# Patient Record
Sex: MALE | Race: BLACK OR AFRICAN AMERICAN | NOT HISPANIC OR LATINO | Employment: OTHER | URBAN - METROPOLITAN AREA
[De-identification: names, ages, dates, MRNs, and addresses within clinical notes are randomized per-mention and may not be internally consistent; named-entity substitution may affect disease eponyms.]

---

## 2019-05-07 ENCOUNTER — OFFICE VISIT (OUTPATIENT)
Dept: GASTROENTEROLOGY | Facility: CLINIC | Age: 69
End: 2019-05-07
Payer: MEDICARE

## 2019-05-07 VITALS
DIASTOLIC BLOOD PRESSURE: 95 MMHG | BODY MASS INDEX: 27.83 KG/M2 | OXYGEN SATURATION: 98 % | SYSTOLIC BLOOD PRESSURE: 142 MMHG | HEART RATE: 74 BPM | HEIGHT: 73 IN | WEIGHT: 210 LBS

## 2019-05-07 DIAGNOSIS — K21.00 GASTROESOPHAGEAL REFLUX DISEASE WITH ESOPHAGITIS: ICD-10-CM

## 2019-05-07 DIAGNOSIS — K57.30 DIVERTICULOSIS OF LARGE INTESTINE WITHOUT HEMORRHAGE: ICD-10-CM

## 2019-05-07 DIAGNOSIS — G89.4 CHRONIC PAIN SYNDROME: Primary | ICD-10-CM

## 2019-05-07 DIAGNOSIS — G89.29 CHRONIC BILATERAL LOW BACK PAIN WITHOUT SCIATICA: ICD-10-CM

## 2019-05-07 DIAGNOSIS — M54.50 CHRONIC BILATERAL LOW BACK PAIN WITHOUT SCIATICA: ICD-10-CM

## 2019-05-07 DIAGNOSIS — Z86.19 HEPATITIS C VIRUS INFECTION RESOLVED AFTER ANTIVIRAL DRUG THERAPY: ICD-10-CM

## 2019-05-07 PROCEDURE — 99999 PR PBB SHADOW E&M-EST. PATIENT-LVL III: ICD-10-PCS | Mod: PBBFAC,,, | Performed by: INTERNAL MEDICINE

## 2019-05-07 PROCEDURE — 99213 OFFICE O/P EST LOW 20 MIN: CPT | Mod: PBBFAC,PN | Performed by: INTERNAL MEDICINE

## 2019-05-07 PROCEDURE — 99203 OFFICE O/P NEW LOW 30 MIN: CPT | Mod: S$PBB,,, | Performed by: INTERNAL MEDICINE

## 2019-05-07 PROCEDURE — 99203 PR OFFICE/OUTPT VISIT, NEW, LEVL III, 30-44 MIN: ICD-10-PCS | Mod: S$PBB,,, | Performed by: INTERNAL MEDICINE

## 2019-05-07 PROCEDURE — 99999 PR PBB SHADOW E&M-EST. PATIENT-LVL III: CPT | Mod: PBBFAC,,, | Performed by: INTERNAL MEDICINE

## 2019-05-07 RX ORDER — HYDROCODONE BITARTRATE AND ACETAMINOPHEN 10; 325 MG/1; MG/1
1 TABLET ORAL EVERY 6 HOURS PRN
Qty: 100 TABLET | Refills: 0 | Status: SHIPPED | OUTPATIENT
Start: 2019-05-07 | End: 2019-06-07 | Stop reason: SDUPTHER

## 2019-05-07 RX ORDER — TRIAMTERENE AND HYDROCHLOROTHIAZIDE 75; 50 MG/1; MG/1
0.5 TABLET ORAL DAILY
COMMUNITY
Start: 2019-04-02

## 2019-05-07 RX ORDER — CHOLECALCIFEROL (VITAMIN D3) 25 MCG
1000 TABLET ORAL DAILY
COMMUNITY
End: 2023-08-14

## 2019-05-07 RX ORDER — METFORMIN HYDROCHLORIDE 1000 MG/1
1000 TABLET ORAL
COMMUNITY

## 2019-05-07 RX ORDER — HYDROCODONE BITARTRATE AND ACETAMINOPHEN 10; 325 MG/1; MG/1
1 TABLET ORAL EVERY 6 HOURS PRN
COMMUNITY
End: 2019-05-07 | Stop reason: SDUPTHER

## 2019-05-07 NOTE — PATIENT INSTRUCTIONS
He will continue his reflux regimen current medications vitamins and minerals.  He went for fiber to the diet and avoid constipation.  He is followed at the [facility] for follow-up for his chronic hepatitis-C.  He completed the therapy.  He has chronic back pain in his intolerant to the anti-inflammatory agents any uses his Lortab of correctly

## 2019-05-07 NOTE — PROGRESS NOTES
Subjective:       Patient ID: Garcia Renteria is a 69 y.o. male.    Chief Complaint: Medication Refill    He is trying to stay physically active.  He is going to the [facility] and is having physical therapy and also water therapy.  He has chronic back pain is had multiple surgeries.  He is intolerant to the anti-inflammatory agents which is caused abdominal pain and GI bleeding.  He uses the Norco 10 correctly. MS- is correct  he states the PPIs resolved the pyrosis dyspepsia.  It is a history of gastroesophageal reflux.  He denies dysphagia hematemesis hematochezia jaundice.  He has had his colonoscopy in the last 3 years.  He has a history of diverticulosis.  He denies constipation. He completed therapy for chronic hepatitis C and is cured.  He is followed by the [facility] who has scheduled abdominal ultrasounds and also performs his labs.  He states they have all been.  I have tried to request the results.  He denies tobacco alcohol.  He does have arthralgias knees.      Allergies:  Review of patient's allergies indicates:  No Known Allergies    Medications:    Current Outpatient Medications:     HYDROcodone-acetaminophen (NORCO)  mg per tablet, Take 1 tablet by mouth every 6 (six) hours as needed for Pain., Disp: 100 tablet, Rfl: 0    metFORMIN (GLUCOPHAGE) 1000 MG tablet, Take 1,000 mg by mouth daily with breakfast., Disp: , Rfl:     ranitidine (ZANTAC) 150 MG tablet, Take 150 mg by mouth nightly., Disp: , Rfl:     triamterene-hydrochlorothiazide 75-50 mg (MAXZIDE) 75-50 mg per tablet, Take 1 tablet by mouth once daily., Disp: , Rfl:     vitamin D (VITAMIN D3) 1000 units Tab, Take 1,000 Units by mouth once daily., Disp: , Rfl:     Past Medical History:   Diagnosis Date    Colitis     Diverticulosis        Past Surgical History:   Procedure Laterality Date    COLONOSCOPY      UPPER GASTROINTESTINAL ENDOSCOPY           Review of Systems   Constitutional: Negative for appetite change,  fever and unexpected weight change.   HENT: Negative for trouble swallowing.         No jaundice.   Respiratory: Negative for cough, shortness of breath and wheezing.         No Rales, Rhonchi, or Dyspnea.   Cardiovascular: Negative for chest pain.   Gastrointestinal: Negative for abdominal distention, abdominal pain, anal bleeding, blood in stool and constipation.        He has a history gastroesophageal reflux but he is currently symptom-free on his PPI.  He is having daily bowel movements.  He is ingesting adequate fiber he states he is taking his vitamins and minerals   Endocrine: Negative for cold intolerance and heat intolerance.   Genitourinary: Negative for difficulty urinating.   Musculoskeletal: Positive for arthralgias and back pain. Negative for neck pain.   Skin: Negative for pallor and rash.   Neurological: Negative for dizziness, seizures, syncope, speech difficulty, weakness and numbness.   Hematological: Negative for adenopathy.   Psychiatric/Behavioral: Negative for confusion.       Objective:      Physical Exam   Constitutional: He is oriented to person, place, and time. He appears well-developed and well-nourished.   Well-nourished well-hydrated nonicteric  male   HENT:   Head: Normocephalic.   Eyes: Pupils are equal, round, and reactive to light. EOM are normal.   Neck: Normal range of motion. Neck supple. No tracheal deviation present. No thyromegaly present.   Cardiovascular: Normal rate, regular rhythm and normal heart sounds.   Pulmonary/Chest: Effort normal and breath sounds normal.   Abdominal: Soft. Bowel sounds are normal. He exhibits no mass. There is no tenderness. There is no guarding.   The abdomen is soft mildly obese without organomegaly masses felt.  Bowel sounds are normal.  Tenderness is absent.  The liver is approximately 10 cm   Musculoskeletal: Normal range of motion.   The back reveals healed lumbar spine surgeries.  There is mild loss of curvature.    Lymphadenopathy:     He has no cervical adenopathy.   Neurological: He is alert and oriented to person, place, and time. No cranial nerve deficit.   Skin: Skin is warm and dry.   Psychiatric: He has a normal mood and affect. His behavior is normal.         Plan:       Chronic pain syndrome    Gastroesophageal reflux disease with esophagitis    Diverticulosis of large intestine without hemorrhage    Chronic bilateral low back pain without sciatica    Hepatitis C virus infection resolved after antiviral drug therapy    Other orders  -     HYDROcodone-acetaminophen (NORCO)  mg per tablet; Take 1 tablet by mouth every 6 (six) hours as needed for Pain.  Dispense: 100 tablet; Refill: 0

## 2019-06-07 ENCOUNTER — TELEPHONE (OUTPATIENT)
Dept: PAIN MEDICINE | Facility: CLINIC | Age: 69
End: 2019-06-07

## 2019-06-07 ENCOUNTER — OFFICE VISIT (OUTPATIENT)
Dept: GASTROENTEROLOGY | Facility: CLINIC | Age: 69
End: 2019-06-07
Payer: OTHER GOVERNMENT

## 2019-06-07 VITALS
WEIGHT: 210 LBS | OXYGEN SATURATION: 98 % | SYSTOLIC BLOOD PRESSURE: 114 MMHG | HEIGHT: 73 IN | HEART RATE: 79 BPM | BODY MASS INDEX: 27.83 KG/M2 | DIASTOLIC BLOOD PRESSURE: 76 MMHG

## 2019-06-07 DIAGNOSIS — K57.30 DIVERTICULOSIS OF LARGE INTESTINE WITHOUT HEMORRHAGE: ICD-10-CM

## 2019-06-07 DIAGNOSIS — M54.50 CHRONIC BILATERAL LOW BACK PAIN WITHOUT SCIATICA: Primary | ICD-10-CM

## 2019-06-07 DIAGNOSIS — K21.9 GASTROESOPHAGEAL REFLUX DISEASE WITHOUT ESOPHAGITIS: ICD-10-CM

## 2019-06-07 DIAGNOSIS — Z86.19 HEPATITIS C VIRUS INFECTION CURED AFTER ANTIVIRAL DRUG THERAPY: ICD-10-CM

## 2019-06-07 DIAGNOSIS — G89.29 CHRONIC BILATERAL LOW BACK PAIN WITHOUT SCIATICA: Primary | ICD-10-CM

## 2019-06-07 PROCEDURE — 99213 PR OFFICE/OUTPT VISIT, EST, LEVL III, 20-29 MIN: ICD-10-PCS | Mod: S$PBB,,, | Performed by: INTERNAL MEDICINE

## 2019-06-07 PROCEDURE — 99999 PR PBB SHADOW E&M-EST. PATIENT-LVL III: CPT | Mod: PBBFAC,,, | Performed by: INTERNAL MEDICINE

## 2019-06-07 PROCEDURE — 99999 PR PBB SHADOW E&M-EST. PATIENT-LVL III: ICD-10-PCS | Mod: PBBFAC,,, | Performed by: INTERNAL MEDICINE

## 2019-06-07 PROCEDURE — 99213 OFFICE O/P EST LOW 20 MIN: CPT | Mod: S$PBB,,, | Performed by: INTERNAL MEDICINE

## 2019-06-07 PROCEDURE — 99213 OFFICE O/P EST LOW 20 MIN: CPT | Mod: PBBFAC,PN | Performed by: INTERNAL MEDICINE

## 2019-06-07 RX ORDER — SILDENAFIL 100 MG/1
TABLET, FILM COATED ORAL
COMMUNITY
Start: 2019-05-14 | End: 2022-02-04 | Stop reason: SDUPTHER

## 2019-06-07 RX ORDER — HYDROCODONE BITARTRATE AND ACETAMINOPHEN 10; 325 MG/1; MG/1
1 TABLET ORAL EVERY 6 HOURS PRN
Qty: 100 TABLET | Refills: 0 | Status: SHIPPED | OUTPATIENT
Start: 2019-06-07 | End: 2019-09-04 | Stop reason: SDUPTHER

## 2019-06-07 NOTE — PATIENT INSTRUCTIONS
He will be referred to the Pain Clinic.  He has had multiple surgeries and has chronic back pain. He uses the Norco correctly.  His diabetes is well controlled and he will continue his diabetic diet vitamins and minerals.  I have requested his ultrasound labs from the [facility].  He will continue his gastrointestinal treatment with the reflux regimen and fiber to avoid constipation

## 2019-06-07 NOTE — PROGRESS NOTES
Noted his history and physical is thereSubjective:       Patient ID: Garcia Renteria is a 69 y.o. male.    Chief Complaint: Follow-up (1m) and Medication Refill    He goes to the [facility].  He had an ultrasound and labs obtained.  I have requested them.  He was told that they were normal. He has chronic back pain.  The Norco has worked very well. He states that he has had an cervical spine surgery lumbar spine surgery twice and ankle surgery.  He is intolerant to the anti-inflammatory agents.  He has a history gastroesophageal reflux but he states that he has not had pyrosis dyspepsia hematemesis hematochezia or dysphagia or aspiration.  The PPI is worked well.  His diabetes is well controlled on the metformin.  He is on the diabetic diet.  He has history of diverticulosis and is having daily bowel movements.  He denies fever chills.  He has completed his medicine for chronic hepatitis C and is cured. MS- he is correct                                                                                                              Medication Refill   This is a chronic problem. The current episode started more than 1 year ago. The problem occurs constantly. The problem has been unchanged. Associated symptoms include arthralgias and neck pain. Pertinent negatives include no abdominal pain, chest pain, coughing, fever, numbness, rash or weakness. The symptoms are aggravated by bending and walking. He has tried oral narcotics for the symptoms.       Allergies:  Review of patient's allergies indicates:  No Known Allergies    Medications:    Current Outpatient Medications:     HYDROcodone-acetaminophen (NORCO)  mg per tablet, Take 1 tablet by mouth every 6 (six) hours as needed for Pain., Disp: 100 tablet, Rfl: 0    metFORMIN (GLUCOPHAGE) 1000 MG tablet, Take 1,000 mg by mouth daily with breakfast., Disp: , Rfl:     ranitidine (ZANTAC) 150 MG tablet, Take 150 mg by mouth nightly., Disp: , Rfl:     sildenafil  (VIAGRA) 100 MG tablet, , Disp: , Rfl:     triamterene-hydrochlorothiazide 75-50 mg (MAXZIDE) 75-50 mg per tablet, Take 1 tablet by mouth once daily., Disp: , Rfl:     vitamin D (VITAMIN D3) 1000 units Tab, Take 1,000 Units by mouth once daily., Disp: , Rfl:     Past Medical History:   Diagnosis Date    Colitis     Diverticulosis        Past Surgical History:   Procedure Laterality Date    COLONOSCOPY      UPPER GASTROINTESTINAL ENDOSCOPY           Review of Systems   Constitutional: Negative for appetite change, fever and unexpected weight change.   HENT: Negative for trouble swallowing.         No jaundice.   Respiratory: Negative for cough, shortness of breath and wheezing.         He is a daily cigarette smoker.  He has been offered the smoking cessation program in the past but does not want to stop smoking.  He denies cardiopulmonary symptoms but he is not as physically active because of his back pain   Cardiovascular: Negative for chest pain.        Hypertension is well controlled   Gastrointestinal: Negative for abdominal distention, abdominal pain, anal bleeding and blood in stool.   Endocrine:        Diabetes is well controlled he states   Musculoskeletal: Positive for arthralgias, back pain and neck pain.        Ambulates with cane   Skin: Negative for pallor and rash.   Neurological: Negative for dizziness, seizures, syncope, speech difficulty, weakness and numbness.   Hematological: Negative for adenopathy.   Psychiatric/Behavioral: Negative for confusion.       Objective:      Physical Exam   Constitutional: He is oriented to person, place, and time. He appears well-developed and well-nourished.   Well-nourished well-hydrated nonicteric  male   HENT:   Head: Normocephalic.   Eyes: Pupils are equal, round, and reactive to light. EOM are normal.   Neck: Normal range of motion. Neck supple. No tracheal deviation present. No thyromegaly present.   Cardiovascular: Normal rate, regular  rhythm and normal heart sounds.   Pulmonary/Chest: Effort normal and breath sounds normal.   Abdominal: Soft. Bowel sounds are normal. He exhibits no distension and no mass. There is no tenderness. There is no guarding.   The abdomen is soft mildly obese without organomegaly or masses felt.  Bowel sounds are normal. Tenderness is absent   Musculoskeletal:   Decreased range of motion of his ankles and also he has lost the curvature of his spine.  There healed cervical spine scars and lumbar scars   Lymphadenopathy:     He has no cervical adenopathy.   Neurological: He is alert and oriented to person, place, and time. No cranial nerve deficit.   Skin: Skin is warm and dry.   Psychiatric: He has a normal mood and affect. His behavior is normal.   Vitals reviewed.        Plan:       Chronic bilateral low back pain without sciatica  -     Ambulatory Referral to Pain Clinic    Hepatitis C virus infection cured after antiviral drug therapy    Gastroesophageal reflux disease without esophagitis    Diverticulosis of large intestine without hemorrhage    Other orders  -     HYDROcodone-acetaminophen (NORCO)  mg per tablet; Take 1 tablet by mouth every 6 (six) hours as needed for Pain.  Dispense: 100 tablet; Refill: 0

## 2019-06-07 NOTE — PROGRESS NOTES
Subjective:       Patient ID: Garcia Renteria is a 69 y.o. male.    Chief Complaint: Follow-up (1m) and Medication Refill    Medication Refill         Allergies:  Review of patient's allergies indicates:  No Known Allergies    Medications:    Current Outpatient Medications:     HYDROcodone-acetaminophen (NORCO)  mg per tablet, Take 1 tablet by mouth every 6 (six) hours as needed for Pain., Disp: 100 tablet, Rfl: 0    metFORMIN (GLUCOPHAGE) 1000 MG tablet, Take 1,000 mg by mouth daily with breakfast., Disp: , Rfl:     ranitidine (ZANTAC) 150 MG tablet, Take 150 mg by mouth nightly., Disp: , Rfl:     sildenafil (VIAGRA) 100 MG tablet, , Disp: , Rfl:     triamterene-hydrochlorothiazide 75-50 mg (MAXZIDE) 75-50 mg per tablet, Take 1 tablet by mouth once daily., Disp: , Rfl:     vitamin D (VITAMIN D3) 1000 units Tab, Take 1,000 Units by mouth once daily., Disp: , Rfl:     Past Medical History:   Diagnosis Date    Colitis     Diverticulosis        Past Surgical History:   Procedure Laterality Date    COLONOSCOPY      UPPER GASTROINTESTINAL ENDOSCOPY           Review of Systems    Objective:      Physical Exam      Plan:       Chronic bilateral low back pain without sciatica  -     Ambulatory Referral to Pain Clinic    Hepatitis C virus infection cured after antiviral drug therapy    Gastroesophageal reflux disease without esophagitis    Diverticulosis of large intestine without hemorrhage    Other orders  -     HYDROcodone-acetaminophen (NORCO)  mg per tablet; Take 1 tablet by mouth every 6 (six) hours as needed for Pain.  Dispense: 100 tablet; Refill: 0

## 2019-06-07 NOTE — PROGRESS NOTES
Validity without doSubjective:       Patient ID: Garcia Renteria is a 69 y.o. male.    Chief Complaint: Follow-up (1m) and Medication Refill    Medication Refill         Allergies:  Review of patient's allergies indicates:  No Known Allergies    Medications:    Current Outpatient Medications:     HYDROcodone-acetaminophen (NORCO)  mg per tablet, Take 1 tablet by mouth every 6 (six) hours as needed for Pain., Disp: 100 tablet, Rfl: 0    metFORMIN (GLUCOPHAGE) 1000 MG tablet, Take 1,000 mg by mouth daily with breakfast., Disp: , Rfl:     ranitidine (ZANTAC) 150 MG tablet, Take 150 mg by mouth nightly., Disp: , Rfl:     sildenafil (VIAGRA) 100 MG tablet, , Disp: , Rfl:     triamterene-hydrochlorothiazide 75-50 mg (MAXZIDE) 75-50 mg per tablet, Take 1 tablet by mouth once daily., Disp: , Rfl:     vitamin D (VITAMIN D3) 1000 units Tab, Take 1,000 Units by mouth once daily., Disp: , Rfl:     Past Medical History:   Diagnosis Date    Colitis     Diverticulosis        Past Surgical History:   Procedure Laterality Date    COLONOSCOPY      UPPER GASTROINTESTINAL ENDOSCOPY           Review of Systems    Objective:      Physical Exam      Plan:       Chronic bilateral low back pain without sciatica  -     Ambulatory Referral to Pain Clinic    Hepatitis C virus infection cured after antiviral drug therapy    Gastroesophageal reflux disease without esophagitis    Diverticulosis of large intestine without hemorrhage    Other orders  -     HYDROcodone-acetaminophen (NORCO)  mg per tablet; Take 1 tablet by mouth every 6 (six) hours as needed for Pain.  Dispense: 100 tablet; Refill: 0

## 2019-06-07 NOTE — TELEPHONE ENCOUNTER
Spoke with patient while in clinic this am. Patient requested appointment with Dr. Mendez on 8/5/19, as he is seeing Dr. Elpidio Lau that day and he is driving from [place].   [facility] for patient advising of 930am with Dr. Mendez availability. If patient would like this appt, he will need to reschedule Dr. Elpidio Lau appt for later in the day.

## 2019-07-18 ENCOUNTER — TELEPHONE (OUTPATIENT)
Dept: PAIN MEDICINE | Facility: CLINIC | Age: 69
End: 2019-07-18

## 2019-08-01 ENCOUNTER — TELEPHONE (OUTPATIENT)
Dept: PAIN MEDICINE | Facility: CLINIC | Age: 69
End: 2019-08-01

## 2019-08-01 NOTE — TELEPHONE ENCOUNTER
M requesting call back to reschedule appointment with Dr. Mendez on 8/5/19 from 9:30am to 1:30pm.    Spoke with patient's wife, Khloe. States patient will take appointment at 1:30pm. Appointment time changed in chart. Provided new address of clinic. Patient's wife voiced understanding.

## 2019-08-05 ENCOUNTER — OFFICE VISIT (OUTPATIENT)
Dept: PAIN MEDICINE | Facility: CLINIC | Age: 69
End: 2019-08-05
Payer: MEDICARE

## 2019-08-05 VITALS
OXYGEN SATURATION: 97 % | WEIGHT: 213.31 LBS | TEMPERATURE: 98 F | SYSTOLIC BLOOD PRESSURE: 130 MMHG | RESPIRATION RATE: 16 BRPM | BODY MASS INDEX: 28.27 KG/M2 | HEIGHT: 73 IN | HEART RATE: 75 BPM | DIASTOLIC BLOOD PRESSURE: 79 MMHG

## 2019-08-05 DIAGNOSIS — M54.50 LOW BACK PAIN, NON-SPECIFIC: ICD-10-CM

## 2019-08-05 DIAGNOSIS — M51.36 DDD (DEGENERATIVE DISC DISEASE), LUMBAR: ICD-10-CM

## 2019-08-05 DIAGNOSIS — G89.4 CHRONIC PAIN DISORDER: Primary | ICD-10-CM

## 2019-08-05 DIAGNOSIS — Z98.890 H/O LUMBOSACRAL SPINE SURGERY: ICD-10-CM

## 2019-08-05 PROCEDURE — 99204 PR OFFICE/OUTPT VISIT, NEW, LEVL IV, 45-59 MIN: ICD-10-PCS | Mod: 25,S$PBB,, | Performed by: ANESTHESIOLOGY

## 2019-08-05 PROCEDURE — 99999 PR PBB SHADOW E&M-EST. PATIENT-LVL IV: ICD-10-PCS | Mod: PBBFAC,,, | Performed by: ANESTHESIOLOGY

## 2019-08-05 PROCEDURE — 99204 OFFICE O/P NEW MOD 45 MIN: CPT | Mod: 25,S$PBB,, | Performed by: ANESTHESIOLOGY

## 2019-08-05 PROCEDURE — 96372 PR INJECTION,THERAP/PROPH/DIAG2ST, IM OR SUBCUT: ICD-10-PCS | Mod: ,,, | Performed by: ANESTHESIOLOGY

## 2019-08-05 PROCEDURE — 96372 THER/PROPH/DIAG INJ SC/IM: CPT | Mod: PBBFAC,PO

## 2019-08-05 PROCEDURE — 96372 THER/PROPH/DIAG INJ SC/IM: CPT | Mod: ,,, | Performed by: ANESTHESIOLOGY

## 2019-08-05 PROCEDURE — 99214 OFFICE O/P EST MOD 30 MIN: CPT | Mod: PBBFAC,PO,25 | Performed by: ANESTHESIOLOGY

## 2019-08-05 PROCEDURE — 99999 PR PBB SHADOW E&M-EST. PATIENT-LVL IV: CPT | Mod: PBBFAC,,, | Performed by: ANESTHESIOLOGY

## 2019-08-05 RX ORDER — GABAPENTIN 300 MG/1
600 CAPSULE ORAL NIGHTLY
Qty: 60 CAPSULE | Refills: 5 | Status: SHIPPED | OUTPATIENT
Start: 2019-08-05 | End: 2019-10-07 | Stop reason: SDUPTHER

## 2019-08-05 NOTE — LETTER
August 6, 2019      Elpidio Lau MD  [address] [place] [address]           [facility]  [address] [place] [address]  Phone: [phone]  Fax: [phone]          Patient: Garcia Renteria   MR Number: [id number]   YOB: 1950   Date of Visit: 8/5/2019       Dear Dr. Elpidio Lau:    Thank you for referring Garcia Renteria to me for evaluation. Attached you will find relevant portions of my assessment and plan of care.    If you have questions, please do not hesitate to call me. I look forward to following Garcia Renteria along with you.    Sincerely,    Estephania Mendez MD    Enclosure  CC:  No Recipients    If you would like to receive this communication electronically, please contact [email or web address] or [phone] to request more information on [employer] access.    For providers and/or their staff who would like to refer a patient to [facility], please contact us through our one-stop-shop provider referral line, [facility] , at [phone].    If you feel you have received this communication in error or would no longer like to receive these types of communications, please e-mail [email or web address]
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
## 2019-08-05 NOTE — PATIENT INSTRUCTIONS
"We are going to start gabapentin for your low back pain.  To help with side effects, I recommend starting this slowly.  This medication may make you sleepy, so do not drive until you know how it affect you.  Start this medication at bedtime.  If it does not make you too sleepy, you can increase this as listed below to 2 capsules at bedtime.  Some people choose to only take this at night.  Other side effects are generally mild and include diarrhea, nausea, and "forgetfullness."    Gabapentin dose schedule:  - Start with one capsule at night for 7 days   - Then increase to two capsules at night  "

## 2019-08-06 ENCOUNTER — HOSPITAL ENCOUNTER (OUTPATIENT)
Dept: RADIOLOGY | Facility: HOSPITAL | Age: 69
Discharge: HOME | End: 2019-08-06
Attending: ANESTHESIOLOGY
Payer: MEDICARE

## 2019-08-06 DIAGNOSIS — M54.50 LOW BACK PAIN, NON-SPECIFIC: ICD-10-CM

## 2019-08-06 DIAGNOSIS — G89.4 CHRONIC PAIN DISORDER: ICD-10-CM

## 2019-08-06 DIAGNOSIS — M51.36 DDD (DEGENERATIVE DISC DISEASE), LUMBAR: ICD-10-CM

## 2019-08-06 DIAGNOSIS — Z98.890 H/O LUMBOSACRAL SPINE SURGERY: ICD-10-CM

## 2019-08-06 PROCEDURE — 72148 MRI LUMBAR SPINE W/O DYE: CPT | Mod: 26,,, | Performed by: RADIOLOGY

## 2019-08-06 PROCEDURE — 72148 MRI LUMBAR SPINE WITHOUT CONTRAST: ICD-10-PCS | Mod: 26,,, | Performed by: RADIOLOGY

## 2019-08-06 PROCEDURE — 72110 XR LUMBAR SPINE 5 VIEW WITH FLEX AND EXT: ICD-10-PCS | Mod: 26,,, | Performed by: RADIOLOGY

## 2019-08-06 PROCEDURE — 72148 MRI LUMBAR SPINE W/O DYE: CPT | Mod: TC

## 2019-08-06 PROCEDURE — 72110 X-RAY EXAM L-2 SPINE 4/>VWS: CPT | Mod: 26,,, | Performed by: RADIOLOGY

## 2019-08-06 PROCEDURE — 72110 X-RAY EXAM L-2 SPINE 4/>VWS: CPT | Mod: TC,FY

## 2019-08-06 RX ORDER — METHYLPREDNISOLONE ACETATE 80 MG/ML
80 INJECTION, SUSPENSION INTRA-ARTICULAR; INTRALESIONAL; INTRAMUSCULAR; SOFT TISSUE
Status: COMPLETED | OUTPATIENT
Start: 2019-08-06 | End: 2019-08-06

## 2019-08-06 RX ORDER — LIDOCAINE HYDROCHLORIDE 10 MG/ML
2 INJECTION INFILTRATION; PERINEURAL
Status: COMPLETED | OUTPATIENT
Start: 2019-08-06 | End: 2019-08-06

## 2019-08-06 RX ADMIN — METHYLPREDNISOLONE ACETATE 80 MG: 80 INJECTION, SUSPENSION INTRA-ARTICULAR; INTRALESIONAL; INTRAMUSCULAR; SOFT TISSUE at 05:08

## 2019-08-06 RX ADMIN — LIDOCAINE HYDROCHLORIDE 2 ML: 10 INJECTION INFILTRATION; PERINEURAL at 05:08

## 2019-08-07 ENCOUNTER — OFFICE VISIT (OUTPATIENT)
Dept: GASTROENTEROLOGY | Facility: CLINIC | Age: 69
End: 2019-08-07
Payer: MEDICARE

## 2019-08-07 VITALS
DIASTOLIC BLOOD PRESSURE: 74 MMHG | OXYGEN SATURATION: 98 % | HEIGHT: 73 IN | SYSTOLIC BLOOD PRESSURE: 124 MMHG | WEIGHT: 209 LBS | BODY MASS INDEX: 27.7 KG/M2 | RESPIRATION RATE: 18 BRPM | TEMPERATURE: 98 F | HEART RATE: 71 BPM

## 2019-08-07 DIAGNOSIS — K21.00 GASTROESOPHAGEAL REFLUX DISEASE WITH ESOPHAGITIS: ICD-10-CM

## 2019-08-07 DIAGNOSIS — M54.41 CHRONIC BILATERAL LOW BACK PAIN WITH RIGHT-SIDED SCIATICA: ICD-10-CM

## 2019-08-07 DIAGNOSIS — G89.4 CHRONIC PAIN SYNDROME: Primary | ICD-10-CM

## 2019-08-07 DIAGNOSIS — G89.29 CHRONIC BILATERAL LOW BACK PAIN WITH RIGHT-SIDED SCIATICA: ICD-10-CM

## 2019-08-07 DIAGNOSIS — K57.30 DIVERTICULOSIS OF LARGE INTESTINE WITHOUT HEMORRHAGE: ICD-10-CM

## 2019-08-07 PROCEDURE — 99213 OFFICE O/P EST LOW 20 MIN: CPT | Mod: S$PBB,,, | Performed by: INTERNAL MEDICINE

## 2019-08-07 PROCEDURE — 99213 OFFICE O/P EST LOW 20 MIN: CPT | Mod: PBBFAC,PN | Performed by: INTERNAL MEDICINE

## 2019-08-07 PROCEDURE — 99999 PR PBB SHADOW E&M-EST. PATIENT-LVL III: CPT | Mod: PBBFAC,,, | Performed by: INTERNAL MEDICINE

## 2019-08-07 PROCEDURE — 99213 PR OFFICE/OUTPT VISIT, EST, LEVL III, 20-29 MIN: ICD-10-PCS | Mod: S$PBB,,, | Performed by: INTERNAL MEDICINE

## 2019-08-07 PROCEDURE — 99999 PR PBB SHADOW E&M-EST. PATIENT-LVL III: ICD-10-PCS | Mod: PBBFAC,,, | Performed by: INTERNAL MEDICINE

## 2019-08-07 RX ORDER — OMEPRAZOLE 20 MG/1
20 CAPSULE, DELAYED RELEASE ORAL DAILY
Qty: 90 CAPSULE | Refills: 1 | Status: SHIPPED | OUTPATIENT
Start: 2019-08-07 | End: 2020-01-02 | Stop reason: SDUPTHER

## 2019-08-07 NOTE — PATIENT INSTRUCTIONS
He will follow up with the Pain Clinic.  He uses the Norco correctly.  The ranitidine has not controlled his reflux and he will be started on the omeprazole 20 mg daily.  He will make a food diary and avoid the offending foods.  He continues his other medications vitamins and minerals.  Discharge Instructions for Gastroesophageal Reflux Disease (GERD)  Gastroesophageal reflux disease (GERD) is a backflow of acid from the stomach into the swallowing tube (esophagus).  Home care  These home care steps can help you manage GERD:  · Maintain a healthy weight. Get help to lose any extra pounds.  · Avoid lying down after meals.  · Avoid eating late at night.  · Elevate the head of your bed by 6 inches. You can do this by placing wooden blocks or bed risers under the head of your bed.  · Avoid wearing tight-fitting clothes.  · Avoid foods that might irritate your stomach, such as the following:  ¨ Alcohol  ¨ Fat  ¨ Chocolate  ¨ Caffeine  ¨ Spearmint or peppermint  · Talk to your healthcare provider if you are taking any of the following medicines. These medicines can make GERD symptoms worse:  ¨ Calcium channel blockers  ¨ Theophylline  ¨ Anticholinergic medicines, such as oxybutynin and benzatropine  · Begin an exercise program. Ask your healthcare provider how to get started. You can benefit from simple activities, such as walking or gardening.  · Break the smoking habit. Enroll in a stop-smoking program to improve your chances of success.  · Limit alcohol intake to no more than 2 drinks a day.  · Take your medicines exactly as directed. Dont skip doses.  · Avoid over-the-counter nonsteroidal anti-inflammatory medicines, such as aspirin and ibuprofen, unless recommended by your healthcare provider for certain conditions.   · If possible, avoid nitrates (heart medicines, such as nitroglycerin and isosorbide dinitrate ).  Follow-up care  Make a follow-up appointment as directed by our staff.     When to call the healthcare  provider  Call your healthcare provider immediately if you have any of the following:  · Trouble swallowing  · Pain when swallowing  · Feeling of food caught in your chest or throat  · Pain in the neck, chest, or back  · Heartburn that causes you to vomit  · Vomiting blood  · Black or tarry stools (from digested blood)  · More saliva (watering of the mouth) than usual  · Weight loss of more than 3% to 5% of your total body weight in a month  · Hoarseness or sore throat that wont go away  · Choking, coughing, or wheezing   Date Last Reviewed: 7/1/2016  © 3147-6024 [employer]. [address] [place] [address], [place] [address]. All rights reserved. This information is not intended as a substitute for professional medical care. Always follow your healthcare professional's instructions.

## 2019-08-07 NOTE — PROGRESS NOTES
Subjective:       Patient ID: Garcia Renteria is a 69 y.o. male.    Chief Complaint: Follow-up (2 m)    He has history of gastroesophageal reflux and diverticulosis.  He has had a recent colonoscopy.  He states the ranitidine has not helped him.  He is having pyrosis but he denies dysphagia aspiration.  He denies hematemesis hematochezia jaundice or bleeding.  He generally has daily bowel movements.  He has a history of chronic hepatitis C and responded to therapy and is considered a cure.  He denies jaundice or alcohol.  He is having daily bowel movements.      Allergies:  Review of patient's allergies indicates:  No Known Allergies    Medications:    Current Outpatient Medications:     gabapentin (NEURONTIN) 300 MG capsule, Take 2 capsules (600 mg total) by mouth every evening., Disp: 60 capsule, Rfl: 5    HYDROcodone-acetaminophen (NORCO)  mg per tablet, Take 1 tablet by mouth every 6 (six) hours as needed for Pain., Disp: 100 tablet, Rfl: 0    metFORMIN (GLUCOPHAGE) 1000 MG tablet, Take 1,000 mg by mouth daily with breakfast., Disp: , Rfl:     ranitidine (ZANTAC) 150 MG tablet, Take 150 mg by mouth nightly., Disp: , Rfl:     sildenafil (VIAGRA) 100 MG tablet, , Disp: , Rfl:     triamterene-hydrochlorothiazide 75-50 mg (MAXZIDE) 75-50 mg per tablet, Take 1 tablet by mouth once daily., Disp: , Rfl:     vitamin D (VITAMIN D3) 1000 units Tab, Take 1,000 Units by mouth once daily., Disp: , Rfl:     omeprazole (PRILOSEC) 20 MG capsule, Take 1 capsule (20 mg total) by mouth once daily., Disp: 90 capsule, Rfl: 1  No current facility-administered medications for this visit.     Past Medical History:   Diagnosis Date    Colitis     Diabetes mellitus type I     Diverticulosis        Past Surgical History:   Procedure Laterality Date    ANKLE SURGERY Bilateral     BACK SURGERY      COLONOSCOPY      lower back surgery      NECK SURGERY      UPPER GASTROINTESTINAL ENDOSCOPY           Review of Systems    Constitutional: Negative for appetite change, fever and unexpected weight change.   HENT: Negative for trouble swallowing.         No jaundice.   Respiratory: Negative for cough, shortness of breath and wheezing.         He said every day cigarette smoker.  He denies cardiopulmonary symptoms but is not physically active.  He denies chronic cough or aspiration.   Cardiovascular: Negative for chest pain.        He states his hypertension well controlled.  He denies cardiopulmonary symptoms   Gastrointestinal: Negative for abdominal distention, abdominal pain, anal bleeding, blood in stool, constipation, diarrhea, nausea, rectal pain and vomiting.   Endocrine:        He states his diabetes is well controlled.  He is on the ADA diet.   Musculoskeletal: Positive for arthralgias, back pain, neck pain and neck stiffness.        He complains of increase in severe back pain with radiation to the right leg.  He has not been physically active.  He is being evaluated in the Pain Clinic.  He has scans and x-rays scheduled to have his back.  He has had 2 back surgeries 1 cervical spine surgery.  He is having difficulty now with numbness of the right leg.  He is having difficulty ambulating even with his cane.  He denies falling.  He uses the Norco correctly.  He avoids the anti-inflammatory agents.  He is intolerant to them.MS- is correct   Skin: Negative for pallor and rash.   Neurological: Negative for dizziness, seizures, syncope, speech difficulty, weakness and numbness.   Hematological: Negative for adenopathy.   Psychiatric/Behavioral: Negative for confusion.       Objective:      Physical Exam   Constitutional: He is oriented to person, place, and time. He appears well-developed and well-nourished.   Well-nourished well-hydrated nonicteric  male   HENT:   Head: Normocephalic.   Eyes: Pupils are equal, round, and reactive to light. EOM are normal.   Neck: Normal range of motion. Neck supple. No tracheal  deviation present. No thyromegaly present.   Cardiovascular: Normal rate, regular rhythm and normal heart sounds.   Pulmonary/Chest: Effort normal and breath sounds normal.   Abdominal: Soft. Bowel sounds are normal. He exhibits no distension and no mass. There is no tenderness. There is no rebound and no guarding.   Musculoskeletal:   His gait is unsteady.  He needs his cane for ambulation.  He leans forward is he walks.  He can get from the site sitting to the standing position with the assistance the cane.  He states that if he straightens his back he will have extreme weakness in his legs and feels as if he would fall.   Lymphadenopathy:     He has no cervical adenopathy.   Neurological: He is alert and oriented to person, place, and time. No cranial nerve deficit.   Skin: Skin is warm and dry.   Psychiatric: He has a normal mood and affect. His behavior is normal.   Vitals reviewed.        Plan:       Chronic pain syndrome    Chronic bilateral low back pain with right-sided sciatica    Gastroesophageal reflux disease with esophagitis    Diverticulosis of large intestine without hemorrhage    Other orders  -     omeprazole (PRILOSEC) 20 MG capsule; Take 1 capsule (20 mg total) by mouth once daily.  Dispense: 90 capsule; Refill: 1

## 2019-08-07 NOTE — PROGRESS NOTES
He will continue her reflux regimen.  He started on the omeprazole and he can stop the ranitidine.  He will continue his therapy and evaluation in the Pain Clinic.  He is using the Norco correctly.  He will avoid the anti-inflammatory agents.  He continues the ADA diet with fiber supplements.  He will consider smoking cessation program.  He is not using alcohol.

## 2019-08-27 ENCOUNTER — OFFICE VISIT (OUTPATIENT)
Dept: PAIN MEDICINE | Facility: CLINIC | Age: 69
End: 2019-08-27
Payer: MEDICARE

## 2019-08-27 VITALS
TEMPERATURE: 98 F | WEIGHT: 214.31 LBS | OXYGEN SATURATION: 98 % | RESPIRATION RATE: 18 BRPM | DIASTOLIC BLOOD PRESSURE: 75 MMHG | HEIGHT: 73 IN | HEART RATE: 74 BPM | BODY MASS INDEX: 28.4 KG/M2 | SYSTOLIC BLOOD PRESSURE: 117 MMHG

## 2019-08-27 DIAGNOSIS — M79.18 MYOFASCIAL PAIN: ICD-10-CM

## 2019-08-27 DIAGNOSIS — Z98.890 H/O LUMBOSACRAL SPINE SURGERY: ICD-10-CM

## 2019-08-27 DIAGNOSIS — M51.36 DDD (DEGENERATIVE DISC DISEASE), LUMBAR: ICD-10-CM

## 2019-08-27 DIAGNOSIS — G89.4 CHRONIC PAIN DISORDER: Primary | ICD-10-CM

## 2019-08-27 DIAGNOSIS — M48.062 SPINAL STENOSIS OF LUMBAR REGION WITH NEUROGENIC CLAUDICATION: ICD-10-CM

## 2019-08-27 PROCEDURE — 20552 NJX 1/MLT TRIGGER POINT 1/2: CPT | Mod: S$PBB,,, | Performed by: ANESTHESIOLOGY

## 2019-08-27 PROCEDURE — 20552 PR INJECT TRIGGER POINT, 1 OR 2: ICD-10-PCS | Mod: S$PBB,,, | Performed by: ANESTHESIOLOGY

## 2019-08-27 PROCEDURE — 96372 THER/PROPH/DIAG INJ SC/IM: CPT | Mod: PBBFAC,PO

## 2019-08-27 PROCEDURE — 99999 PR PBB SHADOW E&M-EST. PATIENT-LVL IV: CPT | Mod: PBBFAC,,, | Performed by: ANESTHESIOLOGY

## 2019-08-27 PROCEDURE — 99214 OFFICE O/P EST MOD 30 MIN: CPT | Mod: S$PBB,25,, | Performed by: ANESTHESIOLOGY

## 2019-08-27 PROCEDURE — 99999 PR PBB SHADOW E&M-EST. PATIENT-LVL IV: ICD-10-PCS | Mod: PBBFAC,,, | Performed by: ANESTHESIOLOGY

## 2019-08-27 PROCEDURE — 99214 OFFICE O/P EST MOD 30 MIN: CPT | Mod: PBBFAC,PO,25 | Performed by: ANESTHESIOLOGY

## 2019-08-27 PROCEDURE — 20552 NJX 1/MLT TRIGGER POINT 1/2: CPT | Mod: PBBFAC,PO | Performed by: ANESTHESIOLOGY

## 2019-08-27 PROCEDURE — 99214 PR OFFICE/OUTPT VISIT, EST, LEVL IV, 30-39 MIN: ICD-10-PCS | Mod: S$PBB,25,, | Performed by: ANESTHESIOLOGY

## 2019-08-27 RX ORDER — BUPIVACAINE HYDROCHLORIDE 5 MG/ML
2 INJECTION, SOLUTION EPIDURAL; INTRACAUDAL ONCE
Status: COMPLETED | OUTPATIENT
Start: 2019-08-27 | End: 2019-08-27

## 2019-08-27 RX ORDER — METHYLPREDNISOLONE ACETATE 80 MG/ML
80 INJECTION, SUSPENSION INTRA-ARTICULAR; INTRALESIONAL; INTRAMUSCULAR; SOFT TISSUE
Status: COMPLETED | OUTPATIENT
Start: 2019-08-27 | End: 2019-08-27

## 2019-08-27 RX ADMIN — METHYLPREDNISOLONE ACETATE 80 MG: 80 INJECTION, SUSPENSION INTRA-ARTICULAR; INTRALESIONAL; INTRAMUSCULAR; SOFT TISSUE at 05:08

## 2019-08-27 RX ADMIN — BUPIVACAINE HYDROCHLORIDE 10 MG: 5 INJECTION, SOLUTION EPIDURAL; INTRACAUDAL; PERINEURAL at 05:08

## 2019-08-27 NOTE — LETTER
August 29, 2019      Elpidio Lau MD  [address] [place] [address]           [facility]  [address] [facility] [place] [address]  Phone: [phone]  Fax: [phone]          Patient: Garcia Renteria   MR Number: [id number]   YOB: 1950   Date of Visit: 8/27/2019       Dear Dr. Elpidio Lau:    Thank you for referring Garcia Renteria to me for evaluation. Attached you will find relevant portions of my assessment and plan of care.    If you have questions, please do not hesitate to call me. I look forward to following Garcia Renteria along with you.    Sincerely,    Estephania Mendez MD    Enclosure  CC:  No Recipients    If you would like to receive this communication electronically, please contact [email or web address] or [phone] to request more information on [employer] access.    For providers and/or their staff who would like to refer a patient to [facility], please contact us through our one-stop-shop provider referral line, [facility], at [phone].    If you feel you have received this communication in error or would no longer like to receive these types of communications, please e-mail [email or web address]
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
## 2019-08-27 NOTE — PATIENT INSTRUCTIONS
Recommend calling [facility]'s financial assistance department to see about co-pay assistance.  Their number is [phone]

## 2019-08-29 DIAGNOSIS — M51.36 DDD (DEGENERATIVE DISC DISEASE), LUMBAR: ICD-10-CM

## 2019-08-29 DIAGNOSIS — M54.16 LUMBAR RADICULOPATHY: Primary | ICD-10-CM

## 2019-09-04 ENCOUNTER — TELEPHONE (OUTPATIENT)
Dept: NEUROSURGERY | Facility: CLINIC | Age: 69
End: 2019-09-04

## 2019-09-04 ENCOUNTER — OFFICE VISIT (OUTPATIENT)
Dept: GASTROENTEROLOGY | Facility: CLINIC | Age: 69
End: 2019-09-04
Payer: MEDICARE

## 2019-09-04 VITALS
OXYGEN SATURATION: 97 % | BODY MASS INDEX: 28.23 KG/M2 | HEIGHT: 73 IN | RESPIRATION RATE: 18 BRPM | WEIGHT: 213 LBS | HEART RATE: 70 BPM | SYSTOLIC BLOOD PRESSURE: 146 MMHG | DIASTOLIC BLOOD PRESSURE: 87 MMHG

## 2019-09-04 DIAGNOSIS — K21.9 GASTROESOPHAGEAL REFLUX DISEASE WITHOUT ESOPHAGITIS: ICD-10-CM

## 2019-09-04 DIAGNOSIS — G89.4 CHRONIC PAIN SYNDROME: Primary | ICD-10-CM

## 2019-09-04 DIAGNOSIS — K57.30 DIVERTICULOSIS OF LARGE INTESTINE WITHOUT HEMORRHAGE: ICD-10-CM

## 2019-09-04 PROCEDURE — 99213 OFFICE O/P EST LOW 20 MIN: CPT | Mod: S$PBB,,, | Performed by: INTERNAL MEDICINE

## 2019-09-04 PROCEDURE — 99999 PR PBB SHADOW E&M-EST. PATIENT-LVL III: ICD-10-PCS | Mod: PBBFAC,,, | Performed by: INTERNAL MEDICINE

## 2019-09-04 PROCEDURE — 99213 PR OFFICE/OUTPT VISIT, EST, LEVL III, 20-29 MIN: ICD-10-PCS | Mod: S$PBB,,, | Performed by: INTERNAL MEDICINE

## 2019-09-04 PROCEDURE — 99213 OFFICE O/P EST LOW 20 MIN: CPT | Mod: PBBFAC,PN | Performed by: INTERNAL MEDICINE

## 2019-09-04 PROCEDURE — 99999 PR PBB SHADOW E&M-EST. PATIENT-LVL III: CPT | Mod: PBBFAC,,, | Performed by: INTERNAL MEDICINE

## 2019-09-04 RX ORDER — HYDROCODONE BITARTRATE AND ACETAMINOPHEN 10; 325 MG/1; MG/1
1 TABLET ORAL EVERY 6 HOURS PRN
Qty: 100 TABLET | Refills: 0 | Status: SHIPPED | OUTPATIENT
Start: 2019-09-04 | End: 2019-10-07 | Stop reason: SDUPTHER

## 2019-09-04 NOTE — PROGRESS NOTES
Subjective:       Patient ID: Garcia Renteria is a 69 y.o. male.    Chief Complaint: Follow-up    The omeprazole and ranitidine have resolved his upper GI symptoms.  He denies hematemesis hematochezia jaundice or bleeding.  He has had a injection by the Pain Clinic and with the gabapentin has noted some improvement in the neuropathy.  He has severe pain and difficulty with walking in spite of using the cane.  He was referred to the neurosurgeon.  He has had multiple back surgeries.  The Norco was helped.  He is intolerant to the anti-inflammatory agents but he states nothing can get rid of this back pain .      Allergies:  Review of patient's allergies indicates:  No Known Allergies    Medications:    Current Outpatient Medications:     gabapentin (NEURONTIN) 300 MG capsule, Take 2 capsules (600 mg total) by mouth every evening., Disp: 60 capsule, Rfl: 5    HYDROcodone-acetaminophen (NORCO)  mg per tablet, Take 1 tablet by mouth every 6 (six) hours as needed for Pain., Disp: 100 tablet, Rfl: 0    metFORMIN (GLUCOPHAGE) 1000 MG tablet, Take 1,000 mg by mouth daily with breakfast., Disp: , Rfl:     omeprazole (PRILOSEC) 20 MG capsule, Take 1 capsule (20 mg total) by mouth once daily., Disp: 90 capsule, Rfl: 1    ranitidine (ZANTAC) 150 MG tablet, Take 150 mg by mouth nightly., Disp: , Rfl:     sildenafil (VIAGRA) 100 MG tablet, , Disp: , Rfl:     triamterene-hydrochlorothiazide 75-50 mg (MAXZIDE) 75-50 mg per tablet, Take 1 tablet by mouth once daily., Disp: , Rfl:     vitamin D (VITAMIN D3) 1000 units Tab, Take 1,000 Units by mouth once daily., Disp: , Rfl:     Past Medical History:   Diagnosis Date    Colitis     Diabetes mellitus type I     Diverticulosis        Past Surgical History:   Procedure Laterality Date    ANKLE SURGERY Bilateral     BACK SURGERY      COLONOSCOPY      lower back surgery      NECK SURGERY      UPPER GASTROINTESTINAL ENDOSCOPY           Review of Systems    Constitutional: Negative for appetite change, fever and unexpected weight change.   HENT: Negative for trouble swallowing.         No jaundice.   Respiratory: Negative for cough, shortness of breath and wheezing.         He is a chronic cigarette smoker.  He is not particularly physically active.  He denies aspiration.  He has occasional coughing without sputum production.  He denies dyspnea on exertion with his limited activity.  He denies   Cardiovascular: Negative for chest pain.        He states his hypertension is well controlled except when I have severe back pain  .  He denies chest pain or rhythm disturbances.   Gastrointestinal: Negative for abdominal distention, abdominal pain, anal bleeding, blood in stool, constipation, diarrhea and nausea.        He will still hold off on GI evaluation such as upper endoscopy and colonoscopy until is he has had his back surgery.  He he states that the PPI with the ranitidine have resolved the pyrosis.  She has had occasional nausea.  He denies jaundice or dysphagia.  She is having daily bowel movements.  His last colonoscopy was at least 5 years ago.  He is having daily bowel movements with fiber supplements and or MiraLax.   Musculoskeletal: Positive for arthralgias, back pain and gait problem. Negative for neck pain.   Skin: Negative for pallor and rash.   Neurological: Negative for dizziness, seizures, syncope, speech difficulty, weakness and numbness.   Hematological: Negative for adenopathy.   Psychiatric/Behavioral: Negative for confusion.       Objective:      Physical Exam   Constitutional: He is oriented to person, place, and time. He appears well-developed and well-nourished.   Thin nonicteric  male.   HENT:   Head: Normocephalic.   Eyes: Pupils are equal, round, and reactive to light. EOM are normal.   Neck: Normal range of motion. Neck supple. No tracheal deviation present. No thyromegaly present.   Cardiovascular: Normal rate, regular  rhythm and normal heart sounds.   Pulmonary/Chest: Effort normal and breath sounds normal.   Abdominal: Soft. Bowel sounds are normal. He exhibits no distension and no mass. There is no tenderness. There is no rebound and no guarding. No hernia.   Abdomen is soft without tenderness masses organomegaly.  Bowel sounds are normal.   Musculoskeletal:   He ambulates poorly with a cane.  His posture is stouped over with a limp he is able to get from the sitting to the standing position.   Lymphadenopathy:     He has no cervical adenopathy.   Neurological: He is alert and oriented to person, place, and time. No cranial nerve deficit.   Skin: Skin is warm and dry.   Psychiatric: He has a normal mood and affect. His behavior is normal.   Vitals reviewed.        Plan:       Chronic pain syndrome    Diverticulosis of large intestine without hemorrhage    Gastroesophageal reflux disease without esophagitis    Other orders  -     HYDROcodone-acetaminophen (NORCO)  mg per tablet; Take 1 tablet by mouth every 6 (six) hours as needed for Pain.  Dispense: 100 tablet; Refill: 0     He will continue his reflux regimen current medications vitamins and minerals.  He continues his bowel program to avoid constipation. He will follow up in the Pain Clinic and also with the neurosurgeon.

## 2019-09-04 NOTE — TELEPHONE ENCOUNTER
----- Message from Carol Her LPN sent at 9/4/2019  9:58 AM CDT -----  Pt referred to your clinic by Dr. Mendez. Please schedule this pt for a consult. Thank you.

## 2019-09-04 NOTE — PATIENT INSTRUCTIONS
He will continue his reflux regimen current medications.  Vitamins and minerals  He continues his bowel program to avoid constipation.  He will follow up in the Pain Clinic.  He is referred to the neurosurgeon as directed.  He continues his vitamins minerals.  He will use his Norco correctly.

## 2019-09-04 NOTE — PROGRESS NOTES
MS- is correct for the Norco usage.  He continues his medications as directed.  He will avoid the anti-inflammatory agents because of his intolerance and history of GI bleeding and gastric upset.

## 2019-10-07 ENCOUNTER — OFFICE VISIT (OUTPATIENT)
Dept: GASTROENTEROLOGY | Facility: CLINIC | Age: 69
End: 2019-10-07
Payer: MEDICARE

## 2019-10-07 VITALS
DIASTOLIC BLOOD PRESSURE: 87 MMHG | HEART RATE: 76 BPM | OXYGEN SATURATION: 98 % | RESPIRATION RATE: 16 BRPM | SYSTOLIC BLOOD PRESSURE: 131 MMHG | BODY MASS INDEX: 28.1 KG/M2 | WEIGHT: 212 LBS | HEIGHT: 73 IN

## 2019-10-07 DIAGNOSIS — G89.4 CHRONIC PAIN DISORDER: ICD-10-CM

## 2019-10-07 DIAGNOSIS — G89.4 CHRONIC PAIN SYNDROME: Primary | ICD-10-CM

## 2019-10-07 DIAGNOSIS — K21.00 GASTROESOPHAGEAL REFLUX DISEASE WITH ESOPHAGITIS: ICD-10-CM

## 2019-10-07 DIAGNOSIS — K21.9 GASTROESOPHAGEAL REFLUX DISEASE WITHOUT ESOPHAGITIS: ICD-10-CM

## 2019-10-07 DIAGNOSIS — K57.30 DIVERTICULOSIS OF LARGE INTESTINE WITHOUT HEMORRHAGE: ICD-10-CM

## 2019-10-07 DIAGNOSIS — M54.50 LOW BACK PAIN, NON-SPECIFIC: ICD-10-CM

## 2019-10-07 DIAGNOSIS — M51.36 DDD (DEGENERATIVE DISC DISEASE), LUMBAR: ICD-10-CM

## 2019-10-07 DIAGNOSIS — Z98.890 H/O LUMBOSACRAL SPINE SURGERY: ICD-10-CM

## 2019-10-07 PROBLEM — M51.369 DDD (DEGENERATIVE DISC DISEASE), LUMBAR: Status: ACTIVE | Noted: 2019-10-07

## 2019-10-07 PROCEDURE — 99213 OFFICE O/P EST LOW 20 MIN: CPT | Mod: S$PBB,,, | Performed by: INTERNAL MEDICINE

## 2019-10-07 PROCEDURE — 99213 OFFICE O/P EST LOW 20 MIN: CPT | Mod: PBBFAC,PN | Performed by: INTERNAL MEDICINE

## 2019-10-07 PROCEDURE — 99999 PR PBB SHADOW E&M-EST. PATIENT-LVL III: CPT | Mod: PBBFAC,,, | Performed by: INTERNAL MEDICINE

## 2019-10-07 PROCEDURE — 99999 PR PBB SHADOW E&M-EST. PATIENT-LVL III: ICD-10-PCS | Mod: PBBFAC,,, | Performed by: INTERNAL MEDICINE

## 2019-10-07 PROCEDURE — 99213 PR OFFICE/OUTPT VISIT, EST, LEVL III, 20-29 MIN: ICD-10-PCS | Mod: S$PBB,,, | Performed by: INTERNAL MEDICINE

## 2019-10-07 RX ORDER — GABAPENTIN 300 MG/1
600 CAPSULE ORAL NIGHTLY
Qty: 60 CAPSULE | Refills: 5 | Status: ON HOLD | OUTPATIENT
Start: 2019-10-07 | End: 2020-03-13 | Stop reason: HOSPADM

## 2019-10-07 RX ORDER — HYDROCODONE BITARTRATE AND ACETAMINOPHEN 10; 325 MG/1; MG/1
1 TABLET ORAL EVERY 6 HOURS PRN
Qty: 100 TABLET | Refills: 0 | Status: SHIPPED | OUTPATIENT
Start: 2019-10-07 | End: 2020-02-05

## 2019-10-07 NOTE — PATIENT INSTRUCTIONS
He will continue her reflux regimen current medications.  He is using the Norco correctly.  He follows up with the neurosurgeon tomorrow  He continues his nutritious diet and avoids constipation. He will continue his current medications vitamins and minerals.  He will avoid the anti-inflammatory agents.

## 2019-10-07 NOTE — PROGRESS NOTES
[facility] - [facility]  Clinic Consult     Consult Requested By: Estephania Mendez MD  PCP: [facility] Quang Beckham    SUBJECTIVE:     Chief Complaint:   Chief Complaint   Patient presents with    Lumbar Spine Pain (L-Spine)     patient reports pains for about 6 monthes; radaities to bilateral legs; c/o numbness and tingling; previous back surgery; pain 7/10       History of Present Illness:  Garcia Renteria is a 69 y.o. male without any major medical problems who presents for evaluation of back pain and leg weakness. Patient reports history of cervical fusion and lumbar fusion. He reports 2 lumbar surgeries, the first laminectomy and the second non-instrumented fusion in the 1990s. He reports chronic low back pain and leg pain. He has been attending yoga and pilates. He reports approximately 6 months ago he experienced worsening of pain. He reports his back and legs started giving out on him. He states he has been experiencing urinary incontinence. Denies fecal incontinence. He reports burning pain and numbness/tingling in left leg and reports now beginning in right leg. He feels as though his knees are weak. He states his feet are always cold. He walks with a cane and wears a lumbar corset brace.     VAS 7/10  PHQ 3  Oswestry Disability Index 60    Past Medical History:   Diagnosis Date    Colitis     Diabetes mellitus type I     Diverticulosis      Past Surgical History:   Procedure Laterality Date    ANKLE SURGERY Bilateral     BACK SURGERY      COLONOSCOPY      lower back surgery      NECK SURGERY      UPPER GASTROINTESTINAL ENDOSCOPY       Family History   Problem Relation Age of Onset    Neurological Disorders Mother      Social History     Tobacco Use    Smoking status: Current Every Day Smoker     Types: Cigars    Smokeless tobacco: Never Used   Substance Use Topics    Alcohol use: Yes     Alcohol/week: 2.0 standard drinks     Types: 1 Glasses of wine, 1 Cans of beer per week     Drug use: Never      Review of patient's allergies indicates:  No Known Allergies    Current Outpatient Medications:     gabapentin (NEURONTIN) 300 MG capsule, Take 2 capsules (600 mg total) by mouth every evening., Disp: 60 capsule, Rfl: 5    HYDROcodone-acetaminophen (NORCO)  mg per tablet, Take 1 tablet by mouth every 6 (six) hours as needed for Pain., Disp: 100 tablet, Rfl: 0    metFORMIN (GLUCOPHAGE) 1000 MG tablet, Take 1,000 mg by mouth daily with breakfast., Disp: , Rfl:     omeprazole (PRILOSEC) 20 MG capsule, Take 1 capsule (20 mg total) by mouth once daily., Disp: 90 capsule, Rfl: 1    ranitidine (ZANTAC) 150 MG tablet, Take 150 mg by mouth nightly., Disp: , Rfl:     sildenafil (VIAGRA) 100 MG tablet, , Disp: , Rfl:     triamterene-hydrochlorothiazide 75-50 mg (MAXZIDE) 75-50 mg per tablet, Take 1 tablet by mouth once daily., Disp: , Rfl:     vitamin D (VITAMIN D3) 1000 units Tab, Take 1,000 Units by mouth once daily., Disp: , Rfl:     Review of Systems:   Constitutional: no fever, chills or night sweats. No changes in weight   Eyes: no visual changes   ENT: no nasal congestion or sore throat   Respiratory: no cough or shortness of breath   Cardiovascular: no chest pain or palpitations   Gastrointestinal: no nausea or vomiting   Genitourinary: no hematuria or dysuria +urinary incontinence   Integument/Breast: no rash or pruritis   Hematologic/Lymphatic: no easy bruising or lymphadenopathy   Musculoskeletal: +back pain   Neurological: no seizures or tremors +weakness  Behavioral/Psych: no auditory or visual hallucinations   Endocrine: no heat or cold intolerance         OBJECTIVE:     Vital Signs (Most Recent):  Vitals:    10/08/19 1406   BP: 125/78   Pulse: 74         Physical Exam:   General: well developed, well nourished, no distress.   Neurologic: Alert and oriented. Thought content appropriate. GCS 15.   Head: normocephalic, atraumatic  Eyes: EOMI.  Neck: trachea midline, no JVD    Cardiovascular: no LE edema  Pulmonary: normal respirations, no signs of respiratory distress  Abdomen: non-distended  Skin: Skin is warm, dry and intact.    Motor Strength: Moves all extremities spontaneously with good tone. No abnormal movements seen.     Strength  Deltoids Triceps Biceps Wrist Extension Wrist Flexion Hand  Interossei   Upper: R 5/5 5/5 5/5 5/5 5/5 5/5 5/5    L 5/5 5/5 5/5 5/5 5/5 5/5 5/5     Iliopsoas Quadriceps Knee  Flexion Tibialis  anterior Gastro- cnemius EHL    Lower: R 5-/5 5/5 5/5 5/5 5/5 5/5     L 5-/5 5/5 5/5 5/5 5/5 5/5      DTR's: 2+ UE 3+ LE  Clonus: absent  Diminished sensation to BLE. Diminished to pinprick and light touch  Sensory level at T12  Gait: spastic     No TTP spine           Diagnostic Results:  I have independently reviewed the following imaging:  MRI: Reviewed  And x-rays reviewed.  Patient has a previous L4-1 noninstrumented fusion, wide decompression at this level.  He has segmental severe adjacent segment disease and stenosis at the thoracolumbar junction with cord signal change, at L1-2, and L2-3.  He has evidence of lumbar kyphosis    ASSESSMENT/PLAN:     Low back pain, non-specific  -     X-Ray Scoliosis Complete; Future; Expected date: 10/08/2019  -     CT Lumbar Spine Without Contrast; Future; Expected date: 10/08/2019  -     CT Thoracic Spine Without Contrast; Future; Expected date: 10/08/2019    H/O spinal fusion    Adjacent segment disease with kyphosis    Myelomalacia    Thoracic spinal stenosis        Garcia Renteria is a 69 y.o. male  With complex spinal history s/p lumbar fusion, cervical fusion  He presents with several months of worsening balance gait, numbness in his lower extremities and urinary incontinence  He remains ambulatory using cane assist  He is otherwise without major medical comorbidities  He is complex imaging previous lumbar fusion, lumbar kyphosis severe stenosis with cord signal change at the thoracolumbar junction  He require  surgery we discussed the magnitude of his symptoms and rapid surgery  Is a high likelihood his urinary incontinence, sensory symptoms and spastic gait do not improve; however, the goal is to prevent worsening and remain ambulatory with hopes of improving  He is complex deformity, his review with Dr. Alan for deformity evaluation, he will likely need a lumbar PSO, He would like to evaluate ,referral was placed he will expedite evaluation  I will to coordinating and assist in any manner possible    CT scan of the T and L-spine, as well as scoliosis films ordered    The diagnosis, goals, limitations, risks and benefits of surgery and alternative treatment options where discussed at length (pros/cons). All questions/concerns were addressed. The patient has verbalized a good understanding of the diagnosis, the planned procedure, anticipated post-operative course, overall expectations, and risks including but not limited to:  nerve root injury/paralysis, death, nerve injury leading to pain or neurological deficit, csf leak, vascular injury or serious bleeding/need for blood product transfusion/stroke, wrong level surgery, hardware/instrumenteation misplacement, chronic pain/failure to improve or worsening of symptoms, infection, pseudoarthrosis, hardware failure, need for further surgery at the same or different levels; medical (eg Heart attack, blood clot, infection) and anesthetic complications.          Patient verbalized understanding of plan. Encouraged to call with any questions or concerns.     This note was partially dictated using voice recognition software, so please excuse any errors that were not corrected.

## 2019-10-07 NOTE — PROGRESS NOTES
Subjective:       Patient ID: Garcia Renteria is a 69 y.o. male.    Chief Complaint: Follow-up    He states the Norco has controlled 95% of his pain.  The omeprazole has resolved his pyrosis and dyspepsia.  He is using the Norco correctly as verified by the MS-.  He is intolerant to the anti-inflammatory agents which is caused abdominal pain and GI bleeding.  He is having daily bowel movements with his fiber supplements.  He states his diabetes is well controlled.  He denies aspiration hematemesis hematochezia jaundice or bleeding.      Allergies:  Review of patient's allergies indicates:  No Known Allergies    Medications:    Current Outpatient Medications:     gabapentin (NEURONTIN) 300 MG capsule, Take 2 capsules (600 mg total) by mouth every evening., Disp: 60 capsule, Rfl: 5    HYDROcodone-acetaminophen (NORCO)  mg per tablet, Take 1 tablet by mouth every 6 (six) hours as needed for Pain., Disp: 100 tablet, Rfl: 0    metFORMIN (GLUCOPHAGE) 1000 MG tablet, Take 1,000 mg by mouth daily with breakfast., Disp: , Rfl:     omeprazole (PRILOSEC) 20 MG capsule, Take 1 capsule (20 mg total) by mouth once daily., Disp: 90 capsule, Rfl: 1    ranitidine (ZANTAC) 150 MG tablet, Take 150 mg by mouth nightly., Disp: , Rfl:     sildenafil (VIAGRA) 100 MG tablet, , Disp: , Rfl:     triamterene-hydrochlorothiazide 75-50 mg (MAXZIDE) 75-50 mg per tablet, Take 1 tablet by mouth once daily., Disp: , Rfl:     vitamin D (VITAMIN D3) 1000 units Tab, Take 1,000 Units by mouth once daily., Disp: , Rfl:     Past Medical History:   Diagnosis Date    Colitis     Diabetes mellitus type I     Diverticulosis        Past Surgical History:   Procedure Laterality Date    ANKLE SURGERY Bilateral     BACK SURGERY      COLONOSCOPY      lower back surgery      NECK SURGERY      UPPER GASTROINTESTINAL ENDOSCOPY           Review of Systems   Constitutional: Negative for appetite change, fever and unexpected weight change.    HENT: Negative for trouble swallowing.         No jaundice.   Respiratory: Negative for cough, shortness of breath and wheezing.         He is a cigarette smoker.  He denies dyspnea on exertion but is not particularly physically active.  Has occasional coughing without aspiration hemoptysis or chronic sputum production.  He has been offered the smoking cessation program in the past.  He states he cannot stop smoking because the chronic pain syndrome.   Cardiovascular: Negative for chest pain.   Gastrointestinal: Negative for abdominal distention, abdominal pain, anal bleeding, constipation and diarrhea.        He has completed his treatment for chronic hepatitis-C and responded and is considered cure.  [facility] and [facility] have [facility] them again.  He denies jaundice.  He denies alcohol.  He denies abdominal pain.   Endocrine:        He states the diabetes well controlled.  He has his labs at the [facility].  I have requested them repeatedly.  He continues his current medications.   Musculoskeletal: Positive for arthralgias, back pain, neck pain and neck stiffness.        He was referred by the Pain Clinic to the nurse surgeon.  He has had multiple back surgeries.  He has chronic back pain.  The pain is radiated into both legs.  He was told he had a neuropathy and is on the gabapentin which has really not helped him.  He is able to ambulate slowly with assistance.  He denies falling.  He is not particularly physically active because of the severe pain.  The Norco has helped.   Skin: Negative for pallor and rash.   Neurological: Negative for dizziness, seizures, syncope, speech difficulty, weakness and numbness.   Hematological: Negative for adenopathy.   Psychiatric/Behavioral: Negative for confusion.       Objective:      Physical Exam   Constitutional: He is oriented to person, place, and time. He appears well-nourished.   Well-nourished well-hydrated thin nonicteric  male.   HENT:    Head: Normocephalic.   Eyes: Pupils are equal, round, and reactive to light. EOM are normal.   Neck: Normal range of motion. Neck supple. No tracheal deviation present. No thyromegaly present.   Cardiovascular: Normal rate, regular rhythm and normal heart sounds.   Pulmonary/Chest: Effort normal and breath sounds normal.   Abdominal: Soft. Bowel sounds are normal. He exhibits no distension and no mass. There is no tenderness. There is no rebound and no guarding.   Musculoskeletal: Normal range of motion.   He ambulates slowly with an antalgic gait he slowly can go from the sitting to the standing position.  He complains of pain is he ambulates and moves.   Lymphadenopathy:     He has no cervical adenopathy.   Neurological: He is alert and oriented to person, place, and time. No cranial nerve deficit.   Skin: Skin is warm and dry.   Psychiatric: He has a normal mood and affect. His behavior is normal.   Vitals reviewed.        Plan:       Chronic pain syndrome    Chronic pain disorder  -     gabapentin (NEURONTIN) 300 MG capsule; Take 2 capsules (600 mg total) by mouth every evening.  Dispense: 60 capsule; Refill: 5    DDD (degenerative disc disease), lumbar  -     gabapentin (NEURONTIN) 300 MG capsule; Take 2 capsules (600 mg total) by mouth every evening.  Dispense: 60 capsule; Refill: 5    H/O lumbosacral spine surgery  -     gabapentin (NEURONTIN) 300 MG capsule; Take 2 capsules (600 mg total) by mouth every evening.  Dispense: 60 capsule; Refill: 5    Low back pain, non-specific  -     gabapentin (NEURONTIN) 300 MG capsule; Take 2 capsules (600 mg total) by mouth every evening.  Dispense: 60 capsule; Refill: 5    Gastroesophageal reflux disease with esophagitis    Diverticulosis of large intestine without hemorrhage    Gastroesophageal reflux disease without esophagitis    Other orders  -     HYDROcodone-acetaminophen (NORCO)  mg per tablet; Take 1 tablet by mouth every 6 (six) hours as needed for Pain.   Dispense: 100 tablet; Refill: 0     He will continue his reflux regimen current medications vitamins and minerals.  He continues his diabetic diet.  He follows up in the Pain Clinic and also with the neurosurgeon.  He uses the Norco correctly and avoids the anti-inflammatory agents.

## 2019-10-08 ENCOUNTER — OFFICE VISIT (OUTPATIENT)
Dept: NEUROSURGERY | Facility: CLINIC | Age: 69
End: 2019-10-08
Payer: MEDICARE

## 2019-10-08 ENCOUNTER — HOSPITAL ENCOUNTER (OUTPATIENT)
Dept: RADIOLOGY | Facility: HOSPITAL | Age: 69
Discharge: HOME | End: 2019-10-08
Attending: STUDENT IN AN ORGANIZED HEALTH CARE EDUCATION/TRAINING PROGRAM
Payer: MEDICARE

## 2019-10-08 VITALS — DIASTOLIC BLOOD PRESSURE: 78 MMHG | HEART RATE: 74 BPM | SYSTOLIC BLOOD PRESSURE: 125 MMHG

## 2019-10-08 DIAGNOSIS — Z98.1 H/O SPINAL FUSION: ICD-10-CM

## 2019-10-08 DIAGNOSIS — M48.04 THORACIC SPINAL STENOSIS: ICD-10-CM

## 2019-10-08 DIAGNOSIS — G95.89 MYELOMALACIA: ICD-10-CM

## 2019-10-08 DIAGNOSIS — M54.50 LOW BACK PAIN, NON-SPECIFIC: ICD-10-CM

## 2019-10-08 DIAGNOSIS — M54.50 LOW BACK PAIN, NON-SPECIFIC: Primary | ICD-10-CM

## 2019-10-08 PROCEDURE — 99205 PR OFFICE/OUTPT VISIT, NEW, LEVL V, 60-74 MIN: ICD-10-PCS | Mod: S$PBB,,, | Performed by: STUDENT IN AN ORGANIZED HEALTH CARE EDUCATION/TRAINING PROGRAM

## 2019-10-08 PROCEDURE — 72082 X-RAY EXAM ENTIRE SPI 2/3 VW: CPT | Mod: 26,,, | Performed by: RADIOLOGY

## 2019-10-08 PROCEDURE — 72082 X-RAY EXAM ENTIRE SPI 2/3 VW: CPT | Mod: TC,FY,PO

## 2019-10-08 PROCEDURE — 99999 PR PBB SHADOW E&M-EST. PATIENT-LVL III: CPT | Mod: PBBFAC,,, | Performed by: STUDENT IN AN ORGANIZED HEALTH CARE EDUCATION/TRAINING PROGRAM

## 2019-10-08 PROCEDURE — 99205 OFFICE O/P NEW HI 60 MIN: CPT | Mod: S$PBB,,, | Performed by: STUDENT IN AN ORGANIZED HEALTH CARE EDUCATION/TRAINING PROGRAM

## 2019-10-08 PROCEDURE — 99999 PR PBB SHADOW E&M-EST. PATIENT-LVL III: ICD-10-PCS | Mod: PBBFAC,,, | Performed by: STUDENT IN AN ORGANIZED HEALTH CARE EDUCATION/TRAINING PROGRAM

## 2019-10-08 PROCEDURE — 99213 OFFICE O/P EST LOW 20 MIN: CPT | Mod: PBBFAC,25,PN | Performed by: STUDENT IN AN ORGANIZED HEALTH CARE EDUCATION/TRAINING PROGRAM

## 2019-10-08 PROCEDURE — 72082 XR SCOLIOSIS COMPLETE: ICD-10-PCS | Mod: 26,,, | Performed by: RADIOLOGY

## 2019-10-08 NOTE — LETTER
October 9, 2019      Estephania Mendez MD  [address]  [address]  [facility] [address]           [facility] - Neurosurgery  [address] [place] [address]  Phone: [phone]  Fax: [phone]          Patient: Garcia Renteria   MR Number: [id number]   YOB: 1950   Date of Visit: 10/8/2019       Dear Dr. Estephania Mendez:    Thank you for referring Garcia Renteria to me for evaluation. Attached you will find relevant portions of my assessment and plan of care.    If you have questions, please do not hesitate to call me. I look forward to following Garcia Renteria along with you.    Sincerely,    Roberto Parkinson MD    Enclosure  CC:  No Recipients    If you would like to receive this communication electronically, please contact [email or web address] or [phone] to request more information on [employer] Link access.    For providers and/or their staff who would like to refer a patient to [facility], please contact us through our one-stop-shop provider referral line, [facility], [place], at [phone].    If you feel you have received this communication in error or would no longer like to receive these types of communications, please e-mail [email or web address]

## 2019-10-09 ENCOUNTER — TELEPHONE (OUTPATIENT)
Dept: NEUROSURGERY | Facility: CLINIC | Age: 69
End: 2019-10-09

## 2019-10-09 NOTE — TELEPHONE ENCOUNTER
Called pt to notify him of appt with Dr. Alan tomorrow at 10AM at [facility] 7th floor neurosurgery clinic per Dr. Parkinson' request. Pt states he can make it to this appt, provided pt with all appt details, address for [facility] and directions to [facility] and the Neurosurgery clinic. Also provided pt with call back number should he have any other questions. Pt very appreciative, confirms appt.

## 2019-10-10 ENCOUNTER — OFFICE VISIT (OUTPATIENT)
Dept: NEUROSURGERY | Facility: CLINIC | Age: 69
End: 2019-10-10
Payer: MEDICARE

## 2019-10-10 ENCOUNTER — TELEPHONE (OUTPATIENT)
Dept: NEUROSURGERY | Facility: CLINIC | Age: 69
End: 2019-10-10

## 2019-10-10 VITALS — HEART RATE: 76 BPM | DIASTOLIC BLOOD PRESSURE: 75 MMHG | TEMPERATURE: 97 F | SYSTOLIC BLOOD PRESSURE: 125 MMHG

## 2019-10-10 DIAGNOSIS — G95.9 MYELOPATHY: ICD-10-CM

## 2019-10-10 DIAGNOSIS — G95.81 CONUS MEDULLARIS SYNDROME: ICD-10-CM

## 2019-10-10 DIAGNOSIS — M43.16 SPONDYLOLISTHESIS OF LUMBAR REGION: ICD-10-CM

## 2019-10-10 DIAGNOSIS — M40.209 ACQUIRED KYPHOSIS: ICD-10-CM

## 2019-10-10 DIAGNOSIS — M51.34 DDD (DEGENERATIVE DISC DISEASE), THORACIC: ICD-10-CM

## 2019-10-10 DIAGNOSIS — M47.14 SPONDYLOSIS, THORACIC, WITH MYELOPATHY: ICD-10-CM

## 2019-10-10 DIAGNOSIS — M41.50 DEGENERATIVE SCOLIOSIS IN ADULT PATIENT: Primary | ICD-10-CM

## 2019-10-10 DIAGNOSIS — M48.062 LUMBAR STENOSIS WITH NEUROGENIC CLAUDICATION: ICD-10-CM

## 2019-10-10 DIAGNOSIS — M48.061 SPINAL STENOSIS OF LUMBAR REGION, UNSPECIFIED WHETHER NEUROGENIC CLAUDICATION PRESENT: ICD-10-CM

## 2019-10-10 DIAGNOSIS — M47.15 OTHER SPONDYLOSIS WITH MYELOPATHY, THORACOLUMBAR REGION: ICD-10-CM

## 2019-10-10 DIAGNOSIS — M47.26 OTHER SPONDYLOSIS WITH RADICULOPATHY, LUMBAR REGION: ICD-10-CM

## 2019-10-10 DIAGNOSIS — G95.89 MYELOMALACIA: ICD-10-CM

## 2019-10-10 DIAGNOSIS — M43.8X9 SAGITTAL PLANE IMBALANCE: ICD-10-CM

## 2019-10-10 DIAGNOSIS — M48.04 SPINAL STENOSIS OF THORACIC REGION: ICD-10-CM

## 2019-10-10 DIAGNOSIS — M51.35 DDD (DEGENERATIVE DISC DISEASE), THORACOLUMBAR: ICD-10-CM

## 2019-10-10 DIAGNOSIS — Z98.1 S/P SPINAL FUSION: Primary | ICD-10-CM

## 2019-10-10 PROCEDURE — 99213 OFFICE O/P EST LOW 20 MIN: CPT | Mod: PBBFAC | Performed by: NEUROLOGICAL SURGERY

## 2019-10-10 PROCEDURE — 99214 PR OFFICE/OUTPT VISIT, EST, LEVL IV, 30-39 MIN: ICD-10-PCS | Mod: S$PBB,,, | Performed by: NEUROLOGICAL SURGERY

## 2019-10-10 PROCEDURE — 99214 OFFICE O/P EST MOD 30 MIN: CPT | Mod: S$PBB,,, | Performed by: NEUROLOGICAL SURGERY

## 2019-10-10 PROCEDURE — 99999 PR PBB SHADOW E&M-EST. PATIENT-LVL III: ICD-10-PCS | Mod: PBBFAC,,, | Performed by: NEUROLOGICAL SURGERY

## 2019-10-10 PROCEDURE — 99999 PR PBB SHADOW E&M-EST. PATIENT-LVL III: CPT | Mod: PBBFAC,,, | Performed by: NEUROLOGICAL SURGERY

## 2019-10-10 RX ORDER — LISINOPRIL 5 MG/1
5 TABLET ORAL DAILY
COMMUNITY
Start: 2019-10-07

## 2019-10-10 RX ORDER — DICLOFENAC SODIUM 10 MG/G
GEL TOPICAL
Status: ON HOLD | COMMUNITY
Start: 2019-10-07 | End: 2019-10-31 | Stop reason: HOSPADM

## 2019-10-10 RX ORDER — DOCUSATE CALCIUM 240 MG
CAPSULE ORAL
COMMUNITY
Start: 2019-10-07 | End: 2023-08-14

## 2019-10-10 NOTE — LETTER
October 10, 2019      Roberto Parkinson MD  [address] [facility] [address] [place] [address]           [facility] - Neurosurgery 7th Fl  [address]  [place] [address]  Phone: [phone]          Patient: Garcia Renteria   MR Number: [id number]   YOB: 1950   Date of Visit: 10/10/2019       Dear Dr. Roberto Parkinson:    Thank you for referring Garcia Renteria to me for evaluation. Attached you will find relevant portions of my assessment and plan of care.    If you have questions, please do not hesitate to call me. I look forward to following Garcia Renteria along with you.    Sincerely,    Francis Alan MD    Enclosure  CC:  No Recipients    If you would like to receive this communication electronically, please contact [email or web address] or [phone] to request more information on [employer] Link access.    For providers and/or their staff who would like to refer a patient to [facility], please contact us through our one-stop-shop provider referral line, [facility], at [phone].    If you feel you have received this communication in error or would no longer like to receive these types of communications, please e-mail [email or web address]

## 2019-10-10 NOTE — PROGRESS NOTES
Dear Dr. Parkinson,      Thank you for referring this patient to my clinic. The full details of my evaluation will also be forthcoming to you by letter.    CHIEF COMPLAINT:  Consult for back pain    I, Cristian Hartley, attest that this documentation has been prepared under the direction and in the presence of Francis Alan MD.    HPI:  Garcia Renteria is a 69 y.o.  male with history of diabetes type 1, smoking, and colitis, who is referred to me by Dr. Parkinson for evaluation of back pain. Pt reports difficulty walking due to BLE numbness and weakaness progressing over the last 6-8 months. He has received several surgeries including an ACDF in 1993 and a lumbar surgery in 1996. He describes leg pain, left worse than right, radiating from his anus down his posterior legs to his feet. He experiences some low back pain but his primary concern is his BLE pain. He also notes new urinary incontinence which has been worsening. He began wearing diapers 6-8 months ago because he cannot make it to the bathroom. He states that his legs and back give out on him. He also notes spasms in his legs. Pt reports cold sensations in his bilateral hands. He denies dropping objects with his hands. He notes mild neck pain as well. Pt smokes cigars.       Review of patient's allergies indicates:  No Known Allergies    Past Medical History:   Diagnosis Date    Colitis     Diabetes mellitus type I     Diverticulosis      Past Surgical History:   Procedure Laterality Date    ANKLE SURGERY Bilateral     BACK SURGERY      COLONOSCOPY      lower back surgery      NECK SURGERY      UPPER GASTROINTESTINAL ENDOSCOPY       Family History   Problem Relation Age of Onset    Neurological Disorders Mother      Social History     Tobacco Use    Smoking status: Current Every Day Smoker     Types: Cigars    Smokeless tobacco: Never Used   Substance Use Topics    Alcohol use: Yes     Alcohol/week: 2.0 standard drinks     Types: 1 Glasses of wine, 1  Cans of beer per week    Drug use: Never        Review of Systems   Constitutional: Negative.    HENT: Negative.    Eyes: Negative.    Respiratory: Negative.    Cardiovascular: Negative.    Gastrointestinal: Negative.    Endocrine: Negative.    Genitourinary: Positive for enuresis.   Musculoskeletal: Positive for myalgias (BLE, L>R). Negative for back pain, gait problem and neck pain.   Skin: Negative.    Allergic/Immunologic: Negative.    Neurological: Positive for numbness. Negative for weakness, light-headedness and headaches.   Hematological: Negative.    Psychiatric/Behavioral: Negative.        OBJECTIVE:   Vital Signs:  Temp: 97.3 °F (36.3 °C) (10/10/19 1032)  Pulse: 76 (10/10/19 1032)  BP: 125/75 (10/10/19 1032)    Physical Exam:    Vital signs: All nursing notes and vital signs reviewed -- afebrile, vital signs stable.  Constitutional: Patient sitting comfortably in chair. Appears well developed and well nourished.  Skin: Exposed areas are intact without abnormal markings, rashes or other lesions. Well healed lumbar incision.  HEENT: Normocephalic. Normal conjunctivae.  Cardiovascular: Normal rate and regular rhythm.  Respiratory: Chest wall rises and falls symmetrically, without signs of respiratory distress.  Abdomen: Soft and non-tender.  Extremities: Warm and without edema. Calves supple, non-tender. Normal muscle tone and bulk.   Psych/Behavior: Normal affect.    Neurological:    Mental status: Alert and oriented. Conversational and appropriate.       Cranial Nerves: Grossly intact.     Motor:    Upper:  Deltoids Triceps Biceps WE WF     R 5/5 5/5 5/5 5/5 5/5 5/5    L 5/5 5/5 5/5 5/5 5/5 5/5      Lower:  HF KE KF DF PF EHL    R 4-/5 5/5 5/5 5/5 4+/5 5/5    L 4-/5 5/5 5/5 5/5 4+/5 5/5     Sensory: Intact sensation to light touch in all extremities. Romberg negative.    Reflexes:          DTR: 1+ bilateral patellar.      Harris's: Negative.     Babinski's: Negative.     Clonus:  Negative.    Cerebellar: Finger-to-nose and rapid alternating movements normal. Gait ataxia. Left leg spasticity    Spine:    Posture: Stooped posture. No focal or global spinal deformity visible on inspection. Shoulders and hips even. No obvious leg length discrepancy. No scapula winging.      Bending: Full ROM with forward, back and lateral bending. No rib prominence with forward bend.    Cervical:      ROM: Full with flexion, extension, lateral rotation and ear-to-shoulder bend.      Midline TTP: Negative.     Spurling's test: Negative.     Lhermitte's: Negative.    Thoracic:     Midline TTP: Negative    Lumbar:     Midline TTP: Negative     Straight Leg Test: Negative     Crossed Straight Leg Test: Negative     Sciatic notch tenderness: Negative.    Other:     SI joint TTP: Negative.     Greater trochanter TTP: Negative.     Tenderness with external/internal hip rotation: Negative.    Diagnostic Results:  All imaging was independently reviewed by me.    MRI L-spine, dated 8/6/2019:  1. Autofusion of L4 and L5 anteriorly  2. Compression of the conus at T12/L1 with T2 signal change  3. Severe nerve root compression at L1/2 and L2/3     Flex/Ex X-ray L-spine, dated 8/6/2019:  1. Mobile grade 1 spondylolisthesis L3 on L4    Scoliosis standing AP and Lateral X-ray, dated 10/8/2019:  1. No global coronal imbalance  2. ACD hardware  3. 7 degrees of focal kyphosis from L3 to S1  2. 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 The compression is from chronic degenerative changes and his symptoms have been slowly progressing for about 8 months. He also has kyphoscoliosis with sagittal imbalance secondary to degenerative and iatrogenic changes from his  multiple remote lumbar surgeries. I will likely recommend a low thoracic to pelvis instrumented fusion, multi-level decompression, and possible L4 PSO; however, I will await MRI C and T spine, flex ex C spine, and CT T and L spine before final recommendations are made. To expedite things, I will book surgery now, get imaging soon, and see him back one week before surgery to finalize the plans. Surgery will be done in concert with Dr. Roberto Parkinson. I have reviewed the substantial risks of the proposed surgery in detail, including the need for diabetic control and smoking cessation to mitigate these risks. A/B/I/M were also reviewed and he wishes to proceed.    The patient understands and agrees with the plan of care. All questions were answered.     1. MRI C-spine  2. MRI T-spine   3. Flex/Ex C-spine  4. CT T-spine  5. CT L-spine  6. RTC 1 week prior to surgery      I, Dr. Francis Alan personally performed the services described in this documentation. All medical record entries made by the scribe, Cristian Hartley, were at my direction and in my presence.  I have reviewed the chart and agree that the record reflects my personal performance and is accurate and complete.      Francis Alan M.D.  Department of Neurosurgery  [facility]      .

## 2019-10-11 DIAGNOSIS — G95.89 MYELOMALACIA: Primary | ICD-10-CM

## 2019-10-11 DIAGNOSIS — Z01.818 PREOPERATIVE TESTING: Primary | ICD-10-CM

## 2019-10-11 DIAGNOSIS — E10.9 DIABETES TYPE 1, NO OCULAR INVOLVEMENT: ICD-10-CM

## 2019-10-11 DIAGNOSIS — M79.604 PAIN IN BOTH LOWER EXTREMITIES: ICD-10-CM

## 2019-10-11 DIAGNOSIS — M47.14 SPONDYLOSIS, THORACIC, WITH MYELOPATHY: ICD-10-CM

## 2019-10-11 DIAGNOSIS — M79.605 PAIN IN BOTH LOWER EXTREMITIES: ICD-10-CM

## 2019-10-11 NOTE — TELEPHONE ENCOUNTER
Pt states the [facility] will not cover his pre-op clearance.  Provided the number for [facility] to investigate scheduling.  V/U. ----- Message from Octaviano Ponce sent at 10/11/2019 10:01 AM CDT -----  Contact: Pt. [phone]  The patient would like to speak to someone regarding the lab work he needs before surgery.  Please contact the patient to discuss further.

## 2019-10-15 ENCOUNTER — TELEPHONE (OUTPATIENT)
Dept: PREADMISSION TESTING | Facility: HOSPITAL | Age: 69
End: 2019-10-15

## 2019-10-15 NOTE — TELEPHONE ENCOUNTER
Appt. 10/25 spoke with patient. Please note if you need anything other then LAB or EKG pass it to your MA (thanks)

## 2019-10-15 NOTE — TELEPHONE ENCOUNTER
----- Message from Freda Dill RN sent at 10/11/2019  1:53 PM CDT -----  10/30 SURGERY  Please schedule Dr. Mcdonnell, poc appt, labs, ua and ekg.  Thanks!

## 2019-10-17 ENCOUNTER — HOSPITAL ENCOUNTER (OUTPATIENT)
Dept: RADIOLOGY | Facility: HOSPITAL | Age: 69
Discharge: HOME | End: 2019-10-17
Attending: NEUROLOGICAL SURGERY
Payer: MEDICARE

## 2019-10-17 ENCOUNTER — HOSPITAL ENCOUNTER (OUTPATIENT)
Dept: RADIOLOGY | Facility: HOSPITAL | Age: 69
Discharge: HOME | End: 2019-10-17
Attending: STUDENT IN AN ORGANIZED HEALTH CARE EDUCATION/TRAINING PROGRAM
Payer: MEDICARE

## 2019-10-17 DIAGNOSIS — G95.81 CONUS MEDULLARIS SYNDROME: ICD-10-CM

## 2019-10-17 DIAGNOSIS — M41.50 DEGENERATIVE SCOLIOSIS IN ADULT PATIENT: ICD-10-CM

## 2019-10-17 DIAGNOSIS — M54.50 LOW BACK PAIN, NON-SPECIFIC: ICD-10-CM

## 2019-10-17 DIAGNOSIS — G95.89 MYELOMALACIA: ICD-10-CM

## 2019-10-17 PROCEDURE — 72131 CT LUMBAR SPINE W/O DYE: CPT | Mod: TC,PO

## 2019-10-17 PROCEDURE — 72141 MRI NECK SPINE W/O DYE: CPT | Mod: TC,PO

## 2019-10-17 PROCEDURE — 72128 CT CHEST SPINE W/O DYE: CPT | Mod: TC,PO

## 2019-10-17 PROCEDURE — 72146 MRI CHEST SPINE W/O DYE: CPT | Mod: TC,PO

## 2019-10-17 PROCEDURE — 72052 X-RAY EXAM NECK SPINE 6/>VWS: CPT | Mod: TC,PO

## 2019-10-18 ENCOUNTER — TELEPHONE (OUTPATIENT)
Dept: PREADMISSION TESTING | Facility: HOSPITAL | Age: 69
End: 2019-10-18

## 2019-10-18 DIAGNOSIS — E11.9 DIABETES MELLITUS WITHOUT COMPLICATION: ICD-10-CM

## 2019-10-18 DIAGNOSIS — M79.604 PAIN IN BOTH LOWER EXTREMITIES: ICD-10-CM

## 2019-10-18 DIAGNOSIS — Z01.818 PREOPERATIVE TESTING: Primary | ICD-10-CM

## 2019-10-18 DIAGNOSIS — M79.605 PAIN IN BOTH LOWER EXTREMITIES: ICD-10-CM

## 2019-10-18 NOTE — TELEPHONE ENCOUNTER
----- Message from Freda Dill RN sent at 10/18/2019  8:05 AM CDT -----  10/30 surgery  Please schedule UA.  Thanks!

## 2019-10-21 ENCOUNTER — TELEPHONE (OUTPATIENT)
Dept: NEUROSURGERY | Facility: CLINIC | Age: 69
End: 2019-10-21

## 2019-10-25 ENCOUNTER — HOSPITAL ENCOUNTER (OUTPATIENT)
Dept: CARDIOLOGY | Facility: CLINIC | Age: 69
Discharge: HOME | End: 2019-10-25
Payer: MEDICARE

## 2019-10-25 ENCOUNTER — OFFICE VISIT (OUTPATIENT)
Dept: NEUROSURGERY | Facility: CLINIC | Age: 69
End: 2019-10-25
Payer: MEDICARE

## 2019-10-25 ENCOUNTER — HOSPITAL ENCOUNTER (OUTPATIENT)
Dept: PREADMISSION TESTING | Facility: HOSPITAL | Age: 69
Discharge: HOME | End: 2019-10-25
Attending: ANESTHESIOLOGY
Payer: MEDICARE

## 2019-10-25 ENCOUNTER — TELEPHONE (OUTPATIENT)
Dept: NEUROSURGERY | Facility: CLINIC | Age: 69
End: 2019-10-25

## 2019-10-25 ENCOUNTER — INITIAL CONSULT (OUTPATIENT)
Dept: INTERNAL MEDICINE | Facility: CLINIC | Age: 69
End: 2019-10-25
Payer: MEDICARE

## 2019-10-25 ENCOUNTER — ANESTHESIA EVENT (OUTPATIENT)
Dept: SURGERY | Facility: HOSPITAL | Age: 69
DRG: 473 | End: 2019-10-25
Payer: MEDICARE

## 2019-10-25 ENCOUNTER — HOSPITAL ENCOUNTER (OUTPATIENT)
Dept: RADIOLOGY | Facility: HOSPITAL | Age: 69
Discharge: HOME | End: 2019-10-25
Attending: NEUROLOGICAL SURGERY
Payer: MEDICARE

## 2019-10-25 VITALS
DIASTOLIC BLOOD PRESSURE: 91 MMHG | TEMPERATURE: 99 F | HEART RATE: 78 BPM | HEIGHT: 73 IN | WEIGHT: 213 LBS | SYSTOLIC BLOOD PRESSURE: 127 MMHG | BODY MASS INDEX: 28.23 KG/M2

## 2019-10-25 VITALS
WEIGHT: 213 LBS | HEIGHT: 73 IN | OXYGEN SATURATION: 100 % | SYSTOLIC BLOOD PRESSURE: 124 MMHG | TEMPERATURE: 98 F | DIASTOLIC BLOOD PRESSURE: 80 MMHG | BODY MASS INDEX: 28.23 KG/M2 | HEART RATE: 77 BPM

## 2019-10-25 DIAGNOSIS — M47.14 SPONDYLOSIS, THORACIC, WITH MYELOPATHY: ICD-10-CM

## 2019-10-25 DIAGNOSIS — I10 ESSENTIAL HYPERTENSION: ICD-10-CM

## 2019-10-25 DIAGNOSIS — K57.30 DIVERTICULOSIS OF LARGE INTESTINE WITHOUT HEMORRHAGE: ICD-10-CM

## 2019-10-25 DIAGNOSIS — G95.9 CERVICAL MYELOPATHY: Primary | ICD-10-CM

## 2019-10-25 DIAGNOSIS — Z86.19 HEPATITIS C VIRUS INFECTION CURED AFTER ANTIVIRAL DRUG THERAPY: ICD-10-CM

## 2019-10-25 DIAGNOSIS — Z98.1 S/P CERVICAL SPINAL FUSION: Primary | ICD-10-CM

## 2019-10-25 DIAGNOSIS — G95.89 MYELOMALACIA: ICD-10-CM

## 2019-10-25 DIAGNOSIS — M47.12 CERVICAL SPONDYLOSIS WITH MYELOPATHY: ICD-10-CM

## 2019-10-25 DIAGNOSIS — Z98.890 H/O LUMBOSACRAL SPINE SURGERY: ICD-10-CM

## 2019-10-25 DIAGNOSIS — K59.00 CONSTIPATION, UNSPECIFIED CONSTIPATION TYPE: ICD-10-CM

## 2019-10-25 DIAGNOSIS — Z98.890 PERSONAL HISTORY OF SPINE SURGERY: ICD-10-CM

## 2019-10-25 DIAGNOSIS — M50.00 INTERVERTEBRAL CERVICAL DISC DISORDER WITH MYELOPATHY, CERVICAL REGION: ICD-10-CM

## 2019-10-25 DIAGNOSIS — K21.9 GASTROESOPHAGEAL REFLUX DISEASE WITHOUT ESOPHAGITIS: ICD-10-CM

## 2019-10-25 DIAGNOSIS — E11.40 TYPE 2 DIABETES MELLITUS WITH DIABETIC NEUROPATHY, WITHOUT LONG-TERM CURRENT USE OF INSULIN: ICD-10-CM

## 2019-10-25 DIAGNOSIS — Z01.818 PREOPERATIVE TESTING: ICD-10-CM

## 2019-10-25 DIAGNOSIS — R33.9 INCOMPLETE BLADDER EMPTYING: ICD-10-CM

## 2019-10-25 DIAGNOSIS — Z01.818 PREOP EXAMINATION: Primary | ICD-10-CM

## 2019-10-25 DIAGNOSIS — E83.52 HYPERCALCEMIA: ICD-10-CM

## 2019-10-25 DIAGNOSIS — F11.90 CHRONIC, CONTINUOUS USE OF OPIOIDS: ICD-10-CM

## 2019-10-25 DIAGNOSIS — Z87.891 HISTORY OF TOBACCO ABUSE: ICD-10-CM

## 2019-10-25 DIAGNOSIS — G89.4 CHRONIC PAIN DISORDER: ICD-10-CM

## 2019-10-25 PROCEDURE — 99204 PR OFFICE/OUTPT VISIT, NEW, LEVL IV, 45-59 MIN: ICD-10-PCS | Mod: S$PBB,,, | Performed by: HOSPITALIST

## 2019-10-25 PROCEDURE — 99999 PR PBB SHADOW E&M-EST. PATIENT-LVL III: ICD-10-PCS | Mod: PBBFAC,,, | Performed by: HOSPITALIST

## 2019-10-25 PROCEDURE — 99212 OFFICE O/P EST SF 10 MIN: CPT | Mod: S$PBB,,, | Performed by: NEUROLOGICAL SURGERY

## 2019-10-25 PROCEDURE — 71046 X-RAY EXAM CHEST 2 VIEWS: CPT | Mod: 26,,, | Performed by: RADIOLOGY

## 2019-10-25 PROCEDURE — 99204 OFFICE O/P NEW MOD 45 MIN: CPT | Mod: S$PBB,,, | Performed by: HOSPITALIST

## 2019-10-25 PROCEDURE — 93010 ELECTROCARDIOGRAM REPORT: CPT | Mod: S$PBB,,, | Performed by: INTERNAL MEDICINE

## 2019-10-25 PROCEDURE — 99999 PR PBB SHADOW E&M-EST. PATIENT-LVL III: ICD-10-PCS | Mod: PBBFAC,,, | Performed by: NEUROLOGICAL SURGERY

## 2019-10-25 PROCEDURE — 99999 PR PBB SHADOW E&M-EST. PATIENT-LVL III: CPT | Mod: PBBFAC,,, | Performed by: HOSPITALIST

## 2019-10-25 PROCEDURE — 99213 OFFICE O/P EST LOW 20 MIN: CPT | Mod: PBBFAC,25 | Performed by: NEUROLOGICAL SURGERY

## 2019-10-25 PROCEDURE — 93010 EKG 12-LEAD: ICD-10-PCS | Mod: S$PBB,,, | Performed by: INTERNAL MEDICINE

## 2019-10-25 PROCEDURE — 99213 OFFICE O/P EST LOW 20 MIN: CPT | Mod: PBBFAC,25,27 | Performed by: HOSPITALIST

## 2019-10-25 PROCEDURE — 99999 PR PBB SHADOW E&M-EST. PATIENT-LVL III: CPT | Mod: PBBFAC,,, | Performed by: NEUROLOGICAL SURGERY

## 2019-10-25 PROCEDURE — 71046 XR CHEST PA AND LATERAL: ICD-10-PCS | Mod: 26,,, | Performed by: RADIOLOGY

## 2019-10-25 PROCEDURE — 93005 ELECTROCARDIOGRAM TRACING: CPT | Mod: PBBFAC | Performed by: INTERNAL MEDICINE

## 2019-10-25 PROCEDURE — 99212 PR OFFICE/OUTPT VISIT, EST, LEVL II, 10-19 MIN: ICD-10-PCS | Mod: S$PBB,,, | Performed by: NEUROLOGICAL SURGERY

## 2019-10-25 PROCEDURE — 71046 X-RAY EXAM CHEST 2 VIEWS: CPT | Mod: TC

## 2019-10-25 NOTE — ASSESSMENT & PLAN NOTE
Had IVDU while in [place]    No history  of cirrhosis of liver or suggestions of hepatic decompensation

## 2019-10-25 NOTE — ASSESSMENT & PLAN NOTE
No recent problem   GERD  Symptoms -acid taste to the throat   Ranitidine helping   GERD-  I suggest continuation of the Ranitidine  in the perioperative period . I suggest aspiration precautions
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
## 2019-10-25 NOTE — PROGRESS NOTES
CHIEF COMPLAINT:  Follow up to discuss surgery    I, Cristian Hartley, attest that this documentation has been prepared under the direction and in the presence of Francis Alan MD.    HPI:  Garcia Renteria is a 69 y.o.  male with history of diabetes type 1, smoking, and colitis, who presents today for follow up evaluation to discuss surgery. Pt reports o significant change in his symptoms. He states that he has been moving slightly better gaining strength in his legs. Pt's previous ACDF surgery was performed in 1993 from a right sided approach. He denies any significant hoarse voice following that surgery. Pt reports neck pain worse when he is driving or traveling long distances. He denies any hand clumsiness but more coldness in the ulnar aspect of his arm to his last three digits. Pt reports numbness down his BLE when he wakes up in the morning. He also experiences spasms in his BLE and a cold sensation in his feet.       Review of patient's allergies indicates:  No Known Allergies    Past Medical History:   Diagnosis Date    Colitis     Diabetes mellitus type I     Diverticulosis      Past Surgical History:   Procedure Laterality Date    ANKLE SURGERY Bilateral     BACK SURGERY      COLONOSCOPY      lower back surgery      NECK SURGERY      UPPER GASTROINTESTINAL ENDOSCOPY       Family History   Problem Relation Age of Onset    Neurological Disorders Mother      Social History     Tobacco Use    Smoking status: Current Every Day Smoker     Types: Cigars    Smokeless tobacco: Never Used   Substance Use Topics    Alcohol use: Yes     Alcohol/week: 2.0 standard drinks     Types: 1 Glasses of wine, 1 Cans of beer per week    Drug use: Never        Review of Systems   Constitutional: Negative.    HENT: Negative.    Eyes: Negative.    Respiratory: Negative.    Cardiovascular: Negative.    Gastrointestinal: Negative.    Endocrine: Negative.    Genitourinary: Positive for enuresis.   Musculoskeletal:  Positive for neck pain. Negative for back pain and gait problem.   Skin: Negative.    Allergic/Immunologic: Negative.    Neurological: Positive for weakness (BLE) and numbness (BUE (coldness); BLE). Negative for light-headedness and headaches.   Hematological: Negative.    Psychiatric/Behavioral: Negative.        OBJECTIVE:   Vital Signs:  Temp: 98.6 °F (37 °C) (10/25/19 0953)  Pulse: 78 (10/25/19 0953)  BP: (!) 127/91 (10/25/19 0953)    Physical Exam:    Vital signs: All nursing notes and vital signs reviewed -- afebrile, vital signs stable.  Constitutional: Patient sitting comfortably in chair. Appears well developed and well nourished.  Skin: Exposed areas are intact without abnormal markings, rashes or other lesions. Well healed right neck incision.   HEENT: Normocephalic. Normal conjunctivae.  Cardiovascular: Normal rate and regular rhythm.  Respiratory: Chest wall rises and falls symmetrically, without signs of respiratory distress.  Abdomen: Soft and non-tender.  Extremities: Warm and without edema. Calves supple, non-tender.  Psych/Behavior: Normal affect.    Neurological:    Mental status: Alert and oriented. Conversational and appropriate.       Cranial Nerves: Grossly intact.     Motor:    Upper:  Deltoids Triceps Biceps WE WF     R 5/5 5/5 5/5 5/5 5/5 5/5    L 5/5 5/5 5/5 5/5 5/5 5/5      Lower:  HF KE KF DF PF EHL    R 4-/5 5/5 5/5 5/5 4+/5 5/5    L 4-/5 5/5 5/5 5/5 4+/5 5/5     Sensory: Intact sensation to light touch in all extremities. Romberg negative.    Reflexes:          DTR: 1+ bilateral patellar.      Harris's: Negative.     Babinski's: Negative.     Clonus: Negative.     Cerebellar: Finger-to-nose and rapid alternating movements normal. Gait ataxia. Left leg spasticity     Spine:     Posture: Stooped posture. No focal or global spinal deformity visible on inspection. Shoulders and hips even. No obvious leg length discrepancy. No scapula winging.     Bending: Full ROM with forward, back  and lateral bending. No rib prominence with forward bend.    Cervical:      ROM: Full with flexion, extension, lateral rotation and ear-to-shoulder bend.      Midline TTP: Negative.     Spurling's test: Negative.     Lhermitte's: Negative.    Thoracic:     Midline TTP: Negative    Lumbar:     Midline TTP: Negative     Straight Leg Test: Negative     Crossed Straight Leg Test: Negative     Sciatic notch tenderness: Negative.    Other:     SI joint TTP: Negative.     Greater trochanter TTP: Negative.     Tenderness with external/internal hip rotation: Negative.    Diagnostic Results:  All imaging was independently reviewed by me.    MRI C-spine, dated 10/17/2019:  1. Severe stenosis at C3/4 and C4/5 with T2 signal change at C3/4     MRI T-spine, dated 10/17/2019:  1. Severe central stenosis at T10/11 and T12/L1    Flex/Ex X-ray C-spine, dated 10/17/2019:  1. C5/6 ACD that appears fused  2. No instability    ASSESSMENT/PLAN:     Garcia Renteria was here today to review his deformity surgery next week; however his recent MRI C spine shows severe cord compression at C3-4 and C4-5 with associated myelomalacia. This may be significantly contributing to his current myelopathy symptoms, and needs to be addressed surgically prior to his deformity surgery. I recommend a C3-4 and C4-5 ACD. The deformity surgery would follow after his bone has healed from this surgery -- unless his thoracic myelopathy progresses. I discussed the R/B/A/I/M of this surgery in detail and he wishes to proceed. He will need a CT C spine prior to surgery. The approach will be RIGHT side, which is ipsilateral to his previous ACD.    The patient understands and agrees with the plan of care. All questions were answered.     1. C3/4, C4/5 ACDF      I, Dr. Francis Alan personally performed the services described in this documentation. All medical record entries made by the scribe, Cristian Hartley, were at my direction and in my presence.  I have reviewed  the chart and agree that the record reflects my personal performance and is accurate and complete.      Francis Alan M.D.  Department of Neurosurgery  [facility]      .

## 2019-10-25 NOTE — ASSESSMENT & PLAN NOTE
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 The compression is from chronic degenerative changes and his symptoms have been slowly progressing for about 8 months. He also has kyphoscoliosis with sagittal imbalance secondary to degenerative and iatrogenic changes from his multiple remote lumbar surgeries. I will likely recommend a low thoracic to pelvis instrumented fusion, multi-level decompression, and possible L4 PSO; however, I will await MRI C and T spine, flex ex C spine, and CT T and L spine before final recommendations are made. To expedite things, I will book surgery now, get imaging soon, and see him back one week before surgery to finalize the plans. Surgery will be done in concert with Dr. Roberto Parkinson. I have reviewed the substantial risks of the proposed surgery in detail, including the need for diabetic control and smoking cessation to mitigate these risks. A/B/I/M were also reviewed and he wishes to proceed.

## 2019-10-25 NOTE — ASSESSMENT & PLAN NOTE
Not known to have eye, kidney involvement   No stroke, TIA    Type 2 Diabetes Mellitus  On treatment with oral agent  Hemoglobin A1c- None   Capillary glucose check-yes   Pre breakfast -100-110       Diabetes Mellitus-I suggest monitoring the glucose in the perioperative period ( Before meals and bed time,if the patient is on oral feeds or every 6 hourly ,if the patient is NPO )  Blood glucose target in hospitalized patients is 140-180. Oral Hypoglycemic agents are generally avoided during the hospital stay . If glucose is consistently elevated ,I suggest using basal ,prandial Insulin regimen to control the glucose , as elevated glucose can be associated with adverse surgical out comes. Please consider involving Hospital Medicine or Endocrinology ,if any help is needed with Glucose control. Patient will be instructed based on the pre op clinic guidelines  about adjustment of diabetic treatment (If applicable )  considering the NPO status for Surgery
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
## 2019-10-25 NOTE — ASSESSMENT & PLAN NOTE
Lisinopril,Maxzide     Home BP readings -None   Recent BP readings in the record-120/80  Hypertension-  Blood pressure is acceptable .I suggest holding - Lisinopril,Maxzide  - on the morning of the surgery and can continue that  post operatively under blood pressure, electrolyte and renal function monitoring as long as they are acceptable.I suggest addressing pain control as uncontrolled pain can increased blood pressure

## 2019-10-25 NOTE — ASSESSMENT & PLAN NOTE
Long standing low back pain , down to Left leg   On Hydrocodone- Tylenol   Planning on surgery for it

## 2019-10-25 NOTE — LETTER
October 25, 2019      Francis Alan MD  [address] [address]           [facility]  [address] [address]  Phone: [phone]          Patient: Garcia Renteria   MR Number: [id number]   YOB: 1950   Date of Visit: 10/25/2019       Dear Dr. Francis Alan:    Thank you for referring Garcia Renteria to me for evaluation. Attached you will find relevant portions of my assessment and plan of care.    If you have questions, please do not hesitate to call me. I look forward to following Garcia Renteria along with you.    Sincerely,    Olga Mcdonnell MD    Enclosure  CC:  Roberto Parkinson MD    If you would like to receive this communication electronically, please contact [email or web address] or [phone] to request more information on [employer] access.    For providers and/or their staff who would like to refer a patient to [facility], please contact us through our one-stop-shop provider referral line, [facility], at [phone].    If you feel you have received this communication in error or would no longer like to receive these types of communications, please e-mail [email or web address]

## 2019-10-25 NOTE — ASSESSMENT & PLAN NOTE
Chronic continuous opioid use- In view of the opioid use, the patient may have opioid tolerance .   He are aware of potential for tolerance and uses half tablet twice a day   I suggest considering the possibility of opioid tolerance  in planning post operative pain control

## 2019-10-25 NOTE — OUTPATIENT SUBJECTIVE & OBJECTIVE
"Outpatient Subjective & Objective     Chief complaint-Preoperative evaluation, Perioperative Medical management, complication reduction plan     Active cardiac conditions- none    Revised cardiac risk index predictors- none    Functional capacity -Examples of physical activity, walks, uses a cane,less active lately ,was doing pilates, water aerobics , yoga until July 2019  ,  can take 1 flight of stairs----- He can undertake all the above activities without  chest pain,chest tightness, Shortness of breath ,dizziness,lightheadedness making his exercise tolerance more, than 4 Mets.       Review of Systems   Constitutional: Negative for chills and fever.        No unusual weight changes     HENT:        STOPBANG score 4 / 8      HTN   Age over 50 years  Neck size over 40 CM  Male gender   Eyes:        No unusual vision changes   Respiratory:        No cough , phlegm    No Hemoptysis   Cardiovascular:        As noted   Gastrointestinal:        Bowels- Regular   No overt GI/ blood losses   Endocrine:        Prednisone use > 20 mg daily for 3 weeks- None    Genitourinary: Negative for dysuria.        No urinary hesitancy    Musculoskeletal:        As above      Skin: Negative for rash.   Neurological: Negative for syncope.        No unilateral weakness   Hematological:        Current use of Anticoagulants  None    Psychiatric/Behavioral:          Anxiety about up coming surgery   No SI/HI   No vascular stenting   No dysphagia  Has urinary incontinence - long standing   No past medical history pertinent negatives.        No anesthesia, bleeding, cardiac problems, PONV  with previous surgeries/procedures.  Medications and Allergies reviewed in epic.   FH- No anesthesia,bleeding / venous thrombosis ,in family   Physical Exam  Blood pressure 124/80, pulse 77, temperature 97.5 °F (36.4 °C), height 6' 1" (1.854 m), weight 96.6 kg (213 lb), SpO2 100 %.      Physical Exam  Constitutional- Vitals - Body mass index is 28.1 " kg/m².,   Vitals:    10/25/19 1309   BP: 124/80   Pulse: 77   Temp: 97.5 °F (36.4 °C)     General appearance-Conscious,Coherent  Eyes- No conjunctival icterus,pupils  round  and reactive to light   ENT-Oral cavity- moist  , Hearing grossly normal   Neck- No thyromegaly ,Trachea -central, No jugular venous distension,   No Carotid Bruit   Cardiovascular -Heart Sounds- Normal  and  no murmur   , No gallop rhythm ,  Respiratory - Normal Respiratory Effort, Normal breath sounds,  no wheeze  and  no forced expiratory wheeze    Peripheral pitting pedal edema-- none , no calf pain   Gastrointestinal -Soft abdomen, No palpable masses, Non Tender,Liver,Spleen not palpable. No-- free fluid and shifting dullness  Musculoskeletal- No finger Clubbing. Strength grossly normal   Lymphatic-No Palpable cervical, axillary,Inguinal lymphadenopathy   Psychiatric - normal effect,Orientation  Rt Dorsalis pedis pulses-palpable    Lt Dorsalis pedis pulses- palpable   Rt Posterior tibial pulses -palpable   Left posterior tibial pulses -palpable   Miscellaneous -  no asterixis,  no dupuytren's contracture,  no renal bruit and  Tattoo     Investigations    Review of Medicine tests    EKG- I had independently reviewed the EKG from--10/25/2019   It was reported to be showing     Sinus rhythm with Premature ventricular complexes  Otherwise normal ECG  No previous ECGs available    As per history , [facility] - As per him no heart disease or heart failure     Review of clinical lab tests:  No results found for: CREATININE, HGB, PLT            Review of old records- Was done and information gathered regards to events leading to surgery and health conditions of significance in the perioperative period.    Outpatient Subjective & Objective

## 2019-10-25 NOTE — DISCHARGE INSTRUCTIONS
Your surgery has been scheduled for:__________________________________________    You should report to:  ____[facility], located on the [address] side of the first floor of the           [facility] ([phone])  ____[facility], located on the [facility] side of the            Second floor of the [facility] ([phone])  ____3rd Floor [facility] located on the [facility] side of the [facility] [phone]  Please Note   - Tell your doctor if you take Aspirin, products containing Aspirin, herbal medications  or blood thinners, such as Coumadin, Ticlid, or Plavix.  (Consult your provider regarding holding or stopping before surgery).  - Arrange for someone to drive you home following surgery.  You will not be allowed to leave the surgical facility alone or drive yourself home following sedation and anesthesia.  Before Surgery  - Stop taking all vitamins/ herbal medications 14days prior to surgery  - No Motrin/Advil (Ibuprofen) 1 day before surgery  - No Aleve (Naproxen) 7 days before surgery  - Stop Taking Asprin, products containing Asprin _____days before surgery  - Stop taking blood thinners_______days before surgery  - No Goody's/BC  Powder 7 days before surgery  - Refrain from drinking alcoholic beverages for 24hours before and after surgery  - Stop or limit smoking _________days before surgery  - You may take Tylenol for pain  Night before Surgery   Stop ALL solid food, gum, candy (including vitamins) 8 hours before arrival time.  (Please note: If your surgeon gives you different eating and drinking instructions, please follow surgeon's directions.)   Stop all CLOUDY liquids: coffee with creamer, formula, tube feeds, cloudy juices, non-human milk and breast milk with additives, 6 hours prior to arrival time.   Stop plain breast milk 4 hours prior to arrival time.   The patient should be ENCOURAGED to drink  carbohydrate-rich clear liquids (sports drinks, clear juices) until 2 hours prior to arrival time.   CLEAR liquids include only water, black coffee NO creamer, clear oral rehydration drinks, clear sports drinks or clear fruit juices (no orange juice, no pulpy juices, no apple cider). Advise patients if they can read newsprint through the liquid, it qualifies as clear liquid.    IF IN DOUBT, drink water instead.   - Take a shower or bath (shower is recommended).  Bathe with Hibiclens soap or an antibacterial soap from the neck down.  If not supplied by your surgeon, hibiclens soap will need to be purchased over the counter in pharmacy.  Rinse soap off thoroughly.  - Shampoo your hair with your regular shampoo  The Day of Surgery  · NOTHING TO  DRINK 2 hours before arrival time. If you are told to take medication on the morning of surgery, it may be taken with a sip of water.   - Take another bath or shower with hibiclens or any antibacterial soap, to reduce the chance of infection.  - Take heart and blood pressure medications with a small sip of water, as advised by the perioperative team.  - Do not take fluid pills  - You may brush your teeth and rinse your mouth, but do not swall any additional water.   - Do not apply perfumes, powder, body lotions or deodorant on the day of surgery.  - Nail polish should be removed.  - Do not wear makeup or moisturizer  - Wear comfortable clothes, such as a button front shirt and loose fitting pants.  - Leave all jewelry, including body piercings, and valuables at home.    - Bring any devices you will neeed after surgery such as crutches or canes.  - If you have sleep apnea, please bring your CPAP machine  In the event that your physical condition changes including the onset of a cold or respiratory illness, or if you have to delay or cancel your surgery, please notify your surgeon.  Anesthesia: General Anesthesia     You are watched continuously during your procedure by your  anesthesia provider.     Youre due to have surgery. During surgery, youll be given medicine called anesthesia or anesthetic. This will keep you comfortable and pain-free. Your anesthesia provider will use general anesthesia.  What is general anesthesia?  General anesthesia puts you into a state like deep sleep. It goes into the bloodstream (IV anesthetics), into the lungs (gas anesthetics), or both. You feel nothing during the procedure. You will not remember it. During the procedure, the anesthesia provider monitors you continuously. He or she checks your heart rate and rhythm, blood pressure, breathing, and blood oxygen.  · IV anesthetics. IV anesthetics are given through an IV line in your arm. Theyre often given first. This is so you are asleep before a gas anesthetic is started. Some kinds of IV anesthetics relieve pain. Others relax you. Your doctor will decide which kind is best in your case.  · Gas anesthetics. Gas anesthetics are breathed into the lungs. They are often used to keep you asleep. They can be given through a facemask or a tube placed in your larynx or trachea (breathing tube).  ? If you have a facemask, your anesthesia provider will most likely place it over your nose and mouth while youre still awake. Youll breathe oxygen through the mask as your IV anesthetic is started. Gas anesthetic may be added through the mask.  ? If you have a tube in the larynx or trachea, it will be inserted into your throat after youre asleep.  Anesthesia tools and medicines  You will likely have:  · IV anesthetics. These are put into an IV line into your bloodstream.  · Gas anesthetics. You breathe these anesthetics into your lungs, where they pass into your bloodstream.  · Pulse oximeter. This is a small clip that is attached to the end of your finger. This measures your blood oxygen level.  · Electrocardiography leads (electrodes). These are small sticky pads that are placed on your chest. They record your  heart rate and rhythm.  · Blood pressure cuff. This reads your blood pressure.  Risks and possible complications  General anesthesia has some risks. These include:  · Breathing problems  · Nausea and vomiting  · Sore throat or hoarseness (usually temporary)  · Allergic reaction to the anesthetic  · Irregular heartbeat (rare)  · Cardiac arrest (rare)   Anesthesia safety  · Follow all instructions you are given for how long not to eat or drink before your procedure.  · Be sure your doctor knows what medicines and drugs you take. This includes over-the-counter medicines, herbs, supplements, alcohol or other drugs. You will be asked when those were last taken.  · Have an adult family member or friend drive you home after the procedure.  · For the first 24 hours after your surgery:  ? Do not drive or use heavy equipment.  ? Do not make important decisions or sign legal documents. If important decisions or signing legal documents is necessary during the first 24 hours after surgery, have a trusted family member or spouse act on your behalf.  ? Avoid alcohol.  ? Have a responsible adult stay with you. He or she can watch for problems and help keep you safe.  Date Last Reviewed: 12/1/2016  © 9943-3947 [employer]. [address] [place] [address], [place] [address]. All rights reserved. This information is not intended as a substitute for professional medical care. Always follow your healthcare professional's instructions

## 2019-10-25 NOTE — ASSESSMENT & PLAN NOTE
Cigar use   Holding for surgery      I suggested to consider stopping  smoking tobacco for its benefits in the desirae operative period and in the long term    Tobacco use-I  Informed about risk of wound healing problem ,infection,lung complications,thrombosis with tobacco use     Not known to have COPD, Bronchitis, Emphysema, wheezing , chronic phlegm   No inhaler, oxygen use     No SOB

## 2019-10-25 NOTE — ASSESSMENT & PLAN NOTE
Cervical spine  1993    Lumbar laminectomy ( ? L5-S1) - early 70's  - while got hurt on [employer]  1996 - lumbar fusion - ? Levels

## 2019-10-26 NOTE — ASSESSMENT & PLAN NOTE
Likely from HCTZ  I suggested hydration , follow up   I suggest desirae operative hydration ,calcium and Cardiac  monitoring due to the potential for Cardiac arrhythmia from hypercalcemia

## 2019-10-29 ENCOUNTER — TELEPHONE (OUTPATIENT)
Dept: NEUROSURGERY | Facility: CLINIC | Age: 69
End: 2019-10-29

## 2019-10-29 ENCOUNTER — HOSPITAL ENCOUNTER (OUTPATIENT)
Dept: RADIOLOGY | Facility: HOSPITAL | Age: 69
Discharge: HOME | DRG: 473 | End: 2019-10-29
Attending: NEUROLOGICAL SURGERY
Payer: MEDICARE

## 2019-10-29 DIAGNOSIS — G95.9 CERVICAL MYELOPATHY: ICD-10-CM

## 2019-10-29 PROCEDURE — 72125 CT CERVICAL SPINE WITHOUT CONTRAST: ICD-10-PCS | Mod: 26,,, | Performed by: RADIOLOGY

## 2019-10-29 PROCEDURE — 72125 CT NECK SPINE W/O DYE: CPT | Mod: 26,,, | Performed by: RADIOLOGY

## 2019-10-29 PROCEDURE — 72125 CT NECK SPINE W/O DYE: CPT | Mod: TC

## 2019-10-29 NOTE — TELEPHONE ENCOUNTER
Confirmed pt's Sx check in time of 0730 for [phone] at [facility].  Reviewed pre-op instructions: bathe in Hibiclens or Dial and NPO after 2300.  Deny any needs at this time.  V/U. ----- Message from Kirill Rincon sent at 10/29/2019  4:23 PM CDT -----  Contact: pt @ [phone]  Pt is calling to confirm time of surgery tomorrow

## 2019-10-29 NOTE — TELEPHONE ENCOUNTER
Attempted to reach pt to review pre-op instructions.  Instructed to check in for Sx at 0730 on 10.30.2019 at [facility].  Bathe in Hibiclens or Dial and NPO after 2300.  LVM.  ----- Message from Bailee Downing sent at 10/29/2019 11:42 AM CDT -----  Contact: pt#[phone]  Pt is calling for surgery time.Please call

## 2019-10-30 ENCOUNTER — ANESTHESIA (OUTPATIENT)
Dept: SURGERY | Facility: HOSPITAL | Age: 69
DRG: 473 | End: 2019-10-30
Payer: MEDICARE

## 2019-10-30 ENCOUNTER — HOSPITAL ENCOUNTER (INPATIENT)
Facility: HOSPITAL | Age: 69
LOS: 1 days | Discharge: HOME | DRG: 473 | End: 2019-10-31
Attending: NEUROLOGICAL SURGERY | Admitting: NEUROLOGICAL SURGERY
Payer: MEDICARE

## 2019-10-30 DIAGNOSIS — M48.02 CERVICAL STENOSIS OF SPINAL CANAL: ICD-10-CM

## 2019-10-30 LAB
ABO + RH BLD: NORMAL
ANION GAP SERPL CALC-SCNC: 9 MMOL/L (ref 8–16)
APTT BLDCRRT: 25.2 SEC (ref 21–32)
BASOPHILS # BLD AUTO: 0.06 K/UL (ref 0–0.2)
BASOPHILS NFR BLD: 1.1 % (ref 0–1.9)
BLD GP AB SCN CELLS X3 SERPL QL: NORMAL
BUN SERPL-MCNC: 23 MG/DL (ref 8–23)
CALCIUM SERPL-MCNC: 10.5 MG/DL (ref 8.7–10.5)
CHLORIDE SERPL-SCNC: 104 MMOL/L (ref 95–110)
CO2 SERPL-SCNC: 29 MMOL/L (ref 23–29)
CREAT SERPL-MCNC: 1.1 MG/DL (ref 0.5–1.4)
DIFFERENTIAL METHOD: ABNORMAL
EOSINOPHIL # BLD AUTO: 0.1 K/UL (ref 0–0.5)
EOSINOPHIL NFR BLD: 2.3 % (ref 0–8)
ERYTHROCYTE [DISTWIDTH] IN BLOOD BY AUTOMATED COUNT: 13.1 % (ref 11.5–14.5)
EST. GFR  (AFRICAN AMERICAN): >60 ML/MIN/1.73 M^2
EST. GFR  (NON AFRICAN AMERICAN): >60 ML/MIN/1.73 M^2
GLUCOSE SERPL-MCNC: 87 MG/DL (ref 70–110)
HCT VFR BLD AUTO: 45.9 % (ref 40–54)
HGB BLD-MCNC: 14.1 G/DL (ref 14–18)
IMM GRANULOCYTES # BLD AUTO: 0.03 K/UL (ref 0–0.04)
IMM GRANULOCYTES NFR BLD AUTO: 0.5 % (ref 0–0.5)
INR PPP: 1.1 (ref 0.8–1.2)
LYMPHOCYTES # BLD AUTO: 2.7 K/UL (ref 1–4.8)
LYMPHOCYTES NFR BLD: 46.6 % (ref 18–48)
MCH RBC QN AUTO: 27 PG (ref 27–31)
MCHC RBC AUTO-ENTMCNC: 30.7 G/DL (ref 32–36)
MCV RBC AUTO: 88 FL (ref 82–98)
MONOCYTES # BLD AUTO: 0.5 K/UL (ref 0.3–1)
MONOCYTES NFR BLD: 8.1 % (ref 4–15)
NEUTROPHILS # BLD AUTO: 2.4 K/UL (ref 1.8–7.7)
NEUTROPHILS NFR BLD: 41.4 % (ref 38–73)
NRBC BLD-RTO: 0 /100 WBC
PLATELET # BLD AUTO: 198 K/UL (ref 150–350)
PMV BLD AUTO: 10.8 FL (ref 9.2–12.9)
POCT GLUCOSE: 99 MG/DL (ref 70–110)
POTASSIUM SERPL-SCNC: 4.2 MMOL/L (ref 3.5–5.1)
PROTHROMBIN TIME: 10.8 SEC (ref 9–12.5)
RBC # BLD AUTO: 5.23 M/UL (ref 4.6–6.2)
SODIUM SERPL-SCNC: 142 MMOL/L (ref 136–145)
WBC # BLD AUTO: 5.71 K/UL (ref 3.9–12.7)

## 2019-10-30 PROCEDURE — 63600175 PHARM REV CODE 636 W HCPCS: Performed by: NEUROLOGICAL SURGERY

## 2019-10-30 PROCEDURE — 36000710: Performed by: NEUROLOGICAL SURGERY

## 2019-10-30 PROCEDURE — 80048 BASIC METABOLIC PNL TOTAL CA: CPT

## 2019-10-30 PROCEDURE — 20930 SP BONE ALGRFT MORSEL ADD-ON: CPT | Mod: ,,, | Performed by: NEUROLOGICAL SURGERY

## 2019-10-30 PROCEDURE — 63600175 PHARM REV CODE 636 W HCPCS: Performed by: STUDENT IN AN ORGANIZED HEALTH CARE EDUCATION/TRAINING PROGRAM

## 2019-10-30 PROCEDURE — 37000009 HC ANESTHESIA EA ADD 15 MINS: Performed by: NEUROLOGICAL SURGERY

## 2019-10-30 PROCEDURE — 71000039 HC RECOVERY, EACH ADD'L HOUR: Performed by: NEUROLOGICAL SURGERY

## 2019-10-30 PROCEDURE — D9220A PRA ANESTHESIA: Mod: ANES,,, | Performed by: ANESTHESIOLOGY

## 2019-10-30 PROCEDURE — C1729 CATH, DRAINAGE: HCPCS | Performed by: NEUROLOGICAL SURGERY

## 2019-10-30 PROCEDURE — 25000003 PHARM REV CODE 250: Performed by: NURSE ANESTHETIST, CERTIFIED REGISTERED

## 2019-10-30 PROCEDURE — 36000711: Performed by: NEUROLOGICAL SURGERY

## 2019-10-30 PROCEDURE — 25000003 PHARM REV CODE 250: Performed by: STUDENT IN AN ORGANIZED HEALTH CARE EDUCATION/TRAINING PROGRAM

## 2019-10-30 PROCEDURE — 22552 ARTHRD ANT NTRBD CERVICAL EA: CPT | Mod: GC,,, | Performed by: NEUROLOGICAL SURGERY

## 2019-10-30 PROCEDURE — D9220A PRA ANESTHESIA: ICD-10-PCS | Mod: ANES,,, | Performed by: ANESTHESIOLOGY

## 2019-10-30 PROCEDURE — 94761 N-INVAS EAR/PLS OXIMETRY MLT: CPT

## 2019-10-30 PROCEDURE — 22551 PR ARTHRODESIS ANT INTERBODY INC DISCECTOMY, CERVICAL BELOW C2: ICD-10-PCS | Mod: GC,,, | Performed by: NEUROLOGICAL SURGERY

## 2019-10-30 PROCEDURE — 12000002 HC ACUTE/MED SURGE SEMI-PRIVATE ROOM

## 2019-10-30 PROCEDURE — 71000033 HC RECOVERY, INTIAL HOUR: Performed by: NEUROLOGICAL SURGERY

## 2019-10-30 PROCEDURE — 22552 PR ARTHRODESIS ANT INTERBODY INC DISCECTOMY, CERVICAL BELOW C2 EACH ADDL: ICD-10-PCS | Mod: GC,,, | Performed by: NEUROLOGICAL SURGERY

## 2019-10-30 PROCEDURE — 85730 THROMBOPLASTIN TIME PARTIAL: CPT

## 2019-10-30 PROCEDURE — 25000003 PHARM REV CODE 250: Performed by: NEUROLOGICAL SURGERY

## 2019-10-30 PROCEDURE — 63600175 PHARM REV CODE 636 W HCPCS

## 2019-10-30 PROCEDURE — 20930 PR ALLOGRAFT FOR SPINE SURGERY ONLY MORSELIZED: ICD-10-PCS | Mod: ,,, | Performed by: NEUROLOGICAL SURGERY

## 2019-10-30 PROCEDURE — 27201423 OPTIME MED/SURG SUP & DEVICES STERILE SUPPLY: Performed by: NEUROLOGICAL SURGERY

## 2019-10-30 PROCEDURE — 86850 RBC ANTIBODY SCREEN: CPT

## 2019-10-30 PROCEDURE — 25000003 PHARM REV CODE 250: Performed by: ANESTHESIOLOGY

## 2019-10-30 PROCEDURE — D9220A PRA ANESTHESIA: ICD-10-PCS | Mod: CRNA,,, | Performed by: NURSE ANESTHETIST, CERTIFIED REGISTERED

## 2019-10-30 PROCEDURE — 82962 GLUCOSE BLOOD TEST: CPT | Performed by: NEUROLOGICAL SURGERY

## 2019-10-30 PROCEDURE — 22551 ARTHRD ANT NTRBDY CERVICAL: CPT | Mod: GC,,, | Performed by: NEUROLOGICAL SURGERY

## 2019-10-30 PROCEDURE — 37000008 HC ANESTHESIA 1ST 15 MINUTES: Performed by: NEUROLOGICAL SURGERY

## 2019-10-30 PROCEDURE — 22853 INSJ BIOMECHANICAL DEVICE: CPT | Mod: GC,,, | Performed by: NEUROLOGICAL SURGERY

## 2019-10-30 PROCEDURE — 85025 COMPLETE CBC W/AUTO DIFF WBC: CPT

## 2019-10-30 PROCEDURE — 85610 PROTHROMBIN TIME: CPT

## 2019-10-30 PROCEDURE — 27800903 OPTIME MED/SURG SUP & DEVICES OTHER IMPLANTS: Performed by: NEUROLOGICAL SURGERY

## 2019-10-30 PROCEDURE — 22853 PR INSERT BIOMECH DEV W/INTERBODY ARTHRODESIS, EA CONTIGUOUS DEFECT: ICD-10-PCS | Mod: GC,,, | Performed by: NEUROLOGICAL SURGERY

## 2019-10-30 PROCEDURE — D9220A PRA ANESTHESIA: Mod: CRNA,,, | Performed by: NURSE ANESTHETIST, CERTIFIED REGISTERED

## 2019-10-30 PROCEDURE — 63600175 PHARM REV CODE 636 W HCPCS: Performed by: ANESTHESIOLOGY

## 2019-10-30 PROCEDURE — 63600175 PHARM REV CODE 636 W HCPCS: Performed by: NURSE ANESTHETIST, CERTIFIED REGISTERED

## 2019-10-30 PROCEDURE — C1713 ANCHOR/SCREW BN/BN,TIS/BN: HCPCS | Performed by: NEUROLOGICAL SURGERY

## 2019-10-30 DEVICE — MATRIX BONE CELLR VIVIGEN 1CC: Type: IMPLANTABLE DEVICE | Site: SPINE CERVICAL | Status: FUNCTIONAL

## 2019-10-30 DEVICE — IMPLANTABLE DEVICE: Type: IMPLANTABLE DEVICE | Site: SPINE CERVICAL | Status: FUNCTIONAL

## 2019-10-30 DEVICE — SPACER COALITION MIS 7X12X14MM: Type: IMPLANTABLE DEVICE | Site: SPINE CERVICAL | Status: FUNCTIONAL

## 2019-10-30 DEVICE — SPACER COALITION MIS 6X12X14MM: Type: IMPLANTABLE DEVICE | Site: SPINE CERVICAL | Status: FUNCTIONAL

## 2019-10-30 DEVICE — ANCHOR COALITION MIS 12MM: Type: IMPLANTABLE DEVICE | Site: SPINE CERVICAL | Status: FUNCTIONAL

## 2019-10-30 RX ORDER — AMOXICILLIN 250 MG
1 CAPSULE ORAL 2 TIMES DAILY
Status: DISCONTINUED | OUTPATIENT
Start: 2019-10-30 | End: 2019-10-31 | Stop reason: HOSPADM

## 2019-10-30 RX ORDER — GABAPENTIN 300 MG/1
CAPSULE ORAL
Status: DISCONTINUED
Start: 2019-10-30 | End: 2019-10-30 | Stop reason: WASHOUT

## 2019-10-30 RX ORDER — METOCLOPRAMIDE HYDROCHLORIDE 5 MG/ML
10 INJECTION INTRAMUSCULAR; INTRAVENOUS EVERY 10 MIN PRN
Status: DISCONTINUED | OUTPATIENT
Start: 2019-10-30 | End: 2019-10-30

## 2019-10-30 RX ORDER — LIDOCAINE HCL/PF 100 MG/5ML
SYRINGE (ML) INTRAVENOUS
Status: DISCONTINUED | OUTPATIENT
Start: 2019-10-30 | End: 2019-10-30

## 2019-10-30 RX ORDER — GLUCAGON 1 MG
1 KIT INJECTION
Status: DISCONTINUED | OUTPATIENT
Start: 2019-10-30 | End: 2019-10-31 | Stop reason: HOSPADM

## 2019-10-30 RX ORDER — GLYCOPYRROLATE 0.2 MG/ML
INJECTION INTRAMUSCULAR; INTRAVENOUS
Status: DISCONTINUED | OUTPATIENT
Start: 2019-10-30 | End: 2019-10-30

## 2019-10-30 RX ORDER — KETAMINE HCL IN 0.9 % NACL 50 MG/5 ML
SYRINGE (ML) INTRAVENOUS
Status: DISCONTINUED | OUTPATIENT
Start: 2019-10-30 | End: 2019-10-30

## 2019-10-30 RX ORDER — IBUPROFEN 200 MG
24 TABLET ORAL
Status: DISCONTINUED | OUTPATIENT
Start: 2019-10-30 | End: 2019-10-31 | Stop reason: HOSPADM

## 2019-10-30 RX ORDER — SUCCINYLCHOLINE CHLORIDE 20 MG/ML
INJECTION INTRAMUSCULAR; INTRAVENOUS
Status: DISCONTINUED | OUTPATIENT
Start: 2019-10-30 | End: 2019-10-30

## 2019-10-30 RX ORDER — PROPOFOL 10 MG/ML
VIAL (ML) INTRAVENOUS
Status: DISCONTINUED | OUTPATIENT
Start: 2019-10-30 | End: 2019-10-30

## 2019-10-30 RX ORDER — HYDROMORPHONE HYDROCHLORIDE 1 MG/ML
1 INJECTION, SOLUTION INTRAMUSCULAR; INTRAVENOUS; SUBCUTANEOUS EVERY 4 HOURS PRN
Status: DISCONTINUED | OUTPATIENT
Start: 2019-10-30 | End: 2019-10-31

## 2019-10-30 RX ORDER — ACETAMINOPHEN 10 MG/ML
INJECTION, SOLUTION INTRAVENOUS
Status: DISCONTINUED | OUTPATIENT
Start: 2019-10-30 | End: 2019-10-30

## 2019-10-30 RX ORDER — ROCURONIUM BROMIDE 10 MG/ML
INJECTION, SOLUTION INTRAVENOUS
Status: DISCONTINUED | OUTPATIENT
Start: 2019-10-30 | End: 2019-10-30

## 2019-10-30 RX ORDER — OXYCODONE AND ACETAMINOPHEN 10; 325 MG/1; MG/1
1 TABLET ORAL EVERY 4 HOURS PRN
Status: DISCONTINUED | OUTPATIENT
Start: 2019-10-30 | End: 2019-10-31 | Stop reason: HOSPADM

## 2019-10-30 RX ORDER — DEXAMETHASONE SODIUM PHOSPHATE 4 MG/ML
INJECTION, SOLUTION INTRA-ARTICULAR; INTRALESIONAL; INTRAMUSCULAR; INTRAVENOUS; SOFT TISSUE
Status: DISCONTINUED | OUTPATIENT
Start: 2019-10-30 | End: 2019-10-30

## 2019-10-30 RX ORDER — LORAZEPAM 2 MG/ML
0.25 INJECTION INTRAMUSCULAR ONCE AS NEEDED
Status: DISCONTINUED | OUTPATIENT
Start: 2019-10-30 | End: 2019-10-30

## 2019-10-30 RX ORDER — HYDROMORPHONE HYDROCHLORIDE 1 MG/ML
2 INJECTION, SOLUTION INTRAMUSCULAR; INTRAVENOUS; SUBCUTANEOUS ONCE
Status: COMPLETED | OUTPATIENT
Start: 2019-10-30 | End: 2019-10-30

## 2019-10-30 RX ORDER — LABETALOL HCL 20 MG/4 ML
10 SYRINGE (ML) INTRAVENOUS EVERY 10 MIN PRN
Status: DISCONTINUED | OUTPATIENT
Start: 2019-10-30 | End: 2019-10-31 | Stop reason: HOSPADM

## 2019-10-30 RX ORDER — MUPIROCIN 20 MG/G
1 OINTMENT TOPICAL
Status: COMPLETED | OUTPATIENT
Start: 2019-10-30 | End: 2019-10-30

## 2019-10-30 RX ORDER — CHOLECALCIFEROL (VITAMIN D3) 25 MCG
1000 TABLET ORAL DAILY
Status: DISCONTINUED | OUTPATIENT
Start: 2019-10-31 | End: 2019-10-31 | Stop reason: HOSPADM

## 2019-10-30 RX ORDER — LISINOPRIL 5 MG/1
5 TABLET ORAL DAILY
Status: DISCONTINUED | OUTPATIENT
Start: 2019-10-31 | End: 2019-10-31 | Stop reason: HOSPADM

## 2019-10-30 RX ORDER — ONDANSETRON 4 MG/1
4 TABLET, FILM COATED ORAL EVERY 6 HOURS PRN
Status: DISCONTINUED | OUTPATIENT
Start: 2019-10-30 | End: 2019-10-31 | Stop reason: HOSPADM

## 2019-10-30 RX ORDER — DEXMEDETOMIDINE HYDROCHLORIDE 100 UG/ML
INJECTION, SOLUTION INTRAVENOUS
Status: DISCONTINUED | OUTPATIENT
Start: 2019-10-30 | End: 2019-10-30

## 2019-10-30 RX ORDER — CEFAZOLIN SODIUM 1 G/3ML
2 INJECTION, POWDER, FOR SOLUTION INTRAMUSCULAR; INTRAVENOUS
Status: DISCONTINUED | OUTPATIENT
Start: 2019-10-30 | End: 2019-10-31

## 2019-10-30 RX ORDER — PROPOFOL 10 MG/ML
VIAL (ML) INTRAVENOUS CONTINUOUS PRN
Status: DISCONTINUED | OUTPATIENT
Start: 2019-10-30 | End: 2019-10-30

## 2019-10-30 RX ORDER — IBUPROFEN 200 MG
16 TABLET ORAL
Status: DISCONTINUED | OUTPATIENT
Start: 2019-10-30 | End: 2019-10-31 | Stop reason: HOSPADM

## 2019-10-30 RX ORDER — ONDANSETRON 2 MG/ML
INJECTION INTRAMUSCULAR; INTRAVENOUS
Status: DISCONTINUED | OUTPATIENT
Start: 2019-10-30 | End: 2019-10-30

## 2019-10-30 RX ORDER — SODIUM CHLORIDE 0.9 % (FLUSH) 0.9 %
3 SYRINGE (ML) INJECTION
Status: DISCONTINUED | OUTPATIENT
Start: 2019-10-30 | End: 2019-10-30

## 2019-10-30 RX ORDER — METHYLPREDNISOLONE ACETATE 40 MG/ML
INJECTION, SUSPENSION INTRA-ARTICULAR; INTRALESIONAL; INTRAMUSCULAR; SOFT TISSUE
Status: DISCONTINUED | OUTPATIENT
Start: 2019-10-30 | End: 2019-10-30 | Stop reason: HOSPADM

## 2019-10-30 RX ORDER — HYDROMORPHONE HYDROCHLORIDE 1 MG/ML
INJECTION, SOLUTION INTRAMUSCULAR; INTRAVENOUS; SUBCUTANEOUS
Status: COMPLETED
Start: 2019-10-30 | End: 2019-10-30

## 2019-10-30 RX ORDER — CEFAZOLIN SODIUM 1 G/3ML
2 INJECTION, POWDER, FOR SOLUTION INTRAMUSCULAR; INTRAVENOUS
Status: COMPLETED | OUTPATIENT
Start: 2019-10-30 | End: 2019-10-30

## 2019-10-30 RX ORDER — OXYCODONE AND ACETAMINOPHEN 10; 325 MG/1; MG/1
TABLET ORAL
Status: DISPENSED
Start: 2019-10-30 | End: 2019-10-31

## 2019-10-30 RX ORDER — LIDOCAINE HYDROCHLORIDE AND EPINEPHRINE 10; 10 MG/ML; UG/ML
INJECTION, SOLUTION INFILTRATION; PERINEURAL
Status: DISCONTINUED | OUTPATIENT
Start: 2019-10-30 | End: 2019-10-30

## 2019-10-30 RX ORDER — TRIAMTERENE AND HYDROCHLOROTHIAZIDE 75; 50 MG/1; MG/1
0.5 TABLET ORAL DAILY
Status: DISCONTINUED | OUTPATIENT
Start: 2019-10-31 | End: 2019-10-31 | Stop reason: ALTCHOICE

## 2019-10-30 RX ORDER — VANCOMYCIN HYDROCHLORIDE 1 G/20ML
INJECTION, POWDER, LYOPHILIZED, FOR SOLUTION INTRAVENOUS
Status: DISCONTINUED | OUTPATIENT
Start: 2019-10-30 | End: 2019-10-30 | Stop reason: HOSPADM

## 2019-10-30 RX ORDER — LIDOCAINE HYDROCHLORIDE 10 MG/ML
1 INJECTION, SOLUTION EPIDURAL; INFILTRATION; INTRACAUDAL; PERINEURAL ONCE
Status: COMPLETED | OUTPATIENT
Start: 2019-10-30 | End: 2019-10-30

## 2019-10-30 RX ORDER — LISINOPRIL 5 MG/1
5 TABLET ORAL ONCE
Status: DISCONTINUED | OUTPATIENT
Start: 2019-10-30 | End: 2019-10-31 | Stop reason: HOSPADM

## 2019-10-30 RX ORDER — BACITRACIN 50000 [IU]/1
INJECTION, POWDER, FOR SOLUTION INTRAMUSCULAR
Status: DISCONTINUED | OUTPATIENT
Start: 2019-10-30 | End: 2019-10-30 | Stop reason: HOSPADM

## 2019-10-30 RX ORDER — MUPIROCIN 20 MG/G
OINTMENT TOPICAL
Status: DISCONTINUED | OUTPATIENT
Start: 2019-10-30 | End: 2019-10-30

## 2019-10-30 RX ORDER — INSULIN ASPART 100 [IU]/ML
1-10 INJECTION, SOLUTION INTRAVENOUS; SUBCUTANEOUS
Status: DISCONTINUED | OUTPATIENT
Start: 2019-10-30 | End: 2019-10-31 | Stop reason: HOSPADM

## 2019-10-30 RX ORDER — MIDAZOLAM HYDROCHLORIDE 1 MG/ML
INJECTION, SOLUTION INTRAMUSCULAR; INTRAVENOUS
Status: DISCONTINUED | OUTPATIENT
Start: 2019-10-30 | End: 2019-10-30

## 2019-10-30 RX ORDER — HEPARIN SODIUM 5000 [USP'U]/ML
5000 INJECTION, SOLUTION INTRAVENOUS; SUBCUTANEOUS EVERY 8 HOURS
Status: DISCONTINUED | OUTPATIENT
Start: 2019-10-31 | End: 2019-10-31 | Stop reason: HOSPADM

## 2019-10-30 RX ORDER — FENTANYL CITRATE 50 UG/ML
INJECTION, SOLUTION INTRAMUSCULAR; INTRAVENOUS
Status: DISCONTINUED | OUTPATIENT
Start: 2019-10-30 | End: 2019-10-30

## 2019-10-30 RX ORDER — PHENYLEPHRINE HCL IN 0.9% NACL 1 MG/10 ML
SYRINGE (ML) INTRAVENOUS
Status: DISCONTINUED | OUTPATIENT
Start: 2019-10-30 | End: 2019-10-30

## 2019-10-30 RX ORDER — FAMOTIDINE 20 MG/1
20 TABLET, FILM COATED ORAL 2 TIMES DAILY
Status: DISCONTINUED | OUTPATIENT
Start: 2019-10-30 | End: 2019-10-31 | Stop reason: HOSPADM

## 2019-10-30 RX ORDER — HYDROMORPHONE HYDROCHLORIDE 1 MG/ML
0.2 INJECTION, SOLUTION INTRAMUSCULAR; INTRAVENOUS; SUBCUTANEOUS EVERY 5 MIN PRN
Status: COMPLETED | OUTPATIENT
Start: 2019-10-30 | End: 2019-10-30

## 2019-10-30 RX ORDER — SODIUM CHLORIDE 9 MG/ML
INJECTION, SOLUTION INTRAVENOUS CONTINUOUS
Status: DISCONTINUED | OUTPATIENT
Start: 2019-10-30 | End: 2019-10-30

## 2019-10-30 RX ORDER — GABAPENTIN 300 MG/1
600 CAPSULE ORAL NIGHTLY
Status: DISCONTINUED | OUTPATIENT
Start: 2019-10-30 | End: 2019-10-31 | Stop reason: HOSPADM

## 2019-10-30 RX ADMIN — MIDAZOLAM HYDROCHLORIDE 2 MG: 1 INJECTION, SOLUTION INTRAMUSCULAR; INTRAVENOUS at 10:10

## 2019-10-30 RX ADMIN — Medication 30 MG: at 11:10

## 2019-10-30 RX ADMIN — MUPIROCIN: 20 OINTMENT TOPICAL at 08:10

## 2019-10-30 RX ADMIN — ACETAMINOPHEN 1000 MG: 10 INJECTION, SOLUTION INTRAVENOUS at 11:10

## 2019-10-30 RX ADMIN — SODIUM CHLORIDE 0.25 MCG/KG/MIN: 9 INJECTION, SOLUTION INTRAVENOUS at 12:10

## 2019-10-30 RX ADMIN — HYDROMORPHONE HYDROCHLORIDE 0.2 MG: 1 INJECTION, SOLUTION INTRAMUSCULAR; INTRAVENOUS; SUBCUTANEOUS at 04:10

## 2019-10-30 RX ADMIN — CEFAZOLIN 2 G: 1 INJECTION, POWDER, FOR SOLUTION INTRAMUSCULAR; INTRAVENOUS at 09:10

## 2019-10-30 RX ADMIN — OXYCODONE HYDROCHLORIDE AND ACETAMINOPHEN 1 TABLET: 10; 325 TABLET ORAL at 04:10

## 2019-10-30 RX ADMIN — DEXMEDETOMIDINE HYDROCHLORIDE 40 MCG: 100 INJECTION, SOLUTION, CONCENTRATE INTRAVENOUS at 02:10

## 2019-10-30 RX ADMIN — PROPOFOL 150 MG: 10 INJECTION, EMULSION INTRAVENOUS at 11:10

## 2019-10-30 RX ADMIN — FAMOTIDINE 20 MG: 20 TABLET ORAL at 08:10

## 2019-10-30 RX ADMIN — Medication 10 MG: at 01:10

## 2019-10-30 RX ADMIN — DEXAMETHASONE SODIUM PHOSPHATE 8 MG: 4 INJECTION, SOLUTION INTRAMUSCULAR; INTRAVENOUS at 11:10

## 2019-10-30 RX ADMIN — SODIUM CHLORIDE, SODIUM GLUCONATE, SODIUM ACETATE, POTASSIUM CHLORIDE, MAGNESIUM CHLORIDE, SODIUM PHOSPHATE, DIBASIC, AND POTASSIUM PHOSPHATE: .53; .5; .37; .037; .03; .012; .00082 INJECTION, SOLUTION INTRAVENOUS at 11:10

## 2019-10-30 RX ADMIN — OXYCODONE HYDROCHLORIDE AND ACETAMINOPHEN 1 TABLET: 10; 325 TABLET ORAL at 08:10

## 2019-10-30 RX ADMIN — HYDROMORPHONE HYDROCHLORIDE 0.2 MG: 1 INJECTION, SOLUTION INTRAMUSCULAR; INTRAVENOUS; SUBCUTANEOUS at 03:10

## 2019-10-30 RX ADMIN — LIDOCAINE HYDROCHLORIDE 10 MG: 10 INJECTION, SOLUTION EPIDURAL; INFILTRATION; INTRACAUDAL; PERINEURAL at 09:10

## 2019-10-30 RX ADMIN — SODIUM CHLORIDE: 0.9 INJECTION, SOLUTION INTRAVENOUS at 09:10

## 2019-10-30 RX ADMIN — ONDANSETRON 4 MG: 2 INJECTION INTRAMUSCULAR; INTRAVENOUS at 02:10

## 2019-10-30 RX ADMIN — MUPIROCIN 1 G: 20 OINTMENT TOPICAL at 08:10

## 2019-10-30 RX ADMIN — GLYCOPYRROLATE 0.2 MG: 0.2 INJECTION, SOLUTION INTRAMUSCULAR; INTRAVENOUS at 02:10

## 2019-10-30 RX ADMIN — LABETALOL HCL IV SOLN PREFILLED SYRINGE 20 MG/4ML (5 MG/ML) 10 MG: 20/4 SOLUTION PREFILLED SYRINGE at 07:10

## 2019-10-30 RX ADMIN — CEFAZOLIN 2 G: 330 INJECTION, POWDER, FOR SOLUTION INTRAMUSCULAR; INTRAVENOUS at 11:10

## 2019-10-30 RX ADMIN — SENNOSIDES AND DOCUSATE SODIUM 1 TABLET: 8.6; 5 TABLET ORAL at 08:10

## 2019-10-30 RX ADMIN — Medication 100 MCG: at 12:10

## 2019-10-30 RX ADMIN — SUCCINYLCHOLINE CHLORIDE 100 MG: 20 INJECTION, SOLUTION INTRAMUSCULAR; INTRAVENOUS at 11:10

## 2019-10-30 RX ADMIN — Medication 100 MCG: at 11:10

## 2019-10-30 RX ADMIN — FENTANYL CITRATE 50 MCG: 50 INJECTION, SOLUTION INTRAMUSCULAR; INTRAVENOUS at 03:10

## 2019-10-30 RX ADMIN — PROPOFOL 150 MCG/KG/MIN: 10 INJECTION, EMULSION INTRAVENOUS at 11:10

## 2019-10-30 RX ADMIN — HYDROMORPHONE HYDROCHLORIDE 2 MG: 1 INJECTION, SOLUTION INTRAMUSCULAR; INTRAVENOUS; SUBCUTANEOUS at 04:10

## 2019-10-30 RX ADMIN — HYDROMORPHONE HYDROCHLORIDE 1 MG: 1 INJECTION, SOLUTION INTRAMUSCULAR; INTRAVENOUS; SUBCUTANEOUS at 09:10

## 2019-10-30 RX ADMIN — REMIFENTANIL HYDROCHLORIDE 0.2 MCG/KG/MIN: 1 INJECTION, POWDER, LYOPHILIZED, FOR SOLUTION INTRAVENOUS at 11:10

## 2019-10-30 RX ADMIN — ROCURONIUM BROMIDE 10 MG: 10 INJECTION, SOLUTION INTRAVENOUS at 11:10

## 2019-10-30 RX ADMIN — GABAPENTIN 600 MG: 300 CAPSULE ORAL at 08:10

## 2019-10-30 RX ADMIN — Medication 10 MG: at 12:10

## 2019-10-30 RX ADMIN — LIDOCAINE HYDROCHLORIDE 100 MG: 20 INJECTION, SOLUTION INTRAVENOUS at 11:10

## 2019-10-30 RX ADMIN — FENTANYL CITRATE 150 MCG: 50 INJECTION, SOLUTION INTRAMUSCULAR; INTRAVENOUS at 11:10

## 2019-10-30 RX ADMIN — PROPOFOL 20 MG: 10 INJECTION, EMULSION INTRAVENOUS at 11:10

## 2019-10-30 NOTE — PROGRESS NOTES
Patient received to room 556B. Patient is AAOx4. States pain is getting better as he was medicated last at 4pm. /100. Patient is asymptomactic for htn at this time.Order noted for prn labetalol. Unable to retreive labetalol from pyxis at this time. Called inpatient pharmacy and technician states he will tube labetalol up asap. Hemovac drain in place, cervical collar on, tay catheter draining clear yellow urine. Patient and family oriented to room and clear liquid dinner is served. Xray called to say they are sending transport for patient to come down for post-op xray.

## 2019-10-30 NOTE — H&P
Garcia Renteria is a 69 y.o.  male with history of diabetes type 1, smoking, and colitis, who presents today for follow up evaluation to discuss surgery. Pt reports o significant change in his symptoms. He states that he has been moving slightly better gaining strength in his legs. Pt's previous ACDF surgery was performed in 1993 from a right sided approach. He denies any significant hoarse voice following that surgery. Pt reports neck pain worse when he is driving or traveling long distances. He denies any hand clumsiness but more coldness in the ulnar aspect of his arm to his last three digits. Pt reports numbness down his BLE when he wakes up in the morning. He also experiences spasms in his BLE and a cold sensation in his feet.         Review of patient's allergies indicates:  No Known Allergies          Past Medical History:   Diagnosis Date    Colitis      Diabetes mellitus type I      Diverticulosis              Past Surgical History:   Procedure Laterality Date    ANKLE SURGERY Bilateral      BACK SURGERY        COLONOSCOPY        lower back surgery        NECK SURGERY        UPPER GASTROINTESTINAL ENDOSCOPY                Family History   Problem Relation Age of Onset    Neurological Disorders Mother        Social History            Tobacco Use    Smoking status: Current Every Day Smoker       Types: Cigars    Smokeless tobacco: Never Used   Substance Use Topics    Alcohol use: Yes       Alcohol/week: 2.0 standard drinks       Types: 1 Glasses of wine, 1 Cans of beer per week    Drug use: Never         Review of Systems   Constitutional: Negative.    HENT: Negative.    Eyes: Negative.    Respiratory: Negative.    Cardiovascular: Negative.    Gastrointestinal: Negative.    Endocrine: Negative.    Genitourinary: Positive for enuresis.   Musculoskeletal: Positive for neck pain. Negative for back pain and gait problem.   Skin: Negative.    Allergic/Immunologic: Negative.    Neurological:  Positive for weakness (BLE) and numbness (BUE (coldness); BLE). Negative for light-headedness and headaches.   Hematological: Negative.    Psychiatric/Behavioral: Negative.          OBJECTIVE:   Vital Signs:  Temp: 98.6 °F (37 °C) (10/25/19 0953)  Pulse: 78 (10/25/19 0953)  BP: (!) 127/91 (10/25/19 0953)     Physical Exam:     Vital signs: All nursing notes and vital signs reviewed -- afebrile, vital signs stable.  Constitutional: Patient sitting comfortably in chair. Appears well developed and well nourished.  Skin: Exposed areas are intact without abnormal markings, rashes or other lesions. Well healed right neck incision.   HEENT: Normocephalic. Normal conjunctivae.  Cardiovascular: Normal rate and regular rhythm.  Respiratory: Chest wall rises and falls symmetrically, without signs of respiratory distress.  Abdomen: Soft and non-tender.  Extremities: Warm and without edema. Calves supple, non-tender.  Psych/Behavior: Normal affect.     Neurological:     Mental status: Alert and oriented. Conversational and appropriate.       Cranial Nerves: Grossly intact.      Motor:     Upper:   Deltoids Triceps Biceps WE WF      R 5/5 5/5 5/5 5/5 5/5 5/5     L 5/5 5/5 5/5 5/5 5/5 5/5      Lower:   HF KE KF DF PF EHL     R 4-/5 5/5 5/5 5/5 4+/5 5/5     L 4-/5 5/5 5/5 5/5 4+/5 5/5      Sensory: Intact sensation to light touch in all extremities. Romberg negative.     Reflexes:          DTR: 1+ bilateral patellar.      Harris's: Negative.     Babinski's: Negative.     Clonus: Negative.     Cerebellar: Finger-to-nose and rapid alternating movements normal. Gait ataxia. Left leg spasticity     Spine:     Posture: Stooped posture. No focal or global spinal deformity visible on inspection. Shoulders and hips even. No obvious leg length discrepancy. No scapula winging.      Bending: Full ROM with forward, back and lateral bending. No rib prominence with forward bend.     Cervical:      ROM: Full with flexion, extension, lateral  rotation and ear-to-shoulder bend.      Midline TTP: Negative.     Spurling's test: Negative.     Lhermitte's: Negative.     Thoracic:     Midline TTP: Negative     Lumbar:     Midline TTP: Negative     Straight Leg Test: Negative     Crossed Straight Leg Test: Negative     Sciatic notch tenderness: Negative.     Other:     SI joint TTP: Negative.     Greater trochanter TTP: Negative.     Tenderness with external/internal hip rotation: Negative.     Diagnostic Results:  All imaging was independently reviewed by me.     MRI C-spine, dated 10/17/2019:  1. Severe stenosis at C3/4 and C4/5 with T2 signal change at C3/4      MRI T-spine, dated 10/17/2019:  1. Severe central stenosis at T10/11 and T12/L1     Flex/Ex X-ray C-spine, dated 10/17/2019:  1. C5/6 ACD that appears fused  2. No instability     ASSESSMENT/PLAN:      Garcia Renteria was here today to review his deformity surgery next week; however his recent MRI C spine shows severe cord compression at C3-4 and C4-5 with associated myelomalacia. This may be significantly contributing to his current myelopathy symptoms, and needs to be addressed surgically prior to his deformity surgery. I recommend a C3-4 and C4-5 ACD. The deformity surgery would follow after his bone has healed from this surgery -- unless his thoracic myelopathy progresses. I discussed the R/B/A/I/M of this surgery in detail and he wishes to proceed. He will need a CT C spine prior to surgery. The approach will be RIGHT side, which is ipsilateral to his previous ACD.     The patient understands and agrees with the plan of care. All questions were answered.      1. C3/4, C4/5 ACDF

## 2019-10-30 NOTE — ANESTHESIA PREPROCEDURE EVALUATION
10/30/2019  Garcia Renteria is a 69 y.o., male.    Anesthesia Evaluation    I have reviewed the Patient Summary Reports.    I have reviewed the Nursing Notes.   I have reviewed the Medications.     Review of Systems  Anesthesia Hx:  No problems with previous Anesthesia  History of prior surgery of interest to airway management or planning: Previous anesthesia: General  Denies Personal Hx of Anesthesia complications.   Cardiovascular:   Hypertension    Pulmonary:  Pulmonary Normal    Renal/:  Renal/ Normal     Hepatic/GI:   GERD    Musculoskeletal:   Arthritis   Spine Disorders: Degenerative disease and Disc disease    Neurological:  Neurology Normal    Endocrine:   Diabetes, type 2      Patient Active Problem List   Diagnosis    Diverticulosis of large intestine without hemorrhage    Gastroesophageal reflux disease without esophagitis    Hepatitis C virus infection cured after antiviral drug therapy    Low back pain, non-specific    Gastroesophageal reflux disease with esophagitis    Chronic pain disorder    H/O lumbosacral spine surgery    DDD (degenerative disc disease), lumbar    Personal history of spine surgery    Chronic, continuous use of opioids    Constipation    Essential hypertension    Type 2 diabetes mellitus with diabetic neuropathy, without long-term current use of insulin    History of tobacco abuse    Incomplete bladder emptying    Hypercalcemia    Cervical stenosis of spinal canal     Past Medical History:   Diagnosis Date    Colitis     Diabetes mellitus, type 2     Diverticulosis     GERD (gastroesophageal reflux disease)     Hypertension      Past Surgical History:   Procedure Laterality Date    ANKLE SURGERY Bilateral     BACK SURGERY      COLONOSCOPY      lower back surgery      NECK SURGERY      SPINE SURGERY      UPPER GASTROINTESTINAL ENDOSCOPY            Physical Exam  General:  Well nourished    Airway/Jaw/Neck:  Airway Findings: Mouth Opening: Normal Tongue: Normal  General Airway Assessment: Adult  Mallampati: II  TM Distance: Normal, at least 6 cm  Jaw/Neck Findings:  Neck ROM: Normal ROM      Dental:  Dental Findings: In tact   Chest/Lungs:  Chest/Lungs Findings: Clear to auscultation     Heart/Vascular:  Heart Findings: Rate: Normal  Rhythm: Regular Rhythm  Sounds: Normal        Mental Status:  Mental Status Findings:  Cooperative, Alert and Oriented         Anesthesia Plan  Type of Anesthesia, risks & benefits discussed:  Anesthesia Type:  general  Patient's Preference:   Intra-op Monitoring Plan: standard ASA monitors  Intra-op Monitoring Plan Comments:   Post Op Pain Control Plan: per primary service following discharge from PACU  Post Op Pain Control Plan Comments:   Induction:   IV  Beta Blocker:  Patient is not currently on a Beta-Blocker (No further documentation required).       Informed Consent: Patient understands risks and agrees with Anesthesia plan.  Questions answered. Anesthesia consent signed with patient.  ASA Score: 2     Day of Surgery Review of History & Physical:    H&P update referred to the surgeon.         Ready For Surgery From Anesthesia Perspective.

## 2019-10-30 NOTE — TRANSFER OF CARE
"Anesthesia Transfer of Care Note    Patient: Garcia Renteria    Procedure(s) Performed: Procedure(s) (LRB):  C3-4, C4-5 ACDF (N/A)    Patient location: PACU    Anesthesia Type: general    Transport from OR: Transported from OR on 6-10 L/min O2 by face mask with adequate spontaneous ventilation    Post pain: adequate analgesia    Post assessment: no apparent anesthetic complications and tolerated procedure well    Post vital signs: stable    Level of consciousness: responds to stimulation (voice)    Nausea/Vomiting: no nausea/vomiting    Complications: none    Transfer of care protocol was followed      Last vitals:   Visit Vitals  /84 (BP Location: Right arm, Patient Position: Lying)   Pulse 79   Temp 36.7 °C (98.1 °F) (Temporal)   Resp 16   Ht 6' 1" (1.854 m)   Wt 97.5 kg (215 lb)   SpO2 100%   BMI 28.37 kg/m²     "

## 2019-10-30 NOTE — OP NOTE
DATE OF SURGERY: 10/30/19    PREOPERATIVE DIAGNOSIS:  1. Severe cervical stenosis, C3-4 and C4-5, with myelopathy  2. Previous C5-6 ACD  3. Scoliosis    POSTOPERATIVE DIAGNOSIS:  Same.    PROCEDURE PERFORMED:  1. Anterior cervical discectomy and fusion, C3-4 and C4-5  2. Application of PEEK interbody cage with integrated blade fixation, C3-4 and C4-5 (Globus)  3. Use of intraoperative microscope for microdissection  4. Use of neuromonitoring with MEPs  5. Use of intraoperative fluoroscopy  6. Vivigen bone grafting    SURGEON: Francis Alan M.D.    ASSISTANT: Subhash Rothman M.D.    ANESTHESIA: GETA    ESTIMATED BLOOD LOSS: 200mL    COMPLICATIONS: None    DRAINS: One deep Hemovac    SPECIMENS SENT: None    INDICATIONS:    This is a 69M who presented with signs and symptoms of cervical myelopathy during my evaluation of his progressive scoliosis deformity. Though I determined that thoracolumbar spinal deformity surgery was indicated, the MRI C spine showed severe cervical stenosis at C3-4 and C4-5. As such, I recommended a C3-4 and C4-5 anterior cervical discectomy and fusion, both for his myelopathy and to prevent high spinal cord injury during the anticipated scoliosis surgery.    Risks, benefits, alternatives, indications and methods were reviewed in detail and the patient wished to proceed. All questions were answered. No guarantees about the results of the procedure were made.    PROCEDURE:    The patient was brought into the operating room where he was intubated and placed under general anesthesia without difficulty. All lines were placed. The patient was positioned supine onto the operating table with appropriate padding of all pressure points. The head and neck were placed in slight extension, resting on a surgical pillow with GW tongs and hanging weight traction. Lateral x-rays were taken for localization and to assess cervical lordosis. Baseline MEPs were run and found to be present and stable in all  extremities. The right neck was marked, prepped and draped in the usual sterile fashion. A timeout was performed prior to the procedure. Ten mL of Lidocaine with epinephrine were injected into the skin.    A transverse linear skin incision was made at the right neck and Weitlaner retractors were used to widen the incision. The platysma was divided sharply with Metzenbaum scissors. A sub-plastysmal dissection was carried out with Bovie electrocautery. A Goode Dawson approach to the the anterior cervical spine was performed in the usual fashion. The carotid artery was palpated lateral to the working corridor. The prevertebral soft tissues were bluntly dissected with Kitner dissectors. The top of the C5-6 plate was seen and left alone. A spinal needle was placed into the presumed C4-5 disc space, and was confirmed on lateral x-ray. Subperiosteal dissection was carried out at C3, C4 and C5 vertebral bodies with Bovie electrocautery. The Trimline retractor system was put into place.     An annulotomy at C3-4 and C4-5 was performed with the 15 blade. Pituitary rongeurs and curettes were used to remove disc material at both levels. Anterior osteophytes were removed with the rongeur and Kerrison punches at both levels. The end plates were prepared with straight curettes at both levels. The microscope was then brought into the field for microdissection. Posterior osteophytes were removed with the high speed drill and Kerrison punches at both levels. The posterior longitudinal ligament was opened and resected with curettes and kerrison punches at both levels. Adequate central and neuroforaminal decompression was achieved at both levels and confirmed with a nerve hook.     An intervertebral trial spacer was placed, and a size 6 PEEK cage with integrated blaes was packed with Vivigen for anterior arthrodesis at C4-5. The cage was then countersunk into the C4-5 disc space and the blades were deployed. A size 7 PEEK cage with  integrated blades and packed with Vivigen was countersunk into the C3-4 disc space. The blades were deployed. MEPs were run after this was completed and found to be consistent with baseline. The microscope was taken out of the field. All hardware was found to be in adequate position on AP and lateral xrays. The screws were finally tightened and locked.    The wound was copiously irrigated with normal saline and hemostasis was achieved with bipolar electrocautery. Depomedrol was placed over the esophagus and the retropharyngeal space. One gram of Vancomycin powder was placed into the wound. One deep Hemovac was placed. The platysma was reapproximated with interrupted inverted 3.0 Vicryl sutures and a subcuticular stitch was placed with 4.0 Monocryl. Dermabond was placed at the skin.    The patient tolerated the procedure well from a hemodynamic and neuromonitoring standpoint. MEPs were present and stable throughout the case. I was present for all critical portions of the case, and at the end of the case all counts were correct. The patient was repositioned supine onto the hospital bed where he was extubated and allowed to emerge from anesthesia without difficult. The patient was sent to the PACU in stable condition for recovery.

## 2019-10-30 NOTE — NURSING TRANSFER
Nursing Transfer Note      10/30/2019     Transfer To: 556B    Transfer via stretcher    Transfer with room air    Transported by pct    Medicines sent: none    Chart send with patient: Yes    Notified: spouse    Patient reassessed at: 10/30/19 see flowsheets

## 2019-10-31 ENCOUNTER — TELEPHONE (OUTPATIENT)
Dept: PAIN MEDICINE | Facility: CLINIC | Age: 69
End: 2019-10-31

## 2019-10-31 VITALS
WEIGHT: 215 LBS | TEMPERATURE: 97 F | HEIGHT: 73 IN | DIASTOLIC BLOOD PRESSURE: 67 MMHG | HEART RATE: 90 BPM | RESPIRATION RATE: 18 BRPM | OXYGEN SATURATION: 95 % | SYSTOLIC BLOOD PRESSURE: 122 MMHG | BODY MASS INDEX: 28.49 KG/M2

## 2019-10-31 LAB
ANION GAP SERPL CALC-SCNC: 8 MMOL/L (ref 8–16)
BASOPHILS # BLD AUTO: 0.02 K/UL (ref 0–0.2)
BASOPHILS NFR BLD: 0.2 % (ref 0–1.9)
BUN SERPL-MCNC: 16 MG/DL (ref 8–23)
CALCIUM SERPL-MCNC: 10.1 MG/DL (ref 8.7–10.5)
CHLORIDE SERPL-SCNC: 103 MMOL/L (ref 95–110)
CO2 SERPL-SCNC: 29 MMOL/L (ref 23–29)
CREAT SERPL-MCNC: 1 MG/DL (ref 0.5–1.4)
DIFFERENTIAL METHOD: ABNORMAL
EOSINOPHIL # BLD AUTO: 0 K/UL (ref 0–0.5)
EOSINOPHIL NFR BLD: 0 % (ref 0–8)
ERYTHROCYTE [DISTWIDTH] IN BLOOD BY AUTOMATED COUNT: 13.2 % (ref 11.5–14.5)
EST. GFR  (AFRICAN AMERICAN): >60 ML/MIN/1.73 M^2
EST. GFR  (NON AFRICAN AMERICAN): >60 ML/MIN/1.73 M^2
GLUCOSE SERPL-MCNC: 106 MG/DL (ref 70–110)
HCT VFR BLD AUTO: 41.3 % (ref 40–54)
HGB BLD-MCNC: 13 G/DL (ref 14–18)
IMM GRANULOCYTES # BLD AUTO: 0.05 K/UL (ref 0–0.04)
IMM GRANULOCYTES NFR BLD AUTO: 0.5 % (ref 0–0.5)
LYMPHOCYTES # BLD AUTO: 1.6 K/UL (ref 1–4.8)
LYMPHOCYTES NFR BLD: 14 % (ref 18–48)
MAGNESIUM SERPL-MCNC: 1.8 MG/DL (ref 1.6–2.6)
MCH RBC QN AUTO: 26.9 PG (ref 27–31)
MCHC RBC AUTO-ENTMCNC: 31.5 G/DL (ref 32–36)
MCV RBC AUTO: 85 FL (ref 82–98)
MONOCYTES # BLD AUTO: 1 K/UL (ref 0.3–1)
MONOCYTES NFR BLD: 9 % (ref 4–15)
NEUTROPHILS # BLD AUTO: 8.5 K/UL (ref 1.8–7.7)
NEUTROPHILS NFR BLD: 76.3 % (ref 38–73)
NRBC BLD-RTO: 0 /100 WBC
PHOSPHATE SERPL-MCNC: 3.5 MG/DL (ref 2.7–4.5)
PLATELET # BLD AUTO: 189 K/UL (ref 150–350)
PMV BLD AUTO: 11.7 FL (ref 9.2–12.9)
POCT GLUCOSE: 132 MG/DL (ref 70–110)
POCT GLUCOSE: 88 MG/DL (ref 70–110)
POCT GLUCOSE: 99 MG/DL (ref 70–110)
POTASSIUM SERPL-SCNC: 4.4 MMOL/L (ref 3.5–5.1)
RBC # BLD AUTO: 4.84 M/UL (ref 4.6–6.2)
SODIUM SERPL-SCNC: 140 MMOL/L (ref 136–145)
WBC # BLD AUTO: 11.09 K/UL (ref 3.9–12.7)

## 2019-10-31 PROCEDURE — 25000003 PHARM REV CODE 250: Performed by: NEUROLOGICAL SURGERY

## 2019-10-31 PROCEDURE — 97161 PT EVAL LOW COMPLEX 20 MIN: CPT

## 2019-10-31 PROCEDURE — 25000003 PHARM REV CODE 250: Performed by: STUDENT IN AN ORGANIZED HEALTH CARE EDUCATION/TRAINING PROGRAM

## 2019-10-31 PROCEDURE — 63600175 PHARM REV CODE 636 W HCPCS: Performed by: STUDENT IN AN ORGANIZED HEALTH CARE EDUCATION/TRAINING PROGRAM

## 2019-10-31 PROCEDURE — 99024 POSTOP FOLLOW-UP VISIT: CPT | Mod: POP,,, | Performed by: PHYSICIAN ASSISTANT

## 2019-10-31 PROCEDURE — 99024 PR POST-OP FOLLOW-UP VISIT: ICD-10-PCS | Mod: POP,,, | Performed by: PHYSICIAN ASSISTANT

## 2019-10-31 PROCEDURE — 85025 COMPLETE CBC W/AUTO DIFF WBC: CPT

## 2019-10-31 PROCEDURE — 80048 BASIC METABOLIC PNL TOTAL CA: CPT

## 2019-10-31 PROCEDURE — 36415 COLL VENOUS BLD VENIPUNCTURE: CPT

## 2019-10-31 PROCEDURE — 97116 GAIT TRAINING THERAPY: CPT

## 2019-10-31 PROCEDURE — 83735 ASSAY OF MAGNESIUM: CPT

## 2019-10-31 PROCEDURE — 84100 ASSAY OF PHOSPHORUS: CPT

## 2019-10-31 RX ORDER — HYDROCODONE BITARTRATE AND ACETAMINOPHEN 10; 325 MG/1; MG/1
1 TABLET ORAL EVERY 4 HOURS PRN
Qty: 30 TABLET | Refills: 0 | Status: SHIPPED | OUTPATIENT
Start: 2019-10-31 | End: 2020-01-02 | Stop reason: SDUPTHER

## 2019-10-31 RX ORDER — TRIAMTERENE AND HYDROCHLOROTHIAZIDE 37.5; 25 MG/1; MG/1
1 CAPSULE ORAL DAILY
Status: DISCONTINUED | OUTPATIENT
Start: 2019-10-31 | End: 2019-10-31 | Stop reason: HOSPADM

## 2019-10-31 RX ORDER — HYDROCODONE BITARTRATE AND ACETAMINOPHEN 10; 325 MG/1; MG/1
1 TABLET ORAL EVERY 4 HOURS PRN
Qty: 30 TABLET | Refills: 0 | Status: SHIPPED | OUTPATIENT
Start: 2019-10-31 | End: 2019-10-31 | Stop reason: SDUPTHER

## 2019-10-31 RX ADMIN — HYDROMORPHONE HYDROCHLORIDE 1 MG: 1 INJECTION, SOLUTION INTRAMUSCULAR; INTRAVENOUS; SUBCUTANEOUS at 07:10

## 2019-10-31 RX ADMIN — FAMOTIDINE 20 MG: 20 TABLET ORAL at 08:10

## 2019-10-31 RX ADMIN — HEPARIN SODIUM 5000 UNITS: 5000 INJECTION INTRAVENOUS; SUBCUTANEOUS at 01:10

## 2019-10-31 RX ADMIN — HEPARIN SODIUM 5000 UNITS: 5000 INJECTION INTRAVENOUS; SUBCUTANEOUS at 05:10

## 2019-10-31 RX ADMIN — CEFAZOLIN 2 G: 1 INJECTION, POWDER, FOR SOLUTION INTRAMUSCULAR; INTRAVENOUS at 01:10

## 2019-10-31 RX ADMIN — MELATONIN 1000 UNITS: at 08:10

## 2019-10-31 RX ADMIN — LISINOPRIL 5 MG: 5 TABLET ORAL at 08:10

## 2019-10-31 RX ADMIN — OXYCODONE HYDROCHLORIDE AND ACETAMINOPHEN 1 TABLET: 10; 325 TABLET ORAL at 05:10

## 2019-10-31 RX ADMIN — TRIAMTERENE AND HYDROCHLOROTHIAZIDE 1 CAPSULE: 25; 37.5 CAPSULE ORAL at 01:10

## 2019-10-31 RX ADMIN — OXYCODONE HYDROCHLORIDE AND ACETAMINOPHEN 1 TABLET: 10; 325 TABLET ORAL at 11:10

## 2019-10-31 RX ADMIN — HYDROMORPHONE HYDROCHLORIDE 1 MG: 1 INJECTION, SOLUTION INTRAMUSCULAR; INTRAVENOUS; SUBCUTANEOUS at 03:10

## 2019-10-31 RX ADMIN — CEFAZOLIN 2 G: 1 INJECTION, POWDER, FOR SOLUTION INTRAMUSCULAR; INTRAVENOUS at 05:10

## 2019-10-31 RX ADMIN — SENNOSIDES AND DOCUSATE SODIUM 1 TABLET: 8.6; 5 TABLET ORAL at 08:10

## 2019-10-31 RX ADMIN — OXYCODONE HYDROCHLORIDE AND ACETAMINOPHEN 1 TABLET: 10; 325 TABLET ORAL at 12:10

## 2019-10-31 NOTE — PLAN OF CARE
Problem: Physical Therapy Goal  Goal: Physical Therapy Goal  Description  Pt is at a SPV level for mobility and scheduled to D/C home on this date. Pt is recommended to return home w/ home health recommended for safety.   Outcome: Adequate for Care Transition     Destiny Pacheco  10/31/2019

## 2019-10-31 NOTE — HPI
Garcia Renteria is a 69 y.o.  male with history of diabetes type 1, smoking, and colitis, who presents today for follow up evaluation to discuss surgery. Pt reports o significant change in his symptoms. He states that he has been moving slightly better gaining strength in his legs. Pt's previous ACDF surgery was performed in 1993 from a right sided approach. He denies any significant hoarse voice following that surgery. Pt reports neck pain worse when he is driving or traveling long distances. He denies any hand clumsiness but more coldness in the ulnar aspect of his arm to his last three digits. Pt reports numbness down his BLE when he wakes up in the morning. He also experiences spasms in his BLE and a cold sensation in his feet.     Patient presents to [facility] today for elective C3-C5 ACDF.

## 2019-10-31 NOTE — ASSESSMENT & PLAN NOTE
70 y/o M with PMH of diabetes type 1, smoking, colitis, and previous ACDF who presents for neck pain, found to have cervical myelopathy, now s/p C3-5 ACDF on 10/30.    - Neurologically stable on exam  - Postop X-rays reviewed, stable with hardware in satisfactory position  - Brace on when upright, OOB, or working with therapy. OK to remove when lying in bed.   - Pain: Controlled on current regimen of Percocet 10 q4h PRN. Pt on pain contract with PCP Dr. Elpidio Lau. Will discharge with regular dose of Norco 10mg with sufficient quantity until next appointment with Dr. Lau on 11/4.  - Continue home meds for HTN, diabetes, and GERD.  - Follow up in Neurosurgery clinic in 2 weeks for a wound check  - Follow up with Dr. Alan in 6 weeks with repeat X-rays  - Discharge instructions given verbally to patient and wife. All of their questions were answered. They were encouraged to call the clinic with any questions or concerns prior to follow up appt.     Dispo: Medically stable for discharge home with appropriate follow up scheduled.

## 2019-10-31 NOTE — PROGRESS NOTES
Patient discharging home, self care. Discharge instructions were given verbally/written to the patient and their family and all of their questions were answered. Patient and family voiced understanding. They were encouraged to call the clinic with any questions they might have prior to the follow up appointments. Medications to be picked up from pharmacy with printed prescription.

## 2019-10-31 NOTE — PROGRESS NOTES
Emptied 600cc from tay before discontinuing. Patient is due to void by 5pm. Requesting regular diet. Telephoned physician in regards to patient request.

## 2019-10-31 NOTE — PT/OT/SLP EVAL
Physical Therapy Evaluation and Discharge Note    Patient Name:  Garcia Renteria   MRN:  [id number]    Recommendations:     Discharge Recommendations:  home health PT   Discharge Equipment Recommendations: none   Barriers to discharge: None    Assessment:     Garcia Renteria is a 69 y.o. male admitted with a medical diagnosis of Cervical stenosis of spinal canal. .  Pt cee session well w/ good participation. PTA he was Bayron w/ mobility but currently requires SPV for safety w/ use of RW. He is medically stable to D/C home but is recommended to receive HH therapy for safe transition.     Recent Surgery: Procedure(s) (LRB):  C3-4, C4-5 ACDF (N/A) 1 Day Post-Op    Plan:     During this hospitalization, patient does not require further acute PT services.  Please re-consult if situation changes.      Subjective     Chief Complaint: cervical pain  Patient/Family Comments/goals: return to PLOF  Pain/Comfort:  · Pain Rating 1: 8/10  · Location - Orientation 1: generalized  · Location 1: neck  · Pain Addressed 1: Pre-medicate for activity  · Pain Rating Post-Intervention 1: 8/10    Patients cultural, spiritual, Mandaen conflicts given the current situation: no    Living Environment:  Pt lives w/ his wife in a 1SH w/ 0 KASSY and a walk-in shower w/ a built in bench.  About 3 months ago pt began using RW for stability due to overall weakness. Prior to then he was IND w/ mobility and ADLs. He drives and is retired.  Equipment used at home: cane, straight, walker, rolling.  DME owned (not currently used): none.  Upon discharge, patient will have assistance from his wife who is retired.    Objective:     Communicated with nursing prior to session.  Patient found supine with cervical collar upon PT entry to room.    General Precautions: Standard, fall   Orthopedic Precautions:spinal precautions   Braces: Cervical collar     Exams:  · Cognitive Exam:  Patient is oriented to Person, Place, Time and Situation  · Gross Motor  Coordination:  WFL  · Postural Exam:  Patient presented with the following abnormalities:    · -       Rounded shoulders  · -       Forward head  · Sensation:    · -       Impaired  light/touch feet due to neuropathy  · RUE ROM: WFL  · RUE Strength: WFL  · LUE ROM: WFL  · LUE Strength: WFL  · RLE ROM: WFL  · RLE Strength: WFL except ankle 4/5  · LLE ROM: WFL  · LLE Strength: WFL except ankle 4/5    Functional Mobility:  · Bed Mobility:    · Supine>sit on bed Bayron; HOB elevated and w/ side rail  · Cues for spinal precautions  · Transfers:   · Sit<>stand to/from EOB w/o AD w/ SPV for safety  · Gait:   · ~200ft w/ RW and SBA/SPV for safety  · Cues for posture and to remain inside RW for safety  · Balance:   · Static stand w/ RW and SPV    AM-PAC 6 CLICK MOBILITY  Total Score:20       Therapeutic Activities and Exercises:   Pt was educated on use of RW for safety/stability upon D/C home. Pt verb understanding and was in agreement w/ recommendation.     AM-PAC 6 CLICK MOBILITY  Total Score:20     Patient left sitting EOB with call button in reach.    GOALS:   Multidisciplinary Problems     Physical Therapy Goals        Problem: Physical Therapy Goal    Goal Priority Disciplines Outcome Goal Variances Interventions   Physical Therapy Goal     PT, PT/OT Adequate for Care Transition     Description:  Pt is at a SPV level for mobility and scheduled to D/C home on this date. Pt is recommended to return home w/ home health recommended for safety.                    History:     Past Medical History:   Diagnosis Date    Colitis     Diabetes mellitus, type 2     Diverticulosis     GERD (gastroesophageal reflux disease)     Hypertension        Past Surgical History:   Procedure Laterality Date    ANKLE SURGERY Bilateral     BACK SURGERY      COLONOSCOPY      lower back surgery      NECK SURGERY      SPINE SURGERY      UPPER GASTROINTESTINAL ENDOSCOPY         Time Tracking:     PT Received On: 10/31/19  PT Start Time:  1107     PT Stop Time: 1127  PT Total Time (min): 20 min     Billable Minutes: Evaluation 10, Gait Training 10 and Total Time 20      Destiny Pacheco, PT  10/31/2019

## 2019-10-31 NOTE — PLAN OF CARE
CM met with patient and spouse to obtain discharge planning assessment.  Patient is POD 1 for ACDF.  Planned discharge is home with family - Plan (A) or home with family with home health - Plan (B).    PCP:  [facility] Quang [facility]     Pharmacy:    [id number] - [place], [place] - [address] [facility] [address]  [address]  [place] [address]  Phone: [phone] Fax: [phone]      Payor:  Payor: MEDICARE / Plan: MEDICARE PART A & B / Product Type: Government /       10/31/19 1210   Discharge Assessment   Assessment Type Discharge Planning Assessment   Confirmed/corrected address and phone number on facesheet? Yes   Assessment information obtained from? Patient   Communicated expected length of stay with patient/caregiver yes   Prior to hospitilization cognitive status: Alert/Oriented   Prior to hospitalization functional status: Assistive Equipment   Current cognitive status: Alert/Oriented   Current Functional Status: Assistive Equipment   Lives With spouse   Able to Return to Prior Arrangements yes   Is patient able to care for self after discharge? Yes   Who are your caregiver(s) and their phone number(s)? Khloe - spouse [phone]   Patient's perception of discharge disposition home or selfcare   Readmission Within the Last 30 Days no previous admission in last 30 days   Patient currently being followed by outpatient case management? No   Patient currently receives any other outside agency services? No   Equipment Currently Used at Home cane, straight   Do you have any problems affording any of your prescribed medications? No   Is the patient taking medications as prescribed? yes   Does the patient have transportation home? Yes   Transportation Anticipated family or friend will provide   Does the patient receive services at the Coumadin Clinic? No   Discharge Plan A Home with family   Discharge Plan B Home Health   DME Needed Upon Discharge  none    Patient/Family in Agreement with Plan yes

## 2019-10-31 NOTE — DISCHARGE SUMMARY
[facility]  Neurosurgery  Discharge Summary      Patient Name: Garcia Renteria  MRN: [id number]  Admission Date: 10/30/2019  Hospital Length of Stay: 1 days  Discharge Date and Time:  10/31/2019 1:52 PM  Attending Physician: Francis Alan MD   Discharging Provider: Allyson Jackson PA-C  Primary Care Provider: [facility]    HPI:   Garcia Renteria is a 69 y.o.  male with history of diabetes type 1, smoking, and colitis, who presents today for follow up evaluation to discuss surgery. Pt reports o significant change in his symptoms. He states that he has been moving slightly better gaining strength in his legs. Pt's previous ACDF surgery was performed in 1993 from a right sided approach. He denies any significant hoarse voice following that surgery. Pt reports neck pain worse when he is driving or traveling long distances. He denies any hand clumsiness but more coldness in the ulnar aspect of his arm to his last three digits. Pt reports numbness down his BLE when he wakes up in the morning. He also experiences spasms in his BLE and a cold sensation in his feet.     Patient presents to [facility] today for elective C3-C5 ACDF.     Procedure(s) (LRB):  C3-4, C4-5 ACDF (N/A)     Hospital Course: Mr. Renteria presented to [facility] on 10/30/2019 for the above stated procedure. He tolerated the procedure well and there were no intra-operative complications. He recovered in PACU and was then transferred to the floor. Post-op X-rays showed good placement of the hardware. Drain had low output and was removed on 10/31 without complication.  On 10/31, he was discharged home with pain medication and follow up appointments. Regular diet. Activity as tolerated. At the time of discharge, vital signs were stable, patient was afebrile and neuro stable. Discharge instructions were given verbally/written to the patient and their family and all of their questions were answered. Patient and family  voiced understanding. They were encouraged to call the clinic with any questions they might have prior to the follow up appointments.             Consults:     Significant Diagnostic Studies: Labs:   BMP:   Recent Labs   Lab 10/30/19  0910 10/31/19  0353   GLU 87 106    140   K 4.2 4.4    103   CO2 29 29   BUN 23 16   CREATININE 1.1 1.0   CALCIUM 10.5 10.1   MG  --  1.8   , CMP   Recent Labs   Lab 10/30/19  0910 10/31/19  0353    140   K 4.2 4.4    103   CO2 29 29   GLU 87 106   BUN 23 16   CREATININE 1.1 1.0   CALCIUM 10.5 10.1   ANIONGAP 9 8   ESTGFRAFRICA >60.0 >60.0   EGFRNONAA >60.0 >60.0   , CBC   Recent Labs   Lab 10/30/19  0910 10/31/19  0354   WBC 5.71 11.09   HGB 14.1 13.0*   HCT 45.9 41.3    189   , INR   Lab Results   Component Value Date    INR 1.1 10/30/2019    INR 1.0 10/25/2019   , A1C:   Recent Labs   Lab 10/25/19  1618   HGBA1C 6.1*    and All labs within the past 24 hours have been reviewed  Radiology: X-ray Cervical Spine    Pending Diagnostic Studies:     None        Final Active Diagnoses:    Diagnosis Date Noted POA    PRINCIPAL PROBLEM:  Cervical stenosis of spinal canal [M48.02] 10/30/2019 Yes      Problems Resolved During this Admission:      Discharged Condition: stable    Disposition: Home or Self Care    Follow Up: 2 weeks with Neurosurgery for wound check    Patient Instructions:      Notify your health care provider if you experience any of the following:  temperature >100.4     Notify your health care provider if you experience any of the following:  persistent nausea and vomiting or diarrhea     Notify your health care provider if you experience any of the following:  severe uncontrolled pain     Notify your health care provider if you experience any of the following:  redness, tenderness, or signs of infection (pain, swelling, redness, odor or green/yellow discharge around incision site)     Notify your health care provider if you experience any of  the following:  difficulty breathing or increased cough     Notify your health care provider if you experience any of the following:  severe persistent headache     Notify your health care provider if you experience any of the following:  worsening rash     Notify your health care provider if you experience any of the following:  persistent dizziness, light-headedness, or visual disturbances     Notify your health care provider if you experience any of the following:  increased confusion or weakness     Activity as tolerated     Medications:  Reconciled Home Medications:      Medication List      CHANGE how you take these medications    * HYDROcodone-acetaminophen  mg per tablet  Commonly known as:  NORCO  Take 1 tablet by mouth every 6 (six) hours as needed for Pain.  What changed:    · how much to take  · when to take this     * HYDROcodone-acetaminophen  mg per tablet  Commonly known as:  NORCO  Take 1 tablet by mouth every 4 (four) hours as needed for Pain.  What changed:  You were already taking a medication with the same name, and this prescription was added. Make sure you understand how and when to take each.         * This list has 2 medication(s) that are the same as other medications prescribed for you. Read the directions carefully, and ask your doctor or other care provider to review them with you.            CONTINUE taking these medications    gabapentin 300 MG capsule  Commonly known as:  NEURONTIN  Take 2 capsules (600 mg total) by mouth every evening.     lisinopril 5 MG tablet  Commonly known as:  PRINIVIL,ZESTRIL  Take 5 mg by mouth once daily.     metFORMIN 1000 MG tablet  Commonly known as:  GLUCOPHAGE  Take 500 mg by mouth daily with breakfast.     omeprazole 20 MG capsule  Commonly known as:  PRILOSEC  Take 1 capsule (20 mg total) by mouth once daily.     ranitidine 150 MG tablet  Commonly known as:  ZANTAC  Take 150 mg by mouth nightly.     sildenafil 100 MG tablet  Commonly known  as:  VIAGRA     STOOL SOFTENER (DOCUSATE MADELAINE) 240 mg capsule  Generic drug:  docusate calcium     triamterene-hydrochlorothiazide 75-50 mg 75-50 mg per tablet  Commonly known as:  MAXZIDE  Take 0.5 tablets by mouth once daily.     vitamin D 1000 units Tab  Commonly known as:  VITAMIN D3  Take 1,000 Units by mouth once daily.        STOP taking these medications    VOLTAREN 1 % Gel  Generic drug:  diclofenac sodium            Allyson Jackson PA-C  Neurosurgery [facility]

## 2019-10-31 NOTE — SUBJECTIVE & OBJECTIVE
Interval History: NAEON. Pt sitting up in bed, wife at bedside. Pt reports neck pain controlled by PO medications. Denies headache, weakness, paresthesias, difficulty swallowing, n/v, and b/b dysfunction. Ambulating without assistance. Tolerating solid diet, voiding appropriately.    Medications:  Continuous Infusions:  Scheduled Meds:   ceFAZolin (ANCEF) IVPB  2 g Intravenous Q8H    famotidine  20 mg Oral BID    gabapentin  600 mg Oral QHS    heparin (porcine)  5,000 Units Subcutaneous Q8H    lisinopril  5 mg Oral Daily    lisinopril  5 mg Oral Once    senna-docusate 8.6-50 mg  1 tablet Oral BID    triamterene-hydrochlorothiazide 37.5-25 mg  1 capsule Oral Daily    vitamin D  1,000 Units Oral Daily     PRN Meds:Dextrose 10% Bolus, Dextrose 10% Bolus, glucagon (human recombinant), glucose, glucose, HYDROmorphone, insulin aspart U-100, labetalol, ondansetron, oxyCODONE-acetaminophen     Review of Systems  Objective:     Weight: 97.5 kg (215 lb)  Body mass index is 28.37 kg/m².  Vital Signs (Most Recent):  Temp: 97.3 °F (36.3 °C) (10/31/19 1150)  Pulse: 90 (10/31/19 1150)  Resp: 18 (10/31/19 1150)  BP: 122/67 (10/31/19 1150)  SpO2: 95 % (10/31/19 1150) Vital Signs (24h Range):  Temp:  [97.3 °F (36.3 °C)-98.4 °F (36.9 °C)] 97.3 °F (36.3 °C)  Pulse:  [67-93] 90  Resp:  [14-18] 18  SpO2:  [94 %-100 %] 95 %  BP: (117-168)/() 122/67     Date 10/31/19 0700 - 11/01/19 0659   Shift 3558-3369 3140-4703 4473-1695 24 Hour Total   INTAKE   Shift Total(mL/kg)       OUTPUT   Urine(mL/kg/hr) 600   600   Shift Total(mL/kg) 600(6.2)   600(6.2)   Weight (kg) 97.5 97.5 97.5 97.5                        Closed/Suction Drain 10/30/19 1511 Right Neck Accordion 10 Fr. (Active)   Site Description Unable to view 10/31/2019  8:00 AM   Drainage Sanguineous 10/31/2019  8:00 AM   Status To bulb suction 10/31/2019  8:00 AM   Output (mL) 10 mL 10/31/2019 12:55 AM            Urethral Catheter 10/30/19 1130 Non-latex;Straight-tip 16 Fr.  "(Active)   Site Assessment Clean;Intact 10/31/2019 12:50 AM   Collection Container Urimeter 10/31/2019 12:50 AM   Securement Method secured to top of thigh w/ adhesive device 10/31/2019 12:50 AM   Catheter Care Performed yes 10/31/2019 12:50 AM   Reason for Continuing Urinary Catheterization Post operative 10/31/2019 12:50 AM   CAUTI Prevention Bundle StatLock in place w 1" slack;Intact seal between catheter & drainage tubing;Drainage bag/urimeter off the floor;Green sheeting clip in use;No dependent loops or kinks;Drainage bag/urimeter not overfilled (<2/3 full);Drainage bag/urimeter below bladder 10/31/2019 12:50 AM   Output (mL) 600 mL 10/31/2019  8:00 AM       Neurosurgery Physical Exam    General: well developed, well nourished, no distress.   Head: normocephalic, atraumatic  Neurologic: Alert and oriented. Thought content appropriate.  GCS: Motor: 6/Verbal: 5/Eyes: 4 GCS Total: 15  Mental Status: Awake, Alert, Oriented x 4  Language: No aphasia  Speech: No dysarthria  Cranial nerves: face symmetric, tongue midline, CN II-XII grossly intact.   Eyes: pupils equal, round, reactive to light with accomodation, EOMI.   Pulmonary: normal respirations, no signs of respiratory distress  Abdomen: soft, non-distended, not tender to palpation  Skin: Skin is warm, dry and intact.  Sensory: intact to light touch throughout    Motor Strength: Moves all extremities spontaneously with good tone.  Full strength upper and lower extremities. No abnormal movements seen.     DTR: 2+ symmetrically throughout.  Harris's: Negative.  Babinski's: Negative.  Clonus: Negative.  Finger-to-nose: intact bilaterally   Pronator drift: absent bilaterally       Incision: Clean, dry, dermabond intact. Skin edges well approximated. No surrounding erythema or edema. No drainage or TTP.     Drain: Intact, holding suction with minimal serosanguinous drainage.      Significant Labs:  Recent Labs   Lab 10/30/19  0910 10/31/19  0353   GLU 87 106   NA " 142 140   K 4.2 4.4    103   CO2 29 29   BUN 23 16   CREATININE 1.1 1.0   CALCIUM 10.5 10.1   MG  --  1.8     Recent Labs   Lab 10/30/19  0910 10/31/19  0354   WBC 5.71 11.09   HGB 14.1 13.0*   HCT 45.9 41.3    189     Recent Labs   Lab 10/30/19  0910   INR 1.1   APTT 25.2     Microbiology Results (last 7 days)     ** No results found for the last 168 hours. **        All pertinent labs from the last 24 hours have been reviewed.    Significant Diagnostics:  I have reviewed and interpreted all pertinent imaging results/findings within the past 24 hours.

## 2019-10-31 NOTE — DISCHARGE INSTRUCTIONS
Neurosurgery Patient Information. Please follow ONLY the instructions that are checked below.    Activity Restrictions:  [x]  Return to work will be determined on an individual basis.  [x]  No lifting greater than 5-10 pounds.  [x]  Avoid bending and twisting the area of your surgery more than 45 degrees from neutral position in any direction.  [x]  No driving or operating machinery:  [x]  until cleared by your surgeon.  [x]  while taking narcotic pain medications or muscle relaxants.  [x]  Wear your brace at all times except when lying in bed, showering, using the restroom, or performing hygiene tasks.   [x]  Increase ambulation over the next 2 weeks so that you are walking 2 miles per day at 2 weeks post-operatively.  [x]  Walk on paved surfaces only. It is okay to walk up and down stairs while holding onto a side rail.  [x]  No sexual activity for 6 weeks.    Discharge Medication/Follow-up:  [x]  Please refer to discharge medication reconciliation form.  [x]  Do not take ANY Aspirin or Aspirin containing products for 2 weeks after surgery.   [x]  Do not take ANY herbal supplements for 2 weeks after surgery.    [x]  Do not take ANY non-steroidal anti-inflammatory drugs (NSAIDS), including the following: ibuprofen, naprosyn, Aleve, Advil, Indocin, Mobic, or Celebrex for 6 weeks after surgery.   [x]  Prescriptions for appropriate medication will be given upon discharge.  [x]  Take docusate (Colace 100 mg): take one capsule a day as needed for constipation. You can get this over the counter.  [x]  Follow-up appointment:  [x]  10-14 days post-op for wound check by physician assistant/nurse  [x]  4-6 weeks with MD:  [x]  with x-rays  [x]  An appointment will be mailed to you.    Wound Care:  [x]  No bandage required. Keep your incision open to the air.  [x]  You may shower on the 2nd day after your surgery. Keep the incision clean and dry at all times. Please cover the incision while showering and REMOVE once you have  completed taking your shower. Do not allow the force of water to hit the incision. If the incision gets damp, pat it dry. Do not rub or scrub the incision.  [x]  You cannot take a bath until 8 weeks after surgery.  [x]  No need to apply anything to incision. Dermabond surgical glue will fall off on its own. Do not try to peel this off.    Call your doctor or go to the Emergency Room for any signs of infection, including: increased redness, drainage, pain, or fever (temperature ?101.5 for 24 hours). Call your doctor or go to the Emergency Room if there are any localized neurological changes; problems with speech, vision, numbness, tingling, weakness, or severe headache; or for other concerns.    Special Instructions:  [x]  No use of tobacco products.  [x]  Diet: Please eat a regular diet as tolerated.    You are on a Pain Contract with Dr. Elpidio Lau. You were given a prescription for pain medication after your surgery that will be sufficient to last until next follow up with Dr. Lau on 11/4. Neurosurgery will not be able to provide future refills of pain medications due to your contract. These must be obtained from Dr. Elpidio Lau.    Physicians need 3 days' notice for pain medicine to be refilled. Pain medicine will only be refilled between 8 AM and 5 PM, Monday through Friday, due to Food and Drug Administration regulation of documentation.        [facility] Neurosurgery Office: [phone]              [facility] Neurosurgery Office: [phone]   [place] Neurosurgery Office: [phone]

## 2019-10-31 NOTE — PLAN OF CARE
SW following for DC needs. SW in communication with Alison VERA.    No SW needs identified at this time.      10/31/19 1556   Post-Acute Status   Post-Acute Authorization Other   Other Status No Post-Acute Service Needs     Gina Cardenas, RONALD  [facility]  [id number]

## 2019-10-31 NOTE — HOSPITAL COURSE
Mr. Renteria presented to [facility] on 10/30/2019 for the above stated procedure. He tolerated the procedure well and there were no intra-operative complications. He recovered in PACU and was then transferred to the floor. Post-op X-rays showed good placement of the hardware. Drain had low output and was removed on 10/31 without complication.  On 10/31, he was discharged home with pain medication and follow up appointments. Regular diet. Activity as tolerated. At the time of discharge, vital signs were stable, patient was afebrile and neuro stable. Discharge instructions were given verbally/written to the patient and their family and all of their questions were answered. Patient and family voiced understanding. They were encouraged to call the clinic with any questions they might have prior to the follow up appointments.

## 2019-10-31 NOTE — PROGRESS NOTES
[facility]  Neurosurgery  Progress Note    Subjective:     History of Present Illness: Garcia Renteria is a 69 y.o.  male with history of diabetes type 1, smoking, and colitis, who presents today for follow up evaluation to discuss surgery. Pt reports o significant change in his symptoms. He states that he has been moving slightly better gaining strength in his legs. Pt's previous ACDF surgery was performed in 1993 from a right sided approach. He denies any significant hoarse voice following that surgery. Pt reports neck pain worse when he is driving or traveling long distances. He denies any hand clumsiness but more coldness in the ulnar aspect of his arm to his last three digits. Pt reports numbness down his BLE when he wakes up in the morning. He also experiences spasms in his BLE and a cold sensation in his feet.     Patient presents to [facility] today for elective C3-C5 ACDF.     Post-Op Info:  Procedure(s) (LRB):  C3-4, C4-5 ACDF (N/A)   1 Day Post-Op     Interval History: NAEON. Pt sitting up in bed, wife at bedside. Pt reports neck pain controlled by PO medications. Denies headache, weakness, paresthesias, difficulty swallowing, n/v, and b/b dysfunction. Ambulating without assistance. Tolerating solid diet, voiding appropriately.    Medications:  Continuous Infusions:  Scheduled Meds:   ceFAZolin (ANCEF) IVPB  2 g Intravenous Q8H    famotidine  20 mg Oral BID    gabapentin  600 mg Oral QHS    heparin (porcine)  5,000 Units Subcutaneous Q8H    lisinopril  5 mg Oral Daily    lisinopril  5 mg Oral Once    senna-docusate 8.6-50 mg  1 tablet Oral BID    triamterene-hydrochlorothiazide 37.5-25 mg  1 capsule Oral Daily    vitamin D  1,000 Units Oral Daily     PRN Meds:Dextrose 10% Bolus, Dextrose 10% Bolus, glucagon (human recombinant), glucose, glucose, HYDROmorphone, insulin aspart U-100, labetalol, ondansetron, oxyCODONE-acetaminophen     Review of Systems  Objective:     Weight: 97.5 kg  "(215 lb)  Body mass index is 28.37 kg/m².  Vital Signs (Most Recent):  Temp: 97.3 °F (36.3 °C) (10/31/19 1150)  Pulse: 90 (10/31/19 1150)  Resp: 18 (10/31/19 1150)  BP: 122/67 (10/31/19 1150)  SpO2: 95 % (10/31/19 1150) Vital Signs (24h Range):  Temp:  [97.3 °F (36.3 °C)-98.4 °F (36.9 °C)] 97.3 °F (36.3 °C)  Pulse:  [67-93] 90  Resp:  [14-18] 18  SpO2:  [94 %-100 %] 95 %  BP: (117-168)/() 122/67     Date 10/31/19 0700 - 11/01/19 0659   Shift 1855-1152 3645-8918 3094-9632 24 Hour Total   INTAKE   Shift Total(mL/kg)       OUTPUT   Urine(mL/kg/hr) 600   600   Shift Total(mL/kg) 600(6.2)   600(6.2)   Weight (kg) 97.5 97.5 97.5 97.5                        Closed/Suction Drain 10/30/19 1511 Right Neck Accordion 10 Fr. (Active)   Site Description Unable to view 10/31/2019  8:00 AM   Drainage Sanguineous 10/31/2019  8:00 AM   Status To bulb suction 10/31/2019  8:00 AM   Output (mL) 10 mL 10/31/2019 12:55 AM            Urethral Catheter 10/30/19 1130 Non-latex;Straight-tip 16 Fr. (Active)   Site Assessment Clean;Intact 10/31/2019 12:50 AM   Collection Container Urimeter 10/31/2019 12:50 AM   Securement Method secured to top of thigh w/ adhesive device 10/31/2019 12:50 AM   Catheter Care Performed yes 10/31/2019 12:50 AM   Reason for Continuing Urinary Catheterization Post operative 10/31/2019 12:50 AM   CAUTI Prevention Bundle StatLock in place w 1" slack;Intact seal between catheter & drainage tubing;Drainage bag/urimeter off the floor;Green sheeting clip in use;No dependent loops or kinks;Drainage bag/urimeter not overfilled (<2/3 full);Drainage bag/urimeter below bladder 10/31/2019 12:50 AM   Output (mL) 600 mL 10/31/2019  8:00 AM       Neurosurgery Physical Exam    General: well developed, well nourished, no distress.   Head: normocephalic, atraumatic  Neurologic: Alert and oriented. Thought content appropriate.  GCS: Motor: 6/Verbal: 5/Eyes: 4 GCS Total: 15  Mental Status: Awake, Alert, Oriented x 4  Language: No " aphasia  Speech: No dysarthria  Cranial nerves: face symmetric, tongue midline, CN II-XII grossly intact.   Eyes: pupils equal, round, reactive to light with accomodation, EOMI.   Pulmonary: normal respirations, no signs of respiratory distress  Abdomen: soft, non-distended, not tender to palpation  Skin: Skin is warm, dry and intact.  Sensory: intact to light touch throughout    Motor Strength: Moves all extremities spontaneously with good tone.  Full strength upper and lower extremities. No abnormal movements seen.     DTR: 2+ symmetrically throughout.  Harris's: Negative.  Babinski's: Negative.  Clonus: Negative.  Finger-to-nose: intact bilaterally   Pronator drift: absent bilaterally       Incision: Clean, dry, dermabond intact. Skin edges well approximated. No surrounding erythema or edema. No drainage or TTP.     Drain: Intact, holding suction with minimal serosanguinous drainage.      Significant Labs:  Recent Labs   Lab 10/30/19  0910 10/31/19  0353   GLU 87 106    140   K 4.2 4.4    103   CO2 29 29   BUN 23 16   CREATININE 1.1 1.0   CALCIUM 10.5 10.1   MG  --  1.8     Recent Labs   Lab 10/30/19  0910 10/31/19  0354   WBC 5.71 11.09   HGB 14.1 13.0*   HCT 45.9 41.3    189     Recent Labs   Lab 10/30/19  0910   INR 1.1   APTT 25.2     Microbiology Results (last 7 days)     ** No results found for the last 168 hours. **        All pertinent labs from the last 24 hours have been reviewed.    Significant Diagnostics:  I have reviewed and interpreted all pertinent imaging results/findings within the past 24 hours.    Assessment/Plan:     * Cervical stenosis of spinal canal  68 y/o M with PMH of diabetes type 1, smoking, colitis, and previous ACDF who presents for neck pain, found to have cervical myelopathy, now s/p C3-5 ACDF on 10/30.    - Neurologically stable on exam  - Postop X-rays reviewed, stable with hardware in satisfactory position  - Brace on when upright, OOB, or working with  therapy. OK to remove when lying in bed.   - Pain: Controlled on current regimen of Percocet 10 q4h PRN. Pt on pain contract with PCP Dr. Elpidio Lau. Will discharge with regular dose of Norco 10mg with sufficient quantity until next appointment with Dr. Lau on 11/4.  - Continue home meds for HTN, diabetes, and GERD.  - Follow up in Neurosurgery clinic in 2 weeks for a wound check  - Follow up with Dr. Alan in 6 weeks with repeat X-rays  - Discharge instructions given verbally to patient and wife. All of their questions were answered. They were encouraged to call the clinic with any questions or concerns prior to follow up appt.     Dispo: Medically stable for discharge home with appropriate follow up scheduled.          Allyson Jackson PA-C  Neurosurgery  [facility]

## 2019-10-31 NOTE — PLAN OF CARE
Patient discharged home.  The patient does not have any home needs.  Family provided transportation home.  Neurosurgery clinic to schedule follow up appointment.    Future Appointments   Date Time Provider Department Center   11/4/2019  9:20 AM Elpidio Lau MD [facility] GASTRO [facility] C   11/15/2019 10:30 AM Pauline Skelton RN [facility] NEUROS [place]   12/20/2019  2:00 PM [facility] OIC-XRAY [facility] X[facility]   12/20/2019  3:00 PM Francis Alan MD [place] [id number] [place] [facility]   1/30/2020 11:30 AM Francis Alan MD [place] [id number] [place] [address]        10/31/19 1616   Final Note   Assessment Type Final Discharge Note   Anticipated Discharge Disposition Home   Hospital Follow Up  Appt(s) scheduled?   (Neurosurgery clinic to schedule follow up appointment.)   Discharge plans and expectations educations in teach back method with documentation complete? Yes

## 2019-10-31 NOTE — TELEPHONE ENCOUNTER
Please advise. Thank you      ----- Message from Alva Cuevas PA-C sent at 10/31/2019 11:05 AM CDT -----  This patient had a cervical fusion yesterday at [facility] and I will be discharging him today. I was not sure about the protocol you use while a patient is under a pain contract because I would like to discharge him with Percocet 10. At home he takes Norco 10, however while admitted he is taking Percocet 10 q4h which is adequately controlling his pain.     Thanks,  Alva Cuevas PA-C  Pager: [phone]  Neurosurgery  [facility]

## 2019-11-01 ENCOUNTER — TELEPHONE (OUTPATIENT)
Dept: NEUROSURGERY | Facility: CLINIC | Age: 69
End: 2019-11-01

## 2019-11-04 ENCOUNTER — OFFICE VISIT (OUTPATIENT)
Dept: GASTROENTEROLOGY | Facility: CLINIC | Age: 69
End: 2019-11-04
Payer: MEDICARE

## 2019-11-04 VITALS
SYSTOLIC BLOOD PRESSURE: 137 MMHG | RESPIRATION RATE: 18 BRPM | HEART RATE: 87 BPM | HEIGHT: 73 IN | OXYGEN SATURATION: 97 % | WEIGHT: 212 LBS | BODY MASS INDEX: 28.1 KG/M2 | DIASTOLIC BLOOD PRESSURE: 80 MMHG

## 2019-11-04 DIAGNOSIS — G89.4 CHRONIC PAIN SYNDROME: Primary | ICD-10-CM

## 2019-11-04 DIAGNOSIS — K21.9 GASTROESOPHAGEAL REFLUX DISEASE WITHOUT ESOPHAGITIS: ICD-10-CM

## 2019-11-04 DIAGNOSIS — M54.50 LOW BACK PAIN, NON-SPECIFIC: ICD-10-CM

## 2019-11-04 DIAGNOSIS — K57.30 DIVERTICULOSIS OF LARGE INTESTINE WITHOUT HEMORRHAGE: ICD-10-CM

## 2019-11-04 PROCEDURE — 99213 OFFICE O/P EST LOW 20 MIN: CPT | Mod: S$PBB,,, | Performed by: INTERNAL MEDICINE

## 2019-11-04 PROCEDURE — 99999 PR PBB SHADOW E&M-EST. PATIENT-LVL III: ICD-10-PCS | Mod: PBBFAC,,, | Performed by: INTERNAL MEDICINE

## 2019-11-04 PROCEDURE — 99213 OFFICE O/P EST LOW 20 MIN: CPT | Mod: PBBFAC,PN | Performed by: INTERNAL MEDICINE

## 2019-11-04 PROCEDURE — 99999 PR PBB SHADOW E&M-EST. PATIENT-LVL III: CPT | Mod: PBBFAC,,, | Performed by: INTERNAL MEDICINE

## 2019-11-04 PROCEDURE — 99213 PR OFFICE/OUTPT VISIT, EST, LEVL III, 20-29 MIN: ICD-10-PCS | Mod: S$PBB,,, | Performed by: INTERNAL MEDICINE

## 2019-11-04 RX ORDER — OXYCODONE AND ACETAMINOPHEN 10; 325 MG/1; MG/1
1 TABLET ORAL EVERY 6 HOURS PRN
Qty: 120 TABLET | Refills: 0 | Status: SHIPPED | OUTPATIENT
Start: 2019-11-04 | End: 2019-12-04 | Stop reason: SDUPTHER

## 2019-11-04 RX ORDER — FAMOTIDINE 40 MG/1
40 TABLET, FILM COATED ORAL DAILY
Qty: 90 TABLET | Refills: 3 | Status: SHIPPED | OUTPATIENT
Start: 2019-11-04 | End: 2020-01-02 | Stop reason: SDUPTHER

## 2019-11-04 NOTE — PROGRESS NOTES
Subjective:       Patient ID: Garcia Renteria is a 69 y.o. male.    Chief Complaint: Follow-up    He had his cervical spine surgery last week.  He has a neck brace on.  He complains of chronic neck and back pain.  He is having difficulty sleeping and moving because of the rigid neck brace.  He is not sleeping well.  The omeprazole and ranitidine have controlled his symptoms.  The ranitidine will have to be replaced because it has been recalled.  He denies dysphagia hematemesis or aspiration.  He is having daily bowel movements.  He completed his treatment for the chronic hepatitis C.  He denies jaundice.  He has severe lower back pain with difficulty walking.  He will be scheduled for lumbar spine surgery in 3 months.  He is able to ambulate with his cane without falling.  He is becoming severely depressed because I have had pain for years .  He is having difficulty moving and sleeping because of the neck brace.  He is having to sleep on on his back without movement at night.  He denies cardiopulmonary symptoms.  He is a cigarette smoker.  He states I can not stop now with all the pain and stress of the pain .      Allergies:  Review of patient's allergies indicates:  No Known Allergies    Medications:    Current Outpatient Medications:     gabapentin (NEURONTIN) 300 MG capsule, Take 2 capsules (600 mg total) by mouth every evening., Disp: 60 capsule, Rfl: 5    HYDROcodone-acetaminophen (NORCO)  mg per tablet, Take 1 tablet by mouth every 6 (six) hours as needed for Pain. (Patient taking differently: Take 0.5 tablets by mouth every 12 (twelve) hours as needed for Pain. ), Disp: 100 tablet, Rfl: 0    HYDROcodone-acetaminophen (NORCO)  mg per tablet, Take 1 tablet by mouth every 4 (four) hours as needed for Pain., Disp: 30 tablet, Rfl: 0    lisinopril (PRINIVIL,ZESTRIL) 5 MG tablet, Take 5 mg by mouth once daily. , Disp: , Rfl:     metFORMIN (GLUCOPHAGE) 1000 MG tablet, Take 500 mg by mouth  daily with breakfast. , Disp: , Rfl:     omeprazole (PRILOSEC) 20 MG capsule, Take 1 capsule (20 mg total) by mouth once daily., Disp: 90 capsule, Rfl: 1    sildenafil (VIAGRA) 100 MG tablet, , Disp: , Rfl:     STOOL SOFTENER, DOCUSATE MADELAINE, 240 mg capsule, , Disp: , Rfl:     triamterene-hydrochlorothiazide 75-50 mg (MAXZIDE) 75-50 mg per tablet, Take 0.5 tablets by mouth once daily. , Disp: , Rfl:     vitamin D (VITAMIN D3) 1000 units Tab, Take 1,000 Units by mouth once daily., Disp: , Rfl:     famotidine (PEPCID) 40 MG tablet, Take 1 tablet (40 mg total) by mouth once daily., Disp: 90 tablet, Rfl: 3    oxyCODONE-acetaminophen (PERCOCET)  mg per tablet, Take 1 tablet by mouth every 6 (six) hours as needed for Pain., Disp: 120 tablet, Rfl: 0    Past Medical History:   Diagnosis Date    Colitis     Diabetes mellitus, type 2     Diverticulosis     GERD (gastroesophageal reflux disease)     Hypertension        Past Surgical History:   Procedure Laterality Date    ANKLE SURGERY Bilateral     BACK SURGERY      COLONOSCOPY      lower back surgery      NECK SURGERY      SPINE SURGERY      UPPER GASTROINTESTINAL ENDOSCOPY           Review of Systems   Constitutional: Negative for appetite change, fever and unexpected weight change.   HENT: Negative for trouble swallowing.         No jaundice.   Respiratory: Negative for cough, shortness of breath and wheezing.         He is a cigarette smoker.  He denies dyspnea on exertion but is not physically active.  He denies aspiration her biopsies.  He has occasional coughing.  He has occasional sputum production.  He has been offered the smoking cessation program in the past.   Cardiovascular: Negative for chest pain.        He denies exertional chest pain or cardiac arrhythmia.   Gastrointestinal: Negative for abdominal distention, abdominal pain, anal bleeding, blood in stool, constipation, diarrhea, nausea, rectal pain and vomiting.        He has a history  of diverticulosis.  With his bowel program with fiber and/or MiraLax he is having daily bowel movements without straining.  He denies hematemesis hematochezia jaundice or bleeding.  He completed his treatment for chronic hepatitis C and is cured.  He denies jaundice or alcohol usage   Musculoskeletal: Positive for arthralgias, back pain, gait problem, myalgias, neck pain and neck stiffness.   Skin: Negative for pallor and rash.   Neurological: Negative for dizziness, seizures, syncope, speech difficulty, weakness and numbness.   Hematological: Negative for adenopathy.   Psychiatric/Behavioral: Negative for confusion.       Objective:      Physical Exam   Constitutional: He is oriented to person, place, and time. He appears well-nourished.   Thin nonicteric  male.   HENT:   Head: Normocephalic.   Eyes: Pupils are equal, round, and reactive to light. EOM are normal.   Neck: Normal range of motion. Neck supple. No tracheal deviation present. No thyromegaly present.   Rigid neck brace is in place.   Cardiovascular: Normal rate, regular rhythm and normal heart sounds.   Pulmonary/Chest: Effort normal and breath sounds normal.   Abdominal: Soft. Bowel sounds are normal. He exhibits no distension and no mass. There is no tenderness. There is no rebound and no guarding.   The abdomen is soft without tenderness masses organomegaly bowel sounds normal.   Musculoskeletal: Normal range of motion.   He ambulates slowly with the cane.  He slowly can go from the sitting to the standing position with assistance with his cane.   Lymphadenopathy:     He has no cervical adenopathy.   Neurological: He is alert and oriented to person, place, and time. No cranial nerve deficit.   Skin: Skin is warm and dry.   Psychiatric: He has a normal mood and affect. His behavior is normal.   Vitals reviewed.        Plan:       Chronic pain syndrome    Diverticulosis of large intestine without hemorrhage    Gastroesophageal reflux  disease without esophagitis    Low back pain, non-specific    Other orders  -     oxyCODONE-acetaminophen (PERCOCET)  mg per tablet; Take 1 tablet by mouth every 6 (six) hours as needed for Pain.  Dispense: 120 tablet; Refill: 0  -     famotidine (PEPCID) 40 MG tablet; Take 1 tablet (40 mg total) by mouth once daily.  Dispense: 90 tablet; Refill: 3     He will continue his reflux regimen.  The ranitidine will be stopped and restarted on the Pepcid.  He follows up with his surgeon.  He avoids the anti-inflammatory agents.  He continues his bowel program to avoid constipation. He will use the Percocet as directed.  He continues his vitamins and minerals.  He was offered the smoking cessation program and he will consider this.

## 2019-11-04 NOTE — PATIENT INSTRUCTIONS
He has had the cervical spine surgery.  He has a neck brace .  He has chronic pain.  He has chronic lower back pain and is scheduled for follow-up lumbar surgery.  The Percocet has worked better than the Norco.  The ranitidine will be stop and the will be switched to the Pepcid.  He continues his omeprazole in the mornings.  He continues his current medications vitamins and minerals.

## 2019-11-05 NOTE — ANESTHESIA POSTPROCEDURE EVALUATION
Anesthesia Post Evaluation    Patient: Garcia Renteria    Procedure(s) Performed: Procedure(s) (LRB):  C3-4, C4-5 ACDF (N/A)    Final Anesthesia Type: general  Patient location during evaluation: PACU  Patient participation: Yes- Able to Participate  Level of consciousness: awake and alert and oriented  Post-procedure vital signs: reviewed and stable  Pain management: adequate  Airway patency: patent  PONV status at discharge: No PONV  Anesthetic complications: no      Cardiovascular status: hemodynamically stable  Respiratory status: unassisted, spontaneous ventilation and room air  Hydration status: euvolemic  Follow-up not needed.          Vitals Value Taken Time   /80 11/4/2019  9:25 AM     11/5/2019  8:27 AM   Pulse 87 11/4/2019  9:25 AM   Resp 18 11/4/2019  9:25 AM   SpO2 97 % 11/4/2019  9:25 AM         Event Time     Out of Recovery 17:56:48          Pain/Celena Score: No data recorded

## 2019-11-07 NOTE — PHYSICIAN QUERY
PT Name: Garcia Renteria  MR #: [id number]     Physician Query Form - Documentation Clarification      CDS: Nury Hartley RN, CCDS       Contact information: [email or web address]    This form is a permanent document in the medical record.     Query Date: November 7, 2019    By submitting this query, we are merely seeking further clarification of documentation. Please utilize your independent clinical judgment when addressing the question(s) below.    The Medical record reflects the following:    Supporting Clinical Findings Location in Medical Record   Patient Active Problem List:  Type 2 diabetes mellitus with diabetic neuropathy, without long-term current use of insulin   10/30-Anes Note   Past Medical History: Diabetes mellitus type I    CONTINUE taking these medications:   metFORMIN 1000 MG tablet  Commonly known as:  GLUCOPHAGE  Take 500 mg by mouth daily with breakfast.   10/30-H&P    10/30-D/C Summary                                                                            Doctor, Please specify diagnosis or diagnoses associated with above clinical findings.    Please clarify TYPE of DM.    Provider Use Only       [X   ] DM Type 2     [   ] DM Type 1     [   ] Other Clarification (please specify)_____________________________                                                                                                           [  ] Clinically Undetermined

## 2019-11-14 DIAGNOSIS — Z98.1 S/P CERVICAL SPINAL FUSION: Primary | ICD-10-CM

## 2019-11-15 ENCOUNTER — CLINICAL SUPPORT (OUTPATIENT)
Dept: NEUROSURGERY | Facility: CLINIC | Age: 69
End: 2019-11-15
Payer: MEDICARE

## 2019-11-15 VITALS
RESPIRATION RATE: 18 BRPM | TEMPERATURE: 98 F | DIASTOLIC BLOOD PRESSURE: 84 MMHG | HEART RATE: 80 BPM | SYSTOLIC BLOOD PRESSURE: 129 MMHG

## 2019-11-15 PROCEDURE — 99999 PR PBB SHADOW E&M-EST. PATIENT-LVL III: CPT | Mod: PBBFAC,,,

## 2019-11-15 PROCEDURE — 99999 PR PBB SHADOW E&M-EST. PATIENT-LVL III: ICD-10-PCS | Mod: PBBFAC,,,

## 2019-11-15 PROCEDURE — 99213 OFFICE O/P EST LOW 20 MIN: CPT | Mod: PBBFAC

## 2019-11-15 NOTE — PROGRESS NOTES
"Patient seen in clinic for 2 week post op s/p C3-4 ACDF performed by Dr. Alan on 10/30/19.  Pain rated 7 out of 10 today, states pain is "muscle pain" in posterior neck/shoulder. Also states this pain has been improving over time. Pt describes his pre-op s/s as 10/10 neck and back pain, numbness in bilateral hands and forearms, and BLE numbness and "coldness". He states that his neck pain and BUE numbness has significantly improved post-op, as has his BLE numbness and "coldness". Patient denies fever, N/V. Denies new s/s. Pt reports brace compliance. Ambulating well with cane.     Incision to anterior neck assessed, C/D/I. Edges well approximated. No redness, swelling, or drainage. Reviewed wound care instructions. Discussed s/s of infection and instructed pt to notify clinic immediately if any of these develop, pt v/u. Education provided regarding healing process after spine fusion surgery. Reviewed appropriate use of pain medications and benefits of physical activity at this stage of recovery.     Patient was instructed as follows:    Discontinue Bacitracin   May shower normally but avoid scrubbing incision and pat dry after shower.   Do not submerge wound in bath tub or go swimming until at least 8 weeks after surgery   Keep incision clean, dry and open to air as much as possible.   Patient encouraged to walk as much as possible but advised to walk with family member or friend and rest as necessary.   No lifting >10lbs.   Avoid bending and twisting the area of your surgery more than 45 degrees from neutral position in any direction.   Return to work will be determined on an individual basis.   No driving or operating machinery while taking narcotic pain medication or muscle relaxants and until cleared by a surgeon.   Wear brace at all times, however, you may remove brace to eat, sleep, and shower.     All questions were answered. Patient will follow up with Dr. Alan on 12/19/19 at [facility] with same " day x-ray. Patient was encouraged to call clinic with any future concerns prior to follow up appt. If any worsening symptoms, patient should report to ED.      Pauline Skelton RN  Neurosurgery

## 2019-12-02 ENCOUNTER — TELEPHONE (OUTPATIENT)
Dept: NEUROSURGERY | Facility: CLINIC | Age: 69
End: 2019-12-02

## 2019-12-02 NOTE — TELEPHONE ENCOUNTER
Notified pt of appt changes from 12/19/19 to 12/18/19, pt v/u, confirms new appt date, times, and locations.

## 2019-12-04 ENCOUNTER — OFFICE VISIT (OUTPATIENT)
Dept: GASTROENTEROLOGY | Facility: CLINIC | Age: 69
End: 2019-12-04
Payer: MEDICARE

## 2019-12-04 VITALS
OXYGEN SATURATION: 97 % | HEART RATE: 94 BPM | DIASTOLIC BLOOD PRESSURE: 76 MMHG | BODY MASS INDEX: 27.04 KG/M2 | SYSTOLIC BLOOD PRESSURE: 112 MMHG | WEIGHT: 204 LBS | RESPIRATION RATE: 16 BRPM | HEIGHT: 73 IN

## 2019-12-04 DIAGNOSIS — G89.4 CHRONIC PAIN SYNDROME: Primary | ICD-10-CM

## 2019-12-04 DIAGNOSIS — M51.36 DDD (DEGENERATIVE DISC DISEASE), LUMBAR: ICD-10-CM

## 2019-12-04 DIAGNOSIS — K57.30 DIVERTICULOSIS OF LARGE INTESTINE WITHOUT HEMORRHAGE: ICD-10-CM

## 2019-12-04 DIAGNOSIS — M54.50 LOW BACK PAIN, NON-SPECIFIC: ICD-10-CM

## 2019-12-04 DIAGNOSIS — Z86.19 HEPATITIS C VIRUS INFECTION RESOLVED AFTER ANTIVIRAL DRUG THERAPY: ICD-10-CM

## 2019-12-04 DIAGNOSIS — K21.9 GASTROESOPHAGEAL REFLUX DISEASE WITHOUT ESOPHAGITIS: ICD-10-CM

## 2019-12-04 PROCEDURE — 1159F PR MEDICATION LIST DOCUMENTED IN MEDICAL RECORD: ICD-10-PCS | Mod: ,,, | Performed by: INTERNAL MEDICINE

## 2019-12-04 PROCEDURE — 99213 PR OFFICE/OUTPT VISIT, EST, LEVL III, 20-29 MIN: ICD-10-PCS | Mod: S$PBB,,, | Performed by: INTERNAL MEDICINE

## 2019-12-04 PROCEDURE — 1159F MED LIST DOCD IN RCRD: CPT | Mod: ,,, | Performed by: INTERNAL MEDICINE

## 2019-12-04 PROCEDURE — 99214 OFFICE O/P EST MOD 30 MIN: CPT | Mod: PBBFAC,PN | Performed by: INTERNAL MEDICINE

## 2019-12-04 PROCEDURE — 99999 PR PBB SHADOW E&M-EST. PATIENT-LVL IV: CPT | Mod: PBBFAC,,, | Performed by: INTERNAL MEDICINE

## 2019-12-04 PROCEDURE — 99213 OFFICE O/P EST LOW 20 MIN: CPT | Mod: S$PBB,,, | Performed by: INTERNAL MEDICINE

## 2019-12-04 PROCEDURE — 99999 PR PBB SHADOW E&M-EST. PATIENT-LVL IV: ICD-10-PCS | Mod: PBBFAC,,, | Performed by: INTERNAL MEDICINE

## 2019-12-04 RX ORDER — OXYCODONE AND ACETAMINOPHEN 10; 325 MG/1; MG/1
1 TABLET ORAL EVERY 6 HOURS PRN
Qty: 120 TABLET | Refills: 0 | Status: ON HOLD | OUTPATIENT
Start: 2019-12-04 | End: 2020-03-13 | Stop reason: HOSPADM

## 2019-12-04 NOTE — PROGRESS NOTES
Subjective:       Patient ID: Garcia Renteria is a 69 y.o. male.    Chief Complaint: Follow-up    He is being followed by the surgeon.  He states that his neck is better after the cervical surgery.  He has chronic lower back pain and is scheduled for surgery next month.  The Percocet is controlled the pain.  He is intolerant to the anti-inflammatory agents.  He is able to ambulate with his cane without falling.  He states that his upper GI symptoms due to reflux are improved with the Pepcid at night and the omeprazole in the morning.  He states his diabetes is well controlled.  He is on the diabetic diet.  He has a history of diverticulosis.  He has daily bowel movements with fiber and/or MiraLax.  He is a cigarette smoker and has been offered the smoking cessation program in the past.  He is not particularly physically active but he denies aspiration hemoptysis chronic cough or chronic sputum production.  He denies hematemesis hematochezia jaundice dysphagia or aspiration.      Allergies:  Review of patient's allergies indicates:  No Known Allergies    Medications:    Current Outpatient Medications:     famotidine (PEPCID) 40 MG tablet, Take 1 tablet (40 mg total) by mouth once daily., Disp: 90 tablet, Rfl: 3    gabapentin (NEURONTIN) 300 MG capsule, Take 2 capsules (600 mg total) by mouth every evening., Disp: 60 capsule, Rfl: 5    HYDROcodone-acetaminophen (NORCO)  mg per tablet, Take 1 tablet by mouth every 6 (six) hours as needed for Pain., Disp: 100 tablet, Rfl: 0    HYDROcodone-acetaminophen (NORCO)  mg per tablet, Take 1 tablet by mouth every 4 (four) hours as needed for Pain., Disp: 30 tablet, Rfl: 0    lisinopril (PRINIVIL,ZESTRIL) 5 MG tablet, Take 5 mg by mouth once daily. , Disp: , Rfl:     metFORMIN (GLUCOPHAGE) 1000 MG tablet, Take 500 mg by mouth daily with breakfast. , Disp: , Rfl:     omeprazole (PRILOSEC) 20 MG capsule, Take 1 capsule (20 mg total) by mouth once daily., Disp:  90 capsule, Rfl: 1    oxyCODONE-acetaminophen (PERCOCET)  mg per tablet, Take 1 tablet by mouth every 6 (six) hours as needed for Pain., Disp: 120 tablet, Rfl: 0    sildenafil (VIAGRA) 100 MG tablet, , Disp: , Rfl:     STOOL SOFTENER, DOCUSATE MADELAINE, 240 mg capsule, , Disp: , Rfl:     triamterene-hydrochlorothiazide 75-50 mg (MAXZIDE) 75-50 mg per tablet, Take 0.5 tablets by mouth once daily. , Disp: , Rfl:     vitamin D (VITAMIN D3) 1000 units Tab, Take 1,000 Units by mouth once daily., Disp: , Rfl:     Past Medical History:   Diagnosis Date    Colitis     Diabetes mellitus, type 2     Diverticulosis     GERD (gastroesophageal reflux disease)     Hypertension        Past Surgical History:   Procedure Laterality Date    ANKLE SURGERY Bilateral     ANTERIOR CERVICAL DISCECTOMY W/ FUSION N/A 10/30/2019    Procedure: C3-4, C4-5 ACDF;  Surgeon: Francis Alan MD;  Location: [facility] OR [address] [place] [address];  Service: Neurosurgery;  Laterality: N/A;  C3-4, C4-5 ACDF  Anesthesia:  General  Rad:  C-Arm  NM:  EMG, SEP, & MEP  Blood:  Type & Screen  Position:  Supine  Bed:  Regular slider bed  Headrest:  [place] & [employer] tongs/hanging weights  Misc:  Small vivigen  Nerve root retractor ([employer])  Disposable #2 [employer]  Interbody    BACK SURGERY      COLONOSCOPY      lower back surgery      NECK SURGERY      SPINE SURGERY      UPPER GASTROINTESTINAL ENDOSCOPY           Review of Systems   Constitutional: Negative for appetite change, fever and unexpected weight change.   HENT: Negative for trouble swallowing.         No jaundice.   Respiratory: Negative for cough, shortness of breath and wheezing.         No Rales, Rhonchi, or Dyspnea.   Cardiovascular: Negative for chest pain.        He denies exertional chest pain or rhythm disturbances.   Gastrointestinal: Negative for abdominal distention, abdominal pain, anal bleeding, blood in stool, constipation, diarrhea and nausea.        He has a history of chronic hepatitis C and  responded to the treatment.   Musculoskeletal: Positive for arthralgias, back pain, neck pain and neck stiffness.        He received additional hydrocodone from his surgeon because of the severe pain.  Otherwise he uses the hydrocodone as prescribed by me.  The -MS was reviewed   Skin: Negative for pallor and rash.   Neurological: Negative for dizziness, seizures, syncope, speech difficulty, weakness and numbness.   Hematological: Negative for adenopathy.   Psychiatric/Behavioral: Negative for confusion.       Objective:      Physical Exam   Constitutional: He is oriented to person, place, and time.   Thin nonicteric  male   HENT:   Head: Normocephalic.   Eyes: Pupils are equal, round, and reactive to light. EOM are normal.   Neck: Normal range of motion. Neck supple. No tracheal deviation present. No thyromegaly present.   He is wearing a cervical collar   Cardiovascular: Normal rate, regular rhythm and normal heart sounds.   Pulmonary/Chest: Effort normal and breath sounds normal.   Abdominal: Soft. Bowel sounds are normal. He exhibits no distension and no mass. There is no tenderness. There is no rebound and no guarding.   The abdomen is soft without tenderness masses organomegaly.  Bowel sounds are normal.   Musculoskeletal: Normal range of motion.   He ambulates slowly with the cane.  He has anatalgic gait   Lymphadenopathy:     He has no cervical adenopathy.   Neurological: He is alert and oriented to person, place, and time. No cranial nerve deficit.   Skin: Skin is warm and dry.   Psychiatric: He has a normal mood and affect. His behavior is normal.   Vitals reviewed.        Plan:       Chronic pain syndrome    Diverticulosis of large intestine without hemorrhage    DDD (degenerative disc disease), lumbar    Gastroesophageal reflux disease without esophagitis    Low back pain, non-specific    Hepatitis C virus infection resolved after antiviral drug therapy    Other orders  -      oxyCODONE-acetaminophen (PERCOCET)  mg per tablet; Take 1 tablet by mouth every 6 (six) hours as needed for Pain.  Dispense: 120 tablet; Refill: 0     He will continue his reflux regimen current medications vitamins and minerals.  He continues his bowel program to avoid constipation. He can and supplemental MiraLax as needed.  He follows up with his surgeon.

## 2019-12-04 NOTE — PATIENT INSTRUCTIONS
He will continue his reflux regimen current medications.  He will make a food diary and avoid the offending foods.  He follows up with his  surgeon.  He will continue the Norco as prescribed.  He will continue his bowel program with additional fiber.

## 2019-12-18 ENCOUNTER — HOSPITAL ENCOUNTER (OUTPATIENT)
Dept: RADIOLOGY | Facility: HOSPITAL | Age: 69
Discharge: HOME | End: 2019-12-18
Attending: NEUROLOGICAL SURGERY
Payer: MEDICARE

## 2019-12-18 ENCOUNTER — OFFICE VISIT (OUTPATIENT)
Dept: NEUROSURGERY | Facility: CLINIC | Age: 69
End: 2019-12-18
Payer: MEDICARE

## 2019-12-18 VITALS
DIASTOLIC BLOOD PRESSURE: 78 MMHG | SYSTOLIC BLOOD PRESSURE: 132 MMHG | BODY MASS INDEX: 27.04 KG/M2 | WEIGHT: 204 LBS | HEIGHT: 73 IN | TEMPERATURE: 98 F | HEART RATE: 75 BPM

## 2019-12-18 DIAGNOSIS — M47.12 CERVICAL SPONDYLOSIS WITH MYELOPATHY: ICD-10-CM

## 2019-12-18 DIAGNOSIS — Z98.1 S/P CERVICAL SPINAL FUSION: ICD-10-CM

## 2019-12-18 DIAGNOSIS — G95.9 CERVICAL MYELOPATHY: Primary | ICD-10-CM

## 2019-12-18 PROCEDURE — 72040 X-RAY EXAM NECK SPINE 2-3 VW: CPT | Mod: 26,,, | Performed by: RADIOLOGY

## 2019-12-18 PROCEDURE — 99999 PR PBB SHADOW E&M-EST. PATIENT-LVL III: CPT | Mod: PBBFAC,,, | Performed by: NEUROLOGICAL SURGERY

## 2019-12-18 PROCEDURE — 99024 POSTOP FOLLOW-UP VISIT: CPT | Mod: POP,,, | Performed by: NEUROLOGICAL SURGERY

## 2019-12-18 PROCEDURE — 99024 PR POST-OP FOLLOW-UP VISIT: ICD-10-PCS | Mod: POP,,, | Performed by: NEUROLOGICAL SURGERY

## 2019-12-18 PROCEDURE — 72040 XR CERVICAL SPINE AP LATERAL: ICD-10-PCS | Mod: 26,,, | Performed by: RADIOLOGY

## 2019-12-18 PROCEDURE — 99999 PR PBB SHADOW E&M-EST. PATIENT-LVL III: ICD-10-PCS | Mod: PBBFAC,,, | Performed by: NEUROLOGICAL SURGERY

## 2019-12-18 PROCEDURE — 72040 X-RAY EXAM NECK SPINE 2-3 VW: CPT | Mod: TC

## 2019-12-18 PROCEDURE — 99213 OFFICE O/P EST LOW 20 MIN: CPT | Mod: PBBFAC,25 | Performed by: NEUROLOGICAL SURGERY

## 2019-12-18 NOTE — PROGRESS NOTES
CHIEF COMPLAINT:  Post op evaluation 7 weeks after C3/4, C4/5 ACDF    Cristian HERNANDEZ, attest that this documentation has been prepared under the direction and in the presence of Francis Alan MD.    HPI:  Garcia Renteria is a 69 y.o.  male who presents today for a 7 week post op evaluation. Pt is s/p C3/4, C4/5 ACDF on 10/30/2019. Pt reports some soreness in the neck but feels that it is mostly musculoskeletal and it is improving. He presents today wearing a soft collar and using a cane. He is swallowing decently well. He noticed some difficulty swallowing on the right side immediately following surgery but it is improving. He is ambulating well and notices that he is walking better than prior to surgery.     Review of patient's allergies indicates:  No Known Allergies    Past Medical History:   Diagnosis Date    Colitis     Diabetes mellitus, type 2     Diverticulosis     GERD (gastroesophageal reflux disease)     Hypertension      Past Surgical History:   Procedure Laterality Date    ANKLE SURGERY Bilateral     ANTERIOR CERVICAL DISCECTOMY W/ FUSION N/A 10/30/2019    Procedure: C3-4, C4-5 ACDF;  Surgeon: Francis Alan MD;  Location: [facility] OR [address] [place] [address];  Service: Neurosurgery;  Laterality: N/A;  C3-4, C4-5 ACDF  Anesthesia:  General  Rad:  C-Arm  NM:  EMG, SEP, & MEP  Blood:  Type & Screen  Position:  Supine  Bed:  Regular slider bed  Headrest:  [place] & GW tongs/hanging weights  Misc:  Small vivigen  Nerve root retractor (DePuy)  Disposable #2 Kerrison  Interbody    BACK SURGERY      COLONOSCOPY      lower back surgery      NECK SURGERY      SPINE SURGERY      UPPER GASTROINTESTINAL ENDOSCOPY       Family History   Problem Relation Age of Onset    Neurological Disorders Mother      Social History     Tobacco Use    Smoking status: Current Every Day Smoker     Types: Cigars    Smokeless tobacco: Never Used   Substance Use Topics    Alcohol use: Yes     Alcohol/week: 2.0 standard drinks      Types: 1 Glasses of wine, 1 Cans of beer per week     Comment: 1 beer, 1 wine , 2 timesa  week    Drug use: Not Currently        Review of Systems   Constitutional: Negative.    HENT: Negative.    Eyes: Negative.    Respiratory: Negative.    Cardiovascular: Negative.    Gastrointestinal: Negative.    Endocrine: Negative.    Genitourinary: Negative.    Musculoskeletal: Positive for gait problem (Cane) and neck pain. Negative for back pain.   Skin: Negative.    Allergic/Immunologic: Negative.    Neurological: Negative for weakness, light-headedness, numbness and headaches.   Hematological: Negative.    Psychiatric/Behavioral: Negative.        OBJECTIVE:   Vital Signs:  Temp: 97.7 °F (36.5 °C) (12/18/19 0858)  Pulse: 75 (12/18/19 0858)  BP: 132/78 (12/18/19 0858)    Physical Exam:    Vital signs: All nursing notes and vital signs reviewed -- afebrile, vital signs stable.  Constitutional: Patient sitting comfortably in chair. Appears well developed and well nourished.  Skin: Exposed areas are intact without abnormal markings, rashes or other lesions. Incision is healing well.  HEENT: Normocephalic. Normal conjunctivae.  Cardiovascular: Normal rate and regular rhythm.  Respiratory: Chest wall rises and falls symmetrically, without signs of respiratory distress.  Abdomen: Soft and non-tender.  Extremities: Warm and without edema. Calves supple, non-tender.  Psych/Behavior: Normal affect.    Neurological:    Mental status: Alert and oriented. Conversational and appropriate.       Cranial Nerves: Grossly intact.     Motor:    Upper:  Deltoids Triceps Biceps WE WF     R 5/5 5/5 5/5 5/5 5/5 5/5    L 5/5 5/5 5/5 5/5 5/5 5/5      Lower:  HF KE KF DF PF EHL    R 5/5 5/5 5/5 5/5 5/5 5/5    L 5/5 5/5 5/5 5/5 5/5 5/5     Sensory: Intact sensation to light touch in all extremities. Romberg negative.    Reflexes:          DTR: 2+ symmetrically throughout.     Harris's: Negative.     Babinski's: Negative.     Clonus:  Negative.    Cerebellar: Finger-to-nose and rapid alternating movements normal. Gait stable, fluid.    Spine:    Posture: Head well aligned over pelvis in front and side views.  No focal or global spinal deformity visible on inspection. Shoulders and hips even. No obvious leg length discrepancy. No scapula winging.    Bending: Full ROM with forward, back and lateral bending. No rib prominence with forward bend.    Cervical:      ROM: Full with flexion, extension, lateral rotation and ear-to-shoulder bend.      Midline TTP: Negative.     Spurling's test: Negative.     Lhermitte's: Negative.    Thoracic:     Midline TTP: Negative    Lumbar:     Midline TTP: Negative     Straight Leg Test: Negative     Crossed Straight Leg Test: Negative     Sciatic notch tenderness: Negative.    Other:     SI joint TTP: Negative.     Greater trochanter TTP: Negative.     Tenderness with external/internal hip rotation: Negative.    Diagnostic Results:  All imaging was independently reviewed by me.    AP and Lateral X-ray C-spine, dated 12/18/2019:  1. Good spinal alignment and hardware position    ASSESSMENT/PLAN:     Garcia Renteria is doing well 6 weeks after a two level ACD. His xrays look good. We will see him back in 6 weeks with a CT C spine to assess bony fusion. We will address his scoliosis once he has fully healed from cervical surgery.    The patient understands and agrees with the plan of care. All questions were answered.     1. RTC 6 weeks with CT C-spine      I, Dr. Francis Alan personally performed the services described in this documentation. All medical record entries made by the scribe, Cristian Hartley, were at my direction and in my presence.  I have reviewed the chart and agree that the record reflects my personal performance and is accurate and complete.      Francis Alan M.D.  Department of Neurosurgery  [facility]      .

## 2020-01-02 ENCOUNTER — OFFICE VISIT (OUTPATIENT)
Dept: GASTROENTEROLOGY | Facility: CLINIC | Age: 70
End: 2020-01-02
Payer: MEDICARE

## 2020-01-02 VITALS
RESPIRATION RATE: 16 BRPM | HEIGHT: 73 IN | BODY MASS INDEX: 27.7 KG/M2 | OXYGEN SATURATION: 99 % | DIASTOLIC BLOOD PRESSURE: 69 MMHG | WEIGHT: 209 LBS | HEART RATE: 95 BPM | SYSTOLIC BLOOD PRESSURE: 116 MMHG

## 2020-01-02 DIAGNOSIS — M54.50 LOW BACK PAIN, NON-SPECIFIC: ICD-10-CM

## 2020-01-02 DIAGNOSIS — K57.30 DIVERTICULOSIS OF LARGE INTESTINE WITHOUT HEMORRHAGE: ICD-10-CM

## 2020-01-02 DIAGNOSIS — G89.4 CHRONIC PAIN SYNDROME: ICD-10-CM

## 2020-01-02 DIAGNOSIS — K21.9 GASTROESOPHAGEAL REFLUX DISEASE, ESOPHAGITIS PRESENCE NOT SPECIFIED: Primary | ICD-10-CM

## 2020-01-02 DIAGNOSIS — M51.36 DDD (DEGENERATIVE DISC DISEASE), LUMBAR: ICD-10-CM

## 2020-01-02 PROCEDURE — 1159F PR MEDICATION LIST DOCUMENTED IN MEDICAL RECORD: ICD-10-PCS | Mod: ,,, | Performed by: INTERNAL MEDICINE

## 2020-01-02 PROCEDURE — 1159F MED LIST DOCD IN RCRD: CPT | Mod: ,,, | Performed by: INTERNAL MEDICINE

## 2020-01-02 PROCEDURE — 99999 PR PBB SHADOW E&M-EST. PATIENT-LVL IV: ICD-10-PCS | Mod: PBBFAC,,, | Performed by: INTERNAL MEDICINE

## 2020-01-02 PROCEDURE — 99213 PR OFFICE/OUTPT VISIT, EST, LEVL III, 20-29 MIN: ICD-10-PCS | Mod: S$PBB,,, | Performed by: INTERNAL MEDICINE

## 2020-01-02 PROCEDURE — 99999 PR PBB SHADOW E&M-EST. PATIENT-LVL IV: CPT | Mod: PBBFAC,,, | Performed by: INTERNAL MEDICINE

## 2020-01-02 PROCEDURE — 99214 OFFICE O/P EST MOD 30 MIN: CPT | Mod: PBBFAC,PN | Performed by: INTERNAL MEDICINE

## 2020-01-02 PROCEDURE — 99213 OFFICE O/P EST LOW 20 MIN: CPT | Mod: S$PBB,,, | Performed by: INTERNAL MEDICINE

## 2020-01-02 RX ORDER — HYDROCODONE BITARTRATE AND ACETAMINOPHEN 10; 325 MG/1; MG/1
1 TABLET ORAL EVERY 6 HOURS PRN
Qty: 120 TABLET | Refills: 0 | Status: SHIPPED | OUTPATIENT
Start: 2020-01-02 | End: 2020-02-05 | Stop reason: SDUPTHER

## 2020-01-02 RX ORDER — OMEPRAZOLE 20 MG/1
20 CAPSULE, DELAYED RELEASE ORAL DAILY
Qty: 90 CAPSULE | Refills: 1 | Status: SHIPPED | OUTPATIENT
Start: 2020-01-02 | End: 2020-07-06 | Stop reason: SDUPTHER

## 2020-01-02 RX ORDER — IBUPROFEN 800 MG/1
800 TABLET ORAL DAILY PRN
Status: ON HOLD | COMMUNITY
Start: 2019-12-24 | End: 2020-03-13 | Stop reason: HOSPADM

## 2020-01-02 RX ORDER — FAMOTIDINE 40 MG/1
40 TABLET, FILM COATED ORAL NIGHTLY
Qty: 90 TABLET | Refills: 3 | Status: SHIPPED | OUTPATIENT
Start: 2020-01-02 | End: 2021-01-04 | Stop reason: SDUPTHER

## 2020-01-02 NOTE — LETTER
January 2, 2020      Alicja Romo MD  [address]  [address]  [place] [address]           [facility]  [address], [address]  [place] [address]  Phone: [phone]  Fax: [phone]          Patient: Garcia Renteria   MR Number: [id number]   YOB: 1950   Date of Visit: 1/2/2020       Dear Dr. Alicja Romo:    Thank you for referring Garcia Renteria to me for evaluation. Attached you will find relevant portions of my assessment and plan of care.    If you have questions, please do not hesitate to call me. I look forward to following Garcia Renteria along with you.    Sincerely,    Elpidio Lau MD    Enclosure  CC:  No Recipients    If you would like to receive this communication electronically, please contact [email or web address] or [phone] to request more information on [employer] Link access.    For providers and/or their staff who would like to refer a patient to [facility], please contact us through our one-stop-shop provider referral line, [facility], at [phone].    If you feel you have received this communication in error or would no longer like to receive these types of communications, please e-mail [email or web address]

## 2020-01-02 NOTE — PATIENT INSTRUCTIONS
He will continue his reflux regimen.  He will take the omeprazole in the morning and Pepcid will be added in the evening for the breakthrough symptoms.  He continues his follow up with his neurosurgeon.  He scheduled for evaluation his lumbar spine.  He continues his current medications vitamins and minerals.

## 2020-01-02 NOTE — PROGRESS NOTES
Subjective:       Patient ID: Garcia Renteria is a 69 y.o. male.    Chief Complaint: Follow-up and Medication Refill    He states the neck pain is better.  He is experiencing less numbness and weakness in his legs.  He states I am getting over my surgery .  He is followed by the nurse surgeon.  He is being evaluated for the low back pain.  She has had multiple spinal surgeries.  He states he can ambulate with the cane without falling.  He is still when the cervical collar but he states that he can move his neck without pain.  The Norco has controlled the pain. He avoids the anti-inflammatory agents.ms- reviewed and reveals that he is using his Norco correctly.  He has been on omeprazole but states his having breakthrough symptoms with pyrosis and dyspepsia bad taste in my mouth .  He denies dysphagia aspiration hematemesis hematochezia jaundice or bleeding.  He has diverticulosis and he is having daily bowel movements.      Allergies:  Review of patient's allergies indicates:  No Known Allergies    Medications:    Current Outpatient Medications:     famotidine (PEPCID) 40 MG tablet, Take 1 tablet (40 mg total) by mouth every evening., Disp: 90 tablet, Rfl: 3    gabapentin (NEURONTIN) 300 MG capsule, Take 2 capsules (600 mg total) by mouth every evening., Disp: 60 capsule, Rfl: 5    HYDROcodone-acetaminophen (NORCO)  mg per tablet, Take 1 tablet by mouth every 6 (six) hours as needed for Pain., Disp: 100 tablet, Rfl: 0    HYDROcodone-acetaminophen (NORCO)  mg per tablet, Take 1 tablet by mouth every 6 (six) hours as needed for Pain., Disp: 120 tablet, Rfl: 0    ibuprofen (ADVIL,MOTRIN) 800 MG tablet, , Disp: , Rfl:     lisinopril (PRINIVIL,ZESTRIL) 5 MG tablet, Take 5 mg by mouth once daily. , Disp: , Rfl:     metFORMIN (GLUCOPHAGE) 1000 MG tablet, Take 500 mg by mouth daily with breakfast. , Disp: , Rfl:     omeprazole (PRILOSEC) 20 MG capsule, Take 1 capsule (20 mg total) by mouth once  daily., Disp: 90 capsule, Rfl: 1    oxyCODONE-acetaminophen (PERCOCET)  mg per tablet, Take 1 tablet by mouth every 6 (six) hours as needed for Pain., Disp: 120 tablet, Rfl: 0    sildenafil (VIAGRA) 100 MG tablet, , Disp: , Rfl:     STOOL SOFTENER, DOCUSATE MADELAINE, 240 mg capsule, , Disp: , Rfl:     triamterene-hydrochlorothiazide 75-50 mg (MAXZIDE) 75-50 mg per tablet, Take 0.5 tablets by mouth once daily. , Disp: , Rfl:     vitamin D (VITAMIN D3) 1000 units Tab, Take 1,000 Units by mouth once daily., Disp: , Rfl:     Past Medical History:   Diagnosis Date    Colitis     Diabetes mellitus, type 2     Diverticulosis     GERD (gastroesophageal reflux disease)     Hypertension        Past Surgical History:   Procedure Laterality Date    ANKLE SURGERY Bilateral     ANTERIOR CERVICAL DISCECTOMY W/ FUSION N/A 10/30/2019    Procedure: C3-4, C4-5 ACDF;  Surgeon: Francis Alan MD;  Location: [facility] OR [address] [place] [address];  Service: Neurosurgery;  Laterality: N/A;  C3-4, C4-5 ACDF  Anesthesia:  General  Rad:  C-Arm  NM:  EMG, SEP, & MEP  Blood:  Type & Screen  Position:  Supine  Bed:  Regular slider bed  Headrest:  [place] & GW tongs/hanging weights  Misc:  Small vivigen  Nerve root retractor (DePuy)  Disposable #2 Kerrison  Interbody    BACK SURGERY      COLONOSCOPY      lower back surgery      NECK SURGERY      SPINE SURGERY      UPPER GASTROINTESTINAL ENDOSCOPY           Review of Systems   Constitutional: Negative for appetite change, fever and unexpected weight change.   HENT: Negative for trouble swallowing.         No jaundice.   Respiratory: Negative for cough, shortness of breath and wheezing.         He is a daily cigarette smoker.  He has been offered the smoking cessation program in the past and he states he will consider this option.  He is not particularly physically active because the chronic pain syndrome but he denies dyspnea on exertion.  He denies aspiration or hemoptysis.  He has occasional  coughing but states that is not chronic and there is very little sputum production.   Cardiovascular: Negative for chest pain.        He denies exertional chest pain but is not particularly physically active.  He denies rhythm disturbances.   Gastrointestinal: Negative for abdominal distention, abdominal pain, anal bleeding, blood in stool, constipation, diarrhea, nausea, rectal pain and vomiting.   Musculoskeletal: Positive for arthralgias, back pain, gait problem, neck pain and neck stiffness.        He ambulates with a cane.  He denies falling.  The neck pain is markedly improved after the surgery.  He has been followed by the nurse surgeon for the chronic back pain.  He has had multiple lumbar spine surgeries.  He believes the numbness and weakness in his legs have improved.  He has a history of bilateral ankle surgery but he is ambulating without difficulty.   Skin: Negative for pallor and rash.   Neurological: Negative for dizziness, seizures, syncope, speech difficulty, weakness and numbness.   Hematological: Negative for adenopathy.   Psychiatric/Behavioral: Negative for confusion.       Objective:      Physical Exam   Constitutional: He is oriented to person, place, and time. He appears well-developed and well-nourished.   Well-nourished well-hydrated nonicteric  male.   HENT:   Head: Normocephalic.   Eyes: Pupils are equal, round, and reactive to light. EOM are normal.   Neck: Normal range of motion. Neck supple. No tracheal deviation present. No thyromegaly present.   He is wearing a cervical collar.  He has a healed scar in the right neck.  He has good movement.   Cardiovascular: Normal rate, regular rhythm and normal heart sounds.   Pulmonary/Chest: Effort normal and breath sounds normal.   Abdominal: Soft. Bowel sounds are normal. He exhibits no distension and no mass. There is no tenderness. There is no rebound and no guarding.   The abdomen is soft without tenderness masses  organomegaly.  Bowel sounds are normal.   Musculoskeletal: Normal range of motion.   He ambulates carefully and slowly with a cane.  He can go from the sitting to the standing position.   Lymphadenopathy:     He has no cervical adenopathy.   Neurological: He is alert and oriented to person, place, and time. No cranial nerve deficit.   Skin: Skin is warm and dry.   Psychiatric: He has a normal mood and affect. His behavior is normal.   Vitals reviewed.        Plan:       Gastroesophageal reflux disease, esophagitis presence not specified  -     famotidine (PEPCID) 40 MG tablet; Take 1 tablet (40 mg total) by mouth every evening.  Dispense: 90 tablet; Refill: 3  -     omeprazole (PRILOSEC) 20 MG capsule; Take 1 capsule (20 mg total) by mouth once daily.  Dispense: 90 capsule; Refill: 1    Chronic pain syndrome  -     HYDROcodone-acetaminophen (NORCO)  mg per tablet; Take 1 tablet by mouth every 6 (six) hours as needed for Pain.  Dispense: 120 tablet; Refill: 0    DDD (degenerative disc disease), lumbar  -     HYDROcodone-acetaminophen (NORCO)  mg per tablet; Take 1 tablet by mouth every 6 (six) hours as needed for Pain.  Dispense: 120 tablet; Refill: 0    Low back pain, non-specific  -     HYDROcodone-acetaminophen (NORCO)  mg per tablet; Take 1 tablet by mouth every 6 (six) hours as needed for Pain.  Dispense: 120 tablet; Refill: 0    Diverticulosis of large intestine without hemorrhage     Continue the reflux regimen.  He takes the omeprazole in the morning in the Pepcid is been added at night.  He will make a food diary and avoid the offending foods.  He continues the Norco.  He will avoid the anti-inflammatory agents.  He continues his vitamins and minerals.  He continues his bowel program with additional fiber and/or stool softeners to avoid constipation. He will follow up with his neurosurgeon.

## 2020-01-30 ENCOUNTER — TELEPHONE (OUTPATIENT)
Dept: NEUROSURGERY | Facility: CLINIC | Age: 70
End: 2020-01-30

## 2020-02-05 ENCOUNTER — OFFICE VISIT (OUTPATIENT)
Dept: GASTROENTEROLOGY | Facility: CLINIC | Age: 70
End: 2020-02-05
Payer: MEDICARE

## 2020-02-05 VITALS
HEIGHT: 73 IN | DIASTOLIC BLOOD PRESSURE: 68 MMHG | RESPIRATION RATE: 18 BRPM | SYSTOLIC BLOOD PRESSURE: 101 MMHG | HEART RATE: 91 BPM | WEIGHT: 214 LBS | OXYGEN SATURATION: 98 % | BODY MASS INDEX: 28.36 KG/M2

## 2020-02-05 DIAGNOSIS — M51.36 DDD (DEGENERATIVE DISC DISEASE), LUMBAR: ICD-10-CM

## 2020-02-05 DIAGNOSIS — M54.50 LOW BACK PAIN, NON-SPECIFIC: ICD-10-CM

## 2020-02-05 DIAGNOSIS — G89.4 CHRONIC PAIN SYNDROME: ICD-10-CM

## 2020-02-05 DIAGNOSIS — K21.9 GASTROESOPHAGEAL REFLUX DISEASE, ESOPHAGITIS PRESENCE NOT SPECIFIED: Primary | ICD-10-CM

## 2020-02-05 DIAGNOSIS — K57.30 DIVERTICULOSIS OF LARGE INTESTINE WITHOUT HEMORRHAGE: ICD-10-CM

## 2020-02-05 PROCEDURE — 99999 PR PBB SHADOW E&M-EST. PATIENT-LVL III: CPT | Mod: PBBFAC,,, | Performed by: INTERNAL MEDICINE

## 2020-02-05 PROCEDURE — 99213 OFFICE O/P EST LOW 20 MIN: CPT | Mod: S$PBB,,, | Performed by: INTERNAL MEDICINE

## 2020-02-05 PROCEDURE — 99999 PR PBB SHADOW E&M-EST. PATIENT-LVL III: ICD-10-PCS | Mod: PBBFAC,,, | Performed by: INTERNAL MEDICINE

## 2020-02-05 PROCEDURE — 99213 OFFICE O/P EST LOW 20 MIN: CPT | Mod: PBBFAC,PN | Performed by: INTERNAL MEDICINE

## 2020-02-05 PROCEDURE — 99213 PR OFFICE/OUTPT VISIT, EST, LEVL III, 20-29 MIN: ICD-10-PCS | Mod: S$PBB,,, | Performed by: INTERNAL MEDICINE

## 2020-02-05 RX ORDER — HYDROCODONE BITARTRATE AND ACETAMINOPHEN 10; 325 MG/1; MG/1
1 TABLET ORAL EVERY 6 HOURS PRN
Qty: 120 TABLET | Refills: 0 | Status: ON HOLD | OUTPATIENT
Start: 2020-02-05 | End: 2020-03-13 | Stop reason: HOSPADM

## 2020-02-05 NOTE — PATIENT INSTRUCTIONS
He will continue his current medications vitamins and minerals.  He will follows up with the neurosurgeon.  Will continue the Norco for pain and he will avoid the anti-inflammatory agents.  He continues his reflux regimen the and nutritious diet.

## 2020-02-05 NOTE — PROGRESS NOTES
Subjective:       Patient ID: Garcia Renteria is a 70 y.o. male.    Chief Complaint: Follow-up    MS- was reviewed and he is using the Norco correctly and obtains the prescriptions only from me.  He has a history of chronic hepatitis C and completed his therapy.  He is considered cured.  He denies tobacco alcohol.  He has diverticulosis.  He has occasional constipation but is using the MiraLax and fiber.  He has gastroesophageal reflux but he symptom-free with current medications.  He denies hematemesis aspiration dysphagia jaundice bleeding or melena.  He has had cervical spine surgery and his neurologic status is markedly improved.  He has chronic joint and back pain.  He has been intolerant to the anti-inflammatory agents.  He is to be evaluated by the neurosurgeon.  He states that he is scheduled to have lower back surgery.  His wife states that is markedly improved and he can ambulate better.  His toenails have improved since he has had the cervical spine surgery.  He ambulates with a with a cane and denies falling.      Allergies:  Review of patient's allergies indicates:  No Known Allergies    Medications:    Current Outpatient Medications:     famotidine (PEPCID) 40 MG tablet, Take 1 tablet (40 mg total) by mouth every evening., Disp: 90 tablet, Rfl: 3    gabapentin (NEURONTIN) 300 MG capsule, Take 2 capsules (600 mg total) by mouth every evening., Disp: 60 capsule, Rfl: 5    HYDROcodone-acetaminophen (NORCO)  mg per tablet, Take 1 tablet by mouth every 6 (six) hours as needed for Pain., Disp: 120 tablet, Rfl: 0    ibuprofen (ADVIL,MOTRIN) 800 MG tablet, , Disp: , Rfl:     lisinopril (PRINIVIL,ZESTRIL) 5 MG tablet, Take 5 mg by mouth once daily. , Disp: , Rfl:     metFORMIN (GLUCOPHAGE) 1000 MG tablet, Take 500 mg by mouth daily with breakfast. , Disp: , Rfl:     omeprazole (PRILOSEC) 20 MG capsule, Take 1 capsule (20 mg total) by mouth once daily., Disp: 90 capsule, Rfl: 1     oxyCODONE-acetaminophen (PERCOCET)  mg per tablet, Take 1 tablet by mouth every 6 (six) hours as needed for Pain., Disp: 120 tablet, Rfl: 0    sildenafil (VIAGRA) 100 MG tablet, , Disp: , Rfl:     STOOL SOFTENER, DOCUSATE MADELAINE, 240 mg capsule, , Disp: , Rfl:     triamterene-hydrochlorothiazide 75-50 mg (MAXZIDE) 75-50 mg per tablet, Take 0.5 tablets by mouth once daily. , Disp: , Rfl:     vitamin D (VITAMIN D3) 1000 units Tab, Take 1,000 Units by mouth once daily., Disp: , Rfl:     Past Medical History:   Diagnosis Date    Colitis     Diabetes mellitus, type 2     Diverticulosis     GERD (gastroesophageal reflux disease)     Hypertension        Past Surgical History:   Procedure Laterality Date    ANKLE SURGERY Bilateral     ANTERIOR CERVICAL DISCECTOMY W/ FUSION N/A 10/30/2019    Procedure: C3-4, C4-5 ACDF;  Surgeon: Francis Alan MD;  Location: [facility] OR [address] [place] [address];  Service: Neurosurgery;  Laterality: N/A;  C3-4, C4-5 ACDF  Anesthesia:  General  Rad:  C-Arm  NM:  EMG, SEP, & MEP  Blood:  Type & Screen  Position:  Supine  Bed:  Regular slider bed  Headrest:  [place] & GW tongs/hanging weights  Misc:  Small vivigen  Nerve root retractor (iPaymentuy)  Disposable #2 Kerrison  Interbody    BACK SURGERY      COLONOSCOPY      lower back surgery      NECK SURGERY      SPINE SURGERY      UPPER GASTROINTESTINAL ENDOSCOPY           Review of Systems   Constitutional: Negative for appetite change, fever and unexpected weight change.   HENT: Negative for trouble swallowing.         No jaundice.   Respiratory: Negative for cough, shortness of breath and wheezing.         He is a chronic cigarette smoker.  He denies dyspnea on exertion but is not particularly physically active.  He only has occasional coughing.  He denies aspiration hemoptysis or chronic sputum production.   Cardiovascular: Negative for chest pain.        He denies exertional chest pain or rhythm disturbance.  He is followed [facility] the [facility] in  addition.   Gastrointestinal: Negative for abdominal distention, abdominal pain, anal bleeding, blood in stool, constipation, diarrhea and nausea.   Endocrine:        He states his diabetes is well controlled.  He takes his vitamins minerals states on the ADA diet.   Musculoskeletal: Positive for arthralgias, back pain, gait problem, neck pain and neck stiffness.   Skin: Negative for pallor and rash.   Neurological: Negative for dizziness, seizures, syncope, speech difficulty, weakness and numbness.   Hematological: Negative for adenopathy.   Psychiatric/Behavioral: Negative for confusion.       Objective:      Physical Exam   Constitutional: He is oriented to person, place, and time. He appears well-developed and well-nourished.   Well-nourished well-hydrated thin nonicteric  male.  He has a firm hand .  He is oriented x3 can present his history appropriately.  He can answer the appropriately.   HENT:   Head: Normocephalic.   Eyes: Pupils are equal, round, and reactive to light. EOM are normal.   Neck: Normal range of motion. Neck supple. No tracheal deviation present. No thyromegaly present.   He has a cervical collar in place.  His surgical scars   Cardiovascular: Normal rate, regular rhythm and normal heart sounds.   Pulmonary/Chest: Effort normal and breath sounds normal.   Abdominal: He exhibits no distension and no mass. There is no tenderness. There is no rebound and no guarding. No hernia.   Abdomen is soft without tenderness masses organomegaly.  Bowel sounds are normal.   Musculoskeletal: Normal range of motion.   He ambulates slowly with a cane.  He can get from the sitting to the standing position with occasions assistance.   Lymphadenopathy:     He has no cervical adenopathy.   Neurological: He is alert and oriented to person, place, and time. No cranial nerve deficit.   Skin: Skin is warm and dry.   Psychiatric: He has a normal mood and affect. His behavior is normal.   Vitals  reviewed.        Plan:       Gastroesophageal reflux disease, esophagitis presence not specified    Chronic pain syndrome  -     HYDROcodone-acetaminophen (NORCO)  mg per tablet; Take 1 tablet by mouth every 6 (six) hours as needed for Pain.  Dispense: 120 tablet; Refill: 0    DDD (degenerative disc disease), lumbar  -     HYDROcodone-acetaminophen (NORCO)  mg per tablet; Take 1 tablet by mouth every 6 (six) hours as needed for Pain.  Dispense: 120 tablet; Refill: 0    Low back pain, non-specific  -     HYDROcodone-acetaminophen (NORCO)  mg per tablet; Take 1 tablet by mouth every 6 (six) hours as needed for Pain.  Dispense: 120 tablet; Refill: 0    Diverticulosis of large intestine without hemorrhage     He will continue his diabetic diet vitamins and minerals.  He will continue his fiber supplements to avoid constipation.  He will continue his reflux regimen.  He uses the Norco as directed the avoid anti-inflammatory agents.  He will follow-up with the surgeon.  [facility]

## 2020-02-05 NOTE — LETTER
February 6, 2020      Alicja Romo MD  [address]  [address]  [place] [address]           [facility]  [address], [address]  [place] [address]  Phone: [phone]  Fax: [phone]          Patient: Garcia Renteria   MR Number: [id number]   YOB: 1950   Date of Visit: 2/5/2020       Dear Dr. Alicja Romo:    Thank you for referring Garcia Renteria to me for evaluation. Attached you will find relevant portions of my assessment and plan of care.    If you have questions, please do not hesitate to call me. I look forward to following Garcia Renteria along with you.    Sincerely,    Elpidio Lau MD    Enclosure  CC:  No Recipients    If you would like to receive this communication electronically, please contact [email or web address] or [phone] to request more information on [employer] Link access.    For providers and/or their staff who would like to refer a patient to [facility], please contact us through our one-stop-shop provider referral line, [facility], at [phone].    If you feel you have received this communication in error or would no longer like to receive these types of communications, please e-mail [email or web address]

## 2020-02-06 ENCOUNTER — TELEPHONE (OUTPATIENT)
Dept: NEUROSURGERY | Facility: CLINIC | Age: 70
End: 2020-02-06

## 2020-02-06 ENCOUNTER — OFFICE VISIT (OUTPATIENT)
Dept: NEUROSURGERY | Facility: CLINIC | Age: 70
End: 2020-02-06
Payer: MEDICARE

## 2020-02-06 ENCOUNTER — HOSPITAL ENCOUNTER (OUTPATIENT)
Dept: RADIOLOGY | Facility: HOSPITAL | Age: 70
Discharge: HOME | End: 2020-02-06
Attending: NEUROLOGICAL SURGERY
Payer: MEDICARE

## 2020-02-06 VITALS
SYSTOLIC BLOOD PRESSURE: 132 MMHG | HEART RATE: 73 BPM | BODY MASS INDEX: 28.1 KG/M2 | WEIGHT: 213 LBS | TEMPERATURE: 98 F | DIASTOLIC BLOOD PRESSURE: 69 MMHG

## 2020-02-06 DIAGNOSIS — M50.00 INTERVERTEBRAL CERVICAL DISC DISORDER WITH MYELOPATHY, CERVICAL REGION: ICD-10-CM

## 2020-02-06 DIAGNOSIS — M43.16 SPONDYLOLISTHESIS OF LUMBAR REGION: ICD-10-CM

## 2020-02-06 DIAGNOSIS — Z98.1 S/P CERVICAL SPINAL FUSION: ICD-10-CM

## 2020-02-06 DIAGNOSIS — M47.12 CERVICAL SPONDYLOSIS WITH MYELOPATHY: ICD-10-CM

## 2020-02-06 DIAGNOSIS — M41.50 DEGENERATIVE SCOLIOSIS IN ADULT PATIENT: Primary | ICD-10-CM

## 2020-02-06 DIAGNOSIS — M47.14 THORACIC MYELOPATHY: ICD-10-CM

## 2020-02-06 PROCEDURE — 99999 PR PBB SHADOW E&M-EST. PATIENT-LVL III: ICD-10-PCS | Mod: PBBFAC,,, | Performed by: NEUROLOGICAL SURGERY

## 2020-02-06 PROCEDURE — 72125 CT NECK SPINE W/O DYE: CPT | Mod: TC

## 2020-02-06 PROCEDURE — 72125 CT CERVICAL SPINE WITHOUT CONTRAST: ICD-10-PCS | Mod: 26,,, | Performed by: RADIOLOGY

## 2020-02-06 PROCEDURE — 99213 OFFICE O/P EST LOW 20 MIN: CPT | Mod: PBBFAC,25 | Performed by: NEUROLOGICAL SURGERY

## 2020-02-06 PROCEDURE — 72125 CT NECK SPINE W/O DYE: CPT | Mod: 26,,, | Performed by: RADIOLOGY

## 2020-02-06 PROCEDURE — 99999 PR PBB SHADOW E&M-EST. PATIENT-LVL III: CPT | Mod: PBBFAC,,, | Performed by: NEUROLOGICAL SURGERY

## 2020-02-06 PROCEDURE — 99214 OFFICE O/P EST MOD 30 MIN: CPT | Mod: S$PBB,,, | Performed by: NEUROLOGICAL SURGERY

## 2020-02-06 PROCEDURE — 99214 PR OFFICE/OUTPT VISIT, EST, LEVL IV, 30-39 MIN: ICD-10-PCS | Mod: S$PBB,,, | Performed by: NEUROLOGICAL SURGERY

## 2020-02-06 NOTE — H&P (VIEW-ONLY)
CHIEF COMPLAINT:  Post op evaluation 3 months after C3/4, C4/5 ACDF    Cristian HERNANDEZ, attest that this documentation has been prepared under the direction and in the presence of Francis Alan MD.    HPI:  Garcia Renteria is a 70 y.o.  male who presents today for a 3 month post op evaluation. Pt is s/p C3/4, C4/5 ACDF on 10/30/2019. Pt reports that his neck is feeling better. He experiences some right sided neck/trapezius pain. This pain is worse when he has his head turned to the right for prolonged periods. Pt presents today wearing a soft cervical collar. Pt experiences continued BLE pain/numbness. He is ready to proceed with the thoracolumbar fusion as previously discussed. Pt continues to endure urinary frequency and incontinence and wears diapers. Pt has noticed significant improvement in his lower extremity function since his neck surgery.       Review of patient's allergies indicates:  No Known Allergies    Past Medical History:   Diagnosis Date    Colitis     Diabetes mellitus, type 2     Diverticulosis     GERD (gastroesophageal reflux disease)     Hypertension      Past Surgical History:   Procedure Laterality Date    ANKLE SURGERY Bilateral     ANTERIOR CERVICAL DISCECTOMY W/ FUSION N/A 10/30/2019    Procedure: C3-4, C4-5 ACDF;  Surgeon: Francis Alan MD;  Location: [facility] OR [address] [place] [address];  Service: Neurosurgery;  Laterality: N/A;  C3-4, C4-5 ACDF  Anesthesia:  General  Rad:  C-Arm  NM:  EMG, SEP, & MEP  Blood:  Type & Screen  Position:  Supine  Bed:  Regular slider bed  Headrest:  [place] & GW tongs/hanging weights  Misc:  Small vivigen  Nerve root retractor (DePuy)  Disposable #2 Kerrison  Interbody    BACK SURGERY      COLONOSCOPY      lower back surgery      NECK SURGERY      SPINE SURGERY      UPPER GASTROINTESTINAL ENDOSCOPY       Family History   Problem Relation Age of Onset    Neurological Disorders Mother      Social History     Tobacco Use    Smoking status: Current Every Day  Smoker     Types: Cigars    Smokeless tobacco: Never Used   Substance Use Topics    Alcohol use: Yes     Alcohol/week: 2.0 standard drinks     Types: 1 Glasses of wine, 1 Cans of beer per week     Comment: 1 beer, 1 wine , 2 timesa  week    Drug use: Not Currently        Review of Systems   Constitutional: Negative.    HENT: Negative.    Eyes: Negative.    Respiratory: Negative.    Cardiovascular: Negative.    Gastrointestinal: Negative.    Endocrine: Negative.    Genitourinary: Positive for enuresis and frequency.   Musculoskeletal: Positive for back pain (low back) and myalgias (right trapezius; BLE). Negative for gait problem and neck pain.   Skin: Negative.    Allergic/Immunologic: Negative.    Neurological: Negative for weakness, light-headedness, numbness and headaches.   Hematological: Negative.    Psychiatric/Behavioral: Negative.        OBJECTIVE:   Vital Signs:  Temp: 98.3 °F (36.8 °C) (02/06/20 1453)  Pulse: 73 (02/06/20 1453)  BP: 132/69 (02/06/20 1453)    Physical Exam:    Vital signs: All nursing notes and vital signs reviewed -- afebrile, vital signs stable.  Constitutional: Patient sitting comfortably in chair. Appears well developed and well nourished.  Skin: Exposed areas are intact without abnormal markings, rashes or other lesions.  HEENT: Normocephalic. Normal conjunctivae.  Cardiovascular: Normal rate and regular rhythm.  Respiratory: Chest wall rises and falls symmetrically, without signs of respiratory distress.  Abdomen: Soft and non-tender.  Extremities: Warm and without edema. Calves supple, non-tender.  Psych/Behavior: Normal affect.    Neurological:    Mental status: Alert and oriented. Conversational and appropriate.       Cranial Nerves: Grossly intact.     Motor:    Upper:  Deltoids Triceps Biceps WE WF     R 5/5 5/5 5/5 5/5 5/5 5/5    L 5/5 5/5 5/5 5/5 5/5 5/5      Lower:  HF KE KF DF PF EHL    R 5/5 5/5 5/5 5/5 5/5 5/5    L 5/5 5/5 5/5 5/5 4/5 5/5     Sensory: Intact  sensation to light touch in all extremities. Romberg negative.    Reflexes:          DTR: 2+ symmetrically throughout.     Harris's: Negative.     Babinski's: Negative.     Clonus: Negative.    Cerebellar: Finger-to-nose and rapid alternating movements normal. Gait stable, fluid.    Spine:    Posture: Head well aligned over pelvis in front and side views.  No focal or global spinal deformity visible on inspection. Shoulders and hips even. No obvious leg length discrepancy. No scapula winging.    Bending: Full ROM with forward, back and lateral bending. No rib prominence with forward bend.    Cervical:      ROM: Full with flexion, extension, lateral rotation and ear-to-shoulder bend.      Midline TTP: Negative.     Spurling's test: Negative.     Lhermitte's: Negative.    Thoracic:     Midline TTP: Negative    Lumbar:     Midline TTP: Negative     Straight Leg Test: Negative     Crossed Straight Leg Test: Negative     Sciatic notch tenderness: Negative.    Other:     SI joint TTP: Negative.     Greater trochanter TTP: Negative.     Tenderness with external/internal hip rotation: Negative.    Diagnostic Results:  All imaging was independently reviewed by me.    CT C-spine, dated 02/06/2020:  1. Solid fusion at C3/4 and C4/5     ASSESSMENT/PLAN:     Garcia Renteria has done well 3 months after a 2 level ACDF for myelopathy.  He reports improved balance and lower extremity strength since that surgery.  His CT shows solid fusion at both operative levels.  We can now proceeded with our anticipated thoracolumbar deformity surgery.  He has degenerative and iatrogenic flat back deformity with sagittal plane imbalance.  He has associated severe compression of the conus and cauda equina.  He will require a T10 to pelvis instrumented fusion with a possible L4 pedicle subtraction osteotomy.  The surgery would be done in concert with Dr. Parkinson.  The substantial risks were reviewed in detail with the patient.  Alternatives,  indications, and methods, were reviewed in detail with the patient wished to proceed    The patient understands and agrees with the plan of care. All questions were answered.     1. T10-Pelvis fusion and possible L4 PSO      I, Dr. Francis Alan personally performed the services described in this documentation. All medical record entries made by the scribe, Cristian Hartley, were at my direction and in my presence.  I have reviewed the chart and agree that the record reflects my personal performance and is accurate and complete.      Francis Alan M.D.  Department of Neurosurgery  [facility]      .

## 2020-02-06 NOTE — LETTER
February 6, 2020      Elpidio Lau MD  [address] [facility] [place] [address]           [facility] - Neurosurgery 7th Fl  [address]  [facility] [address]  Phone: [phone]          Patient: Garcia Renteria   MR Number: [id number]   YOB: 1950   Date of Visit: 2/6/2020       Dear Dr. Elpidio Lau:    Thank you for referring Garcia Renteria to me for evaluation. Attached you will find relevant portions of my assessment and plan of care.    If you have questions, please do not hesitate to call me. I look forward to following Garcia Renteria along with you.    Sincerely,    Francis Alan MD    Enclosure  CC:  No Recipients    If you would like to receive this communication electronically, please contact [email or web address] or [phone] to request more information on [employer] access.    For providers and/or their staff who would like to refer a patient to [facility], please contact us through our one-stop-shop provider referral line, [facility], at [phone].    If you feel you have received this communication in error or would no longer like to receive these types of communications, please e-mail [email or web address]

## 2020-02-06 NOTE — PROGRESS NOTES
CHIEF COMPLAINT:  Post op evaluation 3 months after C3/4, C4/5 ACDF    Cristian HERNANDEZ, attest that this documentation has been prepared under the direction and in the presence of Francis Alan MD.    HPI:  Garcia Retneria is a 70 y.o.  male who presents today for a 3 month post op evaluation. Pt is s/p C3/4, C4/5 ACDF on 10/30/2019. Pt reports that his neck is feeling better. He experiences some right sided neck/trapezius pain. This pain is worse when he has his head turned to the right for prolonged periods. Pt presents today wearing a soft cervical collar. Pt experiences continued BLE pain/numbness. He is ready to proceed with the thoracolumbar fusion as previously discussed. Pt continues to endure urinary frequency and incontinence and wears diapers. Pt has noticed significant improvement in his lower extremity function since his neck surgery.       Review of patient's allergies indicates:  No Known Allergies    Past Medical History:   Diagnosis Date    Colitis     Diabetes mellitus, type 2     Diverticulosis     GERD (gastroesophageal reflux disease)     Hypertension      Past Surgical History:   Procedure Laterality Date    ANKLE SURGERY Bilateral     ANTERIOR CERVICAL DISCECTOMY W/ FUSION N/A 10/30/2019    Procedure: C3-4, C4-5 ACDF;  Surgeon: Francis Alan MD;  Location: [facility] OR [address] [place] [address];  Service: Neurosurgery;  Laterality: N/A;  C3-4, C4-5 ACDF  Anesthesia:  General  Rad:  C-Arm  NM:  EMG, SEP, & MEP  Blood:  Type & Screen  Position:  Supine  Bed:  Regular slider bed  Headrest:  [place] & GW tongs/hanging weights  Misc:  Small vivigen  Nerve root retractor (DePuy)  Disposable #2 Kerrison  Interbody    BACK SURGERY      COLONOSCOPY      lower back surgery      NECK SURGERY      SPINE SURGERY      UPPER GASTROINTESTINAL ENDOSCOPY       Family History   Problem Relation Age of Onset    Neurological Disorders Mother      Social History     Tobacco Use    Smoking status: Current Every Day  Smoker     Types: Cigars    Smokeless tobacco: Never Used   Substance Use Topics    Alcohol use: Yes     Alcohol/week: 2.0 standard drinks     Types: 1 Glasses of wine, 1 Cans of beer per week     Comment: 1 beer, 1 wine , 2 timesa  week    Drug use: Not Currently        Review of Systems   Constitutional: Negative.    HENT: Negative.    Eyes: Negative.    Respiratory: Negative.    Cardiovascular: Negative.    Gastrointestinal: Negative.    Endocrine: Negative.    Genitourinary: Positive for enuresis and frequency.   Musculoskeletal: Positive for back pain (low back) and myalgias (right trapezius; BLE). Negative for gait problem and neck pain.   Skin: Negative.    Allergic/Immunologic: Negative.    Neurological: Negative for weakness, light-headedness, numbness and headaches.   Hematological: Negative.    Psychiatric/Behavioral: Negative.        OBJECTIVE:   Vital Signs:  Temp: 98.3 °F (36.8 °C) (02/06/20 1453)  Pulse: 73 (02/06/20 1453)  BP: 132/69 (02/06/20 1453)    Physical Exam:    Vital signs: All nursing notes and vital signs reviewed -- afebrile, vital signs stable.  Constitutional: Patient sitting comfortably in chair. Appears well developed and well nourished.  Skin: Exposed areas are intact without abnormal markings, rashes or other lesions.  HEENT: Normocephalic. Normal conjunctivae.  Cardiovascular: Normal rate and regular rhythm.  Respiratory: Chest wall rises and falls symmetrically, without signs of respiratory distress.  Abdomen: Soft and non-tender.  Extremities: Warm and without edema. Calves supple, non-tender.  Psych/Behavior: Normal affect.    Neurological:    Mental status: Alert and oriented. Conversational and appropriate.       Cranial Nerves: Grossly intact.     Motor:    Upper:  Deltoids Triceps Biceps WE WF     R 5/5 5/5 5/5 5/5 5/5 5/5    L 5/5 5/5 5/5 5/5 5/5 5/5      Lower:  HF KE KF DF PF EHL    R 5/5 5/5 5/5 5/5 5/5 5/5    L 5/5 5/5 5/5 5/5 4/5 5/5     Sensory: Intact  sensation to light touch in all extremities. Romberg negative.    Reflexes:          DTR: 2+ symmetrically throughout.     Harris's: Negative.     Babinski's: Negative.     Clonus: Negative.    Cerebellar: Finger-to-nose and rapid alternating movements normal. Gait stable, fluid.    Spine:    Posture: Head well aligned over pelvis in front and side views.  No focal or global spinal deformity visible on inspection. Shoulders and hips even. No obvious leg length discrepancy. No scapula winging.    Bending: Full ROM with forward, back and lateral bending. No rib prominence with forward bend.    Cervical:      ROM: Full with flexion, extension, lateral rotation and ear-to-shoulder bend.      Midline TTP: Negative.     Spurling's test: Negative.     Lhermitte's: Negative.    Thoracic:     Midline TTP: Negative    Lumbar:     Midline TTP: Negative     Straight Leg Test: Negative     Crossed Straight Leg Test: Negative     Sciatic notch tenderness: Negative.    Other:     SI joint TTP: Negative.     Greater trochanter TTP: Negative.     Tenderness with external/internal hip rotation: Negative.    Diagnostic Results:  All imaging was independently reviewed by me.    CT C-spine, dated 02/06/2020:  1. Solid fusion at C3/4 and C4/5     ASSESSMENT/PLAN:     Garcia Renteria has done well 3 months after a 2 level ACDF for myelopathy.  He reports improved balance and lower extremity strength since that surgery.  His CT shows solid fusion at both operative levels.  We can now proceeded with our anticipated thoracolumbar deformity surgery.  He has degenerative and iatrogenic flat back deformity with sagittal plane imbalance.  He has associated severe compression of the conus and cauda equina.  He will require a T10 to pelvis instrumented fusion with a possible L4 pedicle subtraction osteotomy.  The surgery would be done in concert with Dr. Parkinson.  The substantial risks were reviewed in detail with the patient.  Alternatives,  indications, and methods, were reviewed in detail with the patient wished to proceed    The patient understands and agrees with the plan of care. All questions were answered.     1. T10-Pelvis fusion and possible L4 PSO      I, Dr. Francis Alan personally performed the services described in this documentation. All medical record entries made by the scribe, Cristian Hartley, were at my direction and in my presence.  I have reviewed the chart and agree that the record reflects my personal performance and is accurate and complete.      Francis Alan M.D.  Department of Neurosurgery  [facility]      .

## 2020-02-07 ENCOUNTER — TELEPHONE (OUTPATIENT)
Dept: NEUROSURGERY | Facility: CLINIC | Age: 70
End: 2020-02-07

## 2020-02-07 DIAGNOSIS — M47.14 THORACIC MYELOPATHY: ICD-10-CM

## 2020-02-07 DIAGNOSIS — M41.50 DEGENERATIVE SCOLIOSIS IN ADULT PATIENT: Primary | ICD-10-CM

## 2020-02-07 DIAGNOSIS — M43.16 SPONDYLOLISTHESIS OF LUMBAR REGION: ICD-10-CM

## 2020-02-07 DIAGNOSIS — Z98.1 HISTORY OF LUMBAR FUSION: ICD-10-CM

## 2020-02-10 DIAGNOSIS — M79.604 PAIN IN BOTH LOWER EXTREMITIES: ICD-10-CM

## 2020-02-10 DIAGNOSIS — Z01.818 PREOPERATIVE TESTING: Primary | ICD-10-CM

## 2020-02-10 DIAGNOSIS — E11.9 DIABETES MELLITUS WITHOUT COMPLICATION: ICD-10-CM

## 2020-02-10 DIAGNOSIS — M79.605 PAIN IN BOTH LOWER EXTREMITIES: ICD-10-CM

## 2020-02-10 NOTE — ANESTHESIA PAT ROS NOTE
02/10/2020  Garcia Renteria is a 70 y.o., male.      Pre-op Assessment         Review of Systems  Anesthesia Hx:  No problems with previous Anesthesia  History of prior surgery of interest to airway management or planning: cervical fusion. Family Hx of Anesthesia complications:   Denies Personal Hx of Anesthesia complications.   Social:  Smoker, Social Alcohol Use    Hematology/Oncology:  Hematology Normal   Oncology Normal    Oncology: NOT SMOKED CIGARS SINCE 10/2019B.    EENT/Dental:EENT/Dental Normal   Cardiovascular:    Denies Angina.  Functional Capacity unable to determine, ABLE TO CLIMB STAIRS SLOWLY  limited by disability Hypertension , Recent typical clinic B/P of 132/69    Pulmonary:  Pulmonary Normal  Denies Shortness of breath.  Denies Recent URI.    Renal/:  Renal Symptoms/Infections/Stones: frequency, incontinence.  RECENT UTI,ABOUT 2 WEEKS AGO, TREATED WITH ANTIBIOTICS PER PATIENT   Hepatic/GI:   H/O HEPATITIS C TREATED AND RESOLVED Esophageal / Stomach Disorders Gerd Controlled by chronic antireflux medication.  Bowel Conditions: (DIVERTICILOSIS)    Musculoskeletal:  Musculoskeletal General/Symptoms: Functional capacity is ambulatory with cane.  Joint Disease:  Arthritis OF LOWER BACK, NECK, AND ANKLE Spine Disorders: Scoliosis (DEGENERATIVE)  Thoracic Spine Disorders THORACIC MYELOPATHY Lumbar Spine Disorders, Lumbar Disc Disease, Spondylolisthesis   Neurological:  Neuro Symptoms of pain OF BACK AND BILATERAL LOWER EXTREMITY PAIN  Endocrine:  Diabetes, Type 2 Diabetes , controlled by oral hypoglycemics. Typical AM glucose range: 108-112 , most recent HgA1c value was 6.1 on 10/25/2019.    Psych:  Psychiatric Normal              Anesthesia Assessment: Preoperative EQUATION    Planned Procedure: Procedure(s) (LRB):  T10-Pelvis Fusion with L4 PSO **AIRO** Co-Surgeon: Roberto Parkinson  (N/A)  Requested Anesthesia Type:General  Surgeon: Francis Alan MD  Service: Neurosurgery  Known or anticipated Date of Surgery:3/3/2020    Surgeon notes: reviewed    Electronic QUestionnaire Assessment completed via nurse interview with patient.        Triage considerations:     The patient has no apparent active cardiac condition (No unstable coronary Syndrome such as severe unstable angina or recent [<1 month] myocardial infarction, decompensated CHF, severe valvular   disease or significant arrhythmia)    Previous anesthesia records:GETA and No problems  10/30/2019 C3-4, C4-5 ACDF (N/A Spine Cervical)   Airway/Jaw/Neck:  Airway Findings: Mouth Opening: Normal Tongue: Normal  General Airway Assessment: Adult  Mallampati: II  TM Distance: Normal, at least 6 cm  Jaw/Neck Findings:  Neck ROM: Normal ROM       Airway Present Prior to Hospital Arrival?: No Placement Date: 10/30/19 Placement Time: 1117 Method of Intubation: Colon Inserted by: Josué Doll SRNA  Airway Device: Endotracheal Tube Mask Ventilation: Mod Diff - oral Intubated: Postinduction Airway Device Size: 7.5 Style: Cuffed Cuff Inflation: Minimal occlusive pressure Placement Verified By: Auscultation;Capnometry;ETT Condensation Grade: Grade II Complicating Factors: Anterior larynx;Small mouth , small mouth opening  Findings Post-Intubation: Positive EtCO2;Bilateral breath sounds;Atraumatic/Condition of teeth unchanged Depth of Insertion (cm): 23 Secured at: Lips Complications: None Breath Sounds: Equal Bilateral Insertion attempts (enter comment if more than 2 attempts): 1 Removal Date: 10/30/19 Removal Time: 1529        Last PCP note: outside [facility] , Does not have a PCP at present  Subspecialty notes: Gastroenterology, Neurosurgery, Pain Management    Other important co-morbidities: DM2, GERD, HTN, Smoker and Diverticulosis      Tests already available:  Available tests,  3-6 months ago , within [facility] .10/25/2019 EKG, 10/17/2019 MRI  THORACIC SPINE W/O CONTRAST, CT THORACIC SPINE W/O CONTRAST, 10/18/2019 XRAY SCOLIOSIS COMPLETE             Instructions given. (See in Nurse's note)    Optimization:  Anesthesia Preop Clinic Assessment  Indicated- not indicated for this surgery    Medical Opinion Indicated           Plan:    Testing:  A1C, CBC, CMP, PT/INR, PTT, T&C, T&S and UA        Consultation:IM Perioperative Hospitalist     Patient  has previously scheduled Medical Appointment:none    Navigation: Tests Scheduled. TBD             Consults scheduled.TBD             Results will be tracked by Preop Clinic.  2/26 Medical optimization by Dr. Mcdonnell on 2/24. Labs resulted and noted. UA, Micro ua and urine cx resulted and noted.(UTI). Dr. Mcdonnell inbasketed that urine culture is resulted for review.  UTI will be treated with Cipro 500 mg q12 for 5 days.  2/28 Patient picked up the antibiotic and starting taking in on 2/27 in the pm.   Freda Dill RN BSN

## 2020-02-12 ENCOUNTER — TELEPHONE (OUTPATIENT)
Dept: PREADMISSION TESTING | Facility: HOSPITAL | Age: 70
End: 2020-02-12

## 2020-02-12 NOTE — TELEPHONE ENCOUNTER
----- Message from Freda Dill RN sent at 2/10/2020  2:43 PM CST -----  SURGERY 3/3  Please schedule Dr. Mcdonnell as requested by Dr. Alan, labs and ua.  Thanks!

## 2020-02-24 ENCOUNTER — INITIAL CONSULT (OUTPATIENT)
Dept: INTERNAL MEDICINE | Facility: CLINIC | Age: 70
End: 2020-02-24
Payer: MEDICARE

## 2020-02-24 ENCOUNTER — TELEPHONE (OUTPATIENT)
Dept: PREADMISSION TESTING | Facility: HOSPITAL | Age: 70
End: 2020-02-24

## 2020-02-24 ENCOUNTER — LAB VISIT (OUTPATIENT)
Dept: LAB | Facility: HOSPITAL | Age: 70
End: 2020-02-24
Attending: ANESTHESIOLOGY
Payer: MEDICARE

## 2020-02-24 VITALS
SYSTOLIC BLOOD PRESSURE: 130 MMHG | TEMPERATURE: 98 F | BODY MASS INDEX: 28.76 KG/M2 | HEIGHT: 73 IN | OXYGEN SATURATION: 99 % | WEIGHT: 217 LBS | RESPIRATION RATE: 18 BRPM | DIASTOLIC BLOOD PRESSURE: 86 MMHG | HEART RATE: 71 BPM

## 2020-02-24 DIAGNOSIS — K21.9 GASTROESOPHAGEAL REFLUX DISEASE, ESOPHAGITIS PRESENCE NOT SPECIFIED: ICD-10-CM

## 2020-02-24 DIAGNOSIS — R35.0 URINARY FREQUENCY: ICD-10-CM

## 2020-02-24 DIAGNOSIS — F11.90 CHRONIC, CONTINUOUS USE OF OPIOIDS: ICD-10-CM

## 2020-02-24 DIAGNOSIS — E11.40 TYPE 2 DIABETES MELLITUS WITH DIABETIC NEUROPATHY, WITHOUT LONG-TERM CURRENT USE OF INSULIN: ICD-10-CM

## 2020-02-24 DIAGNOSIS — I10 ESSENTIAL HYPERTENSION: ICD-10-CM

## 2020-02-24 DIAGNOSIS — Z86.19 HEPATITIS C VIRUS INFECTION RESOLVED AFTER ANTIVIRAL DRUG THERAPY: ICD-10-CM

## 2020-02-24 DIAGNOSIS — E11.9 DIABETES MELLITUS WITHOUT COMPLICATION: ICD-10-CM

## 2020-02-24 DIAGNOSIS — M79.605 PAIN IN BOTH LOWER EXTREMITIES: ICD-10-CM

## 2020-02-24 DIAGNOSIS — Z87.891 HISTORY OF TOBACCO ABUSE: ICD-10-CM

## 2020-02-24 DIAGNOSIS — Z98.890 PERSONAL HISTORY OF SPINE SURGERY: ICD-10-CM

## 2020-02-24 DIAGNOSIS — R33.9 INCOMPLETE BLADDER EMPTYING: ICD-10-CM

## 2020-02-24 DIAGNOSIS — N39.0 URINARY TRACT INFECTION WITHOUT HEMATURIA, SITE UNSPECIFIED: ICD-10-CM

## 2020-02-24 DIAGNOSIS — M40.30 FLAT BACK SYNDROME: ICD-10-CM

## 2020-02-24 DIAGNOSIS — R68.3 FINGER CLUBBING: ICD-10-CM

## 2020-02-24 DIAGNOSIS — M79.604 PAIN IN BOTH LOWER EXTREMITIES: ICD-10-CM

## 2020-02-24 DIAGNOSIS — K59.00 CONSTIPATION, UNSPECIFIED CONSTIPATION TYPE: ICD-10-CM

## 2020-02-24 DIAGNOSIS — Z01.818 PREOPERATIVE TESTING: ICD-10-CM

## 2020-02-24 PROBLEM — E83.52 HYPERCALCEMIA: Status: RESOLVED | Noted: 2019-10-25 | Resolved: 2020-02-24

## 2020-02-24 LAB
ABO + RH BLD: NORMAL
ALBUMIN SERPL BCP-MCNC: 3.7 G/DL (ref 3.5–5.2)
ALP SERPL-CCNC: 63 U/L (ref 55–135)
ALT SERPL W/O P-5'-P-CCNC: 12 U/L (ref 10–44)
ANION GAP SERPL CALC-SCNC: 8 MMOL/L (ref 8–16)
APTT BLDCRRT: 24.7 SEC (ref 21–32)
AST SERPL-CCNC: 13 U/L (ref 10–40)
BASOPHILS # BLD AUTO: 0.07 K/UL (ref 0–0.2)
BASOPHILS NFR BLD: 1.3 % (ref 0–1.9)
BILIRUB SERPL-MCNC: 0.3 MG/DL (ref 0.1–1)
BLD GP AB SCN CELLS X3 SERPL QL: NORMAL
BUN SERPL-MCNC: 16 MG/DL (ref 8–23)
CALCIUM SERPL-MCNC: 9.8 MG/DL (ref 8.7–10.5)
CHLORIDE SERPL-SCNC: 107 MMOL/L (ref 95–110)
CO2 SERPL-SCNC: 28 MMOL/L (ref 23–29)
CREAT SERPL-MCNC: 1.1 MG/DL (ref 0.5–1.4)
DIFFERENTIAL METHOD: ABNORMAL
EOSINOPHIL # BLD AUTO: 0.1 K/UL (ref 0–0.5)
EOSINOPHIL NFR BLD: 2 % (ref 0–8)
ERYTHROCYTE [DISTWIDTH] IN BLOOD BY AUTOMATED COUNT: 13.4 % (ref 11.5–14.5)
EST. GFR  (AFRICAN AMERICAN): >60 ML/MIN/1.73 M^2
EST. GFR  (NON AFRICAN AMERICAN): >60 ML/MIN/1.73 M^2
ESTIMATED AVG GLUCOSE: 117 MG/DL (ref 68–131)
GLUCOSE SERPL-MCNC: 87 MG/DL (ref 70–110)
HBA1C MFR BLD HPLC: 5.7 % (ref 4–5.6)
HCT VFR BLD AUTO: 42.8 % (ref 40–54)
HGB BLD-MCNC: 12.6 G/DL (ref 14–18)
IMM GRANULOCYTES # BLD AUTO: 0.02 K/UL (ref 0–0.04)
IMM GRANULOCYTES NFR BLD AUTO: 0.4 % (ref 0–0.5)
INR PPP: 1.1 (ref 0.8–1.2)
LYMPHOCYTES # BLD AUTO: 2.8 K/UL (ref 1–4.8)
LYMPHOCYTES NFR BLD: 50.5 % (ref 18–48)
MCH RBC QN AUTO: 26.6 PG (ref 27–31)
MCHC RBC AUTO-ENTMCNC: 29.4 G/DL (ref 32–36)
MCV RBC AUTO: 90 FL (ref 82–98)
MONOCYTES # BLD AUTO: 0.4 K/UL (ref 0.3–1)
MONOCYTES NFR BLD: 7.1 % (ref 4–15)
NEUTROPHILS # BLD AUTO: 2.1 K/UL (ref 1.8–7.7)
NEUTROPHILS NFR BLD: 38.7 % (ref 38–73)
NRBC BLD-RTO: 0 /100 WBC
PLATELET # BLD AUTO: 219 K/UL (ref 150–350)
PMV BLD AUTO: 10.2 FL (ref 9.2–12.9)
POTASSIUM SERPL-SCNC: 4.5 MMOL/L (ref 3.5–5.1)
PROT SERPL-MCNC: 7.4 G/DL (ref 6–8.4)
PROTHROMBIN TIME: 10.9 SEC (ref 9–12.5)
RBC # BLD AUTO: 4.74 M/UL (ref 4.6–6.2)
SODIUM SERPL-SCNC: 143 MMOL/L (ref 136–145)
WBC # BLD AUTO: 5.51 K/UL (ref 3.9–12.7)

## 2020-02-24 PROCEDURE — 99214 OFFICE O/P EST MOD 30 MIN: CPT | Mod: S$PBB,,, | Performed by: HOSPITALIST

## 2020-02-24 PROCEDURE — 85610 PROTHROMBIN TIME: CPT

## 2020-02-24 PROCEDURE — 99213 OFFICE O/P EST LOW 20 MIN: CPT | Mod: PBBFAC,25 | Performed by: HOSPITALIST

## 2020-02-24 PROCEDURE — 83036 HEMOGLOBIN GLYCOSYLATED A1C: CPT

## 2020-02-24 PROCEDURE — 85730 THROMBOPLASTIN TIME PARTIAL: CPT

## 2020-02-24 PROCEDURE — 99999 PR PBB SHADOW E&M-EST. PATIENT-LVL III: CPT | Mod: PBBFAC,,, | Performed by: HOSPITALIST

## 2020-02-24 PROCEDURE — 85025 COMPLETE CBC W/AUTO DIFF WBC: CPT

## 2020-02-24 PROCEDURE — 99214 PR OFFICE/OUTPT VISIT, EST, LEVL IV, 30-39 MIN: ICD-10-PCS | Mod: S$PBB,,, | Performed by: HOSPITALIST

## 2020-02-24 PROCEDURE — 86850 RBC ANTIBODY SCREEN: CPT

## 2020-02-24 PROCEDURE — 99999 PR PBB SHADOW E&M-EST. PATIENT-LVL III: ICD-10-PCS | Mod: PBBFAC,,, | Performed by: HOSPITALIST

## 2020-02-24 PROCEDURE — 80053 COMPREHEN METABOLIC PANEL: CPT

## 2020-02-24 PROCEDURE — 36415 COLL VENOUS BLD VENIPUNCTURE: CPT

## 2020-02-24 RX ORDER — GLYCERIN 1 G/1
SUPPOSITORY RECTAL
Status: ON HOLD | COMMUNITY
End: 2020-03-13 | Stop reason: HOSPADM

## 2020-02-24 NOTE — HPI
History of present illness- I had the pleasure of meeting this pleasant 70 y.o. gentleman in the pre op clinic prior to his elective spine surgery. The patient is known to me . aGrcia was accompanied by wife Khloe.    I have obtained the history by speaking to the patient and by reviewing the electronic health records.    Events leading up to surgery / History of presenting illness -    Degenerative scoliosis in adult patient   Spondylolisthesis of lumbar region   Thoracic myelopathy     Low back pain ,numbness  radiating to the Left foot - a few years  Back pain , numbness  radiating to Rt thigha few years      He has been troubled with  mild- moderate  pain for a few years . Pain increases with activity and decreases with resting.    Numbness on laying on the time , relieved by changing on the sides , putting a pillow between the legs      Relevant health conditions of significance for the perioperative period/ History of presenting illness -    Retired from [employer]  Lives in [facility]-With wife   Single level house    Health conditions of significance for the perioperative period     HTN    DM    Acid reflux    Not known to have heart disease

## 2020-02-24 NOTE — ASSESSMENT & PLAN NOTE
Having frequency for an year   Some urinary hesitancy   Unable to empty well   [facility] ( No fever, no urinary burning )   Hydrates   Planning on seeing Urologist   Will try to see , if we can set him up with urology today   If unable , to schedule today , he wants to do [place]ally ( [place]) , post surgery

## 2020-02-24 NOTE — TELEPHONE ENCOUNTER
----- Message from Olga Mcdonnell MD sent at 2/24/2020  8:32 AM CST -----  Please  try to see , if we can set him up with urology today

## 2020-02-24 NOTE — OUTPATIENT SUBJECTIVE & OBJECTIVE
"Outpatient Subjective & Objective     Chief complaint-Preoperative evaluation, Perioperative Medical management, complication reduction plan     Active cardiac conditions- none    Revised cardiac risk index predictors- none    Functional capacity -Examples of physical activity, used to be active until 1 year , was going to gym until then,  can take 1 flight of stairs----- He can undertake all the above activities without  chest pain,chest tightness, Shortness of breath ,dizziness,lightheadedness making his exercise tolerance more, than 4 Mets.       Review of Systems   Constitutional: Negative for chills and fever.        No unusual weight changes     HENT:        STOPBANG score  4/ 8    HTN  Age over 50 years  Neck size over 40 CM  Male gender   Eyes:        No unusual vision changes   Respiratory:        No cough , phlegm    No Hemoptysis   Cardiovascular:        As noted   Gastrointestinal:        Bowels- Regular No overt GI/ blood losses   Endocrine:        Prednisone use > 20 mg daily for 3 weeks- none    Genitourinary: Negative for dysuria.         urinary hesitancy    Musculoskeletal:        As above      Skin: Negative for rash.   Neurological: Negative for syncope.        No unilateral weakness   Hematological:        Current use of Anticoagulants  None    Psychiatric/Behavioral:        No Depression,Anxiety    No SI/HI       No vascular stenting         No anesthesia, bleeding, cardiac problems, PONV  with previous surgeries/procedures.  Medications and Allergies reviewed in epic.   FH- No anesthesia,bleeding / venous thrombosis ,problem in family     Physical Exam  Blood pressure 130/86, pulse 71, temperature 98.4 °F (36.9 °C), temperature source Oral, resp. rate 18, height 6' 1" (1.854 m), weight 98.4 kg (217 lb), SpO2 99 %.    Physical Exam  Constitutional- Vitals - Body mass index is 28.63 kg/m².,   Vitals:    02/24/20 0814   BP: 130/86   Pulse: 71   Resp:    Temp:      General " appearance-Conscious,Coherent  Eyes- No conjunctival icterus,pupils  round  and reactive to light   ENT-Oral cavity- moist  , Hearing grossly normal   Neck- No thyromegaly ,Trachea -central, No jugular venous distension,   No Carotid Bruit   Cardiovascular -Heart Sounds- Normal  and  no murmur   , No gallop rhythm   Respiratory - Normal Respiratory Effort, Normal breath sounds,  no wheeze  and  no forced expiratory wheeze    Peripheral pitting pedal edema-- none , no calf pain   Gastrointestinal -Soft abdomen, No palpable masses, Non Tender,Liver,Spleen not palpable. No-- free fluid and shifting dullness  Musculoskeletal- No finger Clubbing. Strength grossly normal   Lymphatic-No Palpable cervical, axillary,Inguinal lymphadenopathy   Psychiatric - normal effect,Orientation  Rt Dorsalis pedis pulses-palpable    Lt Dorsalis pedis pulses- palpable   Rt Posterior tibial pulses -palpable   Left posterior tibial pulses -palpable   Miscellaneous -  no asterixis,  no dupuytren's contracture,  Surgical scarRt neck  and  no renal bruit    Investigations  Lab and Imaging have been reviewed in [facility].    Review of Medicine tests    EKG- I had independently reviewed the EKG from--10/25/2019   It was reported to be showing     Sinus rhythm with Premature ventricular complexes  Otherwise normal ECG  No previous ECGs available    Review of clinical lab tests:  Lab Results   Component Value Date    CREATININE 1.0 10/31/2019    HGB 13.0 (L) 10/31/2019     10/31/2019           Review of old records- Was done and information gathered regards to events leading to surgery and health conditions of significance in the perioperative period.    Outpatient Subjective & Objective

## 2020-02-24 NOTE — ASSESSMENT & PLAN NOTE
Quit tobacco in Oct 2019   Not known to have COPD, Bronchitis, Emphysema, wheezing , chronic phlegm   No inhaler, oxygen use   No SOB

## 2020-02-24 NOTE — ASSESSMENT & PLAN NOTE
Lisinopril ,Triamterene- HCTZ    Home BP readings -none   Recent BP readings in the [id number]  Hypertension-  Blood pressure is acceptable .  I suggest holding -Lisinopril ,Triamterene- HCTZ  - on the morning of the surgery and can continue that  post operatively under blood pressure, electrolyte and renal function monitoring as long as they are acceptable.I suggest addressing pain control as uncontrolled pain can increased blood pressure

## 2020-02-24 NOTE — LETTER
February 24, 2020      Francis Alan MD  [address] [facility] [address]           [facility]  [address] [facility] [address]  Phone: [phone]          Patient: Garcia Renteria   MR Number: [id number]   YOB: 1950   Date of Visit: 2/24/2020       Dear Dr. Francis Alan:    Thank you for referring Garcia Renteria to me for evaluation. Attached you will find relevant portions of my assessment and plan of care.    If you have questions, please do not hesitate to call me. I look forward to following Garcia Renteria along with you.    Sincerely,    Olga Mcdonnell MD    Enclosure  CC:  No Recipients    If you would like to receive this communication electronically, please contact [email or web address] or [phone] to request more information on REach Link access.    For providers and/or their staff who would like to refer a patient to [facility], please contact us through our one-stop-shop provider referral line, [facility], at [phone].    If you feel you have received this communication in error or would no longer like to receive these types of communications, please e-mail [email or web address]

## 2020-02-24 NOTE — ASSESSMENT & PLAN NOTE
Norco for about 1 year ( Half- 1 tablet ) - twice a day     Chronic continuous opioid use- In view of the opioid use, the patient may have opioid tolerance . I suggest to reduce it , if possible, which likely will help post  op pain control . I suggest considering the possibility of opioid tolerance  in planning post operative pain control

## 2020-02-24 NOTE — ASSESSMENT & PLAN NOTE
Metformin  Feet care suggested    Type 2 Diabetes Mellitus  On treatment with oral agent, not on  Insulin    Hemoglobin A1c- 6.1- Oct 2019   Capillary glucose check-  Pre breakfast -102-110      Diabetes Mellitus-I suggest monitoring the glucose in the perioperative period ( Before meals and bed time,if the patient is on oral feeds or every 6 hourly ,if the patient is NPO )  Blood glucose target in hospitalized patients is 140-180. Oral Hypoglycemic agents are generally avoided during the hospital stay . If glucose is consistently elevated ,I suggest using basal ,prandial Insulin regimen to control the glucose , as elevated glucose can be associated with adverse surgical out comes. Please consider involving Hospital Medicine or Endocrinology ,if any help is needed with Glucose control. Patient will be instructed based on the pre op clinic guidelines  about adjustment of diabetic treatment (If applicable )  considering the NPO status for Surgery

## 2020-02-24 NOTE — ASSESSMENT & PLAN NOTE
Pepcid, Omeprazole  helping   GERD-  I suggest continuation of the Pepcid, omeprazole  in the perioperative period . I suggest aspiration precautions  GERD  Symptoms -acid taste to the throat
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
## 2020-02-26 RX ORDER — CIPROFLOXACIN 500 MG/1
500 TABLET ORAL EVERY 12 HOURS
Qty: 10 TABLET | Refills: 0 | Status: ON HOLD | OUTPATIENT
Start: 2020-02-26 | End: 2020-03-13 | Stop reason: HOSPADM

## 2020-02-26 NOTE — TELEPHONE ENCOUNTER
Pt reports that he has 100% disability and would prefer to allow their process to address it.  Ensured that patient has the appropriate contact information for the PCC if he decided to allow us to assist with scheduling in Urology.      Pt reported that his only symptoms are frequent urination, however he was recently prescribed antibiotics for UTI.  Educated patient that frequent urination might not delay surgery but a UTI could definitely impact surgery being postponed or canceled.  Suggested not delaying in [facility] to [facility].

## 2020-02-27 NOTE — PRE-PROCEDURE INSTRUCTIONS
Spoke with patient. Has not gotten message about the UTI and antibiotic ordered by DR. Mcdonnell. He will go  the prescrition today and start it. I asked that he call me tomorrow to let me know that he has the antibiotic, cipro, and is taking it. Verbalizes understanding.

## 2020-02-27 NOTE — PRE-PROCEDURE INSTRUCTIONS
Left message for patient: I want to know if you got your antibiotic for UTI that Dr. Mcdonnell had called in for you yesterday. I asked patient to call me back to let me know.

## 2020-02-28 NOTE — PRE-PROCEDURE INSTRUCTIONS
Patient called and told me he got his antibiotic yesterday and started taking it(2/27)he also said he took a dose this am and will continue taking the antibiotic.

## 2020-03-02 ENCOUNTER — TELEPHONE (OUTPATIENT)
Dept: PREADMISSION TESTING | Facility: HOSPITAL | Age: 70
End: 2020-03-02

## 2020-03-02 ENCOUNTER — ANESTHESIA EVENT (OUTPATIENT)
Dept: SURGERY | Facility: HOSPITAL | Age: 70
DRG: 457 | End: 2020-03-02
Payer: MEDICARE

## 2020-03-02 ENCOUNTER — TELEPHONE (OUTPATIENT)
Dept: SPINE | Facility: CLINIC | Age: 70
End: 2020-03-02

## 2020-03-02 NOTE — TELEPHONE ENCOUNTER
Mr. Renteria says that he has 1 more pill to take at this time. He is feeling fine as he was never feeling ill.

## 2020-03-02 NOTE — TELEPHONE ENCOUNTER
----- Message from Olga Mcdonnell MD sent at 2/28/2020  8:59 AM CST -----  Please check to see , if he started  Antibiotic and how he is doing

## 2020-03-02 NOTE — TELEPHONE ENCOUNTER
Pt confirmed his Sx check time of 0500 for a Sx start time of 0700 for 03.03.2020.  Reviewed to bathe in Dial or Hibiclens and NPO after 2200 on 03.02.2020.  Pt verified he is en route to the [facility] to stay for the evening.  Denies any needs or Q/C at this time.  V/U.

## 2020-03-03 ENCOUNTER — HOSPITAL ENCOUNTER (INPATIENT)
Facility: HOSPITAL | Age: 70
LOS: 21 days | Discharge: NURSING, REHAB OR LONG-TERM CARE | DRG: 457 | End: 2020-03-24
Attending: NEUROLOGICAL SURGERY | Admitting: NEUROLOGICAL SURGERY
Payer: MEDICARE

## 2020-03-03 ENCOUNTER — ANESTHESIA (OUTPATIENT)
Dept: SURGERY | Facility: HOSPITAL | Age: 70
DRG: 457 | End: 2020-03-03
Payer: MEDICARE

## 2020-03-03 DIAGNOSIS — Z01.818 PREOPERATIVE TESTING: ICD-10-CM

## 2020-03-03 DIAGNOSIS — R00.0 TACHYCARDIA: ICD-10-CM

## 2020-03-03 DIAGNOSIS — Z74.09 IMPAIRED MOBILITY: ICD-10-CM

## 2020-03-03 DIAGNOSIS — G89.4 CHRONIC PAIN SYNDROME: ICD-10-CM

## 2020-03-03 DIAGNOSIS — M43.15: ICD-10-CM

## 2020-03-03 DIAGNOSIS — M41.50 DEGENERATIVE SCOLIOSIS: Primary | ICD-10-CM

## 2020-03-03 LAB
ABO + RH BLD: NORMAL
BASOPHILS # BLD AUTO: 0.04 K/UL (ref 0–0.2)
BASOPHILS NFR BLD: 0.3 % (ref 0–1.9)
BLD GP AB SCN CELLS X3 SERPL QL: NORMAL
CHOLEST SERPL-MCNC: 117 MG/DL (ref 120–199)
CHOLEST/HDLC SERPL: 6.9 {RATIO} (ref 2–5)
DIFFERENTIAL METHOD: ABNORMAL
EOSINOPHIL # BLD AUTO: 0 K/UL (ref 0–0.5)
EOSINOPHIL NFR BLD: 0 % (ref 0–8)
ERYTHROCYTE [DISTWIDTH] IN BLOOD BY AUTOMATED COUNT: 13.6 % (ref 11.5–14.5)
ESTIMATED AVG GLUCOSE: 114 MG/DL (ref 68–131)
GLUCOSE SERPL-MCNC: 129 MG/DL (ref 70–110)
GLUCOSE SERPL-MCNC: 94 MG/DL (ref 70–110)
HBA1C MFR BLD HPLC: 5.6 % (ref 4–5.6)
HCO3 UR-SCNC: 22.7 MMOL/L (ref 24–28)
HCO3 UR-SCNC: 23.1 MMOL/L (ref 24–28)
HCT VFR BLD AUTO: 32.2 % (ref 40–54)
HCT VFR BLD CALC: 27 %PCV (ref 36–54)
HCT VFR BLD CALC: 37 %PCV (ref 36–54)
HDLC SERPL-MCNC: 17 MG/DL (ref 40–75)
HDLC SERPL: 14.5 % (ref 20–50)
HGB BLD-MCNC: 10.1 G/DL (ref 14–18)
IMM GRANULOCYTES # BLD AUTO: 0.23 K/UL (ref 0–0.04)
IMM GRANULOCYTES NFR BLD AUTO: 1.5 % (ref 0–0.5)
LDLC SERPL CALC-MCNC: ABNORMAL MG/DL (ref 63–159)
LYMPHOCYTES # BLD AUTO: 1.3 K/UL (ref 1–4.8)
LYMPHOCYTES NFR BLD: 8.5 % (ref 18–48)
MCH RBC QN AUTO: 27.9 PG (ref 27–31)
MCHC RBC AUTO-ENTMCNC: 31.4 G/DL (ref 32–36)
MCV RBC AUTO: 89 FL (ref 82–98)
MONOCYTES # BLD AUTO: 1 K/UL (ref 0.3–1)
MONOCYTES NFR BLD: 6.2 % (ref 4–15)
NEUTROPHILS # BLD AUTO: 13.1 K/UL (ref 1.8–7.7)
NEUTROPHILS NFR BLD: 83.5 % (ref 38–73)
NONHDLC SERPL-MCNC: 100 MG/DL
NRBC BLD-RTO: 0 /100 WBC
PCO2 BLDA: 36.1 MMHG (ref 35–45)
PCO2 BLDA: 38 MMHG (ref 35–45)
PH SMN: 7.39 [PH] (ref 7.35–7.45)
PH SMN: 7.41 [PH] (ref 7.35–7.45)
PLATELET # BLD AUTO: 194 K/UL (ref 150–350)
PMV BLD AUTO: 10.9 FL (ref 9.2–12.9)
PO2 BLDA: 212 MMHG (ref 80–100)
PO2 BLDA: 222 MMHG (ref 80–100)
POC BE: -2 MMOL/L
POC BE: -2 MMOL/L
POC IONIZED CALCIUM: 1.09 MMOL/L (ref 1.06–1.42)
POC IONIZED CALCIUM: 1.1 MMOL/L (ref 1.06–1.42)
POC SATURATED O2: 100 % (ref 95–100)
POC SATURATED O2: 100 % (ref 95–100)
POC TCO2: 24 MMOL/L (ref 23–27)
POC TCO2: 24 MMOL/L (ref 23–27)
POCT GLUCOSE: 94 MG/DL (ref 70–110)
POTASSIUM BLD-SCNC: 3.1 MMOL/L (ref 3.5–5.1)
POTASSIUM BLD-SCNC: 3.6 MMOL/L (ref 3.5–5.1)
RBC # BLD AUTO: 3.62 M/UL (ref 4.6–6.2)
SAMPLE: ABNORMAL
SAMPLE: ABNORMAL
SODIUM BLD-SCNC: 141 MMOL/L (ref 136–145)
SODIUM BLD-SCNC: 141 MMOL/L (ref 136–145)
TRIGL SERPL-MCNC: 578 MG/DL (ref 30–150)
TSH SERPL DL<=0.005 MIU/L-ACNC: 0.95 UIU/ML (ref 0.4–4)
WBC # BLD AUTO: 15.7 K/UL (ref 3.9–12.7)

## 2020-03-03 PROCEDURE — 22843 INSERT SPINE FIXATION DEVICE: CPT | Mod: ,,, | Performed by: NEUROLOGICAL SURGERY

## 2020-03-03 PROCEDURE — 22848 INSERT PELV FIXATION DEVICE: CPT | Mod: ,,, | Performed by: NEUROLOGICAL SURGERY

## 2020-03-03 PROCEDURE — 36000711: Performed by: NEUROLOGICAL SURGERY

## 2020-03-03 PROCEDURE — 27800903 OPTIME MED/SURG SUP & DEVICES OTHER IMPLANTS: Performed by: NEUROLOGICAL SURGERY

## 2020-03-03 PROCEDURE — 27100088 HC CELL SAVER

## 2020-03-03 PROCEDURE — 22848 INSERT PELV FIXATION DEVICE: CPT | Mod: 82,,, | Performed by: STUDENT IN AN ORGANIZED HEALTH CARE EDUCATION/TRAINING PROGRAM

## 2020-03-03 PROCEDURE — 22207 INCIS SPINE 3 COLUMN LUMBAR: CPT | Mod: 62,22,, | Performed by: NEUROLOGICAL SURGERY

## 2020-03-03 PROCEDURE — 22848 PR PELVIC FIXATION OTHER THAN SACRUM: ICD-10-PCS | Mod: 82,,, | Performed by: STUDENT IN AN ORGANIZED HEALTH CARE EDUCATION/TRAINING PROGRAM

## 2020-03-03 PROCEDURE — 63600175 PHARM REV CODE 636 W HCPCS: Performed by: NEUROLOGICAL SURGERY

## 2020-03-03 PROCEDURE — 22843 INSERT SPINE FIXATION DEVICE: CPT | Mod: 82,,, | Performed by: STUDENT IN AN ORGANIZED HEALTH CARE EDUCATION/TRAINING PROGRAM

## 2020-03-03 PROCEDURE — 22614 ARTHRD PST TQ 1NTRSPC EA ADD: CPT | Mod: 62,,, | Performed by: NEUROLOGICAL SURGERY

## 2020-03-03 PROCEDURE — 20930 PR ALLOGRAFT FOR SPINE SURGERY ONLY MORSELIZED: ICD-10-PCS | Mod: ,,, | Performed by: NEUROLOGICAL SURGERY

## 2020-03-03 PROCEDURE — 22614 PR ARTHRODESIS, POST/POSTLAT, SNGL INTERSPACE, EA ADDTL: ICD-10-PCS | Mod: 62,,, | Performed by: NEUROLOGICAL SURGERY

## 2020-03-03 PROCEDURE — 86920 COMPATIBILITY TEST SPIN: CPT

## 2020-03-03 PROCEDURE — 27201041 HC RESERVOIR, CARDIOTOMY

## 2020-03-03 PROCEDURE — 63600175 PHARM REV CODE 636 W HCPCS: Performed by: ANESTHESIOLOGY

## 2020-03-03 PROCEDURE — 20930 SP BONE ALGRFT MORSEL ADD-ON: CPT | Mod: ,,, | Performed by: NEUROLOGICAL SURGERY

## 2020-03-03 PROCEDURE — C1729 CATH, DRAINAGE: HCPCS | Performed by: NEUROLOGICAL SURGERY

## 2020-03-03 PROCEDURE — 36000710: Performed by: NEUROLOGICAL SURGERY

## 2020-03-03 PROCEDURE — C1713 ANCHOR/SCREW BN/BN,TIS/BN: HCPCS | Performed by: NEUROLOGICAL SURGERY

## 2020-03-03 PROCEDURE — 22614 PR ARTHRODESIS, POST/POSTLAT, SNGL INTERSPACE, EA ADDTL: ICD-10-PCS | Mod: 62,,, | Performed by: STUDENT IN AN ORGANIZED HEALTH CARE EDUCATION/TRAINING PROGRAM

## 2020-03-03 PROCEDURE — 20000000 HC ICU ROOM

## 2020-03-03 PROCEDURE — D9220A PRA ANESTHESIA: Mod: CRNA,,, | Performed by: NURSE ANESTHETIST, CERTIFIED REGISTERED

## 2020-03-03 PROCEDURE — 86901 BLOOD TYPING SEROLOGIC RH(D): CPT

## 2020-03-03 PROCEDURE — 25000003 PHARM REV CODE 250: Performed by: NEUROLOGICAL SURGERY

## 2020-03-03 PROCEDURE — 94761 N-INVAS EAR/PLS OXIMETRY MLT: CPT

## 2020-03-03 PROCEDURE — 63600175 PHARM REV CODE 636 W HCPCS: Performed by: NURSE ANESTHETIST, CERTIFIED REGISTERED

## 2020-03-03 PROCEDURE — 25000003 PHARM REV CODE 250: Performed by: NURSE ANESTHETIST, CERTIFIED REGISTERED

## 2020-03-03 PROCEDURE — 15734 MUSCLE-SKIN GRAFT TRUNK: CPT | Mod: GC,,, | Performed by: SURGERY

## 2020-03-03 PROCEDURE — 99291 PR CRITICAL CARE, E/M 30-74 MINUTES: ICD-10-PCS | Mod: ,,, | Performed by: PHYSICIAN ASSISTANT

## 2020-03-03 PROCEDURE — 22843 PR POSTERIOR SEGMENTAL INSTRUMENTATION 7-12 VRT SEG: ICD-10-PCS | Mod: ,,, | Performed by: NEUROLOGICAL SURGERY

## 2020-03-03 PROCEDURE — 22848 PR PELVIC FIXATION OTHER THAN SACRUM: ICD-10-PCS | Mod: ,,, | Performed by: NEUROLOGICAL SURGERY

## 2020-03-03 PROCEDURE — 63048 PR LAMINECT/FACETECT/FORAMINOT, EA ADDTL VERTEBRAL SEGM: ICD-10-PCS | Mod: 62,59,, | Performed by: NEUROLOGICAL SURGERY

## 2020-03-03 PROCEDURE — 63046 LAM FACETEC & FORAMOT THRC: CPT | Mod: 62,59,, | Performed by: NEUROLOGICAL SURGERY

## 2020-03-03 PROCEDURE — 20936 SP BONE AGRFT LOCAL ADD-ON: CPT | Mod: ,,, | Performed by: NEUROLOGICAL SURGERY

## 2020-03-03 PROCEDURE — 27100019 HC AMBU BAG ADULT/PED: Performed by: ANESTHESIOLOGY

## 2020-03-03 PROCEDURE — 63048 LAM FACETEC &FORAMOT EA ADDL: CPT | Mod: 62,59,, | Performed by: STUDENT IN AN ORGANIZED HEALTH CARE EDUCATION/TRAINING PROGRAM

## 2020-03-03 PROCEDURE — 63600175 PHARM REV CODE 636 W HCPCS: Performed by: SURGERY

## 2020-03-03 PROCEDURE — D9220A PRA ANESTHESIA: Mod: ANES,,, | Performed by: ANESTHESIOLOGY

## 2020-03-03 PROCEDURE — 61783 PR STEREOTACTIC COMP ASSIST PROC,SPINAL: ICD-10-PCS | Mod: 59,,, | Performed by: NEUROLOGICAL SURGERY

## 2020-03-03 PROCEDURE — 63600175 PHARM REV CODE 636 W HCPCS: Mod: JG | Performed by: NURSE ANESTHETIST, CERTIFIED REGISTERED

## 2020-03-03 PROCEDURE — 63046 PR LAMINEC/FACETECT/FORAMIN,THORACIC 1 SEG: ICD-10-PCS | Mod: 62,59,51, | Performed by: STUDENT IN AN ORGANIZED HEALTH CARE EDUCATION/TRAINING PROGRAM

## 2020-03-03 PROCEDURE — 83036 HEMOGLOBIN GLYCOSYLATED A1C: CPT

## 2020-03-03 PROCEDURE — 36620 INSERTION CATHETER ARTERY: CPT | Mod: 59,,, | Performed by: ANESTHESIOLOGY

## 2020-03-03 PROCEDURE — 22207 PR OSTEOTOMY SPINE POSTERIOR 3 COLUMN LUMBAR: ICD-10-PCS | Mod: 62,22,, | Performed by: NEUROLOGICAL SURGERY

## 2020-03-03 PROCEDURE — C9290 INJ, BUPIVACAINE LIPOSOME: HCPCS | Performed by: SURGERY

## 2020-03-03 PROCEDURE — 15734 PR MUSCLE-SKIN FLAP,TRUNK: ICD-10-PCS | Mod: GC,,, | Performed by: SURGERY

## 2020-03-03 PROCEDURE — 82962 GLUCOSE BLOOD TEST: CPT | Performed by: NEUROLOGICAL SURGERY

## 2020-03-03 PROCEDURE — 37000008 HC ANESTHESIA 1ST 15 MINUTES: Performed by: NEUROLOGICAL SURGERY

## 2020-03-03 PROCEDURE — 20936 PR AUTOGRAFT SPINE SURGERY LOCAL FROM SAME INCISION: ICD-10-PCS | Mod: ,,, | Performed by: NEUROLOGICAL SURGERY

## 2020-03-03 PROCEDURE — 80061 LIPID PANEL: CPT

## 2020-03-03 PROCEDURE — 22612 PR ARTHRODESIS, POST/POSTLAT, SNGL INTERSPACE, LUMBAR: ICD-10-PCS | Mod: 62,51,, | Performed by: NEUROLOGICAL SURGERY

## 2020-03-03 PROCEDURE — 25000003 PHARM REV CODE 250: Performed by: STUDENT IN AN ORGANIZED HEALTH CARE EDUCATION/TRAINING PROGRAM

## 2020-03-03 PROCEDURE — 99291 CRITICAL CARE FIRST HOUR: CPT | Mod: ,,, | Performed by: PHYSICIAN ASSISTANT

## 2020-03-03 PROCEDURE — 27201037 HC PRESSURE MONITORING SET UP

## 2020-03-03 PROCEDURE — 22612 ARTHRD PST TQ 1NTRSPC LUMBAR: CPT | Mod: 62,51,, | Performed by: NEUROLOGICAL SURGERY

## 2020-03-03 PROCEDURE — 22614 ARTHRD PST TQ 1NTRSPC EA ADD: CPT | Mod: 62,,, | Performed by: STUDENT IN AN ORGANIZED HEALTH CARE EDUCATION/TRAINING PROGRAM

## 2020-03-03 PROCEDURE — 84443 ASSAY THYROID STIM HORMONE: CPT

## 2020-03-03 PROCEDURE — P9045 ALBUMIN (HUMAN), 5%, 250 ML: HCPCS | Mod: JG | Performed by: NURSE ANESTHETIST, CERTIFIED REGISTERED

## 2020-03-03 PROCEDURE — 63048 PR LAMINECT/FACETECT/FORAMINOT, EA ADDTL VERTEBRAL SEGM: ICD-10-PCS | Mod: 62,59,, | Performed by: STUDENT IN AN ORGANIZED HEALTH CARE EDUCATION/TRAINING PROGRAM

## 2020-03-03 PROCEDURE — D9220A PRA ANESTHESIA: ICD-10-PCS | Mod: CRNA,,, | Performed by: NURSE ANESTHETIST, CERTIFIED REGISTERED

## 2020-03-03 PROCEDURE — 22207 PR OSTEOTOMY SPINE POSTERIOR 3 COLUMN LUMBAR: ICD-10-PCS | Mod: 22,62,, | Performed by: STUDENT IN AN ORGANIZED HEALTH CARE EDUCATION/TRAINING PROGRAM

## 2020-03-03 PROCEDURE — 63046 LAM FACETEC & FORAMOT THRC: CPT | Mod: 62,59,51, | Performed by: STUDENT IN AN ORGANIZED HEALTH CARE EDUCATION/TRAINING PROGRAM

## 2020-03-03 PROCEDURE — 36620 ARTERIAL: ICD-10-PCS | Mod: 59,,, | Performed by: ANESTHESIOLOGY

## 2020-03-03 PROCEDURE — 37000009 HC ANESTHESIA EA ADD 15 MINS: Performed by: NEUROLOGICAL SURGERY

## 2020-03-03 PROCEDURE — 27201423 OPTIME MED/SURG SUP & DEVICES STERILE SUPPLY: Performed by: NEUROLOGICAL SURGERY

## 2020-03-03 PROCEDURE — 22612 ARTHRD PST TQ 1NTRSPC LUMBAR: CPT | Mod: 62,51,, | Performed by: STUDENT IN AN ORGANIZED HEALTH CARE EDUCATION/TRAINING PROGRAM

## 2020-03-03 PROCEDURE — D9220A PRA ANESTHESIA: ICD-10-PCS | Mod: ANES,,, | Performed by: ANESTHESIOLOGY

## 2020-03-03 PROCEDURE — 63600175 PHARM REV CODE 636 W HCPCS: Performed by: PHYSICIAN ASSISTANT

## 2020-03-03 PROCEDURE — 63048 LAM FACETEC &FORAMOT EA ADDL: CPT | Mod: 62,59,, | Performed by: NEUROLOGICAL SURGERY

## 2020-03-03 PROCEDURE — C1751 CATH, INF, PER/CENT/MIDLINE: HCPCS | Performed by: ANESTHESIOLOGY

## 2020-03-03 PROCEDURE — 22843 PR POSTERIOR SEGMENTAL INSTRUMENTATION 7-12 VRT SEG: ICD-10-PCS | Mod: 82,,, | Performed by: STUDENT IN AN ORGANIZED HEALTH CARE EDUCATION/TRAINING PROGRAM

## 2020-03-03 PROCEDURE — 22207 INCIS SPINE 3 COLUMN LUMBAR: CPT | Mod: 22,62,, | Performed by: STUDENT IN AN ORGANIZED HEALTH CARE EDUCATION/TRAINING PROGRAM

## 2020-03-03 PROCEDURE — 63600175 PHARM REV CODE 636 W HCPCS: Performed by: STUDENT IN AN ORGANIZED HEALTH CARE EDUCATION/TRAINING PROGRAM

## 2020-03-03 PROCEDURE — 61783 SCAN PROC SPINAL: CPT | Mod: 59,,, | Performed by: NEUROLOGICAL SURGERY

## 2020-03-03 PROCEDURE — 22612 PR ARTHRODESIS, POST/POSTLAT, SNGL INTERSPACE, LUMBAR: ICD-10-PCS | Mod: 62,51,, | Performed by: STUDENT IN AN ORGANIZED HEALTH CARE EDUCATION/TRAINING PROGRAM

## 2020-03-03 PROCEDURE — 25000003 PHARM REV CODE 250: Performed by: PHYSICIAN ASSISTANT

## 2020-03-03 PROCEDURE — 85025 COMPLETE CBC W/AUTO DIFF WBC: CPT

## 2020-03-03 PROCEDURE — 63046 PR LAMINEC/FACETECT/FORAMIN,THORACIC 1 SEG: ICD-10-PCS | Mod: 62,59,, | Performed by: NEUROLOGICAL SURGERY

## 2020-03-03 DEVICE — SET SCREW EXPEDIUM VERSE UNITZ: Type: IMPLANTABLE DEVICE | Site: SPINE LUMBAR | Status: FUNCTIONAL

## 2020-03-03 DEVICE — ROD SPINAL EXPEDIUM 120MM: Type: IMPLANTABLE DEVICE | Site: SPINE LUMBAR | Status: FUNCTIONAL

## 2020-03-03 DEVICE — KIT CONFIDENCE W/O NEEDLES: Type: IMPLANTABLE DEVICE | Site: SPINE LUMBAR | Status: FUNCTIONAL

## 2020-03-03 DEVICE — SCREW BONE SPINAL 5.5 5 X 45MM: Type: IMPLANTABLE DEVICE | Site: SPINE LUMBAR | Status: FUNCTIONAL

## 2020-03-03 DEVICE — IMPLANTABLE DEVICE: Type: IMPLANTABLE DEVICE | Site: SPINE LUMBAR | Status: FUNCTIONAL

## 2020-03-03 DEVICE — SCREW BONE SPINAL 5.5 6 X 45MM: Type: IMPLANTABLE DEVICE | Site: SPINE LUMBAR | Status: FUNCTIONAL

## 2020-03-03 DEVICE — KIT BONE GRAFT INFUSE LG 8MM: Type: IMPLANTABLE DEVICE | Site: SPINE LUMBAR | Status: FUNCTIONAL

## 2020-03-03 DEVICE — CONNECTOR SPINAL SFX A6 5.5MM: Type: IMPLANTABLE DEVICE | Site: SPINE LUMBAR | Status: FUNCTIONAL

## 2020-03-03 DEVICE — MATRIX BONE CELLR VIVIGEN 5CC: Type: IMPLANTABLE DEVICE | Site: SPINE LUMBAR | Status: FUNCTIONAL

## 2020-03-03 DEVICE — SCREW INNER SINGLE SET TITANIU: Type: IMPLANTABLE DEVICE | Site: SPINE LUMBAR | Status: FUNCTIONAL

## 2020-03-03 DEVICE — ROD SPINAL EXPEDIUM 65MM: Type: IMPLANTABLE DEVICE | Site: SPINE LUMBAR | Status: FUNCTIONAL

## 2020-03-03 RX ORDER — ACETAMINOPHEN 325 MG/1
650 TABLET ORAL EVERY 6 HOURS PRN
Status: CANCELLED | OUTPATIENT
Start: 2020-03-03

## 2020-03-03 RX ORDER — MIDAZOLAM HYDROCHLORIDE 1 MG/ML
INJECTION INTRAMUSCULAR; INTRAVENOUS
Status: DISCONTINUED | OUTPATIENT
Start: 2020-03-03 | End: 2020-03-03

## 2020-03-03 RX ORDER — PHENYLEPHRINE HYDROCHLORIDE 10 MG/ML
INJECTION INTRAVENOUS
Status: DISCONTINUED | OUTPATIENT
Start: 2020-03-03 | End: 2020-03-03

## 2020-03-03 RX ORDER — VANCOMYCIN HYDROCHLORIDE 1 G/20ML
INJECTION, POWDER, LYOPHILIZED, FOR SOLUTION INTRAVENOUS
Status: DISCONTINUED | OUTPATIENT
Start: 2020-03-03 | End: 2020-03-03 | Stop reason: HOSPADM

## 2020-03-03 RX ORDER — FAMOTIDINE 20 MG/1
40 TABLET, FILM COATED ORAL NIGHTLY
Status: DISCONTINUED | OUTPATIENT
Start: 2020-03-03 | End: 2020-03-24 | Stop reason: HOSPADM

## 2020-03-03 RX ORDER — PROPOFOL 10 MG/ML
VIAL (ML) INTRAVENOUS CONTINUOUS PRN
Status: DISCONTINUED | OUTPATIENT
Start: 2020-03-03 | End: 2020-03-03

## 2020-03-03 RX ORDER — GLYCOPYRROLATE 0.2 MG/ML
INJECTION INTRAMUSCULAR; INTRAVENOUS
Status: DISCONTINUED | OUTPATIENT
Start: 2020-03-03 | End: 2020-03-03

## 2020-03-03 RX ORDER — FENTANYL CITRATE 50 UG/ML
25 INJECTION, SOLUTION INTRAMUSCULAR; INTRAVENOUS EVERY 5 MIN PRN
Status: CANCELLED | OUTPATIENT
Start: 2020-03-03

## 2020-03-03 RX ORDER — ONDANSETRON 2 MG/ML
INJECTION INTRAMUSCULAR; INTRAVENOUS
Status: DISCONTINUED | OUTPATIENT
Start: 2020-03-03 | End: 2020-03-03

## 2020-03-03 RX ORDER — OXYCODONE HYDROCHLORIDE 10 MG/1
10 TABLET ORAL EVERY 4 HOURS PRN
Status: DISCONTINUED | OUTPATIENT
Start: 2020-03-03 | End: 2020-03-09

## 2020-03-03 RX ORDER — LISINOPRIL 5 MG/1
5 TABLET ORAL DAILY
Status: DISCONTINUED | OUTPATIENT
Start: 2020-03-04 | End: 2020-03-07

## 2020-03-03 RX ORDER — KETAMINE HCL IN 0.9 % NACL 50 MG/5 ML
SYRINGE (ML) INTRAVENOUS
Status: DISCONTINUED | OUTPATIENT
Start: 2020-03-03 | End: 2020-03-03

## 2020-03-03 RX ORDER — BACITRACIN 50000 [IU]/1
INJECTION, POWDER, FOR SOLUTION INTRAMUSCULAR
Status: DISCONTINUED | OUTPATIENT
Start: 2020-03-03 | End: 2020-03-03 | Stop reason: HOSPADM

## 2020-03-03 RX ORDER — SODIUM CHLORIDE 9 MG/ML
INJECTION, SOLUTION INTRAVENOUS CONTINUOUS
Status: DISCONTINUED | OUTPATIENT
Start: 2020-03-03 | End: 2020-03-04

## 2020-03-03 RX ORDER — MUPIROCIN 20 MG/G
OINTMENT TOPICAL
Status: DISCONTINUED | OUTPATIENT
Start: 2020-03-03 | End: 2020-03-03 | Stop reason: HOSPADM

## 2020-03-03 RX ORDER — AMOXICILLIN 250 MG
2 CAPSULE ORAL NIGHTLY PRN
Status: DISCONTINUED | OUTPATIENT
Start: 2020-03-03 | End: 2020-03-03

## 2020-03-03 RX ORDER — GABAPENTIN 300 MG/1
600 CAPSULE ORAL NIGHTLY
Status: DISCONTINUED | OUTPATIENT
Start: 2020-03-03 | End: 2020-03-04

## 2020-03-03 RX ORDER — CEFAZOLIN SODIUM 1 G/3ML
2 INJECTION, POWDER, FOR SOLUTION INTRAMUSCULAR; INTRAVENOUS
Status: COMPLETED | OUTPATIENT
Start: 2020-03-03 | End: 2020-03-03

## 2020-03-03 RX ORDER — SODIUM CHLORIDE 9 MG/ML
INJECTION, SOLUTION INTRAVENOUS CONTINUOUS PRN
Status: DISCONTINUED | OUTPATIENT
Start: 2020-03-03 | End: 2020-03-03

## 2020-03-03 RX ORDER — HYDROMORPHONE HYDROCHLORIDE 1 MG/ML
0.5 INJECTION, SOLUTION INTRAMUSCULAR; INTRAVENOUS; SUBCUTANEOUS
Status: CANCELLED | OUTPATIENT
Start: 2020-03-03

## 2020-03-03 RX ORDER — METOCLOPRAMIDE HYDROCHLORIDE 5 MG/ML
10 INJECTION INTRAMUSCULAR; INTRAVENOUS EVERY 10 MIN PRN
Status: CANCELLED | OUTPATIENT
Start: 2020-03-03

## 2020-03-03 RX ORDER — AMOXICILLIN 250 MG
1 CAPSULE ORAL 2 TIMES DAILY
Status: DISCONTINUED | OUTPATIENT
Start: 2020-03-03 | End: 2020-03-24 | Stop reason: HOSPADM

## 2020-03-03 RX ORDER — SODIUM CHLORIDE 0.9 % (FLUSH) 0.9 %
10 SYRINGE (ML) INJECTION
Status: DISCONTINUED | OUTPATIENT
Start: 2020-03-03 | End: 2020-03-24 | Stop reason: HOSPADM

## 2020-03-03 RX ORDER — CEFAZOLIN SODIUM 1 G/3ML
2 INJECTION, POWDER, FOR SOLUTION INTRAMUSCULAR; INTRAVENOUS
Status: DISCONTINUED | OUTPATIENT
Start: 2020-03-04 | End: 2020-03-17

## 2020-03-03 RX ORDER — HYDROMORPHONE HYDROCHLORIDE 2 MG/ML
INJECTION, SOLUTION INTRAMUSCULAR; INTRAVENOUS; SUBCUTANEOUS
Status: DISCONTINUED | OUTPATIENT
Start: 2020-03-03 | End: 2020-03-03

## 2020-03-03 RX ORDER — MAG HYDROX/ALUMINUM HYD/SIMETH 200-200-20
30 SUSPENSION, ORAL (FINAL DOSE FORM) ORAL EVERY 4 HOURS PRN
Status: DISCONTINUED | OUTPATIENT
Start: 2020-03-03 | End: 2020-03-24 | Stop reason: HOSPADM

## 2020-03-03 RX ORDER — ONDANSETRON 8 MG/1
8 TABLET, ORALLY DISINTEGRATING ORAL EVERY 6 HOURS PRN
Status: DISCONTINUED | OUTPATIENT
Start: 2020-03-03 | End: 2020-03-09

## 2020-03-03 RX ORDER — MUPIROCIN 20 MG/G
1 OINTMENT TOPICAL 2 TIMES DAILY
Status: DISCONTINUED | OUTPATIENT
Start: 2020-03-03 | End: 2020-03-03 | Stop reason: HOSPADM

## 2020-03-03 RX ORDER — HYDROMORPHONE HYDROCHLORIDE 1 MG/ML
0.5 INJECTION, SOLUTION INTRAMUSCULAR; INTRAVENOUS; SUBCUTANEOUS EVERY 6 HOURS PRN
Status: DISCONTINUED | OUTPATIENT
Start: 2020-03-03 | End: 2020-03-24 | Stop reason: HOSPADM

## 2020-03-03 RX ORDER — ONDANSETRON 2 MG/ML
4 INJECTION INTRAMUSCULAR; INTRAVENOUS EVERY 8 HOURS PRN
Status: DISCONTINUED | OUTPATIENT
Start: 2020-03-03 | End: 2020-03-24 | Stop reason: HOSPADM

## 2020-03-03 RX ORDER — MUPIROCIN 20 MG/G
OINTMENT TOPICAL 2 TIMES DAILY
Status: COMPLETED | OUTPATIENT
Start: 2020-03-03 | End: 2020-03-08

## 2020-03-03 RX ORDER — DEXAMETHASONE SODIUM PHOSPHATE 4 MG/ML
INJECTION, SOLUTION INTRA-ARTICULAR; INTRALESIONAL; INTRAMUSCULAR; INTRAVENOUS; SOFT TISSUE
Status: DISCONTINUED | OUTPATIENT
Start: 2020-03-03 | End: 2020-03-03

## 2020-03-03 RX ORDER — CYCLOBENZAPRINE HCL 5 MG
10 TABLET ORAL 3 TIMES DAILY PRN
Status: DISCONTINUED | OUTPATIENT
Start: 2020-03-03 | End: 2020-03-09

## 2020-03-03 RX ORDER — TRANEXAMIC ACID 100 MG/ML
INJECTION, SOLUTION INTRAVENOUS
Status: DISCONTINUED | OUTPATIENT
Start: 2020-03-03 | End: 2020-03-03

## 2020-03-03 RX ORDER — BISACODYL 10 MG
10 SUPPOSITORY, RECTAL RECTAL DAILY
Status: DISCONTINUED | OUTPATIENT
Start: 2020-03-04 | End: 2020-03-09

## 2020-03-03 RX ORDER — PROPOFOL 10 MG/ML
VIAL (ML) INTRAVENOUS
Status: DISCONTINUED | OUTPATIENT
Start: 2020-03-03 | End: 2020-03-03

## 2020-03-03 RX ORDER — PROCHLORPERAZINE EDISYLATE 5 MG/ML
10 INJECTION INTRAMUSCULAR; INTRAVENOUS EVERY 6 HOURS PRN
Status: CANCELLED | OUTPATIENT
Start: 2020-03-03

## 2020-03-03 RX ORDER — TRANEXAMIC ACID 100 MG/ML
INJECTION, SOLUTION INTRAVENOUS CONTINUOUS PRN
Status: DISCONTINUED | OUTPATIENT
Start: 2020-03-03 | End: 2020-03-03

## 2020-03-03 RX ORDER — OXYCODONE HYDROCHLORIDE 5 MG/1
5 TABLET ORAL
Status: CANCELLED | OUTPATIENT
Start: 2020-03-03

## 2020-03-03 RX ORDER — ALBUMIN HUMAN 50 G/1000ML
SOLUTION INTRAVENOUS CONTINUOUS PRN
Status: DISCONTINUED | OUTPATIENT
Start: 2020-03-03 | End: 2020-03-03

## 2020-03-03 RX ORDER — ACETAMINOPHEN 10 MG/ML
INJECTION, SOLUTION INTRAVENOUS
Status: DISCONTINUED | OUTPATIENT
Start: 2020-03-03 | End: 2020-03-03

## 2020-03-03 RX ORDER — LABETALOL HCL 20 MG/4 ML
10 SYRINGE (ML) INTRAVENOUS EVERY 4 HOURS PRN
Status: DISCONTINUED | OUTPATIENT
Start: 2020-03-03 | End: 2020-03-24 | Stop reason: HOSPADM

## 2020-03-03 RX ORDER — FENTANYL CITRATE 50 UG/ML
INJECTION, SOLUTION INTRAMUSCULAR; INTRAVENOUS
Status: DISCONTINUED | OUTPATIENT
Start: 2020-03-03 | End: 2020-03-03

## 2020-03-03 RX ORDER — LIDOCAINE HCL/PF 100 MG/5ML
SYRINGE (ML) INTRAVENOUS
Status: DISCONTINUED | OUTPATIENT
Start: 2020-03-03 | End: 2020-03-03

## 2020-03-03 RX ORDER — ACETAMINOPHEN 325 MG/1
650 TABLET ORAL EVERY 6 HOURS PRN
Status: DISCONTINUED | OUTPATIENT
Start: 2020-03-03 | End: 2020-03-09

## 2020-03-03 RX ORDER — LIDOCAINE HYDROCHLORIDE AND EPINEPHRINE 10; 10 MG/ML; UG/ML
INJECTION, SOLUTION INFILTRATION; PERINEURAL
Status: DISCONTINUED | OUTPATIENT
Start: 2020-03-03 | End: 2020-03-03 | Stop reason: HOSPADM

## 2020-03-03 RX ORDER — ROCURONIUM BROMIDE 10 MG/ML
INJECTION, SOLUTION INTRAVENOUS
Status: DISCONTINUED | OUTPATIENT
Start: 2020-03-03 | End: 2020-03-03

## 2020-03-03 RX ORDER — OXYCODONE HYDROCHLORIDE 5 MG/1
5 TABLET ORAL EVERY 4 HOURS PRN
Status: DISCONTINUED | OUTPATIENT
Start: 2020-03-03 | End: 2020-03-09

## 2020-03-03 RX ORDER — NEOSTIGMINE METHYLSULFATE 0.5 MG/ML
INJECTION, SOLUTION INTRAVENOUS
Status: DISCONTINUED | OUTPATIENT
Start: 2020-03-03 | End: 2020-03-03

## 2020-03-03 RX ADMIN — MIDAZOLAM HYDROCHLORIDE 2 MG: 1 INJECTION, SOLUTION INTRAMUSCULAR; INTRAVENOUS at 07:03

## 2020-03-03 RX ADMIN — TRANEXAMIC ACID 1 MG/KG/HR: 100 INJECTION, SOLUTION INTRAVENOUS at 09:03

## 2020-03-03 RX ADMIN — NICARDIPINE HYDROCHLORIDE 0.1 MG: 0.2 INJECTION, SOLUTION INTRAVENOUS at 12:03

## 2020-03-03 RX ADMIN — PROPOFOL 200 MG: 10 INJECTION, EMULSION INTRAVENOUS at 07:03

## 2020-03-03 RX ADMIN — FENTANYL CITRATE 50 MCG: 50 INJECTION, SOLUTION INTRAMUSCULAR; INTRAVENOUS at 07:03

## 2020-03-03 RX ADMIN — MUPIROCIN: 20 OINTMENT TOPICAL at 06:03

## 2020-03-03 RX ADMIN — SODIUM CHLORIDE, SODIUM GLUCONATE, SODIUM ACETATE, POTASSIUM CHLORIDE, MAGNESIUM CHLORIDE, SODIUM PHOSPHATE, DIBASIC, AND POTASSIUM PHOSPHATE: .53; .5; .37; .037; .03; .012; .00082 INJECTION, SOLUTION INTRAVENOUS at 07:03

## 2020-03-03 RX ADMIN — SODIUM CHLORIDE, SODIUM GLUCONATE, SODIUM ACETATE, POTASSIUM CHLORIDE, MAGNESIUM CHLORIDE, SODIUM PHOSPHATE, DIBASIC, AND POTASSIUM PHOSPHATE: .53; .5; .37; .037; .03; .012; .00082 INJECTION, SOLUTION INTRAVENOUS at 11:03

## 2020-03-03 RX ADMIN — PROPOFOL 50 MG: 10 INJECTION, EMULSION INTRAVENOUS at 04:03

## 2020-03-03 RX ADMIN — ROCURONIUM BROMIDE 20 MG: 10 INJECTION, SOLUTION INTRAVENOUS at 07:03

## 2020-03-03 RX ADMIN — SODIUM CHLORIDE: 0.9 INJECTION, SOLUTION INTRAVENOUS at 08:03

## 2020-03-03 RX ADMIN — ACETAMINOPHEN 1000 MG: 10 INJECTION, SOLUTION INTRAVENOUS at 10:03

## 2020-03-03 RX ADMIN — REMIFENTANIL HYDROCHLORIDE 0.2 MCG/KG/MIN: 1 INJECTION, POWDER, LYOPHILIZED, FOR SOLUTION INTRAVENOUS at 07:03

## 2020-03-03 RX ADMIN — LIDOCAINE HYDROCHLORIDE 100 MG: 20 INJECTION, SOLUTION INTRAVENOUS at 07:03

## 2020-03-03 RX ADMIN — Medication 50 MG: at 08:03

## 2020-03-03 RX ADMIN — Medication 10 MG: at 03:03

## 2020-03-03 RX ADMIN — SODIUM CHLORIDE, SODIUM GLUCONATE, SODIUM ACETATE, POTASSIUM CHLORIDE, MAGNESIUM CHLORIDE, SODIUM PHOSPHATE, DIBASIC, AND POTASSIUM PHOSPHATE: .53; .5; .37; .037; .03; .012; .00082 INJECTION, SOLUTION INTRAVENOUS at 03:03

## 2020-03-03 RX ADMIN — GABAPENTIN 600 MG: 300 CAPSULE ORAL at 08:03

## 2020-03-03 RX ADMIN — MUPIROCIN: 20 OINTMENT TOPICAL at 08:03

## 2020-03-03 RX ADMIN — CEFAZOLIN 2 G: 330 INJECTION, POWDER, FOR SOLUTION INTRAMUSCULAR; INTRAVENOUS at 04:03

## 2020-03-03 RX ADMIN — SODIUM CHLORIDE: 0.9 INJECTION, SOLUTION INTRAVENOUS at 10:03

## 2020-03-03 RX ADMIN — CEFAZOLIN 2 G: 330 INJECTION, POWDER, FOR SOLUTION INTRAMUSCULAR; INTRAVENOUS at 12:03

## 2020-03-03 RX ADMIN — DEXAMETHASONE SODIUM PHOSPHATE 4 MG: 4 INJECTION, SOLUTION INTRAMUSCULAR; INTRAVENOUS at 10:03

## 2020-03-03 RX ADMIN — ROCURONIUM BROMIDE 30 MG: 10 INJECTION, SOLUTION INTRAVENOUS at 08:03

## 2020-03-03 RX ADMIN — Medication 10 MG: at 02:03

## 2020-03-03 RX ADMIN — OXYCODONE HYDROCHLORIDE 10 MG: 10 TABLET ORAL at 09:03

## 2020-03-03 RX ADMIN — OXYCODONE HYDROCHLORIDE 5 MG: 5 TABLET ORAL at 08:03

## 2020-03-03 RX ADMIN — PROPOFOL 150 MCG/KG/MIN: 10 INJECTION, EMULSION INTRAVENOUS at 07:03

## 2020-03-03 RX ADMIN — HYDROMORPHONE HYDROCHLORIDE 0.5 MG: 1 INJECTION, SOLUTION INTRAMUSCULAR; INTRAVENOUS; SUBCUTANEOUS at 06:03

## 2020-03-03 RX ADMIN — NEOSTIGMINE METHYLSULFATE 2 MG: 0.5 INJECTION INTRAVENOUS at 04:03

## 2020-03-03 RX ADMIN — TRANEXAMIC ACID 1000 MG: 100 INJECTION, SOLUTION INTRAVENOUS at 08:03

## 2020-03-03 RX ADMIN — ONDANSETRON 4 MG: 2 INJECTION INTRAMUSCULAR; INTRAVENOUS at 03:03

## 2020-03-03 RX ADMIN — HYDROMORPHONE HYDROCHLORIDE 0.4 MG: 2 INJECTION, SOLUTION INTRAMUSCULAR; INTRAVENOUS; SUBCUTANEOUS at 04:03

## 2020-03-03 RX ADMIN — GLYCOPYRROLATE 0.2 MG: 0.2 INJECTION INTRAMUSCULAR; INTRAVENOUS at 08:03

## 2020-03-03 RX ADMIN — Medication 10 MG: at 12:03

## 2020-03-03 RX ADMIN — CYCLOBENZAPRINE HYDROCHLORIDE 10 MG: 5 TABLET, FILM COATED ORAL at 08:03

## 2020-03-03 RX ADMIN — PHENYLEPHRINE HYDROCHLORIDE 100 MCG: 10 INJECTION INTRAVENOUS at 09:03

## 2020-03-03 RX ADMIN — NICARDIPINE HYDROCHLORIDE 0.3 MG: 0.2 INJECTION, SOLUTION INTRAVENOUS at 01:03

## 2020-03-03 RX ADMIN — NICARDIPINE HYDROCHLORIDE 0.2 MG: 0.2 INJECTION, SOLUTION INTRAVENOUS at 01:03

## 2020-03-03 RX ADMIN — SODIUM CHLORIDE: 0.9 INJECTION, SOLUTION INTRAVENOUS at 07:03

## 2020-03-03 RX ADMIN — SENNOSIDES AND DOCUSATE SODIUM 1 TABLET: 8.6; 5 TABLET ORAL at 08:03

## 2020-03-03 RX ADMIN — SODIUM CHLORIDE 0.3 MCG/KG/MIN: 9 INJECTION, SOLUTION INTRAVENOUS at 07:03

## 2020-03-03 RX ADMIN — FAMOTIDINE 40 MG: 20 TABLET, FILM COATED ORAL at 08:03

## 2020-03-03 RX ADMIN — GLYCOPYRROLATE 0.2 MG: 0.2 INJECTION INTRAMUSCULAR; INTRAVENOUS at 04:03

## 2020-03-03 RX ADMIN — Medication 10 MG: at 11:03

## 2020-03-03 RX ADMIN — ALBUMIN (HUMAN): 12.5 SOLUTION INTRAVENOUS at 01:03

## 2020-03-03 RX ADMIN — PROPOFOL 30 MG: 10 INJECTION, EMULSION INTRAVENOUS at 04:03

## 2020-03-03 RX ADMIN — FENTANYL CITRATE 100 MCG: 50 INJECTION, SOLUTION INTRAMUSCULAR; INTRAVENOUS at 08:03

## 2020-03-03 RX ADMIN — Medication 20 MG: at 10:03

## 2020-03-03 RX ADMIN — CEFAZOLIN 2 G: 330 INJECTION, POWDER, FOR SOLUTION INTRAMUSCULAR; INTRAVENOUS at 08:03

## 2020-03-03 NOTE — ANESTHESIA PREPROCEDURE EVALUATION
[facility]  Anesthesia Pre-Operative Evaluation         Patient Name: Garcia Renteria  YOB: 1950  MRN: [id number]    SUBJECTIVE:     03/03/2020    Procedure(s) (LRB):  T10-Pelvis Fusion with L4 PSO **AIRO** Co-Surgeon: Roberto Parkinson (N/A)    Garcia Renteria is a 70 y.o. male here for above procedure    Drips:     Patient Active Problem List   Diagnosis    Diverticulosis of large intestine without hemorrhage    Gastroesophageal reflux disease    Hepatitis C virus infection resolved after antiviral drug therapy    Low back pain, non-specific    Gastroesophageal reflux disease with esophagitis    Chronic pain syndrome    H/O lumbosacral spine surgery    DDD (degenerative disc disease), lumbar    Personal history of spine surgery    Chronic, continuous use of opioids    Constipation    Essential hypertension    Type 2 diabetes mellitus with diabetic neuropathy, without long-term current use of insulin    History of tobacco abuse    Incomplete bladder emptying    Cervical stenosis of spinal canal    Flat back syndrome    Urinary frequency    Finger clubbing    Urinary tract infection without hematuria    Acquired spondylolisthesis of thoracolumbar vertebra       Review of patient's allergies indicates:  No Known Allergies    No current facility-administered medications on file prior to encounter.      Current Outpatient Medications on File Prior to Encounter   Medication Sig Dispense Refill    famotidine (PEPCID) 40 MG tablet Take 1 tablet (40 mg total) by mouth every evening. 90 tablet 3    gabapentin (NEURONTIN) 300 MG capsule Take 2 capsules (600 mg total) by mouth every evening. 60 capsule 5    HYDROcodone-acetaminophen (NORCO)  mg per tablet Take 1 tablet by mouth every 6 (six) hours as needed for Pain. 120 tablet 0    ibuprofen (ADVIL,MOTRIN) 800 MG tablet Take 800 mg by mouth daily as needed for Pain.        lisinopril (PRINIVIL,ZESTRIL) 5 MG tablet Take 5 mg by mouth once daily.       metFORMIN (GLUCOPHAGE) 1000 MG tablet Take 500 mg by mouth daily with breakfast.       omeprazole (PRILOSEC) 20 MG capsule Take 1 capsule (20 mg total) by mouth once daily. 90 capsule 1    STOOL SOFTENER, DOCUSATE MADELAINE, 240 mg capsule       triamterene-hydrochlorothiazide 75-50 mg (MAXZIDE) 75-50 mg per tablet Take 0.5 tablets by mouth once daily.       vitamin D (VITAMIN D3) 1000 units Tab Take 1,000 Units by mouth once daily.      oxyCODONE-acetaminophen (PERCOCET)  mg per tablet Take 1 tablet by mouth every 6 (six) hours as needed for Pain. (Patient not taking: Reported on 2/6/2020) 120 tablet 0    sildenafil (VIAGRA) 100 MG tablet          Past Surgical History:   Procedure Laterality Date    ANKLE SURGERY Bilateral     ANTERIOR CERVICAL DISCECTOMY W/ FUSION N/A 10/30/2019    Procedure: C3-4, C4-5 ACDF;  Surgeon: Francis Alan MD;  Location: [facility] OR [address] [place] [address];  Service: Neurosurgery;  Laterality: N/A;  C3-4, C4-5 ACDF  Anesthesia:  General  Rad:  C-Arm  NM:  EMG, SEP, & MEP  Blood:  Type & Screen  Position:  Supine  Bed:  Regular slider bed  Headrest:  [place] & GW tongs/hanging weights  Misc:  Small vivigen  Nerve root retractor (DePuy)  Disposable #2 Kerrison  Interbody    BACK SURGERY      COLONOSCOPY      lower back surgery      NECK SURGERY      SPINE SURGERY      UPPER GASTROINTESTINAL ENDOSCOPY         Social History     Socioeconomic History    Marital status:      Spouse name: Not on file    Number of children: Not on file    Years of education: Not on file    Highest education level: Not on file   Occupational History    Not on file   Social Needs    Financial resource strain: Not on file    Food insecurity:     Worry: Not on file     Inability: Not on file    Transportation needs:     Medical: Not on file     Non-medical: Not on file   Tobacco Use    Smoking status: Former Smoker     Types:  Cigars     Last attempt to quit: 10/2019     Years since quittin.4    Smokeless tobacco: Never Used   Substance and Sexual Activity    Alcohol use: Yes     Alcohol/week: 2.0 standard drinks     Types: 1 Glasses of wine, 1 Cans of beer per week     Comment: 1 wine , 2 times a week    Drug use: Not Currently    Sexual activity: Yes     Partners: Female   Lifestyle    Physical activity:     Days per week: Not on file     Minutes per session: Not on file    Stress: Not on file   Relationships    Social connections:     Talks on phone: Not on file     Gets together: Not on file     Attends Synagogue service: Not on file     Active member of club or organization: Not on file     Attends meetings of clubs or organizations: Not on file     Relationship status: Not on file   Other Topics Concern    Not on file   Social History Narrative    Not on file         OBJECTIVE:     Vital Signs Range (Last 24H):  Temp:  [36.6 °C (97.9 °F)] 36.6 °C (97.9 °F)  Pulse:  [81] 81  Resp:  [20] 20  SpO2:  [97 %] 97 %  BP: (126)/(73) 126/73    Significant Labs:  Lab Results   Component Value Date    WBC 5.51 2020    HGB 12.6 (L) 2020    HCT 42.8 2020     2020    ALT 12 2020    AST 13 2020     2020    K 4.5 2020     2020    CREATININE 1.1 2020    BUN 16 2020    CO2 28 2020    INR 1.1 2020    HGBA1C 5.7 (H) 2020       Diagnostic Studies:    EKG:   Results for orders placed or performed during the hospital encounter of 10/25/19   EKG 12-lead    Collection Time: 10/25/19 11:24 AM    Narrative    Test Reason : Z01.818,    Vent. Rate : 072 BPM     Atrial Rate : 072 BPM     P-R Int : 126 ms          QRS Dur : 076 ms      QT Int : 358 ms       P-R-T Axes : 059 013 038 degrees     QTc Int : 392 ms    Sinus rhythm with Premature ventricular complexes  Otherwise normal ECG  No previous ECGs available  Confirmed by Echo Aguila MD ([id number]) on  10/25/2019 2:48:10 PM    Referred By: RHONDA LEOPOLD           Confirmed By:Echo Aguila MD       2D ECHO:  TTE:  No results found for this or any previous visit.      ALDEN:  No results found for this or any previous visit.      Anesthesia Evaluation    I have reviewed the Patient Summary Reports.    I have reviewed the Nursing Notes.   I have reviewed the Medications.     Review of Systems  Anesthesia Hx:  No problems with previous Anesthesia  History of prior surgery of interest to airway management or planning: cervical fusion. Previous anesthesia: General   Cardiovascular:  Functional Capacity low / < 4 METS        Physical Exam  General:  Well nourished    Airway/Jaw/Neck:  Airway Findings: Mouth Opening: Normal Tongue: Normal  General Airway Assessment: Adult  Mallampati: II  TM Distance: Normal, at least 6 cm  Jaw/Neck Findings:  Neck ROM: Normal ROM     Eyes/Ears/Nose:  EYES/EARS/NOSE FINDINGS: Normal   Dental:  Dental Findings: In tact   Chest/Lungs:  Chest/Lungs Findings: Clear to auscultation, Normal Respiratory Rate     Heart/Vascular:  Heart Findings: Rate: Normal  Rhythm: Regular Rhythm  Sounds: Normal  Vascular Findings: Normal    Abdomen:  Abdomen Findings:  Normal, Soft, Nontender      Skin:  Skin Findings: Normal    Mental Status:  Mental Status Findings:  Cooperative, Alert and Oriented         Anesthesia Plan  Type of Anesthesia, risks & benefits discussed:  Anesthesia Type:  general  Patient's Preference:   Intra-op Monitoring Plan: standard ASA monitors  Intra-op Monitoring Plan Comments:   Post Op Pain Control Plan: multimodal analgesia, IV/PO Opioids PRN and per primary service following discharge from PACU  Post Op Pain Control Plan Comments:   Induction:   IV  Beta Blocker:  Patient is not currently on a Beta-Blocker (No further documentation required).       Informed Consent: Patient understands risks and agrees with Anesthesia plan.  Questions answered. Anesthesia consent signed with  patient.  ASA Score: 3     Day of Surgery Review of History & Physical:    H&P update referred to the surgeon.         Ready For Surgery From Anesthesia Perspective.

## 2020-03-03 NOTE — ANESTHESIA PROCEDURE NOTES
Arterial    Diagnosis: scoliosis    Patient location during procedure: done in OR  Procedure start time: 3/3/2020 8:01 AM  Timeout: 3/3/2020 8:00 AM  Procedure end time: 3/3/2020 8:06 AM    Staffing  Authorizing Provider: Mehdi Cook MD  Performing Provider: Radha Shrestha CRNA    Anesthesiologist was present at the time of the procedure.    Preanesthetic Checklist  Completed: patient identified, site marked, surgical consent, pre-op evaluation, timeout performed, IV checked, risks and benefits discussed, monitors and equipment checked and anesthesia consent givenArterial  Skin Prep: chlorhexidine gluconate  Local Infiltration: none  Orientation: left  Location: radial  Catheter placement by Ultrasound guidance. Heme positive aspiration all ports.  Vessel Caliber: medium, patent, compressibility normal  Vascular Doppler:  not done  Needle advanced into vessel with real time Ultrasound guidance.  Guidewire confirmed in vessel.  Sterile sheath used.Insertion Attempts: 1  Assessment  Dressing: secured with tape and tegaderm  Patient: Tolerated well

## 2020-03-03 NOTE — HPI
71 y/o male with PMHx of HTN, T2DM, HLD, GERD, cervical and thoracic myelopathy who is admitted to the neuro ICU s/p T10-pelvis instrumented fusion, T10- L2 laminectomy, L3 PSO, and complex wound closure by plastic surgery. Prior to surgery, the patient suffered from chronic severe low back pain, bilateral LE radiculopathy, postural deformity, and urinary incontinence. Patient previously underwent C3-C5 ACDF on 10/30/2019 and did well with that surgery. Patient did receive 2 units PRBCs intraoperatively. He will be admitted to the neuro ICU for close neurological monitoring and pain control.

## 2020-03-03 NOTE — TRANSFER OF CARE
"Anesthesia Transfer of Care Note    Patient: Garcia Renteria    Procedure(s) Performed: Procedure(s) (LRB):  T10-Pelvis Fusion with L4 PSO **AIRO** Co-Surgeon: Roberto Parkinson (N/A)  CREATION, FLAP, MUSCLE ROTATION    Patient location: ICU    Anesthesia Type: general    Transport from OR: Transported from OR on 6-10 L/min O2 by face mask with adequate spontaneous ventilation. Continuous ECG monitoring in transport. Continuos invasive BP monitoring in transport. Continuous SpO2 monitoring in transport    Post pain: adequate analgesia    Post assessment: no apparent anesthetic complications    Post vital signs: stable    Level of consciousness: awake    Nausea/Vomiting: no nausea/vomiting    Complications: none    Transfer of care protocol was followed      Last vitals:   Visit Vitals  /73   Pulse 97   Temp 36.6 °C (97.9 °F) (Oral)   Resp 20   Ht 6' 1" (1.854 m)   Wt 97.5 kg (215 lb)   SpO2 97%   BMI 28.37 kg/m²     "

## 2020-03-03 NOTE — ANESTHESIA POSTPROCEDURE EVALUATION
Anesthesia Post Evaluation    Patient: Garcia Renteria    Procedure(s) Performed: Procedure(s) (LRB):  T10-Pelvis Fusion with L4 PSO **AIRO** Co-Surgeon: Roberto Parkinson (N/A)  CREATION, FLAP, MUSCLE ROTATION    Final Anesthesia Type: general    Patient location during evaluation: ICU  Patient participation: No - Unable to Participate, Sedation  Level of consciousness: sedated  Post-procedure vital signs: reviewed and stable  Pain management: adequate  Airway patency: patent    PONV status at discharge: No PONV  Anesthetic complications: no      Cardiovascular status: hemodynamically stable  Respiratory status: unassisted, spontaneous ventilation and face mask  Follow-up not needed.          Vitals Value Taken Time   /73 3/3/2020  6:30 AM   Temp 36.6 °C (97.9 °F) 3/3/2020  5:48 AM   Pulse 102 3/3/2020  5:47 PM   Resp 19 3/3/2020  5:47 PM   SpO2 100 % 3/3/2020  5:47 PM   Vitals shown include unvalidated device data.      No case tracking events are documented in the log.      Pain/Celena Score: No data recorded

## 2020-03-03 NOTE — OP NOTE
DATE OF SURGERY: 3/3/20     PREOPERATIVE DIAGNOSIS:  1. Fixed iatrogenic flatback deformity with severe sagittal plane imbalance  2. Severe stenosis with spinal cord compression, T10-L2, and thoracic myelopathy  3. History of cervical myelopathy and cervical stenosis s/p C3-4 ACD     POSTOPERATIVE DIAGNOSIS:  Same.     PROCEDURE PERFORMED:  1. Revision and extension of posterior spinal fusion, T10 to sacrum  2. Posterior segmental spinal fixation, T10 to S1 (DePuy Synthes)  3. Pelvic fixation with S2AI screws (Depuy Synthes)  4. L3 pedicle subtraction osteotomy  5. T10-L2 laminectomy for severe central stenosis and spinal cord compression  6. Cement augmentation of screws, T10 and T11 bilaterally  7.  Use of intraoperative fluoroscopy  8.  Use of intraoperative neuro-monitoring with MEPs  9. Use of intraoperative CT neuronavigation  10. Vivigen, Infuse and local  bone grafting     SURGEON: Francis Alan M.D.     CO-SURGEON: Roberto Parkinson, -- Dr. Parkinson assisted with the placement of all spinal instrumentation because a qualified resident was not available for this portion of the procedure.    Brain Caro M.D., Plastic Surgery for wound closure    ANESTHESIA: GETA     ESTIMATED BLOOD LOSS: 600mL     COMPLICATIONS: None     DRAINS: Two deep     SPECIMENS SENT: None     FINDINGS: None     INDICATIONS:     This is a 70-year-old man with a history of a non instrumented lumbar fusion many years ago who presents with severe progressive axial low back pain, bilateral radicular leg pain, and thoracic myelopathy.  Additionally he had severe postural deformity that was functionally disabling.  He failed multiple conservative measures.  Imaging showed severe central stenosis with spinal cord compression from T10-L2, solid fusion from his previous surgery from L3 to the sacrum, and flat back deformity with severe fixed sagittal plane deformity.  My recommendation was for spinal cord decompression and deformity reduction  surgery, via a T10 to pelvis instrumented fusion with an L3 PSO and a multilevel laminectomy for spinal cord decompression.  Risks benefits alternatives indications and methods were reviewed in detail and he wished to proceed.  All questions were answered.  No guarantees about the results the procedure  .  PROCEDURE:     The patient was brought into the operating room where he was intubated and placed under general anesthesia without difficulty.  All lines were placed. He was repositioned prone onto the operating room table with appropriate padding all pressure points. The region of interest was localized with AP and lateral x-ray.  The skin was marked and the area was prepped and draped in the usual sterile fashion.  A timeout was performed prior to procedure.  20 mL's of lidocaine with epinephrine were injected in the skin.     A midline linear incision was made with a 10 blade from approximately T10 to the sacrum, incorporating his previous midline lumbar scar.  Supra and subfascial midline dissection was carried out with Bovie electrocautery.  Subperiosteal dissection was carried out with Bovie electrocautery and Phan elevators to expose the posterior elements from T10 to the sacrum, and the previous fusion mass from L3 to the sacrum. The fusion mass was explored and found to be intact. Xrays confirmed levels. Medial facetectomies were performed bilaterally at T10-11 through L2-3 with the osteotome.       The CT neuro navigation array was docked onto the right PSIS throught a separate stab incision and a CT spin was acquired.  The intraoperative CT confirmed levels.  Using neuro-navigation pedicle screws were placed bilaterally from T10 to L5. Bilateral pelvic fixation was achieved with S2AI, using navigation. Xrays showed good screw position.    Next we augmented bilateral T10 and T11 screws with cement under flouroscopy. No cement extravasation was seen.    We performed a T10 through L2 laminectomy in standard  fashion for his severe central stenosis with cord compression. The high speed drill, rongeurs and Kerrison punches were used. Adequate decompression was confirmed by palpation with a Converse probe.     Next we performed an L3 pedicle subtraction osteotomy in standard fashion.  Temporary rods were placed bilaterally.  A wide Glez laminectomy was performed at L3.  Bilateral L3 pedicles were skeletonized.  Wide bilateral foraminotomies were performed at L2-3 and L3-4 to identify and decompress the exiting L2 and L3 nerve roots on each side.  Using a navigated osteotome we performed a wedge osteotomy through all 3 columns into the body of L3.  We completed our osteotomy with gaurav, pituitary rongeurs, and the posterior wall impactor.  The region was placed into the osteotomy defect for anterior arthrodesis at L3.  The osteotomy was closed with compression on our temporary rods.  No nerve root or thecal sac compression was induced by the osteotomy closure, and the closure was complete.    Bilateral titanium rods were sized, contoured and reduced into the tulip heads.  Set screws were tightened. A final CT spin showed excellent hardware position and reduction of his deformity. We shortened the distal end of the right kristina with a metal cutting bit. We also noticed that the left L5 set screw was loose due to splaying of the pedicle screw tulip head. The decision was made to leave this screw in situ and to add both a crosslink at L3 and an accessory kristina on the left, spanning the PSO site. The wound was copiously irrigated with sterile normal saline and a dilute Betadyne solution. Exposed bony surfaces from T10 to S1 were decorticated bilaterally with a high-speed drill.  Infuse and local bone were placed posteriorly for arthrodesis from T10 to the sacrum. Two deep Hemovacs were placed. The wound was then handed over to Plastic Surgery for closure.     The patient appeared to tolerate the procedure well from a hemodynamic  and neuromonitoring standpoint. MEPs were present and stable in all extremities throughout the case. Dr. Parkinson and I were present for all critical portions of the case, and at the end of the case all counts were correct. The patient was repositioned supine onto the hospital bed where he was extubated and allowed to emerge from anesthesia. He was sent to the ICU in stable condition for recovery.    JUSTIFICATION OF MODIFIER 22 AND CO-SURGEON: This was a complex revision spinal deformity operation in a patient with severe fixed sagittal imbalance. A three column osteotomy was required for deformity correction in the setting of dense fusion mass, which prolonged this maneuver significantly. Overall the case required signififcantly increased preop planning, mental effort, technical skill and time to perform safely.  Two attending surgeons were indicated to reduce operative times, blood loss, and improve patient outcomes.

## 2020-03-03 NOTE — ANESTHESIA PROCEDURE NOTES
Intubation  Performed by: Radha Shrestha CRNA  Authorized by: Mehdi Cook MD     Intubation:     Induction:  Intravenous    Intubated:  Postinduction    Mask Ventilation:  Easy with oral airway    Attempts:  1    Attempted By:  CRNA    Method of Intubation:  Video laryngoscopy    Blade:  Glidescope 4    Laryngeal View Grade: Grade I - full view of chords      Difficult Airway Encountered?: No      Airway Device:  Oral endotracheal tube    Airway Device Size:  7.5    Style/Cuff Inflation:  Cuffed (inflated to minimal occlusive pressure)    Tube secured:  22    Secured at:  The lips    Placement Verified By:  Capnometry    Complicating Factors:  Anterior larynx (Had a difficult time getting ETT into trachea. Had to put a significant bend on ETT.)    Findings Post-Intubation:  BS equal bilateral and atraumatic/condition of teeth unchanged

## 2020-03-03 NOTE — PROGRESS NOTES
Patient arrived to [facility] from PACU    Type of stroke/diagnosis: chronic spinal deformities (see below list for procedures performed during surgery)  1. Revision and extension of posterior spinal fusion, T10 to sacrum  2. Posterior segmental spinal fixation, T10 to S1 (DePuy Synthes)  3. Pelvic fixation with S2AI screws (Depuy Synthes)  4. L3 pedicle subtraction osteotomy  5. T10-L2 laminectomy for severe central stenosis and spinal cord compression  6. Cement augmentation of screws, T10 and T11 bilaterally  7. Vivigen, Infuse and local  bone grafting    Current symptoms: Withdraw x 4, PERRL, lethargic, not following commands    Skin assessment done: Y    Wounds noted: Marcos drain L side lower  Marcos drain R side lower back  Hemovac R side upper  Hemovac L side upper  Wound vac lower back/sacral area - Vertical incision w/ purple dressing     UZMA Armband Applied: yes    NCC notified: Madai Gilliam NP

## 2020-03-04 PROBLEM — M41.50 DEGENERATIVE SCOLIOSIS: Status: ACTIVE | Noted: 2020-03-04

## 2020-03-04 LAB
ALBUMIN SERPL BCP-MCNC: 3.2 G/DL (ref 3.5–5.2)
ALP SERPL-CCNC: 55 U/L (ref 55–135)
ALT SERPL W/O P-5'-P-CCNC: 16 U/L (ref 10–44)
ANION GAP SERPL CALC-SCNC: 8 MMOL/L (ref 8–16)
APTT BLDCRRT: 24.8 SEC (ref 21–32)
AST SERPL-CCNC: 46 U/L (ref 10–40)
BASOPHILS # BLD AUTO: 0.02 K/UL (ref 0–0.2)
BASOPHILS NFR BLD: 0.2 % (ref 0–1.9)
BILIRUB SERPL-MCNC: 0.3 MG/DL (ref 0.1–1)
BUN SERPL-MCNC: 13 MG/DL (ref 8–23)
CALCIUM SERPL-MCNC: 8.5 MG/DL (ref 8.7–10.5)
CHLORIDE SERPL-SCNC: 110 MMOL/L (ref 95–110)
CO2 SERPL-SCNC: 22 MMOL/L (ref 23–29)
CREAT SERPL-MCNC: 1 MG/DL (ref 0.5–1.4)
DIFFERENTIAL METHOD: ABNORMAL
EOSINOPHIL # BLD AUTO: 0 K/UL (ref 0–0.5)
EOSINOPHIL NFR BLD: 0 % (ref 0–8)
ERYTHROCYTE [DISTWIDTH] IN BLOOD BY AUTOMATED COUNT: 13.6 % (ref 11.5–14.5)
EST. GFR  (AFRICAN AMERICAN): >60 ML/MIN/1.73 M^2
EST. GFR  (NON AFRICAN AMERICAN): >60 ML/MIN/1.73 M^2
GLUCOSE SERPL-MCNC: 151 MG/DL (ref 70–110)
HCT VFR BLD AUTO: 29.6 % (ref 40–54)
HGB BLD-MCNC: 9 G/DL (ref 14–18)
IMM GRANULOCYTES # BLD AUTO: 0.1 K/UL (ref 0–0.04)
IMM GRANULOCYTES NFR BLD AUTO: 0.8 % (ref 0–0.5)
INR PPP: 1.2 (ref 0.8–1.2)
LYMPHOCYTES # BLD AUTO: 1 K/UL (ref 1–4.8)
LYMPHOCYTES NFR BLD: 7.6 % (ref 18–48)
MAGNESIUM SERPL-MCNC: 1.7 MG/DL (ref 1.6–2.6)
MCH RBC QN AUTO: 26.4 PG (ref 27–31)
MCHC RBC AUTO-ENTMCNC: 30.4 G/DL (ref 32–36)
MCV RBC AUTO: 87 FL (ref 82–98)
MONOCYTES # BLD AUTO: 0.9 K/UL (ref 0.3–1)
MONOCYTES NFR BLD: 6.6 % (ref 4–15)
NEUTROPHILS # BLD AUTO: 11.1 K/UL (ref 1.8–7.7)
NEUTROPHILS NFR BLD: 84.8 % (ref 38–73)
NRBC BLD-RTO: 0 /100 WBC
PHOSPHATE SERPL-MCNC: 2.9 MG/DL (ref 2.7–4.5)
PLATELET # BLD AUTO: 158 K/UL (ref 150–350)
PMV BLD AUTO: 10.6 FL (ref 9.2–12.9)
POCT GLUCOSE: 106 MG/DL (ref 70–110)
POTASSIUM SERPL-SCNC: 3.9 MMOL/L (ref 3.5–5.1)
PROT SERPL-MCNC: 5.7 G/DL (ref 6–8.4)
PROTHROMBIN TIME: 11.8 SEC (ref 9–12.5)
RBC # BLD AUTO: 3.41 M/UL (ref 4.6–6.2)
SODIUM SERPL-SCNC: 140 MMOL/L (ref 136–145)
WBC # BLD AUTO: 13.09 K/UL (ref 3.9–12.7)

## 2020-03-04 PROCEDURE — 83735 ASSAY OF MAGNESIUM: CPT

## 2020-03-04 PROCEDURE — 63600175 PHARM REV CODE 636 W HCPCS: Performed by: STUDENT IN AN ORGANIZED HEALTH CARE EDUCATION/TRAINING PROGRAM

## 2020-03-04 PROCEDURE — 25000003 PHARM REV CODE 250: Performed by: STUDENT IN AN ORGANIZED HEALTH CARE EDUCATION/TRAINING PROGRAM

## 2020-03-04 PROCEDURE — 25000003 PHARM REV CODE 250: Performed by: PHYSICIAN ASSISTANT

## 2020-03-04 PROCEDURE — 99900035 HC TECH TIME PER 15 MIN (STAT)

## 2020-03-04 PROCEDURE — 85025 COMPLETE CBC W/AUTO DIFF WBC: CPT

## 2020-03-04 PROCEDURE — 92610 EVALUATE SWALLOWING FUNCTION: CPT

## 2020-03-04 PROCEDURE — 97535 SELF CARE MNGMENT TRAINING: CPT

## 2020-03-04 PROCEDURE — 97162 PT EVAL MOD COMPLEX 30 MIN: CPT

## 2020-03-04 PROCEDURE — 63600175 PHARM REV CODE 636 W HCPCS: Performed by: NURSE PRACTITIONER

## 2020-03-04 PROCEDURE — 84100 ASSAY OF PHOSPHORUS: CPT

## 2020-03-04 PROCEDURE — 97165 OT EVAL LOW COMPLEX 30 MIN: CPT

## 2020-03-04 PROCEDURE — 99233 SBSQ HOSP IP/OBS HIGH 50: CPT | Mod: ,,, | Performed by: NURSE PRACTITIONER

## 2020-03-04 PROCEDURE — 63600175 PHARM REV CODE 636 W HCPCS: Performed by: PHYSICIAN ASSISTANT

## 2020-03-04 PROCEDURE — 80053 COMPREHEN METABOLIC PANEL: CPT

## 2020-03-04 PROCEDURE — 94761 N-INVAS EAR/PLS OXIMETRY MLT: CPT

## 2020-03-04 PROCEDURE — 97802 MEDICAL NUTRITION INDIV IN: CPT

## 2020-03-04 PROCEDURE — 97530 THERAPEUTIC ACTIVITIES: CPT

## 2020-03-04 PROCEDURE — 11000001 HC ACUTE MED/SURG PRIVATE ROOM

## 2020-03-04 PROCEDURE — 99233 PR SUBSEQUENT HOSPITAL CARE,LEVL III: ICD-10-PCS | Mod: ,,, | Performed by: NURSE PRACTITIONER

## 2020-03-04 PROCEDURE — 85730 THROMBOPLASTIN TIME PARTIAL: CPT

## 2020-03-04 PROCEDURE — 85610 PROTHROMBIN TIME: CPT

## 2020-03-04 RX ORDER — INSULIN ASPART 100 [IU]/ML
1-10 INJECTION, SOLUTION INTRAVENOUS; SUBCUTANEOUS
Status: DISCONTINUED | OUTPATIENT
Start: 2020-03-04 | End: 2020-03-24 | Stop reason: HOSPADM

## 2020-03-04 RX ORDER — IBUPROFEN 200 MG
24 TABLET ORAL
Status: DISCONTINUED | OUTPATIENT
Start: 2020-03-04 | End: 2020-03-24 | Stop reason: HOSPADM

## 2020-03-04 RX ORDER — SODIUM CHLORIDE 9 MG/ML
INJECTION, SOLUTION INTRAVENOUS CONTINUOUS
Status: ACTIVE | OUTPATIENT
Start: 2020-03-04 | End: 2020-03-05

## 2020-03-04 RX ORDER — GLUCAGON 1 MG
1 KIT INJECTION
Status: DISCONTINUED | OUTPATIENT
Start: 2020-03-04 | End: 2020-03-24 | Stop reason: HOSPADM

## 2020-03-04 RX ORDER — IBUPROFEN 200 MG
16 TABLET ORAL
Status: DISCONTINUED | OUTPATIENT
Start: 2020-03-04 | End: 2020-03-24 | Stop reason: HOSPADM

## 2020-03-04 RX ORDER — PREGABALIN 75 MG/1
75 CAPSULE ORAL 2 TIMES DAILY
Status: DISCONTINUED | OUTPATIENT
Start: 2020-03-04 | End: 2020-03-09

## 2020-03-04 RX ORDER — HEPARIN SODIUM 5000 [USP'U]/ML
5000 INJECTION, SOLUTION INTRAVENOUS; SUBCUTANEOUS EVERY 8 HOURS
Status: DISCONTINUED | OUTPATIENT
Start: 2020-03-04 | End: 2020-03-08

## 2020-03-04 RX ADMIN — HYDROMORPHONE HYDROCHLORIDE 0.5 MG: 1 INJECTION, SOLUTION INTRAMUSCULAR; INTRAVENOUS; SUBCUTANEOUS at 12:03

## 2020-03-04 RX ADMIN — CEFAZOLIN 2 G: 1 INJECTION, POWDER, FOR SOLUTION INTRAMUSCULAR; INTRAVENOUS at 08:03

## 2020-03-04 RX ADMIN — CEFAZOLIN 2 G: 1 INJECTION, POWDER, FOR SOLUTION INTRAMUSCULAR; INTRAVENOUS at 04:03

## 2020-03-04 RX ADMIN — SODIUM CHLORIDE: 0.9 INJECTION, SOLUTION INTRAVENOUS at 10:03

## 2020-03-04 RX ADMIN — OXYCODONE HYDROCHLORIDE 5 MG: 5 TABLET ORAL at 06:03

## 2020-03-04 RX ADMIN — SENNOSIDES AND DOCUSATE SODIUM 1 TABLET: 8.6; 5 TABLET ORAL at 10:03

## 2020-03-04 RX ADMIN — MUPIROCIN: 20 OINTMENT TOPICAL at 09:03

## 2020-03-04 RX ADMIN — OXYCODONE HYDROCHLORIDE 10 MG: 10 TABLET ORAL at 04:03

## 2020-03-04 RX ADMIN — HEPARIN SODIUM 5000 UNITS: 5000 INJECTION INTRAVENOUS; SUBCUTANEOUS at 08:03

## 2020-03-04 RX ADMIN — FAMOTIDINE 40 MG: 20 TABLET, FILM COATED ORAL at 10:03

## 2020-03-04 RX ADMIN — PREGABALIN 75 MG: 75 CAPSULE ORAL at 10:03

## 2020-03-04 RX ADMIN — HYDROMORPHONE HYDROCHLORIDE 0.5 MG: 1 INJECTION, SOLUTION INTRAMUSCULAR; INTRAVENOUS; SUBCUTANEOUS at 09:03

## 2020-03-04 RX ADMIN — OXYCODONE HYDROCHLORIDE 10 MG: 10 TABLET ORAL at 10:03

## 2020-03-04 RX ADMIN — CYCLOBENZAPRINE HYDROCHLORIDE 10 MG: 5 TABLET, FILM COATED ORAL at 02:03

## 2020-03-04 RX ADMIN — HYDROMORPHONE HYDROCHLORIDE 0.5 MG: 1 INJECTION, SOLUTION INTRAMUSCULAR; INTRAVENOUS; SUBCUTANEOUS at 01:03

## 2020-03-04 RX ADMIN — CEFAZOLIN 2 G: 1 INJECTION, POWDER, FOR SOLUTION INTRAMUSCULAR; INTRAVENOUS at 12:03

## 2020-03-04 RX ADMIN — HEPARIN SODIUM 5000 UNITS: 5000 INJECTION INTRAVENOUS; SUBCUTANEOUS at 10:03

## 2020-03-04 RX ADMIN — MUPIROCIN: 20 OINTMENT TOPICAL at 10:03

## 2020-03-04 RX ADMIN — SODIUM CHLORIDE 1000 ML: 0.9 INJECTION, SOLUTION INTRAVENOUS at 10:03

## 2020-03-04 RX ADMIN — OXYCODONE HYDROCHLORIDE 5 MG: 5 TABLET ORAL at 11:03

## 2020-03-04 RX ADMIN — CYCLOBENZAPRINE HYDROCHLORIDE 10 MG: 5 TABLET, FILM COATED ORAL at 06:03

## 2020-03-04 RX ADMIN — ONDANSETRON 8 MG: 8 TABLET, ORALLY DISINTEGRATING ORAL at 10:03

## 2020-03-04 RX ADMIN — HYDROMORPHONE HYDROCHLORIDE 0.5 MG: 1 INJECTION, SOLUTION INTRAMUSCULAR; INTRAVENOUS; SUBCUTANEOUS at 08:03

## 2020-03-04 RX ADMIN — HEPARIN SODIUM 5000 UNITS: 5000 INJECTION INTRAVENOUS; SUBCUTANEOUS at 02:03

## 2020-03-04 NOTE — PROGRESS NOTES
"[facility]  Adult Nutrition  Progress Note    SUMMARY       Recommendations    1. Recommend Diabetic diet as tolerated, with texture per SLP recommendations. 2. Recommend Boost Glucose Control TID to optimize nutritional intake.   Goals: Pt to consume/tolerate % of EEN/EPn by RD follow-up.  Nutrition Goal Status: new    Reason for Assessment    Reason For Assessment: consult  Diagnosis: surgery/postoperative complications  Relevant Medical History: HTN, DM2, HLD, GERD, Diverticulosis  Interdisciplinary Rounds: did not attend  General Information Comments: POD#1 T-10 pelvis fusion, T10-L2 laminectomy, L3 PSO, and wound closure. Pt reports good appetite PTA and denies wt changes. He states his appetite is currently decreased 2/2 nausea. Pt states he does not follow any dietary restrictions at home. Per chart review, pt with stable wts x 1 yr.  Pt appears nourished, with no indications for malnutrition at this time.   Nutrition Discharge Planning: Adequate PO intake.    Nutrition Risk Screen    Nutrition Risk Screen: no indicators present    Nutrition/Diet History    Spiritual, Cultural Beliefs, Jewish Practices, Values that Affect Care: no    Anthropometrics    Temp: 97.9 °F (36.6 °C)  Height Method: Stated  Height: 6' 1" (185.4 cm)  Height (inches): 73 in  Weight Method: Bed Scale  Weight: 98.5 kg (217 lb 2.5 oz)  Weight (lb): 217.16 lb  Ideal Body Weight (IBW), Male: 184 lb  % Ideal Body Weight, Male (lb): 118.02 %  BMI (Calculated): 28.7  BMI Grade: 25 - 29.9 - overweight       Lab/Procedures/Meds    Pertinent Labs Reviewed: reviewed  Pertinent Labs Comments: CO2 22, Glu 151, Ca 8.5, Total pro 5.7, AST 46  Pertinent Medications Reviewed: reviewed  Pertinent Medications Comments: cefazolin, IVF, lisinopril, famotidine, bisacodyl, senna-docusate    Estimated/Assessed Needs    Weight Used For Calorie Calculations: 98.5 kg (217 lb 2.5 oz)  Energy Calorie Requirements (kcal): 2254  Energy " Need Method: [place]  Protein Requirements: 118-138g/day(1.2-1.4g/kg)  Weight Used For Protein Calculations: 98.5 kg (217 lb 2.5 oz)  Fluid Requirements (mL): 1mL/kcal or per MD  Estimated Fluid Requirement Method: RDA Method  RDA Method (mL): 2254  CHO requirement: 282 g/day         Nutrition Prescription Ordered    Current Diet Order: Regular    Evaluation of Received Nutrient/Fluid Intake    I/O: +2.3L since admit  Comments: LBM 3/2  % Intake of Estimated Energy Needs: 0-25%  % Meal Intake: 0-25%    Nutrition Risk    Level of Risk/Frequency of Follow-up: low(1x/wk)     Assessment and Plan    Nutrition Problem  Inadequate energy intake    Related to (etiology):   Decreased appetite    Signs and Symptoms (as evidenced by):   Pt consuming <50% of EEN/EPN.    Interventions/Recommendations (treatment strategy):  Collaboration of care with providers   [employer] - Boost Glucose Control  Modified Diet - Diabetic    Nutrition Diagnosis Status:   New      Monitor and Evaluation    Food and Nutrient Intake: energy intake, food and beverage intake  Food and Nutrient Adminstration: diet order  Knowledge/Beliefs/Attitudes: food and nutrition knowledge/skill  Physical Activity and Function: nutrition-related ADLs and IADLs  Anthropometric Measurements: weight, weight change, body mass index  Biochemical Data, Medical Tests and Procedures: electrolyte and renal panel, gastrointestinal profile, glucose/endocrine profile, inflammatory profile, lipid profile  Nutrition-Focused Physical Findings: overall appearance     Nutrition Follow-Up    RD Follow-up?: Yes

## 2020-03-04 NOTE — H&P
[facility]  Neurocritical Care  History & Physical    Admit Date: 3/3/2020  Service Date: 03/03/2020  Length of Stay: 0    Subjective:     Chief Complaint: Acquired spondylolisthesis of thoracolumbar vertebra    History of Present Illness: 71 y/o male with PMHx of HTN, T2DM, HLD, GERD, cervical and thoracic myelopathy who is admitted to the neuro ICU s/p T10-pelvis instrumented fusion, T10- L2 laminectomy, L3 PSO, and complex wound closure by plastic surgery. Prior to surgery, the patient suffered from chronic severe low back pain, bilateral LE radiculopathy, postural deformity, and urinary incontinence. Patient previously underwent C3-C5 ACDF on 10/30/2019 and did well with that surgery. Patient did receive 2 units PRBCs intraoperatively. He will be admitted to the neuro ICU for close neurological monitoring and pain control.     Past Medical History:   Diagnosis Date    Colitis     Diabetes mellitus, type 2     Diverticulosis     GERD (gastroesophageal reflux disease)     Hypertension      Past Surgical History:   Procedure Laterality Date    ANKLE SURGERY Bilateral     ANTERIOR CERVICAL DISCECTOMY W/ FUSION N/A 10/30/2019    Procedure: C3-4, C4-5 ACDF;  Surgeon: Francis Alan MD;  Location: [facility] OR [address] [place] [address];  Service: Neurosurgery;  Laterality: N/A;  C3-4, C4-5 ACDF  Anesthesia:  General  Rad:  C-Arm  NM:  EMG, SEP, & MEP  Blood:  Type & Screen  Position:  Supine  Bed:  Regular slider bed  Headrest:  [place] & GW tongs/hanging weights  Misc:  Small vivigen  Nerve root retractor (DePuy)  Disposable #2 [place]  Interbody    BACK SURGERY      COLONOSCOPY      lower back surgery      NECK SURGERY      SPINE SURGERY      UPPER GASTROINTESTINAL ENDOSCOPY         No current facility-administered medications on file prior to encounter.      Current Outpatient Medications on File Prior to Encounter   Medication Sig Dispense Refill    famotidine (PEPCID) 40 MG tablet Take 1 tablet (40 mg  total) by mouth every evening. 90 tablet 3    gabapentin (NEURONTIN) 300 MG capsule Take 2 capsules (600 mg total) by mouth every evening. 60 capsule 5    HYDROcodone-acetaminophen (NORCO)  mg per tablet Take 1 tablet by mouth every 6 (six) hours as needed for Pain. 120 tablet 0    ibuprofen (ADVIL,MOTRIN) 800 MG tablet Take 800 mg by mouth daily as needed for Pain.       lisinopril (PRINIVIL,ZESTRIL) 5 MG tablet Take 5 mg by mouth once daily.       metFORMIN (GLUCOPHAGE) 1000 MG tablet Take 500 mg by mouth daily with breakfast.       omeprazole (PRILOSEC) 20 MG capsule Take 1 capsule (20 mg total) by mouth once daily. 90 capsule 1    STOOL SOFTENER, DOCUSATE MADELAINE, 240 mg capsule       triamterene-hydrochlorothiazide 75-50 mg (MAXZIDE) 75-50 mg per tablet Take 0.5 tablets by mouth once daily.       vitamin D (VITAMIN D3) 1000 units Tab Take 1,000 Units by mouth once daily.      oxyCODONE-acetaminophen (PERCOCET)  mg per tablet Take 1 tablet by mouth every 6 (six) hours as needed for Pain. (Patient not taking: Reported on 2020) 120 tablet 0    sildenafil (VIAGRA) 100 MG tablet        Allergies: Patient has no known allergies.    Family History   Problem Relation Age of Onset    Neurological Disorders Mother        Social History     Tobacco Use    Smoking status: Former Smoker     Types: Cigars     Last attempt to quit: 10/2019     Years since quittin.4    Smokeless tobacco: Never Used   Substance Use Topics    Alcohol use: Yes     Alcohol/week: 2.0 standard drinks     Types: 1 Glasses of wine, 1 Cans of beer per week     Comment: 1 wine , 2 times a week    Drug use: Not Currently      Review of Systems: Unable to obtain a complete ROS due to level of consciousness (patient still waking up from surgery)    Vitals:   Temp: 97.8 °F (36.6 °C)  BP: 126/73    Temp  Min: 97.8 °F (36.6 °C)  Max: 97.9 °F (36.6 °C)  Pulse  Min: 81  Max: 81  BP  Min: 126/73  Max: 126/73  MAP (mmHg)  Min: 94   Max: 94  Resp  Min: 20  Max: 20  SpO2  Min: 97 %  Max: 97 %    No intake/output data recorded.         Examination:   Constitutional: Well-nourished and -developed.   Eyes: Conjunctiva clear, anicteric. Lids no lesions.  Head/Ears/Nose/Mouth/Throat/Neck: Moist mucous membranes. External ears, nose atraumatic.   Cardiovascular: Regular rhythm. No murmurs. No leg edema.  Respiratory: Comfortable respirations. Clear to auscultation.  Gastrointestinal: No hernia. Soft, nondistended, nontender. + bowel sounds.    Neurologic:   -Patient still waking up from anesthesia. Not awake or alert.   -pupils 3 mm bilaterally and brisk  -does not follow commands  -withdraws extremities x 4 to noxious stimuli   Unable to test orientation, language, memory, judgment, insight, fund of knowledge, hearing, shoulder shrug, tongue protrusion, coordination, gait due to level of consciousness.    Today I independently reviewed pertinent medications, lines/drains/airways, imaging, cardiology results, laboratory results, microbiology results, notably:   Assessment/Plan:     Cardiac/Vascular  Essential hypertension  POA   -SBP < 160; MAP > 65  -continue home BP meds    Endocrine  Type 2 diabetes mellitus with diabetic neuropathy, without long-term current use of insulin  POA  -A1c  -SSI  -Diabetic diet    GI  Constipation  POA  -Bowel regimen   -I/Os    Gastroesophageal reflux disease  POA  -continue pepcid     Other  * Acquired spondylolisthesis of thoracolumbar vertebra  69 y/o male s/p T10-pelvis instrumented fusion, T10- L2 laminectomy, L3 PSO, and complex wound closure by plastic surgery on 3/3/2020  -admit to neuro ICU  -q1h neuro checks and vitals  -SBP < 160  -Monitor drain output-- drains to full suction. YRN drain x 2 (subq) hemovac x 2 (deep) and wound vac x 1  -ancef 2 g q8h for drain ppx   -Pain control, patient may be candidate for PCA  -Bedrest  -Logroll  -post op imaging per neurosurgery   -PT/OT when appropriate             The  patient is being Prophylaxed for:  Venous Thromboembolism with: Mechanical  Stress Ulcer with: H2B  Ventilator Pneumonia with: not applicable    Activity Orders          Diet clear liquid: Clear Liquid starting at 03/03 1730    Commode at bedside starting at 03/03 1722        Full Code  CCT 35 min    Linh Roach PA-C  Neurocritical Care  [facility]

## 2020-03-04 NOTE — PROGRESS NOTES
Neurosurgery Post-Operative Note:    The patient was evaluated shortly after surgery in the ICU.  He was still a little confused from anesthesia, but eventually gave correct answers for self, hospital, and year.  He was moving the BUE at least 4/5, but he had too much back pain to move his BLE more than 2/5 strength, though he was showing some movement in all muscle groups.  He confirmed sensation in the BLE, though he was still too groggy to tell if his sensation was better or worse or the same as compared to pre-op.  Nurse was working to get pain under control.

## 2020-03-04 NOTE — PLAN OF CARE
POC reviewed with pt at 0400. Pt verbalized understanding. Questions and concerns addressed. No acute events overnight. Pt progressing toward goals. Will continue to monitor. See flowsheets for full assessment and VS info .    Problem: Adult Inpatient Plan of Care  Goal: Plan of Care Review  Outcome: Ongoing, Progressing  Flowsheets (Taken 3/4/2020 0446)  Plan of Care Reviewed With: patient     Problem: Adult Inpatient Plan of Care  Goal: Absence of Hospital-Acquired Illness or Injury  Outcome: Ongoing, Progressing     Problem: Fall Injury Risk  Goal: Absence of Fall and Fall-Related Injury  Outcome: Ongoing, Progressing  Intervention: Identify and Manage Contributors to Fall Injury Risk  Flowsheets (Taken 3/4/2020 0446)  Medication Review/Management: medications reviewed

## 2020-03-04 NOTE — SUBJECTIVE & OBJECTIVE
Interval History: POD#1. Overall pain controlled. No issues per nursing. Labs WNL. AFVSS. Drains with large output staying for now. Xray pending     Medications:  Continuous Infusions:   sodium chloride 0.9% 75 mL/hr at 03/04/20 0705     Scheduled Meds:   bisacodyL  10 mg Rectal Daily    ceFAZolin (ANCEF) IVPB  2 g Intravenous Q8H    famotidine  40 mg Oral QHS    gabapentin  600 mg Oral QHS    lisinopril  5 mg Oral Daily    mupirocin   Nasal BID    senna-docusate 8.6-50 mg  1 tablet Oral BID     PRN Meds:acetaminophen, aluminum-magnesium hydroxide-simethicone, cyclobenzaprine, HYDROmorphone, labetalol, ondansetron, ondansetron, oxyCODONE, oxyCODONE, promethazine (PHENERGAN) IVPB, sodium chloride 0.9%     Review of Systems  Objective:     Weight: 98.5 kg (217 lb 2.5 oz)  Body mass index is 28.65 kg/m².  Vital Signs (Most Recent):  Temp: 98.2 °F (36.8 °C) (03/04/20 0301)  Pulse: 109 (03/04/20 0710)  Resp: 18 (03/04/20 0710)  BP: (!) 142/90 (03/04/20 0705)  SpO2: 99 % (03/04/20 0710) Vital Signs (24h Range):  Temp:  [97.8 °F (36.6 °C)-98.3 °F (36.8 °C)] 98.2 °F (36.8 °C)  Pulse:  [] 109  Resp:  [6-21] 18  SpO2:  [95 %-100 %] 99 %  BP: (105-143)/(59-96) 142/90  Arterial Line BP: (114-152)/(55-73) 151/66     Date 03/04/20 0700 - 03/05/20 0659   Shift 5701-2670 6731-4290 7962-9576 24 Hour Total   INTAKE   I.V.(mL/kg) 75(0.8)   75(0.8)   Shift Total(mL/kg) 75(0.8)   75(0.8)   OUTPUT   Shift Total(mL/kg)       Weight (kg) 98.5 98.5 98.5 98.5                        Closed/Suction Drain 03/03/20 1559 Left;Superior Back Accordion 10 Fr. (Active)   Dressing Type Transparent (Tegaderm);Biopatch in place 3/4/2020  3:01 AM   Dressing Status Clean;Dry;Intact 3/4/2020  3:01 AM   Dressing Intervention Integrity maintained 3/4/2020  3:01 AM   Drainage Serosanguineous 3/4/2020  3:01 AM   Status To bulb suction 3/4/2020  3:01 AM   Output (mL) 60 mL 3/4/2020  4:01 AM            Closed/Suction Drain 03/03/20 1600  "Right;Superior Back Accordion 10 Fr. (Active)   Dressing Type Transparent (Tegaderm);Biopatch in place 3/4/2020  3:01 AM   Dressing Status Clean;Dry;Intact 3/4/2020  3:01 AM   Dressing Intervention Integrity maintained 3/4/2020  3:01 AM   Drainage Serosanguineous 3/4/2020  3:01 AM   Status To bulb suction 3/4/2020  3:01 AM   Output (mL) 60 mL 3/4/2020  4:01 AM            Closed/Suction Drain 03/03/20 1601 Left;Inferior Back Bulb 15 Fr. (Active)   Dressing Type Biopatch in place;Transparent (Tegaderm) 3/4/2020  3:01 AM   Dressing Status Clean;Dry;Intact 3/4/2020  3:01 AM   Dressing Intervention Integrity maintained 3/4/2020  3:01 AM   Drainage Serosanguineous 3/4/2020  3:01 AM   Status To bulb suction 3/4/2020  3:01 AM   Output (mL) 0 mL 3/4/2020  4:01 AM            Closed/Suction Drain 03/03/20 1602 Right;Inferior Back Bulb 15 Fr. (Active)   Dressing Type Biopatch in place;Transparent (Tegaderm) 3/4/2020  3:01 AM   Dressing Status Clean;Intact;Dry 3/4/2020  3:01 AM   Dressing Intervention Integrity maintained 3/4/2020  3:01 AM   Drainage Serosanguineous 3/4/2020  3:01 AM   Status To bulb suction 3/4/2020  3:01 AM   Output (mL) 5 mL 3/4/2020  4:01 AM            Urethral Catheter 03/03/20 0740 Non-latex 16 Fr. (Active)   Site Assessment Clean;Intact;Dry 3/4/2020  3:01 AM   Collection Container Urimeter 3/4/2020  3:01 AM   Securement Method secured to top of thigh w/ adhesive device 3/4/2020  3:01 AM   Catheter Care Performed yes 3/4/2020  3:01 AM   Reason for Continuing Urinary Catheterization Post operative 3/4/2020  3:01 AM   CAUTI Prevention Bundle StatLock in place w 1" slack;Intact seal between catheter & drainage tubing;Drainage bag/urimeter off the floor;Green sheeting clip in use;No dependent loops or kinks;Drainage bag/urimeter below bladder 3/3/2020  7:01 PM   Output (mL) 60 mL 3/4/2020  6:01 AM       Neurosurgery Physical Exam  AAOX3  BUE with 5/5  BLE effort dependant with 5/5 throughout except b/l HF " 4/5  SILT        Significant Labs:  Recent Labs   Lab 03/04/20 0257   *      K 3.9      CO2 22*   BUN 13   CREATININE 1.0   CALCIUM 8.5*   MG 1.7     Recent Labs   Lab 03/03/20  1425 03/03/20 1811 03/04/20 0257   WBC  --  15.70* 13.09*   HGB  --  10.1* 9.0*   HCT 27* 32.2* 29.6*   PLT  --  194 158     Recent Labs   Lab 03/04/20 0257   INR 1.2   APTT 24.8     Microbiology Results (last 7 days)     ** No results found for the last 168 hours. **        Recent Lab Results       03/04/20 0257 03/03/20 1811 03/03/20 1425 03/03/20  1134        Albumin 3.2           Alkaline Phosphatase 55           ALT 16           Anion Gap 8           aPTT 24.8  Comment:  aPTT therapeutic range = 39-69 seconds           AST 46           Baso # 0.02 0.04         Basophil% 0.2 0.3         BILIRUBIN TOTAL 0.3  Comment:  For infants and newborns, interpretation of results should be based  on gestational age, weight and in agreement with clinical  observations.  Premature Infant recommended reference ranges:  Up to 24 hours.............<8.0 mg/dL  Up to 48 hours............<12.0 mg/dL  3-5 days..................<15.0 mg/dL  6-29 days.................<15.0 mg/dL             BUN, Bld 13           Calcium 8.5           Chloride 110           Cholesterol   117  Comment:  The National Cholesterol Education Program (NCEP) has set the  following guidelines (reference ranges) for Cholesterol:  Optimal.....................<200 mg/dL  Borderline High.............200-239 mg/dL  High........................> or = 240 mg/dL           CO2 22           Creatinine 1.0           Differential Method Automated Automated         eGFR if  >60.0           eGFR if non  >60.0  Comment:  Calculation used to obtain the estimated glomerular filtration  rate (eGFR) is the CKD-EPI equation.              Eos # 0.0 0.0         Eosinophil% 0.0 0.0         Estimated Avg Glucose   114         Glucose 151            Gran # (ANC) 11.1 13.1         Gran% 84.8 83.5         Group & Rh   A POS         HDL   17  Comment:  The National Cholesterol Education Program (NCEP) has set the  following guidelines (reference values) for HDL Cholesterol:  Low...............<40 mg/dL  Optimal...........>60 mg/dL           Hdl/Cholesterol Ratio   14.5         Hematocrit 29.6 32.2         Hemoglobin 9.0 10.1         Hemoglobin A1C External   5.6  Comment:  ADA Screening Guidelines:  5.7-6.4%  Consistent with prediabetes  >or=6.5%  Consistent with diabetes  High levels of fetal hemoglobin interfere with the HbA1C  assay. Heterozygous hemoglobin variants (HbS, HgC, etc)do  not significantly interfere with this assay.   However, presence of multiple variants may affect accuracy.           Immature Grans (Abs) 0.10  Comment:  Mild elevation in immature granulocytes is non specific and   can be seen in a variety of conditions including stress response,   acute inflammation, trauma and pregnancy. Correlation with other   laboratory and clinical findings is essential.   0.23  Comment:  Mild elevation in immature granulocytes is non specific and   can be seen in a variety of conditions including stress response,   acute inflammation, trauma and pregnancy. Correlation with other   laboratory and clinical findings is essential.           Immature Granulocytes 0.8 1.5         INDIRECT NAZ   NEG         INR 1.2  Comment:  Coumadin Therapy:  2.0 - 3.0 for INR for all indicators except mechanical heart valves  and antiphospholipid syndromes which should use 2.5 - 3.5.             LDL Cholesterol External   Invalid, Trig>400.0  Comment:  The National Cholesterol Education Program (NCEP) has set the  following guidelines (reference values) for LDL Cholesterol:  Optimal.......................<130 mg/dL  Borderline High...............130-159 mg/dL  High..........................160-189 mg/dL  Very High.....................>190 mg/dL           Lymph # 1.0 1.3          Lymph% 7.6 8.5         Magnesium 1.7           MCH 26.4 27.9         MCHC 30.4 31.4         MCV 87 89         Mono # 0.9 1.0         Mono% 6.6 6.2         MPV 10.6 10.9         Non-HDL Cholesterol   100  Comment:  Risk category and Non-HDL cholesterol goals:  Coronary heart disease (CHD)or equivalent (10-year risk of CHD >20%):  Non-HDL cholesterol goal     <130 mg/dL  Two or more CHD risk factors and 10-year risk of CHD <= 20%:  Non-HDL cholesterol goal     <160 mg/dL  0 to 1 CHD risk factor:  Non-HDL cholesterol goal     <190 mg/dL           nRBC 0 0         Phosphorus 2.9           Platelets 158 194         POC BE     -2 -2     POC Glucose     129 94     POC HCO3     23.1 22.7     POC Hematocrit     27 37     POC Ionized Calcium     1.10 1.09     POC PCO2     38.0 36.1     POC PH     7.392 7.407     POC PO2     212 222     POC Potassium     3.6 3.1     POC SATURATED O2     100 100     POC Sodium     141 141     POC TCO2     24 24     Potassium 3.9           PROTEIN TOTAL 5.7           Protime 11.8           RBC 3.41 3.62         RDW 13.6 13.6         Sample     ARTERIAL ARTERIAL     Sodium 140           Total Cholesterol/HDL Ratio   6.9         Triglycerides   578  Comment:  The National Cholesterol Education Program (NCEP) has set the  following guidelines (reference values) for triglycerides:  Normal......................<150 mg/dL  Borderline High.............150-199 mg/dL  High........................200-499 mg/dL           TSH   0.950         WBC 13.09 15.70               Significant Diagnostics:  I have reviewed all pertinent imaging results/findings within the past 24 hours.

## 2020-03-04 NOTE — PLAN OF CARE
CM met with patient in room for [facility] Planning Assessment.  Patient is able to answer questions.  Per patient, he lives with his wife in a single story house with no step(s) to enter.   Per patient , he was independent with ADLS and used cane and rolling walker for ambulation.  Patient will have assistance from wife and son upon discharge.   Discharge Planning Booklet given to patient and discussed.  All questions addressed.  CM will follow for needs.       03/04/20 1353   Discharge Assessment   Assessment Type Discharge Planning Assessment   Confirmed/corrected address and phone number on facesheet? Yes   Assessment information obtained from? Patient   Expected Length of Stay (days) 5   Communicated expected length of stay with patient/caregiver yes   Prior to hospitilization cognitive status: Alert/Oriented   Prior to hospitalization functional status: Independent;Assistive Equipment   Current cognitive status: Alert/Oriented   Current Functional Status: Needs Assistance  (X43-Dyohbg Fusion with L4 PSO  POD 1)   Lives With spouse   Able to Return to Prior Arrangements yes   Is patient able to care for self after discharge? Unable to determine at this time (comments)   Who are your caregiver(s) and their phone number(s)? Khloe Renteria (wife) [phone]   Patient's perception of discharge disposition home or selfcare;home health   Readmission Within the Last 30 Days no previous admission in last 30 days   Patient currently being followed by outpatient case management? No   Patient currently receives any other outside agency services? No   Equipment Currently Used at Home walker, rolling;cane, straight   Do you have any problems affording any of your prescribed medications? No   Is the patient taking medications as prescribed? yes   Does the patient have transportation home? Yes   Transportation Anticipated family or friend will provide   Does the patient receive services at the [facility]madin Clinic? No   Discharge  Plan A Rehab   Discharge Plan B Home Health   DME Needed Upon Discharge  other (see comments)  (tbd)   Patient/Family in Agreement with Plan yes                PCP:  [facility]        Pharmacy:    [id number] - [place], [place] - [address] AT [facility] [address]  [address] [facility] [address]  [facility] [address]  Phone: [phone] Fax: [phone]        Emergency Contacts:  Extended Emergency Contact Information  Primary Emergency Contact: AYO OPSADA  Mobile Phone: [phone]  Relation: Spouse      Insurance:    Payor: MEDICARE / Plan: MEDICARE PART A & B / Product Type: [facility] /       Lilliana Thomas RN, CCRN-K, [facility]  Neuro-Critical Care   X [id number]    03/04/2020  2:00 PM

## 2020-03-04 NOTE — PT/OT/SLP EVAL
Speech Language Pathology Evaluation  Bedside Swallow    Patient Name:  Garcia Renteria   MRN:  [id number]  Admitting Diagnosis: Acquired spondylolisthesis of thoracolumbar vertebra    Recommendations:                 General Recommendations:  Speech language evaluation, Cognitive-linguistic evaluation and monitor diet tolerance  Diet recommendations:  Regular, Thin   Aspiration Precautions: Elevate HOB to most upright position tolerable, 1 bite/sip at a time, Small bites/sips and Standard aspiration precautions   General Precautions: Standard,    Communication strategies:  none    History:     Past Medical History:   Diagnosis Date    Colitis     Diabetes mellitus, type 2     Diverticulosis     GERD (gastroesophageal reflux disease)     Hypertension        Past Surgical History:   Procedure Laterality Date    ANKLE SURGERY Bilateral     ANTERIOR CERVICAL DISCECTOMY W/ FUSION N/A 10/30/2019    Procedure: C3-4, C4-5 ACDF;  Surgeon: Francis Alan MD;  Location: [facility] OR [address] [place] [address];  Service: Neurosurgery;  Laterality: N/A;  C3-4, C4-5 ACDF  Anesthesia:  General  Rad:  C-Arm  NM:  EMG, SEP, & MEP  Blood:  Type & Screen  Position:  Supine  Bed:  Regular slider bed  Headrest:  [place] & GW tongs/hanging weights  Misc:  Small vivigen  Nerve root retractor ([employer])  Disposable #2 [employer]  Interbody    BACK SURGERY      COLONOSCOPY      CREATION OF MUSCLE ROTATIONAL FLAP  3/3/2020    Procedure: CREATION, FLAP, MUSCLE ROTATION;  Surgeon: Francis Alan MD;  Location: [address];  Service: Neurosurgery;;    lower back surgery      LUMBAR LAMINECTOMY WITH FUSION N/A 3/3/2020    Procedure: T10-Pelvis Fusion with L4 PSO **AIRO** Co-Surgeon: Roberto Parkinson;  Surgeon: Francis Alan MD;  Location: [address];  Service: Neurosurgery;  Laterality: N/A;  **AIRO**  **CELL SAVER**  Co-Surgeon: Roberto Parkinson  NM: EMG, SEP, MEP  Position: Prone  Bed: [place] 4 post, prone pillow  Blood: Type & Hold 2 units  Rad: C-Arm,  "AIRO  Vendor: Dep[employer]  Misc: Vivigen, Infuse, Cement (Depuy)  [place]    NECK SURGERY      SPINE SURGERY      UPPER GASTROINTESTINAL ENDOSCOPY       Prior Intubation HX:  Intubated for 9 hours for surgery on 3/3/20    Modified Barium Swallow: None on file    Chest X-Rays: None this admission    Prior diet: regular/thin    Subjective     Patient awake and alert during session  "A little foggy, but getting better." Patient referring to mentation following anesthesia yesterday  Communicated with nurse prior to session     Pain/Comfort:  · Pain Rating 1: (pt did not rate)  · Location - Side 1: Left  · Location 1: hip  · Pain Addressed 1: Reposition, Cessation of Activity, Nurse notified  · Pain Rating Post-Intervention 1: (pt did not rate)    Objective:     Oral Musculature Evaluation  · Oral Musculature: WFL  · Dentition: present and adequate  · Secretion Management: adequate  · Mucosal Quality: good  · Mandibular Strength and Mobility: WFL  · Oral Labial Strength and Mobility: WFL  · Lingual Strength and Mobility: WFL  · Buccal Strength and Mobility: WFL  · Volitional Cough: adequate  · Volitional Swallow: timely; good laryngeal elevation  · Voice Prior to PO Intake: mildly reduced intensity; clear quality    Bedside Swallow Eval:   Consistencies Assessed:  · Thin liquids x5 (via straw)  · Solids x3 (julian crackers)     Oral Phase:   · WFL    Pharyngeal Phase:   · no overt clinical signs/symptoms of aspiration  · no overt clinical signs/symptoms of pharyngeal dysphagia    Compensatory Strategies  · None    Treatment: Patient seen for a bedside swallow eval. He was sitting up in bed during eval, but was unable to tolerated this position for long 2' to hip pain. SLC eval deferred 2' to patient's significant pain. SLP educated patient and spouse regarding recs and they verbalized understanding. Whiteboard updated. Patient left in bed with call light within reach and spouse present. Findings communicated to RN. "     Assessment:     Garcia Renteria is a 70 y.o. male who presents with a safe oropharyngeal swallow. ST will f/u for formal speech/language/cognitive eval.     Goals:   Multidisciplinary Problems     SLP Goals        Problem: SLP Goal    Goal Priority Disciplines Outcome   SLP Goal     SLP Ongoing, Progressing   Description:  Speech-Language Pathology Goals  Goals to be met by 3/11/20  1. Patient will tolerate a regular diet/thin liquids with no s/s of aspiration.   2. Patient will participate in a speech/language/cognitive eval.                     Plan:     · Patient to be seen:  4 x/week   · Plan of Care expires:  04/03/20  · Plan of Care reviewed with:  patient, spouse   · SLP Follow-Up:  Yes       Discharge recommendations:  (pending further assessment)   Barriers to Discharge:  None    Time Tracking:     SLP Treatment Date:   03/04/20  Speech Start Time:  1008  Speech Stop Time:  1018     Speech Total Time (min):  10 min    Billable Minutes: Eval Swallow and Oral Function 10    Maximino Mendez CCC-SLP  Speech-Language Pathology  Pager: [phone]   03/04/2020

## 2020-03-04 NOTE — PLAN OF CARE
POC reviewed with pt and family at 1700. Pt and spouse verbalized understanding. Questions and concerns addressed. No acute events today. PRN pain medication given around the clock. 1L NS bolus given per MD order. NS gtt @ 100cc/hr. Brenda remains in place. Pt progressing toward goals. Will continue to monitor. See flowsheets for full assessment and VS info.

## 2020-03-04 NOTE — OP NOTE
[facility]  Plastic Surgery  Operative Note    SUMMARY     Date of Procedure: 3/3/2020     Procedure: Procedure(s) (LRB):  T10-Pelvis Fusion with L4 PSO **AIRO** Co-Surgeon: Roberto Parkinson (N/A)  CREATION, FLAP, MUSCLE ROTATION     Plastic Surgery Procedure:  1.  Bilateral paraspinous flap advancement  2.  Layered closure of skin 38 cm    Dr. Alan Portion:  Revision and extension of posterior spinal fusion, T10 to sacrum  2. Posterior segmental spinal fixation, T10 to S1 ([employer])  3. Pelvic fixation with S2AI screws ([employer])  4. L3 pedicle subtraction osteotomy  5. T10-L2 laminectomy for severe central stenosis and spinal cord compression  6. Cement augmentation of screws, T10 and T11 bilaterally  7.  Use of intraoperative fluoroscopy  8.  Use of intraoperative neuro-monitoring with MEPs  9. Use of intraoperative CT neuronavigation  10. Vivigen, Infuse and local  bone grafting     Surgeon(s) and Role:     * Francis Alan MD - Primary     * Roberto Parkinson MD - Assisting     * Rudy Caro MD - Co-Surgeon    Pre-Operative Diagnosis: Degenerative scoliosis in adult patient [M41.50]  Spondylolisthesis of lumbar region [M43.16]  Thoracic myelopathy [M47.14]    Post-Operative Diagnosis: * No post-op diagnosis entered *    Anesthesia: General    Indications:   70 year old male with revision spine procedure, including new instrumentation, laminectomies, and graft placement.  Due to the emergent nature of the procedure, written consent was not obtained.  In my judgement and in consultation with spine team, I performed above procedure in order to obliterate space and provide robust coverage of the underlying construct.      Procedure:   I was called into the OR.  I observed healthy paraspinous muscles and fascia as well as dead space over the laminectomy defects and the opportunity to cover the lumbosacral fusion construct.      Paraspinous flaps were elevated bilaterally, sparing  perforators.  The fascia over the muscle was advanced into the defect and imbricated with running looped 0 PDS with excellent approximation.  Drains had been placed in the epidural space by neurosurgery.  Subcutaneous drains were placed  The fascial approximation was reinforced with 0 Vicryl figure of 8 sutures.  Drains were brought out cranial and lateral to the incision.  The skin was closed in layers with interrupted 0 Vicryl and interrupted 2-0 nylon with excellent approximation.  Closure with 0 vicryl and 2-0 nylon was performed in the paramedian psotion.  A prevena was placed over the incision. Drains were confirmed to suction.  All counts were correct at the end of the procedure.  Blocks with a total of 266 mg of exparelle were performed.  An abdominal binder was placed for dressing and vac tubing and drain tubing were offloaded with abd pads.        Complications: No    Estimated Blood Loss (EBL): 600 mL           Implants:   Implant Name Type Inv. Item Serial No.  Lot No. LRB No. Used   MATRIX BONE CELLR VIVIGEN 5CC - [id number]  MATRIX BONE CELLR VIVIGEN [address] [place] [address] [id number] N/A 1   KIT BONE GRAFT INFUSE LG 8MM - [id number]  KIT BONE GRAFT INFUSE LG 8MM  [employer] [facility] [id number] N/A 1   Green Pro Countoured Rods    SYNTHES  N/A 1   CONNECTOR SPINAL SFX A6 5.5MM - [id number]  CONNECTOR SPINAL SFX A6 5.5MM  [employer]  N/A 1   5.5- Side connector    [employer]  N/A 2   SCREW INNER SINGLE SET TITANIU - [id number]  SCREW INNER SINGLE SET TITANIU  [employer]  N/A 15   SCREW BONE SPINAL 5.5 5 X 45MM - [id number]  SCREW BONE SPINAL 5.5 5 X 45MM  [employer]  N/A 2   SCREW BONE SPINAL 5.5 6 X 45MM - [id number]  SCREW BONE SPINAL 5.5 6 X 45MM  [employer]  N/A 4   OMKAR SPINAL EXPEDIUM 65MM - [id number]  OMKAR SPINAL EXPEDIUM 65MM  [employer]  N/A 1   75 mm ST [place]    [employer]  N/A 1   85mm [place]    [employer]  N/A 1   OMKAR SPINAL EXPEDIUM 120MM - [id number]  OMKAR SPINAL EXPEDIUM 120MM   [employer]  N/A 1   KIT CONFIDENCE W/O NEEDLES - [id number]  KIT CONFIDENCE W/O NEEDLES  [employer]  N/A 1   Open Cannula    [employer]  N/A 1   5x45 [place].    [employer]  N/A 4   SET SCREW EXPEDIUM VERSE UNITZ - [id number]  SET SCREW EXPEDIUM VERSE UNITZ  [employer]  N/A 15   6x50 Verse Screw    [employer]  N/A 4   8x80 Verse Screw    [employer]  N/A 2       Specimens:   Specimen (12h ago, onward)    None                  Condition: Good    Disposition: Stable    Attestation: I was present and scrubbed for the key portions of the procedure.     Plastic & Reconstructive Surgery  [facility]  c/o Rudy Caro M.D.  [facility]  Second Floor [address]  [address] [place] [address]    Work [phone]  Toll free [phone]  If no answer [phone]

## 2020-03-04 NOTE — PROGRESS NOTES
[facility]  Neurocritical Care  Progress Note    Admit Date: 3/3/2020  Service Date: 03/04/2020  Length of Stay: 1    Subjective:     Chief Complaint: Acquired spondylolisthesis of thoracolumbar vertebra    History of Present Illness: 69 y/o male with PMHx of HTN, T2DM, HLD, GERD, cervical and thoracic myelopathy who is admitted to the neuro ICU s/p T10-pelvis instrumented fusion, T10- L2 laminectomy, L3 PSO, and complex wound closure by plastic surgery. Prior to surgery, the patient suffered from chronic severe low back pain, bilateral LE radiculopathy, postural deformity, and urinary incontinence. Patient previously underwent C3-C5 ACDF on 10/30/2019 and did well with that surgery. Patient did receive 2 units PRBCs intraoperatively. He will be admitted to the neuro ICU for close neurological monitoring and pain control.     Hospital Course: 03/03/2020 admit to the neuro ICU s/p T10-pelvis instrumented fusion, T10- L2 laminectomy, and L3 PSO.   3/4/2020 No significant events over night. Requiring pain meds around the clock. Gets some relief, not total. bolused 1l  And increased IVF discussed with NSGY will step down to nsgy today    Interval History:No significant events over night. Requiring pain meds around the clock. Gets some relief, not total. bolused 1l  And increased IVF discussed with NSGY will step down to nsgy today    Review of Systems  Constitutional: Denies fevers, weight loss, chills, or weakness.  Eyes: Denies changes in vision.  ENT: Denies dysphagia, nasal discharge, ear pain or discharge.  Cardiovascular: Denies chest pain, palpitations, orthopnea, or claudication.  Respiratory: Denies shortness of breath, cough, hemoptysis, or wheezing.  GI: Denies nausea/vomitting, hematochezia, melena, abd pain, or changes in appetite.  : Denies dysuria, incontinence, or hematuria.  Musculoskeletal:+ post op pain Denies joint pain or myalgias.  Skin/breast: Denies rashes, lumps, lesions,  or discharge.  Neurologic:+ bnilat LE weakness Denies headache, dizziness, vertigo, or paresthesias.  Psychiatric: Denies changes in mood or hallucinations.  Endocrine: Denies polyuria, polydipsia, heat/cold intolerance.  Hematologic/Lymph: Denies lymphadenopathy, easy bruising or easy bleeding.  Allergic/Immunologic: Denies rash, rhinitis.       Vitals:   Temp: 98.3 °F (36.8 °C)  Pulse: (!) 119  Rhythm: sinus tachycardia  BP: 123/72  MAP (mmHg): 92  Resp: (!) 21  SpO2: 98 %  O2 Device (Oxygen Therapy): nasal cannula    Temp  Min: 97.8 °F (36.6 °C)  Max: 98.4 °F (36.9 °C)  Pulse  Min: 92  Max: 119  BP  Min: 105/62  Max: 144/74  MAP (mmHg)  Min: 75  Max: 115  Resp  Min: 6  Max: 21  SpO2  Min: 95 %  Max: 100 %    03/03 0701 - 03/04 0700  In: 5956.3 [P.O.:240; I.V.:5416.3]  Out: 3622 [Urine:2587; Drains:435]   Unmeasured Output  Stool Occurrence: 0     Examination:   Constitutional: Well-nourished and -developed. No apparent distress.   Eyes: Conjunctiva clear, anicteric. Lids no lesions.  Head/Ears/Nose/Mouth/Throat/Neck: Moist mucous membranes. External ears, nose atraumatic.   Cardiovascular: Regular rhythm. No murmurs. No leg edema.  Respiratory: Comfortable respirations. Clear to auscultation.  Gastrointestinal: No hernia. Soft, nondistended, nontender. + bowel sounds.    Neurologic:  -GCS E4V5M6  -Alert. Oriented to person, place, and time. Speech fluent. Follows commands.  -Cranial nerves II-XII grossly intact,PERRL 3+  -Motor 5/5 BUE; BLE 3/5 moves BLE on bed no lifting. BUE moves spon and against gravity  -Sensation bilat intact      Medications:   Continuous  sodium chloride 0.9% Last Rate: 100 mL/hr at 03/04/20 1605   Scheduled  bisacodyL 10 mg Daily   ceFAZolin (ANCEF) IVPB 2 g Q8H   famotidine 40 mg QHS   gabapentin 600 mg QHS   heparin (porcine) 5,000 Units Q8H   lisinopril 5 mg Daily   mupirocin  BID   senna-docusate 8.6-50 mg 1 tablet BID   PRN  acetaminophen 650 mg Q6H PRN   aluminum-magnesium  hydroxide-simethicone 30 mL Q4H PRN   cyclobenzaprine 10 mg TID PRN   Dextrose 10% Bolus 12.5 g PRN   Dextrose 10% Bolus 25 g PRN   glucagon (human recombinant) 1 mg PRN   glucose 16 g PRN   glucose 24 g PRN   HYDROmorphone 0.5 mg Q6H PRN   insulin aspart U-100 1-10 Units QID (AC + HS) PRN   labetalol 10 mg Q4H PRN   ondansetron 8 mg Q6H PRN   ondansetron 4 mg Q8H PRN   oxyCODONE 10 mg Q4H PRN   oxyCODONE 5 mg Q4H PRN   promethazine (PHENERGAN) IVPB 6.25 mg Q6H PRN   sodium chloride 0.9% 10 mL PRN      Today I independently reviewed pertinent medications, lines/drains/airways, imaging, laboratory results, microbiology results, notably:     ISTAT: No results for input(s): PH, PCO2, PO2, POCSATURATED, HCO3, BE, POCNA, POCK, POCTCO2, POCGLU, POCICA, POCLAC, SAMPLE in the last 24 hours.   Chem: Recent Labs   Lab 03/04/20  0257      K 3.9      CO2 22*   *   BUN 13   CREATININE 1.0   ESTGFRAFRICA >60.0   EGFRNONAA >60.0   CALCIUM 8.5*   MG 1.7   PHOS 2.9   ANIONGAP 8   PROT 5.7*   ALBUMIN 3.2*   BILITOT 0.3   ALKPHOS 55   AST 46*   ALT 16     Heme: Recent Labs   Lab 03/04/20  0257   WBC 13.09*   HGB 9.0*   HCT 29.6*      INR 1.2     Endo:   Recent Labs   Lab 03/04/20  1517   POCTGLUCOSE 106      Assessment/Plan:     Cardiac/Vascular  Essential hypertension  POA   -SBP < 160; MAP > 65  Lisinopril 5mg daily  Prn labetalol      Endocrine  Type 2 diabetes mellitus with diabetic neuropathy, without long-term current use of insulin  POA  -A1c 5.6  -SSI moderate accuchecks ac/hs  -Diabetic diet    GI  Constipation  POA  -Bowel regimen   -I/Os    Gastroesophageal reflux disease  POA  -continue pepcid 40mg qhs    Other  * Acquired spondylolisthesis of thoracolumbar vertebra  3/3 s/p T10-pelvis instrumented fusion, T10- L2 laminectomy, L3 PSO, and complex wound closure by plastic surgery POD#1  NSGY following  -q1h neuro checks and vitals  -SBP < 160  -Monitor drain output-- drains to full suction. YRN  drain x 2 (subq) hemovac x 2 (deep) and wound vac x 1  -ancef 2 g q8h for drain ppx   -Pain control, patient may be candidate for PCA  OOB with PT/OT  -Logroll  -post op imaging per neurosurgery   -PT/OT when appropriate             The patient is being Prophylaxed for:  Venous Thromboembolism with: Mechanical or Chemical  Stress Ulcer with: H2B  Ventilator Pneumonia with: not applicable    Activity Orders          Diet diabetic [facility]; 2000 Calorie: Diabetic starting at 03/04 0907    Commode at bedside starting at 03/03 1722        Full Code     I have spent 35 min with this patient, with over 50% of this time spent coordinating care and speaking with the family    Deja Hawkins NP  Neurocritical Care  [facility] [place]

## 2020-03-04 NOTE — CONSULTS
Inpatient consult to Physical Medicine Rehab  Consult performed by: Jaelyn Gallardo NP  Consult ordered by: Linh Roach PA-C  Reason for consult: Assess rehab needs      Reviewed patient history and current admission.  Rehab team following.  Full consult to follow.    MODESTA Pfeiffer, FNP-C  Physical Medicine & Rehabilitation   03/04/2020  Spectralink: [id number]

## 2020-03-04 NOTE — PLAN OF CARE
Problem: Adult Inpatient Plan of Care  Goal: Plan of Care Review  Outcome: Ongoing, Progressing     Recommendations    1. Recommend Diabetic diet as tolerated, with texture per SLP recommendations.   2. Recommend Boost Glucose Control TID to optimize nutritional intake.   Goals: Pt to consume/tolerate % of EEN/EPn by RD follow-up.  Nutrition Goal Status: new    Full assessment completed 3/4/20.

## 2020-03-04 NOTE — PLAN OF CARE
03/04/20 1628   Post-Acute Status   Post-Acute Authorization Placement   Post-Acute Placement Status Patient List Provided  (Rehab)     SW met with Pt and wife at bedside. Discussed therapy recs for inpatient rehab and provided list. Pt and wife to review and give choices asap.    Joseline Henderson LMSW  [facility]

## 2020-03-04 NOTE — BRIEF OP NOTE
[facility]  Brief Operative Note    SUMMARY     Surgery Date: 3/3/2020     Surgeon(s) and Role:     * Francis Alan MD - Primary     * Roberto Parkinson MD - Assisting     * Rudy Caro MD - Co-Surgeon     * Mulugeta Pierson MD - Resident - Assisting        Pre-op Diagnosis:  Degenerative scoliosis in adult patient [M41.50]  Spondylolisthesis of lumbar region [M43.16]  Thoracic myelopathy [M47.14]    Post-op Diagnosis:  Post-Op Diagnosis Codes:     * Degenerative scoliosis in adult patient [M41.50]     * Spondylolisthesis of lumbar region [M43.16]     * Thoracic myelopathy [M47.14]    Procedure(s) (LRB):  T10-Pelvis Fusion with L4 PSO **AIRO** Co-Surgeon: Roberto Parkinson (N/A)  CREATION, FLAP, MUSCLE ROTATION    Anesthesia: General    Description of Procedure: T10 - pelvis posterior segmental instrumented fusion, T10 - L3 laminectomy for decompression, L3 pedical subtraction osteotomy.    Description of the findings of the procedure: Very extensive previous fusion mass from L3 to the sacrum, good decompression with return to more physiologic position of the thecal sac.  No concern for new CSF leak, though there was an irregular area on the dorsal surface at about the level of L3 that was likely some sort of dural repair from the previous surgery.  Hardware well placed per intraop imaging.  Fairly good correction of kyphotic deformity post-op.      Estimated Blood Loss: 600 mL         Specimens:   Specimen (12h ago, onward)    None

## 2020-03-04 NOTE — PROGRESS NOTES
[facility]  Neurosurgery  Progress Note    Subjective:         Post-Op Info:  Procedure(s) (LRB):  T10-Pelvis Fusion with L4 PSO **AIRO** Co-Surgeon: Roberto Parkinson (N/A)  CREATION, FLAP, MUSCLE ROTATION   1 Day Post-Op     Interval History: POD#1. Overall pain controlled. No issues per nursing. Labs WNL. AFVSS. Drains with large output staying for now. Xray pending     Medications:  Continuous Infusions:   sodium chloride 0.9% 75 mL/hr at 03/04/20 0705     Scheduled Meds:   bisacodyL  10 mg Rectal Daily    ceFAZolin (ANCEF) IVPB  2 g Intravenous Q8H    famotidine  40 mg Oral QHS    gabapentin  600 mg Oral QHS    lisinopril  5 mg Oral Daily    mupirocin   Nasal BID    senna-docusate 8.6-50 mg  1 tablet Oral BID     PRN Meds:acetaminophen, aluminum-magnesium hydroxide-simethicone, cyclobenzaprine, HYDROmorphone, labetalol, ondansetron, ondansetron, oxyCODONE, oxyCODONE, promethazine (PHENERGAN) IVPB, sodium chloride 0.9%     Review of Systems  Objective:     Weight: 98.5 kg (217 lb 2.5 oz)  Body mass index is 28.65 kg/m².  Vital Signs (Most Recent):  Temp: 98.2 °F (36.8 °C) (03/04/20 0301)  Pulse: 109 (03/04/20 0710)  Resp: 18 (03/04/20 0710)  BP: (!) 142/90 (03/04/20 0705)  SpO2: 99 % (03/04/20 0710) Vital Signs (24h Range):  Temp:  [97.8 °F (36.6 °C)-98.3 °F (36.8 °C)] 98.2 °F (36.8 °C)  Pulse:  [] 109  Resp:  [6-21] 18  SpO2:  [95 %-100 %] 99 %  BP: (105-143)/(59-96) 142/90  Arterial Line BP: (114-152)/(55-73) 151/66     Date 03/04/20 0700 - 03/05/20 0659   Shift 8093-6238 2821-6705 8592-5619 24 Hour Total   INTAKE   I.V.(mL/kg) 75(0.8)   75(0.8)   Shift Total(mL/kg) 75(0.8)   75(0.8)   OUTPUT   Shift Total(mL/kg)       Weight (kg) 98.5 98.5 98.5 98.5                        Closed/Suction Drain 03/03/20 1559 Left;Superior Back Accordion 10 Fr. (Active)   Dressing Type Transparent (Tegaderm);Biopatch in place 3/4/2020  3:01 AM   Dressing Status Clean;Dry;Intact 3/4/2020  3:01 AM  "  Dressing Intervention Integrity maintained 3/4/2020  3:01 AM   Drainage Serosanguineous 3/4/2020  3:01 AM   Status To bulb suction 3/4/2020  3:01 AM   Output (mL) 60 mL 3/4/2020  4:01 AM            Closed/Suction Drain 03/03/20 1600 Right;Superior Back Accordion 10 Fr. (Active)   Dressing Type Transparent (Tegaderm);Biopatch in place 3/4/2020  3:01 AM   Dressing Status Clean;Dry;Intact 3/4/2020  3:01 AM   Dressing Intervention Integrity maintained 3/4/2020  3:01 AM   Drainage Serosanguineous 3/4/2020  3:01 AM   Status To bulb suction 3/4/2020  3:01 AM   Output (mL) 60 mL 3/4/2020  4:01 AM            Closed/Suction Drain 03/03/20 1601 Left;Inferior Back Bulb 15 Fr. (Active)   Dressing Type Biopatch in place;Transparent (Tegaderm) 3/4/2020  3:01 AM   Dressing Status Clean;Dry;Intact 3/4/2020  3:01 AM   Dressing Intervention Integrity maintained 3/4/2020  3:01 AM   Drainage Serosanguineous 3/4/2020  3:01 AM   Status To bulb suction 3/4/2020  3:01 AM   Output (mL) 0 mL 3/4/2020  4:01 AM            Closed/Suction Drain 03/03/20 1602 Right;Inferior Back Bulb 15 Fr. (Active)   Dressing Type Biopatch in place;Transparent (Tegaderm) 3/4/2020  3:01 AM   Dressing Status Clean;Intact;Dry 3/4/2020  3:01 AM   Dressing Intervention Integrity maintained 3/4/2020  3:01 AM   Drainage Serosanguineous 3/4/2020  3:01 AM   Status To bulb suction 3/4/2020  3:01 AM   Output (mL) 5 mL 3/4/2020  4:01 AM            Urethral Catheter 03/03/20 0740 Non-latex 16 Fr. (Active)   Site Assessment Clean;Intact;Dry 3/4/2020  3:01 AM   Collection Container Urimeter 3/4/2020  3:01 AM   Securement Method secured to top of thigh w/ adhesive device 3/4/2020  3:01 AM   Catheter Care Performed yes 3/4/2020  3:01 AM   Reason for Continuing Urinary Catheterization Post operative 3/4/2020  3:01 AM   CAUTI Prevention Bundle StatLock in place w 1" slack;Intact seal between catheter & drainage tubing;Drainage bag/urimeter off the floor;Green sheeting clip in " use;No dependent loops or kinks;Drainage bag/urimeter below bladder 3/3/2020  7:01 PM   Output (mL) 60 mL 3/4/2020  6:01 AM       Neurosurgery Physical Exam  AAOX3  BUE with 5/5  BLE effort dependant with 5/5 throughout except b/l HF 4/5  SILT        Significant Labs:  Recent Labs   Lab 03/04/20  0257   *      K 3.9      CO2 22*   BUN 13   CREATININE 1.0   CALCIUM 8.5*   MG 1.7     Recent Labs   Lab 03/03/20  1425 03/03/20 1811 03/04/20  0257   WBC  --  15.70* 13.09*   HGB  --  10.1* 9.0*   HCT 27* 32.2* 29.6*   PLT  --  194 158     Recent Labs   Lab 03/04/20  0257   INR 1.2   APTT 24.8     Microbiology Results (last 7 days)     ** No results found for the last 168 hours. **        Recent Lab Results       03/04/20  0257 03/03/20 1811 03/03/20  1425   03/03/20  1134        Albumin 3.2           Alkaline Phosphatase 55           ALT 16           Anion Gap 8           aPTT 24.8  Comment:  aPTT therapeutic range = 39-69 seconds           AST 46           Baso # 0.02 0.04         Basophil% 0.2 0.3         BILIRUBIN TOTAL 0.3  Comment:  For infants and newborns, interpretation of results should be based  on gestational age, weight and in agreement with clinical  observations.  Premature Infant recommended reference ranges:  Up to 24 hours.............<8.0 mg/dL  Up to 48 hours............<12.0 mg/dL  3-5 days..................<15.0 mg/dL  6-29 days.................<15.0 mg/dL             BUN, Bld 13           Calcium 8.5           Chloride 110           Cholesterol   117  Comment:  The National Cholesterol Education Program (NCEP) has set the  following guidelines (reference ranges) for Cholesterol:  Optimal.....................<200 mg/dL  Borderline High.............200-239 mg/dL  High........................> or = 240 mg/dL           CO2 22           Creatinine 1.0           Differential Method Automated Automated         eGFR if  >60.0           eGFR if non African American  >60.0  Comment:  Calculation used to obtain the estimated glomerular filtration  rate (eGFR) is the CKD-EPI equation.              Eos # 0.0 0.0         Eosinophil% 0.0 0.0         Estimated Avg Glucose   114         Glucose 151           Gran # (ANC) 11.1 13.1         Gran% 84.8 83.5         Group & Rh   A POS         HDL   17  Comment:  The National Cholesterol Education Program (NCEP) has set the  following guidelines (reference values) for HDL Cholesterol:  Low...............<40 mg/dL  Optimal...........>60 mg/dL           Hdl/Cholesterol Ratio   14.5         Hematocrit 29.6 32.2         Hemoglobin 9.0 10.1         Hemoglobin A1C External   5.6  Comment:  ADA Screening Guidelines:  5.7-6.4%  Consistent with prediabetes  >or=6.5%  Consistent with diabetes  High levels of fetal hemoglobin interfere with the HbA1C  assay. Heterozygous hemoglobin variants (HbS, HgC, etc)do  not significantly interfere with this assay.   However, presence of multiple variants may affect accuracy.           Immature Grans (Abs) 0.10  Comment:  Mild elevation in immature granulocytes is non specific and   can be seen in a variety of conditions including stress response,   acute inflammation, trauma and pregnancy. Correlation with other   laboratory and clinical findings is essential.   0.23  Comment:  Mild elevation in immature granulocytes is non specific and   can be seen in a variety of conditions including stress response,   acute inflammation, trauma and pregnancy. Correlation with other   laboratory and clinical findings is essential.           Immature Granulocytes 0.8 1.5         INDIRECT NAZ   NEG         INR 1.2  Comment:  Coumadin Therapy:  2.0 - 3.0 for INR for all indicators except mechanical heart valves  and antiphospholipid syndromes which should use 2.5 - 3.5.             LDL Cholesterol External   Invalid, Trig>400.0  Comment:  The National Cholesterol Education Program (NCEP) has set the  following guidelines  (reference values) for LDL Cholesterol:  Optimal.......................<130 mg/dL  Borderline High...............130-159 mg/dL  High..........................160-189 mg/dL  Very High.....................>190 mg/dL           Lymph # 1.0 1.3         Lymph% 7.6 8.5         Magnesium 1.7           MCH 26.4 27.9         MCHC 30.4 31.4         MCV 87 89         Mono # 0.9 1.0         Mono% 6.6 6.2         MPV 10.6 10.9         Non-HDL Cholesterol   100  Comment:  Risk category and Non-HDL cholesterol goals:  Coronary heart disease (CHD)or equivalent (10-year risk of CHD >20%):  Non-HDL cholesterol goal     <130 mg/dL  Two or more CHD risk factors and 10-year risk of CHD <= 20%:  Non-HDL cholesterol goal     <160 mg/dL  0 to 1 CHD risk factor:  Non-HDL cholesterol goal     <190 mg/dL           nRBC 0 0         Phosphorus 2.9           Platelets 158 194         POC BE     -2 -2     POC Glucose     129 94     POC HCO3     23.1 22.7     POC Hematocrit     27 37     POC Ionized Calcium     1.10 1.09     POC PCO2     38.0 36.1     POC PH     7.392 7.407     POC PO2     212 222     POC Potassium     3.6 3.1     POC SATURATED O2     100 100     POC Sodium     141 141     POC TCO2     24 24     Potassium 3.9           PROTEIN TOTAL 5.7           Protime 11.8           RBC 3.41 3.62         RDW 13.6 13.6         Sample     ARTERIAL ARTERIAL     Sodium 140           Total Cholesterol/HDL Ratio   6.9         Triglycerides   578  Comment:  The National Cholesterol Education Program (NCEP) has set the  following guidelines (reference values) for triglycerides:  Normal......................<150 mg/dL  Borderline High.............150-199 mg/dL  High........................200-499 mg/dL           TSH   0.950         WBC 13.09 15.70               Significant Diagnostics:  I have reviewed all pertinent imaging results/findings within the past 24 hours.    Assessment/Plan:     Degenerative scoliosis  A 70 year old with degenerative lumbar  spondy and spine deformity now s/p T10 - pelvis posterior segmental instrumented fusion, T10 - L3 laminectomy for decompression, L3 pedical subtraction osteotomy (3/3).    Okay for stepdown to NSGY when appropriate per NCC  Pain control   T/L xray pending   TLOS brace when OOB  Keep prevena vac and drains in place  Goal normotensive   Sating well, encourage IS  ADAT  Replace lytes PRN  Follow up H/H and ransfuse PRN  Continue ppx abx while drains in place  Daily PT/OT and OOB  DVT ppx: okay to start SQH  Further care per NCC  Will continue to follow              Robin Grier MD  Neurosurgery  [facility]

## 2020-03-04 NOTE — PT/OT/SLP EVAL
Physical Therapy Evaluation    Patient Name:  Garcia Renteria   MRN:  [id number]    Recommendations:     Discharge Recommendations:  rehabilitation facility   Discharge Equipment Recommendations: (TBD by next level of care)   Barriers to discharge: Inaccessible home and Decreased caregiver support    Assessment:     Garcia Renteria is a 70 y.o. male admitted with a medical diagnosis of Acquired spondylolisthesis of thoracolumbar vertebra.  He presents with the following impairments/functional limitations:  weakness, impaired endurance, impaired self care skills, impaired functional mobilty, gait instability, impaired balance, impaired sensation, decreased lower extremity function, pain, orthopedic precautions.    Pt would benefit from intensive collaborative rehabilitation for: Dynamic/static standing/sitting balance through skilled balance training, strengthening with the use of skilled therapeutic exercises interventions, mobility and safety training to ensure safe discharge home through skilled patient and caregiver education home management training, mobility through community re-integration training and mobility through adaptive equipment training. Pt highly motivated to return to independent PLOF and can tolerate 3+hours of therapy. Pt continues to benefit from a collaborative PT/OT/SLP program to improve quality of life and focus on recovery of impairments.      Rehab Prognosis: Good; patient would benefit from acute skilled PT services to address these deficits and reach maximum level of function.    Recent Surgery: Procedure(s) (LRB):  T10-Pelvis Fusion with L4 PSO **AIRO** Co-Surgeon: Roberto Parkinson (N/A)  CREATION, FLAP, MUSCLE ROTATION 1 Day Post-Op    Plan:     During this hospitalization, patient to be seen 4 x/week to address the identified rehab impairments via gait training, therapeutic activities, therapeutic exercises, neuromuscular re-education and progress toward the following goals:    · Plan of  Care Expires:  04/03/20    Subjective     Chief Complaint: pain in L hip and L foot with WBing  Patient/Family Comments/goals: return to PLOF  Pain/Comfort:  · Pain Rating 1: (not rated)  · Location - Side 1: Left  · Location 1: hip  · Pain Addressed 1: Pre-medicate for activity, Reposition, Cessation of Activity, Nurse notified  · Pain Rating Post-Intervention 1: (not rated)    Patients cultural, spiritual, Christianity conflicts given the current situation: no    Living Environment:  Living Environment: Pt lives with spouse and son in 1-story house with 0 KASSY and walk-in shower with bench.  Previous level of function: ACDF in October 2019, reports (I) with ADLs and mobility prior to current surgery  Equipment Used at Home:  walker, rolling, cane, straight, shower chair  Assistance upon Discharge: Wife and son are able to assist    Objective:     Communicated with nursing prior to session.  Patient found right sidelying with arterial line, bed alarm, blood pressure cuff, YRN drain, hemovac, tay catheter, peripheral IV, pulse ox (continuous), SCD, telemetry, wound vac  upon PT entry to room.    General Precautions: Standard, fall   Orthopedic Precautions:spinal precautions   Braces: TLSO     Exams:  · Cognitive Exam:  Patient is oriented to Person, Place, Time and Situation  · Fine Motor Coordination: Unable to assess; pt unable to remain seated EOB 2/2 pain  · Gross Motor Coordination:  WFL  · Postural Exam:  Patient presented with the following abnormalities:    · -       Rounded shoulders  · Sensation: LLE decreased sensation  · RLE ROM: WFL  · RLE Strength: WFL  · LLE ROM: WFL  · LLE Strength: Deficits: grossly 4/5, except  hip flexion 3/5    Functional Mobility:  · Bed Mobility:     · Rolling Left:  maximal assistance and of 2 persons  · Rolling Right: maximal assistance and of 2 persons  · Scooting: maximal assistance and of 2 persons to HOB  · Supine to Sit: maximal assistance and of 2 persons  · Sit to  Supine: maximal assistance and of 2 persons  · Transfers:     · Sit to Stand: deferred 2/2 pt unable to tolerate EOB 2/2 L hip pain  · EOB ~2-3 minutes  · Balance:   · Static Sitting: max A, R posterolateral lean  · Dynamic sitting: N/T      Therapeutic Activities and Exercises:  Pt educated on spinal precautions, log roll technique, and TLSO brace.  Patient educated on role of therapy, goals of session, and benefits of mobilizing. Discussed PT plan of care during hospitalization. Patient educated on calling for assistance. Patient educated on how their diagnosis impacts their mobility within PT scope of practice.   Communication board updated.  All questions answered within PT scope of practice.      AM-PAC 6 CLICK MOBILITY  Total Score:6     Patient left right sidelying with all lines intact, call button in reach, bed alarm on, nursing notified and spouse present.    GOALS:   Multidisciplinary Problems     Physical Therapy Goals        Problem: Physical Therapy Goal    Goal Priority Disciplines Outcome Goal Variances Interventions   Physical Therapy Goal     PT, PT/OT Ongoing, Progressing     Description:  Goals to be met by: 3/14/2020     Patient will increase functional independence with mobility by performin. Supine to sit with Moderate Assistance  2. Sit to supine with Moderate Assistance  3. Rolling to Left and Right with Moderate Assistance.  4. Sit to stand transfer with Maximum Assistance  5. Bed to chair transfer with Maximum Assistance  6. Gait  x 15 feet with Maximum Assistance using RW.   7. Lower extremity exercise program x15 reps per handout, with assistance as needed                      History:     Past Medical History:   Diagnosis Date    Colitis     Diabetes mellitus, type 2     Diverticulosis     GERD (gastroesophageal reflux disease)     Hypertension        Past Surgical History:   Procedure Laterality Date    ANKLE SURGERY Bilateral     ANTERIOR CERVICAL DISCECTOMY W/ FUSION  N/A 10/30/2019    Procedure: C3-4, C4-5 ACDF;  Surgeon: Francis Alan MD;  Location: [facility] OR [facility];  Service: Neurosurgery;  Laterality: N/A;  C3-4, C4-5 ACDF  Anesthesia:  General  Rad:  C-Arm  NM:  EMG, SEP, & MEP  Blood:  Type & Screen  Position:  Supine  Bed:  Regular slider bed  Headrest:  [place] & GW tongs/hanging weights  Misc:  Small vivigen  Nerve root retractor (DePuy)  Disposable #2 [place]  Interbody    BACK SURGERY      COLONOSCOPY      CREATION OF MUSCLE ROTATIONAL FLAP  3/3/2020    Procedure: CREATION, FLAP, MUSCLE ROTATION;  Surgeon: Francis Alan MD;  [facility];  Service: Neurosurgery;;    lower back surgery      LUMBAR LAMINECTOMY WITH FUSION N/A 3/3/2020    Procedure: T10-Pelvis Fusion with L4 PSO **AIRO** Co-Surgeon: Roberto Parkinson;  Surgeon: Francis Alan MD;  [facility];  Service: Neurosurgery;  Laterality: N/A;  **AIRO**  **CELL SAVER**  Co-Surgeon: Roberto Parkinson  NM: EMG, SEP, MEP  Position: Prone  Bed: Shaji 4 post, prone pillow  Blood: Type & Hold 2 units  Rad: C-Arm, AIRO  Vendor: Depuy  Misc: Vivigen, Infuse, Cement (Depuy)  [place]    NECK SURGERY      SPINE SURGERY      UPPER GASTROINTESTINAL ENDOSCOPY         Time Tracking:     PT Received On: 03/04/20  Initial attempt in AM [id number]  PT Start Time: 1340     PT Stop Time: 1355   PT Total Time (min): 15 min + 9 min = 24 mins (co-eval performed)    Billable Minutes: Evaluation 15 and Therapeutic Activity 8      Katy Deutsch, PT  03/04/2020

## 2020-03-04 NOTE — PLAN OF CARE
PT Eval complete and POC established.    Katy Deutsch, PT, DPT  3/4/2020      Problem: Physical Therapy Goal  Goal: Physical Therapy Goal  Description  Goals to be met by: 3/14/2020     Patient will increase functional independence with mobility by performin. Supine to sit with Moderate Assistance  2. Sit to supine with Moderate Assistance  3. Rolling to Left and Right with Moderate Assistance.  4. Sit to stand transfer with Maximum Assistance  5. Bed to chair transfer with Maximum Assistance  6. Gait  x 15 feet with Maximum Assistance using RW.   7. Lower extremity exercise program x15 reps per handout, with assistance as needed     Outcome: Ongoing, Progressing

## 2020-03-04 NOTE — HOSPITAL COURSE
03/03/2020 admit to the neuro ICU s/p T10-pelvis instrumented fusion, T10- L2 laminectomy, and L3 PSO.   3/4/2020 No significant events over night. Requiring pain meds around the clock. Gets some relief, not total. bolused 1l  And increased IVF discussed with NSGY will step down to nsgy today

## 2020-03-04 NOTE — PROGRESS NOTES
Pt with increased pain to left hip. Does not radiate.     3/5 L HF, 4/5 throughout LLE  5/5 throughout RLE    - Gabapentin d/c'd. Lyrica 75 mg bid started. Continue rest of pain medications.  - PT/OT- encouraged patient to get up and OOB    Discussed with Dr. Waqas Bowen, PA-C   [phone]  Neurosurgery  [facility]

## 2020-03-04 NOTE — ASSESSMENT & PLAN NOTE
71 y/o male s/p T10-pelvis instrumented fusion, T10- L2 laminectomy, L3 PSO, and complex wound closure by plastic surgery on 3/3/2020  -admit to neuro ICU  -q1h neuro checks and vitals  -SBP < 160  -Monitor drain output-- drains to full suction. YRN drain x 2 (subq) hemovac x 2 (deep) and wound vac x 1  -ancef 2 g q8h for drain ppx   -Pain control, patient may be candidate for PCA  -Bedrest  -Logroll  -post op imaging per neurosurgery   -PT/OT when appropriate

## 2020-03-04 NOTE — PLAN OF CARE
Problem: SLP Goal  Goal: SLP Goal  Description  Speech-Language Pathology Goals  Goals to be met by 3/11/20  1. Patient will tolerate a regular diet/thin liquids with no s/s of aspiration.   2. Patient will participate in a speech/language/cognitive eval.    Outcome: Ongoing, Progressing     Patient seen for a bedside swallow eval. SLP recommending continued regular diet/thin liquids at this time.     Maximino Mendez CCC-SLP  Speech-Language Pathology  Pager: [phone]

## 2020-03-04 NOTE — ASSESSMENT & PLAN NOTE
3/3 s/p T10-pelvis instrumented fusion, T10- L2 laminectomy, L3 PSO, and complex wound closure by plastic surgery POD#1  NSGY following  -q1h neuro checks and vitals  -SBP < 160  -Monitor drain output-- drains to full suction. YRN drain x 2 (subq) hemovac x 2 (deep) and wound vac x 1  -ancef 2 g q8h for drain ppx   -Pain control, patient may be candidate for PCA  OOB with PT/OT  -Logroll  -post op imaging per neurosurgery   -PT/OT when appropriate

## 2020-03-04 NOTE — PT/OT/SLP EVAL
Occupational Therapy   Evaluation    Name: Garcia Renteria  MRN: [id number]  Admitting Diagnosis:  Acquired spondylolisthesis of thoracolumbar vertebra 1 Day Post-Op  T10-Pelvis Fusion with L4 PSO **AIRO** Co-Surgeon: Roberto Parkinson (N/A)  CREATION, FLAP, MUSCLE ROTATION     Recommendations:     Discharge Recommendations: rehabilitation facility  Discharge Equipment Recommendations:  (TBD)  Barriers to discharge:  (increased assistance needed)    Assessment:     Garcia Renteria is a 70 y.o. male with a medical diagnosis of Acquired spondylolisthesis of thoracolumbar vertebra.  He presents with performance deficits including weakness, impaired endurance, impaired self care skills, impaired functional mobilty, impaired balance, decreased lower extremity function, pain, orthopedic precautions. Pt limited by L hip pain as well as feeling dizzy/lightheaded sitting EOB and unable to tolerate. Pt would continue to benefit from OT to increase functional independence and safety. Recommend rehab upon D/C pending progress.     Rehab Prognosis: Good; patient would benefit from acute skilled OT services to address these deficits and reach maximum level of function.       Plan:     Patient to be seen 4 x/week to address the above listed problems via self-care/home management, therapeutic activities, therapeutic exercises, neuromuscular re-education  · Plan of Care Expires: 04/04/20  · Plan of Care Reviewed with: spouse, patient    Subjective     Chief Complaint: L hip pain  Patient/Family Comments/goals: Return to PLOF    Occupational Profile:  Living Environment: Pt lives with spouse and son in 1-story house with 0 KASSY and walk-in shower with bench.  Previous level of function: ACDF in October 2019, reports (I) with ADLs and mobility prior to current surgery  Equipment Used at Home:  walker, rolling, cane, straight, shower chair  Assistance upon Discharge: Wife and son are able to assist    Pain/Comfort:  · Pain Rating 1: (Did not  rate)  · Location - Side 1: Left  · Location 1: hip  · Pain Addressed 1: Pre-medicate for activity, Reposition, Cessation of Activity    Patients cultural, spiritual, Religion conflicts given the current situation: no    Objective:     Communicated with: RN prior to session. Patient found right sidelying with arterial line, blood pressure cuff, hemovac, YRN drain, peripheral IV, pulse ox (continuous), SCD, wound vac upon OT entry to room, spouse present.  Initial attempt in AM, pt motivated to participate but TLSO brace not at bedside; educated on spinal precautions and returned in PM when brace arrived.    General Precautions: Standard, fall   Orthopedic Precautions:spinal precautions   Braces: TLSO     Occupational Performance:    Bed Mobility:    · Patient completed Scooting with maximal assistance and 2 persons in supine to HOB  · Patient completed Supine to Sit with maximal assistance and 2 persons  · Patient completed Sit to Supine with maximal assistance and 2 persons  · Pt required 2 person assistance due to L hip pain which increased with movement    Functional Mobility/Transfers:  · Unable to attempt this date-- pt unable to tolerate sitting EOB long enough to don TLSO brace and reports significant L hip pain and feeling dizzy/lightheaded sitting EOB ~2-3 minutes, bp dropped and pt returned to sidelying which resolved symptoms-- RN at bedside    Activities of Daily Living:  · Lower Body Dressing: total assistance to don socks due to pain    Cognitive/Visual Perceptual:  Cognitive/Psychosocial Skills:     -       Oriented to: Person, Place, Time and Situation   -       Follows Commands/attention: Follows multistep commands  -       Communication: clear/fluent  -       Safety awareness/insight to disability: intact   Visual/Perceptual:      -Intact      Physical Exam:  Balance:    -       poor sitting balance-- tolerated EOB ~2-3 minutes with Max A due to increased L hip pain with attempt  Postural  examination/scapula alignment:    -       No postural abnormalities identified  Sensation:    -       Intact UEs  Upper Extremity Range of Motion:     -       Right Upper Extremity: WFL, limited by pain  -       Left Upper Extremity: WFL, limited by pain  Upper Extremity Strength:    -       Right/Left Upper Extremity: unable to formally assess this date   Strength: B WFL  Fine Motor Coordination: unable to formally assess this date    AMPA 6 Click ADL:  [facility] Total Score: 11    Treatment & Education:  OT eval; educated on OT role and POC as well as spinal precautions and TLSO management  Education:    Patient left right sidelying with all lines intact, call button in reach, RN notified and spouse present    GOALS:   Multidisciplinary Problems     Occupational Therapy Goals        Problem: Occupational Therapy Goal    Goal Priority Disciplines Outcome Interventions   Occupational Therapy Goal     OT, PT/OT Ongoing, Progressing    Description:  Goals to be met by: 7 days (3/11/20)     Patient will increase functional independence with ADLs by performing:    UE Dressing with Minimal Assistance including TLSO.  LE Dressing with Moderate Assistance using AD as needed.  Grooming while standing with Contact Guard Assistance.  Toileting from bedside commode with Minimal Assistance for hygiene and clothing management.   Supine to sit with Minimal Assistance.  Toilet transfer to bedside commode with Minimal Assistance.  Pt will verbalize understanding of 3/3 spinal precautions without added cues.                      History:     Past Medical History:   Diagnosis Date    Colitis     Diabetes mellitus, type 2     Diverticulosis     GERD (gastroesophageal reflux disease)     Hypertension        Past Surgical History:   Procedure Laterality Date    ANKLE SURGERY Bilateral     ANTERIOR CERVICAL DISCECTOMY W/ FUSION N/A 10/30/2019    Procedure: C3-4, C4-5 ACDF;  Surgeon: Francis Alan MD;  Location: [facility] OR [address] [place] [address];   Service: Neurosurgery;  Laterality: N/A;  C3-4, C4-5 ACDF  Anesthesia:  General  Rad:  C-Arm  NM:  EMG, SEP, & MEP  Blood:  Type & Screen  Position:  Supine  Bed:  Regular slider bed  Headrest:  [place] & GW tongs/hanging weights  Misc:  Small vivigen  Nerve root retractor (DePuy)  Disposable #2 [place]  Interbody    BACK SURGERY      COLONOSCOPY      CREATION OF MUSCLE ROTATIONAL FLAP  3/3/2020    Procedure: CREATION, FLAP, MUSCLE ROTATION;  Surgeon: Francis Alan MD;  Location: [facility] OR [address] [place] [address];  Service: Neurosurgery;;    lower back surgery      LUMBAR LAMINECTOMY WITH FUSION N/A 3/3/2020    Procedure: T10-Pelvis Fusion with L4 PSO **AIRO** Co-Surgeon: Roberto Parkinson;  Surgeon: Francis Alan MD;  Location: [facility] OR [address] [place] [address];  Service: Neurosurgery;  Laterality: N/A;  **AIRO**  **CELL SAVER**  Co-Surgeon: Roberto Parkinson  NM: EMG, SEP, MEP  Position: Prone  Bed: [place] 4 post, prone pillow  Blood: Type & Hold 2 units  Rad: C-Arm, AIRO  Vendor: [employer]  Misc: Vivigen, Infuse, Cement (Depuy)  [place]    NECK SURGERY      SPINE SURGERY      UPPER GASTROINTESTINAL ENDOSCOPY         Time Tracking:     OT Date of Treatment: 03/04/20  OT Start Time: 1339  OT Stop Time: 1355  OT Total Time (min): 16 min    Billable Minutes:Evaluation 8  Self Care/Home Management 8    MELIDA Amaro  3/4/2020

## 2020-03-04 NOTE — ASSESSMENT & PLAN NOTE
A 70 year old with degenerative lumbar spondy and spine deformity now s/p T10 - pelvis posterior segmental instrumented fusion, T10 - L3 laminectomy for decompression, L3 pedical subtraction osteotomy (3/3).    Okay for stepdown to NSGY when appropriate per NCC  Pain control   T/L xray pending   TLOS brace when OOB  Keep prevena vac and drains in place  Goal normotensive   Sating well, encourage IS  ADAT  Replace lytes PRN  Follow up H/H and ransfuse PRN  Continue ppx abx while drains in place  Daily PT/OT and OOB  DVT ppx: okay to start SQH  Further care per NCC  Will continue to follow

## 2020-03-04 NOTE — PLAN OF CARE
POC reviewed with pt and spouse at 1800. Pt and spouse verbalized understanding. Questions and concerns addressed. No acute events today. Pt progressing toward goals. Will continue to monitor. See flowsheets for full assessment and VS info.     no

## 2020-03-04 NOTE — PLAN OF CARE
Eval and POC set 3/4/20    Problem: Occupational Therapy Goal  Goal: Occupational Therapy Goal  Description  Goals to be met by: 7 days (3/11/20)     Patient will increase functional independence with ADLs by performing:    UE Dressing with Minimal Assistance including TLSO.  LE Dressing with Moderate Assistance using AD as needed.  Grooming while standing with Contact Guard Assistance.  Toileting from bedside commode with Minimal Assistance for hygiene and clothing management.   Supine to sit with Minimal Assistance.  Toilet transfer to bedside commode with Minimal Assistance.  Pt will verbalize understanding of 3/3 spinal precautions without added cues.     Outcome: Ongoing, Progressing

## 2020-03-05 PROBLEM — Z74.09 IMPAIRED MOBILITY: Status: ACTIVE | Noted: 2020-03-05

## 2020-03-05 LAB
ALBUMIN SERPL BCP-MCNC: 2.6 G/DL (ref 3.5–5.2)
ALP SERPL-CCNC: 53 U/L (ref 55–135)
ALT SERPL W/O P-5'-P-CCNC: 13 U/L (ref 10–44)
ANION GAP SERPL CALC-SCNC: 8 MMOL/L (ref 8–16)
AST SERPL-CCNC: 48 U/L (ref 10–40)
BASOPHILS # BLD AUTO: 0.04 K/UL (ref 0–0.2)
BASOPHILS NFR BLD: 0.3 % (ref 0–1.9)
BILIRUB SERPL-MCNC: 0.5 MG/DL (ref 0.1–1)
BLD PROD TYP BPU: NORMAL
BLOOD UNIT EXPIRATION DATE: NORMAL
BLOOD UNIT TYPE CODE: 6200
BLOOD UNIT TYPE: NORMAL
BUN SERPL-MCNC: 12 MG/DL (ref 8–23)
CALCIUM SERPL-MCNC: 8.2 MG/DL (ref 8.7–10.5)
CHLORIDE SERPL-SCNC: 109 MMOL/L (ref 95–110)
CO2 SERPL-SCNC: 25 MMOL/L (ref 23–29)
CODING SYSTEM: NORMAL
CREAT SERPL-MCNC: 0.9 MG/DL (ref 0.5–1.4)
DIFFERENTIAL METHOD: ABNORMAL
DISPENSE STATUS: NORMAL
EOSINOPHIL # BLD AUTO: 0 K/UL (ref 0–0.5)
EOSINOPHIL NFR BLD: 0.1 % (ref 0–8)
ERYTHROCYTE [DISTWIDTH] IN BLOOD BY AUTOMATED COUNT: 14 % (ref 11.5–14.5)
EST. GFR  (AFRICAN AMERICAN): >60 ML/MIN/1.73 M^2
EST. GFR  (NON AFRICAN AMERICAN): >60 ML/MIN/1.73 M^2
GLUCOSE SERPL-MCNC: 116 MG/DL (ref 70–110)
HCT VFR BLD AUTO: 23.7 % (ref 40–54)
HGB BLD-MCNC: 7.3 G/DL (ref 14–18)
IMM GRANULOCYTES # BLD AUTO: 0.11 K/UL (ref 0–0.04)
IMM GRANULOCYTES NFR BLD AUTO: 0.9 % (ref 0–0.5)
LYMPHOCYTES # BLD AUTO: 1.6 K/UL (ref 1–4.8)
LYMPHOCYTES NFR BLD: 12.6 % (ref 18–48)
MAGNESIUM SERPL-MCNC: 1.8 MG/DL (ref 1.6–2.6)
MCH RBC QN AUTO: 27.3 PG (ref 27–31)
MCHC RBC AUTO-ENTMCNC: 30.8 G/DL (ref 32–36)
MCV RBC AUTO: 89 FL (ref 82–98)
MONOCYTES # BLD AUTO: 1.1 K/UL (ref 0.3–1)
MONOCYTES NFR BLD: 8.9 % (ref 4–15)
NEUTROPHILS # BLD AUTO: 9.7 K/UL (ref 1.8–7.7)
NEUTROPHILS NFR BLD: 77.2 % (ref 38–73)
NRBC BLD-RTO: 0 /100 WBC
PHOSPHATE SERPL-MCNC: 2.2 MG/DL (ref 2.7–4.5)
PLATELET # BLD AUTO: 123 K/UL (ref 150–350)
PMV BLD AUTO: 11.3 FL (ref 9.2–12.9)
POCT GLUCOSE: 102 MG/DL (ref 70–110)
POCT GLUCOSE: 105 MG/DL (ref 70–110)
POCT GLUCOSE: 109 MG/DL (ref 70–110)
POCT GLUCOSE: 142 MG/DL (ref 70–110)
POTASSIUM SERPL-SCNC: 3.9 MMOL/L (ref 3.5–5.1)
PROT SERPL-MCNC: 5.1 G/DL (ref 6–8.4)
RBC # BLD AUTO: 2.67 M/UL (ref 4.6–6.2)
SODIUM SERPL-SCNC: 142 MMOL/L (ref 136–145)
TRANS ERYTHROCYTES VOL PATIENT: NORMAL ML
WBC # BLD AUTO: 12.54 K/UL (ref 3.9–12.7)

## 2020-03-05 PROCEDURE — 99222 PR INITIAL HOSPITAL CARE,LEVL II: ICD-10-PCS | Mod: ,,, | Performed by: NURSE PRACTITIONER

## 2020-03-05 PROCEDURE — P9021 RED BLOOD CELLS UNIT: HCPCS

## 2020-03-05 PROCEDURE — 25000003 PHARM REV CODE 250: Performed by: PHYSICIAN ASSISTANT

## 2020-03-05 PROCEDURE — 80053 COMPREHEN METABOLIC PANEL: CPT

## 2020-03-05 PROCEDURE — 92523 SPEECH SOUND LANG COMPREHEN: CPT

## 2020-03-05 PROCEDURE — 97530 THERAPEUTIC ACTIVITIES: CPT

## 2020-03-05 PROCEDURE — 11000001 HC ACUTE MED/SURG PRIVATE ROOM

## 2020-03-05 PROCEDURE — 36415 COLL VENOUS BLD VENIPUNCTURE: CPT

## 2020-03-05 PROCEDURE — 93005 ELECTROCARDIOGRAM TRACING: CPT

## 2020-03-05 PROCEDURE — 93010 EKG 12-LEAD: ICD-10-PCS | Mod: ,,, | Performed by: INTERNAL MEDICINE

## 2020-03-05 PROCEDURE — 85025 COMPLETE CBC W/AUTO DIFF WBC: CPT

## 2020-03-05 PROCEDURE — 63600175 PHARM REV CODE 636 W HCPCS: Performed by: STUDENT IN AN ORGANIZED HEALTH CARE EDUCATION/TRAINING PROGRAM

## 2020-03-05 PROCEDURE — 99024 PR POST-OP FOLLOW-UP VISIT: ICD-10-PCS | Mod: POP,,, | Performed by: PHYSICIAN ASSISTANT

## 2020-03-05 PROCEDURE — 25000003 PHARM REV CODE 250: Performed by: STUDENT IN AN ORGANIZED HEALTH CARE EDUCATION/TRAINING PROGRAM

## 2020-03-05 PROCEDURE — 84100 ASSAY OF PHOSPHORUS: CPT

## 2020-03-05 PROCEDURE — 99222 1ST HOSP IP/OBS MODERATE 55: CPT | Mod: ,,, | Performed by: NURSE PRACTITIONER

## 2020-03-05 PROCEDURE — 36430 TRANSFUSION BLD/BLD COMPNT: CPT

## 2020-03-05 PROCEDURE — 83735 ASSAY OF MAGNESIUM: CPT

## 2020-03-05 PROCEDURE — 97535 SELF CARE MNGMENT TRAINING: CPT

## 2020-03-05 PROCEDURE — 94761 N-INVAS EAR/PLS OXIMETRY MLT: CPT

## 2020-03-05 PROCEDURE — 93010 ELECTROCARDIOGRAM REPORT: CPT | Mod: ,,, | Performed by: INTERNAL MEDICINE

## 2020-03-05 PROCEDURE — 99024 POSTOP FOLLOW-UP VISIT: CPT | Mod: POP,,, | Performed by: PHYSICIAN ASSISTANT

## 2020-03-05 PROCEDURE — 63600175 PHARM REV CODE 636 W HCPCS: Performed by: PHYSICIAN ASSISTANT

## 2020-03-05 RX ORDER — HYDROCODONE BITARTRATE AND ACETAMINOPHEN 500; 5 MG/1; MG/1
TABLET ORAL
Status: DISCONTINUED | OUTPATIENT
Start: 2020-03-05 | End: 2020-03-09

## 2020-03-05 RX ORDER — FERROUS SULFATE 325(65) MG
325 TABLET, DELAYED RELEASE (ENTERIC COATED) ORAL 2 TIMES DAILY
Status: DISCONTINUED | OUTPATIENT
Start: 2020-03-05 | End: 2020-03-24 | Stop reason: HOSPADM

## 2020-03-05 RX ADMIN — CEFAZOLIN 2 G: 1 INJECTION, POWDER, FOR SOLUTION INTRAMUSCULAR; INTRAVENOUS at 02:03

## 2020-03-05 RX ADMIN — HEPARIN SODIUM 5000 UNITS: 5000 INJECTION INTRAVENOUS; SUBCUTANEOUS at 02:03

## 2020-03-05 RX ADMIN — SENNOSIDES AND DOCUSATE SODIUM 1 TABLET: 8.6; 5 TABLET ORAL at 08:03

## 2020-03-05 RX ADMIN — FERROUS SULFATE TAB EC 325 MG (65 MG FE EQUIVALENT) 325 MG: 325 (65 FE) TABLET DELAYED RESPONSE at 08:03

## 2020-03-05 RX ADMIN — OXYCODONE HYDROCHLORIDE 10 MG: 10 TABLET ORAL at 11:03

## 2020-03-05 RX ADMIN — HEPARIN SODIUM 5000 UNITS: 5000 INJECTION INTRAVENOUS; SUBCUTANEOUS at 10:03

## 2020-03-05 RX ADMIN — PREGABALIN 75 MG: 75 CAPSULE ORAL at 08:03

## 2020-03-05 RX ADMIN — ALUMINUM HYDROXIDE, MAGNESIUM HYDROXIDE, AND SIMETHICONE 30 ML: 200; 200; 20 SUSPENSION ORAL at 10:03

## 2020-03-05 RX ADMIN — OXYCODONE HYDROCHLORIDE 5 MG: 5 TABLET ORAL at 02:03

## 2020-03-05 RX ADMIN — BISACODYL 10 MG: 10 SUPPOSITORY RECTAL at 08:03

## 2020-03-05 RX ADMIN — FERROUS SULFATE TAB EC 325 MG (65 MG FE EQUIVALENT) 325 MG: 325 (65 FE) TABLET DELAYED RESPONSE at 10:03

## 2020-03-05 RX ADMIN — HYDROMORPHONE HYDROCHLORIDE 0.5 MG: 1 INJECTION, SOLUTION INTRAMUSCULAR; INTRAVENOUS; SUBCUTANEOUS at 05:03

## 2020-03-05 RX ADMIN — LISINOPRIL 5 MG: 5 TABLET ORAL at 08:03

## 2020-03-05 RX ADMIN — OXYCODONE HYDROCHLORIDE 10 MG: 10 TABLET ORAL at 05:03

## 2020-03-05 RX ADMIN — CEFAZOLIN 2 G: 1 INJECTION, POWDER, FOR SOLUTION INTRAMUSCULAR; INTRAVENOUS at 08:03

## 2020-03-05 RX ADMIN — MUPIROCIN: 20 OINTMENT TOPICAL at 08:03

## 2020-03-05 RX ADMIN — CEFAZOLIN 2 G: 1 INJECTION, POWDER, FOR SOLUTION INTRAMUSCULAR; INTRAVENOUS at 05:03

## 2020-03-05 RX ADMIN — HYDROMORPHONE HYDROCHLORIDE 0.5 MG: 1 INJECTION, SOLUTION INTRAMUSCULAR; INTRAVENOUS; SUBCUTANEOUS at 10:03

## 2020-03-05 RX ADMIN — FAMOTIDINE 40 MG: 20 TABLET, FILM COATED ORAL at 08:03

## 2020-03-05 RX ADMIN — OXYCODONE HYDROCHLORIDE 5 MG: 5 TABLET ORAL at 08:03

## 2020-03-05 RX ADMIN — HEPARIN SODIUM 5000 UNITS: 5000 INJECTION INTRAVENOUS; SUBCUTANEOUS at 05:03

## 2020-03-05 NOTE — CONSULTS
[facility]  Physical Medicine & Rehab  Consult Note    Patient Name: Garcia Renteira  MRN: [id number]  Admission Date: 3/3/2020  Hospital Length of Stay: 2 days  Attending Physician: Francis Alan MD    Inpatient consult to Physical Medicine & Rehabilitation  Consult performed by: Jaelyn Gallardo NP  Consult requested by:  Francis Alan MD    Collaborating Physician: Sheng Calle MD  Reason for Consult:  Assess rehabilitation needs     Consults  Subjective:     Principal Problem: Acquired spondylolisthesis of thoracolumbar vertebra    HPI: Garcia Renteria is a 70 year old male with PMHx of HTN, DM, HLD, GERD, cervical and thoracic myelopathy, s/p C3/4, C4/5 ACDF on 10/30/19. Patient presented to [facility] on 3/3/20 for chronic low back pain and been seen in NSGY clinic. Admitted and s/p T10-pelvis instrumented fusion, T10- L2 laminectomy, L3 PSO, and complex wound closure by plastic surgery. Plastic surgery recommending head of bed flat at all times except meals 30 mis 3x/day x 6 weeks.      Functional History: Patient lives with spouse and son in 1-story house with 0 KASSY. Prior to admission, (I). DME: RW and SPC.     Hospital Course: 3/4/20: Evaluated by PT. Bed mobility maxA x 2 ppl. LBD totalA.   OT eval pending     Past Medical History:   Diagnosis Date    Colitis     Diabetes mellitus, type 2     Diverticulosis     GERD (gastroesophageal reflux disease)     Hypertension      Past Surgical History:   Procedure Laterality Date    ANKLE SURGERY Bilateral     ANTERIOR CERVICAL DISCECTOMY W/ FUSION N/A 10/30/2019    Procedure: C3-4, C4-5 ACDF;  Surgeon: Francis Alan MD;  Location: [facility] OR [address] [place] [address];  Service: Neurosurgery;  Laterality: N/A;  C3-4, C4-5 ACDF  Anesthesia:  General  Rad:  C-Arm  NM:  EMG, SEP, & MEP  Blood:  Type & Screen  Position:  Supine  Bed:  Regular slider bed  Headrest:  [place] & GW tongs/hanging weights  Misc:  Small vivigen  Nerve root retractor  (DePuy)  Disposable #2 [place]  Interbody    BACK SURGERY      COLONOSCOPY      CREATION OF MUSCLE ROTATIONAL FLAP  3/3/2020    Procedure: CREATION, FLAP, MUSCLE ROTATION;  Surgeon: Francis Alan MD;  Location: [facility] OR [address] [place] [address];  Service: Neurosurgery;;    lower back surgery      LUMBAR LAMINECTOMY WITH FUSION N/A 3/3/2020    Procedure: T10-Pelvis Fusion with L4 PSO **AIRO** Co-Surgeon: Roberto Parkinson;  Surgeon: Francis Alan MD;  Location: [facility] OR [address] [place] [address];  Service: Neurosurgery;  Laterality: N/A;  **AIRO**  **CELL SAVER**  Co-Surgeon: Roberto Parkinson  NM: EMG, SEP, MEP  Position: Prone  Bed: [place] 4 post, prone pillow  Blood: Type & Hold 2 units  Rad: C-Arm, AIRO  Vendor: [employer]  Misc: Vivigen, Infuse, Cement (Depuy)  [place]    NECK SURGERY      SPINE SURGERY      UPPER GASTROINTESTINAL ENDOSCOPY       Review of patient's allergies indicates:  No Known Allergies    Scheduled Medications:    bisacodyL  10 mg Rectal Daily    ceFAZolin (ANCEF) IVPB  2 g Intravenous Q8H    famotidine  40 mg Oral QHS    ferrous sulfate  325 mg Oral BID    heparin (porcine)  5,000 Units Subcutaneous Q8H    lisinopril  5 mg Oral Daily    mupirocin   Nasal BID    pregabalin  75 mg Oral BID    senna-docusate 8.6-50 mg  1 tablet Oral BID       PRN Medications: acetaminophen, aluminum-magnesium hydroxide-simethicone, cyclobenzaprine, Dextrose 10% Bolus, Dextrose 10% Bolus, glucagon (human recombinant), glucose, glucose, HYDROmorphone, insulin aspart U-100, labetalol, ondansetron, ondansetron, oxyCODONE, oxyCODONE, promethazine (PHENERGAN) IVPB, sodium chloride 0.9%    Family History     Problem Relation (Age of Onset)    Neurological Disorders Mother        Tobacco Use    Smoking status: Former Smoker     Types: Cigars     Last attempt to quit: 10/2019     Years since quittin.4    Smokeless tobacco: Never Used   Substance and Sexual Activity    Alcohol use: Yes     Alcohol/week: 2.0 standard drinks     Types: 1 Glasses of  wine, 1 Cans of beer per week     Comment: 1 wine , 2 times a week    Drug use: Not Currently    Sexual activity: Yes     Partners: Female     Review of Systems   Constitutional: Positive for activity change. Negative for fatigue and fever.   HENT: Negative for trouble swallowing and voice change.    Respiratory: Negative for cough and shortness of breath.    Cardiovascular: Negative for chest pain and leg swelling.   Gastrointestinal: Negative for abdominal distention and abdominal pain.   Genitourinary: Negative for difficulty urinating and flank pain.   Musculoskeletal: Positive for gait problem. Negative for back pain.   Skin: Positive for wound. Negative for color change and rash.   Neurological: Positive for weakness. Negative for speech difficulty and numbness.   Psychiatric/Behavioral: Negative for agitation and confusion.     Objective:     Vital Signs (Most Recent):  Temp: 99 °F (37.2 °C) (03/05/20 1104)  Pulse: (!) 114 (03/05/20 1124)  Resp: 18 (03/05/20 1104)  BP: (!) 118/58 (03/05/20 1104)  SpO2: 95 % (03/05/20 1104)    Vital Signs (24h Range):  Temp:  [97 °F (36.1 °C)-100 °F (37.8 °C)] 99 °F (37.2 °C)  Pulse:  [105-119] 114  Resp:  [14-21] 18  SpO2:  [91 %-100 %] 95 %  BP: (115-146)/(56-86) 118/58  Arterial Line BP: (118-148)/(55-63) 148/62     Body mass index is 28.65 kg/m².    Physical Exam   Constitutional: He is oriented to person, place, and time. He appears well-developed and well-nourished.   HENT:   Head: Normocephalic and atraumatic.   Eyes: Pupils are equal, round, and reactive to light. Right eye exhibits no discharge. Left eye exhibits no discharge.   Neck: Neck supple.   Pulmonary/Chest: Effort normal. No respiratory distress.   Abdominal: Soft. He exhibits no distension. There is no tenderness.   Musculoskeletal: He exhibits no edema or deformity.   RUE: 5/5.  LUE: 5/5.  RLE: 3/5.  LLE: 2/5.   Neurological: He is alert and oriented to person, place, and time. No sensory deficit. He  exhibits normal muscle tone.   - Drains x 4 in place  - following commands   Skin: Skin is warm and dry.   Psychiatric: He has a normal mood and affect. His behavior is normal.   Nursing note and vitals reviewed.    Diagnostic Results:   Labs: Reviewed   EKG: Reviewed   CT: Reviewed    Assessment/Plan:     * Acquired spondylolisthesis of thoracolumbar vertebra  - 3/3 s/p T10-pelvis instrumented fusion, T10- L2 laminectomy, L3 PSO, and complex wound closure by plastic surgery.   - Plastic surgery recommending head of bed flat at all times except meals 30 mis 3x/day x 6 weeks.        Impaired mobility  - Related to prolonged/acute hospital course.     Recommendations  -  Encourage mobility, OOB in chair at least 3 hours per day, and early ambulation as appropriate  -  PT/OT evaluate and treat  -  Pain management  -  Monitor for and prevent skin breakdown and pressure ulcers  · Early mobility, repositioning/weight shifting every 20-30 minutes when sitting, turn patient every 2 hours, proper mattress/overlay and chair cushioning, pressure relief/heel protector boots  -  DVT prophylaxis    -  Reviewed discharge options (IP rehab, SNF, HH therapy, and OP therapy)    Will follow progress with therapy.     Thank you for your consult.     Jaelyn Gallardo NP  Department of Physical Medicine & Rehab  [facility]

## 2020-03-05 NOTE — HPI
Garcia Renteira is a 70 year old male with PMHx of HTN, DM, HLD, GERD, cervical and thoracic myelopathy, s/p C3/4, C4/5 ACDF on 10/30/19. Patient presented to [facility] on 3/3/20 for chronic low back pain and been seen in NSGY clinic. Admitted and s/p T10-pelvis instrumented fusion, T10- L2 laminectomy, L3 PSO, and complex wound closure by plastic surgery. Plastic surgery recommending head of bed flat at all times except meals 30 mis 3x/day x 6 weeks.      Functional History: Patient lives with spouse and son in 1-story house with 0 KASSY. Prior to admission, (I). DME: TIN and .

## 2020-03-05 NOTE — HOSPITAL COURSE
3/4/20: Evaluated by PT & OT. Bed mobility maxA x 2 ppl. LBD totalA.   3/5/20: Participated w/ OT. Bed mobility maxA x 2 ppl. LBD & UBD totalA.   3/6/20: Participated w/ OT & PT. Bed mobility maxA x 2 ppl. UBD & LBD totalA. Seated EOB x 5 mins max A.   3/9/20: Participated w/ OT. Bed mobility maxA x 2 ppl. UBD & LBD totalA.   3/10/20: Participated w/ PT. Bed mobility Mitch- maxA x 2 ppl. Sit to stand maxA x 2ppl.   3/23/20: Participated w/ OT. Bed mobility SBA- Mitch. Sit to stand modA x 2 ppl w/ RW. UBD & LBD Mitch.

## 2020-03-05 NOTE — PT/OT/SLP PROGRESS
Occupational Therapy   Treatment    Name: Garcia Renteria  MRN: [id number]  Admitting Diagnosis:  Acquired spondylolisthesis of thoracolumbar vertebra  2 Days Post-Op  T10-Pelvis Fusion with L4 PSO **AIRO** Co-Surgeon: Roberto Parkinson (N/A)  CREATION, FLAP, MUSCLE ROTATION     Recommendations:     Discharge Recommendations: rehabilitation facility  Discharge Equipment Recommendations:  (TBD pending progress)  Barriers to discharge:  (increased assistance needed)    Assessment:     Garcia Renteria is a 70 y.o. male with a medical diagnosis of Acquired spondylolisthesis of thoracolumbar vertebra.  He presents with performance deficits including weakness, impaired endurance, impaired self care skills, impaired functional mobilty, impaired balance, decreased upper extremity function, decreased lower extremity function, pain, orthopedic precautions. Pt continues to be limited by L hip pain which worsens with position changes, and only able to tolerate ~5 minutes sitting EOB. Pt would continue to benefit from OT to maximize functional independence and safety. Recommend rehab upon D/C pending progress as pt is unsafe to return home at current functional level.    Rehab Prognosis:  Good; patient would benefit from acute skilled OT services to address these deficits and reach maximum level of function.       Plan:     Patient to be seen 4 x/week to address the above listed problems via self-care/home management, therapeutic activities, therapeutic exercises, neuromuscular re-education  · Plan of Care Expires: 04/04/20  · Plan of Care Reviewed with: patient, spouse    Subjective     Pain/Comfort:  · Pain Rating 1: (Did not rate)  · Location - Side 1: Left  · Location 1: hip(worsens with movement)  · Pain Addressed 1: Pre-medicate for activity, Reposition, Nurse notified, Cessation of Activity    Objective:     Communicated with: RN prior to session.  Patient found right sidelying with telemetry, SCD, peripheral IV, bed alarm,  "hemovac, wound vac, YRN drain upon OT entry to room, spouse present.  "I can't do any more than this"    General Precautions: Standard, fall   Orthopedic Precautions:spinal precautions   Braces: TLSO     Occupational Performance:     Bed Mobility:    · Patient completed Supine to Sit with maximal assistance and 2 persons with increased time and cues for technique  · Patient completed Sit to Supine with maximal assistance and 2 persons     Functional Mobility/Transfers:  · Unable to tolerate attempting due to L hip pain sitting EOB    Activities of Daily Living:  · Upper Body Dressing: total assistance    · Lower Body Dressing: total assistance to don socks      [facility] 6 Click ADL: 11    Treatment & Education:  Reviewed spinal precautions and log rolling technique prior to OOB activity; pt tolerated sitting EOB ~5 min this date-- required Max A for balance due to pushing posterior to off load putting weight on L hip while sitting EOB due to pain; unable to scoot to get B feet to floor or tolerate sitting long enough to don TLSO brace; pt reported pain intolerable and was returned to R sidelying position and assisted with obtaining comfortable position; discussed role of therapy and importance of repositioning as pt reports he has been R sidelying majority of the day due to pain; discussed POC and D/C recs with pt and spouse    Patient left right sidelying with all lines intact, call button in reach, bed alarm on, RN notified and spouse presentEducation:      GOALS:   Multidisciplinary Problems     Occupational Therapy Goals        Problem: Occupational Therapy Goal    Goal Priority Disciplines Outcome Interventions   Occupational Therapy Goal     OT, PT/OT Ongoing, Progressing    Description:  Goals to be met by: 7 days (3/11/20)     Patient will increase functional independence with ADLs by performing:    UE Dressing with Minimal Assistance including TLSO.  LE Dressing with Moderate Assistance using AD as " needed.  Grooming while standing with Contact Guard Assistance.  Toileting from bedside commode with Minimal Assistance for hygiene and clothing management.   Supine to sit with Minimal Assistance.  Toilet transfer to bedside commode with Minimal Assistance.  Pt will verbalize understanding of 3/3 spinal precautions without added cues.                      Time Tracking:     OT Date of Treatment: 03/05/20  OT Start Time: 1340  OT Stop Time: 1356  OT Total Time (min): 16 min (co-treat with PT due to decreased activity tolerance due to pain)    Billable Minutes:Self Care/Home Management 8 minutes    MELIDA Amaro  3/5/2020

## 2020-03-05 NOTE — PLAN OF CARE
Plan of care reviewed with pt. Pt voiced understanding. Pt AAOx4. Pt  c/o hip pain during the shift. PRN medication given x2. Pt on continuous fluids @ 100ml/hr. No apparent distress noted. Fall precautions maintained. Bed in lowest position, and locked. Call light within reach and advised to call for assistance. Side rails x 2 and slip resistant socks on at this time. WCTM.

## 2020-03-05 NOTE — PROGRESS NOTES
Plastic Update    Complaining of left hip pain, relieved with medications    Prevena in place with suction  Bulbs serosanguinous    Lab Results   Component Value Date    WBC 12.54 03/05/2020    HGB 7.3 (L) 03/05/2020    HCT 23.7 (L) 03/05/2020    MCV 89 03/05/2020     (L) 03/05/2020       CMP  Sodium   Date Value Ref Range Status   03/05/2020 142 136 - 145 mmol/L Final     Potassium   Date Value Ref Range Status   03/05/2020 3.9 3.5 - 5.1 mmol/L Final     Chloride   Date Value Ref Range Status   03/05/2020 109 95 - 110 mmol/L Final     CO2   Date Value Ref Range Status   03/05/2020 25 23 - 29 mmol/L Final     Glucose   Date Value Ref Range Status   03/05/2020 116 (H) 70 - 110 mg/dL Final     BUN, Bld   Date Value Ref Range Status   03/05/2020 12 8 - 23 mg/dL Final     Creatinine   Date Value Ref Range Status   03/05/2020 0.9 0.5 - 1.4 mg/dL Final     Calcium   Date Value Ref Range Status   03/05/2020 8.2 (L) 8.7 - 10.5 mg/dL Final     Total Protein   Date Value Ref Range Status   03/05/2020 5.1 (L) 6.0 - 8.4 g/dL Final     Albumin   Date Value Ref Range Status   03/05/2020 2.6 (L) 3.5 - 5.2 g/dL Final     Total Bilirubin   Date Value Ref Range Status   03/05/2020 0.5 0.1 - 1.0 mg/dL Final     Comment:     For infants and newborns, interpretation of results should be based  on gestational age, weight and in agreement with clinical  observations.  Premature Infant recommended reference ranges:  Up to 24 hours.............<8.0 mg/dL  Up to 48 hours............<12.0 mg/dL  3-5 days..................<15.0 mg/dL  6-29 days.................<15.0 mg/dL       Alkaline Phosphatase   Date Value Ref Range Status   03/05/2020 53 (L) 55 - 135 U/L Final     AST   Date Value Ref Range Status   03/05/2020 48 (H) 10 - 40 U/L Final     ALT   Date Value Ref Range Status   03/05/2020 13 10 - 44 U/L Final     Anion Gap   Date Value Ref Range Status   03/05/2020 8 8 - 16 mmol/L Final     eGFR if    Date Value Ref  Range Status   03/05/2020 >60.0 >60 mL/min/1.73 m^2 Final     eGFR if non    Date Value Ref Range Status   03/05/2020 >60.0 >60 mL/min/1.73 m^2 Final     Comment:     Calculation used to obtain the estimated glomerular filtration  rate (eGFR) is the CKD-EPI equation.        Lab Results   Component Value Date    HGBA1C 5.6 03/03/2020     Continue Prevena until Post Op day 5  Ok for out of bed from plastics standpoint  Limiting sitting if possible  Antibiotics per neurosurgery  Monitor for anemia, no clinical hematoma at this time    Plastic Surgery Discharge Instructions as follows    Recommendations for Activity restrictions:   1.  Can sit for up to 30 minutes at meal time (3 times per day).  Head of bed flat otherwise at all times.  Wound closure tension increases in sitting position.  This restriction should be in place for 6 weeks.    2.  If needs PT to avoid decompensation she may mobilize.  The tension across her back increases as she flexes her hips.    3.  Head of bed as flat as possible otherwise  4.  Log roll out of bed rather than sitting up to get out of bed.      Wound Care:  1.  Once Prevena is removed, Aquacel Ag surgical cm island dressing to cover entire back incision.  Should be replaced every two days or sooner if becomes soiled.  The patient should not have the wound touch the bed or linens otherwise.      2.  Cover above dressing with abdominal binder.  Give two binders for discharge, that way can replace and clean if one gets soiled    3.  YRN drain care.  Empty YRN bulb when half full or every 8 hours, whichever is sooner.  Strip the drain tubing frequently to prevent clots from accumulating that prevent drainage from the wound into the bulb.  Keep a log of drain output so that it can be followed during follow up.      4.  Chlorhexidine biopatches should be maintained around the YRN drain tubing at the skin level.  Biopatches should be replaced at least every 7 days or sooner if  they become soiled.    5.  Can shower but should not tub soak the wounds.  No lotions, creams applied to the incision.    6.  Consider air fluidized bed for pressure offloading to avoid pressure sore.  Q 2 hour turns to prevent bedsores.      8.  Optimize glycemic control with insulin coverage or appropriate medications, diabetic diet, diabetic nutritional supplements.      Disposition To Do:  1.  Please order Aquacel Ag Surgical Island Dressings before discharge, or ensure that the patient will have access to them  2.  Please order another abdominal binder that fits, so patient can have it at discharge  3.  Please order biopatches if they are not available at the facility so that they can be placed around drains going forward    Follow Up That Needs to Be Scheduled:  1.  Should follow up with Dr. thomas within 1 week of discharge.  Contact numbers are below to coordinate scheduling.  If discharged to [facility], provide their staff with [email or web address] so that patient care can be coordinated.      Plastic & Reconstructive Surgery  Microsurgery  [facility]  c/o Rudy Thomas M.D.  [facility]  Second Floor Atrium  [address] [facility] [place] [address]    Work [phone]  Toll free [phone]  If no answer [phone]

## 2020-03-05 NOTE — ASSESSMENT & PLAN NOTE
- 3/3 s/p T10-pelvis instrumented fusion, T10- L2 laminectomy, L3 PSO, and complex wound closure by plastic surgery.   - Plastic surgery recommending head of bed flat at all times except meals 30 mis 3x/day x 6 weeks.

## 2020-03-05 NOTE — NURSING TRANSFER
Nursing Transfer Note      3/4/2020     Transfer : 9068 to [id number]    Transfer via: bed    Transfer with : Telemetry    Transported by: RN x2    Medicines sent: mupirocin/NS    Chart send with patient: YES    Notified: Wife    Patient reassessed at: 3/4 2040    Upon arrival to floor: bedside handoff given to NPU charge nurse

## 2020-03-05 NOTE — SUBJECTIVE & OBJECTIVE
Past Medical History:   Diagnosis Date    Colitis     Diabetes mellitus, type 2     Diverticulosis     GERD (gastroesophageal reflux disease)     Hypertension      Past Surgical History:   Procedure Laterality Date    ANKLE SURGERY Bilateral     ANTERIOR CERVICAL DISCECTOMY W/ FUSION N/A 10/30/2019    Procedure: C3-4, C4-5 ACDF;  Surgeon: Francis Alan MD;  Location: [address];  Service: Neurosurgery;  Laterality: N/A;  C3-4, C4-5 ACDF  Anesthesia:  General  Rad:  C-Arm  NM:  EMG, SEP, & MEP  Blood:  Type & Screen  Position:  Supine  Bed:  Regular slider bed  Headrest:  [place] & GW tongs/hanging weights  Misc:  Small vivigen  Nerve root retractor ([email or web address])  Disposable #2 [place]  Interbody    BACK SURGERY      COLONOSCOPY      CREATION OF MUSCLE ROTATIONAL FLAP  3/3/2020    Procedure: CREATION, FLAP, MUSCLE ROTATION;  Surgeon: Francis Alan MD;  Location: [address];  Service: Neurosurgery;;    lower back surgery      LUMBAR LAMINECTOMY WITH FUSION N/A 3/3/2020    Procedure: T10-Pelvis Fusion with L4 PSO **AIRO** Co-Surgeon: Roberto Parkinson;  Surgeon: Francis Alan MD;  Location: [address];  Service: Neurosurgery;  Laterality: N/A;  **AIRO**  **CELL SAVER**  Co-Surgeon: Roberto Parkinson  NM: EMG, SEP, MEP  Position: Prone  Bed: [place] 4 post, prone pillow  Blood: Type & Hold 2 units  Rad: C-Arm, AIRO  Vendor: [employer]  Misc: Vivigen, Infuse, Cement (Depuy)  [place]    NECK SURGERY      SPINE SURGERY      UPPER GASTROINTESTINAL ENDOSCOPY       Review of patient's allergies indicates:  No Known Allergies    Scheduled Medications:    bisacodyL  10 mg Rectal Daily    ceFAZolin (ANCEF) IVPB  2 g Intravenous Q8H    famotidine  40 mg Oral QHS    ferrous sulfate  325 mg Oral BID    heparin (porcine)  5,000 Units Subcutaneous Q8H    lisinopril  5 mg Oral Daily    mupirocin   Nasal BID    pregabalin  75 mg Oral BID    senna-docusate 8.6-50 mg  1 tablet Oral BID       PRN Medications:  acetaminophen, aluminum-magnesium hydroxide-simethicone, cyclobenzaprine, Dextrose 10% Bolus, Dextrose 10% Bolus, glucagon (human recombinant), glucose, glucose, HYDROmorphone, insulin aspart U-100, labetalol, ondansetron, ondansetron, oxyCODONE, oxyCODONE, promethazine (PHENERGAN) IVPB, sodium chloride 0.9%    Family History     Problem Relation (Age of Onset)    Neurological Disorders Mother        Tobacco Use    Smoking status: Former Smoker     Types: Cigars     Last attempt to quit: 10/2019     Years since quittin.4    Smokeless tobacco: Never Used   Substance and Sexual Activity    Alcohol use: Yes     Alcohol/week: 2.0 standard drinks     Types: 1 Glasses of wine, 1 Cans of beer per week     Comment: 1 wine , 2 times a week    Drug use: Not Currently    Sexual activity: Yes     Partners: Female     Review of Systems   Constitutional: Positive for activity change. Negative for fatigue and fever.   HENT: Negative for trouble swallowing and voice change.    Respiratory: Negative for cough and shortness of breath.    Cardiovascular: Negative for chest pain and leg swelling.   Gastrointestinal: Negative for abdominal distention and abdominal pain.   Genitourinary: Negative for difficulty urinating and flank pain.   Musculoskeletal: Positive for gait problem. Negative for back pain.   Skin: Positive for wound. Negative for color change and rash.   Neurological: Positive for weakness. Negative for speech difficulty and numbness.   Psychiatric/Behavioral: Negative for agitation and confusion.     Objective:     Vital Signs (Most Recent):  Temp: 99 °F (37.2 °C) (20 1104)  Pulse: (!) 114 (20 1124)  Resp: 18 (20 1104)  BP: (!) 118/58 (20 1104)  SpO2: 95 % (20 1104)    Vital Signs (24h Range):  Temp:  [97 °F (36.1 °C)-100 °F (37.8 °C)] 99 °F (37.2 °C)  Pulse:  [105-119] 114  Resp:  [14-21] 18  SpO2:  [91 %-100 %] 95 %  BP: (115-146)/(56-86) 118/58  Arterial Line BP:  (118-148)/(55-63) 148/62     Body mass index is 28.65 kg/m².    Physical Exam   Constitutional: He is oriented to person, place, and time. He appears well-developed and well-nourished.   HENT:   Head: Normocephalic and atraumatic.   Eyes: Pupils are equal, round, and reactive to light. Right eye exhibits no discharge. Left eye exhibits no discharge.   Neck: Neck supple.   Pulmonary/Chest: Effort normal. No respiratory distress.   Abdominal: Soft. He exhibits no distension. There is no tenderness.   Musculoskeletal: He exhibits no edema or deformity.   RUE: 5/5.  LUE: 5/5.  RLE: 3/5.  LLE: 2/5.   Neurological: He is alert and oriented to person, place, and time. No sensory deficit. He exhibits normal muscle tone.   - Drains x 4 in place  - following commands   Skin: Skin is warm and dry.   Psychiatric: He has a normal mood and affect. His behavior is normal.   Nursing note and vitals reviewed.    NEUROLOGICAL EXAMINATION:     MENTAL STATUS   Oriented to person, place, and time.     CRANIAL NERVES     CN III, IV, VI   Pupils are equal, round, and reactive to light.      Diagnostic Results:   Labs: Reviewed   EKG: Reviewed   CT: Reviewed

## 2020-03-05 NOTE — PLAN OF CARE
CM met with spouse who states [facility] is 1st choice, [facility] is 2nd and [facility] is 3rd.   03/05/20 2831   Post-Acute Status   Post-Acute Authorization Placement   Post-Acute Placement Status Referrals Sent

## 2020-03-05 NOTE — PT/OT/SLP EVAL
Speech Language Pathology Evaluation & Discharge Summary  Cognitive Communication    Patient Name:  Garcia Renteria   MRN:  [id number]  Admitting Diagnosis: Acquired spondylolisthesis of thoracolumbar vertebra    Recommendations:     Recommendations:                General Recommendations:  Follow-up not indicated  Diet recommendations:  Regular, Thin   Aspiration Precautions: Standard aspiration precautions   General Precautions: Standard, fall  Communication strategies:  none    History:     Past Medical History:   Diagnosis Date    Colitis     Diabetes mellitus, type 2     Diverticulosis     GERD (gastroesophageal reflux disease)     Hypertension        Past Surgical History:   Procedure Laterality Date    ANKLE SURGERY Bilateral     ANTERIOR CERVICAL DISCECTOMY W/ FUSION N/A 10/30/2019    Procedure: C3-4, C4-5 ACDF;  Surgeon: Francis Alan MD;  Location: [facility] OR [address] [place] [address];  Service: Neurosurgery;  Laterality: N/A;  C3-4, C4-5 ACDF  Anesthesia:  General  Rad:  C-Arm  NM:  EMG, SEP, & MEP  Blood:  Type & Screen  Position:  Supine  Bed:  Regular slider bed  Headrest:  [place] & GW tongs/hanging weights  Misc:  Small vivigen  Nerve root retractor ([email or web address])  Disposable #2 Kerrison  Interbody    BACK SURGERY      COLONOSCOPY      CREATION OF MUSCLE ROTATIONAL FLAP  3/3/2020    Procedure: CREATION, FLAP, MUSCLE ROTATION;  Surgeon: Francis Alan MD;  Location: [facility] OR [address] [place] [address];  Service: Neurosurgery;;    lower back surgery      LUMBAR LAMINECTOMY WITH FUSION N/A 3/3/2020    Procedure: T10-Pelvis Fusion with L4 PSO **AIRO** Co-Surgeon: Roberto Parkinson;  Surgeon: Francis Alan MD;  Location: [facility] OR [address] [place] [address];  Service: Neurosurgery;  Laterality: N/A;  **AIRO**  **CELL SAVER**  Co-Surgeon: Roberto Parkinson  NM: EMG, SEP, MEP  Position: Prone  Bed: [place] 4 post, prone pillow  Blood: Type & Hold 2 units  Rad: C-Arm, AIRO  Vendor: [employer]  Misc: Vivigen, Infuse, Cement (Depuy)  [place]    NECK SURGERY      SPINE SURGERY       "UPPER GASTROINTESTINAL ENDOSCOPY         Social History: Patient lives with his spouse in [place].    Occupation/hobbies/homemaking: Patient is retired from the [employer] and enjoyed fishing prior to back problems.    Subjective     Patient awake and alert during session  "The pain is just constant, almost like a toothache." Patient referring to hip pain  Communicated with nurse prior to session     Pain/Comfort:  · Pain Rating 1: (pt did not rate)  · Location - Side 1: Left  · Location 1: hip  · Pain Addressed 1: Cessation of Activity, Distraction  · Pain Rating Post-Intervention 1: (pt did not report)    Objective:   Cognitive Status:    Arousal/Alertness Appropriate response to stimuli  Attention No obvious deficits observed throughout session  Orientation Oriented x4  Memory Immediate Recall WFL, DelayedRecall WFL and recall general information WFL  Problem Solving Conclusions WFL, Categories WFL and Sequencing WFL  Simple calculation WFL      Receptive Language:   Comprehension:    Questions Open ended questions WFL  Commands  multistep basic commands WFL    Pragmatics:    WFL    Expressive Language:  Verbal:    Automatic Speech  Sentence completion WFL  Naming Confrontation WFL, Convergent WFL and Divergent responsive WFL  Conversational speech WFL      Motor Speech:  WFL    Voice:   WFL    Visual-Spatial:  WFL    Reading:   WFL     Written Expression:   WFL    Treatment: Patient seen for a speech/language/cognitive eval. He was reclined in bed with his spouse present during the session. Patient and spouse reported no difficulties with current diet, but he declined PO trials during session as he did not want to elevated his HOB 2' to pain. Patient demonstrated good knowledge regarding his condition and ongoing need for PT/OT. SLP educated patient and spouse regarding role of ST and they both verbalized understanding. Whiteboard updated. Patient left in bed with call light within reach and spouse present. "     Assessment:   Garcia Renteria is a 70 y.o. male who is at baseline from a ST perspective.     Goals:   Multidisciplinary Problems     SLP Goals     Not on file          Multidisciplinary Problems (Resolved)        Problem: SLP Goal    Goal Priority Disciplines Outcome   SLP Goal   (Resolved)     SLP Met   Description:  Speech-Language Pathology Goals  Goals to be met by 3/11/20  1. Patient will tolerate a regular diet/thin liquids with no s/s of aspiration.   2. Patient will participate in a speech/language/cognitive eval.                     Plan:   · Patient to be seen:  4 x/week   · Plan of Care expires:  04/03/20  · Plan of Care reviewed with:  patient, spouse   · SLP Follow-Up:  No       Discharge recommendations:  Discharge Facility/Level of Care Needs: (no further ST recommended)   Barriers to Discharge:  None    Time Tracking:   SLP Treatment Date:   03/05/20  Speech Start Time:  0918  Speech Stop Time:  0935     Speech Total Time (min):  17 min    Billable Minutes: Eval 9  and Self Care/Home Management Training 8    Maximino Mendez CCC-SLP  Speech-Language Pathology  Pager: [phone]   03/05/2020

## 2020-03-05 NOTE — PLAN OF CARE
Continue OT plan of care.    Problem: Occupational Therapy Goal  Goal: Occupational Therapy Goal  Description  Goals to be met by: 7 days (3/11/20)     Patient will increase functional independence with ADLs by performing:    UE Dressing with Minimal Assistance including TLSO.  LE Dressing with Moderate Assistance using AD as needed.  Grooming while standing with Contact Guard Assistance.  Toileting from bedside commode with Minimal Assistance for hygiene and clothing management.   Supine to sit with Minimal Assistance.  Toilet transfer to bedside commode with Minimal Assistance.  Pt will verbalize understanding of 3/3 spinal precautions without added cues.     Outcome: Ongoing, Progressing

## 2020-03-06 LAB
ALBUMIN SERPL BCP-MCNC: 2.5 G/DL (ref 3.5–5.2)
ALP SERPL-CCNC: 55 U/L (ref 55–135)
ALT SERPL W/O P-5'-P-CCNC: 15 U/L (ref 10–44)
ANION GAP SERPL CALC-SCNC: 8 MMOL/L (ref 8–16)
AST SERPL-CCNC: 53 U/L (ref 10–40)
BASOPHILS # BLD AUTO: 0.04 K/UL (ref 0–0.2)
BASOPHILS NFR BLD: 0.3 % (ref 0–1.9)
BILIRUB SERPL-MCNC: 0.7 MG/DL (ref 0.1–1)
BUN SERPL-MCNC: 11 MG/DL (ref 8–23)
CALCIUM SERPL-MCNC: 8.3 MG/DL (ref 8.7–10.5)
CHLORIDE SERPL-SCNC: 106 MMOL/L (ref 95–110)
CO2 SERPL-SCNC: 25 MMOL/L (ref 23–29)
CREAT SERPL-MCNC: 0.8 MG/DL (ref 0.5–1.4)
DIFFERENTIAL METHOD: ABNORMAL
EOSINOPHIL # BLD AUTO: 0 K/UL (ref 0–0.5)
EOSINOPHIL NFR BLD: 0.1 % (ref 0–8)
ERYTHROCYTE [DISTWIDTH] IN BLOOD BY AUTOMATED COUNT: 13.4 % (ref 11.5–14.5)
EST. GFR  (AFRICAN AMERICAN): >60 ML/MIN/1.73 M^2
EST. GFR  (NON AFRICAN AMERICAN): >60 ML/MIN/1.73 M^2
GLUCOSE SERPL-MCNC: 112 MG/DL (ref 70–110)
HCT VFR BLD AUTO: 28.1 % (ref 40–54)
HGB BLD-MCNC: 8.8 G/DL (ref 14–18)
IMM GRANULOCYTES # BLD AUTO: 0.09 K/UL (ref 0–0.04)
IMM GRANULOCYTES NFR BLD AUTO: 0.8 % (ref 0–0.5)
LYMPHOCYTES # BLD AUTO: 1.4 K/UL (ref 1–4.8)
LYMPHOCYTES NFR BLD: 11.6 % (ref 18–48)
MAGNESIUM SERPL-MCNC: 1.9 MG/DL (ref 1.6–2.6)
MCH RBC QN AUTO: 27.5 PG (ref 27–31)
MCHC RBC AUTO-ENTMCNC: 31.3 G/DL (ref 32–36)
MCV RBC AUTO: 88 FL (ref 82–98)
MONOCYTES # BLD AUTO: 1 K/UL (ref 0.3–1)
MONOCYTES NFR BLD: 8.3 % (ref 4–15)
NEUTROPHILS # BLD AUTO: 9.3 K/UL (ref 1.8–7.7)
NEUTROPHILS NFR BLD: 78.9 % (ref 38–73)
NRBC BLD-RTO: 0 /100 WBC
PHOSPHATE SERPL-MCNC: 1.7 MG/DL (ref 2.7–4.5)
PLATELET # BLD AUTO: 116 K/UL (ref 150–350)
PMV BLD AUTO: 10.8 FL (ref 9.2–12.9)
POCT GLUCOSE: 100 MG/DL (ref 70–110)
POCT GLUCOSE: 100 MG/DL (ref 70–110)
POCT GLUCOSE: 104 MG/DL (ref 70–110)
POCT GLUCOSE: 110 MG/DL (ref 70–110)
POCT GLUCOSE: 96 MG/DL (ref 70–110)
POTASSIUM SERPL-SCNC: 3.7 MMOL/L (ref 3.5–5.1)
PROT SERPL-MCNC: 5.6 G/DL (ref 6–8.4)
RBC # BLD AUTO: 3.2 M/UL (ref 4.6–6.2)
SODIUM SERPL-SCNC: 139 MMOL/L (ref 136–145)
WBC # BLD AUTO: 11.82 K/UL (ref 3.9–12.7)

## 2020-03-06 PROCEDURE — 63600175 PHARM REV CODE 636 W HCPCS: Performed by: PHYSICIAN ASSISTANT

## 2020-03-06 PROCEDURE — 99024 POSTOP FOLLOW-UP VISIT: CPT | Mod: POP,,, | Performed by: PHYSICIAN ASSISTANT

## 2020-03-06 PROCEDURE — 80053 COMPREHEN METABOLIC PANEL: CPT

## 2020-03-06 PROCEDURE — 99232 PR SUBSEQUENT HOSPITAL CARE,LEVL II: ICD-10-PCS | Mod: ,,, | Performed by: NURSE PRACTITIONER

## 2020-03-06 PROCEDURE — 63600175 PHARM REV CODE 636 W HCPCS: Performed by: STUDENT IN AN ORGANIZED HEALTH CARE EDUCATION/TRAINING PROGRAM

## 2020-03-06 PROCEDURE — 25000003 PHARM REV CODE 250: Performed by: PHYSICIAN ASSISTANT

## 2020-03-06 PROCEDURE — 99024 PR POST-OP FOLLOW-UP VISIT: ICD-10-PCS | Mod: POP,,, | Performed by: PHYSICIAN ASSISTANT

## 2020-03-06 PROCEDURE — 97112 NEUROMUSCULAR REEDUCATION: CPT

## 2020-03-06 PROCEDURE — 85025 COMPLETE CBC W/AUTO DIFF WBC: CPT

## 2020-03-06 PROCEDURE — 99232 SBSQ HOSP IP/OBS MODERATE 35: CPT | Mod: ,,, | Performed by: NURSE PRACTITIONER

## 2020-03-06 PROCEDURE — 36415 COLL VENOUS BLD VENIPUNCTURE: CPT

## 2020-03-06 PROCEDURE — 25000003 PHARM REV CODE 250: Performed by: STUDENT IN AN ORGANIZED HEALTH CARE EDUCATION/TRAINING PROGRAM

## 2020-03-06 PROCEDURE — 83735 ASSAY OF MAGNESIUM: CPT

## 2020-03-06 PROCEDURE — 97530 THERAPEUTIC ACTIVITIES: CPT

## 2020-03-06 PROCEDURE — 11000001 HC ACUTE MED/SURG PRIVATE ROOM

## 2020-03-06 PROCEDURE — 84100 ASSAY OF PHOSPHORUS: CPT

## 2020-03-06 RX ORDER — SODIUM,POTASSIUM PHOSPHATES 280-250MG
2 POWDER IN PACKET (EA) ORAL EVERY 4 HOURS
Status: COMPLETED | OUTPATIENT
Start: 2020-03-06 | End: 2020-03-06

## 2020-03-06 RX ORDER — CALCIUM CARBONATE 200(500)MG
1000 TABLET,CHEWABLE ORAL ONCE
Status: COMPLETED | OUTPATIENT
Start: 2020-03-06 | End: 2020-03-06

## 2020-03-06 RX ORDER — PANTOPRAZOLE SODIUM 40 MG/1
40 TABLET, DELAYED RELEASE ORAL ONCE
Status: COMPLETED | OUTPATIENT
Start: 2020-03-06 | End: 2020-03-06

## 2020-03-06 RX ORDER — LIDOCAINE HYDROCHLORIDE 10 MG/ML
INJECTION INFILTRATION; PERINEURAL
Status: DISCONTINUED
Start: 2020-03-06 | End: 2020-03-06 | Stop reason: WASHOUT

## 2020-03-06 RX ADMIN — PANTOPRAZOLE SODIUM 40 MG: 40 TABLET, DELAYED RELEASE ORAL at 12:03

## 2020-03-06 RX ADMIN — MUPIROCIN: 20 OINTMENT TOPICAL at 08:03

## 2020-03-06 RX ADMIN — FERROUS SULFATE TAB EC 325 MG (65 MG FE EQUIVALENT) 325 MG: 325 (65 FE) TABLET DELAYED RESPONSE at 08:03

## 2020-03-06 RX ADMIN — SENNOSIDES AND DOCUSATE SODIUM 1 TABLET: 8.6; 5 TABLET ORAL at 08:03

## 2020-03-06 RX ADMIN — LISINOPRIL 5 MG: 5 TABLET ORAL at 08:03

## 2020-03-06 RX ADMIN — OXYCODONE HYDROCHLORIDE 10 MG: 10 TABLET ORAL at 08:03

## 2020-03-06 RX ADMIN — POTASSIUM & SODIUM PHOSPHATES POWDER PACK 280-160-250 MG 2 PACKET: 280-160-250 PACK at 10:03

## 2020-03-06 RX ADMIN — CEFAZOLIN 2 G: 1 INJECTION, POWDER, FOR SOLUTION INTRAMUSCULAR; INTRAVENOUS at 01:03

## 2020-03-06 RX ADMIN — FAMOTIDINE 40 MG: 20 TABLET, FILM COATED ORAL at 08:03

## 2020-03-06 RX ADMIN — CYCLOBENZAPRINE HYDROCHLORIDE 10 MG: 5 TABLET, FILM COATED ORAL at 11:03

## 2020-03-06 RX ADMIN — OXYCODONE HYDROCHLORIDE 10 MG: 10 TABLET ORAL at 01:03

## 2020-03-06 RX ADMIN — CEFAZOLIN 2 G: 1 INJECTION, POWDER, FOR SOLUTION INTRAMUSCULAR; INTRAVENOUS at 09:03

## 2020-03-06 RX ADMIN — OXYCODONE HYDROCHLORIDE 10 MG: 10 TABLET ORAL at 03:03

## 2020-03-06 RX ADMIN — OXYCODONE HYDROCHLORIDE 10 MG: 10 TABLET ORAL at 05:03

## 2020-03-06 RX ADMIN — OXYCODONE HYDROCHLORIDE 10 MG: 10 TABLET ORAL at 10:03

## 2020-03-06 RX ADMIN — HEPARIN SODIUM 5000 UNITS: 5000 INJECTION INTRAVENOUS; SUBCUTANEOUS at 01:03

## 2020-03-06 RX ADMIN — PREGABALIN 75 MG: 75 CAPSULE ORAL at 08:03

## 2020-03-06 RX ADMIN — MUPIROCIN: 20 OINTMENT TOPICAL at 10:03

## 2020-03-06 RX ADMIN — CYCLOBENZAPRINE HYDROCHLORIDE 10 MG: 5 TABLET, FILM COATED ORAL at 08:03

## 2020-03-06 RX ADMIN — POTASSIUM & SODIUM PHOSPHATES POWDER PACK 280-160-250 MG 2 PACKET: 280-160-250 PACK at 01:03

## 2020-03-06 RX ADMIN — HEPARIN SODIUM 5000 UNITS: 5000 INJECTION INTRAVENOUS; SUBCUTANEOUS at 06:03

## 2020-03-06 RX ADMIN — POTASSIUM & SODIUM PHOSPHATES POWDER PACK 280-160-250 MG 2 PACKET: 280-160-250 PACK at 05:03

## 2020-03-06 RX ADMIN — CEFAZOLIN 2 G: 1 INJECTION, POWDER, FOR SOLUTION INTRAMUSCULAR; INTRAVENOUS at 04:03

## 2020-03-06 RX ADMIN — HEPARIN SODIUM 5000 UNITS: 5000 INJECTION INTRAVENOUS; SUBCUTANEOUS at 10:03

## 2020-03-06 RX ADMIN — CALCIUM CARBONATE (ANTACID) CHEW TAB 500 MG 1000 MG: 500 CHEW TAB at 03:03

## 2020-03-06 NOTE — PT/OT/SLP PROGRESS
Physical Therapy Treatment    Patient Name:  Garcia Renteria   MRN:  [id number]    Recommendations:     Discharge Recommendations:  rehabilitation facility   Discharge Equipment Recommendations: (TBD by next level of care)   Barriers to discharge: Inaccessible home and Decreased caregiver support    Assessment:     Garcia Renteria is a 70 y.o. male admitted with a medical diagnosis of Acquired spondylolisthesis of thoracolumbar vertebra.  He presents with the following impairments/functional limitations:  weakness, impaired endurance, impaired self care skills, impaired functional mobilty, gait instability, impaired balance, decreased lower extremity function, decreased safety awareness, pain. Pt primarily limited by pain. Pt unable to tolerate full WBing on L hip in seated position.     Rehab Prognosis: Good; patient would benefit from acute skilled PT services to address these deficits and reach maximum level of function.    Recent Surgery: Procedure(s) (LRB):  T10-Pelvis Fusion with L4 PSO **AIRO** Co-Surgeon: Roberto Parkinson (N/A)  CREATION, FLAP, MUSCLE ROTATION 3 Days Post-Op    Plan:     During this hospitalization, patient to be seen 4 x/week to address the identified rehab impairments via gait training, therapeutic activities, therapeutic exercises, neuromuscular re-education and progress toward the following goals:    · Plan of Care Expires:  04/03/20    Subjective     Chief Complaint: Pain  Pain/Comfort:  · Pain Rating 1: 5/10  · Location - Side 1: Left  · Location 1: hip  · Pain Addressed 1: Reposition, Distraction, Cessation of Activity, Pre-medicate for activity, Nurse notified  · Pain Rating Post-Intervention 1: 5/10  · Location - Side 2: Right  · Location 2: thigh  · Pain Addressed 2: Reposition, Distraction, Pre-medicate for activity, Cessation of Activity, Nurse notified      Objective:     Communicated with nursing prior to session.  Patient found left sidelying with telemetry, peripheral IV, bed  alarm, wound vac, YRN drain, hemovac upon PT entry to room.     General Precautions: Standard, fall   Orthopedic Precautions:spinal precautions   Braces: TLSO     Functional Mobility:  · Bed Mobility:     · Rolling Left:  maximal assistance and of 2 persons  · Rolling Right: maximal assistance and of 2 persons  · Scooting: maximal assistance and of 2 persons,   · Supine to Sit: maximal assistance and of 2 persons  · Sit to Supine: maximal assistance and of 2 persons  · Transfers:  Unable to perform, pt limited by pain  · Seated EOB x 5 mins; max A, posterior lean/pushing  · Balance:   · Static Sitting: max A, posterior lean/pushing  · Dynamic Sitting: N/T  · Static and Dynamic Standing: N/T      AM-PAC 6 CLICK MOBILITY  Turning over in bed (including adjusting bedclothes, sheets and blankets)?: 1  Sitting down on and standing up from a chair with arms (e.g., wheelchair, bedside commode, etc.): 1  Moving from lying on back to sitting on the side of the bed?: 1  Moving to and from a bed to a chair (including a wheelchair)?: 1  Need to walk in hospital room?: 1  Climbing 3-5 steps with a railing?: 1  Basic Mobility Total Score: 6       Therapeutic Activities and Exercises:  Spinal precautions and log rolling technique reviewed.  Patient educated on role of therapy, goals of session, and benefits of mobilizing. Discussed PT plan of care during hospitalization. Patient educated on calling for assistance. Patient educated on how their diagnosis impacts their mobility within PT scope of practice.   Communication board updated.  All questions answered within PT scope of practice.      Patient left right sidelying with all lines intact, call button in reach, bed alarm on, nursing notified and brother present.    GOALS:   Multidisciplinary Problems     Physical Therapy Goals        Problem: Physical Therapy Goal    Goal Priority Disciplines Outcome Goal Variances Interventions   Physical Therapy Goal     PT, PT/OT Ongoing,  Progressing     Description:  Goals to be met by: 3/14/2020     Patient will increase functional independence with mobility by performin. Supine to sit with Moderate Assistance  2. Sit to supine with Moderate Assistance  3. Rolling to Left and Right with Moderate Assistance.  4. Sit to stand transfer with Maximum Assistance  5. Bed to chair transfer with Maximum Assistance  6. Gait  x 15 feet with Maximum Assistance using RW.   7. Lower extremity exercise program x15 reps per handout, with assistance as needed                      Time Tracking:     PT Received On: 20  PT Start Time: 1426     PT Stop Time: 1444  PT Total Time (min): 18 min     Billable Minutes: Therapeutic Activity 8    Treatment Type: Treatment  PT/PTA: PT           Katy Deutsch, PT  2020

## 2020-03-06 NOTE — PLAN OF CARE
Pt is progressing towards goals as expected. Goals remain appropriate continue with current POC.     Katy Deutsch, PT, DPT  3/5/2020      Problem: Physical Therapy Goal  Goal: Physical Therapy Goal  Description  Goals to be met by: 3/14/2020     Patient will increase functional independence with mobility by performin. Supine to sit with Moderate Assistance  2. Sit to supine with Moderate Assistance  3. Rolling to Left and Right with Moderate Assistance.  4. Sit to stand transfer with Maximum Assistance  5. Bed to chair transfer with Maximum Assistance  6. Gait  x 15 feet with Maximum Assistance using RW.   7. Lower extremity exercise program x15 reps per handout, with assistance as needed     Outcome: Ongoing, Progressing

## 2020-03-06 NOTE — PT/OT/SLP PROGRESS
Physical Therapy Treatment    Patient Name:  Garcia Renteria   MRN:  [id number]    Recommendations:     Discharge Recommendations:  rehabilitation facility   Discharge Equipment Recommendations: (TBD by next level of care)   Barriers to discharge: Inaccessible home and Decreased caregiver support    Assessment:     Garcia Renteria is a 70 y.o. male admitted with a medical diagnosis of Acquired spondylolisthesis of thoracolumbar vertebra.  He presents with the following impairments/functional limitations:  weakness, impaired endurance, impaired self care skills, impaired functional mobilty, gait instability, impaired balance, decreased lower extremity function, decreased safety awareness, pain. Pt tolerated ~ 5 mins EOB 2/2 L hip pain which worsens in sitting. Pt would continue to benefit from skilled acute care PT services to address the above listed deficits.     Pt would benefit from intensive collaborative rehabilitation for: Dynamic/static standing/sitting balance through skilled balance training, strengthening with the use of skilled therapeutic exercises interventions, mobility and safety training to ensure safe discharge home through skilled patient and caregiver education home management training, mobility through community re-integration training and mobility through adaptive equipment training. Pt highly motivated to return to independent PLOF and can tolerate 3+hours of therapy. Pt continues to benefit from a collaborative PT/OT/SLP program to improve quality of life and focus on recovery of impairments.      Rehab Prognosis: Good; patient would benefit from acute skilled PT services to address these deficits and reach maximum level of function.    Recent Surgery: Procedure(s) (LRB):  T10-Pelvis Fusion with L4 PSO **AIRO** Co-Surgeon: Roberto Parkinson (N/A)  CREATION, FLAP, MUSCLE ROTATION 2 Days Post-Op    Plan:     During this hospitalization, patient to be seen 4 x/week to address the identified rehab  impairments via gait training, therapeutic activities, therapeutic exercises, neuromuscular re-education and progress toward the following goals:    · Plan of Care Expires:  04/03/20    Subjective     Chief Complaint: L hip pain  Patient/Family Comments/goals: return to independent PLOF  Pain/Comfort:  Pain Rating 1: (not rated)  Location - Side 1: Left  Location 1: hip  Pain Addressed 1: Reposition, Distraction, Nurse notified, Cessation of Activity  Pain Rating Post-Intervention 1: (not rated)      Objective:     Communicated with nursing prior to session.  Patient found right sidelying with telemetry, SCD, peripheral IV, hemovac, YRN drain, wound vac, bed alarm upon PT entry to room.     General Precautions: Standard, fall   Orthopedic Precautions:spinal precautions   Braces: TLSO     Functional Mobility:  · Bed Mobility:     · Scooting to EOB: pt unable to perform to get feet on floor  · Supine to Sit: maximal assistance and of 2 persons  · Sit to Supine: maximal assistance and of 2 persons  · Transfers:  Deferred 2/2 significant L hip pain and patient unable to tolerate prolonged EOB  · EOB ~ 5mins  · Balance:   · Static Sitting: max A, pt leaning and pushing posteriorly to off load weight on L hip  · Dynamic Sitting: N/T      AM-PAC 6 CLICK MOBILITY  Turning over in bed (including adjusting bedclothes, sheets and blankets)?: 1  Sitting down on and standing up from a chair with arms (e.g., wheelchair, bedside commode, etc.): 1  Moving from lying on back to sitting on the side of the bed?: 1  Moving to and from a bed to a chair (including a wheelchair)?: 1  Need to walk in hospital room?: 1  Climbing 3-5 steps with a railing?: 1  Basic Mobility Total Score: 6       Therapeutic Activities and Exercises:  Pt educated on spinal precautions and log roll technique.  Patient educated on role of therapy, goals of session, and benefits of mobilizing. Discussed PT plan of care during hospitalization. Patient educated on  calling for assistance. Patient educated on how their diagnosis impacts their mobility within PT scope of practice.   Communication board updated.  All questions answered within PT scope of practice.    Patient left right sidelying with all lines intact, call button in reach, bed alarm on, nursing notified and spouse present.    GOALS:   Multidisciplinary Problems     Physical Therapy Goals        Problem: Physical Therapy Goal    Goal Priority Disciplines Outcome Goal Variances Interventions   Physical Therapy Goal     PT, PT/OT Ongoing, Progressing     Description:  Goals to be met by: 3/14/2020     Patient will increase functional independence with mobility by performin. Supine to sit with Moderate Assistance  2. Sit to supine with Moderate Assistance  3. Rolling to Left and Right with Moderate Assistance.  4. Sit to stand transfer with Maximum Assistance  5. Bed to chair transfer with Maximum Assistance  6. Gait  x 15 feet with Maximum Assistance using RW.   7. Lower extremity exercise program x15 reps per handout, with assistance as needed                      Time Tracking:     PT Received On: 20  PT Start Time: 1340     PT Stop Time: 1356  PT Total Time (min): 16 min     Billable Minutes: Therapeutic Activity 8    Treatment Type: Treatment  PT/PTA: PT           Katy Deutsch, PT  2020

## 2020-03-06 NOTE — SUBJECTIVE & OBJECTIVE
Interval History:  Continues left hip pain, isolated to lateral thigh and groin.  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  Patient states he was able to sit up longer with therapy today.  We will continue to monitor his pain.  Continue Lyrica which was started yesterday.  H&H dropped, we will transfuse 2 units today.  Improvement in left lower extremity pain/weakness.  Voiding spontaneously.  Denies worsening pain, weakness, paresthesias.      Medications:  Continuous Infusions:  Scheduled Meds:   bisacodyL  10 mg Rectal Daily    ceFAZolin (ANCEF) IVPB  2 g Intravenous Q8H    famotidine  40 mg Oral QHS    ferrous sulfate  325 mg Oral BID    heparin (porcine)  5,000 Units Subcutaneous Q8H    lisinopril  5 mg Oral Daily    mupirocin   Nasal BID    pregabalin  75 mg Oral BID    senna-docusate 8.6-50 mg  1 tablet Oral BID     PRN Meds:sodium chloride, acetaminophen, aluminum-magnesium hydroxide-simethicone, cyclobenzaprine, Dextrose 10% Bolus, Dextrose 10% Bolus, glucagon (human recombinant), glucose, glucose, HYDROmorphone, insulin aspart U-100, labetalol, ondansetron, ondansetron, oxyCODONE, oxyCODONE, promethazine (PHENERGAN) IVPB, sodium chloride 0.9%       Objective:     Weight: 98.5 kg (217 lb 2.5 oz)  Body mass index is 28.65 kg/m².  Vital Signs (Most Recent):  Temp: 98.5 °F (36.9 °C) (03/05/20 1933)  Pulse: 109 (03/05/20 1933)  Resp: 18 (03/05/20 1933)  BP: 126/71 (03/05/20 1933)  SpO2: 100 % (03/05/20 1715) Vital Signs (24h Range):  Temp:  [97 °F (36.1 °C)-100 °F (37.8 °C)] 98.5 °F (36.9 °C)  Pulse:  [105-116] 109  Resp:  [14-20] 18  SpO2:  [92 %-100 %] 100 %  BP: ()/(52-79) 126/71  Arterial Line BP: (148)/(62) 148/62     Date 03/05/20 0700 - 03/06/20 0659   Shift 4252-2845 9775-9499 9437-3299 24 Hour Total   INTAKE   P.O. 360   360   Shift Total(mL/kg) 360(3.7)   360(3.7)   OUTPUT   Drains  55  55   Shift Total(mL/kg)  55(0.6)  55(0.6)   Weight  (kg) 98.5 98.5 98.5 98.5                        Closed/Suction Drain 03/03/20 1559 Left;Superior Back Accordion 10 Fr. (Active)   Site Description Unable to view 3/4/2020 10:39 PM   Dressing Type Transparent (Tegaderm) 3/4/2020 10:39 PM   Dressing Status Clean;Dry;New drainage 3/4/2020 10:39 PM   Dressing Intervention Integrity maintained 3/4/2020 10:39 PM   Drainage Serosanguineous 3/4/2020  7:05 PM   Status Other (Comment) 3/4/2020  7:05 PM   Output (mL) 0 mL 3/5/2020  5:28 AM            Closed/Suction Drain 03/03/20 1600 Right;Superior Back Accordion 10 Fr. (Active)   Site Description Unable to view 3/4/2020 10:39 PM   Dressing Type Transparent (Tegaderm) 3/4/2020 10:39 PM   Dressing Status Dry;Clean;Intact 3/4/2020 10:39 PM   Dressing Intervention Integrity maintained 3/4/2020 10:39 PM   Drainage Serosanguineous 3/4/2020  7:05 PM   Status Other (Comment) 3/4/2020  7:05 PM   Output (mL) 25 mL 3/5/2020  5:29 PM            Closed/Suction Drain 03/03/20 1601 Left;Inferior Back Bulb 15 Fr. (Active)   Site Description Unable to view 3/4/2020 10:39 PM   Dressing Type Biopatch in place 3/4/2020 10:39 PM   Dressing Status Clean;Dry;Intact 3/4/2020  7:05 PM   Dressing Intervention Integrity maintained 3/4/2020  7:05 PM   Drainage Serosanguineous 3/4/2020  7:05 PM   Status To bulb suction 3/4/2020  7:05 PM   Output (mL) 0 mL 3/5/2020  5:28 AM            Closed/Suction Drain 03/03/20 1602 Right;Inferior Back Bulb 15 Fr. (Active)   Site Description Unable to view 3/4/2020 10:39 PM   Dressing Type Biopatch in place 3/4/2020 10:39 PM   Dressing Status Clean;Dry;Intact 3/4/2020  7:05 PM   Dressing Intervention Integrity maintained 3/4/2020  7:05 PM   Drainage Serosanguineous 3/4/2020  7:05 PM   Status To bulb suction 3/4/2020  7:05 PM   Output (mL) 30 mL 3/5/2020  5:29 PM       Neurosurgery Physical Exam    General: well developed, well nourished, no distress.   Head: normocephalic, atraumatic  Neck: No tracheal deviation. No  palpable masses.   Neurologic: Alert and oriented. Thought content appropriate.  GCS: Motor: 6/Verbal: 5/Eyes: 4 GCS Total: 15  Mental Status: Awake, Alert, Oriented x 4  Language: No aphasia  Speech: No dysarthria  Cranial nerves: face symmetric, tongue midline, CN II-XII grossly intact.   Eyes: pupils equal, round, reactive to light with accomodation, EOMI.   Ears: No drainage.   Pulmonary: normal respirations, no signs of respiratory distress  Abdomen: soft, non-distended, not tender to palpation  Sensory: intact to light touch throughout  Motor Strength: Moves all extremities spontaneously with good tone. No abnormal movements seen.     Strength  Deltoids Triceps Biceps Wrist Extension Wrist Flexion Hand    Upper: R 5/5 5/5 5/5 5/5 5/5 5/5    L 5/5 5/5 5/5 5/5 5/5 5/5     Iliopsoas Quadriceps Knee  Flexion Tibialis  anterior Gastro- cnemius EHL   Lower: R 5/5 5/5 5/5 5/5 5/5 5/5    L 3/5 3/5 3/5 4/5 4/5 4/5     Harris: absent  Clonus: absent  Babinski: absent  Vascular: Pulses 2+ and symmetric radial and dorsalis pedis. No LE edema.   Skin: Skin is warm, dry and intact.  No pain with ER/IR left hip, non-tender to palpation.     Incisional WV in place. 2 HV and 2 YRN drains in place.       Significant Labs:  Recent Labs   Lab 03/04/20  0257 03/05/20  0359   * 116*    142   K 3.9 3.9    109   CO2 22* 25   BUN 13 12   CREATININE 1.0 0.9   CALCIUM 8.5* 8.2*   MG 1.7 1.8     Recent Labs   Lab 03/04/20  0257 03/05/20  0359   WBC 13.09* 12.54   HGB 9.0* 7.3*   HCT 29.6* 23.7*    123*     Recent Labs   Lab 03/04/20  0257   INR 1.2   APTT 24.8     Microbiology Results (last 7 days)     ** No results found for the last 168 hours. **        Recent Lab Results       03/05/20  1638   03/05/20  1134   03/05/20  0758   03/05/20  0532   03/05/20  0359        Albumin         2.6     Alkaline Phosphatase         53     ALT         13     Anion Gap         8     AST         48     Baso #         0.04      Basophil%         0.3     BILIRUBIN TOTAL         0.5  Comment:  For infants and newborns, interpretation of results should be based  on gestational age, weight and in agreement with clinical  observations.  Premature Infant recommended reference ranges:  Up to 24 hours.............<8.0 mg/dL  Up to 48 hours............<12.0 mg/dL  3-5 days..................<15.0 mg/dL  6-29 days.................<15.0 mg/dL       BUN, Bld         12     Calcium         8.2     Chloride         109     CO2         25     Creatinine         0.9     Differential Method         Automated     eGFR if          >60.0     eGFR if non          >60.0  Comment:  Calculation used to obtain the estimated glomerular filtration  rate (eGFR) is the CKD-EPI equation.        Eos #         0.0     Eosinophil%         0.1     Glucose         116     Gran # (ANC)         9.7     Gran%         77.2     Hematocrit         23.7     Hemoglobin         7.3     Immature Grans (Abs)         0.11  Comment:  Mild elevation in immature granulocytes is non specific and   can be seen in a variety of conditions including stress response,   acute inflammation, trauma and pregnancy. Correlation with other   laboratory and clinical findings is essential.       Immature Granulocytes         0.9     Lymph #         1.6     Lymph%         12.6     Magnesium         1.8     MCH         27.3     MCHC         30.8     MCV         89     Mono #         1.1     Mono%         8.9     MPV         11.3     nRBC         0     Phosphorus         2.2     Platelets         123     POCT Glucose 102 105 142 109       Potassium         3.9     PROTEIN TOTAL         5.1     RBC         2.67     RDW         14.0     Sodium         142     WBC         12.54                        All pertinent labs from the last 24 hours have been reviewed.    Significant Diagnostics:  I have reviewed all pertinent imaging results/findings within the past 24 hours.

## 2020-03-06 NOTE — ASSESSMENT & PLAN NOTE
POD 3    Continue Prevena until Post Op day 5  Ok for out of bed from plastics standpoint  Limiting sitting if possible  Antibiotics per neurosurgery  Monitor for anemia, no clinical hematoma at this time    For discharge instructions see Dr. Caro's note dated 3/5/2020

## 2020-03-06 NOTE — PROGRESS NOTES
[facility]  Neurosurgery  Progress Note    Subjective:     History of Present Illness: This is a 70-year-old man with a history of a non instrumented lumbar fusion many years ago who presents with severe progressive axial low back pain, bilateral radicular leg pain, and thoracic myelopathy.  Additionally he had severe postural deformity that was functionally disabling.  He failed multiple conservative measures.  Imaging showed severe central stenosis with spinal cord compression from T10-L2, solid fusion from his previous surgery from L3 to the sacrum, and flat back deformity with severe fixed sagittal plane deformity. Recommended patient undergo spinal cord decompression and deformity reduction surgery, via a T10 to pelvis instrumented fusion with an L3 PSO and a multilevel laminectomy for spinal cord decompression.  Risks benefits alternatives indications and methods were reviewed in detail and he wished to proceed.  All questions were answered.  No guarantees about the results the procedure. Presents to [facility] for T10-pelvis and L3 PSO.    Post-Op Info:  Procedure(s) (LRB):  T10-Pelvis Fusion with L4 PSO **AIRO** Co-Surgeon: Roberto Parkinson (N/A)  CREATION, FLAP, MUSCLE ROTATION   2 Days Post-Op     Interval History:  Continues left hip pain, isolated to lateral thigh and groin.  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  Patient states he was able to sit up longer with therapy today.  We will continue to monitor his pain.  Continue Lyrica which was started yesterday.  H&H dropped, we will transfuse 2 units today.  Improvement in left lower extremity pain/weakness.  Voiding spontaneously.  Denies worsening pain, weakness, paresthesias.      Medications:  Continuous Infusions:  Scheduled Meds:   bisacodyL  10 mg Rectal Daily    ceFAZolin (ANCEF) IVPB  2 g Intravenous Q8H    famotidine  40 mg Oral QHS    ferrous sulfate  325 mg Oral BID    heparin  (porcine)  5,000 Units Subcutaneous Q8H    lisinopril  5 mg Oral Daily    mupirocin   Nasal BID    pregabalin  75 mg Oral BID    senna-docusate 8.6-50 mg  1 tablet Oral BID     PRN Meds:sodium chloride, acetaminophen, aluminum-magnesium hydroxide-simethicone, cyclobenzaprine, Dextrose 10% Bolus, Dextrose 10% Bolus, glucagon (human recombinant), glucose, glucose, HYDROmorphone, insulin aspart U-100, labetalol, ondansetron, ondansetron, oxyCODONE, oxyCODONE, promethazine (PHENERGAN) IVPB, sodium chloride 0.9%       Objective:     Weight: 98.5 kg (217 lb 2.5 oz)  Body mass index is 28.65 kg/m².  Vital Signs (Most Recent):  Temp: 98.5 °F (36.9 °C) (03/05/20 1933)  Pulse: 109 (03/05/20 1933)  Resp: 18 (03/05/20 1933)  BP: 126/71 (03/05/20 1933)  SpO2: 100 % (03/05/20 1715) Vital Signs (24h Range):  Temp:  [97 °F (36.1 °C)-100 °F (37.8 °C)] 98.5 °F (36.9 °C)  Pulse:  [105-116] 109  Resp:  [14-20] 18  SpO2:  [92 %-100 %] 100 %  BP: ()/(52-79) 126/71  Arterial Line BP: (148)/(62) 148/62     Date 03/05/20 0700 - 03/06/20 0659   Shift 2956-6036 9251-0496 5412-8803 24 Hour Total   INTAKE   P.O. 360   360   Shift Total(mL/kg) 360(3.7)   360(3.7)   OUTPUT   Drains  55  55   Shift Total(mL/kg)  55(0.6)  55(0.6)   Weight (kg) 98.5 98.5 98.5 98.5                        Closed/Suction Drain 03/03/20 1559 Left;Superior Back Accordion 10 Fr. (Active)   Site Description Unable to view 3/4/2020 10:39 PM   Dressing Type Transparent (Tegaderm) 3/4/2020 10:39 PM   Dressing Status Clean;Dry;New drainage 3/4/2020 10:39 PM   Dressing Intervention Integrity maintained 3/4/2020 10:39 PM   Drainage Serosanguineous 3/4/2020  7:05 PM   Status Other (Comment) 3/4/2020  7:05 PM   Output (mL) 0 mL 3/5/2020  5:28 AM            Closed/Suction Drain 03/03/20 1600 Right;Superior Back Accordion 10 Fr. (Active)   Site Description Unable to view 3/4/2020 10:39 PM   Dressing Type Transparent (Tegaderm) 3/4/2020 10:39 PM   Dressing Status  Dry;Clean;Intact 3/4/2020 10:39 PM   Dressing Intervention Integrity maintained 3/4/2020 10:39 PM   Drainage Serosanguineous 3/4/2020  7:05 PM   Status Other (Comment) 3/4/2020  7:05 PM   Output (mL) 25 mL 3/5/2020  5:29 PM            Closed/Suction Drain 03/03/20 1601 Left;Inferior Back Bulb 15 Fr. (Active)   Site Description Unable to view 3/4/2020 10:39 PM   Dressing Type Biopatch in place 3/4/2020 10:39 PM   Dressing Status Clean;Dry;Intact 3/4/2020  7:05 PM   Dressing Intervention Integrity maintained 3/4/2020  7:05 PM   Drainage Serosanguineous 3/4/2020  7:05 PM   Status To bulb suction 3/4/2020  7:05 PM   Output (mL) 0 mL 3/5/2020  5:28 AM            Closed/Suction Drain 03/03/20 1602 Right;Inferior Back Bulb 15 Fr. (Active)   Site Description Unable to view 3/4/2020 10:39 PM   Dressing Type Biopatch in place 3/4/2020 10:39 PM   Dressing Status Clean;Dry;Intact 3/4/2020  7:05 PM   Dressing Intervention Integrity maintained 3/4/2020  7:05 PM   Drainage Serosanguineous 3/4/2020  7:05 PM   Status To bulb suction 3/4/2020  7:05 PM   Output (mL) 30 mL 3/5/2020  5:29 PM       Neurosurgery Physical Exam    General: well developed, well nourished, no distress.   Head: normocephalic, atraumatic  Neck: No tracheal deviation. No palpable masses.   Neurologic: Alert and oriented. Thought content appropriate.  GCS: Motor: 6/Verbal: 5/Eyes: 4 GCS Total: 15  Mental Status: Awake, Alert, Oriented x 4  Language: No aphasia  Speech: No dysarthria  Cranial nerves: face symmetric, tongue midline, CN II-XII grossly intact.   Eyes: pupils equal, round, reactive to light with accomodation, EOMI.   Ears: No drainage.   Pulmonary: normal respirations, no signs of respiratory distress  Abdomen: soft, non-distended, not tender to palpation  Sensory: intact to light touch throughout  Motor Strength: Moves all extremities spontaneously with good tone. No abnormal movements seen.     Strength  Deltoids Triceps Biceps Wrist Extension Wrist  Flexion Hand    Upper: R 5/5 5/5 5/5 5/5 5/5 5/5    L 5/5 5/5 5/5 5/5 5/5 5/5     Iliopsoas Quadriceps Knee  Flexion Tibialis  anterior Gastro- cnemius EHL   Lower: R 5/5 5/5 5/5 5/5 5/5 5/5    L 3/5 3/5 3/5 4/5 4/5 4/5     Harris: absent  Clonus: absent  Babinski: absent  Vascular: Pulses 2+ and symmetric radial and dorsalis pedis. No LE edema.   Skin: Skin is warm, dry and intact.  No pain with ER/IR left hip, non-tender to palpation.     Incisional WV in place. 2 HV and 2 YRN drains in place.       Significant Labs:  Recent Labs   Lab 03/04/20 0257 03/05/20  0359   * 116*    142   K 3.9 3.9    109   CO2 22* 25   BUN 13 12   CREATININE 1.0 0.9   CALCIUM 8.5* 8.2*   MG 1.7 1.8     Recent Labs   Lab 03/04/20  0257 03/05/20  0359   WBC 13.09* 12.54   HGB 9.0* 7.3*   HCT 29.6* 23.7*    123*     Recent Labs   Lab 03/04/20  0257   INR 1.2   APTT 24.8     Microbiology Results (last 7 days)     ** No results found for the last 168 hours. **        Recent Lab Results       03/05/20  1638   03/05/20  1134   03/05/20  0758   03/05/20  0532   03/05/20  0359        Albumin         2.6     Alkaline Phosphatase         53     ALT         13     Anion Gap         8     AST         48     Baso #         0.04     Basophil%         0.3     BILIRUBIN TOTAL         0.5  Comment:  For infants and newborns, interpretation of results should be based  on gestational age, weight and in agreement with clinical  observations.  Premature Infant recommended reference ranges:  Up to 24 hours.............<8.0 mg/dL  Up to 48 hours............<12.0 mg/dL  3-5 days..................<15.0 mg/dL  6-29 days.................<15.0 mg/dL       BUN, Bld         12     Calcium         8.2     Chloride         109     CO2         25     Creatinine         0.9     Differential Method         Automated     eGFR if          >60.0     eGFR if non          >60.0  Comment:  Calculation used to obtain  the estimated glomerular filtration  rate (eGFR) is the CKD-EPI equation.        Eos #         0.0     Eosinophil%         0.1     Glucose         116     Gran # (ANC)         9.7     Gran%         77.2     Hematocrit         23.7     Hemoglobin         7.3     Immature Grans (Abs)         0.11  Comment:  Mild elevation in immature granulocytes is non specific and   can be seen in a variety of conditions including stress response,   acute inflammation, trauma and pregnancy. Correlation with other   laboratory and clinical findings is essential.       Immature Granulocytes         0.9     Lymph #         1.6     Lymph%         12.6     Magnesium         1.8     MCH         27.3     MCHC         30.8     MCV         89     Mono #         1.1     Mono%         8.9     MPV         11.3     nRBC         0     Phosphorus         2.2     Platelets         123     POCT Glucose 102 105 142 109       Potassium         3.9     PROTEIN TOTAL         5.1     RBC         2.67     RDW         14.0     Sodium         142     WBC         12.54                        All pertinent labs from the last 24 hours have been reviewed.    Significant Diagnostics:  I have reviewed all pertinent imaging results/findings within the past 24 hours.    Assessment/Plan:     Degenerative scoliosis  A 70 year old with degenerative lumbar spondy and spine deformity now s/p T10 - pelvis posterior segmental instrumented fusion, T10 - L3 laminectomy for decompression, L3 pedical subtraction osteotomy (3/3).    - Neurologically stable  - Pain: continue current regimen. 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   - Postop XR when able to tolerate standing  - TLSO when upright/OOB.  - Prevena WV and YRN drains per plastic surgery management  - Continue 2 HV drains 2/2 high output.   - Continue ppx abx while drains in place  - Hemoglobin decreased from 9 to 7 today, transfuse 2 units RBC. Continue to monitor.   - Continue PT/OT/OOB. OOB  goal > 6 hrs per day. Continue to encourage OOB mobility and up to chair with meals.   - GI: diet as tolerated. Continue current bowel regimen.   - DVT ppx: SQH, TEDs, SCDs  - Atelectasis ppx: IS every hour while awake  - HTN: continue home dose lisinopril, Prn labetalol  - DM2: continue POCT glucose, SSI, diabetic diet  - GERD: continue home dose pepcid    - Dispo: recommending rehab, referrals sent.     - Discussed with Dr. Waqas Cuevas PA-C  Neurosurgery  [facility]

## 2020-03-06 NOTE — ASSESSMENT & PLAN NOTE
A 70 year old with degenerative lumbar spondy and spine deformity now s/p T10 - pelvis posterior segmental instrumented fusion, T10 - L3 laminectomy for decompression, L3 pedical subtraction osteotomy (3/3).    - Neurologically stable  - Pain: continue current regimen. 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   - Postop XR when able to tolerate standing  - TLSO when upright/OOB.  - Prevena [place] and YRN drains per plastic surgery management  - Continue 2 HV drains 2/2 high output.   - Continue ppx abx while drains in place  - Hemoglobin decreased from 9 to 7 today, transfuse 2 units RBC. Continue to monitor.   - Continue PT/OT/OOB. OOB goal > 6 hrs per day. Continue to encourage OOB mobility and up to chair with meals.   - GI: diet as tolerated. Continue current bowel regimen.   - DVT ppx: SQH, TEDs, SCDs  - Atelectasis ppx: IS every hour while awake  - HTN: continue home dose lisinopril, Prn labetalol  - DM2: continue POCT glucose, SSI, diabetic diet  - GERD: continue home dose pepcid    - Dispo: recommending rehab, referrals sent.     - Discussed with Dr. Alan

## 2020-03-06 NOTE — PLAN OF CARE
Problem: Diabetes Comorbidity  Goal: Blood Glucose Level Within Desired Range  Outcome: Ongoing, Progressing     Problem: Adult Inpatient Plan of Care  Goal: Plan of Care Review  Outcome: Ongoing, Progressing  Goal: Patient-Specific Goal (Individualization)  Description  Admit Date: 3/3    Admit Dx: spinal fusion    Past Medical History:  No date: Colitis  No date: Diabetes mellitus, type 2  No date: Diverticulosis  No date: GERD (gastroesophageal reflux disease)  No date: Hypertension    Past Surgical History:  No date: ANKLE SURGERY; Bilateral  10/30/2019: ANTERIOR CERVICAL DISCECTOMY W/ FUSION; N/A      Comment:  Procedure: C3-4, C4-5 ACDF;  Surgeon: Francis Alan MD;               Location: [facility] OR 2ND FLR;  Service: Neurosurgery;                 Laterality: N/A;  C3-4, C4-5 ACDFAnesthesia:                 GeneralRad:  C-ArmNM:  EMG, SEP, & MEPBlood:  Type                & ScreenPosition:  SupineBed:  Regular slider                bedHeadrest:  [place] & GW tongs/hanging                weightsMisc:Small vivigenNerve root retractor                (DePuy)Disposable #2 KerrisonInterbody  No date: BACK SURGERY  No date: COLONOSCOPY  3/3/2020: CREATION OF MUSCLE ROTATIONAL FLAP      Comment:  Procedure: CREATION, FLAP, MUSCLE ROTATION;  Surgeon:                Francis Alan MD;  [facility];  Service:                Neurosurgery;;  No date: lower back surgery  3/3/2020: LUMBAR LAMINECTOMY WITH FUSION; N/A      Comment:  Procedure: T10-Pelvis Fusion with L4 PSO **AIRO**                Co-Surgeon: Roberto Parkinson;  Surgeon: Francis Alan MD;                 [facility];  Service: Neurosurgery;                 Laterality: N/A;  **AIRO**CELL SAVER**Co-Surgeon:                Roberto ParkinsonNM: EMG, SEP, MEPPosition: ProneBed:                Shaji 4 post, prone pillowBlood: Type & Hold 2                unitsRad: C-Arm, AIROVendor: DepuyMisc: Vivigen,                Infuse, Cement (Depuy)[place]  No  date: NECK SURGERY  No date: SPINE SURGERY  No date: UPPER GASTROINTESTINAL ENDOSCOPY    Individualization:   1. Pt likes dim lights    Restraints: (date/time/why or N/A) na         Outcome: Ongoing, Progressing  Goal: Absence of Hospital-Acquired Illness or Injury  Outcome: Ongoing, Progressing  Goal: Optimal Comfort and Wellbeing  Outcome: Ongoing, Progressing  Goal: Readiness for Transition of Care  Outcome: Ongoing, Progressing     Problem: Fall Injury Risk  Goal: Absence of Fall and Fall-Related Injury  Outcome: Ongoing, Progressing     Problem: Infection  Goal: Infection Symptom Resolution  Outcome: Ongoing, Progressing     Problem: Skin Injury Risk Increased  Goal: Skin Health and Integrity  Outcome: Ongoing, Progressing     Problem: Hypertension Comorbidity  Goal: Blood Pressure in Desired Range  Outcome: Ongoing, Progressing     Problem: Pain Chronic (Persistent) (Comorbidity Management)  Goal: Acceptable Pain Control and Functional Ability  Outcome: Ongoing, Progressing     Problem: Oral Intake Inadequate  Goal: Improved Oral Intake  Outcome: Ongoing, Progressing

## 2020-03-06 NOTE — HOSPITAL COURSE
3/3: admit to the neuro ICU s/p T10-pelvis instrumented fusion, T10- L2 laminectomy, and L3 PSO.   3/4: No significant events over night. Requiring pain meds around the clock. Gets some relief, not total. bolused 1l  And increased IVF discussed with NSGY will step down to nsgy today  3/5:  Continues left hip pain, isolated to lateral thigh and groin.  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  Patient states he was able to sit up longer with therapy today.  We will continue to monitor his pain.  Continue Lyrica which was started yesterday.  H&H dropped, we will transfuse 2 units today.  Improvement in left lower extremity pain/weakness.  Voiding spontaneously.  Denies worsening pain, weakness, paresthesias.  3/6: NAEON. Left hip pain and Right anterior thigh spasms limited participation with therapy today. Patient able to tolerate sitting EOB for 5 minutes, unable to stand. H/H improved some after transfusion, will ctm. Denies worsening weakness, paresthesias, b/b dysfunction. Voiding spontaneously. L HV removed today.   3/7: NAEON. H/H stable, PLTs trended up to 142. Lisinopril held for mild hypotension. Remaining R HV drain removed for low output. Pt reports continued L hip pain and bilateral thigh pain, unimproved and worse when attempting to sit up. Plan for nerve block with Anesthesia on Monday if does not improve. Otherwise no issues. Denies new/worsening weakness, paresthesias, and b/b dysfunction.    3/8: NAEON. Exam stable. Pain unchanged. NPO at midnight for possible nerve block tomorrow.   3/9: NAEON. Patient resting in bed. Wife at bedside. He continues to report severe BLE pain. States RLE pain is now equal to LLE pain. Pain begins in this lower back/buttocks, radiates down the anterolateral aspect of his leg, terminating at the knee. Pain is in the same distribution as pre op pain; however, the severity has greatly increased post operatively. Leg pain  greatly increases when trying to bear weight through legs or sit. Pain is tolerable when lying down. Patient also reports muscle spasms that do not improve with PO Flexeril. Tolerating diet. Intermittent urinary retention at baseline. Denies any new bladder or bowel dysfunction.   3/10: MAXIMILIANON. Nerve block this morning. Patient reports some improvement in pain since medication adjusted by Anesthesiology. No distress noted while lying in bed, although his pain is exacerbated with movement. Able to tolerate sitting edge of bed for a longer period of time and able to bear some weight to stand with therapy today. Denies worsening weakness, numbness, paresthesias from the day prior. Voiding spontaneously. BM several days ago and asking for fleet enema, he states he uses an enema or suppository 1-2x per week at home.   3/11: NAEON. Patient reports pain is now tolerable. Able to sit edge of bed today unassisted. Denies worsening pain, weakness, paresthesias, b/b dysfunction. Continue current regimen per APS recs. Medically stable for discharge, pending acceptance.   3/12: NAEON. Strength and tolerance continues to improve daily. Denies worsening pain, weakness, paresthesias, b/b dysfunction. L YRN drain removed by plastics today. Medically stable for discharge.   3/13: NAEON. Strength stable. Tolerated PT/OT session well today, pain limited weakness in proximal RLE noted after session today. Motivated to continue progression. XR done. Patient denies worsening weakness, paresthesias, b/b dysfunction.  3/14: NAEON. Pain controlled. YRN in place per plastics. Pending DC to [facility].   3/15: NAEON. Neurologically stable. YRN per plastics. Anticipate DC in AM to rehab.   3/16: NAEON. Neuro exam stable. Ambulated to nurses station over the weekend, sat in chair yesterday for 30 minutes. Progressing daily with aggressive therapy regimen, patient remains self-motivated to progress. Denies weakness, paresthesias, b/b dysfunction.  Continue YRN per plastics.   3/17: NAEON. YRN removed per plastics, Dr. Caro will arrange f/u. Ambulating well with PT, motivated to progress. Pain controlled with current regimen. Sitting edge of bed with therapy, tolerating well. Denies weakness, paresthesias, b/b dysfunction.   3/18: + Quantiferon gold test. ID consulted, will f/u recs. Will be moved to isolation room. Patient denies weakness, paresthesias, b/b dysfunction.   3/19: NAEON. Sputum obtained this morning for AFB smear. Continue isolation precautions. Up with therapy today. Remains motivated to progress. BM yesterday. Voiding spontaneously.   3/20: NAEON. AFB smear results pending this afternoon. Endorses right groin pain this morning, controlled with current regimen. Up to chair twice yesterday and ambulated around room, motivated to continue ambulating and progress. Denies weakness, paresthesias, b/b dysfunction.  3/21: PATRICK, enema today for BM, otherwise no complaints  3/22: PATRICK, awaitng LTAC placement  3/23: NAEON. AFVSS. Up in chair today. Complains of worsening right groin pain with radiation into anterolateral right thigh. Increases with movement. Voiding spontaneously. BM today. Denies weakness, paresthesias, b/b dysfunction. Pending placement.   3/24: NAEON. Continues to complain of right groin/anterolateral thigh pain that increases with movement. Pain is alleviated with PO medications, but increases once medication begins to wear off. Denies new onset weakness, paresthesias, or bowel/bladder issues. He has been accepted to [facility]. Medically stable for discharge. Discharge instructions reviewed with patient. He voiced understanding. All of his questions were answered.

## 2020-03-06 NOTE — SUBJECTIVE & OBJECTIVE
Interval History: NAEON.  Pain well controlled.  Drains with around 60cc of output total.  Only from drains on right side.  Provena in place with no output. H/H 8.8/28.1.      Medications:  Continuous Infusions:  Scheduled Meds:   bisacodyL  10 mg Rectal Daily    ceFAZolin (ANCEF) IVPB  2 g Intravenous Q8H    famotidine  40 mg Oral QHS    ferrous sulfate  325 mg Oral BID    heparin (porcine)  5,000 Units Subcutaneous Q8H    lisinopril  5 mg Oral Daily    mupirocin   Nasal BID    potassium, sodium phosphates  2 packet Oral Q4H    pregabalin  75 mg Oral BID    senna-docusate 8.6-50 mg  1 tablet Oral BID     PRN Meds:sodium chloride, acetaminophen, aluminum-magnesium hydroxide-simethicone, cyclobenzaprine, Dextrose 10% Bolus, Dextrose 10% Bolus, glucagon (human recombinant), glucose, glucose, HYDROmorphone, insulin aspart U-100, labetalol, ondansetron, ondansetron, oxyCODONE, oxyCODONE, promethazine (PHENERGAN) IVPB, sodium chloride 0.9%     Review of patient's allergies indicates:  No Known Allergies  Objective:     Vital Signs (Most Recent):  Temp: 99.8 °F (37.7 °C) (03/06/20 0450)  Pulse: 93 (03/06/20 0723)  Resp: 18 (03/06/20 0450)  BP: (!) 135/57 (03/06/20 0450)  SpO2: 97 % (03/06/20 0450) Vital Signs (24h Range):  Temp:  [98.2 °F (36.8 °C)-99.9 °F (37.7 °C)] 99.8 °F (37.7 °C)  Pulse:  [] 93  Resp:  [18] 18  SpO2:  [92 %-100 %] 97 %  BP: ()/(52-89) 135/57     Weight: 98.5 kg (217 lb 2.5 oz)  Body mass index is 28.65 kg/m².    Intake/Output - Last 3 Shifts       03/04 0700 - 03/05 0659 03/05 0700 - 03/06 0659 03/06 0700 - 03/07 0659    P.O. 460 600     I.V. (mL/kg) 1288.3 (13.1)      Blood  325     Total Intake(mL/kg) 1748.3 (17.7) 925 (9.4)     Urine (mL/kg/hr) 1935 (0.8) 0 (0)     Drains 459.5 65     Other  0     Stool 0 0     Blood       Total Output 2394.5 65     Net -646.2 +860            Urine Occurrence  6 x     Stool Occurrence 0 x 1 x           Physical Exam   Constitutional: He is  oriented to person, place, and time. He appears well-developed and well-nourished.   HENT:   Head: Normocephalic.   Eyes: EOM are normal.   Cardiovascular: Normal rate.   Pulmonary/Chest: Effort normal.   Abdominal: He exhibits no distension.   Musculoskeletal:   Provena in place over incisions on back.  Wearing brace.  YRN drains in place with serosanguinous output.  Appropriately tender on palpation.  Continues to lay on side keeping pressure off of back.   Neurological: He is alert and oriented to person, place, and time.   Skin: Skin is warm and dry.   Psychiatric: He has a normal mood and affect.       Significant Labs:  CBC:   Recent Labs   Lab 03/06/20 0404   WBC 11.82   RBC 3.20*   HGB 8.8*   HCT 28.1*   *   MCV 88   MCH 27.5   MCHC 31.3*     BMP:   Recent Labs   Lab 03/06/20 0404   *      K 3.7      CO2 25   BUN 11   CREATININE 0.8   CALCIUM 8.3*   MG 1.9     CMP:   Recent Labs   Lab 03/06/20 0404   *   CALCIUM 8.3*   ALBUMIN 2.5*   PROT 5.6*      K 3.7   CO2 25      BUN 11   CREATININE 0.8   ALKPHOS 55   ALT 15   AST 53*   BILITOT 0.7       Significant Diagnostics:  Reviewed

## 2020-03-06 NOTE — SUBJECTIVE & OBJECTIVE
Interval History 3/6/2020:  Patient is seen for follow-up rehab evaluation and recommendations: Participating with therapy.     HPI, Past Medical, Family, and Social History remains the same as documented in the initial encounter.    Scheduled Medications:    bisacodyL  10 mg Rectal Daily    ceFAZolin (ANCEF) IVPB  2 g Intravenous Q8H    famotidine  40 mg Oral QHS    ferrous sulfate  325 mg Oral BID    heparin (porcine)  5,000 Units Subcutaneous Q8H    lisinopril  5 mg Oral Daily    mupirocin   Nasal BID    potassium, sodium phosphates  2 packet Oral Q4H    pregabalin  75 mg Oral BID    senna-docusate 8.6-50 mg  1 tablet Oral BID       Diagnostic Results:   Labs: Reviewed    PRN Medications: sodium chloride, acetaminophen, aluminum-magnesium hydroxide-simethicone, cyclobenzaprine, Dextrose 10% Bolus, Dextrose 10% Bolus, glucagon (human recombinant), glucose, glucose, HYDROmorphone, insulin aspart U-100, labetalol, ondansetron, ondansetron, oxyCODONE, oxyCODONE, promethazine (PHENERGAN) IVPB, sodium chloride 0.9%    Review of Systems   Constitutional: Positive for activity change. Negative for fatigue and fever.   HENT: Negative for trouble swallowing and voice change.    Respiratory: Negative for cough and shortness of breath.    Cardiovascular: Negative for chest pain and leg swelling.   Gastrointestinal: Negative for abdominal distention and abdominal pain.   Genitourinary: Negative for difficulty urinating and flank pain.   Musculoskeletal: Positive for gait problem. Negative for back pain.   Skin: Positive for wound. Negative for color change and rash.   Neurological: Positive for weakness. Negative for speech difficulty and numbness.   Psychiatric/Behavioral: Negative for agitation and confusion.     Objective:     Vital Signs (Most Recent):  Temp: 99.8 °F (37.7 °C) (03/06/20 0450)  Pulse: 93 (03/06/20 0723)  Resp: 18 (03/06/20 0450)  BP: (!) 135/57 (03/06/20 0450)  SpO2: 97 % (03/06/20 0450)    Vital  Signs (24h Range):  Temp:  [98.2 °F (36.8 °C)-99.9 °F (37.7 °C)] 99.8 °F (37.7 °C)  Pulse:  [] 93  Resp:  [18] 18  SpO2:  [92 %-100 %] 97 %  BP: ()/(52-89) 135/57     Physical Exam   Constitutional: He is oriented to person, place, and time. He appears well-developed and well-nourished.   HENT:   Head: Normocephalic and atraumatic.   Eyes: Pupils are equal, round, and reactive to light. Right eye exhibits no discharge. Left eye exhibits no discharge.   Neck: Neck supple.   Pulmonary/Chest: Effort normal. No respiratory distress.   Abdominal: Soft. He exhibits no distension. There is no tenderness.   Musculoskeletal: He exhibits no edema or deformity.   RUE: 5/5.  LUE: 5/5.  RLE: 3/5.  LLE: 2/5.   Neurological: He is alert and oriented to person, place, and time. No sensory deficit. He exhibits normal muscle tone.   - Drains x 4 in place, dressing in place to back  - following commands   Skin: Skin is warm and dry.   Psychiatric: He has a normal mood and affect. His behavior is normal.   Nursing note and vitals reviewed.    NEUROLOGICAL EXAMINATION:     MENTAL STATUS   Oriented to person, place, and time.     CRANIAL NERVES     CN III, IV, VI   Pupils are equal, round, and reactive to light.

## 2020-03-06 NOTE — ASSESSMENT & PLAN NOTE
- 3/3 s/p T10-pelvis instrumented fusion, T10- L2 laminectomy, L3 PSO, and complex wound closure by plastic surgery.   - Plastic surgery recommending head of bed flat at all times except meals 30 mis 3x/day x 6 weeks.    - Per NSGY TLSO when upright/OOB.

## 2020-03-06 NOTE — PT/OT/SLP PROGRESS
Occupational Therapy   Treatment    Name: Garcia Renteria  MRN: [id number]  Admitting Diagnosis:  Acquired spondylolisthesis of thoracolumbar vertebra  3 Days Post-Op  T10-Pelvis Fusion with L4 PSO **AIRO** Co-Surgeon: Roberto Parkinson (N/A)  CREATION, FLAP, MUSCLE ROTATION     Recommendations:     Discharge Recommendations: rehabilitation facility  Discharge Equipment Recommendations:  (TBD)  Barriers to discharge:  (increased assistance needed)    Assessment:     Garcia Renteria is a 70 y.o. male with a medical diagnosis of Acquired spondylolisthesis of thoracolumbar vertebra.  He presents with performance deficits including weakness, impaired self care skills, impaired functional mobilty, impaired balance, decreased lower extremity function, pain. Pt continues to be limited by L hip pain as well as R thigh pain this date which worsens with position changes, and only able to tolerate sitting EOB briefly. Pt would continue to benefit from OT to maximize functional independence and safety. Recommend rehab upon D/C pending progress as pt is unsafe to return home at current functional level.    Rehab Prognosis: Good; patient would benefit from acute skilled OT services to address these deficits and reach maximum level of function.       Plan:     Patient to be seen 4 x/week to address the above listed problems via self-care/home management, therapeutic activities, therapeutic exercises, neuromuscular re-education  · Plan of Care Expires: 04/04/20  · Plan of Care Reviewed with: patient, sibling    Subjective     Pain/Comfort:  · Pain Rating 1: 5/10  · Location - Side 1: Left  · Location 1: hip(and R thigh, increased with bed mobility)  · Pain Addressed 1: Pre-medicate for activity, Reposition, Cessation of Activity  · Pain Rating Post-Intervention 1: 5/10    Objective:     Communicated with: RN prior to session. Patient found right sidelying with telemetry, wound vac, hemovac, YRN drain, bed alarm upon OT entry to room,  "brother present.  Initial attempt, pt had not had pain medication so OT/PT returned after administration.  "I can move my legs more."    General Precautions: Standard, fall   Orthopedic Precautions:spinal precautions   Braces: TLSO     Occupational Performance:     Bed Mobility:    · Patient completed Supine to Sit with maximal assistance and 2 persons due to pain  · Patient completed Sit to Supine with maximal assistance and 2 persons      Functional Mobility/Transfers:  · Unable to tolerate attempting due to L hip and R thigh pain sitting EOB, requesting to return to sidelying     Activities of Daily Living:  · Upper Body Dressing: total assistance    · Lower Body Dressing: total assistance to don socks        [facility] 6 Click ADL: 11     Treatment & Education:  Reviewed spinal precautions and log rolling technique prior to OOB activity; pt again tolerated sitting EOB ~5 min this date-- required Max A for balance due to pushing posterior to off load putting weight on L hip while sitting EOB due to pain; B feet to floor this date but pt unable to tolerate sitting long enough to don TLSO brace; pt reported pain intolerable and was returned to R sidelying position and assisted with obtaining comfortable position; reviewed role of therapy and importance of frequent repositioning and progressing in order to tolerate inpatient rehab; discussed POC and D/C recs with pt and brother      Patient left right sidelying with all lines intact, call button in reach, RN notified and brother presentEducation:      GOALS:   Multidisciplinary Problems     Occupational Therapy Goals        Problem: Occupational Therapy Goal    Goal Priority Disciplines Outcome Interventions   Occupational Therapy Goal     OT, PT/OT Ongoing, Progressing    Description:  Goals to be met by: 7 days (3/11/20)     Patient will increase functional independence with ADLs by performing:    UE Dressing with Minimal Assistance including TLSO.  LE Dressing with " Moderate Assistance using AD as needed.  Grooming while standing with Contact Guard Assistance.  Toileting from bedside commode with Minimal Assistance for hygiene and clothing management.   Supine to sit with Minimal Assistance.  Toilet transfer to bedside commode with Minimal Assistance.  Pt will verbalize understanding of 3/3 spinal precautions without added cues.                      Time Tracking:     OT Date of Treatment: 03/06/20  OT Start Time: 1426  OT Stop Time: 1442  OT Total Time (min): 16 min (co-treat with PT due to decreased activity tolerance due to pain)    Billable Minutes:Neuromuscular Re-education 8 minutes    MELIDA Amaro  3/6/2020

## 2020-03-06 NOTE — PHYSICIAN QUERY
"PT Name: Garcia Renteria  MR #: [id number]    Physician Query Form - Hematology Clarification      CDS: Nury Hartley RN, CCDS       Contact information: [email or web address]    This form is a permanent document in the medical record.      Query Date: March 6, 2020    By submitting this query, we are merely seeking further clarification of documentation. Please utilize your independent clinical judgment when addressing the question(s) below.    The Medical record contains the following:   Indicators  Supporting Clinical Findings Location in Medical Record    "Anemia" documented     X  X  X H & H = Hgb=12.6, Hct=42.8  Hgb=10.1, Hct=32.2  Hgb=7.3, Hct=23.7 2/24-Labs (pre-op)  3/3-Labs  3/5-Labs    BP =                     HR=      "GI bleeding" documented      Acute bleeding (Non GI site)     X  X Transfusion(s) Patient did receive 2 units PRBCs intraoperatively. H&H dropped, we will transfuse 2 units today. 3/3-NCC H&P  3/5-NSGY PN    Treatment:     X Other:  ESTIMATED BLOOD LOSS: 600mL 3/3-Op Note     Provider, please specify diagnosis or diagnoses associated with above clinical findings.    [  X] Acute blood loss anemia expected post-operatively   [  ] Acute blood loss anemia     [  ] Other Hematological Diagnosis (please specify):     [  ] Clinically Undetermined       Please document in your progress notes daily for the duration of treatment, until resolved, and include in your discharge summary.                                                                                                      "

## 2020-03-06 NOTE — HPI
This is a 70-year-old man with a history of a non instrumented lumbar fusion many years ago who presents with severe progressive axial low back pain, bilateral radicular leg pain, and thoracic myelopathy.  Additionally he had severe postural deformity that was functionally disabling.  He failed multiple conservative measures.  Imaging showed severe central stenosis with spinal cord compression from T10-L2, solid fusion from his previous surgery from L3 to the sacrum, and flat back deformity with severe fixed sagittal plane deformity. Recommended patient undergo spinal cord decompression and deformity reduction surgery, via a T10 to pelvis instrumented fusion with an L3 PSO and a multilevel laminectomy for spinal cord decompression.  Risks benefits alternatives indications and methods were reviewed in detail and he wished to proceed.  All questions were answered.  No guarantees about the results the procedure. [place] [facility] [place] L3 P[place].

## 2020-03-06 NOTE — PLAN OF CARE
Problem: Adult Inpatient Plan of Care  Goal: Plan of Care Review  Outcome: Ongoing, Progressing   Plan of care reviewed with pt. Pt voiced understanding. Pt AAOx4. Pt c/o left hip pain during the shift. PRN medication given see mar. Pt received 2 units of RBC. Pt tolerated well no complaints. VSS see flowsheets. No apparent distress noted. Pt has wound vac to lower back, 2 hemovac drains and 2 jomar drains.  Fall precautions maintained. Bed in lowest position, and locked. Call light within reach and advised to call for assistance. Side rails x 2 and slip resistant socks on at this time. [facility].

## 2020-03-06 NOTE — PROGRESS NOTES
[facility]  Plastic Surgery  Progress Note    Subjective:     History of Present Illness:  No notes on file    Post-Op Info:  Procedure(s) (LRB):  T10-Pelvis Fusion with L4 PSO **AIRO** Co-Surgeon: Roberto Parkinson (N/A)  CREATION, FLAP, MUSCLE ROTATION   3 Days Post-Op     Interval History: NAEON.  Pain well controlled.  Drains with around 60cc of output total.  Only from drains on right side.  Provena in place with no output. H/H 8.8/28.1.      Medications:  Continuous Infusions:  Scheduled Meds:   bisacodyL  10 mg Rectal Daily    ceFAZolin (ANCEF) IVPB  2 g Intravenous Q8H    famotidine  40 mg Oral QHS    ferrous sulfate  325 mg Oral BID    heparin (porcine)  5,000 Units Subcutaneous Q8H    lisinopril  5 mg Oral Daily    mupirocin   Nasal BID    potassium, sodium phosphates  2 packet Oral Q4H    pregabalin  75 mg Oral BID    senna-docusate 8.6-50 mg  1 tablet Oral BID     PRN Meds:sodium chloride, acetaminophen, aluminum-magnesium hydroxide-simethicone, cyclobenzaprine, Dextrose 10% Bolus, Dextrose 10% Bolus, glucagon (human recombinant), glucose, glucose, HYDROmorphone, insulin aspart U-100, labetalol, ondansetron, ondansetron, oxyCODONE, oxyCODONE, promethazine (PHENERGAN) IVPB, sodium chloride 0.9%     Review of patient's allergies indicates:  No Known Allergies  Objective:     Vital Signs (Most Recent):  Temp: 99.8 °F (37.7 °C) (03/06/20 0450)  Pulse: 93 (03/06/20 0723)  Resp: 18 (03/06/20 0450)  BP: (!) 135/57 (03/06/20 0450)  SpO2: 97 % (03/06/20 0450) Vital Signs (24h Range):  Temp:  [98.2 °F (36.8 °C)-99.9 °F (37.7 °C)] 99.8 °F (37.7 °C)  Pulse:  [] 93  Resp:  [18] 18  SpO2:  [92 %-100 %] 97 %  BP: ()/(52-89) 135/57     Weight: 98.5 kg (217 lb 2.5 oz)  Body mass index is 28.65 kg/m².    Intake/Output - Last 3 Shifts       03/04 0700 - 03/05 0659 03/05 0700 - 03/06 0659 03/06 0700 - 03/07 0659    P.O. 460 600     I.V. (mL/kg) 1288.3 (13.1)      Blood  325     Total  Intake(mL/kg) 1748.3 (17.7) 925 (9.4)     Urine (mL/kg/hr) 1935 (0.8) 0 (0)     Drains 459.5 65     Other  0     Stool 0 0     Blood       Total Output 2394.5 65     Net -646.2 +860            Urine Occurrence  6 x     Stool Occurrence 0 x 1 x           Physical Exam   Constitutional: He is oriented to person, place, and time. He appears well-developed and well-nourished.   HENT:   Head: Normocephalic.   Eyes: EOM are normal.   Cardiovascular: Normal rate.   Pulmonary/Chest: Effort normal.   Abdominal: He exhibits no distension.   Musculoskeletal:   Provena in place over incisions on back.  Wearing brace.  YRN drains in place with serosanguinous output.  Appropriately tender on palpation.  Continues to lay on side keeping pressure off of back.   Neurological: He is alert and oriented to person, place, and time.   Skin: Skin is warm and dry.   Psychiatric: He has a normal mood and affect.       Significant Labs:  CBC:   Recent Labs   Lab 03/06/20  0404   WBC 11.82   RBC 3.20*   HGB 8.8*   HCT 28.1*   *   MCV 88   MCH 27.5   MCHC 31.3*     BMP:   Recent Labs   Lab 03/06/20  0404   *      K 3.7      CO2 25   BUN 11   CREATININE 0.8   CALCIUM 8.3*   MG 1.9     CMP:   Recent Labs   Lab 03/06/20  0404   *   CALCIUM 8.3*   ALBUMIN 2.5*   PROT 5.6*      K 3.7   CO2 25      BUN 11   CREATININE 0.8   ALKPHOS 55   ALT 15   AST 53*   BILITOT 0.7       Significant Diagnostics:  Reviewed    Assessment/Plan:     * Acquired spondylolisthesis of thoracolumbar vertebra  POD 3    Continue Prevena until Post Op day 5  Ok for out of bed from plastics standpoint  Limiting sitting if possible  Antibiotics per neurosurgery  Monitor for anemia, no clinical hematoma at this time    For discharge instructions see Dr. Caro's note dated 3/5/2020        Dat Gould MD  Plastic Surgery  [facility]

## 2020-03-06 NOTE — NURSING
POC reviewed with the pt/ family, ongoing, progressing, verbalized understanding, will continue to monitor. Pt didn't have any issues this shift, in bed resting with suction, wife at the bedside, hob elevated, wheels locked, call light within reach and informed to call for assistance when needed,

## 2020-03-06 NOTE — PROGRESS NOTES
[facility]  Physical Medicine & Rehab  Progress Note    Patient Name: Garcia Renteria  MRN: [id number]  Admission Date: 3/3/2020  Length of Stay: 3 days  Attending Physician: Francis Alan MD    Subjective:     Principal Problem:Acquired spondylolisthesis of thoracolumbar vertebra    Hospital Course:   3/4/20: Evaluated by PT & OT. Bed mobility maxA x 2 ppl. LBD totalA.   3/5/20: Participated w/ OT. Bed mobility maxA x 2 ppl. LBD & UBD totalA.       Interval History 3/6/2020:  Patient is seen for follow-up rehab evaluation and recommendations: Participating with therapy.     HPI, Past Medical, Family, and Social History remains the same as documented in the initial encounter.    Scheduled Medications:    bisacodyL  10 mg Rectal Daily    ceFAZolin (ANCEF) IVPB  2 g Intravenous Q8H    famotidine  40 mg Oral QHS    ferrous sulfate  325 mg Oral BID    heparin (porcine)  5,000 Units Subcutaneous Q8H    lisinopril  5 mg Oral Daily    mupirocin   Nasal BID    potassium, sodium phosphates  2 packet Oral Q4H    pregabalin  75 mg Oral BID    senna-docusate 8.6-50 mg  1 tablet Oral BID       Diagnostic Results:   Labs: Reviewed    PRN Medications: sodium chloride, acetaminophen, aluminum-magnesium hydroxide-simethicone, cyclobenzaprine, Dextrose 10% Bolus, Dextrose 10% Bolus, glucagon (human recombinant), glucose, glucose, HYDROmorphone, insulin aspart U-100, labetalol, ondansetron, ondansetron, oxyCODONE, oxyCODONE, promethazine (PHENERGAN) IVPB, sodium chloride 0.9%    Review of Systems   Constitutional: Positive for activity change. Negative for fatigue and fever.   HENT: Negative for trouble swallowing and voice change.    Respiratory: Negative for cough and shortness of breath.    Cardiovascular: Negative for chest pain and leg swelling.   Gastrointestinal: Negative for abdominal distention and abdominal pain.   Genitourinary: Negative for difficulty urinating and flank pain.    Musculoskeletal: Positive for gait problem. Negative for back pain.   Skin: Positive for wound. Negative for color change and rash.   Neurological: Positive for weakness. Negative for speech difficulty and numbness.   Psychiatric/Behavioral: Negative for agitation and confusion.     Objective:     Vital Signs (Most Recent):  Temp: 99.8 °F (37.7 °C) (03/06/20 0450)  Pulse: 93 (03/06/20 0723)  Resp: 18 (03/06/20 0450)  BP: (!) 135/57 (03/06/20 0450)  SpO2: 97 % (03/06/20 0450)    Vital Signs (24h Range):  Temp:  [98.2 °F (36.8 °C)-99.9 °F (37.7 °C)] 99.8 °F (37.7 °C)  Pulse:  [] 93  Resp:  [18] 18  SpO2:  [92 %-100 %] 97 %  BP: ()/(52-89) 135/57     Physical Exam   Constitutional: He is oriented to person, place, and time. He appears well-developed and well-nourished.   HENT:   Head: Normocephalic and atraumatic.   Eyes: Pupils are equal, round, and reactive to light. Right eye exhibits no discharge. Left eye exhibits no discharge.   Neck: Neck supple.   Pulmonary/Chest: Effort normal. No respiratory distress.   Abdominal: Soft. He exhibits no distension. There is no tenderness.   Musculoskeletal: He exhibits no edema or deformity.   RUE: 5/5.  LUE: 5/5.  RLE: 3/5.  LLE: 2/5.   Neurological: He is alert and oriented to person, place, and time. No sensory deficit. He exhibits normal muscle tone.   - Drains x 4 in place, dressing in place to back  - following commands   Skin: Skin is warm and dry.   Psychiatric: He has a normal mood and affect. His behavior is normal.   Nursing note and vitals reviewed.    Assessment/Plan:      * Acquired spondylolisthesis of thoracolumbar vertebra  - 3/3 s/p T10-pelvis instrumented fusion, T10- L2 laminectomy, L3 PSO, and complex wound closure by plastic surgery.   - Plastic surgery recommending head of bed flat at all times except meals 30 mis 3x/day x 6 weeks.    - Per NSGY TLSO when upright/OOB.      Impaired mobility  - Related to prolonged/acute hospital course.      Recommendations  -  Encourage mobility, OOB in chair at least 3 hours per day, and early ambulation as appropriate  -  PT/OT evaluate and treat  -  Pain management  -  Monitor for and prevent skin breakdown and pressure ulcers  · Early mobility, repositioning/weight shifting every 20-30 minutes when sitting, turn patient every 2 hours, proper mattress/overlay and chair cushioning, pressure relief/heel protector boots  -  DVT prophylaxis    -  Reviewed discharge options (IP rehab, SNF, HH therapy, and OP therapy)    Will follow progress with therapy.     Jaelyn Gallardo NP  Department of Physical Medicine & Rehab   [facility]

## 2020-03-07 LAB
ALBUMIN SERPL BCP-MCNC: 2.1 G/DL (ref 3.5–5.2)
ALP SERPL-CCNC: 69 U/L (ref 55–135)
ALT SERPL W/O P-5'-P-CCNC: 27 U/L (ref 10–44)
ANION GAP SERPL CALC-SCNC: 8 MMOL/L (ref 8–16)
AST SERPL-CCNC: 61 U/L (ref 10–40)
BASOPHILS # BLD AUTO: 0.04 K/UL (ref 0–0.2)
BASOPHILS NFR BLD: 0.5 % (ref 0–1.9)
BILIRUB SERPL-MCNC: 0.6 MG/DL (ref 0.1–1)
BUN SERPL-MCNC: 13 MG/DL (ref 8–23)
CALCIUM SERPL-MCNC: 8.2 MG/DL (ref 8.7–10.5)
CHLORIDE SERPL-SCNC: 103 MMOL/L (ref 95–110)
CO2 SERPL-SCNC: 28 MMOL/L (ref 23–29)
CREAT SERPL-MCNC: 0.8 MG/DL (ref 0.5–1.4)
DIFFERENTIAL METHOD: ABNORMAL
EOSINOPHIL # BLD AUTO: 0.1 K/UL (ref 0–0.5)
EOSINOPHIL NFR BLD: 0.8 % (ref 0–8)
ERYTHROCYTE [DISTWIDTH] IN BLOOD BY AUTOMATED COUNT: 13.2 % (ref 11.5–14.5)
EST. GFR  (AFRICAN AMERICAN): >60 ML/MIN/1.73 M^2
EST. GFR  (NON AFRICAN AMERICAN): >60 ML/MIN/1.73 M^2
GLUCOSE SERPL-MCNC: 109 MG/DL (ref 70–110)
HCT VFR BLD AUTO: 26.5 % (ref 40–54)
HGB BLD-MCNC: 8.2 G/DL (ref 14–18)
IMM GRANULOCYTES # BLD AUTO: 0.06 K/UL (ref 0–0.04)
IMM GRANULOCYTES NFR BLD AUTO: 0.7 % (ref 0–0.5)
LYMPHOCYTES # BLD AUTO: 2.3 K/UL (ref 1–4.8)
LYMPHOCYTES NFR BLD: 26.4 % (ref 18–48)
MAGNESIUM SERPL-MCNC: 2 MG/DL (ref 1.6–2.6)
MCH RBC QN AUTO: 27.1 PG (ref 27–31)
MCHC RBC AUTO-ENTMCNC: 30.9 G/DL (ref 32–36)
MCV RBC AUTO: 88 FL (ref 82–98)
MONOCYTES # BLD AUTO: 0.9 K/UL (ref 0.3–1)
MONOCYTES NFR BLD: 10.4 % (ref 4–15)
NEUTROPHILS # BLD AUTO: 5.3 K/UL (ref 1.8–7.7)
NEUTROPHILS NFR BLD: 61.2 % (ref 38–73)
NRBC BLD-RTO: 0 /100 WBC
PHOSPHATE SERPL-MCNC: 2 MG/DL (ref 2.7–4.5)
PLATELET # BLD AUTO: 142 K/UL (ref 150–350)
PMV BLD AUTO: 11 FL (ref 9.2–12.9)
POCT GLUCOSE: 113 MG/DL (ref 70–110)
POCT GLUCOSE: 121 MG/DL (ref 70–110)
POCT GLUCOSE: 147 MG/DL (ref 70–110)
POTASSIUM SERPL-SCNC: 3.5 MMOL/L (ref 3.5–5.1)
PROT SERPL-MCNC: 5 G/DL (ref 6–8.4)
RBC # BLD AUTO: 3.03 M/UL (ref 4.6–6.2)
SODIUM SERPL-SCNC: 139 MMOL/L (ref 136–145)
WBC # BLD AUTO: 8.59 K/UL (ref 3.9–12.7)

## 2020-03-07 PROCEDURE — 25000003 PHARM REV CODE 250: Performed by: PHYSICIAN ASSISTANT

## 2020-03-07 PROCEDURE — 99024 PR POST-OP FOLLOW-UP VISIT: ICD-10-PCS | Mod: POP,,, | Performed by: PHYSICIAN ASSISTANT

## 2020-03-07 PROCEDURE — 84100 ASSAY OF PHOSPHORUS: CPT

## 2020-03-07 PROCEDURE — 63600175 PHARM REV CODE 636 W HCPCS: Performed by: STUDENT IN AN ORGANIZED HEALTH CARE EDUCATION/TRAINING PROGRAM

## 2020-03-07 PROCEDURE — 80053 COMPREHEN METABOLIC PANEL: CPT

## 2020-03-07 PROCEDURE — 83735 ASSAY OF MAGNESIUM: CPT

## 2020-03-07 PROCEDURE — 85025 COMPLETE CBC W/AUTO DIFF WBC: CPT

## 2020-03-07 PROCEDURE — 99024 POSTOP FOLLOW-UP VISIT: CPT | Mod: POP,,, | Performed by: PHYSICIAN ASSISTANT

## 2020-03-07 PROCEDURE — 36415 COLL VENOUS BLD VENIPUNCTURE: CPT

## 2020-03-07 PROCEDURE — 63600175 PHARM REV CODE 636 W HCPCS: Performed by: PHYSICIAN ASSISTANT

## 2020-03-07 PROCEDURE — 25000003 PHARM REV CODE 250: Performed by: STUDENT IN AN ORGANIZED HEALTH CARE EDUCATION/TRAINING PROGRAM

## 2020-03-07 PROCEDURE — 11000001 HC ACUTE MED/SURG PRIVATE ROOM

## 2020-03-07 RX ORDER — LISINOPRIL 5 MG/1
5 TABLET ORAL DAILY
Status: DISCONTINUED | OUTPATIENT
Start: 2020-03-08 | End: 2020-03-24 | Stop reason: HOSPADM

## 2020-03-07 RX ORDER — SODIUM,POTASSIUM PHOSPHATES 280-250MG
2 POWDER IN PACKET (EA) ORAL EVERY 4 HOURS
Status: COMPLETED | OUTPATIENT
Start: 2020-03-07 | End: 2020-03-07

## 2020-03-07 RX ADMIN — MUPIROCIN: 20 OINTMENT TOPICAL at 09:03

## 2020-03-07 RX ADMIN — FAMOTIDINE 40 MG: 20 TABLET, FILM COATED ORAL at 08:03

## 2020-03-07 RX ADMIN — SENNOSIDES AND DOCUSATE SODIUM 1 TABLET: 8.6; 5 TABLET ORAL at 08:03

## 2020-03-07 RX ADMIN — OXYCODONE HYDROCHLORIDE 10 MG: 10 TABLET ORAL at 07:03

## 2020-03-07 RX ADMIN — FERROUS SULFATE TAB EC 325 MG (65 MG FE EQUIVALENT) 325 MG: 325 (65 FE) TABLET DELAYED RESPONSE at 09:03

## 2020-03-07 RX ADMIN — OXYCODONE HYDROCHLORIDE 10 MG: 10 TABLET ORAL at 10:03

## 2020-03-07 RX ADMIN — PREGABALIN 75 MG: 75 CAPSULE ORAL at 08:03

## 2020-03-07 RX ADMIN — POTASSIUM & SODIUM PHOSPHATES POWDER PACK 280-160-250 MG 2 PACKET: 280-160-250 PACK at 05:03

## 2020-03-07 RX ADMIN — CEFAZOLIN 2 G: 1 INJECTION, POWDER, FOR SOLUTION INTRAMUSCULAR; INTRAVENOUS at 02:03

## 2020-03-07 RX ADMIN — OXYCODONE HYDROCHLORIDE 10 MG: 10 TABLET ORAL at 11:03

## 2020-03-07 RX ADMIN — CEFAZOLIN 2 G: 1 INJECTION, POWDER, FOR SOLUTION INTRAMUSCULAR; INTRAVENOUS at 11:03

## 2020-03-07 RX ADMIN — CEFAZOLIN 2 G: 1 INJECTION, POWDER, FOR SOLUTION INTRAMUSCULAR; INTRAVENOUS at 09:03

## 2020-03-07 RX ADMIN — POTASSIUM & SODIUM PHOSPHATES POWDER PACK 280-160-250 MG 2 PACKET: 280-160-250 PACK at 09:03

## 2020-03-07 RX ADMIN — CEFAZOLIN 2 G: 1 INJECTION, POWDER, FOR SOLUTION INTRAMUSCULAR; INTRAVENOUS at 05:03

## 2020-03-07 RX ADMIN — MUPIROCIN: 20 OINTMENT TOPICAL at 10:03

## 2020-03-07 RX ADMIN — FERROUS SULFATE TAB EC 325 MG (65 MG FE EQUIVALENT) 325 MG: 325 (65 FE) TABLET DELAYED RESPONSE at 08:03

## 2020-03-07 RX ADMIN — HEPARIN SODIUM 5000 UNITS: 5000 INJECTION INTRAVENOUS; SUBCUTANEOUS at 02:03

## 2020-03-07 RX ADMIN — HEPARIN SODIUM 5000 UNITS: 5000 INJECTION INTRAVENOUS; SUBCUTANEOUS at 09:03

## 2020-03-07 RX ADMIN — POTASSIUM & SODIUM PHOSPHATES POWDER PACK 280-160-250 MG 2 PACKET: 280-160-250 PACK at 03:03

## 2020-03-07 RX ADMIN — OXYCODONE HYDROCHLORIDE 10 MG: 10 TABLET ORAL at 06:03

## 2020-03-07 RX ADMIN — PREGABALIN 75 MG: 75 CAPSULE ORAL at 09:03

## 2020-03-07 RX ADMIN — HYDROMORPHONE HYDROCHLORIDE 0.5 MG: 1 INJECTION, SOLUTION INTRAMUSCULAR; INTRAVENOUS; SUBCUTANEOUS at 02:03

## 2020-03-07 RX ADMIN — OXYCODONE HYDROCHLORIDE 10 MG: 10 TABLET ORAL at 03:03

## 2020-03-07 RX ADMIN — HEPARIN SODIUM 5000 UNITS: 5000 INJECTION INTRAVENOUS; SUBCUTANEOUS at 05:03

## 2020-03-07 RX ADMIN — SENNOSIDES AND DOCUSATE SODIUM 1 TABLET: 8.6; 5 TABLET ORAL at 09:03

## 2020-03-07 RX ADMIN — ALUMINUM HYDROXIDE, MAGNESIUM HYDROXIDE, AND SIMETHICONE 30 ML: 200; 200; 20 SUSPENSION ORAL at 08:03

## 2020-03-07 NOTE — ASSESSMENT & PLAN NOTE
A 70 year old with degenerative lumbar spondy and spine deformity now s/p T10 - pelvis posterior segmental instrumented fusion, T10 - L3 laminectomy for decompression, L3 pedical subtraction osteotomy (3/3).    - Neurologically stable  - Pain: continue current regimen. Oxy IR PRN, Dilaudid for breakthrough pain. Baclofen PRN for spasms. Lyrica 75mg BID. 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 Also complains of right anterior thigh pain/spasms.   - Consult placed to Anesthesia Service for possible nerve block, appreciate assistance. Spoke with Dr. Coe, will plan for regional nerve block Monday if remains unimproved. Pt to be NPO on Sunday night.  - Postop XR when able to tolerate standing  - TLSO when upright/OOB.  - Prevena WV and YRN drains per plastic surgery management  - HV drain output decreased overnight. L HV removed yesterday, R HV removed today, no complications.  - Continue ppx abx while drains in place  - Hemoglobin improved after transfusion 3/5. Remains stable today. Continue iron supplementation. Continue to monitor.   - Thrombocytopenia: Improved today, Plt count 142. Will continue to monitor, transfuse if needed.  - Continue PT/OT/OOB. OOB goal > 6 hrs per day. Continue to encourage OOB mobility and up to chair with meals.   - GI: diet as tolerated. Continue current bowel regimen.   - DVT ppx: SQH, TEDs, SCDs  - Atelectasis ppx: IS every hour while awake  - HTN: continue home dose lisinopril, Prn labetalol. Held today for mild hypotension. CTM.  - DM2: continue POCT glucose, SSI, diabetic diet  - GERD: continue home dose pepcid    - Dispo: recommending rehab, referrals sent.     - Discussed with Dr. Alan

## 2020-03-07 NOTE — SUBJECTIVE & OBJECTIVE
Interval History: NAEON. H/H stable, PLTs trended up to 142. Lisinopril held for mild hypotension. Remaining R HV drain removed for low output. Pt reports continued L hip pain and bilateral thigh pain, unimproved and worse when attempting to sit up. Plan for nerve block with Anesthesia on Monday if does not improve. Otherwise no issues. Denies new/worsening weakness, paresthesias, and b/b dysfunction.     Medications:  Continuous Infusions:  Scheduled Meds:   bisacodyL  10 mg Rectal Daily    ceFAZolin (ANCEF) IVPB  2 g Intravenous Q8H    famotidine  40 mg Oral QHS    ferrous sulfate  325 mg Oral BID    heparin (porcine)  5,000 Units Subcutaneous Q8H    [START ON 3/8/2020] lisinopril  5 mg Oral Daily    mupirocin   Nasal BID    potassium, sodium phosphates  2 packet Oral Q4H    pregabalin  75 mg Oral BID    senna-docusate 8.6-50 mg  1 tablet Oral BID     PRN Meds:sodium chloride, acetaminophen, aluminum-magnesium hydroxide-simethicone, cyclobenzaprine, Dextrose 10% Bolus, Dextrose 10% Bolus, glucagon (human recombinant), glucose, glucose, HYDROmorphone, insulin aspart U-100, labetalol, ondansetron, ondansetron, oxyCODONE, oxyCODONE, promethazine (PHENERGAN) IVPB, sodium chloride 0.9%     Review of Systems  Objective:     Weight: 98.5 kg (217 lb 2.5 oz)  Body mass index is 28.65 kg/m².  Vital Signs (Most Recent):  Temp: (!) 100.6 °F (38.1 °C) (03/07/20 1629)  Pulse: 96 (03/07/20 1629)  Resp: 18 (03/07/20 1629)  BP: 127/60 (03/07/20 1629)  SpO2: 95 % (03/07/20 1629) Vital Signs (24h Range):  Temp:  [98.8 °F (37.1 °C)-100.6 °F (38.1 °C)] 100.6 °F (38.1 °C)  Pulse:  [] 96  Resp:  [17-18] 18  SpO2:  [93 %-98 %] 95 %  BP: ()/(55-65) 127/60                          Closed/Suction Drain 03/03/20 1559 Left;Superior Back Accordion 10 Fr. (Active)   Site Description Unable to view 3/7/2020  8:00 AM   Dressing Type Transparent (Tegaderm) 3/7/2020  8:00 AM   Dressing Status Clean;Dry;Intact 3/7/2020  8:00 AM    Dressing Intervention Integrity maintained 3/7/2020  8:00 AM   Drainage Serosanguineous 3/7/2020  8:00 AM   Status To bulb suction 3/7/2020  8:00 AM   Output (mL) 5 mL 3/7/2020  6:20 AM            Closed/Suction Drain 03/03/20 1600 Right;Superior Back Accordion 10 Fr. (Active)   Site Description Unable to view 3/7/2020  8:00 AM   Dressing Type Transparent (Tegaderm) 3/7/2020  8:00 AM   Dressing Status Clean;Dry;Intact 3/7/2020  8:00 AM   Dressing Intervention Integrity maintained 3/7/2020  8:00 AM   Drainage Serosanguineous 3/7/2020  8:00 AM   Status To bulb suction 3/7/2020  8:00 AM   Output (mL) 20 mL 3/7/2020  6:20 AM            Closed/Suction Drain 03/03/20 1601 Left;Inferior Back Bulb 15 Fr. (Active)   Site Description Unable to view 3/7/2020  8:00 AM   Dressing Type Transparent (Tegaderm) 3/7/2020  8:00 AM   Dressing Status Clean;Dry;Intact 3/7/2020  8:00 AM   Dressing Intervention Integrity maintained 3/7/2020  8:00 AM   Drainage Sanguineous 3/7/2020  8:00 AM   Status To bulb suction 3/7/2020  8:00 AM   Output (mL) 80 mL 3/7/2020  2:19 AM       Neurosurgery Physical Exam    General: well developed, well nourished, no distress.   Head: normocephalic, atraumatic  Neck: No tracheal deviation. No palpable masses.   Neurologic: Alert and oriented. Thought content appropriate.  GCS: Motor: 6/Verbal: 5/Eyes: 4 GCS Total: 15  Mental Status: Awake, Alert, Oriented x 4  Language: No aphasia  Speech: No dysarthria  Cranial nerves: face symmetric, tongue midline, CN II-XII grossly intact.   Eyes: pupils equal, round, reactive to light with accomodation, EOMI.   Ears: No drainage.   Pulmonary: normal respirations, no signs of respiratory distress  Abdomen: soft, non-distended, not tender to palpation  Sensory: intact to light touch throughout  Motor Strength: Moves all extremities spontaneously with good tone. No abnormal movements seen. Proximal exam of BLE pain-limited, L>R.      Strength   Deltoids Triceps Biceps  Wrist Extension Wrist Flexion Hand    Upper: R 5/5 5/5 5/5 5/5 5/5 5/5     L 5/5 5/5 5/5 5/5 5/5 5/5       Iliopsoas Quadriceps Knee  Flexion Tibialis  anterior Gastro- cnemius EHL   Lower: R 4+/5 4+/5 4+/5 5/5 5/5 5/5     L 3/5 4/5 4/5 4/5 4/5 4/5      Harris: absent  Clonus: absent  Babinski: absent  Vascular: Pulses 2+ and symmetric radial and dorsalis pedis. No LE edema.   Skin: Skin is warm, dry and intact.  No pain with ER/IR left hip, non-tender to palpation.      Incisional WV in place. 2 YRN drains in place.      Significant Labs:  Recent Labs   Lab 03/06/20  0404 03/07/20  0319   * 109    139   K 3.7 3.5    103   CO2 25 28   BUN 11 13   CREATININE 0.8 0.8   CALCIUM 8.3* 8.2*   MG 1.9 2.0     Recent Labs   Lab 03/06/20  0404 03/07/20  0319   WBC 11.82 8.59   HGB 8.8* 8.2*   HCT 28.1* 26.5*   * 142*     No results for input(s): LABPT, INR, APTT in the last 48 hours.  Microbiology Results (last 7 days)     ** No results found for the last 168 hours. **        All pertinent labs from the last 24 hours have been reviewed.    Significant Diagnostics:  I have reviewed and interpreted all pertinent imaging results/findings within the past 24 hours.

## 2020-03-07 NOTE — PLAN OF CARE
NO ACUTE CHANGES NOTED.  PATIENT REMAINS IN BED WITH ASSIST TURNING Q2 HRS.  MEDICATED FOR PAIN WITH PRN MEDICATION AROUND THE CLOCK DOSING.  SEE MAR.  BP LOW THIS AM AFTER DILAUDID GIVEN.  PAIN MEDICATION HELD UNTIL BP WNL.  NO FURTHER ISSUES VOICED.  FAMILY REMAINED AT BEDSIDE TO ASSIST PATIENT.  R YRN BULB DRAIN CAME LOSE FROM TUBING & LEAKED INTO BED X1 EPISODE.  OUTPUT RECORDED X1 OCCURRENCE WITH 25 cc NOTED LEFT IN BULB.  HEMAVAC IN PLACE.  L YRN DRAIN WITH ONLY 5 cc WHOLE SHIFT.  WOUND VAC IN PLACE TO MID BACK WITH OUT ANY OUTPUT NOTED.  TO SUCTION  mmHG.  PATIENT VOIDED IN HIS OWN PERSONAL CONTAINER & FAMILY EMPTIED WITHOUT RECORDING.  INSTRUCTED TO USE THE URINAL TO MEASURE.

## 2020-03-07 NOTE — SUBJECTIVE & OBJECTIVE
Interval History: NAEON. Left hip pain and Right anterior thigh spasms limited participation with therapy today. Patient able to tolerate sitting EOB for 5 minutes, unable to stand. H/H improved some after transfusion, will ctm. Denies worsening weakness, paresthesias, b/b dysfunction. Voiding spontaneously. L HV removed today.     Medications:  Continuous Infusions:  Scheduled Meds:   bisacodyL  10 mg Rectal Daily    ceFAZolin (ANCEF) IVPB  2 g Intravenous Q8H    famotidine  40 mg Oral QHS    ferrous sulfate  325 mg Oral BID    heparin (porcine)  5,000 Units Subcutaneous Q8H    lisinopril  5 mg Oral Daily    mupirocin   Nasal BID    pregabalin  75 mg Oral BID    senna-docusate 8.6-50 mg  1 tablet Oral BID     PRN Meds:sodium chloride, acetaminophen, aluminum-magnesium hydroxide-simethicone, cyclobenzaprine, Dextrose 10% Bolus, Dextrose 10% Bolus, glucagon (human recombinant), glucose, glucose, HYDROmorphone, insulin aspart U-100, labetalol, ondansetron, ondansetron, oxyCODONE, oxyCODONE, promethazine (PHENERGAN) IVPB, sodium chloride 0.9%       Objective:     Weight: 98.5 kg (217 lb 2.5 oz)  Body mass index is 28.65 kg/m².  Vital Signs (Most Recent):  Temp: 100.3 °F (37.9 °C) (03/06/20 1559)  Pulse: 86 (03/06/20 1559)  Resp: 18 (03/06/20 1559)  BP: (!) 145/65 (03/06/20 1559)  SpO2: (!) 92 % (03/06/20 1559) Vital Signs (24h Range):  Temp:  [98.2 °F (36.8 °C)-100.3 °F (37.9 °C)] 100.3 °F (37.9 °C)  Pulse:  [] 86  Resp:  [18] 18  SpO2:  [92 %-97 %] 92 %  BP: (118-148)/(57-89) 145/65     Date 03/06/20 0700 - 03/07/20 0659   Shift 7428-9177 9620-7062 7227-4888 24 Hour Total   INTAKE   Shift Total(mL/kg)       OUTPUT   Drains 35   35   Other 0   0   Shift Total(mL/kg) 35(0.4)   35(0.4)   Weight (kg) 98.5 98.5 98.5 98.5                        Closed/Suction Drain 03/03/20 1559 Left;Superior Back Accordion 10 Fr. (Active)   Site Description Unable to view 3/6/2020  8:00 AM   Dressing Type Transparent  (Tegaderm) 3/6/2020  8:00 AM   Dressing Status Clean;Dry;Intact 3/6/2020  8:00 AM   Dressing Intervention Integrity maintained 3/6/2020  8:00 AM   Drainage Serosanguineous 3/6/2020  8:00 AM   Status Other (Comment) 3/4/2020  7:05 PM   Output (mL) 0 mL 3/6/2020  8:00 AM            Closed/Suction Drain 03/03/20 1600 Right;Superior Back Accordion 10 Fr. (Active)   Site Description Unable to view 3/6/2020  8:00 AM   Dressing Type Transparent (Tegaderm) 3/6/2020  8:00 AM   Dressing Status Clean;Dry;New drainage 3/6/2020  8:00 AM   Dressing Intervention Integrity maintained 3/6/2020  8:00 AM   Drainage Serosanguineous 3/6/2020  8:00 AM   Status Other (Comment) 3/4/2020  7:05 PM   Output (mL) 0 mL 3/6/2020  8:00 AM            Closed/Suction Drain 03/03/20 1601 Left;Inferior Back Bulb 15 Fr. (Active)   Site Description Unable to view 3/6/2020  8:00 AM   Dressing Type Biopatch in place 3/6/2020  8:00 AM   Dressing Status Clean;Dry;Intact 3/6/2020  8:00 AM   Dressing Intervention Integrity maintained 3/6/2020  8:00 AM   Drainage Serosanguineous 3/6/2020  8:00 AM   Status To bulb suction 3/4/2020  7:05 PM   Output (mL) 0 mL 3/6/2020  8:00 AM            Closed/Suction Drain 03/03/20 1602 Right;Inferior Back Bulb 15 Fr. (Active)   Site Description Unable to view 3/6/2020  8:00 AM   Dressing Type Biopatch in place 3/6/2020  8:00 AM   Dressing Status Clean;Dry;Intact 3/6/2020  8:00 AM   Dressing Intervention Integrity maintained 3/6/2020  8:00 AM   Drainage Serosanguineous 3/6/2020  8:00 AM   Status To bulb suction 3/6/2020  8:00 AM   Output (mL) 30 mL 3/6/2020  2:00 PM       Neurosurgery Physical Exam    General: well developed, well nourished, no distress.   Head: normocephalic, atraumatic  Neck: No tracheal deviation. No palpable masses.   Neurologic: Alert and oriented. Thought content appropriate.  GCS: Motor: 6/Verbal: 5/Eyes: 4 GCS Total: 15  Mental Status: Awake, Alert, Oriented x 4  Language: No aphasia  Speech: No  dysarthria  Cranial nerves: face symmetric, tongue midline, CN II-XII grossly intact.   Eyes: pupils equal, round, reactive to light with accomodation, EOMI.   Ears: No drainage.   Pulmonary: normal respirations, no signs of respiratory distress  Abdomen: soft, non-distended, not tender to palpation  Sensory: intact to light touch throughout  Motor Strength: Moves all extremities spontaneously with good tone. No abnormal movements seen. Proximal exam of BLE limited by pain today.      Strength   Deltoids Triceps Biceps Wrist Extension Wrist Flexion Hand    Upper: R 5/5 5/5 5/5 5/5 5/5 5/5     L 5/5 5/5 5/5 5/5 5/5 5/5       Iliopsoas Quadriceps Knee  Flexion Tibialis  anterior Gastro- cnemius EHL   Lower: R 4+/5 4+/5 4+/5 5/5 5/5 5/5     L 3/5 3/5 3/5 4/5 4/5 4/5      Harris: absent  Clonus: absent  Babinski: absent  Vascular: Pulses 2+ and symmetric radial and dorsalis pedis. No LE edema.   Skin: Skin is warm, dry and intact.  No pain with ER/IR left hip, non-tender to palpation.      Incisional WV in place. 2 HV and 2 YRN drains in place.      Significant Labs:  Recent Labs   Lab 03/05/20  0359 03/06/20  0404   * 112*    139   K 3.9 3.7    106   CO2 25 25   BUN 12 11   CREATININE 0.9 0.8   CALCIUM 8.2* 8.3*   MG 1.8 1.9     Recent Labs   Lab 03/05/20  0359 03/06/20  0404   WBC 12.54 11.82   HGB 7.3* 8.8*   HCT 23.7* 28.1*   * 116*     No results for input(s): LABPT, INR, APTT in the last 48 hours.  Microbiology Results (last 7 days)     ** No results found for the last 168 hours. **        Recent Lab Results       03/06/20  1718   03/06/20  1115   03/06/20  0745   03/06/20  0630   03/06/20  0404        Albumin         2.5     Alkaline Phosphatase         55     ALT         15     Anion Gap         8     AST         53     Baso #         0.04     Basophil%         0.3     BILIRUBIN TOTAL         0.7  Comment:  For infants and newborns, interpretation of results should be based  on  gestational age, weight and in agreement with clinical  observations.  Premature Infant recommended reference ranges:  Up to 24 hours.............<8.0 mg/dL  Up to 48 hours............<12.0 mg/dL  3-5 days..................<15.0 mg/dL  6-29 days.................<15.0 mg/dL       BUN, Bld         11     Calcium         8.3     Chloride         106     CO2         25     Creatinine         0.8     Differential Method         Automated     eGFR if          >60.0     eGFR if non          >60.0  Comment:  Calculation used to obtain the estimated glomerular filtration  rate (eGFR) is the CKD-EPI equation.        Eos #         0.0     Eosinophil%         0.1     Glucose         112     Gran # (ANC)         9.3     Gran%         78.9     Hematocrit         28.1     Hemoglobin         8.8     Immature Grans (Abs)         0.09  Comment:  Mild elevation in immature granulocytes is non specific and   can be seen in a variety of conditions including stress response,   acute inflammation, trauma and pregnancy. Correlation with other   laboratory and clinical findings is essential.       Immature Granulocytes         0.8     Lymph #         1.4     Lymph%         11.6     Magnesium         1.9     MCH         27.5     MCHC         31.3     MCV         88     Mono #         1.0     Mono%         8.3     MPV         10.8     nRBC         0     Phosphorus         1.7     Platelets         116     POCT Glucose 104 110 96 100       Potassium         3.7     PROTEIN TOTAL         5.6     RBC         3.20     RDW         13.4     Sodium         139     WBC         11.82                        All pertinent labs from the last 24 hours have been reviewed.    Significant Diagnostics:  I have reviewed all pertinent imaging results/findings within the past 24 hours.

## 2020-03-07 NOTE — NURSING
Pt is in stable condition,no s/s of distress,bed in lowest position call light within reach.Family at the bedside.pain medications given as scheduled.wctm

## 2020-03-07 NOTE — OP NOTE
DATE OF SURGERY: 3/3/20     PREOPERATIVE DIAGNOSIS:  1. Fixed iatrogenic flatback deformity with severe sagittal plane imbalance  2. Severe stenosis with spinal cord compression, T10-L2, and thoracic myelopathy  3. History of cervical myelopathy and cervical stenosis s/p C3-4 ACD     POSTOPERATIVE DIAGNOSIS:  Same.     PROCEDURE PERFORMED:  1. Revision and extension of posterior spinal fusion, T10 to sacrum  2. Posterior segmental spinal fixation, T10 to S1 (DePuy Synthes)  3. Pelvic fixation with S2AI screws (Depuy Synthes)  4. L3 pedicle subtraction osteotomy  5. T10-L2 laminectomy for severe central stenosis and spinal cord compression  6. Cement augmentation of screws, T10 and T11 bilaterally  7.  Use of intraoperative fluoroscopy  8.  Use of intraoperative neuro-monitoring with MEPs  9. Use of intraoperative CT neuronavigation  10. Vivigen, Infuse and local  bone grafting     SURGEON: Francis Alan M.D.     CO-SURGEON: Roberto Parkinson, --  assisted with the placement of all spinal instrumentation      Brain Caro M.D., Plastic Surgery for wound closure     ANESTHESIA: GETA     ESTIMATED BLOOD LOSS: 600mL     COMPLICATIONS: None     DRAINS: Two deep     SPECIMENS SENT: None     FINDINGS: None     INDICATIONS:     This is a 70-year-old man with a history of a non instrumented lumbar fusion.  He was sent to Dr. Ansari due to complexity and co-surgery planning. + sagital balance with L3-S1 fusion mass, severe throracolumbar stenosis with cord signal change.  Risks benefits alternatives indications and methods were reviewed in detail and he wished to proceed.  All questions were answered.  No guarantees about the results the procedure  I served as cosurgeon with Dr. Alan for the entire procedure.   .  PROCEDURE:     The patient was brought into the operating room where he was intubated and placed under general anesthesia without difficulty.  All lines were placed. He was repositioned prone onto the operating room  table with appropriate padding all pressure points. The region of interest was localized with AP and lateral x-ray.  The skin was marked and the area was prepped and draped in the usual sterile fashion.  A timeout was performed prior to procedure.  20 mL's of lidocaine with epinephrine were injected in the skin.     A midline linear incision was made with a 10 blade from approximately T10 to the sacrum, incorporating his previous midline lumbar scar.    , next exposure was performed suprafascial, as well as subfascial subperiosteal E with the Bovie electrocautery and Phan elevator.  This was exposed from T T10 to the ilium  Facetectomies were next performed from T10 11 to the L3 facet segmental       Next, an intraoperative CT scan was plan with Jigsee.  The patient was prepped and draped appropriately.  Using a standard technique knee with computer-assisted spinal navigation.  An entry was identified followed by a gear shift pedicle cannulation, tapping and screw placement All navigated.  This was performed bilaterally at T10, avoiding facet injury, T11, T12, L1, L2, L3, L4, L5, skipping S1 and S a I 2 screws bilaterally for pelvic fixation.     Next we augmented bilateral T10 and T11 screws with cement under flouroscopy. No cement extravasation was seen.     We next performed a decompression.  Attn to L2 laminectomy was performed for severe central and lateral recess spinal stenosis.  The lamina was drilled and high-speed drill and Kerrison rongeur were used to resect lamina hypertrophied ligamentum flavum lateral recess remaining with completion facetectomies and resection the superior articular process..  Acquisition be versus perform confirmed with a Marina completing central lateral recess and foraminal decompression.    Next, the L3 pedicle subtraction osteotomy was performed.  A wide revision decompression was performed.  At L3 and L4 laminectomy kerr performed to just above the L4 pedicle.    This was challenging with adherence and scar tissue a decompression was performed  , the facets were resected.  Both nerve roots were identified.  The transverse process was resected at L3 bilaterally just above the inter transverse ligament circumferential exposing the pedicle.  The lateral wall of the vertebral body was dissected along the pedicle.  Using navigated instruments and osteotome the pedicle was resected on both sides creating a wedge superiorly inferiorly.  The medial and lateral edges were then drilled down and resected.  The vertebral body was then cored out and wedge shaped manner.  The impactor removed the ventral remaining vertebral body away from the thecal sac.  A circumferential 3 column osteotomy was completed with a free-floating floating thecal sac L3 and L4 nerve roots bilaterally.        Next, the deformity correction was reduced with utilization of temporary rods and lordotic forces with nice correction.  This was locked in place.  The decompression was inspected there was no kinking residual stenosis identified.  SCDs were followed any piece performed before and after without change     Allograft was impacted into the vertebral body defect of the PS 0      The rods were then sized and placed appropriately from T10 to the pelvis, appropriate screw caps were placed.  A 2nd intraoperative CT scan was performed and evaluated with nice correction and hardware placement.  During final tightening it was noted that the left L5 screw tulips with splayed without secure locking.  It was decided not to remove the entire hardware and replaced.  Therefore an offset secondary kristina construct was placed below the L5 screw and above to enhance biomechanical stability.  Everything looked great       The wound was copiously irrigated with sterile normal saline and a dilute Betadyne solution. Exposed bony surfaces from T10 to pelvis were decorticated bilaterally with a high-speed drill.  Infuse and local  bone were placed posteriorly for arthrodesis from T10 to the pelvis to generate the posterolateral fusion. Two deep Hemovacs were placed. The wound was then handed over to Plastic Surgery for closure.     The patient appeared to tolerate the procedure well from a hemodynamic and neuromonitoring standpoint. MEPs were present and stable in all extremities throughout the case. Dr. Alan and I were present for all critical portions of the case. Dr. Caro with plastic surgery performed the revision closure. The patient was repositioned supine onto the hospital bed where he was extubated and allowed to emerge from anesthesia. He was sent to the ICU in stable condition for recovery.     JUSTIFICATION OF MODIFIER 22 AND CO-SURGEON: This was a complex revision spinal deformity operation in a patient with severe fixed sagittal imbalance. A three column osteotomy was required for deformity correction in the setting of dense fusion mass, which prolonged this maneuver significantly. Overall the case required signififcantly increased preop planning, mental effort, technical skill and time to perform safely.  Two attending surgeons were indicated to reduce operative times, blood loss, and improve patient outcomes.

## 2020-03-07 NOTE — PROGRESS NOTES
[facility]  Neurosurgery  Progress Note    Subjective:     History of Present Illness: This is a 70-year-old man with a history of a non instrumented lumbar fusion many years ago who presents with severe progressive axial low back pain, bilateral radicular leg pain, and thoracic myelopathy.  Additionally he had severe postural deformity that was functionally disabling.  He failed multiple conservative measures.  Imaging showed severe central stenosis with spinal cord compression from T10-L2, solid fusion from his previous surgery from L3 to the sacrum, and flat back deformity with severe fixed sagittal plane deformity. Recommended patient undergo spinal cord decompression and deformity reduction surgery, via a T10 to pelvis instrumented fusion with an L3 PSO and a multilevel laminectomy for spinal cord decompression.  Risks benefits alternatives indications and methods were reviewed in detail and he wished to proceed.  All questions were answered.  No guarantees about the results the procedure. Presents to [facility] [place] for T10-pelvis and L3 PSO.    Post-Op Info:  Procedure(s) (LRB):  T10-Pelvis Fusion with L4 PSO **AIRO** Co-Surgeon: Roberto Parkinson (N/A)  CREATION, FLAP, MUSCLE ROTATION   3 Days Post-Op     Interval History: NAEON. Left hip pain and Right anterior thigh spasms limited participation with therapy today. Patient able to tolerate sitting EOB for 5 minutes, unable to stand. H/H improved some after transfusion, will ctm. Denies worsening weakness, paresthesias, b/b dysfunction. Voiding spontaneously. L HV removed today.     Medications:  Continuous Infusions:  Scheduled Meds:   bisacodyL  10 mg Rectal Daily    ceFAZolin (ANCEF) IVPB  2 g Intravenous Q8H    famotidine  40 mg Oral QHS    ferrous sulfate  325 mg Oral BID    heparin (porcine)  5,000 Units Subcutaneous Q8H    lisinopril  5 mg Oral Daily    mupirocin   Nasal BID    pregabalin  75 mg Oral BID    senna-docusate 8.6-50 mg  1  tablet Oral BID     PRN Meds:sodium chloride, acetaminophen, aluminum-magnesium hydroxide-simethicone, cyclobenzaprine, Dextrose 10% Bolus, Dextrose 10% Bolus, glucagon (human recombinant), glucose, glucose, HYDROmorphone, insulin aspart U-100, labetalol, ondansetron, ondansetron, oxyCODONE, oxyCODONE, promethazine (PHENERGAN) IVPB, sodium chloride 0.9%       Objective:     Weight: 98.5 kg (217 lb 2.5 oz)  Body mass index is 28.65 kg/m².  Vital Signs (Most Recent):  Temp: 100.3 °F (37.9 °C) (03/06/20 1559)  Pulse: 86 (03/06/20 1559)  Resp: 18 (03/06/20 1559)  BP: (!) 145/65 (03/06/20 1559)  SpO2: (!) 92 % (03/06/20 1559) Vital Signs (24h Range):  Temp:  [98.2 °F (36.8 °C)-100.3 °F (37.9 °C)] 100.3 °F (37.9 °C)  Pulse:  [] 86  Resp:  [18] 18  SpO2:  [92 %-97 %] 92 %  BP: (118-148)/(57-89) 145/65     Date 03/06/20 0700 - 03/07/20 0659   Shift 8290-6810 8539-8813 7193-4320 24 Hour Total   INTAKE   Shift Total(mL/kg)       OUTPUT   Drains 35   35   Other 0   0   Shift Total(mL/kg) 35(0.4)   35(0.4)   Weight (kg) 98.5 98.5 98.5 98.5                        Closed/Suction Drain 03/03/20 1559 Left;Superior Back Accordion 10 Fr. (Active)   Site Description Unable to view 3/6/2020  8:00 AM   Dressing Type Transparent (Tegaderm) 3/6/2020  8:00 AM   Dressing Status Clean;Dry;Intact 3/6/2020  8:00 AM   Dressing Intervention Integrity maintained 3/6/2020  8:00 AM   Drainage Serosanguineous 3/6/2020  8:00 AM   Status Other (Comment) 3/4/2020  7:05 PM   Output (mL) 0 mL 3/6/2020  8:00 AM            Closed/Suction Drain 03/03/20 1600 Right;Superior Back Accordion 10 Fr. (Active)   Site Description Unable to view 3/6/2020  8:00 AM   Dressing Type Transparent (Tegaderm) 3/6/2020  8:00 AM   Dressing Status Clean;Dry;New drainage 3/6/2020  8:00 AM   Dressing Intervention Integrity maintained 3/6/2020  8:00 AM   Drainage Serosanguineous 3/6/2020  8:00 AM   Status Other (Comment) 3/4/2020  7:05 PM   Output (mL) 0 mL 3/6/2020  8:00  AM            Closed/Suction Drain 03/03/20 1601 Left;Inferior Back Bulb 15 Fr. (Active)   Site Description Unable to view 3/6/2020  8:00 AM   Dressing Type Biopatch in place 3/6/2020  8:00 AM   Dressing Status Clean;Dry;Intact 3/6/2020  8:00 AM   Dressing Intervention Integrity maintained 3/6/2020  8:00 AM   Drainage Serosanguineous 3/6/2020  8:00 AM   Status To bulb suction 3/4/2020  7:05 PM   Output (mL) 0 mL 3/6/2020  8:00 AM            Closed/Suction Drain 03/03/20 1602 Right;Inferior Back Bulb 15 Fr. (Active)   Site Description Unable to view 3/6/2020  8:00 AM   Dressing Type Biopatch in place 3/6/2020  8:00 AM   Dressing Status Clean;Dry;Intact 3/6/2020  8:00 AM   Dressing Intervention Integrity maintained 3/6/2020  8:00 AM   Drainage Serosanguineous 3/6/2020  8:00 AM   Status To bulb suction 3/6/2020  8:00 AM   Output (mL) 30 mL 3/6/2020  2:00 PM       Neurosurgery Physical Exam    General: well developed, well nourished, no distress.   Head: normocephalic, atraumatic  Neck: No tracheal deviation. No palpable masses.   Neurologic: Alert and oriented. Thought content appropriate.  GCS: Motor: 6/Verbal: 5/Eyes: 4 GCS Total: 15  Mental Status: Awake, Alert, Oriented x 4  Language: No aphasia  Speech: No dysarthria  Cranial nerves: face symmetric, tongue midline, CN II-XII grossly intact.   Eyes: pupils equal, round, reactive to light with accomodation, EOMI.   Ears: No drainage.   Pulmonary: normal respirations, no signs of respiratory distress  Abdomen: soft, non-distended, not tender to palpation  Sensory: intact to light touch throughout  Motor Strength: Moves all extremities spontaneously with good tone. No abnormal movements seen. Proximal exam of BLE limited by pain today.      Strength   Deltoids Triceps Biceps Wrist Extension Wrist Flexion Hand    Upper: R 5/5 5/5 5/5 5/5 5/5 5/5     L 5/5 5/5 5/5 5/5 5/5 5/5       Iliopsoas Quadriceps Knee  Flexion Tibialis  anterior Gastro- cnemius EHL   Lower: R  4+/5 4+/5 4+/5 5/5 5/5 5/5     L 3/5 3/5 3/5 4/5 4/5 4/5      Harris: absent  Clonus: absent  Babinski: absent  Vascular: Pulses 2+ and symmetric radial and dorsalis pedis. No LE edema.   Skin: Skin is warm, dry and intact.  No pain with ER/IR left hip, non-tender to palpation.      Incisional WV in place. 2 HV and 2 YRN drains in place.      Significant Labs:  Recent Labs   Lab 03/05/20  0359 03/06/20  0404   * 112*    139   K 3.9 3.7    106   CO2 25 25   BUN 12 11   CREATININE 0.9 0.8   CALCIUM 8.2* 8.3*   MG 1.8 1.9     Recent Labs   Lab 03/05/20  0359 03/06/20  0404   WBC 12.54 11.82   HGB 7.3* 8.8*   HCT 23.7* 28.1*   * 116*     No results for input(s): LABPT, INR, APTT in the last 48 hours.  Microbiology Results (last 7 days)     ** No results found for the last 168 hours. **        Recent Lab Results       03/06/20  1718   03/06/20  1115   03/06/20  0745   03/06/20  0630   03/06/20  0404        Albumin         2.5     Alkaline Phosphatase         55     ALT         15     Anion Gap         8     AST         53     Baso #         0.04     Basophil%         0.3     BILIRUBIN TOTAL         0.7  Comment:  For infants and newborns, interpretation of results should be based  on gestational age, weight and in agreement with clinical  observations.  Premature Infant recommended reference ranges:  Up to 24 hours.............<8.0 mg/dL  Up to 48 hours............<12.0 mg/dL  3-5 days..................<15.0 mg/dL  6-29 days.................<15.0 mg/dL       BUN, Bld         11     Calcium         8.3     Chloride         106     CO2         25     Creatinine         0.8     Differential Method         Automated     eGFR if          >60.0     eGFR if non          >60.0  Comment:  Calculation used to obtain the estimated glomerular filtration  rate (eGFR) is the CKD-EPI equation.        Eos #         0.0     Eosinophil%         0.1     Glucose         112     Gran  # (ANC)         9.3     Gran%         78.9     Hematocrit         28.1     Hemoglobin         8.8     Immature Grans (Abs)         0.09  Comment:  Mild elevation in immature granulocytes is non specific and   can be seen in a variety of conditions including stress response,   acute inflammation, trauma and pregnancy. Correlation with other   laboratory and clinical findings is essential.       Immature Granulocytes         0.8     Lymph #         1.4     Lymph%         11.6     Magnesium         1.9     MCH         27.5     MCHC         31.3     MCV         88     Mono #         1.0     Mono%         8.3     MPV         10.8     nRBC         0     Phosphorus         1.7     Platelets         116     POCT Glucose 104 110 96 100       Potassium         3.7     PROTEIN TOTAL         5.6     RBC         3.20     RDW         13.4     Sodium         139     WBC         11.82                        All pertinent labs from the last 24 hours have been reviewed.    Significant Diagnostics:  I have reviewed all pertinent imaging results/findings within the past 24 hours.    Assessment/Plan:     Degenerative scoliosis  A 70 year old with degenerative lumbar spondy and spine deformity now s/p T10 - pelvis posterior segmental instrumented fusion, T10 - L3 laminectomy for decompression, L3 pedical subtraction osteotomy (3/3).    - Neurologically stable  - Pain: continue current regimen. 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 Also complains of right anterior thigh spasms today, encouraged prn baclofen.  - Postop XR when able to tolerate standing  - TLSO when upright/OOB.  - Prevena WV and YRN drains per plastic surgery management  - HV drain output decreased overnight. L HV removed today. Continue R HV, likely remove tomorrow if output remains low.  - Continue ppx abx while drains in place  - Hemoglobin improved after transfusion yesterday. Continue iron supplementation. Continue to monitor.   -  Thrombocytopenia: trending down since surgery, currently 112K. Will monitor and likely transfuse tomorrow.   - Continue PT/OT/OOB. OOB goal > 6 hrs per day. Continue to encourage OOB mobility and up to chair with meals.   - GI: diet as tolerated. Continue current bowel regimen.   - DVT ppx: SQH, TEDs, SCDs  - Atelectasis ppx: IS every hour while awake  - HTN: continue home dose lisinopril, Prn labetalol  - DM2: continue POCT glucose, SSI, diabetic diet  - GERD: continue home dose pepcid    - Dispo: recommending rehab, referrals sent.     - Discussed with SANDY Anna-C  Neurosurgery  [facility]

## 2020-03-07 NOTE — PROGRESS NOTES
[facility]  Neurosurgery  Progress Note    Subjective:     History of Present Illness: This is a 70-year-old man with a history of a non instrumented lumbar fusion many years ago who presents with severe progressive axial low back pain, bilateral radicular leg pain, and thoracic myelopathy.  Additionally he had severe postural deformity that was functionally disabling.  He failed multiple conservative measures.  Imaging showed severe central stenosis with spinal cord compression from T10-L2, solid fusion from his previous surgery from L3 to the sacrum, and flat back deformity with severe fixed sagittal plane deformity. Recommended patient undergo spinal cord decompression and deformity reduction surgery, via a T10 to pelvis instrumented fusion with an L3 PSO and a multilevel laminectomy for spinal cord decompression.  Risks benefits alternatives indications and methods were reviewed in detail and he wished to proceed.  All questions were answered.  No guarantees about the results the procedure. Presents to [facility] [place] for T10-pelvis and L3 PSO.    Post-Op Info:  Procedure(s) (LRB):  T10-Pelvis Fusion with L4 PSO **AIRO** Co-Surgeon: Roberto Parkinson (N/A)  CREATION, FLAP, MUSCLE ROTATION   4 Days Post-Op     Interval History: NAEON. H/H stable, PLTs trended up to 142. Lisinopril held for mild hypotension. Remaining R HV drain removed for low output. Pt reports continued L hip pain and bilateral thigh pain, unimproved and worse when attempting to sit up. Plan for nerve block with Anesthesia on Monday if does not improve. Otherwise no issues. Denies new/worsening weakness, paresthesias, and b/b dysfunction.     Medications:  Continuous Infusions:  Scheduled Meds:   bisacodyL  10 mg Rectal Daily    ceFAZolin (ANCEF) IVPB  2 g Intravenous Q8H    famotidine  40 mg Oral QHS    ferrous sulfate  325 mg Oral BID    heparin (porcine)  5,000 Units Subcutaneous Q8H    [START ON 3/8/2020] lisinopril  5 mg Oral Daily     mupirocin   Nasal BID    potassium, sodium phosphates  2 packet Oral Q4H    pregabalin  75 mg Oral BID    senna-docusate 8.6-50 mg  1 tablet Oral BID     PRN Meds:sodium chloride, acetaminophen, aluminum-magnesium hydroxide-simethicone, cyclobenzaprine, Dextrose 10% Bolus, Dextrose 10% Bolus, glucagon (human recombinant), glucose, glucose, HYDROmorphone, insulin aspart U-100, labetalol, ondansetron, ondansetron, oxyCODONE, oxyCODONE, promethazine (PHENERGAN) IVPB, sodium chloride 0.9%     Review of Systems  Objective:     Weight: 98.5 kg (217 lb 2.5 oz)  Body mass index is 28.65 kg/m².  Vital Signs (Most Recent):  Temp: (!) 100.6 °F (38.1 °C) (03/07/20 1629)  Pulse: 96 (03/07/20 1629)  Resp: 18 (03/07/20 1629)  BP: 127/60 (03/07/20 1629)  SpO2: 95 % (03/07/20 1629) Vital Signs (24h Range):  Temp:  [98.8 °F (37.1 °C)-100.6 °F (38.1 °C)] 100.6 °F (38.1 °C)  Pulse:  [] 96  Resp:  [17-18] 18  SpO2:  [93 %-98 %] 95 %  BP: ()/(55-65) 127/60                          Closed/Suction Drain 03/03/20 1559 Left;Superior Back Accordion 10 Fr. (Active)   Site Description Unable to view 3/7/2020  8:00 AM   Dressing Type Transparent (Tegaderm) 3/7/2020  8:00 AM   Dressing Status Clean;Dry;Intact 3/7/2020  8:00 AM   Dressing Intervention Integrity maintained 3/7/2020  8:00 AM   Drainage Serosanguineous 3/7/2020  8:00 AM   Status To bulb suction 3/7/2020  8:00 AM   Output (mL) 5 mL 3/7/2020  6:20 AM            Closed/Suction Drain 03/03/20 1600 Right;Superior Back Accordion 10 Fr. (Active)   Site Description Unable to view 3/7/2020  8:00 AM   Dressing Type Transparent (Tegaderm) 3/7/2020  8:00 AM   Dressing Status Clean;Dry;Intact 3/7/2020  8:00 AM   Dressing Intervention Integrity maintained 3/7/2020  8:00 AM   Drainage Serosanguineous 3/7/2020  8:00 AM   Status To bulb suction 3/7/2020  8:00 AM   Output (mL) 20 mL 3/7/2020  6:20 AM            Closed/Suction Drain 03/03/20 1601 Left;Inferior Back Bulb 15 Fr.  (Active)   Site Description Unable to view 3/7/2020  8:00 AM   Dressing Type Transparent (Tegaderm) 3/7/2020  8:00 AM   Dressing Status Clean;Dry;Intact 3/7/2020  8:00 AM   Dressing Intervention Integrity maintained 3/7/2020  8:00 AM   Drainage Sanguineous 3/7/2020  8:00 AM   Status To bulb suction 3/7/2020  8:00 AM   Output (mL) 80 mL 3/7/2020  2:19 AM       Neurosurgery Physical Exam    General: well developed, well nourished, no distress.   Head: normocephalic, atraumatic  Neck: No tracheal deviation. No palpable masses.   Neurologic: Alert and oriented. Thought content appropriate.  GCS: Motor: 6/Verbal: 5/Eyes: 4 GCS Total: 15  Mental Status: Awake, Alert, Oriented x 4  Language: No aphasia  Speech: No dysarthria  Cranial nerves: face symmetric, tongue midline, CN II-XII grossly intact.   Eyes: pupils equal, round, reactive to light with accomodation, EOMI.   Ears: No drainage.   Pulmonary: normal respirations, no signs of respiratory distress  Abdomen: soft, non-distended, not tender to palpation  Sensory: intact to light touch throughout  Motor Strength: Moves all extremities spontaneously with good tone. No abnormal movements seen. Proximal exam of BLE pain-limited, L>R.      Strength   Deltoids Triceps Biceps Wrist Extension Wrist Flexion Hand    Upper: R 5/5 5/5 5/5 5/5 5/5 5/5     L 5/5 5/5 5/5 5/5 5/5 5/5       Iliopsoas Quadriceps Knee  Flexion Tibialis  anterior Gastro- cnemius EHL   Lower: R 4+/5 4+/5 4+/5 5/5 5/5 5/5     L 3/5 4/5 4/5 4/5 4/5 4/5      Harris: absent  Clonus: absent  Babinski: absent  Vascular: Pulses 2+ and symmetric radial and dorsalis pedis. No LE edema.   Skin: Skin is warm, dry and intact.  No pain with ER/IR left hip, non-tender to palpation.      Incisional WV in place. 2 YRN drains in place.      Significant Labs:  Recent Labs   Lab 03/06/20  0404 03/07/20  0319   * 109    139   K 3.7 3.5    103   CO2 25 28   BUN 11 13   CREATININE 0.8 0.8   CALCIUM 8.3*  8.2*   MG 1.9 2.0     Recent Labs   Lab 03/06/20  0404 03/07/20  0319   WBC 11.82 8.59   HGB 8.8* 8.2*   HCT 28.1* 26.5*   * 142*     No results for input(s): LABPT, INR, APTT in the last 48 hours.  Microbiology Results (last 7 days)     ** No results found for the last 168 hours. **        All pertinent labs from the last 24 hours have been reviewed.    Significant Diagnostics:  I have reviewed and interpreted all pertinent imaging results/findings within the past 24 hours.    Assessment/Plan:     Degenerative scoliosis  A 70 year old with degenerative lumbar spondy and spine deformity now s/p T10 - pelvis posterior segmental instrumented fusion, T10 - L3 laminectomy for decompression, L3 pedical subtraction osteotomy (3/3).    - Neurologically stable  - Pain: continue current regimen. Oxy IR PRN, Dilaudid for breakthrough pain. Baclofen PRN for spasms. Lyrica 75mg BID. 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 Also complains of right anterior thigh pain/spasms.   - Consult placed to Anesthesia Service for possible nerve block, appreciate assistance. Spoke with Dr. Coe, will plan for regional nerve block Monday if remains unimproved. Pt to be NPO on Sunday night.  - Postop XR when able to tolerate standing  - TLSO when upright/OOB.  - Prevena WV and YRN drains per plastic surgery management  - HV drain output decreased overnight. L HV removed yesterday, R HV removed today, no complications.  - Continue ppx abx while drains in place  - Hemoglobin improved after transfusion 3/5. Remains stable today. Continue iron supplementation. Continue to monitor.   - Thrombocytopenia: Improved today, Plt count 142. Will continue to monitor, transfuse if needed.  - Continue PT/OT/OOB. OOB goal > 6 hrs per day. Continue to encourage OOB mobility and up to chair with meals.   - GI: diet as tolerated. Continue current bowel regimen.   - DVT ppx: SQH, TEDs, SCDs  -  Atelectasis ppx: IS every hour while awake  - HTN: continue home dose lisinopril, Prn labetalol. Held today for mild hypotension. CTM.  - DM2: continue POCT glucose, SSI, diabetic diet  - GERD: continue home dose pepcid    - Dispo: recommending rehab, referrals sent.     - Discussed with Dr. Waqas Jackson, PA-C  Neurosurgery  [facility]

## 2020-03-08 LAB
ABO + RH BLD: NORMAL
ALBUMIN SERPL BCP-MCNC: 2.2 G/DL (ref 3.5–5.2)
ALP SERPL-CCNC: 102 U/L (ref 55–135)
ALT SERPL W/O P-5'-P-CCNC: 62 U/L (ref 10–44)
ANION GAP SERPL CALC-SCNC: 8 MMOL/L (ref 8–16)
AST SERPL-CCNC: 79 U/L (ref 10–40)
BASOPHILS # BLD AUTO: 0.04 K/UL (ref 0–0.2)
BASOPHILS NFR BLD: 0.5 % (ref 0–1.9)
BILIRUB SERPL-MCNC: 0.7 MG/DL (ref 0.1–1)
BLD GP AB SCN CELLS X3 SERPL QL: NORMAL
BUN SERPL-MCNC: 12 MG/DL (ref 8–23)
CALCIUM SERPL-MCNC: 8 MG/DL (ref 8.7–10.5)
CHLORIDE SERPL-SCNC: 100 MMOL/L (ref 95–110)
CO2 SERPL-SCNC: 29 MMOL/L (ref 23–29)
CREAT SERPL-MCNC: 0.9 MG/DL (ref 0.5–1.4)
DIFFERENTIAL METHOD: ABNORMAL
EOSINOPHIL # BLD AUTO: 0.1 K/UL (ref 0–0.5)
EOSINOPHIL NFR BLD: 1.6 % (ref 0–8)
ERYTHROCYTE [DISTWIDTH] IN BLOOD BY AUTOMATED COUNT: 13.2 % (ref 11.5–14.5)
EST. GFR  (AFRICAN AMERICAN): >60 ML/MIN/1.73 M^2
EST. GFR  (NON AFRICAN AMERICAN): >60 ML/MIN/1.73 M^2
GLUCOSE SERPL-MCNC: 102 MG/DL (ref 70–110)
HCT VFR BLD AUTO: 26.8 % (ref 40–54)
HGB BLD-MCNC: 8.4 G/DL (ref 14–18)
IMM GRANULOCYTES # BLD AUTO: 0.07 K/UL (ref 0–0.04)
IMM GRANULOCYTES NFR BLD AUTO: 0.9 % (ref 0–0.5)
LYMPHOCYTES # BLD AUTO: 2.3 K/UL (ref 1–4.8)
LYMPHOCYTES NFR BLD: 30.3 % (ref 18–48)
MAGNESIUM SERPL-MCNC: 2 MG/DL (ref 1.6–2.6)
MCH RBC QN AUTO: 27.7 PG (ref 27–31)
MCHC RBC AUTO-ENTMCNC: 31.3 G/DL (ref 32–36)
MCV RBC AUTO: 88 FL (ref 82–98)
MONOCYTES # BLD AUTO: 0.9 K/UL (ref 0.3–1)
MONOCYTES NFR BLD: 11.2 % (ref 4–15)
NEUTROPHILS # BLD AUTO: 4.3 K/UL (ref 1.8–7.7)
NEUTROPHILS NFR BLD: 55.5 % (ref 38–73)
NRBC BLD-RTO: 0 /100 WBC
PHOSPHATE SERPL-MCNC: 2.6 MG/DL (ref 2.7–4.5)
PLATELET # BLD AUTO: 155 K/UL (ref 150–350)
PMV BLD AUTO: 10.5 FL (ref 9.2–12.9)
POCT GLUCOSE: 109 MG/DL (ref 70–110)
POCT GLUCOSE: 175 MG/DL (ref 70–110)
POCT GLUCOSE: 88 MG/DL (ref 70–110)
POTASSIUM SERPL-SCNC: 3.4 MMOL/L (ref 3.5–5.1)
PROT SERPL-MCNC: 5.4 G/DL (ref 6–8.4)
RBC # BLD AUTO: 3.03 M/UL (ref 4.6–6.2)
SODIUM SERPL-SCNC: 137 MMOL/L (ref 136–145)
WBC # BLD AUTO: 7.66 K/UL (ref 3.9–12.7)

## 2020-03-08 PROCEDURE — 84100 ASSAY OF PHOSPHORUS: CPT

## 2020-03-08 PROCEDURE — 63600175 PHARM REV CODE 636 W HCPCS: Performed by: STUDENT IN AN ORGANIZED HEALTH CARE EDUCATION/TRAINING PROGRAM

## 2020-03-08 PROCEDURE — 25000003 PHARM REV CODE 250: Performed by: PHYSICIAN ASSISTANT

## 2020-03-08 PROCEDURE — 36415 COLL VENOUS BLD VENIPUNCTURE: CPT

## 2020-03-08 PROCEDURE — 86901 BLOOD TYPING SEROLOGIC RH(D): CPT

## 2020-03-08 PROCEDURE — 63600175 PHARM REV CODE 636 W HCPCS: Performed by: PHYSICIAN ASSISTANT

## 2020-03-08 PROCEDURE — 80053 COMPREHEN METABOLIC PANEL: CPT

## 2020-03-08 PROCEDURE — 25000003 PHARM REV CODE 250: Performed by: STUDENT IN AN ORGANIZED HEALTH CARE EDUCATION/TRAINING PROGRAM

## 2020-03-08 PROCEDURE — 11000001 HC ACUTE MED/SURG PRIVATE ROOM

## 2020-03-08 PROCEDURE — 83735 ASSAY OF MAGNESIUM: CPT

## 2020-03-08 PROCEDURE — 85025 COMPLETE CBC W/AUTO DIFF WBC: CPT

## 2020-03-08 RX ADMIN — FAMOTIDINE 40 MG: 20 TABLET, FILM COATED ORAL at 09:03

## 2020-03-08 RX ADMIN — CEFAZOLIN 2 G: 1 INJECTION, POWDER, FOR SOLUTION INTRAMUSCULAR; INTRAVENOUS at 08:03

## 2020-03-08 RX ADMIN — PREGABALIN 75 MG: 75 CAPSULE ORAL at 08:03

## 2020-03-08 RX ADMIN — HEPARIN SODIUM 5000 UNITS: 5000 INJECTION INTRAVENOUS; SUBCUTANEOUS at 05:03

## 2020-03-08 RX ADMIN — OXYCODONE HYDROCHLORIDE 10 MG: 10 TABLET ORAL at 11:03

## 2020-03-08 RX ADMIN — HEPARIN SODIUM 5000 UNITS: 5000 INJECTION INTRAVENOUS; SUBCUTANEOUS at 03:03

## 2020-03-08 RX ADMIN — CEFAZOLIN 2 G: 1 INJECTION, POWDER, FOR SOLUTION INTRAMUSCULAR; INTRAVENOUS at 03:03

## 2020-03-08 RX ADMIN — SENNOSIDES AND DOCUSATE SODIUM 1 TABLET: 8.6; 5 TABLET ORAL at 09:03

## 2020-03-08 RX ADMIN — OXYCODONE HYDROCHLORIDE 10 MG: 10 TABLET ORAL at 10:03

## 2020-03-08 RX ADMIN — FERROUS SULFATE TAB EC 325 MG (65 MG FE EQUIVALENT) 325 MG: 325 (65 FE) TABLET DELAYED RESPONSE at 08:03

## 2020-03-08 RX ADMIN — MUPIROCIN: 20 OINTMENT TOPICAL at 08:03

## 2020-03-08 RX ADMIN — CYCLOBENZAPRINE HYDROCHLORIDE 10 MG: 5 TABLET, FILM COATED ORAL at 09:03

## 2020-03-08 RX ADMIN — SENNOSIDES AND DOCUSATE SODIUM 1 TABLET: 8.6; 5 TABLET ORAL at 08:03

## 2020-03-08 RX ADMIN — LISINOPRIL 5 MG: 5 TABLET ORAL at 08:03

## 2020-03-08 RX ADMIN — OXYCODONE HYDROCHLORIDE 10 MG: 10 TABLET ORAL at 04:03

## 2020-03-08 RX ADMIN — OXYCODONE HYDROCHLORIDE 10 MG: 10 TABLET ORAL at 03:03

## 2020-03-08 RX ADMIN — FERROUS SULFATE TAB EC 325 MG (65 MG FE EQUIVALENT) 325 MG: 325 (65 FE) TABLET DELAYED RESPONSE at 09:03

## 2020-03-08 RX ADMIN — BISACODYL 10 MG: 10 SUPPOSITORY RECTAL at 09:03

## 2020-03-08 RX ADMIN — PREGABALIN 75 MG: 75 CAPSULE ORAL at 09:03

## 2020-03-08 NOTE — PLAN OF CARE
NO ACUTE CHANGES NOTED.  PATIENT REMAINS IN BED WITH ASSIST TURNING Q2 HRS.  MEDICATED FOR PAIN WITH PRN MEDICATION.  SEE MAR.  PAIN APPEARED BETTER MANAGED THIS SHIFT FROM PREVIOUS NIGHT.  FAMILY REMAINED AT BEDSIDE TO ASSIST PATIENT.    WOUND VAC IN PLACE TO MID BACK WITH OUT ANY OUTPUT NOTED, TO SUCTION  mmHG.  L YRN DRAIN NO OUTPUT.  R YRN DRAIN 25cc OUTPUT.  PATIENT GIVEN MYLANTA AT START OF SHIFT FOR BELCHING & GAS DISCOMFORT.  NOTED TO BE IMPROVED AFTER MEDICATED.

## 2020-03-09 ENCOUNTER — ANESTHESIA EVENT (OUTPATIENT)
Dept: NEUROSURGERY | Facility: HOSPITAL | Age: 70
DRG: 457 | End: 2020-03-09
Payer: MEDICARE

## 2020-03-09 ENCOUNTER — ANESTHESIA (OUTPATIENT)
Dept: NEUROSURGERY | Facility: HOSPITAL | Age: 70
DRG: 457 | End: 2020-03-09
Payer: MEDICARE

## 2020-03-09 LAB
ALBUMIN SERPL BCP-MCNC: 2.2 G/DL (ref 3.5–5.2)
ALP SERPL-CCNC: 101 U/L (ref 55–135)
ALT SERPL W/O P-5'-P-CCNC: 49 U/L (ref 10–44)
ANION GAP SERPL CALC-SCNC: 6 MMOL/L (ref 8–16)
AST SERPL-CCNC: 51 U/L (ref 10–40)
BASOPHILS # BLD AUTO: 0.03 K/UL (ref 0–0.2)
BASOPHILS NFR BLD: 0.4 % (ref 0–1.9)
BILIRUB SERPL-MCNC: 0.6 MG/DL (ref 0.1–1)
BUN SERPL-MCNC: 12 MG/DL (ref 8–23)
CALCIUM SERPL-MCNC: 8.5 MG/DL (ref 8.7–10.5)
CHLORIDE SERPL-SCNC: 101 MMOL/L (ref 95–110)
CO2 SERPL-SCNC: 30 MMOL/L (ref 23–29)
CREAT SERPL-MCNC: 0.8 MG/DL (ref 0.5–1.4)
DIFFERENTIAL METHOD: ABNORMAL
EOSINOPHIL # BLD AUTO: 0.1 K/UL (ref 0–0.5)
EOSINOPHIL NFR BLD: 1.6 % (ref 0–8)
ERYTHROCYTE [DISTWIDTH] IN BLOOD BY AUTOMATED COUNT: 13.2 % (ref 11.5–14.5)
EST. GFR  (AFRICAN AMERICAN): >60 ML/MIN/1.73 M^2
EST. GFR  (NON AFRICAN AMERICAN): >60 ML/MIN/1.73 M^2
GLUCOSE SERPL-MCNC: 100 MG/DL (ref 70–110)
HCT VFR BLD AUTO: 29.3 % (ref 40–54)
HGB BLD-MCNC: 9 G/DL (ref 14–18)
IMM GRANULOCYTES # BLD AUTO: 0.1 K/UL (ref 0–0.04)
IMM GRANULOCYTES NFR BLD AUTO: 1.3 % (ref 0–0.5)
LYMPHOCYTES # BLD AUTO: 1.7 K/UL (ref 1–4.8)
LYMPHOCYTES NFR BLD: 21.6 % (ref 18–48)
MAGNESIUM SERPL-MCNC: 1.9 MG/DL (ref 1.6–2.6)
MCH RBC QN AUTO: 27.6 PG (ref 27–31)
MCHC RBC AUTO-ENTMCNC: 30.7 G/DL (ref 32–36)
MCV RBC AUTO: 90 FL (ref 82–98)
MONOCYTES # BLD AUTO: 0.9 K/UL (ref 0.3–1)
MONOCYTES NFR BLD: 11.4 % (ref 4–15)
NEUTROPHILS # BLD AUTO: 4.9 K/UL (ref 1.8–7.7)
NEUTROPHILS NFR BLD: 63.7 % (ref 38–73)
NRBC BLD-RTO: 0 /100 WBC
PHOSPHATE SERPL-MCNC: 2.8 MG/DL (ref 2.7–4.5)
PLATELET # BLD AUTO: 193 K/UL (ref 150–350)
PMV BLD AUTO: 11 FL (ref 9.2–12.9)
POCT GLUCOSE: 108 MG/DL (ref 70–110)
POCT GLUCOSE: 122 MG/DL (ref 70–110)
POCT GLUCOSE: 125 MG/DL (ref 70–110)
POCT GLUCOSE: 242 MG/DL (ref 70–110)
POCT GLUCOSE: 256 MG/DL (ref 70–110)
POTASSIUM SERPL-SCNC: 3.8 MMOL/L (ref 3.5–5.1)
PROT SERPL-MCNC: 5.6 G/DL (ref 6–8.4)
RBC # BLD AUTO: 3.26 M/UL (ref 4.6–6.2)
SODIUM SERPL-SCNC: 137 MMOL/L (ref 136–145)
WBC # BLD AUTO: 7.74 K/UL (ref 3.9–12.7)

## 2020-03-09 PROCEDURE — 25000003 PHARM REV CODE 250: Performed by: PHYSICIAN ASSISTANT

## 2020-03-09 PROCEDURE — 25000003 PHARM REV CODE 250: Performed by: STUDENT IN AN ORGANIZED HEALTH CARE EDUCATION/TRAINING PROGRAM

## 2020-03-09 PROCEDURE — 84100 ASSAY OF PHOSPHORUS: CPT

## 2020-03-09 PROCEDURE — 63600175 PHARM REV CODE 636 W HCPCS: Performed by: PHYSICIAN ASSISTANT

## 2020-03-09 PROCEDURE — 99232 PR SUBSEQUENT HOSPITAL CARE,LEVL II: ICD-10-PCS | Mod: ,,, | Performed by: NURSE PRACTITIONER

## 2020-03-09 PROCEDURE — 99024 PR POST-OP FOLLOW-UP VISIT: ICD-10-PCS | Mod: POP,,, | Performed by: PHYSICIAN ASSISTANT

## 2020-03-09 PROCEDURE — 11000001 HC ACUTE MED/SURG PRIVATE ROOM

## 2020-03-09 PROCEDURE — 99024 POSTOP FOLLOW-UP VISIT: CPT | Mod: POP,,, | Performed by: PHYSICIAN ASSISTANT

## 2020-03-09 PROCEDURE — 94761 N-INVAS EAR/PLS OXIMETRY MLT: CPT

## 2020-03-09 PROCEDURE — 83735 ASSAY OF MAGNESIUM: CPT

## 2020-03-09 PROCEDURE — 85025 COMPLETE CBC W/AUTO DIFF WBC: CPT

## 2020-03-09 PROCEDURE — 36415 COLL VENOUS BLD VENIPUNCTURE: CPT

## 2020-03-09 PROCEDURE — 99232 SBSQ HOSP IP/OBS MODERATE 35: CPT | Mod: ,,, | Performed by: NURSE PRACTITIONER

## 2020-03-09 PROCEDURE — 80053 COMPREHEN METABOLIC PANEL: CPT

## 2020-03-09 PROCEDURE — 97530 THERAPEUTIC ACTIVITIES: CPT

## 2020-03-09 RX ORDER — METHOCARBAMOL 500 MG/1
500 TABLET, FILM COATED ORAL 3 TIMES DAILY
Status: DISCONTINUED | OUTPATIENT
Start: 2020-03-09 | End: 2020-03-10

## 2020-03-09 RX ORDER — DIAZEPAM 5 MG/1
5 TABLET ORAL EVERY 6 HOURS PRN
Status: DISCONTINUED | OUTPATIENT
Start: 2020-03-09 | End: 2020-03-09

## 2020-03-09 RX ORDER — HEPARIN SODIUM 5000 [USP'U]/ML
5000 INJECTION, SOLUTION INTRAVENOUS; SUBCUTANEOUS EVERY 8 HOURS
Status: DISCONTINUED | OUTPATIENT
Start: 2020-03-09 | End: 2020-03-24 | Stop reason: HOSPADM

## 2020-03-09 RX ORDER — OXYCODONE HYDROCHLORIDE 10 MG/1
10 TABLET ORAL EVERY 4 HOURS PRN
Status: DISCONTINUED | OUTPATIENT
Start: 2020-03-09 | End: 2020-03-24 | Stop reason: HOSPADM

## 2020-03-09 RX ORDER — ACETAMINOPHEN 500 MG
1000 TABLET ORAL EVERY 8 HOURS
Status: COMPLETED | OUTPATIENT
Start: 2020-03-09 | End: 2020-03-11

## 2020-03-09 RX ORDER — PREGABALIN 75 MG/1
75 CAPSULE ORAL 3 TIMES DAILY
Status: DISCONTINUED | OUTPATIENT
Start: 2020-03-09 | End: 2020-03-10

## 2020-03-09 RX ORDER — LIDOCAINE 50 MG/G
1 PATCH TOPICAL
Status: DISCONTINUED | OUTPATIENT
Start: 2020-03-09 | End: 2020-03-09

## 2020-03-09 RX ORDER — LIDOCAINE 50 MG/G
2 PATCH TOPICAL
Status: DISCONTINUED | OUTPATIENT
Start: 2020-03-09 | End: 2020-03-24 | Stop reason: HOSPADM

## 2020-03-09 RX ORDER — BISACODYL 10 MG
10 SUPPOSITORY, RECTAL RECTAL DAILY PRN
Status: DISCONTINUED | OUTPATIENT
Start: 2020-03-09 | End: 2020-03-24 | Stop reason: HOSPADM

## 2020-03-09 RX ADMIN — OXYCODONE HYDROCHLORIDE 15 MG: 10 TABLET ORAL at 05:03

## 2020-03-09 RX ADMIN — SENNOSIDES AND DOCUSATE SODIUM 1 TABLET: 8.6; 5 TABLET ORAL at 08:03

## 2020-03-09 RX ADMIN — PREGABALIN 75 MG: 75 CAPSULE ORAL at 09:03

## 2020-03-09 RX ADMIN — ACETAMINOPHEN 1000 MG: 500 TABLET ORAL at 09:03

## 2020-03-09 RX ADMIN — OXYCODONE HYDROCHLORIDE 15 MG: 10 TABLET ORAL at 10:03

## 2020-03-09 RX ADMIN — HEPARIN SODIUM 5000 UNITS: 5000 INJECTION, SOLUTION INTRAVENOUS; SUBCUTANEOUS at 02:03

## 2020-03-09 RX ADMIN — CEFAZOLIN 2 G: 1 INJECTION, POWDER, FOR SOLUTION INTRAMUSCULAR; INTRAVENOUS at 04:03

## 2020-03-09 RX ADMIN — ACETAMINOPHEN 1000 MG: 500 TABLET ORAL at 01:03

## 2020-03-09 RX ADMIN — OXYCODONE HYDROCHLORIDE 10 MG: 10 TABLET ORAL at 08:03

## 2020-03-09 RX ADMIN — PREGABALIN 75 MG: 75 CAPSULE ORAL at 08:03

## 2020-03-09 RX ADMIN — PREGABALIN 75 MG: 75 CAPSULE ORAL at 02:03

## 2020-03-09 RX ADMIN — CEFAZOLIN 2 G: 1 INJECTION, POWDER, FOR SOLUTION INTRAMUSCULAR; INTRAVENOUS at 12:03

## 2020-03-09 RX ADMIN — METHOCARBAMOL TABLETS 500 MG: 500 TABLET, COATED ORAL at 09:03

## 2020-03-09 RX ADMIN — FERROUS SULFATE TAB EC 325 MG (65 MG FE EQUIVALENT) 325 MG: 325 (65 FE) TABLET DELAYED RESPONSE at 08:03

## 2020-03-09 RX ADMIN — DIAZEPAM 5 MG: 5 TABLET ORAL at 11:03

## 2020-03-09 RX ADMIN — HYDROMORPHONE HYDROCHLORIDE 0.5 MG: 1 INJECTION, SOLUTION INTRAMUSCULAR; INTRAVENOUS; SUBCUTANEOUS at 03:03

## 2020-03-09 RX ADMIN — METHOCARBAMOL TABLETS 500 MG: 500 TABLET, COATED ORAL at 02:03

## 2020-03-09 RX ADMIN — LIDOCAINE 2 PATCH: 50 PATCH TOPICAL at 01:03

## 2020-03-09 RX ADMIN — HEPARIN SODIUM 5000 UNITS: 5000 INJECTION, SOLUTION INTRAVENOUS; SUBCUTANEOUS at 09:03

## 2020-03-09 RX ADMIN — SENNOSIDES AND DOCUSATE SODIUM 1 TABLET: 8.6; 5 TABLET ORAL at 09:03

## 2020-03-09 RX ADMIN — CEFAZOLIN 2 G: 1 INJECTION, POWDER, FOR SOLUTION INTRAMUSCULAR; INTRAVENOUS at 08:03

## 2020-03-09 RX ADMIN — FERROUS SULFATE TAB EC 325 MG (65 MG FE EQUIVALENT) 325 MG: 325 (65 FE) TABLET DELAYED RESPONSE at 09:03

## 2020-03-09 RX ADMIN — LISINOPRIL 5 MG: 5 TABLET ORAL at 08:03

## 2020-03-09 RX ADMIN — FAMOTIDINE 40 MG: 20 TABLET, FILM COATED ORAL at 09:03

## 2020-03-09 RX ADMIN — OXYCODONE HYDROCHLORIDE 15 MG: 10 TABLET ORAL at 01:03

## 2020-03-09 NOTE — PROGRESS NOTES
[facility]  Neurosurgery  Progress Note    Subjective:     History of Present Illness: This is a 70-year-old man with a history of a non instrumented lumbar fusion many years ago who presents with severe progressive axial low back pain, bilateral radicular leg pain, and thoracic myelopathy.  Additionally he had severe postural deformity that was functionally disabling.  He failed multiple conservative measures.  Imaging showed severe central stenosis with spinal cord compression from T10-L2, solid fusion from his previous surgery from L3 to the sacrum, and flat back deformity with severe fixed sagittal plane deformity. Recommended patient undergo spinal cord decompression and deformity reduction surgery, via a T10 to pelvis instrumented fusion with an L3 PSO and a multilevel laminectomy for spinal cord decompression.  Risks benefits alternatives indications and methods were reviewed in detail and he wished to proceed.  All questions were answered.  No guarantees about the results the procedure. Presents to [facility] for T10-pelvis and L3 PSO.    Post-Op Info:  Procedure(s) (LRB):  T10-Pelvis Fusion with L4 PSO **AIRO** Co-Surgeon: Roberto Parkinson (N/A)  CREATION, FLAP, MUSCLE ROTATION   5 Days Post-Op          Medications:  Continuous Infusions:  Scheduled Meds:   bisacodyL  10 mg Rectal Daily    ceFAZolin (ANCEF) IVPB  2 g Intravenous Q8H    famotidine  40 mg Oral QHS    ferrous sulfate  325 mg Oral BID    lisinopril  5 mg Oral Daily    pregabalin  75 mg Oral BID    senna-docusate 8.6-50 mg  1 tablet Oral BID     PRN Meds:sodium chloride, acetaminophen, aluminum-magnesium hydroxide-simethicone, cyclobenzaprine, Dextrose 10% Bolus, Dextrose 10% Bolus, glucagon (human recombinant), glucose, glucose, HYDROmorphone, insulin aspart U-100, labetalol, ondansetron, ondansetron, oxyCODONE, oxyCODONE, promethazine (PHENERGAN) IVPB, sodium chloride 0.9%     Review of Systems    Objective:     Weight: 98.5 kg  (217 lb 2.5 oz)  Body mass index is 28.65 kg/m².  Vital Signs (Most Recent):  Temp: 99 °F (37.2 °C) (03/08/20 1532)  Pulse: 92 (03/08/20 1900)  Resp: 18 (03/08/20 1532)  BP: (!) 146/87 (03/08/20 1532)  SpO2: 99 % (03/08/20 1532) Vital Signs (24h Range):  Temp:  [97.3 °F (36.3 °C)-100 °F (37.8 °C)] 99 °F (37.2 °C)  Pulse:  [76-97] 92  Resp:  [17-20] 18  SpO2:  [90 %-99 %] 99 %  BP: ()/() 146/87                          Closed/Suction Drain 03/03/20 1559 Left;Superior Back Accordion 10 Fr. (Active)   Site Description Unable to view 3/7/2020  8:00 AM   Dressing Type Transparent (Tegaderm) 3/7/2020  8:00 AM   Dressing Status Clean;Dry;Intact 3/7/2020  8:00 AM   Dressing Intervention Integrity maintained 3/7/2020  8:00 AM   Drainage Serosanguineous 3/7/2020  8:00 AM   Status To bulb suction 3/7/2020  8:00 AM   Output (mL) 5 mL 3/7/2020  6:20 AM            Closed/Suction Drain 03/03/20 1600 Right;Superior Back Accordion 10 Fr. (Active)   Site Description Unable to view 3/7/2020  8:00 AM   Dressing Type Transparent (Tegaderm) 3/7/2020  8:00 AM   Dressing Status Clean;Dry;Intact 3/7/2020  8:00 AM   Dressing Intervention Integrity maintained 3/7/2020  8:00 AM   Drainage Serosanguineous 3/7/2020  8:00 AM   Status To bulb suction 3/7/2020  8:00 AM   Output (mL) 20 mL 3/7/2020  6:20 AM            Closed/Suction Drain 03/03/20 1601 Left;Inferior Back Bulb 15 Fr. (Active)   Site Description Unable to view 3/7/2020  8:00 AM   Dressing Type Transparent (Tegaderm) 3/7/2020  8:00 AM   Dressing Status Clean;Dry;Intact 3/7/2020  8:00 AM   Dressing Intervention Integrity maintained 3/7/2020  8:00 AM   Drainage Sanguineous 3/7/2020  8:00 AM   Status To bulb suction 3/7/2020  8:00 AM   Output (mL) 80 mL 3/7/2020  2:19 AM       Neurosurgery Physical Exam      General: well developed, well nourished, no distress.   Head: normocephalic, atraumatic  Neck: No tracheal deviation. No palpable masses.   Neurologic: Alert and oriented.  Thought content appropriate.  GCS: Motor: 6/Verbal: 5/Eyes: 4 GCS Total: 15  Mental Status: Awake, Alert, Oriented x 4  Language: No aphasia  Speech: No dysarthria  Cranial nerves: face symmetric, tongue midline, CN II-XII grossly intact.   Eyes: pupils equal, round, reactive to light with accomodation, EOMI.   Ears: No drainage.   Pulmonary: normal respirations, no signs of respiratory distress  Abdomen: soft, non-distended, not tender to palpation  Sensory: intact to light touch throughout  Motor Strength: Moves all extremities spontaneously with good tone. No abnormal movements seen. Proximal exam of BLE pain-limited, L>R.      Strength   Deltoids Triceps Biceps Wrist Extension Wrist Flexion Hand    Upper: R 5/5 5/5 5/5 5/5 5/5 5/5     L 5/5 5/5 5/5 5/5 5/5 5/5       Iliopsoas Quadriceps Knee  Flexion Tibialis  anterior Gastro- cnemius EHL   Lower: R 4+/5 4+/5 4+/5 5/5 5/5 5/5     L 3/5 4/5 4/5 4/5 4/5 4/5      Harris: absent  Clonus: absent  Babinski: absent  Vascular: Pulses 2+ and symmetric radial and dorsalis pedis. No LE edema.   Skin: Skin is warm, dry and intact.  No pain with ER/IR left hip, non-tender to palpation.      Incisional WV in place. 2 YRN drains in place.      Significant Labs:  Recent Labs   Lab 03/07/20 0319 03/08/20  0314    102    137   K 3.5 3.4*    100   CO2 28 29   BUN 13 12   CREATININE 0.8 0.9   CALCIUM 8.2* 8.0*   MG 2.0 2.0     Recent Labs   Lab 03/07/20  0319 03/08/20  0314   WBC 8.59 7.66   HGB 8.2* 8.4*   HCT 26.5* 26.8*   * 155     No results for input(s): LABPT, INR, APTT in the last 48 hours.  Microbiology Results (last 7 days)     ** No results found for the last 168 hours. **        All pertinent labs from the last 24 hours have been reviewed.    Significant Diagnostics:  I have reviewed and interpreted all pertinent imaging results/findings within the past 24 hours.    Assessment/Plan:     Degenerative scoliosis  A 70 year old with  degenerative lumbar spondy and spine deformity now s/p T10 - pelvis posterior segmental instrumented fusion, T10 - L3 laminectomy for decompression, L3 pedical subtraction osteotomy (3/3).    - Neurologically stable  - Pain: continue current regimen. Oxy IR PRN, Dilaudid for breakthrough pain. Baclofen PRN for spasms. Lyrica 75mg BID. 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 Also complains of right anterior thigh pain/spasms.   - Consult placed to Anesthesia Service for possible nerve block, appreciate assistance. Spoke with Dr. Coe, will plan for regional nerve block Monday if remains unimproved. Pt to be NPO on Sunday night.    -Pt requests to proceed with regional block. NPO at midnight.    - Postop XR when able to tolerate standing  - TLSO when upright/OOB.  - Prevena WV and YRN drains per plastic surgery management  - HV drain output decreased overnight.   - Continue ppx abx while drains in place  - Hemoglobin improved after transfusion 3/5. Remains stable today. Continue iron supplementation. Continue to monitor.   - Thrombocytopenia: Improved today, Plt count 142. Will continue to monitor, transfuse if needed.  - Continue PT/OT/OOB. OOB goal > 6 hrs per day. Continue to encourage OOB mobility and up to chair with meals.   - GI: diet as tolerated. Continue current bowel regimen.   - DVT ppx: SQH, TEDs, SCDs  - Atelectasis ppx: IS every hour while awake  - HTN: continue home dose lisinopril, Prn labetalol. Held today for mild hypotension. CTM.  - DM2: continue POCT glucose, SSI, diabetic diet  - GERD: continue home dose pepcid    - Dispo: recommending rehab, referrals sent.     - Discussed with Dr. Waqas Herrera MD  Neurosurgery  [facility]

## 2020-03-09 NOTE — ASSESSMENT & PLAN NOTE
A 70 year old with degenerative lumbar spondy and spine deformity now s/p T10 - pelvis posterior segmental instrumented fusion, T10 - L3 laminectomy for decompression, L3 pedical subtraction osteotomy (3/3).    - Neurologically stable  - Pain: continue current regimen. Oxy IR PRN, Dilaudid for breakthrough pain. Baclofen PRN for spasms. Lyrica 75mg BID. 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 Also complains of right anterior thigh pain/spasms.   - Consult placed to Anesthesia Service for possible nerve block, appreciate assistance. Spoke with Dr. Coe, will plan for regional nerve block Monday if remains unimproved. Pt to be NPO on Sunday night.    -Pt requests to proceed with regional block. NPO at midnight.    - Postop XR when able to tolerate standing  - TLSO when upright/OOB.  - Prevena WV and YRN drains per plastic surgery management  - HV drain output decreased overnight.   - Continue ppx abx while drains in place  - Hemoglobin improved after transfusion 3/5. Remains stable today. Continue iron supplementation. Continue to monitor.   - Thrombocytopenia: Improved today, Plt count 142. Will continue to monitor, transfuse if needed.  - Continue PT/OT/OOB. OOB goal > 6 hrs per day. Continue to encourage OOB mobility and up to chair with meals.   - GI: diet as tolerated. Continue current bowel regimen.   - DVT ppx: SQH, TEDs, SCDs  - Atelectasis ppx: IS every hour while awake  - HTN: continue home dose lisinopril, Prn labetalol. Held today for mild hypotension. CTM.  - DM2: continue POCT glucose, SSI, diabetic diet  - GERD: continue home dose pepcid    - Dispo: recommending rehab, referrals sent.     - Discussed with Dr. Alan

## 2020-03-09 NOTE — ASSESSMENT & PLAN NOTE
A 70 year old with degenerative lumbar spondy and spine deformity now s/p T10 - pelvis posterior segmental instrumented fusion, T10 - L3 laminectomy for decompression, L3 pedical subtraction osteotomy (3/3).    -Patient neurologically stable on exam. BLE exam remains largely pain limited.   -Brace on when upright, OOB, or working with therapy. OK to remove when lying in bed.   -Postop XR when patient able to tolerate standing  -Continue management of Prevena and remaining YRN drains per Plastics. Abx while drains in place. Plan for FU 1 week after discharge for drain removal.  -Activity restrictions: OK for patient to get out of bed for 30-60 mins at a time. OK for patient to sit in the chair and ambulate. When in bed, keep HOB < 60 degrees to reduce tension across wound. Log roll to get out of bed rather than sitting straight up.     -Pain: Anesthesia consulted. Concern for meralgia parasthetica. PRN Oxy increased. Scheduled lido patches, tylenol 1000 mg Q8, and robaxin 500 mg TID. DC Flexeril. NPO at midnight for possible nerve block tomorrow.   -Thrombocytopenia: Resolved. Plts 193.  -Post op anemia: H&H 8/29.3. Improving.   -Transaminitis: AST/ALT 51/49, was 79/62. Improving. Continue iron for replacement.   -HTN: -133. Continue home dose lisinopril and Prn labetalol.   -DM2: continue POCT glucose, SSI, diabetic diet  -GERD: continue home dose pepcid  -DVT prophylaxis: CLIFF's, SCD's, SQH  -Bowel regimen: senna BID and miralax daily  -Atelectasis prevention: IS hourly while awake, PT/OT, OOB > 6 hours per day. Continue to encourage OOB mobility and up to chair with meals.       Dispo: Pending Rehab. Stability pending pain control.

## 2020-03-09 NOTE — ASSESSMENT & PLAN NOTE
A 70 year old with degenerative lumbar spondy and spine deformity now s/p T10 - pelvis posterior segmental instrumented fusion, T10 - L3 laminectomy for decompression, L3 pedical subtraction osteotomy (3/3).    -Patient neurologically stable on exam.  -Brace on when upright, OOB, or working with therapy. OK to remove when lying in bed.   -Postop XR when patient able to tolerate standing  -Continue management of Prevena and remaining YRN drains per Plastics. Abx while drains in place. Plan for FU 1 week after discharge for drain removal.  -Activity restrictions: OK for patient to get out of bed for 30-60 mins at a time. OK for patient to sit in the chair and ambulate. When in bed, keep HOB < 60 degrees to reduce tension across wound. Log roll to get out of bed rather than sitting straight up.     -Pain: Anesthesia managing. Better controlled this morning after adjustments made. No changes in pain after nerve block today, however patient was able to participate more with therapy. Lyrica increased to TID. PRN Oxy increased. Scheduled lido patches, tylenol 1000 mg Q8, and robaxin 500 mg TID.   -Thrombocytopenia: Resolved. Plts 193.  -Post op anemia: H&H 8/29.3 at last lab draw. Continue iron for replacement. Improving.   -Transaminitis: Improving, f/u next lab draw.  -HTN: Continue home dose lisinopril and Prn labetalol.   -DM2: continue POCT glucose, SSI, diabetic diet  -GERD: continue home dose pepcid  -DVT prophylaxis: CLIFF's, SCD's, SQH  -Bowel regimen: senna BID and miralax daily. Fleet enema today per patient's request. States he uses enema or suppository normally 1-2x per week at home.   -Atelectasis prevention: IS hourly while awake, PT/OT, OOB > 6 hours per day. Continue to encourage OOB mobility and up to chair with meals.       Dispo: Pending Rehab. Stability pending pain control.

## 2020-03-09 NOTE — SUBJECTIVE & OBJECTIVE
Interval History 3/9/2020:  Patient is seen for follow-up rehab evaluation and recommendations: Participating with therapy. Pain not controlled. Scheduled for nerve block today.     HPI, Past Medical, Family, and Social History remains the same as documented in the initial encounter.    Scheduled Medications:    acetaminophen  1,000 mg Oral Q8H    ceFAZolin (ANCEF) IVPB  2 g Intravenous Q8H    famotidine  40 mg Oral QHS    ferrous sulfate  325 mg Oral BID    heparin (porcine)  5,000 Units Subcutaneous Q8H    lidocaine  2 patch Transdermal Q24H    lisinopril  5 mg Oral Daily    methocarbamol  500 mg Oral TID    pregabalin  75 mg Oral TID    senna-docusate 8.6-50 mg  1 tablet Oral BID       Diagnostic Results:   Labs: Reviewed    PRN Medications: aluminum-magnesium hydroxide-simethicone, bisacodyL, Dextrose 10% Bolus, Dextrose 10% Bolus, glucagon (human recombinant), glucose, glucose, HYDROmorphone, insulin aspart U-100, labetalol, ondansetron, oxyCODONE, oxyCODONE, promethazine (PHENERGAN) IVPB, sodium chloride 0.9%    Review of Systems   Constitutional: Positive for activity change. Negative for fatigue and fever.   HENT: Negative for trouble swallowing and voice change.    Respiratory: Negative for cough and shortness of breath.    Cardiovascular: Negative for chest pain and leg swelling.   Gastrointestinal: Negative for abdominal distention and abdominal pain.   Genitourinary: Negative for difficulty urinating and flank pain.   Musculoskeletal: Positive for gait problem. Negative for back pain.   Skin: Positive for wound. Negative for color change and rash.   Neurological: Positive for weakness. Negative for speech difficulty and numbness.   Psychiatric/Behavioral: Negative for agitation and confusion.     Objective:     Vital Signs (Most Recent):  Temp: 99.1 °F (37.3 °C) (03/09/20 1229)  Pulse: 85 (03/09/20 1229)  Resp: 18 (03/09/20 1229)  BP: 123/64 (03/09/20 1229)  SpO2: 96 % (03/09/20 1229)    Vital  Signs (24h Range):  Temp:  [98.8 °F (37.1 °C)-100.2 °F (37.9 °C)] 99.1 °F (37.3 °C)  Pulse:  [84-97] 85  Resp:  [12-18] 18  SpO2:  [94 %-99 %] 96 %  BP: (105-146)/(51-87) 123/64     Physical Exam   Constitutional: He is oriented to person, place, and time. He appears well-developed and well-nourished.   HENT:   Head: Normocephalic and atraumatic.   Eyes: Pupils are equal, round, and reactive to light. Right eye exhibits no discharge. Left eye exhibits no discharge.   Neck: Neck supple.   Pulmonary/Chest: Effort normal. No respiratory distress.   Abdominal: Soft. He exhibits no distension. There is no tenderness.   Musculoskeletal: He exhibits no edema or deformity.   RUE: 5/5.  LUE: 5/5.  RLE: 3/5.  LLE: 2/5.   Neurological: He is alert and oriented to person, place, and time. No sensory deficit. He exhibits normal muscle tone.   - Drains x 4 in place, dressing in place to back  - following commands   Skin: Skin is warm and dry.   Psychiatric: He has a normal mood and affect. His behavior is normal.   Nursing note and vitals reviewed.    NEUROLOGICAL EXAMINATION:     MENTAL STATUS   Oriented to person, place, and time.     CRANIAL NERVES     CN III, IV, VI   Pupils are equal, round, and reactive to light.

## 2020-03-09 NOTE — ANESTHESIA POST-OP PAIN MANAGEMENT
Acute Pain Service Progress Note    Garcia Renteria is a 70 y.o., male, [id number].    Surgery:  T10-Pelvis Fusion with L4 PSO **AIRO**   CREATION, FLAP, MUSCLE ROTATION     Post Op Day #: 6    Problem List:    Active Hospital Problems    Diagnosis  POA    *Acquired spondylolisthesis of thoracolumbar vertebra [M43.15]  Yes    Impaired mobility [Z74.09]  Unknown    Degenerative scoliosis [M41.50]  Unknown    Constipation [K59.00]  Yes    Essential hypertension [I10]  Yes    Type 2 diabetes mellitus with diabetic neuropathy, without long-term current use of insulin [E11.40]  Yes    Gastroesophageal reflux disease [K21.9]  Yes      Resolved Hospital Problems   No resolved problems to display.       Subjective:     General appearance of alert, oriented, no complaints   Pain with rest: 7    Numbers   Pain with movement: 8    Numbers   Side Effects    1. Pruritis No    2. Nausea No    3. Motor Blockade Yes, 0=Ability to raise lower extremities off bed    4. Sedation No, 1=awake and alert    Objective:    Vitals:    03/09/20 1120   BP:    Pulse: 91   Resp:    Temp:         Labs    No results displayed because visit has over 200 results.           Meds   Current Facility-Administered Medications   Medication Dose Route Frequency Provider Last Rate Last Dose    acetaminophen tablet 1,000 mg  1,000 mg Oral Q8H Chance Angulo MD        aluminum-magnesium hydroxide-simethicone 200-200-20 mg/5 mL suspension 30 mL  30 mL Oral Q4H PRN Mulugeta Pierson MD   30 mL at 03/07/20 2058    bisacodyL suppository 10 mg  10 mg Rectal Daily PRN SANDY Moreno        ceFAZolin injection 2 g  2 g Intravenous Q8H Linh Roach PA-C   2 g at 03/09/20 0841    dextrose 10% (D10W) Bolus  12.5 g Intravenous PRN Deja Hawkins NP        dextrose 10% (D10W) Bolus  25 g Intravenous PRN Deja Hawkins NP        diazePAM tablet 5 mg  5 mg Oral Q6H PRN SANDY Moreno   5 mg at 03/09/20 1122    famotidine tablet 40 mg   40 mg Oral QHS Linh Roach PA-C   40 mg at 03/08/20 2107    ferrous sulfate EC tablet 325 mg  325 mg Oral BID Alva Cuevas PA-C   325 mg at 03/09/20 0842    glucagon (human recombinant) injection 1 mg  1 mg Intramuscular PRN Deja Hawkins, NP        glucose chewable tablet 16 g  16 g Oral PRN Deja Hawkins, NP        glucose chewable tablet 24 g  24 g Oral PRN Deja Hawkins, NP        heparin (porcine) injection 5,000 Units  5,000 Units Subcutaneous Q8H SANDY Moreno        HYDROmorphone injection 0.5 mg  0.5 mg Intravenous Q6H PRN Linh Roach PA-C   0.5 mg at 03/09/20 0352    insulin aspart U-100 pen 1-10 Units  1-10 Units Subcutaneous QID (AC + HS) PRN Deja Hawkins NP        labetalol 20 mg/4 mL (5 mg/mL) IV syring  10 mg Intravenous Q4H PRN Linh Roach PA-C        lidocaine 5 % patch 1 patch  1 patch Transdermal Q24H Chance Angulo MD        lisinopril tablet 5 mg  5 mg Oral Daily Allyson Jackson PA-C   5 mg at 03/09/20 0842    methocarbamol tablet 500 mg  500 mg Oral TID Chance Angulo MD        ondansetron injection 4 mg  4 mg Intravenous Q8H PRN Linh Roach PA-C        oxyCODONE immediate release tablet 15 mg  15 mg Oral Q4H PRN SANDY Moreno        oxyCODONE immediate release tablet Tab 10 mg  10 mg Oral Q4H PRN SANDY Moreno        pregabalin capsule 75 mg  75 mg Oral TID SANDY Moreno        promethazine (PHENERGAN) 6.25 mg in dextrose 5 % 50 mL IVPB  6.25 mg Intravenous Q6H PRN Mulugeta Pierson MD        senna-docusate 8.6-50 mg per tablet 1 tablet  1 tablet Oral BID Linh Roach PA-C   1 tablet at 03/09/20 0842    sodium chloride 0.9% flush 10 mL  10 mL Intravenous PRN Linh Roach PA-C           Assessment:     Pain control inadequate. Pt reports pain with movement. He reports its difficult to sit up in the bed due to the pain. He has been taking Oxy and morphine x1 with a little relief    Plan:     -  will evaluate Pt for possible nerve block for tomorrow. NPO at midnight. Will revaluate  tomorrow AM   - continue PRN Oxy and Morphine for breakthrough   - scheduled tylenol 1000 mg Q8   - scheduled robaxin 500 mg TID   - lidocaine patch     Evaluator Chance Angulo

## 2020-03-09 NOTE — PLAN OF CARE
Patient remains AAOx4 throughout shift.  Family at bedside throughout shift.  Pain managed with PRN pain medications.  Pain medication adjustments made today, patient reports much relief, pain down to a 3 post medication and patch placement.  Wound vac to back intact, YRN drains intact.  No acute neuro changes this shift.  Patient to be NPO at midnight tonight for possible nerve block tomorrow depending on pain management throughout the night.  Urinal at bedside, bed changed 3 times this shift due to urgency/spilling of the urinal.  Call bell within reach, family remains at bedside.  Will continue to monitor.

## 2020-03-09 NOTE — PLAN OF CARE
Problem: Diabetes Comorbidity  Goal: Blood Glucose Level Within Desired Range  Outcome: Ongoing, Progressing     Problem: Adult Inpatient Plan of Care  Goal: Plan of Care Review  Outcome: Ongoing, Progressing     Problem: Fall Injury Risk  Goal: Absence of Fall and Fall-Related Injury  Outcome: Ongoing, Progressing     Patient is AAO x4. POC reviewed with patient and spouse. Patient verbalized understanding. Patient's breathing is unlabored with equal chest expansion. Patient has an incision to back with transparent film. Patient also has a wound vac in place with suction at 125.  Patient complains of numbness and tingling in lower extremities. Patient complained of pain;PRN meds given. Patient voids per urinal. Bed in lowest position,bed alarm on, side rails up x3, no complaints or signs of distress. WCTM.

## 2020-03-09 NOTE — SUBJECTIVE & OBJECTIVE
Medications:  Continuous Infusions:  Scheduled Meds:   bisacodyL  10 mg Rectal Daily    ceFAZolin (ANCEF) IVPB  2 g Intravenous Q8H    famotidine  40 mg Oral QHS    ferrous sulfate  325 mg Oral BID    lisinopril  5 mg Oral Daily    pregabalin  75 mg Oral BID    senna-docusate 8.6-50 mg  1 tablet Oral BID     PRN Meds:sodium chloride, acetaminophen, aluminum-magnesium hydroxide-simethicone, cyclobenzaprine, Dextrose 10% Bolus, Dextrose 10% Bolus, glucagon (human recombinant), glucose, glucose, HYDROmorphone, insulin aspart U-100, labetalol, ondansetron, ondansetron, oxyCODONE, oxyCODONE, promethazine (PHENERGAN) IVPB, sodium chloride 0.9%     Review of Systems    Objective:     Weight: 98.5 kg (217 lb 2.5 oz)  Body mass index is 28.65 kg/m².  Vital Signs (Most Recent):  Temp: 99 °F (37.2 °C) (03/08/20 1532)  Pulse: 92 (03/08/20 1900)  Resp: 18 (03/08/20 1532)  BP: (!) 146/87 (03/08/20 1532)  SpO2: 99 % (03/08/20 1532) Vital Signs (24h Range):  Temp:  [97.3 °F (36.3 °C)-100 °F (37.8 °C)] 99 °F (37.2 °C)  Pulse:  [76-97] 92  Resp:  [17-20] 18  SpO2:  [90 %-99 %] 99 %  BP: ()/() 146/87                          Closed/Suction Drain 03/03/20 1559 Left;Superior Back Accordion 10 Fr. (Active)   Site Description Unable to view 3/7/2020  8:00 AM   Dressing Type Transparent (Tegaderm) 3/7/2020  8:00 AM   Dressing Status Clean;Dry;Intact 3/7/2020  8:00 AM   Dressing Intervention Integrity maintained 3/7/2020  8:00 AM   Drainage Serosanguineous 3/7/2020  8:00 AM   Status To bulb suction 3/7/2020  8:00 AM   Output (mL) 5 mL 3/7/2020  6:20 AM            Closed/Suction Drain 03/03/20 1600 Right;Superior Back Accordion 10 Fr. (Active)   Site Description Unable to view 3/7/2020  8:00 AM   Dressing Type Transparent (Tegaderm) 3/7/2020  8:00 AM   Dressing Status Clean;Dry;Intact 3/7/2020  8:00 AM   Dressing Intervention Integrity maintained 3/7/2020  8:00 AM   Drainage Serosanguineous 3/7/2020  8:00 AM   Status  To bulb suction 3/7/2020  8:00 AM   Output (mL) 20 mL 3/7/2020  6:20 AM            Closed/Suction Drain 03/03/20 1601 Left;Inferior Back Bulb 15 Fr. (Active)   Site Description Unable to view 3/7/2020  8:00 AM   Dressing Type Transparent (Tegaderm) 3/7/2020  8:00 AM   Dressing Status Clean;Dry;Intact 3/7/2020  8:00 AM   Dressing Intervention Integrity maintained 3/7/2020  8:00 AM   Drainage Sanguineous 3/7/2020  8:00 AM   Status To bulb suction 3/7/2020  8:00 AM   Output (mL) 80 mL 3/7/2020  2:19 AM       Neurosurgery Physical Exam      General: well developed, well nourished, no distress.   Head: normocephalic, atraumatic  Neck: No tracheal deviation. No palpable masses.   Neurologic: Alert and oriented. Thought content appropriate.  GCS: Motor: 6/Verbal: 5/Eyes: 4 GCS Total: 15  Mental Status: Awake, Alert, Oriented x 4  Language: No aphasia  Speech: No dysarthria  Cranial nerves: face symmetric, tongue midline, CN II-XII grossly intact.   Eyes: pupils equal, round, reactive to light with accomodation, EOMI.   Ears: No drainage.   Pulmonary: normal respirations, no signs of respiratory distress  Abdomen: soft, non-distended, not tender to palpation  Sensory: intact to light touch throughout  Motor Strength: Moves all extremities spontaneously with good tone. No abnormal movements seen. Proximal exam of BLE pain-limited, L>R.      Strength   Deltoids Triceps Biceps Wrist Extension Wrist Flexion Hand    Upper: R 5/5 5/5 5/5 5/5 5/5 5/5     L 5/5 5/5 5/5 5/5 5/5 5/5       Iliopsoas Quadriceps Knee  Flexion Tibialis  anterior Gastro- cnemius EHL   Lower: R 4+/5 4+/5 4+/5 5/5 5/5 5/5     L 3/5 4/5 4/5 4/5 4/5 4/5      Harris: absent  Clonus: absent  Babinski: absent  Vascular: Pulses 2+ and symmetric radial and dorsalis pedis. No LE edema.   Skin: Skin is warm, dry and intact.  No pain with ER/IR left hip, non-tender to palpation.      Incisional WV in place. 2 YRN drains in place.      Significant Labs:  Recent  Labs   Lab 03/07/20  0319 03/08/20  0314    102    137   K 3.5 3.4*    100   CO2 28 29   BUN 13 12   CREATININE 0.8 0.9   CALCIUM 8.2* 8.0*   MG 2.0 2.0     Recent Labs   Lab 03/07/20  0319 03/08/20  0314   WBC 8.59 7.66   HGB 8.2* 8.4*   HCT 26.5* 26.8*   * 155     No results for input(s): LABPT, INR, APTT in the last 48 hours.  Microbiology Results (last 7 days)     ** No results found for the last 168 hours. **        All pertinent labs from the last 24 hours have been reviewed.    Significant Diagnostics:  I have reviewed and interpreted all pertinent imaging results/findings within the past 24 hours.

## 2020-03-09 NOTE — PROGRESS NOTES
[facility]  Neurosurgery  Progress Note    Subjective:     History of Present Illness: This is a 70-year-old man with a history of a non instrumented lumbar fusion many years ago who presents with severe progressive axial low back pain, bilateral radicular leg pain, and thoracic myelopathy.  Additionally he had severe postural deformity that was functionally disabling.  He failed multiple conservative measures.  Imaging showed severe central stenosis with spinal cord compression from T10-L2, solid fusion from his previous surgery from L3 to the sacrum, and flat back deformity with severe fixed sagittal plane deformity. Recommended patient undergo spinal cord decompression and deformity reduction surgery, via a T10 to pelvis instrumented fusion with an L3 PSO and a multilevel laminectomy for spinal cord decompression.  Risks benefits alternatives indications and methods were reviewed in detail and he wished to proceed.  All questions were answered.  No guarantees about the results the procedure. [place] [facility] [place]    Post-Op Info:  Procedure(s) (LRB):  T10-Pelvis Fusion with L4 PSO **AIRO** Co-Surgeon: Roberto Parkinson (N/A)  CREATION, FLAP, MUSCLE ROTATION   6 Days Post-Op     Interval History:   NAEON. Patient resting in bed. Wife at bedside. He continues to report severe BLE pain. States RLE pain is now equal to LLE pain. Pain begins in this lower back/buttocks, radiates down the anterolateral aspect of his leg, terminating at the knee. Pain is in the same distribution as pre op pain; however, the severity has greatly increased post operatively. Leg pain greatly increases when trying to bear weight through legs or sit. Pain is tolerable when lying down. Patient also reports muscle spasms that do not improve with PO Flexeril. Tolerating diet. Intermittent urinary retention at baseline. Denies any new bladder or bowel dysfunction.     Medications:  Continuous  Infusions:  Scheduled Meds:   acetaminophen  1,000 mg Oral Q8H    ceFAZolin (ANCEF) IVPB  2 g Intravenous Q8H    famotidine  40 mg Oral QHS    ferrous sulfate  325 mg Oral BID    heparin (porcine)  5,000 Units Subcutaneous Q8H    lidocaine  2 patch Transdermal Q24H    lisinopril  5 mg Oral Daily    methocarbamol  500 mg Oral TID    pregabalin  75 mg Oral TID    senna-docusate 8.6-50 mg  1 tablet Oral BID     PRN Meds:aluminum-magnesium hydroxide-simethicone, bisacodyL, Dextrose 10% Bolus, Dextrose 10% Bolus, glucagon (human recombinant), glucose, glucose, HYDROmorphone, insulin aspart U-100, labetalol, ondansetron, oxyCODONE, oxyCODONE, promethazine (PHENERGAN) IVPB, sodium chloride 0.9%     Review of Systems  Objective:     Weight: 98.5 kg (217 lb 2.5 oz)  Body mass index is 28.65 kg/m².  Vital Signs (Most Recent):  Temp: 99.1 °F (37.3 °C) (03/09/20 1229)  Pulse: 85 (03/09/20 1229)  Resp: 18 (03/09/20 1229)  BP: 123/64 (03/09/20 1229)  SpO2: 96 % (03/09/20 1229) Vital Signs (24h Range):  Temp:  [98.8 °F (37.1 °C)-100.2 °F (37.9 °C)] 99.1 °F (37.3 °C)  Pulse:  [84-97] 85  Resp:  [12-18] 18  SpO2:  [94 %-99 %] 96 %  BP: (105-146)/(51-87) 123/64                          Closed/Suction Drain 03/03/20 1559 Left;Superior Back Accordion 10 Fr. (Active)   Site Description Unable to view 3/9/2020  4:00 AM   Dressing Type Transparent (Tegaderm) 3/9/2020  4:00 AM   Dressing Status Clean;Dry;Intact 3/9/2020  4:00 AM   Dressing Intervention Integrity maintained 3/9/2020  4:00 AM   Drainage Serosanguineous 3/9/2020  4:00 AM   Status To bulb suction 3/9/2020  4:00 AM   Output (mL) 3 mL 3/9/2020  4:02 AM            Closed/Suction Drain 03/03/20 1600 Right;Superior Back Accordion 10 Fr. (Active)   Site Description Unable to view 3/9/2020  4:00 AM   Dressing Type Transparent (Tegaderm) 3/9/2020  4:00 AM   Dressing Status Clean;Dry;Intact 3/9/2020  4:00 AM   Dressing Intervention Integrity maintained 3/9/2020  4:00 AM    Drainage Serosanguineous 3/9/2020  4:00 AM   Status To bulb suction 3/9/2020  4:00 AM   Output (mL) 15 mL 3/9/2020  4:02 AM            Closed/Suction Drain 03/03/20 1601 Left;Inferior Back Bulb 15 Fr. (Active)   Site Description Unable to view 3/8/2020  8:00 AM   Dressing Type Transparent (Tegaderm) 3/8/2020  8:00 AM   Dressing Status Clean;Dry;Intact 3/8/2020  8:00 AM   Dressing Intervention Integrity maintained 3/8/2020  8:00 AM   Drainage Sanguineous 3/8/2020  8:00 AM   Status To bulb suction 3/8/2020  8:00 AM   Output (mL) 80 mL 3/7/2020  2:19 AM       Neurosurgery Physical Exam  General: well developed, well nourished, no distress.   Head: normocephalic, atraumatic  Neurologic: Alert and oriented. Thought content appropriate.  GCS: Motor: 6/Verbal: 5/Eyes: 4 GCS Total: 15  Mental Status: Awake, Alert, Oriented x 4  Language: No aphasia  Speech: No dysarthria  Cranial nerves: face symmetric, tongue midline, CN II-XII grossly intact.   Eyes: pupils equal, round, reactive to light with accomodation, EOMI.   Pulmonary: normal respirations, no signs of respiratory distress  Abdomen: soft, non-distended, not tender to palpation  Sensory: intact to light touch throughout BUE, RLE, and proximal LLE. Decreased to light touch throughout distal LLE.     Motor Strength: Moves all extremities spontaneously with good tone. No abnormal movements seen. BLE pain limited.      Strength   Deltoids Triceps Biceps Wrist Extension Wrist Flexion Hand    Upper: R 5/5 5/5 5/5 5/5 5/5 5/5     L 5/5 5/5 5/5 5/5 5/5 5/5       Iliopsoas Quadriceps Knee  Flexion Tibialis  anterior Gastro- cnemius EHL   Lower: R 4+/5 5-/5 5/5 5/5 5/5 5/5     L 4+/5 4+/5   5-/5 5/5 5/5 5/5      Harris: absent  Clonus: absent  Skin: Skin is warm, dry and intact.       Incision:  Clean, dry, Prevena incisional vac in place with good suction. No evidence of active drainage. 2 YRN drains in place.      Significant Labs:  Recent Labs   Lab 03/08/20  2002  03/09/20  0539    100    137   K 3.4* 3.8    101   CO2 29 30*   BUN 12 12   CREATININE 0.9 0.8   CALCIUM 8.0* 8.5*   MG 2.0 1.9     Recent Labs   Lab 03/08/20  0314 03/09/20  0539   WBC 7.66 7.74   HGB 8.4* 9.0*   HCT 26.8* 29.3*    193         Assessment/Plan:     Degenerative scoliosis  A 70 year old with degenerative lumbar spondy and spine deformity now s/p T10 - pelvis posterior segmental instrumented fusion, T10 - L3 laminectomy for decompression, L3 pedical subtraction osteotomy (3/3).    -Patient neurologically stable on exam. BLE exam remains largely pain limited.   -Brace on when upright, OOB, or working with therapy. OK to remove when lying in bed.   -Postop XR when patient able to tolerate standing  -Continue management of Prevena and remaining YRN drains per Plastics. Abx while drains in place. Plan for FU 1 week after discharge for drain removal.  -Activity restrictions: OK for patient to get out of bed for 30-60 mins at a time. OK for patient to sit in the chair and ambulate. When in bed, keep HOB < 60 degrees to reduce tension across wound. Log roll to get out of bed rather than sitting straight up.     -Pain: Anesthesia consulted. Concern for meralgia parasthetica. Lyrica increased to TID. PRN Oxy increased. Scheduled lido patches, tylenol 1000 mg Q8, and robaxin 500 mg TID. DC Flexeril. NPO at midnight for possible nerve block tomorrow.   -Thrombocytopenia: Resolved. Plts 193.  -Post op anemia: H&H 8/29.3. Improving.   -Transaminitis: AST/ALT 51/49, was 79/62. Improving. Continue iron for replacement.   -HTN: -133. Continue home dose lisinopril and Prn labetalol.   -DM2: continue POCT glucose, SSI, diabetic diet  -GERD: continue home dose pepcid  -DVT prophylaxis: CLIFF's, SCD's, SQH  -Bowel regimen: senna BID and miralax daily  -Atelectasis prevention: IS hourly while awake, PT/OT, OOB > 6 hours per day. Continue to encourage OOB mobility and up to chair with meals.        Dispo: Pending Rehab. Stability pending pain control.       Discussed with Dr. Alan  Please call with any questions      Hannah Almazan PA-C   Neurosurgery   Pager: [phone]

## 2020-03-09 NOTE — PLAN OF CARE
03/09/20 1542   Discharge Reassessment   Assessment Type Discharge Planning Reassessment   Provided patient/caregiver education on the expected discharge date and the discharge plan No   Do you have any problems affording any of your prescribed medications? No   Discharge Plan A Rehab   Discharge Plan B Skilled Nursing Facility   DME Needed Upon Discharge  none   Patient choice form signed by patient/caregiver N/A   Anticipated Discharge Disposition Rehab   Can the patient/caregiver answer the patient profile reliably? Yes, cognitively intact

## 2020-03-09 NOTE — PROGRESS NOTES
[facility]  Physical Medicine & Rehab  Progress Note    Patient Name: Garcia Renteria  MRN: [id number]  Admission Date: 3/3/2020  Length of Stay: 6 days  Attending Physician: Francis Alan MD    Subjective:     Principal Problem:Acquired spondylolisthesis of thoracolumbar vertebra    Hospital Course:   3/4/20: Evaluated by PT & OT. Bed mobility maxA x 2 ppl. LBD totalA.   3/5/20: Participated w/ OT. Bed mobility maxA x 2 ppl. LBD & UBD totalA.   3/6/20: Participated w/ OT & PT. Bed mobility maxA x 2 ppl. UBD & LBD totalA. Seated EOB x 5 mins max A.       Interval History 3/9/2020:  Patient is seen for follow-up rehab evaluation and recommendations: Participating with therapy. Pain not controlled. Scheduled for nerve block today.     HPI, Past Medical, Family, and Social History remains the same as documented in the initial encounter.    Scheduled Medications:    acetaminophen  1,000 mg Oral Q8H    ceFAZolin (ANCEF) IVPB  2 g Intravenous Q8H    famotidine  40 mg Oral QHS    ferrous sulfate  325 mg Oral BID    heparin (porcine)  5,000 Units Subcutaneous Q8H    lidocaine  2 patch Transdermal Q24H    lisinopril  5 mg Oral Daily    methocarbamol  500 mg Oral TID    pregabalin  75 mg Oral TID    senna-docusate 8.6-50 mg  1 tablet Oral BID       Diagnostic Results:   Labs: Reviewed    PRN Medications: aluminum-magnesium hydroxide-simethicone, bisacodyL, Dextrose 10% Bolus, Dextrose 10% Bolus, glucagon (human recombinant), glucose, glucose, HYDROmorphone, insulin aspart U-100, labetalol, ondansetron, oxyCODONE, oxyCODONE, promethazine (PHENERGAN) IVPB, sodium chloride 0.9%    Review of Systems   Constitutional: Positive for activity change. Negative for fatigue and fever.   HENT: Negative for trouble swallowing and voice change.    Respiratory: Negative for cough and shortness of breath.    Cardiovascular: Negative for chest pain and leg swelling.   Gastrointestinal: Negative for abdominal  distention and abdominal pain.   Genitourinary: Negative for difficulty urinating and flank pain.   Musculoskeletal: Positive for gait problem. Negative for back pain.   Skin: Positive for wound. Negative for color change and rash.   Neurological: Positive for weakness. Negative for speech difficulty and numbness.   Psychiatric/Behavioral: Negative for agitation and confusion.     Objective:     Vital Signs (Most Recent):  Temp: 99.1 °F (37.3 °C) (03/09/20 1229)  Pulse: 85 (03/09/20 1229)  Resp: 18 (03/09/20 1229)  BP: 123/64 (03/09/20 1229)  SpO2: 96 % (03/09/20 1229)    Vital Signs (24h Range):  Temp:  [98.8 °F (37.1 °C)-100.2 °F (37.9 °C)] 99.1 °F (37.3 °C)  Pulse:  [84-97] 85  Resp:  [12-18] 18  SpO2:  [94 %-99 %] 96 %  BP: (105-146)/(51-87) 123/64     Physical Exam   Constitutional: He is oriented to person, place, and time. He appears well-developed and well-nourished.   HENT:   Head: Normocephalic and atraumatic.   Eyes: Pupils are equal, round, and reactive to light. Right eye exhibits no discharge. Left eye exhibits no discharge.   Neck: Neck supple.   Pulmonary/Chest: Effort normal. No respiratory distress.   Abdominal: Soft. He exhibits no distension. There is no tenderness.   Musculoskeletal: He exhibits no edema or deformity.   RUE: 5/5.  LUE: 5/5.  RLE: 3/5.  LLE: 2/5.   Neurological: He is alert and oriented to person, place, and time. No sensory deficit. He exhibits normal muscle tone.   - Drains x 4 in place, dressing in place to back  - following commands   Skin: Skin is warm and dry.   Psychiatric: He has a normal mood and affect. His behavior is normal.   Nursing note and vitals reviewed.          Assessment/Plan:      * Acquired spondylolisthesis of thoracolumbar vertebra  - 3/3 s/p T10-pelvis instrumented fusion, T10- L2 laminectomy, L3 PSO, and complex wound closure by plastic surgery.   - Plastic surgery recommending head of bed flat at all times except meals 30 mis 3x/day x 6 weeks.    - Per  NSGY TLSO when upright/OOB.  - Nerve block pending     Impaired mobility  - Related to prolonged/acute hospital course.     Recommendations  -  Encourage mobility, OOB in chair at least 3 hours per day, and early ambulation as appropriate  -  PT/OT evaluate and treat  -  Pain management  -  Monitor for and prevent skin breakdown and pressure ulcers  · Early mobility, repositioning/weight shifting every 20-30 minutes when sitting, turn patient every 2 hours, proper mattress/overlay and chair cushioning, pressure relief/heel protector boots  -  DVT prophylaxis    -  Reviewed discharge options (IP rehab, SNF, HH therapy, and OP therapy)    Recommend Inpatient Rehab once medically stable and pain controlled.       Jaelyn Gallardo NP  Department of Physical Medicine & Rehab   [facility]

## 2020-03-09 NOTE — SUBJECTIVE & OBJECTIVE
Interval History:   NAEON. Patient resting in bed. Wife at bedside. He continues to report severe BLE pain. States RLE pain is now equal to LLE pain. Pain begins in this lower back/buttocks, radiates down the anterolateral aspect of his leg, terminating at the knee. Pain is in the same distribution as pre op pain; however, the severity has greatly increased post operatively. Leg pain greatly increases when trying to bear weight through legs or sit. Pain is tolerable when lying down. Patient also reports muscle spasms that do not improve with PO Flexeril. Tolerating diet. Intermittent urinary retention at baseline. Denies any new bladder or bowel dysfunction.     Medications:  Continuous Infusions:  Scheduled Meds:   acetaminophen  1,000 mg Oral Q8H    ceFAZolin (ANCEF) IVPB  2 g Intravenous Q8H    famotidine  40 mg Oral QHS    ferrous sulfate  325 mg Oral BID    heparin (porcine)  5,000 Units Subcutaneous Q8H    lidocaine  2 patch Transdermal Q24H    lisinopril  5 mg Oral Daily    methocarbamol  500 mg Oral TID    pregabalin  75 mg Oral TID    senna-docusate 8.6-50 mg  1 tablet Oral BID     PRN Meds:aluminum-magnesium hydroxide-simethicone, bisacodyL, Dextrose 10% Bolus, Dextrose 10% Bolus, glucagon (human recombinant), glucose, glucose, HYDROmorphone, insulin aspart U-100, labetalol, ondansetron, oxyCODONE, oxyCODONE, promethazine (PHENERGAN) IVPB, sodium chloride 0.9%     Review of Systems  Objective:     Weight: 98.5 kg (217 lb 2.5 oz)  Body mass index is 28.65 kg/m².  Vital Signs (Most Recent):  Temp: 99.1 °F (37.3 °C) (03/09/20 1229)  Pulse: 85 (03/09/20 1229)  Resp: 18 (03/09/20 1229)  BP: 123/64 (03/09/20 1229)  SpO2: 96 % (03/09/20 1229) Vital Signs (24h Range):  Temp:  [98.8 °F (37.1 °C)-100.2 °F (37.9 °C)] 99.1 °F (37.3 °C)  Pulse:  [84-97] 85  Resp:  [12-18] 18  SpO2:  [94 %-99 %] 96 %  BP: (105-146)/(51-87) 123/64                          Closed/Suction Drain 03/03/20 1559 Left;Superior Back  Accordion 10 Fr. (Active)   Site Description Unable to view 3/9/2020  4:00 AM   Dressing Type Transparent (Tegaderm) 3/9/2020  4:00 AM   Dressing Status Clean;Dry;Intact 3/9/2020  4:00 AM   Dressing Intervention Integrity maintained 3/9/2020  4:00 AM   Drainage Serosanguineous 3/9/2020  4:00 AM   Status To bulb suction 3/9/2020  4:00 AM   Output (mL) 3 mL 3/9/2020  4:02 AM            Closed/Suction Drain 03/03/20 1600 Right;Superior Back Accordion 10 Fr. (Active)   Site Description Unable to view 3/9/2020  4:00 AM   Dressing Type Transparent (Tegaderm) 3/9/2020  4:00 AM   Dressing Status Clean;Dry;Intact 3/9/2020  4:00 AM   Dressing Intervention Integrity maintained 3/9/2020  4:00 AM   Drainage Serosanguineous 3/9/2020  4:00 AM   Status To bulb suction 3/9/2020  4:00 AM   Output (mL) 15 mL 3/9/2020  4:02 AM            Closed/Suction Drain 03/03/20 1601 Left;Inferior Back Bulb 15 Fr. (Active)   Site Description Unable to view 3/8/2020  8:00 AM   Dressing Type Transparent (Tegaderm) 3/8/2020  8:00 AM   Dressing Status Clean;Dry;Intact 3/8/2020  8:00 AM   Dressing Intervention Integrity maintained 3/8/2020  8:00 AM   Drainage Sanguineous 3/8/2020  8:00 AM   Status To bulb suction 3/8/2020  8:00 AM   Output (mL) 80 mL 3/7/2020  2:19 AM       Neurosurgery Physical Exam  General: well developed, well nourished, no distress.   Head: normocephalic, atraumatic  Neurologic: Alert and oriented. Thought content appropriate.  GCS: Motor: 6/Verbal: 5/Eyes: 4 GCS Total: 15  Mental Status: Awake, Alert, Oriented x 4  Language: No aphasia  Speech: No dysarthria  Cranial nerves: face symmetric, tongue midline, CN II-XII grossly intact.   Eyes: pupils equal, round, reactive to light with accomodation, EOMI.   Pulmonary: normal respirations, no signs of respiratory distress  Abdomen: soft, non-distended, not tender to palpation  Sensory: intact to light touch throughout BUE, RLE, and proximal LLE. Decreased to light touch throughout  distal LLE.     Motor Strength: Moves all extremities spontaneously with good tone. No abnormal movements seen. BLE pain limited.      Strength   Deltoids Triceps Biceps Wrist Extension Wrist Flexion Hand    Upper: R 5/5 5/5 5/5 5/5 5/5 5/5     L 5/5 5/5 5/5 5/5 5/5 5/5       Iliopsoas Quadriceps Knee  Flexion Tibialis  anterior Gastro- cnemius EHL   Lower: R 4+/5 5-/5 5/5 5/5 5/5 5/5     L 4+/5 4+/5   5-/5 5/5 5/5 5/5      Harris: absent  Clonus: absent  Skin: Skin is warm, dry and intact.       Incision:  Clean, dry, Prevena incisional vac in place with good suction. No evidence of active drainage. 2 YNR drains in place.      Significant Labs:  Recent Labs   Lab 03/08/20 0314 03/09/20  0539    100    137   K 3.4* 3.8    101   CO2 29 30*   BUN 12 12   CREATININE 0.9 0.8   CALCIUM 8.0* 8.5*   MG 2.0 1.9     Recent Labs   Lab 03/08/20  0314 03/09/20  0539   WBC 7.66 7.74   HGB 8.4* 9.0*   HCT 26.8* 29.3*    193

## 2020-03-09 NOTE — PT/OT/SLP PROGRESS
Occupational Therapy   Treatment    Name: Garcia Renteria  MRN: [id number]  Admitting Diagnosis:  Acquired spondylolisthesis of thoracolumbar vertebra  6 Days Post-Op  T10-Pelvis Fusion with L4 PSO **AIRO** Co-Surgeon: Roberto Parkinson (N/A)  CREATION, FLAP, MUSCLE ROTATION     Recommendations:     Discharge Recommendations: rehabilitation facility  Discharge Equipment Recommendations:  (TBD)  Barriers to discharge: pain; increased assistance needed    Assessment:     Garcia Renteria is a 70 y.o. male with a medical diagnosis of Acquired spondylolisthesis of thoracolumbar vertebra.  He presents with performance deficits including weakness, impaired self care skills, impaired functional mobilty, impaired balance, decreased lower extremity function, pain, orthopedic precautions. Pt continues to be limited by pain date which worsens with attempting OOB activity and pt only able to tolerate sitting EOB briefly. Pt would continue to benefit from OT to maximize functional independence and safety. Recommend rehab upon D/C pending progress as pt is unsafe to return home at current functional level.     Rehab Prognosis: Good; patient would benefit from acute skilled OT services to address these deficits and reach maximum level of function.       Plan:     Patient to be seen 4 x/week to address the above listed problems via self-care/home management, therapeutic activities, therapeutic exercises, neuromuscular re-education  · Plan of Care Expires: 04/04/20  · Plan of Care Reviewed with: patient, spouse    Subjective     Pain/Comfort:  · Pain Rating 1: (Did not rate)  · Location 1: (R anterior thigh/groin, L hip/groin)  · Pain Addressed 1: Pre-medicate for activity, Reposition, Cessation of Activity, Nurse notified    Objective:     Communicated with: RN prior to session. Patient found right sidelying with wound vac, peripheral IV, YRN drain upon OT entry to room, spouse present.    General Precautions: Standard, fall   Orthopedic  Precautions:spinal precautions   Braces: TLSO     Occupational Performance:     Bed Mobility:    · Patient completed R sidelying to Sit with maximal assistance and 2 persons due to pain  · Patient completed Sit to R sidelying with maximal assistance and 2 persons  · Scooting to HOB with Max A x 2      Functional Mobility/Transfers:  · Unable to tolerate attempting due to L hip and R thigh pain sitting EOB, requesting to return to sidelying     Activities of Daily Living:  · Upper Body Dressing: total assistance    · Lower Body Dressing: total assistance to don socks        [facility] 6 Click ADL: 11     Treatment & Education:  Reviewed spinal precautions and log rolling technique prior to OOB activity; pt discussed possibly having nerve block procedure today, but wanted to attempt OOB activity-- tolerated sitting EOB ~3 min this date before requesting to return to sidelying due to intolerable pain; required Max A for balance due to pushing posterior to off load putting weight on L hip while sitting EOB due to pain--unable to tolerate sitting long enough to don TLSO brace; returned to R sidelying position and assisted with obtaining comfortable position; pt discouraged and emotional at end of session due to pain and lack of progress-- RN notified    Patient left right sidelying with all lines intact, call button in reach, RN notified and spouse presentEducation:      GOALS:   Multidisciplinary Problems     Occupational Therapy Goals        Problem: Occupational Therapy Goal    Goal Priority Disciplines Outcome Interventions   Occupational Therapy Goal     OT, PT/OT Ongoing, Progressing    Description:  Goals to be met by: 7 days (3/11/20)     Patient will increase functional independence with ADLs by performing:    UE Dressing with Minimal Assistance including TLSO.  LE Dressing with Moderate Assistance using AD as needed.  Grooming while standing with Contact Guard Assistance.  Toileting from bedside commode with Minimal  Assistance for hygiene and clothing management.   Supine to sit with Minimal Assistance.  Toilet transfer to bedside commode with Minimal Assistance.  Pt will verbalize understanding of 3/3 spinal precautions without added cues.                      Time Tracking:     OT Date of Treatment: 03/09/20  OT Start Time: 1043  OT Stop Time: 1055  OT Total Time (min): 12 min    Billable Minutes:Therapeutic Activity 12 minutes    MELIDA Amaro  3/9/2020

## 2020-03-09 NOTE — ASSESSMENT & PLAN NOTE
- 3/3 s/p T10-pelvis instrumented fusion, T10- L2 laminectomy, L3 PSO, and complex wound closure by plastic surgery.   - Plastic surgery recommending head of bed flat at all times except meals 30 mis 3x/day x 6 weeks.    - Per NSGY TLSO when upright/OOB.  - Nerve block pending

## 2020-03-10 ENCOUNTER — ANESTHESIA (OUTPATIENT)
Dept: NEUROSURGERY | Facility: HOSPITAL | Age: 70
DRG: 457 | End: 2020-03-10
Payer: MEDICARE

## 2020-03-10 ENCOUNTER — ANESTHESIA EVENT (OUTPATIENT)
Dept: NEUROSURGERY | Facility: HOSPITAL | Age: 70
DRG: 457 | End: 2020-03-10
Payer: MEDICARE

## 2020-03-10 LAB
POCT GLUCOSE: 105 MG/DL (ref 70–110)
POCT GLUCOSE: 113 MG/DL (ref 70–110)
POCT GLUCOSE: 125 MG/DL (ref 70–110)
POCT GLUCOSE: 84 MG/DL (ref 70–110)

## 2020-03-10 PROCEDURE — 99024 PR POST-OP FOLLOW-UP VISIT: ICD-10-PCS | Mod: POP,,, | Performed by: PHYSICIAN ASSISTANT

## 2020-03-10 PROCEDURE — 63600175 PHARM REV CODE 636 W HCPCS: Performed by: PHYSICIAN ASSISTANT

## 2020-03-10 PROCEDURE — 25000003 PHARM REV CODE 250: Performed by: PHYSICIAN ASSISTANT

## 2020-03-10 PROCEDURE — 25000003 PHARM REV CODE 250: Performed by: ANESTHESIOLOGY

## 2020-03-10 PROCEDURE — 64450: ICD-10-PCS | Mod: 59,50,, | Performed by: ANESTHESIOLOGY

## 2020-03-10 PROCEDURE — 99231 PR SUBSEQUENT HOSPITAL CARE,LEVL I: ICD-10-PCS | Mod: ,,, | Performed by: ANESTHESIOLOGY

## 2020-03-10 PROCEDURE — 63600175 PHARM REV CODE 636 W HCPCS: Performed by: STUDENT IN AN ORGANIZED HEALTH CARE EDUCATION/TRAINING PROGRAM

## 2020-03-10 PROCEDURE — 64450 NJX AA&/STRD OTHER PN/BRANCH: CPT | Mod: 59,50,, | Performed by: ANESTHESIOLOGY

## 2020-03-10 PROCEDURE — 64447 NJX AA&/STRD FEMORAL NRV IMG: CPT | Performed by: ANESTHESIOLOGY

## 2020-03-10 PROCEDURE — 82962 GLUCOSE BLOOD TEST: CPT

## 2020-03-10 PROCEDURE — 11000001 HC ACUTE MED/SURG PRIVATE ROOM

## 2020-03-10 PROCEDURE — 99231 SBSQ HOSP IP/OBS SF/LOW 25: CPT | Mod: ,,, | Performed by: ANESTHESIOLOGY

## 2020-03-10 PROCEDURE — 99024 POSTOP FOLLOW-UP VISIT: CPT | Mod: POP,,, | Performed by: PHYSICIAN ASSISTANT

## 2020-03-10 PROCEDURE — 25000003 PHARM REV CODE 250: Performed by: STUDENT IN AN ORGANIZED HEALTH CARE EDUCATION/TRAINING PROGRAM

## 2020-03-10 PROCEDURE — 97112 NEUROMUSCULAR REEDUCATION: CPT

## 2020-03-10 PROCEDURE — 97110 THERAPEUTIC EXERCISES: CPT

## 2020-03-10 RX ORDER — PREGABALIN 150 MG/1
150 CAPSULE ORAL 3 TIMES DAILY
Status: DISCONTINUED | OUTPATIENT
Start: 2020-03-10 | End: 2020-03-24 | Stop reason: HOSPADM

## 2020-03-10 RX ORDER — MIDAZOLAM HYDROCHLORIDE 1 MG/ML
0.5 INJECTION INTRAMUSCULAR; INTRAVENOUS
Status: DISCONTINUED | OUTPATIENT
Start: 2020-03-10 | End: 2020-03-23

## 2020-03-10 RX ORDER — BUPIVACAINE HYDROCHLORIDE AND EPINEPHRINE 2.5; 5 MG/ML; UG/ML
INJECTION, SOLUTION EPIDURAL; INFILTRATION; INTRACAUDAL; PERINEURAL
Status: DISCONTINUED | OUTPATIENT
Start: 2020-03-10 | End: 2020-03-10 | Stop reason: HOSPADM

## 2020-03-10 RX ORDER — METHOCARBAMOL 750 MG/1
750 TABLET, FILM COATED ORAL 3 TIMES DAILY
Status: DISCONTINUED | OUTPATIENT
Start: 2020-03-10 | End: 2020-03-24 | Stop reason: HOSPADM

## 2020-03-10 RX ORDER — FENTANYL CITRATE 50 UG/ML
25 INJECTION, SOLUTION INTRAMUSCULAR; INTRAVENOUS EVERY 5 MIN PRN
Status: DISCONTINUED | OUTPATIENT
Start: 2020-03-10 | End: 2020-03-23

## 2020-03-10 RX ADMIN — CEFAZOLIN 2 G: 1 INJECTION, POWDER, FOR SOLUTION INTRAMUSCULAR; INTRAVENOUS at 03:03

## 2020-03-10 RX ADMIN — OXYCODONE HYDROCHLORIDE 15 MG: 10 TABLET ORAL at 11:03

## 2020-03-10 RX ADMIN — LIDOCAINE 2 PATCH: 50 PATCH TOPICAL at 02:03

## 2020-03-10 RX ADMIN — BUPIVACAINE HYDROCHLORIDE AND EPINEPHRINE BITARTRATE 15 ML: 2.5; .0091 INJECTION, SOLUTION EPIDURAL; INFILTRATION; INTRACAUDAL; PERINEURAL at 07:03

## 2020-03-10 RX ADMIN — BISACODYL 10 MG: 10 SUPPOSITORY RECTAL at 04:03

## 2020-03-10 RX ADMIN — OXYCODONE HYDROCHLORIDE 15 MG: 10 TABLET ORAL at 04:03

## 2020-03-10 RX ADMIN — METHOCARBAMOL TABLETS 500 MG: 500 TABLET, COATED ORAL at 09:03

## 2020-03-10 RX ADMIN — ACETAMINOPHEN 1000 MG: 500 TABLET ORAL at 10:03

## 2020-03-10 RX ADMIN — CEFAZOLIN 2 G: 1 INJECTION, POWDER, FOR SOLUTION INTRAMUSCULAR; INTRAVENOUS at 12:03

## 2020-03-10 RX ADMIN — FENTANYL CITRATE 50 MCG: 50 INJECTION INTRAMUSCULAR; INTRAVENOUS at 07:03

## 2020-03-10 RX ADMIN — PREGABALIN 75 MG: 75 CAPSULE ORAL at 09:03

## 2020-03-10 RX ADMIN — OXYCODONE HYDROCHLORIDE 15 MG: 10 TABLET ORAL at 05:03

## 2020-03-10 RX ADMIN — ACETAMINOPHEN 1000 MG: 500 TABLET ORAL at 02:03

## 2020-03-10 RX ADMIN — MIDAZOLAM HYDROCHLORIDE 1 MG: 1 INJECTION, SOLUTION INTRAMUSCULAR; INTRAVENOUS at 07:03

## 2020-03-10 RX ADMIN — SENNOSIDES AND DOCUSATE SODIUM 1 TABLET: 8.6; 5 TABLET ORAL at 10:03

## 2020-03-10 RX ADMIN — FAMOTIDINE 40 MG: 20 TABLET, FILM COATED ORAL at 10:03

## 2020-03-10 RX ADMIN — ACETAMINOPHEN 1000 MG: 500 TABLET ORAL at 05:03

## 2020-03-10 RX ADMIN — SENNOSIDES AND DOCUSATE SODIUM 1 TABLET: 8.6; 5 TABLET ORAL at 09:03

## 2020-03-10 RX ADMIN — METHOCARBAMOL TABLETS 750 MG: 750 TABLET, COATED ORAL at 10:03

## 2020-03-10 RX ADMIN — ALUMINUM HYDROXIDE, MAGNESIUM HYDROXIDE, AND SIMETHICONE 30 ML: 200; 200; 20 SUSPENSION ORAL at 04:03

## 2020-03-10 RX ADMIN — HEPARIN SODIUM 5000 UNITS: 5000 INJECTION, SOLUTION INTRAVENOUS; SUBCUTANEOUS at 01:03

## 2020-03-10 RX ADMIN — HYDROMORPHONE HYDROCHLORIDE 0.5 MG: 1 INJECTION, SOLUTION INTRAMUSCULAR; INTRAVENOUS; SUBCUTANEOUS at 12:03

## 2020-03-10 RX ADMIN — OXYCODONE HYDROCHLORIDE 15 MG: 10 TABLET ORAL at 10:03

## 2020-03-10 RX ADMIN — HEPARIN SODIUM 5000 UNITS: 5000 INJECTION, SOLUTION INTRAVENOUS; SUBCUTANEOUS at 10:03

## 2020-03-10 RX ADMIN — PREGABALIN 150 MG: 150 CAPSULE ORAL at 10:03

## 2020-03-10 RX ADMIN — LISINOPRIL 5 MG: 5 TABLET ORAL at 09:03

## 2020-03-10 RX ADMIN — FERROUS SULFATE TAB EC 325 MG (65 MG FE EQUIVALENT) 325 MG: 325 (65 FE) TABLET DELAYED RESPONSE at 10:03

## 2020-03-10 RX ADMIN — PREGABALIN 150 MG: 150 CAPSULE ORAL at 02:03

## 2020-03-10 RX ADMIN — HEPARIN SODIUM 5000 UNITS: 5000 INJECTION, SOLUTION INTRAVENOUS; SUBCUTANEOUS at 05:03

## 2020-03-10 RX ADMIN — FERROUS SULFATE TAB EC 325 MG (65 MG FE EQUIVALENT) 325 MG: 325 (65 FE) TABLET DELAYED RESPONSE at 09:03

## 2020-03-10 RX ADMIN — CEFAZOLIN 2 G: 1 INJECTION, POWDER, FOR SOLUTION INTRAMUSCULAR; INTRAVENOUS at 09:03

## 2020-03-10 RX ADMIN — METHOCARBAMOL TABLETS 750 MG: 750 TABLET, COATED ORAL at 02:03

## 2020-03-10 NOTE — ANESTHESIA POST-OP PAIN MANAGEMENT
Acute Pain Service Progress Note    Garcia Renteria is a 70 y.o., male, [id number].    Pt reports chronic pain from prior to surgery. Pain has continued post-op. Pt now s/p T10-pelvis fusion and flap creation and is POD 6.  Pain starts midline in his back and wraps around bilaterally to his thigh and groin.  Pain becomes severe when pt bears any weight or sits on side of bed or stands. Pt describes the pain as sharp and shooting.     Surgery:  T10-Pelvis Fusion with L4 PSO **AIRO**   CREATION, FLAP, MUSCLE ROTATION     Post Op Day #: 7    Problem List:    There are no hospital problems to display for this patient.      Subjective:     General appearance of alert, oriented, no complaints   Pain with rest: 7    Numbers   Pain with movement: 8    Numbers   Side Effects    1. Pruritis No    2. Nausea No    3. Motor Blockade Yes, 0=Ability to raise lower extremities off bed    4. Sedation No, 1=awake and alert    Objective:  Vitals:    03/10/20 0845   BP: (!) 96/54   Pulse: 75   Resp: 13   Temp:         Labs    No results displayed because visit has over 200 results.           Meds   No current facility-administered medications for this visit.      No current outpatient medications on file.     Facility-Administered Medications Ordered in Other Visits   Medication Dose Route Frequency Provider Last Rate Last Dose    acetaminophen tablet 1,000 mg  1,000 mg Oral Q8H Chance Angulo MD   1,000 mg at 03/10/20 0558    aluminum-magnesium hydroxide-simethicone 200-200-20 mg/5 mL suspension 30 mL  30 mL Oral Q4H PRN Mulugeta Pierson MD   30 mL at 03/07/20 2058    bisacodyL suppository 10 mg  10 mg Rectal Daily PRN SANDY Moreno        ceFAZolin injection 2 g  2 g Intravenous Q8H SANDY Moreno   Stopped at 03/10/20 0830    dextrose 10% (D10W) Bolus  12.5 g Intravenous PRN Deja Hawkins NP        dextrose 10% (D10W) Bolus  25 g Intravenous PRN Deja Hawkins NP        famotidine tablet 40 mg  40 mg Oral  QHS Linh Roach PA-C   40 mg at 03/09/20 2104    fentaNYL injection 25 mcg  25 mcg Intravenous Q5 Min PRN Ace Paula MD   50 mcg at 03/10/20 0743    ferrous sulfate EC tablet 325 mg  325 mg Oral BID Alva Cuevas PA-C   325 mg at 03/09/20 2104    glucagon (human recombinant) injection 1 mg  1 mg Intramuscular PRN Deja Hawkins NP        glucose chewable tablet 16 g  16 g Oral PRN Deja Hawkins, NP        glucose chewable tablet 24 g  24 g Oral PRN Deja Hawkins NP        heparin (porcine) injection 5,000 Units  5,000 Units Subcutaneous Q8H SANDY Moreno   5,000 Units at 03/10/20 0558    HYDROmorphone injection 0.5 mg  0.5 mg Intravenous Q6H PRN Linh Roach PA-C   0.5 mg at 03/10/20 0031    insulin aspart U-100 pen 1-10 Units  1-10 Units Subcutaneous QID (AC + HS) PRN Deja Hawkins NP        labetalol 20 mg/4 mL (5 mg/mL) IV syring  10 mg Intravenous Q4H PRN Linh Roach PA-C        lidocaine 5 % patch 2 patch  2 patch Transdermal Q24H Chance Angulo MD   2 patch at 03/09/20 1302    lisinopril tablet 5 mg  5 mg Oral Daily Allyson Jackson PA-C   5 mg at 03/09/20 0842    methocarbamol tablet 500 mg  500 mg Oral TID Chance Angulo MD   500 mg at 03/09/20 2104    midazolam (VERSED) 1 mg/mL injection 0.5 mg  0.5 mg Intravenous PRN Ace Paula MD   1 mg at 03/10/20 0743    ondansetron injection 4 mg  4 mg Intravenous Q8H PRN Linh Roach PA-C        oxyCODONE immediate release tablet 15 mg  15 mg Oral Q4H PRN SANDY Moreno   15 mg at 03/10/20 0430    oxyCODONE immediate release tablet Tab 10 mg  10 mg Oral Q4H PRN SANDY Mroeno        pregabalin capsule 75 mg  75 mg Oral TID SANDY Moreno   75 mg at 03/09/20 2105    promethazine (PHENERGAN) 6.25 mg in dextrose 5 % 50 mL IVPB  6.25 mg Intravenous Q6H PRN MD Gregoria Knox-docusate 8.6-50 mg per tablet 1 tablet  1 tablet Oral BID Linh Roach PA-C    1 tablet at 03/09/20 6010    sodium chloride 0.9% flush 10 mL  10 mL Intravenous PRN Linh Roach PA-C           Assessment:      Pt reports continued pain with movement. However, pt was able to sit up on the side of the bed this morning with minimal pain. He reports he has not been able to do that. Pt is happy that muscle relaxants were started.     Plan:     - Pt to get lateral femoral cutaneous nerve blocks this AM   - continue PRN Oxy and Morphine for breakthrough   - continued scheduled tylenol 1000 mg Q8   - continue scheduled robaxin 500 mg TID. Will consider increasing dose.    - continue lidocaine patch     Evaluator Chance Angulo

## 2020-03-10 NOTE — PLAN OF CARE
Problem: Diabetes Comorbidity  Goal: Blood Glucose Level Within Desired Range  Outcome: Ongoing, Progressing     Problem: Adult Inpatient Plan of Care  Goal: Plan of Care Review  Outcome: Ongoing, Progressing     Problem: Fall Injury Risk  Goal: Absence of Fall and Fall-Related Injury  Outcome: Ongoing, Progressing     Patient is AAO x4. POC reviewed with patient and spouse. Patient verbalized understanding. Patient's breathing is unlabored with equal chest expansion. Patient has an incision to back with transparent film. Patient also has a wound vac in place with suction at 125.  Patient complains of numbness and tingling in lower extremities. Patient complained of pain;PRN meds given. Patient voids per urinal. Bed in lowest position,bed alarm on, side rails up x3, no complaints or signs of distress. WCTM.  Patient became NPO at midnight except with meds.

## 2020-03-10 NOTE — PT/OT/SLP PROGRESS
Physical Therapy Treatment    Patient Name:  Garcia Renteria   MRN:  [id number]    Recommendations:     Discharge Recommendations:  rehabilitation facility   Discharge Equipment Recommendations: (TBD by next level of care)   Barriers to discharge: Inaccessible home and Decreased caregiver support    Assessment:     Garcia Renteria is a 70 y.o. male admitted with a medical diagnosis of Acquired spondylolisthesis of thoracolumbar vertebra.  He presents with the following impairments/functional limitations:  weakness, impaired endurance, impaired self care skills, impaired functional mobilty, gait instability, impaired balance, decreased lower extremity function, pain. Pt tolerated session well. Pt was EOB ~20 mins and performed 4 standing trials. Pt required max A x 2 and was unable to come to full upright 2/2 BLE weakness. Pt progressed to EOB sitting balance, Mitch with BUE support.    Pt would benefit from intensive collaborative rehabilitation for: Dynamic/static standing/sitting balance through skilled balance training, strengthening with the use of skilled therapeutic exercises interventions, mobility and safety training to ensure safe discharge home through skilled patient and caregiver education home management training, mobility through community re-integration training and mobility through adaptive equipment training. Pt highly motivated to return to PLOF and can tolerate 3+hours of therapy. Pt continues to benefit from a collaborative PT/OT program to improve quality of life and focus on recovery of impairments.      Rehab Prognosis: Good; patient would benefit from acute skilled PT services to address these deficits and reach maximum level of function.    Recent Surgery: Procedure(s) (LRB):  Block, Nerve (Bilateral) Day of Surgery    Plan:     During this hospitalization, patient to be seen 4 x/week to address the identified rehab impairments via gait training, therapeutic activities, therapeutic  "exercises, neuromuscular re-education and progress toward the following goals:    · Plan of Care Expires:  04/03/20    Subjective     Chief Complaint: P in R thigh and L lower back  Patient/Family Comments/goals: "I have to get back to walking"   · pt very highly motivated to regain functional mobility  Pain/Comfort:  · Pain Rating 1: 6/10  · Location - Side 1: Left  · Location 1: (R anterior thigh pain and L low back pain)  · Pain Addressed 1: Pre-medicate for activity, Reposition, Distraction, Cessation of Activity  · Pain Rating Post-Intervention 1: 8/10      Objective:     Communicated with nursing prior to session.  Patient found supine with HOB slightly elevated with wound vac, RYN drain, peripheral IV upon PT entry to room.     General Precautions: Standard, fall   Orthopedic Precautions:spinal precautions   Braces: TLSO     Functional Mobility:  · Bed Mobility:     · Rolling Left:  minimum assistance  · Scooting: minimum assistance  · Supine to Sit: maximal assistance and of 2 persons  · Sit to Supine: maximal assistance and of 2 persons  · Transfers:     · Sit to Stand:  maximal assistance and of 2 persons with no AD and HHA, x4 trials, unable to come to full upright, ~10 seconds each  · Gait: N/T  · Balance:   · Static Sitting: Mitch  · Dynamic Sitting: Mitch to modA with BLE there-ex  · Static Standing: N/T  · Dynamic Standing: N/T    MMT:   · hip flexion 2/5 noted on BLE seated EOB  · Knee extension 2/5 noted on BLE seated EOB      AM-PAC 6 CLICK MOBILITY  Turning over in bed (including adjusting bedclothes, sheets and blankets)?: 3  Sitting down on and standing up from a chair with arms (e.g., wheelchair, bedside commode, etc.): 1  Moving from lying on back to sitting on the side of the bed?: 1  Moving to and from a bed to a chair (including a wheelchair)?: 1  Need to walk in hospital room?: 1  Climbing 3-5 steps with a railing?: 1  Basic Mobility Total Score: 8       Therapeutic Activities and " Exercises:  Pt educated on donning and doffing TLSO brace. Pt required total A for donning TLSO and maxA for doffing TLSO.  Patient also educated and instructed on therapeutic exercises. Therapeutic exercises included the following exercises: single leg marches, Long Arc Quads, Bilateral Heel raises, bilateral toe raises. Pt performed 2 x 10 of each lower extremity exercise.   Patient educated on role of therapy, goals of session, and benefits of mobilizing. Discussed PT plan of care during hospitalization. Patient educated on calling for assistance. Patient educated on how their diagnosis impacts their mobility within PT scope of practice.   Communication board updated.  All questions answered within PT scope of practice.    Patient left right sidelying with all lines intact, call button in reach, bed alarm on and spouse present.    GOALS:   Multidisciplinary Problems     Physical Therapy Goals        Problem: Physical Therapy Goal    Goal Priority Disciplines Outcome Goal Variances Interventions   Physical Therapy Goal     PT, PT/OT Ongoing, Progressing     Description:  Goals to be met by: 3/14/2020     Patient will increase functional independence with mobility by performin. Supine to sit with Moderate Assistance  2. Sit to supine with Moderate Assistance  3. Rolling to Left and Right with Moderate Assistance.  4. Sit to stand transfer with Maximum Assistance  5. Bed to chair transfer with Maximum Assistance  6. Gait  x 15 feet with Maximum Assistance using RW.   7. Lower extremity exercise program x15 reps per handout, with assistance as needed                      Time Tracking:     PT Received On: 03/10/20  PT Start Time: 48     PT Stop Time: 1015  PT Total Time (min): 27 min     Billable Minutes: Therapeutic Exercise 10 and Neuromuscular Re-education 17    Treatment Type: Treatment  PT/PTA: PT           Katy Deutsch, PT  03/10/2020

## 2020-03-10 NOTE — SUBJECTIVE & OBJECTIVE
Interval History: NAEON. Nerve block this morning. Patient reports some improvement in pain since medication adjusted by Anesthesiology. No distress noted while lying in bed, although his pain is exacerbated with movement. Able to tolerate sitting edge of bed for a longer period of time and able to bear some weight to stand with therapy today. Denies worsening weakness, numbness, paresthesias from the day prior. Voiding spontaneously. BM several days ago and asking for fleet enema, he states he uses an enema or suppository 1-2x per week at home.     Medications:  Continuous Infusions:  Scheduled Meds:   acetaminophen  1,000 mg Oral Q8H    ceFAZolin (ANCEF) IVPB  2 g Intravenous Q8H    famotidine  40 mg Oral QHS    ferrous sulfate  325 mg Oral BID    heparin (porcine)  5,000 Units Subcutaneous Q8H    lidocaine  2 patch Transdermal Q24H    lisinopriL  5 mg Oral Daily    methocarbamoL  750 mg Oral TID    pregabalin  150 mg Oral TID    senna-docusate 8.6-50 mg  1 tablet Oral BID     PRN Meds:aluminum-magnesium hydroxide-simethicone, bisacodyL, Dextrose 10% Bolus, Dextrose 10% Bolus, fentaNYL, glucagon (human recombinant), glucose, glucose, HYDROmorphone, insulin aspart U-100, labetalol, midazolam, ondansetron, oxyCODONE, oxyCODONE, promethazine (PHENERGAN) IVPB, sodium chloride 0.9%       Objective:     Weight: 98.4 kg (217 lb)  Body mass index is 28.63 kg/m².  Vital Signs (Most Recent):  Temp: 97.9 °F (36.6 °C) (03/10/20 1034)  Pulse: 93 (03/10/20 1143)  Resp: 16 (03/10/20 1034)  BP: 110/70 (03/10/20 1034)  SpO2: 95 % (03/10/20 1034) Vital Signs (24h Range):  Temp:  [97.8 °F (36.6 °C)-100 °F (37.8 °C)] 97.9 °F (36.6 °C)  Pulse:  [72-93] 93  Resp:  [11-19] 16  SpO2:  [93 %-100 %] 95 %  BP: ()/(54-70) 110/70     Date 03/10/20 0700 - 03/11/20 0659   Shift 1737-6593 4692-8814 3073-4382 24 Hour Total   INTAKE   P.O. 480   480   Shift Total(mL/kg) 480(4.9)   480(4.9)   OUTPUT   Urine(mL/kg/hr) 750   750    Drains 30   30   Shift Total(mL/kg) 780(7.9)   780(7.9)   Weight (kg) 98.4 98.4 98.4 98.4                        Closed/Suction Drain 03/03/20 1559 Left;Superior Back Accordion 10 Fr. (Active)   Site Description Unable to view 3/10/2020  9:00 AM   Dressing Type Transparent (Tegaderm) 3/10/2020  9:00 AM   Dressing Status Clean;Dry;Intact 3/10/2020  9:00 AM   Dressing Intervention Integrity maintained 3/10/2020  9:00 AM   Drainage Serosanguineous 3/10/2020  9:00 AM   Status To bulb suction 3/10/2020  9:00 AM   Output (mL) 5 mL 3/10/2020 12:00 PM            Closed/Suction Drain 03/03/20 1600 Right;Superior Back Accordion 10 Fr. (Active)   Site Description Unable to view 3/10/2020  9:00 AM   Dressing Type Transparent (Tegaderm) 3/10/2020  9:00 AM   Dressing Status Clean;Dry;Intact 3/10/2020  9:00 AM   Dressing Intervention Integrity maintained 3/10/2020  9:00 AM   Drainage Serosanguineous 3/10/2020  9:00 AM   Status To bulb suction 3/10/2020  9:00 AM   Output (mL) 5 mL 3/10/2020 12:00 PM            Closed/Suction Drain 03/03/20 1601 Left;Inferior Back Bulb 15 Fr. (Active)   Site Description Unable to view 3/8/2020  8:00 AM   Dressing Type Transparent (Tegaderm);Gauze 3/9/2020  8:00 AM   Dressing Status Clean;Dry;Intact 3/9/2020  8:00 AM   Dressing Intervention Integrity maintained 3/8/2020  8:00 AM   Drainage Sanguineous 3/8/2020  8:00 AM   Status To bulb suction 3/8/2020  8:00 AM   Output (mL) 80 mL 3/7/2020  2:19 AM       Neurosurgery Physical Exam    General: well developed, well nourished, no distress.   Head: normocephalic, atraumatic  Neck: No tracheal deviation. No palpable masses.   Neurologic: Alert and oriented. Thought content appropriate.  GCS: Motor: 6/Verbal: 5/Eyes: 4 GCS Total: 15  Mental Status: Awake, Alert, Oriented x 4  Language: No aphasia  Speech: No dysarthria  Cranial nerves: face symmetric, tongue midline, CN II-XII grossly intact.   Eyes: pupils equal, round, reactive to light with  accomodation, EOMI.   Ears: No drainage.   Pulmonary: normal respirations, no signs of respiratory distress  Abdomen: soft, distended, not tender to palpation  Sensory: intact to light touch throughout  Motor Strength: Moves all extremities spontaneously with good tone.  Pain limited weakness in proximal RLE. Otherwise full strength upper and lower extremities. No abnormal movements seen.     Strength  Deltoids Triceps Biceps Wrist Extension Wrist Flexion Hand    Upper: R 5/5 5/5 5/5 5/5 5/5 5/5    L 5/5 5/5 5/5 5/5 5/5 5/5     Iliopsoas Quadriceps Knee  Flexion Tibialis  anterior Gastro- cnemius EHL   Lower: R 4+/5 4/5 4+/5 5/5 5/5 5/5    L 5/5 5/5 5/5 5/5 5/5 5/5     Harris: absent  Clonus: absent  Babinski: absent  Vascular: Pulses 2+ and symmetric radial and dorsalis pedis. No LE edema.   Skin: Skin is warm, dry and intact.    Incisional WV in place, 2 YRN drains in place with serosanguinous drainage.      Significant Labs:  Recent Labs   Lab 03/09/20  0539         K 3.8      CO2 30*   BUN 12   CREATININE 0.8   CALCIUM 8.5*   MG 1.9     Recent Labs   Lab 03/09/20  0539   WBC 7.74   HGB 9.0*   HCT 29.3*        No results for input(s): LABPT, INR, APTT in the last 48 hours.  Microbiology Results (last 7 days)     ** No results found for the last 168 hours. **        Recent Lab Results       03/10/20  0728   03/09/20  2213   03/09/20  1958   03/09/20  1633        POCT Glucose 113 122 242 256         All pertinent labs from the last 24 hours have been reviewed.    Significant Diagnostics:  I have reviewed all pertinent imaging results/findings within the past 24 hours.

## 2020-03-10 NOTE — ANESTHESIA PROCEDURE NOTES
Bilateral Lateral Femoral Cutaneous Nerve Block Single Shot    Patient location during procedure: holding area   Block not for primary anesthetic.  Reason for block: at surgeon's request and post-op pain management   Post-op Pain Location: bilateral hip pain  Start time: 3/10/2020 7:52 AM  Timeout: 3/10/2020 7:52 AM   End time: 3/10/2020 7:58 AM    Staffing  Authorizing Provider: Rafael Coe MD  Performing Provider: Rafael Coe MD    Preanesthetic Checklist  Completed: patient identified, site marked, surgical consent, pre-op evaluation, timeout performed, IV checked, risks and benefits discussed and monitors and equipment checked  Peripheral Block  Patient position: supine  Prep: ChloraPrep  Patient monitoring: heart rate, continuous pulse ox, frequent blood pressure checks, continuous capnometry and cardiac monitor  Block type: lateral femoral cutaneous  Laterality: bilateral  Injection technique: single shot  Needle  Needle type: Stimuplex   Needle gauge: 21 G  Needle length: 4 in  Needle localization: anatomical landmarks     Assessment  Injection assessment: negative aspiration, negative parasthesia and local visualized surrounding nerve  Paresthesia pain: none  Heart rate change: no  Slow fractionated injection: yes

## 2020-03-10 NOTE — PROGRESS NOTES
[facility]  Neurosurgery  Progress Note    Subjective:     History of Present Illness: This is a 70-year-old man with a history of a non instrumented lumbar fusion many years ago who presents with severe progressive axial low back pain, bilateral radicular leg pain, and thoracic myelopathy.  Additionally he had severe postural deformity that was functionally disabling.  He failed multiple conservative measures.  Imaging showed severe central stenosis with spinal cord compression from T10-L2, solid fusion from his previous surgery from L3 to the sacrum, and flat back deformity with severe fixed sagittal plane deformity. Recommended patient undergo spinal cord decompression and deformity reduction surgery, via a T10 to pelvis instrumented fusion with an L3 PSO and a multilevel laminectomy for spinal cord decompression.  Risks benefits alternatives indications and methods were reviewed in detail and he wished to proceed.  All questions were answered.  No guarantees about the results the procedure. Presents to [facility] for T10-pelvis and L3 PSO.    Post-Op Info:  Procedure(s) (LRB):  Block, Nerve (Bilateral)   Day of Surgery     Interval History: NAEON. Nerve block this morning. Patient reports some improvement in pain since medication adjusted by Anesthesiology. No distress noted while lying in bed, although his pain is exacerbated with movement. Able to tolerate sitting edge of bed for a longer period of time and able to bear some weight to stand with therapy today. Denies worsening weakness, numbness, paresthesias from the day prior. Voiding spontaneously. BM several days ago and asking for fleet enema, he states he uses an enema or suppository 1-2x per week at home.     Medications:  Continuous Infusions:  Scheduled Meds:   acetaminophen  1,000 mg Oral Q8H    ceFAZolin (ANCEF) IVPB  2 g Intravenous Q8H    famotidine  40 mg Oral QHS    ferrous sulfate  325 mg Oral BID    heparin (porcine)  5,000  Units Subcutaneous Q8H    lidocaine  2 patch Transdermal Q24H    lisinopriL  5 mg Oral Daily    methocarbamoL  750 mg Oral TID    pregabalin  150 mg Oral TID    senna-docusate 8.6-50 mg  1 tablet Oral BID     PRN Meds:aluminum-magnesium hydroxide-simethicone, bisacodyL, Dextrose 10% Bolus, Dextrose 10% Bolus, fentaNYL, glucagon (human recombinant), glucose, glucose, HYDROmorphone, insulin aspart U-100, labetalol, midazolam, ondansetron, oxyCODONE, oxyCODONE, promethazine (PHENERGAN) IVPB, sodium chloride 0.9%       Objective:     Weight: 98.4 kg (217 lb)  Body mass index is 28.63 kg/m².  Vital Signs (Most Recent):  Temp: 97.9 °F (36.6 °C) (03/10/20 1034)  Pulse: 93 (03/10/20 1143)  Resp: 16 (03/10/20 1034)  BP: 110/70 (03/10/20 1034)  SpO2: 95 % (03/10/20 1034) Vital Signs (24h Range):  Temp:  [97.8 °F (36.6 °C)-100 °F (37.8 °C)] 97.9 °F (36.6 °C)  Pulse:  [72-93] 93  Resp:  [11-19] 16  SpO2:  [93 %-100 %] 95 %  BP: ()/(54-70) 110/70     Date 03/10/20 0700 - 03/11/20 0659   Shift 5241-7169 1166-7263 7288-1545 24 Hour Total   INTAKE   P.O. 480   480   Shift Total(mL/kg) 480(4.9)   480(4.9)   OUTPUT   Urine(mL/kg/hr) 750   750   Drains 30   30   Shift Total(mL/kg) 780(7.9)   780(7.9)   Weight (kg) 98.4 98.4 98.4 98.4                        Closed/Suction Drain 03/03/20 1559 Left;Superior Back Accordion 10 Fr. (Active)   Site Description Unable to view 3/10/2020  9:00 AM   Dressing Type Transparent (Tegaderm) 3/10/2020  9:00 AM   Dressing Status Clean;Dry;Intact 3/10/2020  9:00 AM   Dressing Intervention Integrity maintained 3/10/2020  9:00 AM   Drainage Serosanguineous 3/10/2020  9:00 AM   Status To bulb suction 3/10/2020  9:00 AM   Output (mL) 5 mL 3/10/2020 12:00 PM            Closed/Suction Drain 03/03/20 1600 Right;Superior Back Accordion 10 Fr. (Active)   Site Description Unable to view 3/10/2020  9:00 AM   Dressing Type Transparent (Tegaderm) 3/10/2020  9:00 AM   Dressing Status Clean;Dry;Intact  3/10/2020  9:00 AM   Dressing Intervention Integrity maintained 3/10/2020  9:00 AM   Drainage Serosanguineous 3/10/2020  9:00 AM   Status To bulb suction 3/10/2020  9:00 AM   Output (mL) 5 mL 3/10/2020 12:00 PM            Closed/Suction Drain 03/03/20 1601 Left;Inferior Back Bulb 15 Fr. (Active)   Site Description Unable to view 3/8/2020  8:00 AM   Dressing Type Transparent (Tegaderm);Gauze 3/9/2020  8:00 AM   Dressing Status Clean;Dry;Intact 3/9/2020  8:00 AM   Dressing Intervention Integrity maintained 3/8/2020  8:00 AM   Drainage Sanguineous 3/8/2020  8:00 AM   Status To bulb suction 3/8/2020  8:00 AM   Output (mL) 80 mL 3/7/2020  2:19 AM       Neurosurgery Physical Exam    General: well developed, well nourished, no distress.   Head: normocephalic, atraumatic  Neck: No tracheal deviation. No palpable masses.   Neurologic: Alert and oriented. Thought content appropriate.  GCS: Motor: 6/Verbal: 5/Eyes: 4 GCS Total: 15  Mental Status: Awake, Alert, Oriented x 4  Language: No aphasia  Speech: No dysarthria  Cranial nerves: face symmetric, tongue midline, CN II-XII grossly intact.   Eyes: pupils equal, round, reactive to light with accomodation, EOMI.   Ears: No drainage.   Pulmonary: normal respirations, no signs of respiratory distress  Abdomen: soft, distended, not tender to palpation  Sensory: intact to light touch throughout  Motor Strength: Moves all extremities spontaneously with good tone.  Pain limited weakness in proximal RLE. Otherwise full strength upper and lower extremities. No abnormal movements seen.     Strength  Deltoids Triceps Biceps Wrist Extension Wrist Flexion Hand    Upper: R 5/5 5/5 5/5 5/5 5/5 5/5    L 5/5 5/5 5/5 5/5 5/5 5/5     Iliopsoas Quadriceps Knee  Flexion Tibialis  anterior Gastro- cnemius EHL   Lower: R 4+/5 4/5 4+/5 5/5 5/5 5/5    L 5/5 5/5 5/5 5/5 5/5 5/5     Harris: absent  Clonus: absent  Babinski: absent  Vascular: Pulses 2+ and symmetric radial and dorsalis pedis. No LE  edema.   Skin: Skin is warm, dry and intact.    Incisional WV in place, 2 YRN drains in place with serosanguinous drainage.      Significant Labs:  Recent Labs   Lab 03/09/20  0539         K 3.8      CO2 30*   BUN 12   CREATININE 0.8   CALCIUM 8.5*   MG 1.9     Recent Labs   Lab 03/09/20  0539   WBC 7.74   HGB 9.0*   HCT 29.3*        No results for input(s): LABPT, INR, APTT in the last 48 hours.  Microbiology Results (last 7 days)     ** No results found for the last 168 hours. **        Recent Lab Results       03/10/20  0728   03/09/20  2213   03/09/20  1958   03/09/20  1633        POCT Glucose 113 122 242 256         All pertinent labs from the last 24 hours have been reviewed.    Significant Diagnostics:  I have reviewed all pertinent imaging results/findings within the past 24 hours.    Assessment/Plan:     Degenerative scoliosis  A 70 year old with degenerative lumbar spondy and spine deformity now s/p T10 - pelvis posterior segmental instrumented fusion, T10 - L3 laminectomy for decompression, L3 pedical subtraction osteotomy (3/3).    -Patient neurologically stable on exam.  -Brace on when upright, OOB, or working with therapy. OK to remove when lying in bed.   -Postop XR when patient able to tolerate standing  -Continue management of Prevena and remaining YRN drains per Plastics. Abx while drains in place. Plan for FU 1 week after discharge for drain removal.  -Activity restrictions: OK for patient to get out of bed for 30-60 mins at a time. OK for patient to sit in the chair and ambulate. When in bed, keep HOB < 60 degrees to reduce tension across wound. Log roll to get out of bed rather than sitting straight up.     -Pain: Anesthesia managing. Better controlled this morning after adjustments made. No changes in pain after nerve block today, however patient was able to participate more with therapy. Lyrica increased to TID. PRN Oxy increased. Scheduled lido patches, tylenol 1000 mg  Q8, and robaxin 500 mg TID.   -Thrombocytopenia: Resolved. Plts 193.  -Post op anemia: H&H 8/29.3 at last lab draw. Continue iron for replacement. Improving.   -Transaminitis: Improving, f/u next lab draw.  -HTN: Continue home dose lisinopril and Prn labetalol.   -DM2: continue POCT glucose, SSI, diabetic diet  -GERD: continue home dose pepcid  -DVT prophylaxis: CLIFF's, SCD's, SQH  -Bowel regimen: senna BID and miralax daily. Fleet enema today per patient's request. States he uses enema or suppository normally 1-2x per week at home.   -Atelectasis prevention: IS hourly while awake, PT/OT, OOB > 6 hours per day. Continue to encourage OOB mobility and up to chair with meals.       Dispo: Pending Rehab. Stability pending pain control.         Alva Cuevas PA-C  Neurosurgery  [facility]

## 2020-03-10 NOTE — PLAN OF CARE
Problem: Diabetes Comorbidity  Goal: Blood Glucose Level Within Desired Range  Outcome: Ongoing, Progressing     Problem: Adult Inpatient Plan of Care  Goal: Plan of Care Review  Outcome: Ongoing, Progressing  Goal: Patient-Specific Goal (Individualization)  Description  Admit Date: 3/3    Admit Dx: spinal fusion    Past Medical History:  No date: Colitis  No date: Diabetes mellitus, type 2  No date: Diverticulosis  No date: GERD (gastroesophageal reflux disease)  No date: Hypertension    Past Surgical History:  No date: ANKLE SURGERY; Bilateral  10/30/2019: ANTERIOR CERVICAL DISCECTOMY W/ FUSION; N/A      Comment:  Procedure: C3-4, C4-5 ACDF;  Surgeon: Francis Alan MD;               Location: [facility] OR 2ND FLR;  Service: Neurosurgery;                 Laterality: N/A;  C3-4, C4-5 ACDFAnesthesia:                 GeneralRad:  C-ArmNM:  EMG, SEP, & MEPBlood:  Type                & ScreenPosition:  SupineBed:  Regular slider                bedHeadrest:  [place] & GW tongs/hanging                weightsMisc:Small vivigenNerve root retractor                (DePuy)Disposable #2 KerrisonInterbody  No date: BACK SURGERY  No date: COLONOSCOPY  3/3/2020: CREATION OF MUSCLE ROTATIONAL FLAP      Comment:  Procedure: CREATION, FLAP, MUSCLE ROTATION;  Surgeon:                Francis Alan MD;  [facility];  Service:                Neurosurgery;;  No date: lower back surgery  3/3/2020: LUMBAR LAMINECTOMY WITH FUSION; N/A      Comment:  Procedure: T10-Pelvis Fusion with L4 PSO **AIRO**                Co-Surgeon: Roberto Parkinson;  Surgeon: Francis Alan MD;                 [facility];  Service: Neurosurgery;                 Laterality: N/A;  **AIRO**CELL SAVER**Co-Surgeon:                Roberto ParkinsonNM: EMG, SEP, MEPPosition: ProneBed:                [place] 4 post, prone pillowBlood: Type & Hold 2                unitsRad: C-Arm, AIROVendor: DepuyMisc: Vivigen,                Infuse, Cement (Depuy)[place]  No  date: NECK SURGERY  No date: SPINE SURGERY  No date: UPPER GASTROINTESTINAL ENDOSCOPY    Individualization:   1. Pt likes dim lights    Restraints: (date/time/why or N/A) na         Outcome: Ongoing, Progressing  Goal: Absence of Hospital-Acquired Illness or Injury  Outcome: Ongoing, Progressing  Goal: Optimal Comfort and Wellbeing  Outcome: Ongoing, Progressing     Problem: Fall Injury Risk  Goal: Absence of Fall and Fall-Related Injury  Outcome: Ongoing, Progressing     Problem: Infection  Goal: Infection Symptom Resolution  Outcome: Ongoing, Progressing    POC reviewed with pt and wife at 1445. Pt verbalized understanding. Questions and concerns addressed. No acute events today, Pt. Procedure went well, pain is much better. Pt progressing toward goals. Will continue to monitor. See flowsheets for full assessment and VS info.

## 2020-03-10 NOTE — PLAN OF CARE
Pt is progressing towards goals as expected. Goals remain appropriate continue with current POC.     Katy Deutsch, PT, DPT  3/10/2020      Problem: Physical Therapy Goal  Goal: Physical Therapy Goal  Description  Goals to be met by: 3/14/2020     Patient will increase functional independence with mobility by performin. Supine to sit with Moderate Assistance  2. Sit to supine with Moderate Assistance  3. Rolling to Left and Right with Moderate Assistance.  4. Sit to stand transfer with Maximum Assistance  5. Bed to chair transfer with Maximum Assistance  6. Gait  x 15 feet with Maximum Assistance using RW.   7. Lower extremity exercise program x15 reps per handout, with assistance as needed     Outcome: Ongoing, Progressing

## 2020-03-11 LAB
POCT GLUCOSE: 100 MG/DL (ref 70–110)
POCT GLUCOSE: 101 MG/DL (ref 70–110)
POCT GLUCOSE: 106 MG/DL (ref 70–110)
POCT GLUCOSE: 151 MG/DL (ref 70–110)

## 2020-03-11 PROCEDURE — 99024 POSTOP FOLLOW-UP VISIT: CPT | Mod: POP,,, | Performed by: PHYSICIAN ASSISTANT

## 2020-03-11 PROCEDURE — 25000003 PHARM REV CODE 250: Performed by: PHYSICIAN ASSISTANT

## 2020-03-11 PROCEDURE — 99024 PR POST-OP FOLLOW-UP VISIT: ICD-10-PCS | Mod: POP,,, | Performed by: PHYSICIAN ASSISTANT

## 2020-03-11 PROCEDURE — 63600175 PHARM REV CODE 636 W HCPCS: Performed by: PHYSICIAN ASSISTANT

## 2020-03-11 PROCEDURE — 97112 NEUROMUSCULAR REEDUCATION: CPT

## 2020-03-11 PROCEDURE — 99232 SBSQ HOSP IP/OBS MODERATE 35: CPT | Mod: ,,, | Performed by: NURSE PRACTITIONER

## 2020-03-11 PROCEDURE — 99231 SBSQ HOSP IP/OBS SF/LOW 25: CPT | Mod: ,,, | Performed by: ANESTHESIOLOGY

## 2020-03-11 PROCEDURE — 97535 SELF CARE MNGMENT TRAINING: CPT

## 2020-03-11 PROCEDURE — 97530 THERAPEUTIC ACTIVITIES: CPT

## 2020-03-11 PROCEDURE — 94761 N-INVAS EAR/PLS OXIMETRY MLT: CPT

## 2020-03-11 PROCEDURE — 99232 PR SUBSEQUENT HOSPITAL CARE,LEVL II: ICD-10-PCS | Mod: ,,, | Performed by: NURSE PRACTITIONER

## 2020-03-11 PROCEDURE — 97803 MED NUTRITION INDIV SUBSEQ: CPT

## 2020-03-11 PROCEDURE — 25000003 PHARM REV CODE 250: Performed by: STUDENT IN AN ORGANIZED HEALTH CARE EDUCATION/TRAINING PROGRAM

## 2020-03-11 PROCEDURE — 11000001 HC ACUTE MED/SURG PRIVATE ROOM

## 2020-03-11 PROCEDURE — 99231 PR SUBSEQUENT HOSPITAL CARE,LEVL I: ICD-10-PCS | Mod: ,,, | Performed by: ANESTHESIOLOGY

## 2020-03-11 RX ADMIN — SENNOSIDES AND DOCUSATE SODIUM 1 TABLET: 8.6; 5 TABLET ORAL at 09:03

## 2020-03-11 RX ADMIN — PREGABALIN 150 MG: 150 CAPSULE ORAL at 08:03

## 2020-03-11 RX ADMIN — LIDOCAINE 2 PATCH: 50 PATCH TOPICAL at 02:03

## 2020-03-11 RX ADMIN — CEFAZOLIN 2 G: 1 INJECTION, POWDER, FOR SOLUTION INTRAMUSCULAR; INTRAVENOUS at 04:03

## 2020-03-11 RX ADMIN — FAMOTIDINE 40 MG: 20 TABLET, FILM COATED ORAL at 08:03

## 2020-03-11 RX ADMIN — CEFAZOLIN 2 G: 1 INJECTION, POWDER, FOR SOLUTION INTRAMUSCULAR; INTRAVENOUS at 12:03

## 2020-03-11 RX ADMIN — FERROUS SULFATE TAB EC 325 MG (65 MG FE EQUIVALENT) 325 MG: 325 (65 FE) TABLET DELAYED RESPONSE at 08:03

## 2020-03-11 RX ADMIN — LISINOPRIL 5 MG: 5 TABLET ORAL at 09:03

## 2020-03-11 RX ADMIN — SENNOSIDES AND DOCUSATE SODIUM 1 TABLET: 8.6; 5 TABLET ORAL at 08:03

## 2020-03-11 RX ADMIN — HEPARIN SODIUM 5000 UNITS: 5000 INJECTION, SOLUTION INTRAVENOUS; SUBCUTANEOUS at 08:03

## 2020-03-11 RX ADMIN — HYDROMORPHONE HYDROCHLORIDE 0.5 MG: 1 INJECTION, SOLUTION INTRAMUSCULAR; INTRAVENOUS; SUBCUTANEOUS at 04:03

## 2020-03-11 RX ADMIN — METHOCARBAMOL TABLETS 750 MG: 750 TABLET, COATED ORAL at 09:03

## 2020-03-11 RX ADMIN — METHOCARBAMOL TABLETS 750 MG: 750 TABLET, COATED ORAL at 02:03

## 2020-03-11 RX ADMIN — OXYCODONE HYDROCHLORIDE 15 MG: 10 TABLET ORAL at 08:03

## 2020-03-11 RX ADMIN — HEPARIN SODIUM 5000 UNITS: 5000 INJECTION, SOLUTION INTRAVENOUS; SUBCUTANEOUS at 02:03

## 2020-03-11 RX ADMIN — HEPARIN SODIUM 5000 UNITS: 5000 INJECTION, SOLUTION INTRAVENOUS; SUBCUTANEOUS at 05:03

## 2020-03-11 RX ADMIN — CEFAZOLIN 2 G: 1 INJECTION, POWDER, FOR SOLUTION INTRAMUSCULAR; INTRAVENOUS at 09:03

## 2020-03-11 RX ADMIN — ACETAMINOPHEN 1000 MG: 500 TABLET ORAL at 05:03

## 2020-03-11 RX ADMIN — METHOCARBAMOL TABLETS 750 MG: 750 TABLET, COATED ORAL at 08:03

## 2020-03-11 RX ADMIN — FERROUS SULFATE TAB EC 325 MG (65 MG FE EQUIVALENT) 325 MG: 325 (65 FE) TABLET DELAYED RESPONSE at 09:03

## 2020-03-11 RX ADMIN — PREGABALIN 150 MG: 150 CAPSULE ORAL at 02:03

## 2020-03-11 RX ADMIN — OXYCODONE HYDROCHLORIDE 10 MG: 10 TABLET ORAL at 02:03

## 2020-03-11 RX ADMIN — PREGABALIN 150 MG: 150 CAPSULE ORAL at 09:03

## 2020-03-11 RX ADMIN — HYDROMORPHONE HYDROCHLORIDE 0.5 MG: 1 INJECTION, SOLUTION INTRAMUSCULAR; INTRAVENOUS; SUBCUTANEOUS at 09:03

## 2020-03-11 RX ADMIN — OXYCODONE HYDROCHLORIDE 15 MG: 10 TABLET ORAL at 05:03

## 2020-03-11 RX ADMIN — HYDROMORPHONE HYDROCHLORIDE 0.5 MG: 1 INJECTION, SOLUTION INTRAMUSCULAR; INTRAVENOUS; SUBCUTANEOUS at 12:03

## 2020-03-11 NOTE — PLAN OF CARE
Pt is progressing towards goals as expected. Goals remain appropriate continue with current POC.     Katy Deutsch, PT, DPT  3/11/2020      Problem: Physical Therapy Goal  Goal: Physical Therapy Goal  Description  Goals to be met by: 3/14/2020     Patient will increase functional independence with mobility by performin. Supine to sit with Moderate Assistance - goal met 3/11/2020  2. Sit to supine with Moderate Assistance  3. Rolling to Left and Right with Moderate Assistance.  4. Sit to stand transfer with Maximum Assistance  5. Bed to chair transfer with Maximum Assistance  6. Gait  x 15 feet with Maximum Assistance using RW.   7. Lower extremity exercise program x15 reps per handout, with assistance as needed      Outcome: Ongoing, Progressing

## 2020-03-11 NOTE — PT/OT/SLP PROGRESS
Physical Therapy Treatment    Patient Name:  Garcia Renteria   MRN:  [id number]    Recommendations:     Discharge Recommendations:  rehabilitation facility   Discharge Equipment Recommendations: (TBD by next level of care)   Barriers to discharge: Inaccessible home and Decreased caregiver support    Assessment:     Garcia Renteria is a 70 y.o. male admitted with a medical diagnosis of Acquired spondylolisthesis of thoracolumbar vertebra.  He presents with the following impairments/functional limitations:  weakness, impaired endurance, impaired self care skills, impaired functional mobilty, gait instability, impaired balance, decreased lower extremity function, decreased safety awareness, pain, impaired sensation, orthopedic precautions. Pt tolerated session well and was experiencing improved pain control. Pt progressed to performing standing trials and achieving full upright. Pt would benefit from inpatient rehab services to improve balance, strength, and functional mobility to improve quality of life, pt independence, and overall quality of life.    Rehab Prognosis: Good; patient would benefit from acute skilled PT services to address these deficits and reach maximum level of function.    Recent Surgery: Procedure(s) (LRB):  Block, Nerve (Bilateral) 1 Day Post-Op    Plan:     During this hospitalization, patient to be seen 4 x/week to address the identified rehab impairments via gait training, therapeutic activities, therapeutic exercises, neuromuscular re-education and progress toward the following goals:    · Plan of Care Expires:  04/03/20    Subjective     Chief Complaint: L hip pain  Patient/Family Comments/goals: return to PLOF and independence  Pain/Comfort:  · Pain Rating 1: 7/10  · Location - Side 1: Left  · Location - Orientation 1: generalized  · Location 1: hip  · Pain Addressed 1: Pre-medicate for activity, Reposition, Distraction, Cessation of Activity  · Pain Rating Post-Intervention 1: (not rated,  but pt reports increased)      Objective:     Communicated with nursing prior to session.  Patient found right sidelying with wound vac, YRN drain, peripheral IV upon PT entry to room.     General Precautions: Standard, fall   Orthopedic Precautions:spinal precautions   Braces: TLSO     Functional Mobility:  · Bed Mobility:     · R lateral Scooting EOB: stand by assistance and contact guard assistance  · Right sidelying to Sit: moderate assistance  · Sit to Right Sidelying: moderate assistance and of 2 persons  · Transfers:     · Trial 1: Sit to Stand:  maximal assistance and of 2 persons with no AD   · Trial 2 and 3: Sit to Stand:  moderate assistance and of 2 persons with rolling walker  · Each trial ~3 mins, R knee blocked 2/2 buckling, verbal and tactile cuing for upright posture  · Gait: N/T this date  · EOB ~20 minutes  · Balance:   · Static Sitting: SBA to CGA with BUE support  · Dynamic Sitting:  SBA to CGA with BUE support when placing BLEs and anteriorly scooting for positioning for standing trials  · Static Standing: modA x 2 with RW and R knee blocked 2/2 buckling      AM-PAC 6 CLICK MOBILITY  Turning over in bed (including adjusting bedclothes, sheets and blankets)?: 3  Sitting down on and standing up from a chair with arms (e.g., wheelchair, bedside commode, etc.): 1  Moving from lying on back to sitting on the side of the bed?: 2  Moving to and from a bed to a chair (including a wheelchair)?: 1  Need to walk in hospital room?: 1  Climbing 3-5 steps with a railing?: 1  Basic Mobility Total Score: 9       Therapeutic Activities and Exercises:  Pt educated on donning/doffing TLSO brace edge of bed.   Patient educated on role of therapy, goals of session, and benefits of mobilizing. Discussed PT plan of care during hospitalization. Patient educated that they need to call for assistance to mobilize to EOB. Patient educated on how their diagnosis impacts their mobility within PT scope of practice.    Communication board updated.  All questions answered within PT scope of practice.    Patient left right sidelying with all lines intact, call button in reach, bed alarm on and spouse present..    GOALS:   Multidisciplinary Problems     Physical Therapy Goals        Problem: Physical Therapy Goal    Goal Priority Disciplines Outcome Goal Variances Interventions   Physical Therapy Goal     PT, PT/OT Ongoing, Progressing     Description:  Goals to be met by: 3/14/2020     Patient will increase functional independence with mobility by performin. Supine to sit with Moderate Assistance - goal met 3/11/2020  2. Sit to supine with Moderate Assistance  3. Rolling to Left and Right with Moderate Assistance.  4. Sit to stand transfer with Maximum Assistance  5. Bed to chair transfer with Maximum Assistance  6. Gait  x 15 feet with Maximum Assistance using RW.   7. Lower extremity exercise program x15 reps per handout, with assistance as needed                       Time Tracking:     PT Received On: 20  PT Start Time: 1337     PT Stop Time: 1404  PT Total Time (min): 27 min co-treat with OT    Billable Minutes: Therapeutic Activity 8 and Neuromuscular Re-education 19    Treatment Type: Treatment  PT/PTA: PT           Katy Deutsch, PT  2020

## 2020-03-11 NOTE — ASSESSMENT & PLAN NOTE
A 70 year old with degenerative lumbar spondy and spine deformity now s/p T10 - pelvis posterior segmental instrumented fusion, T10 - L3 laminectomy for decompression, L3 pedical subtraction osteotomy (3/3).    -Patient neurologically stable on exam.  -Brace on when upright, OOB, or working with therapy. OK to remove when lying in bed.   -Postop XR when patient able. Ok to obtain sitting upright.   -Continue management of Prevena and remaining YRN drains per Plastics. Abx while drains in place. Plan for FU 1 week after discharge for drain removal. Will remove Prevena WV today.  -Activity restrictions: OK for patient to get out of bed for 30-60 mins at a time. OK for patient to sit in the chair and ambulate. When in bed, keep HOB < 60 degrees to reduce tension across wound. Log roll to get out of bed rather than sitting straight up.     -Pain: Improved with multimodal agents per Anesthesia recommendations. No changes in pain after nerve block. Continue Lyrica TID. Scheduled lido patches, tylenol 1000 mg Q8, and robaxin 500 mg TID. Continue oxy prn.  -Thrombocytopenia: Resolved. Plts 193.  -Post op anemia: H&H 8/29.3 at last lab draw. Continue iron for replacement. Improving.   -Transaminitis: Improving, f/u next lab draw.  -HTN: Continue home dose lisinopril and Prn labetalol.   -DM2: continue POCT glucose, SSI, diabetic diet  -GERD: continue home dose pepcid  -DVT prophylaxis: CLIFF's, SCD's, SQH  -Bowel regimen: senna BID and miralax daily. Fleet enema yesterda per patient's request. States he uses enema or suppository normally 1-2x per week at home.   -Atelectasis prevention: IS hourly while awake, PT/OT, OOB > 6 hours per day. Continue to encourage OOB mobility and up to chair with meals.       Dispo: Pending Rehab. Medically stable for discharge.

## 2020-03-11 NOTE — PLAN OF CARE
03/11/20 1219   Post-Acute Status   Post-Acute Authorization Placement   Post-Acute Placement Status Referrals Sent       [facility]  Awaiting acceptance.  SW in contact with CM and Medical staff. Will continue to follow and offer support as needed.     Aguilar Barone, RONALD  [facility]   Ext. [phone]

## 2020-03-11 NOTE — PLAN OF CARE
Problem: Diabetes Comorbidity  Goal: Blood Glucose Level Within Desired Range  Outcome: Ongoing, Progressing     Problem: Adult Inpatient Plan of Care  Goal: Plan of Care Review  Outcome: Ongoing, Progressing  Goal: Patient-Specific Goal (Individualization)  Description  Admit Date: 3/3    Admit Dx: spinal fusion    Past Medical History:  No date: Colitis  No date: Diabetes mellitus, type 2  No date: Diverticulosis  No date: GERD (gastroesophageal reflux disease)  No date: Hypertension    Past Surgical History:  No date: ANKLE SURGERY; Bilateral  10/30/2019: ANTERIOR CERVICAL DISCECTOMY W/ FUSION; N/A      Comment:  Procedure: C3-4, C4-5 ACDF;  Surgeon: Francis Alan MD;               Location: [facility] OR 2ND FLR;  Service: Neurosurgery;                 Laterality: N/A;  C3-4, C4-5 ACDFAnesthesia:                 GeneralRad:  C-ArmNM:  EMG, SEP, & MEPBlood:  Type                & ScreenPosition:  SupineBed:  Regular slider                bedHeadrest:  [place] & GW tongs/hanging                weightsMisc:Small vivigenNerve root retractor                (DePuy)Disposable #2 KerrisonInterbody  No date: BACK SURGERY  No date: COLONOSCOPY  3/3/2020: CREATION OF MUSCLE ROTATIONAL FLAP      Comment:  Procedure: CREATION, FLAP, MUSCLE ROTATION;  Surgeon:                Francis Alan MD;  [facility];  Service:                Neurosurgery;;  No date: lower back surgery  3/3/2020: LUMBAR LAMINECTOMY WITH FUSION; N/A      Comment:  Procedure: T10-Pelvis Fusion with L4 PSO **AIRO**                Co-Surgeon: Roberto Parkinson;  Surgeon: Francis Alan MD;                 [facility];  Service: Neurosurgery;                 Laterality: N/A;  **AIRO**CELL SAVER**Co-Surgeon:                Roberto ParkinsonNM: EMG, SEP, MEPPosition: ProneBed:                Shaji 4 post, prone pillowBlood: Type & Hold 2                unitsRad: C-Arm, AIROVendor: DepuyMisc: Vivigen,                Infuse, Cement (Depuy)[place]  No  date: NECK SURGERY  No date: SPINE SURGERY  No date: UPPER GASTROINTESTINAL ENDOSCOPY    Individualization:   1. Pt likes dim lights    Restraints: (date/time/why or N/A) na         Outcome: Ongoing, Progressing  Goal: Absence of Hospital-Acquired Illness or Injury  Outcome: Ongoing, Progressing  Goal: Optimal Comfort and Wellbeing  Outcome: Ongoing, Progressing  Goal: Readiness for Transition of Care  Outcome: Ongoing, Progressing  Goal: Rounds/Family Conference  Outcome: Ongoing, Progressing     Problem: Fall Injury Risk  Goal: Absence of Fall and Fall-Related Injury  Outcome: Ongoing, Progressing     Problem: Infection  Goal: Infection Symptom Resolution  Outcome: Ongoing, Progressing     Problem: Skin Injury Risk Increased  Goal: Skin Health and Integrity  Outcome: Ongoing, Progressing     Problem: Hypertension Comorbidity  Goal: Blood Pressure in Desired Range  Outcome: Ongoing, Progressing     Problem: Pain Chronic (Persistent) (Comorbidity Management)  Goal: Acceptable Pain Control and Functional Ability  Outcome: Ongoing, Progressing     Problem: Oral Intake Inadequate  Goal: Improved Oral Intake  Outcome: Ongoing, Progressing    Neuro - aax4  Deficits - numbness and tingling BLE  V/S - see v/s flowsheet  Tele - NSR  Diet - diabetic  GI - no bm today  - voids per urinal  IV - x1  IV Fluids - none  Skin - back incision with wound vac and YRN drain x2  Others - back brace when out of bed, wife at bedside

## 2020-03-11 NOTE — ANESTHESIA POST-OP PAIN MANAGEMENT
Acute Pain Service Progress Note    Garcia Renteria is a 70 y.o., male, [id number].    Pt reports chronic pain from prior to surgery. Pain has continued post-op. Pt now s/p T10-pelvis fusion and flap creation and is POD 6.  Pain starts midline in his back and wraps around bilaterally to his thigh and groin.  Pain becomes severe when pt bears any weight or sits on side of bed or stands. Pt describes the pain as sharp and shooting.     Surgery:  T10-Pelvis Fusion with L4 PSO **AIRO**   CREATION, FLAP, MUSCLE ROTATION     Post Op Day #: 8    Problem List:    There are no hospital problems to display for this patient.      Subjective:     General appearance of alert, oriented, no complaints   Pain with rest: 7    Numbers   Pain with movement: 8    Numbers   Side Effects    1. Pruritis No    2. Nausea No    3. Motor Blockade Yes, 0=Ability to raise lower extremities off bed    4. Sedation No, 1=awake and alert    Objective:  Vitals:    03/11/20 0730   BP: 112/63   Pulse: 86   Resp: 16   Temp: 36.9 °C (98.5 °F)        Labs    No results displayed because visit has over 200 results.           Meds   No current facility-administered medications for this visit.      No current outpatient medications on file.     Facility-Administered Medications Ordered in Other Visits   Medication Dose Route Frequency Provider Last Rate Last Dose    aluminum-magnesium hydroxide-simethicone 200-200-20 mg/5 mL suspension 30 mL  30 mL Oral Q4H PRN Mulugeta Pierson MD   30 mL at 03/10/20 1656    bisacodyL suppository 10 mg  10 mg Rectal Daily PRN SANDY Moreno   10 mg at 03/10/20 1656    ceFAZolin injection 2 g  2 g Intravenous Q8H SANDY Moreno   2 g at 03/11/20 0905    dextrose 10% (D10W) Bolus  12.5 g Intravenous PRN Deja Hawkins NP        dextrose 10% (D10W) Bolus  25 g Intravenous PRN Deja Hawkins NP        famotidine tablet 40 mg  40 mg Oral QHS Linh Roach PA-C   40 mg at 03/10/20 2201    fentaNYL  injection 25 mcg  25 mcg Intravenous Q5 Min PRN Ace Paula MD   50 mcg at 03/10/20 0743    ferrous sulfate EC tablet 325 mg  325 mg Oral BID Alva Cuevas PA-C   325 mg at 03/11/20 0905    glucagon (human recombinant) injection 1 mg  1 mg Intramuscular PRN Deja Hawkins NP        glucose chewable tablet 16 g  16 g Oral PRN Deja Hawkins NP        glucose chewable tablet 24 g  24 g Oral PRN Deja Hawkins NP        heparin (porcine) injection 5,000 Units  5,000 Units Subcutaneous Q8H SANDY Moreno   5,000 Units at 03/11/20 0543    HYDROmorphone injection 0.5 mg  0.5 mg Intravenous Q6H PRN Linh Roach PA-C   0.5 mg at 03/11/20 0905    insulin aspart U-100 pen 1-10 Units  1-10 Units Subcutaneous QID (AC + HS) PRN Deja Hawkins NP        labetalol 20 mg/4 mL (5 mg/mL) IV syring  10 mg Intravenous Q4H PRN Linh Roach PA-C        lidocaine 5 % patch 2 patch  2 patch Transdermal Q24H Chance Angulo MD   2 patch at 03/10/20 1400    lisinopril tablet 5 mg  5 mg Oral Daily Allyson Jackson PA-C   5 mg at 03/11/20 0905    methocarbamoL tablet 750 mg  750 mg Oral TID Chance Angulo MD   750 mg at 03/11/20 0905    midazolam (VERSED) 1 mg/mL injection 0.5 mg  0.5 mg Intravenous PRN Ace Paula MD   1 mg at 03/10/20 0743    ondansetron injection 4 mg  4 mg Intravenous Q8H PRN Linh Roach PA-C        oxyCODONE immediate release tablet 15 mg  15 mg Oral Q4H PRN SANDY Moreno   15 mg at 03/11/20 0543    oxyCODONE immediate release tablet Tab 10 mg  10 mg Oral Q4H PRN SANDY Moreno        pregabalin capsule 150 mg  150 mg Oral TID Chance Angulo MD   150 mg at 03/11/20 0906    promethazine (PHENERGAN) 6.25 mg in dextrose 5 % 50 mL IVPB  6.25 mg Intravenous Q6H PRN Mulugeta Pierson MD        senna-docusate 8.6-50 mg per tablet 1 tablet  1 tablet Oral BID Linh Roach PA-C   1 tablet at 03/11/20 0905    sodium chloride 0.9% flush 10 mL   10 mL Intravenous PRN Linh Roach PA-C           Assessment:      Pt reports continued pain with movement. However, pt is able to move feet with less pain over past 2 days. Pt was able to sit on side of bed with minimal discomfort yesterday. Likely improvement from robaxin. Pt reports no further pain relief after nerve block    Plan:     -  lateral femoral cutaneous nerve block yesterday   - continue PRN Oxy and Morphine for breakthrough   - continued scheduled tylenol 1000 mg Q8   - continue scheduled robaxin TID.    - continue lidocaine patch     Evaluator Chance Angulo

## 2020-03-11 NOTE — PROGRESS NOTES
"[facility]  Adult Nutrition  Progress Note    SUMMARY       Recommendations    1. Continue with diabetic diet   2. Add Boost Glucose Control TID  3. RD to continue to follow    Goals: Pt to consume/tolerate % of EEN/EPn by RD follow-up.  Nutrition Goal Status: progressing towards goal  Communication of RD Recs: other (comment)    Reason for Assessment    Reason For Assessment: RD follow-up  Diagnosis: surgery/postoperative complications  Relevant Medical History: HTN, DM2, HLD, GERD, Diverticulosis  Interdisciplinary Rounds: did not attend  General Information Comments: Pt was lying in bed with wife in the room. Pt states he is eating 50-75% of meals. His appetite has improved since last week. He does state that he has gas and pressure after he eats but no n/v/d. NFPE no warranted at this time- pt has no wt loss, pt is eating 75% of meals, and pt appears well nourished.   Nutrition Discharge Planning: Diabetic diet    Nutrition Risk Screen    Nutrition Risk Screen: no indicators present    Nutrition/Diet History    Spiritual, Cultural Beliefs, Latter-day Practices, Values that Affect Care: no    Anthropometrics    Temp: 99.2 °F (37.3 °C)  Height Method: Stated  Height: 6' 1" (185.4 cm)  Height (inches): 73 in  Weight Method: Standard Scale  Weight: 98.4 kg (217 lb)  Weight (lb): 217 lb  Ideal Body Weight (IBW), Male: 184 lb  % Ideal Body Weight, Male (lb): 117.93 %  BMI (Calculated): 28.6  BMI Grade: 25 - 29.9 - overweight       Lab/Procedures/Meds    Pertinent Labs Reviewed: reviewed  Pertinent Labs Comments: Josh 8.5, Alb 2.2  Pertinent Medications Reviewed: reviewed  Pertinent Medications Comments: Iron, Senna    Physical Findings/Assessment         Estimated/Assessed Needs    Weight Used For Calorie Calculations: 98.5 kg (217 lb 2.5 oz)  Energy Calorie Requirements (kcal): 2254  Energy Need Method: Charlie Ahmadi  Protein Requirements: 118-138g/day(1.2-1.4g/kg)  Weight Used For Protein " Calculations: 98.5 kg (217 lb 2.5 oz)  Fluid Requirements (mL): 1mL/kcal or per MD  Estimated Fluid Requirement Method: RDA Method  RDA Method (mL): 2254  CHO Requirement: 282 g/day      Nutrition Prescription Ordered    Current Diet Order: Diabetic    Evaluation of Received Nutrient/Fluid Intake    I/O: +2L since admit  Comments: LBM 3/10  Tolerance: tolerating  % Intake of Estimated Energy Needs: 50 - 75 %  % Meal Intake: 50 - 75 %    Nutrition Risk    Level of Risk/Frequency of Follow-up: low(1x/wk)     Assessment and Plan    Nutrition Problem  Inadequate energy intake     Related to (etiology):   Decreased appetite     Signs and Symptoms (as evidenced by):   Pt consuming <50% of EEN/EPN.     Interventions/Recommendations (treatment strategy):  Collaboration of care with providers   Commercial Beverage - Boost Glucose Control  Modified Diet - Diabetic     Nutrition Diagnosis Status:    Continues    Monitor and Evaluation    Food and Nutrient Intake: energy intake, food and beverage intake  Food and Nutrient Adminstration: diet order  Knowledge/Beliefs/Attitudes: food and nutrition knowledge/skill  Physical Activity and Function: nutrition-related ADLs and IADLs  Anthropometric Measurements: weight, weight change, body mass index  Biochemical Data, Medical Tests and Procedures: electrolyte and renal panel, gastrointestinal profile, glucose/endocrine profile, inflammatory profile, lipid profile  Nutrition-Focused Physical Findings: overall appearance     Malnutrition Assessment                                       Nutrition Follow-Up    RD Follow-up?: Yes

## 2020-03-11 NOTE — PLAN OF CARE
Continue OT plan of care.    Problem: Occupational Therapy Goal  Goal: Occupational Therapy Goal  Description  Updated Goals to be met by: 7 days (3/18/20)     Patient will increase functional independence with ADLs by performing:    UE Dressing with Minimal Assistance including TLSO.  LE Dressing with Moderate Assistance using AD as needed.  Grooming while seated with SBA.  Toileting from bedside commode with Moderate Assistance for hygiene and clothing management.   Supine to sit with Minimal Assistance.  Toilet transfer to bedside commode with Minimal Assistance.  Pt will verbalize understanding of 3/3 spinal precautions without added cues.       Goals to be met by: 7 days (3/11/20)     Patient will increase functional independence with ADLs by performing:    UE Dressing with Minimal Assistance including TLSO.  LE Dressing with Moderate Assistance using AD as needed.  Grooming while standing with Contact Guard Assistance.  Toileting from bedside commode with Minimal Assistance for hygiene and clothing management.   Supine to sit with Minimal Assistance.  Toilet transfer to bedside commode with Minimal Assistance.  Pt will verbalize understanding of 3/3 spinal precautions without added cues.      Outcome: Ongoing, Progressing

## 2020-03-11 NOTE — PROGRESS NOTES
[facility]  Neurosurgery  Progress Note    Subjective:     History of Present Illness: This is a 70-year-old man with a history of a non instrumented lumbar fusion many years ago who presents with severe progressive axial low back pain, bilateral radicular leg pain, and thoracic myelopathy.  Additionally he had severe postural deformity that was functionally disabling.  He failed multiple conservative measures.  Imaging showed severe central stenosis with spinal cord compression from T10-L2, solid fusion from his previous surgery from L3 to the sacrum, and flat back deformity with severe fixed sagittal plane deformity. Recommended patient undergo spinal cord decompression and deformity reduction surgery, via a T10 to pelvis instrumented fusion with an L3 PSO and a multilevel laminectomy for spinal cord decompression.  Risks benefits alternatives indications and methods were reviewed in detail and he wished to proceed.  All questions were answered.  No guarantees about the results the procedure. Presents to [facility] for T10-pelvis and L3 PSO.    Post-Op Info:  Procedure(s) (LRB):  Block, Nerve (Bilateral)   1 Day Post-Op     Interval History: NAEON. Patient reports pain is now tolerable. Able to sit edge of bed today unassisted. Denies worsening pain, weakness, paresthesias, b/b dysfunction. Continue current regimen per APS recs. Medically stable for discharge, pending acceptance.        Medications:  Continuous Infusions:  Scheduled Meds:   ceFAZolin (ANCEF) IVPB  2 g Intravenous Q8H    famotidine  40 mg Oral QHS    ferrous sulfate  325 mg Oral BID    heparin (porcine)  5,000 Units Subcutaneous Q8H    lidocaine  2 patch Transdermal Q24H    lisinopriL  5 mg Oral Daily    methocarbamoL  750 mg Oral TID    pregabalin  150 mg Oral TID    senna-docusate 8.6-50 mg  1 tablet Oral BID     PRN Meds:aluminum-magnesium hydroxide-simethicone, bisacodyL, Dextrose 10% Bolus, Dextrose 10% Bolus, fentaNYL,  glucagon (human recombinant), glucose, glucose, HYDROmorphone, insulin aspart U-100, labetalol, midazolam, ondansetron, oxyCODONE, oxyCODONE, promethazine (PHENERGAN) IVPB, sodium chloride 0.9%       Objective:     Weight: 98.4 kg (217 lb)  Body mass index is 28.63 kg/m².  Vital Signs (Most Recent):  Temp: 99.2 °F (37.3 °C) (03/11/20 1201)  Pulse: 87 (03/11/20 1201)  Resp: 18 (03/11/20 1201)  BP: (!) 108/58 (03/11/20 1201)  SpO2: (!) 94 % (03/11/20 1201) Vital Signs (24h Range):  Temp:  [98.5 °F (36.9 °C)-99.2 °F (37.3 °C)] 99.2 °F (37.3 °C)  Pulse:  [73-92] 87  Resp:  [16-18] 18  SpO2:  [91 %-97 %] 94 %  BP: ()/(52-63) 108/58     Date 03/11/20 0700 - 03/12/20 0659   Shift 7482-0291 3244-0184 7196-3813 24 Hour Total   INTAKE   P.O. 960   960   Shift Total(mL/kg) 960(9.8)   960(9.8)   OUTPUT   Urine(mL/kg/hr) 400(0.5)   400   Shift Total(mL/kg) 400(4.1)   400(4.1)   Weight (kg) 98.4 98.4 98.4 98.4                        Closed/Suction Drain 03/03/20 1559 Left;Superior Back Accordion 10 Fr. (Active)   Site Description Unable to view 3/10/2020  8:00 PM   Dressing Type Transparent (Tegaderm) 3/10/2020  8:00 PM   Dressing Status Clean;Dry;Intact 3/10/2020  8:00 PM   Dressing Intervention Integrity maintained 3/10/2020  9:00 AM   Drainage Serosanguineous 3/10/2020  9:00 AM   Status To bulb suction 3/10/2020  8:00 PM   Output (mL) 0 mL 3/10/2020  4:00 PM            Closed/Suction Drain 03/03/20 1600 Right;Superior Back Accordion 10 Fr. (Active)   Site Description Unable to view 3/10/2020  8:00 PM   Dressing Type Transparent (Tegaderm) 3/10/2020  8:00 PM   Dressing Status Clean;Dry;Intact 3/10/2020  8:00 PM   Dressing Intervention Integrity maintained 3/10/2020  8:00 PM   Drainage Serosanguineous 3/10/2020  9:00 AM   Status To bulb suction 3/10/2020  8:00 PM   Output (mL) 10 mL 3/10/2020  4:00 PM            Closed/Suction Drain 03/03/20 1601 Left;Inferior Back Bulb 15 Fr. (Active)   Site Description Unable to view  3/10/2020  8:00 PM   Dressing Type Transparent (Tegaderm) 3/10/2020  8:00 PM   Dressing Status Clean;Dry;Intact 3/10/2020  8:00 PM   Dressing Intervention Integrity maintained 3/10/2020  8:00 PM   Drainage Serosanguineous 3/10/2020  8:00 PM   Status To bulb suction 3/10/2020  8:00 PM   Output (mL) 80 mL 3/7/2020  2:19 AM       Neurosurgery Physical Exam    General: well developed, well nourished, no distress.   Head: normocephalic, atraumatic  Neck: No tracheal deviation. No palpable masses.   Neurologic: Alert and oriented. Thought content appropriate.  GCS: Motor: 6/Verbal: 5/Eyes: 4 GCS Total: 15  Mental Status: Awake, Alert, Oriented x 4  Language: No aphasia  Speech: No dysarthria  Cranial nerves: face symmetric, tongue midline, CN II-XII grossly intact.   Eyes: pupils equal, round, reactive to light with accomodation, EOMI.   Ears: No drainage.   Pulmonary: normal respirations, no signs of respiratory distress  Abdomen: soft, non-distended, not tender to palpation  Sensory: intact to light touch throughout  Motor Strength: Moves all extremities spontaneously with good tone. Pain limited weakness in proximal RLE otherwise full strength upper and lower extremities. No abnormal movements seen.     Strength  Deltoids Triceps Biceps Wrist Extension Wrist Flexion Hand    Upper: R 5/5 5/5 5/5 5/5 5/5 5/5    L 5/5 5/5 5/5 5/5 5/5 5/5     Iliopsoas Quadriceps Knee  Flexion Tibialis  anterior Gastro- cnemius EHL   Lower: R 4-/5 4-/5 4-/5 5/5 5/5 5/5    L 5/5 4-/5 5/5 5/5 5/5 5/5     Vascular: Pulses 2+ and symmetric radial and dorsalis pedis. No LE edema.   Skin: Skin is warm, dry and intact.    Incisional wound vac in place, 2 YRN drains in place.       Significant Labs:  No results for input(s): GLU, NA, K, CL, CO2, BUN, CREATININE, CALCIUM, MG in the last 48 hours.  No results for input(s): WBC, HGB, HCT, PLT in the last 48 hours.  No results for input(s): LABPT, INR, APTT in the last 48 hours.  Microbiology  Results (last 7 days)     ** No results found for the last 168 hours. **        Recent Lab Results       03/11/20  1141   03/11/20  0747   03/10/20  2217   03/10/20  1638        POCT Glucose 100 151 84 105         All pertinent labs from the last 24 hours have been reviewed.    Significant Diagnostics:  I have reviewed all pertinent imaging results/findings within the past 24 hours.    Assessment/Plan:     Degenerative scoliosis  A 70 year old with degenerative lumbar spondy and spine deformity now s/p T10 - pelvis posterior segmental instrumented fusion, T10 - L3 laminectomy for decompression, L3 pedical subtraction osteotomy (3/3).    -Patient neurologically stable on exam.  -Brace on when upright, OOB, or working with therapy. OK to remove when lying in bed.   -Postop XR when patient able. Ok to obtain sitting upright.   -Continue management of Prevena and remaining YRN drains per Plastics. Abx while drains in place. Plan for FU 1 week after discharge for drain removal. Will remove Prevena WV today.  -Activity restrictions: OK for patient to get out of bed for 30-60 mins at a time. OK for patient to sit in the chair and ambulate. When in bed, keep HOB < 60 degrees to reduce tension across wound. Log roll to get out of bed rather than sitting straight up.     -Pain: Improved with multimodal agents per Anesthesia recommendations. No changes in pain after nerve block. Continue Lyrica TID. Scheduled lido patches, tylenol 1000 mg Q8, and robaxin 500 mg TID. Continue oxy prn.  -Thrombocytopenia: Resolved. Plts 193.  -Post op anemia: H&H 8/29.3 at last lab draw. Continue iron for replacement. Improving.   -Transaminitis: Improving, f/u next lab draw.  -HTN: Continue home dose lisinopril and Prn labetalol.   -DM2: continue POCT glucose, SSI, diabetic diet  -GERD: continue home dose pepcid  -DVT prophylaxis: CLIFF's, SCD's, SQH  -Bowel regimen: senna BID and miralax daily. Fleet enema yesterda per patient's request. States  he uses enema or suppository normally 1-2x per week at home.   -Atelectasis prevention: IS hourly while awake, PT/OT, OOB > 6 hours per day. Continue to encourage OOB mobility and up to chair with meals.       Dispo: Pending Rehab. Medically stable for discharge.         Alva Cuevas PA-C  Neurosurgery  [facility]

## 2020-03-11 NOTE — SUBJECTIVE & OBJECTIVE
Interval History 3/11/2020:  Patient is seen for follow-up rehab evaluation and recommendations: Participating with therapy.     HPI, Past Medical, Family, and Social History remains the same as documented in the initial encounter.    Scheduled Medications:    ceFAZolin (ANCEF) IVPB  2 g Intravenous Q8H    famotidine  40 mg Oral QHS    ferrous sulfate  325 mg Oral BID    heparin (porcine)  5,000 Units Subcutaneous Q8H    lidocaine  2 patch Transdermal Q24H    lisinopriL  5 mg Oral Daily    methocarbamoL  750 mg Oral TID    pregabalin  150 mg Oral TID    senna-docusate 8.6-50 mg  1 tablet Oral BID       Diagnostic Results: Labs: Reviewed    PRN Medications: aluminum-magnesium hydroxide-simethicone, bisacodyL, Dextrose 10% Bolus, Dextrose 10% Bolus, fentaNYL, glucagon (human recombinant), glucose, glucose, HYDROmorphone, insulin aspart U-100, labetalol, midazolam, ondansetron, oxyCODONE, oxyCODONE, promethazine (PHENERGAN) IVPB, sodium chloride 0.9%    Review of Systems   Constitutional: Positive for activity change. Negative for fatigue and fever.   HENT: Negative for trouble swallowing and voice change.    Respiratory: Negative for cough and shortness of breath.    Cardiovascular: Negative for chest pain and leg swelling.   Gastrointestinal: Negative for abdominal distention and abdominal pain.   Genitourinary: Negative for difficulty urinating and flank pain.   Musculoskeletal: Positive for gait problem. Negative for back pain.   Skin: Positive for wound. Negative for color change and rash.   Neurological: Positive for weakness. Negative for speech difficulty and numbness.   Psychiatric/Behavioral: Negative for agitation and confusion.     Objective:     Vital Signs (Most Recent):  Temp: 98.5 °F (36.9 °C) (03/11/20 0730)  Pulse: 86 (03/11/20 0730)  Resp: 16 (03/11/20 0730)  BP: 112/63 (03/11/20 0730)  SpO2: 97 % (03/11/20 0730)    Vital Signs (24h Range):  Temp:  [98.3 °F (36.8 °C)-99.2 °F (37.3 °C)] 98.5  °F (36.9 °C)  Pulse:  [73-95] 86  Resp:  [16-18] 16  SpO2:  [91 %-97 %] 97 %  BP: ()/(49-63) 112/63     Physical Exam   Constitutional: He is oriented to person, place, and time. He appears well-developed and well-nourished.   HENT:   Head: Normocephalic and atraumatic.   Eyes: Pupils are equal, round, and reactive to light. Right eye exhibits no discharge. Left eye exhibits no discharge.   Neck: Neck supple.   Pulmonary/Chest: Effort normal. No respiratory distress.   Abdominal: Soft. He exhibits no distension. There is no tenderness.   Musculoskeletal: He exhibits no edema or deformity.   RUE: 5/5.  LUE: 5/5.  RLE: 3/5.  LLE: 2/5.   Neurological: He is alert and oriented to person, place, and time. No sensory deficit. He exhibits normal muscle tone.   - YRN Drains x 2 in place, dressing in place to back  - following commands   Skin: Skin is warm and dry.   Psychiatric: He has a normal mood and affect. His behavior is normal.   Nursing note and vitals reviewed.    NEUROLOGICAL EXAMINATION:     MENTAL STATUS   Oriented to person, place, and time.     CRANIAL NERVES     CN III, IV, VI   Pupils are equal, round, and reactive to light.

## 2020-03-11 NOTE — PLAN OF CARE
Problem: Adult Inpatient Plan of Care  Goal: Plan of Care Review  Outcome: Ongoing, Progressing     Problem: Skin Injury Risk Increased  Goal: Skin Health and Integrity  Outcome: Ongoing, Progressing     Problem: Hypertension Comorbidity  Goal: Blood Pressure in Desired Range  Outcome: Ongoing, Progressing     Problem: Diabetes Comorbidity  Goal: Blood Glucose Level Within Desired Range  Outcome: Ongoing, Progressing     Problem: Infection  Goal: Infection Symptom Resolution  Outcome: Ongoing, Progressing

## 2020-03-11 NOTE — PT/OT/SLP PROGRESS
"Occupational Therapy   Treatment    Name: Garcia Renteria  MRN: [id number]  Admitting Diagnosis:  Acquired spondylolisthesis of thoracolumbar vertebra  1 Day Post-Op  T10-Pelvis Fusion with L4 PSO **AIRO**   CREATION, FLAP, MUSCLE ROTATION      Recommendations:     Discharge Recommendations: rehabilitation facility  Discharge Equipment Recommendations:  (TBD next level of care)  Barriers to discharge:  (increased assistance needed)    Assessment:     Garcia Renteria is a 70 y.o. male with a medical diagnosis of Acquired spondylolisthesis of thoracolumbar vertebra.  He presents with performance deficits including weakness, impaired endurance, impaired self care skills, impaired functional mobilty, impaired balance, gait instability, decreased lower extremity function, decreased safety awareness, pain. Pt required less assistance for bed mobility this date and was able to complete standing trials. Pt progressing toward goals and would continue to benefit from OT to increase functional independence and safety. Recommend rehab upon D/C as pt is motivated to return to PLOF.     Rehab Prognosis:  Good; patient would benefit from acute skilled OT services to address these deficits and reach maximum level of function.       Plan:     Patient to be seen 4 x/week to address the above listed problems via self-care/home management, therapeutic activities, neuromuscular re-education, therapeutic exercises  · Plan of Care Expires: 04/04/20  · Plan of Care Reviewed with: patient, spouse    Subjective     Pain/Comfort:  · Pain Rating 1: 7/10  · Location - Side 1: Left  · Location 1: hip  · Pain Addressed 1: Pre-medicate for activity, Reposition, Distraction  · Pain Rating Post-Intervention 1: (Did not rate)    Objective:     Communicated with: RN prior to session.  Patient found right sidelying with wound vac, YRN drain, peripheral IV, bed alarm upon OT entry to room, spouse present.  "I tried to sit up today, but that alarm went " "off."    General Precautions: Standard, fall   Orthopedic Precautions:spinal precautions   Braces: TLSO     Occupational Performance:     Bed Mobility:    · Patient completed R sidelying to Sit with moderate assistance  · Patient completed Sit to R sidelying with moderate assistance and 2 persons   · Scooting along EOB while seated with close SBA-CGA and rest breaks as needed    Functional Mobility/Transfers:  · Patient completed Sit <> Stand Transfer with Max A x 2 from EOB without AD, then Mod A x 2 from EOB 2 trials with RW and R knee blocked, ncreased time to come to full stand-- maintained static standing ~3 min each trial with cues for upright posture  · Functional Mobility: Unable this date    Activities of Daily Living:  · Upper Body Dressing: moderate assistance to don/doff TLSO while seated EOB  · Lower Body Dressing: total assistance to don socks      AMPAC 6 Click ADL: 13    Treatment & Education:  Pt motivated to participate; able to sit EOB with close SBA-CGA; educated on TLSO management and able to don/doff while seated; completed standing trials as described above, improvement noted with use of RW; practiced scooting along EOB; discussed POC and D/C recs with pt and spouse    Patient left right sidelying with all lines intact, call button in reach and RN and spouse presentEducation:      GOALS:   Multidisciplinary Problems     Occupational Therapy Goals        Problem: Occupational Therapy Goal    Goal Priority Disciplines Outcome Interventions   Occupational Therapy Goal     OT, PT/OT Ongoing, Progressing    Description:  Updated Goals to be met by: 7 days (3/18/20)     Patient will increase functional independence with ADLs by performing:    UE Dressing with Minimal Assistance including TLSO.  LE Dressing with Moderate Assistance using AD as needed.  Grooming while seated with SBA.  Toileting from bedside commode with Moderate Assistance for hygiene and clothing management.   Supine to sit with " Minimal Assistance.  Toilet transfer to bedside commode with Minimal Assistance.  Pt will verbalize understanding of 3/3 spinal precautions without added cues.       Goals to be met by: 7 days (3/11/20)     Patient will increase functional independence with ADLs by performing:    UE Dressing with Minimal Assistance including TLSO.  LE Dressing with Moderate Assistance using AD as needed.  Grooming while standing with Contact Guard Assistance.  Toileting from bedside commode with Minimal Assistance for hygiene and clothing management.   Supine to sit with Minimal Assistance.  Toilet transfer to bedside commode with Minimal Assistance.  Pt will verbalize understanding of 3/3 spinal precautions without added cues.                       Time Tracking:     OT Date of Treatment: 03/11/20  OT Start Time: 1337  OT Stop Time: 1403  OT Total Time (min): 26 min (co-treat with PT)    Billable Minutes:Self Care/Home Management 11  Therapeutic Activity 15    MELIDA Amaro  3/11/2020

## 2020-03-11 NOTE — PROGRESS NOTES
[facility]  Physical Medicine & Rehab  Progress Note    Patient Name: Garcia Renteria  MRN: [id number]  Admission Date: 3/3/2020  Length of Stay: 8 days  Attending Physician: Francis Alan MD    Subjective:     Principal Problem:Acquired spondylolisthesis of thoracolumbar vertebra    Hospital Course:   3/4/20: Evaluated by PT & OT. Bed mobility maxA x 2 ppl. LBD totalA.   3/5/20: Participated w/ OT. Bed mobility maxA x 2 ppl. LBD & UBD totalA.   3/6/20: Participated w/ OT & PT. Bed mobility maxA x 2 ppl. UBD & LBD totalA. Seated EOB x 5 mins max A.   3/9/20: Participated w/ OT. Bed mobility maxA x 2 ppl. UBD & LBD totalA.   3/10/20: Participated w/ PT. Bed mobility Mitch- maxA x 2 ppl. Sit to stand maxA x 2ppl.     Interval History 3/11/2020:  Patient is seen for follow-up rehab evaluation and recommendations: Participating with therapy.     HPI, Past Medical, Family, and Social History remains the same as documented in the initial encounter.    Scheduled Medications:    ceFAZolin (ANCEF) IVPB  2 g Intravenous Q8H    famotidine  40 mg Oral QHS    ferrous sulfate  325 mg Oral BID    heparin (porcine)  5,000 Units Subcutaneous Q8H    lidocaine  2 patch Transdermal Q24H    lisinopriL  5 mg Oral Daily    methocarbamoL  750 mg Oral TID    pregabalin  150 mg Oral TID    senna-docusate 8.6-50 mg  1 tablet Oral BID       Diagnostic Results:   Labs: Reviewed    PRN Medications: aluminum-magnesium hydroxide-simethicone, bisacodyL, Dextrose 10% Bolus, Dextrose 10% Bolus, fentaNYL, glucagon (human recombinant), glucose, glucose, HYDROmorphone, insulin aspart U-100, labetalol, midazolam, ondansetron, oxyCODONE, oxyCODONE, promethazine (PHENERGAN) IVPB, sodium chloride 0.9%    Review of Systems   Constitutional: Positive for activity change. Negative for fatigue and fever.   HENT: Negative for trouble swallowing and voice change.    Respiratory: Negative for cough and shortness of breath.     Cardiovascular: Negative for chest pain and leg swelling.   Gastrointestinal: Negative for abdominal distention and abdominal pain.   Genitourinary: Negative for difficulty urinating and flank pain.   Musculoskeletal: Positive for gait problem. Negative for back pain.   Skin: Positive for wound. Negative for color change and rash.   Neurological: Positive for weakness. Negative for speech difficulty and numbness.   Psychiatric/Behavioral: Negative for agitation and confusion.     Objective:     Vital Signs (Most Recent):  Temp: 98.5 °F (36.9 °C) (03/11/20 0730)  Pulse: 86 (03/11/20 0730)  Resp: 16 (03/11/20 0730)  BP: 112/63 (03/11/20 0730)  SpO2: 97 % (03/11/20 0730)    Vital Signs (24h Range):  Temp:  [98.3 °F (36.8 °C)-99.2 °F (37.3 °C)] 98.5 °F (36.9 °C)  Pulse:  [73-95] 86  Resp:  [16-18] 16  SpO2:  [91 %-97 %] 97 %  BP: ()/(49-63) 112/63     Physical Exam   Constitutional: He is oriented to person, place, and time. He appears well-developed and well-nourished.   HENT:   Head: Normocephalic and atraumatic.   Eyes: Pupils are equal, round, and reactive to light. Right eye exhibits no discharge. Left eye exhibits no discharge.   Neck: Neck supple.   Pulmonary/Chest: Effort normal. No respiratory distress.   Abdominal: Soft. He exhibits no distension. There is no tenderness.   Musculoskeletal: He exhibits no edema or deformity.   RUE: 5/5.  LUE: 5/5.  RLE: 3/5.  LLE: 2/5.   Neurological: He is alert and oriented to person, place, and time. No sensory deficit. He exhibits normal muscle tone.   - YRN Drains x 2 in place, dressing in place to back  - following commands   Skin: Skin is warm and dry.   Psychiatric: He has a normal mood and affect. His behavior is normal.   Nursing note and vitals reviewed.    Assessment/Plan:      * Acquired spondylolisthesis of thoracolumbar vertebra  - 3/3 s/p T10-pelvis instrumented fusion, T10- L2 laminectomy, L3 PSO, and complex wound closure by plastic surgery.   - Plastic  surgery recommending head of bed flat at all times except meals 30 mis 3x/day x 6 weeks.    - Per NSGY TLSO when upright/OOB.  - Nerve block pending     Impaired mobility  - Related to prolonged/acute hospital course.     Recommendations  -  Encourage mobility, OOB in chair at least 3 hours per day, and early ambulation as appropriate  -  PT/OT evaluate and treat  -  Pain management  -  Monitor for and prevent skin breakdown and pressure ulcers  · Early mobility, repositioning/weight shifting every 20-30 minutes when sitting, turn patient every 2 hours, proper mattress/overlay and chair cushioning, pressure relief/heel protector boots  -  DVT prophylaxis    -  Reviewed discharge options (IP rehab, SNF, HH therapy, and OP therapy)    Recommend Inpatient Rehab.      Jaelyn Gallardo NP  Department of Physical Medicine & Rehab   [facility]

## 2020-03-11 NOTE — SUBJECTIVE & OBJECTIVE
Interval History: NAEON. Patient reports pain is now tolerable. Able to sit edge of bed today unassisted. Denies worsening pain, weakness, paresthesias, b/b dysfunction. Continue current regimen per APS recs. Medically stable for discharge, pending acceptance.        Medications:  Continuous Infusions:  Scheduled Meds:   ceFAZolin (ANCEF) IVPB  2 g Intravenous Q8H    famotidine  40 mg Oral QHS    ferrous sulfate  325 mg Oral BID    heparin (porcine)  5,000 Units Subcutaneous Q8H    lidocaine  2 patch Transdermal Q24H    lisinopriL  5 mg Oral Daily    methocarbamoL  750 mg Oral TID    pregabalin  150 mg Oral TID    senna-docusate 8.6-50 mg  1 tablet Oral BID     PRN Meds:aluminum-magnesium hydroxide-simethicone, bisacodyL, Dextrose 10% Bolus, Dextrose 10% Bolus, fentaNYL, glucagon (human recombinant), glucose, glucose, HYDROmorphone, insulin aspart U-100, labetalol, midazolam, ondansetron, oxyCODONE, oxyCODONE, promethazine (PHENERGAN) IVPB, sodium chloride 0.9%       Objective:     Weight: 98.4 kg (217 lb)  Body mass index is 28.63 kg/m².  Vital Signs (Most Recent):  Temp: 99.2 °F (37.3 °C) (03/11/20 1201)  Pulse: 87 (03/11/20 1201)  Resp: 18 (03/11/20 1201)  BP: (!) 108/58 (03/11/20 1201)  SpO2: (!) 94 % (03/11/20 1201) Vital Signs (24h Range):  Temp:  [98.5 °F (36.9 °C)-99.2 °F (37.3 °C)] 99.2 °F (37.3 °C)  Pulse:  [73-92] 87  Resp:  [16-18] 18  SpO2:  [91 %-97 %] 94 %  BP: ()/(52-63) 108/58     Date 03/11/20 0700 - 03/12/20 0659   Shift 4497-7467 9886-5715 8678-2278 24 Hour Total   INTAKE   P.O. 960   960   Shift Total(mL/kg) 960(9.8)   960(9.8)   OUTPUT   Urine(mL/kg/hr) 400(0.5)   400   Shift Total(mL/kg) 400(4.1)   400(4.1)   Weight (kg) 98.4 98.4 98.4 98.4                        Closed/Suction Drain 03/03/20 1559 Left;Superior Back Accordion 10 Fr. (Active)   Site Description Unable to view 3/10/2020  8:00 PM   Dressing Type Transparent (Tegaderm) 3/10/2020  8:00 PM   Dressing Status  Clean;Dry;Intact 3/10/2020  8:00 PM   Dressing Intervention Integrity maintained 3/10/2020  9:00 AM   Drainage Serosanguineous 3/10/2020  9:00 AM   Status To bulb suction 3/10/2020  8:00 PM   Output (mL) 0 mL 3/10/2020  4:00 PM            Closed/Suction Drain 03/03/20 1600 Right;Superior Back Accordion 10 Fr. (Active)   Site Description Unable to view 3/10/2020  8:00 PM   Dressing Type Transparent (Tegaderm) 3/10/2020  8:00 PM   Dressing Status Clean;Dry;Intact 3/10/2020  8:00 PM   Dressing Intervention Integrity maintained 3/10/2020  8:00 PM   Drainage Serosanguineous 3/10/2020  9:00 AM   Status To bulb suction 3/10/2020  8:00 PM   Output (mL) 10 mL 3/10/2020  4:00 PM            Closed/Suction Drain 03/03/20 1601 Left;Inferior Back Bulb 15 Fr. (Active)   Site Description Unable to view 3/10/2020  8:00 PM   Dressing Type Transparent (Tegaderm) 3/10/2020  8:00 PM   Dressing Status Clean;Dry;Intact 3/10/2020  8:00 PM   Dressing Intervention Integrity maintained 3/10/2020  8:00 PM   Drainage Serosanguineous 3/10/2020  8:00 PM   Status To bulb suction 3/10/2020  8:00 PM   Output (mL) 80 mL 3/7/2020  2:19 AM       Neurosurgery Physical Exam    General: well developed, well nourished, no distress.   Head: normocephalic, atraumatic  Neck: No tracheal deviation. No palpable masses.   Neurologic: Alert and oriented. Thought content appropriate.  GCS: Motor: 6/Verbal: 5/Eyes: 4 GCS Total: 15  Mental Status: Awake, Alert, Oriented x 4  Language: No aphasia  Speech: No dysarthria  Cranial nerves: face symmetric, tongue midline, CN II-XII grossly intact.   Eyes: pupils equal, round, reactive to light with accomodation, EOMI.   Ears: No drainage.   Pulmonary: normal respirations, no signs of respiratory distress  Abdomen: soft, non-distended, not tender to palpation  Sensory: intact to light touch throughout  Motor Strength: Moves all extremities spontaneously with good tone. Pain limited weakness in proximal RLE otherwise full  strength upper and lower extremities. No abnormal movements seen.     Strength  Deltoids Triceps Biceps Wrist Extension Wrist Flexion Hand    Upper: R 5/5 5/5 5/5 5/5 5/5 5/5    L 5/5 5/5 5/5 5/5 5/5 5/5     Iliopsoas Quadriceps Knee  Flexion Tibialis  anterior Gastro- cnemius EHL   Lower: R 4-/5 4-/5 4-/5 5/5 5/5 5/5    L 5/5 4-/5 5/5 5/5 5/5 5/5     Vascular: Pulses 2+ and symmetric radial and dorsalis pedis. No LE edema.   Skin: Skin is warm, dry and intact.    Incisional wound vac in place, 2 YRN drains in place.       Significant Labs:  No results for input(s): GLU, NA, K, CL, CO2, BUN, CREATININE, CALCIUM, MG in the last 48 hours.  No results for input(s): WBC, HGB, HCT, PLT in the last 48 hours.  No results for input(s): LABPT, INR, APTT in the last 48 hours.  Microbiology Results (last 7 days)     ** No results found for the last 168 hours. **        Recent Lab Results       03/11/20  1141   03/11/20  0747   03/10/20  2217   03/10/20  1638        POCT Glucose 100 151 84 105         All pertinent labs from the last 24 hours have been reviewed.    Significant Diagnostics:  I have reviewed all pertinent imaging results/findings within the past 24 hours.

## 2020-03-12 LAB
ALBUMIN SERPL BCP-MCNC: 2.2 G/DL (ref 3.5–5.2)
ALP SERPL-CCNC: 105 U/L (ref 55–135)
ALT SERPL W/O P-5'-P-CCNC: 40 U/L (ref 10–44)
ANION GAP SERPL CALC-SCNC: 7 MMOL/L (ref 8–16)
AST SERPL-CCNC: 53 U/L (ref 10–40)
BASOPHILS # BLD AUTO: 0.05 K/UL (ref 0–0.2)
BASOPHILS NFR BLD: 0.6 % (ref 0–1.9)
BILIRUB SERPL-MCNC: 0.3 MG/DL (ref 0.1–1)
BUN SERPL-MCNC: 11 MG/DL (ref 8–23)
CALCIUM SERPL-MCNC: 8.7 MG/DL (ref 8.7–10.5)
CHLORIDE SERPL-SCNC: 104 MMOL/L (ref 95–110)
CO2 SERPL-SCNC: 29 MMOL/L (ref 23–29)
CREAT SERPL-MCNC: 0.9 MG/DL (ref 0.5–1.4)
DIFFERENTIAL METHOD: ABNORMAL
EOSINOPHIL # BLD AUTO: 0.2 K/UL (ref 0–0.5)
EOSINOPHIL NFR BLD: 2 % (ref 0–8)
ERYTHROCYTE [DISTWIDTH] IN BLOOD BY AUTOMATED COUNT: 13.5 % (ref 11.5–14.5)
EST. GFR  (AFRICAN AMERICAN): >60 ML/MIN/1.73 M^2
EST. GFR  (NON AFRICAN AMERICAN): >60 ML/MIN/1.73 M^2
GLUCOSE SERPL-MCNC: 106 MG/DL (ref 70–110)
HCT VFR BLD AUTO: 29 % (ref 40–54)
HGB BLD-MCNC: 8.8 G/DL (ref 14–18)
IMM GRANULOCYTES # BLD AUTO: 0.26 K/UL (ref 0–0.04)
IMM GRANULOCYTES NFR BLD AUTO: 3.2 % (ref 0–0.5)
LYMPHOCYTES # BLD AUTO: 2.5 K/UL (ref 1–4.8)
LYMPHOCYTES NFR BLD: 30.2 % (ref 18–48)
MCH RBC QN AUTO: 27.3 PG (ref 27–31)
MCHC RBC AUTO-ENTMCNC: 30.3 G/DL (ref 32–36)
MCV RBC AUTO: 90 FL (ref 82–98)
MONOCYTES # BLD AUTO: 0.9 K/UL (ref 0.3–1)
MONOCYTES NFR BLD: 11.2 % (ref 4–15)
NEUTROPHILS # BLD AUTO: 4.3 K/UL (ref 1.8–7.7)
NEUTROPHILS NFR BLD: 52.8 % (ref 38–73)
NRBC BLD-RTO: 0 /100 WBC
PLATELET # BLD AUTO: 280 K/UL (ref 150–350)
PMV BLD AUTO: 10.7 FL (ref 9.2–12.9)
POCT GLUCOSE: 104 MG/DL (ref 70–110)
POCT GLUCOSE: 104 MG/DL (ref 70–110)
POCT GLUCOSE: 118 MG/DL (ref 70–110)
POCT GLUCOSE: 148 MG/DL (ref 70–110)
POTASSIUM SERPL-SCNC: 4.2 MMOL/L (ref 3.5–5.1)
PROT SERPL-MCNC: 5.6 G/DL (ref 6–8.4)
RBC # BLD AUTO: 3.22 M/UL (ref 4.6–6.2)
SODIUM SERPL-SCNC: 140 MMOL/L (ref 136–145)
WBC # BLD AUTO: 8.19 K/UL (ref 3.9–12.7)

## 2020-03-12 PROCEDURE — 63600175 PHARM REV CODE 636 W HCPCS: Performed by: PHYSICIAN ASSISTANT

## 2020-03-12 PROCEDURE — 25000003 PHARM REV CODE 250: Performed by: STUDENT IN AN ORGANIZED HEALTH CARE EDUCATION/TRAINING PROGRAM

## 2020-03-12 PROCEDURE — 25000003 PHARM REV CODE 250: Performed by: PHYSICIAN ASSISTANT

## 2020-03-12 PROCEDURE — 99024 POSTOP FOLLOW-UP VISIT: CPT | Mod: POP,,, | Performed by: PHYSICIAN ASSISTANT

## 2020-03-12 PROCEDURE — 85025 COMPLETE CBC W/AUTO DIFF WBC: CPT

## 2020-03-12 PROCEDURE — 99024 PR POST-OP FOLLOW-UP VISIT: ICD-10-PCS | Mod: POP,,, | Performed by: PHYSICIAN ASSISTANT

## 2020-03-12 PROCEDURE — 11000001 HC ACUTE MED/SURG PRIVATE ROOM

## 2020-03-12 PROCEDURE — 97116 GAIT TRAINING THERAPY: CPT

## 2020-03-12 PROCEDURE — 80053 COMPREHEN METABOLIC PANEL: CPT

## 2020-03-12 PROCEDURE — 97530 THERAPEUTIC ACTIVITIES: CPT

## 2020-03-12 PROCEDURE — 36415 COLL VENOUS BLD VENIPUNCTURE: CPT

## 2020-03-12 RX ORDER — BACITRACIN 500 [USP'U]/G
OINTMENT TOPICAL 2 TIMES DAILY
Status: DISCONTINUED | OUTPATIENT
Start: 2020-03-12 | End: 2020-03-17

## 2020-03-12 RX ADMIN — CEFAZOLIN 2 G: 1 INJECTION, POWDER, FOR SOLUTION INTRAMUSCULAR; INTRAVENOUS at 09:03

## 2020-03-12 RX ADMIN — OXYCODONE HYDROCHLORIDE 15 MG: 10 TABLET ORAL at 08:03

## 2020-03-12 RX ADMIN — LISINOPRIL 5 MG: 5 TABLET ORAL at 09:03

## 2020-03-12 RX ADMIN — HYDROMORPHONE HYDROCHLORIDE 0.5 MG: 1 INJECTION, SOLUTION INTRAMUSCULAR; INTRAVENOUS; SUBCUTANEOUS at 04:03

## 2020-03-12 RX ADMIN — HEPARIN SODIUM 5000 UNITS: 5000 INJECTION, SOLUTION INTRAVENOUS; SUBCUTANEOUS at 01:03

## 2020-03-12 RX ADMIN — FERROUS SULFATE TAB EC 325 MG (65 MG FE EQUIVALENT) 325 MG: 325 (65 FE) TABLET DELAYED RESPONSE at 11:03

## 2020-03-12 RX ADMIN — HYDROMORPHONE HYDROCHLORIDE 0.5 MG: 1 INJECTION, SOLUTION INTRAMUSCULAR; INTRAVENOUS; SUBCUTANEOUS at 11:03

## 2020-03-12 RX ADMIN — HEPARIN SODIUM 5000 UNITS: 5000 INJECTION, SOLUTION INTRAVENOUS; SUBCUTANEOUS at 10:03

## 2020-03-12 RX ADMIN — METHOCARBAMOL TABLETS 750 MG: 750 TABLET, COATED ORAL at 10:03

## 2020-03-12 RX ADMIN — HYDROMORPHONE HYDROCHLORIDE 0.5 MG: 1 INJECTION, SOLUTION INTRAMUSCULAR; INTRAVENOUS; SUBCUTANEOUS at 09:03

## 2020-03-12 RX ADMIN — METHOCARBAMOL TABLETS 750 MG: 750 TABLET, COATED ORAL at 01:03

## 2020-03-12 RX ADMIN — OXYCODONE HYDROCHLORIDE 10 MG: 10 TABLET ORAL at 01:03

## 2020-03-12 RX ADMIN — PREGABALIN 150 MG: 150 CAPSULE ORAL at 09:03

## 2020-03-12 RX ADMIN — PREGABALIN 150 MG: 150 CAPSULE ORAL at 10:03

## 2020-03-12 RX ADMIN — LIDOCAINE 2 PATCH: 50 PATCH TOPICAL at 01:03

## 2020-03-12 RX ADMIN — CEFAZOLIN 2 G: 1 INJECTION, POWDER, FOR SOLUTION INTRAMUSCULAR; INTRAVENOUS at 04:03

## 2020-03-12 RX ADMIN — CEFAZOLIN 2 G: 1 INJECTION, POWDER, FOR SOLUTION INTRAMUSCULAR; INTRAVENOUS at 12:03

## 2020-03-12 RX ADMIN — PREGABALIN 150 MG: 150 CAPSULE ORAL at 01:03

## 2020-03-12 RX ADMIN — HEPARIN SODIUM 5000 UNITS: 5000 INJECTION, SOLUTION INTRAVENOUS; SUBCUTANEOUS at 05:03

## 2020-03-12 RX ADMIN — OXYCODONE HYDROCHLORIDE 15 MG: 10 TABLET ORAL at 12:03

## 2020-03-12 RX ADMIN — SENNOSIDES AND DOCUSATE SODIUM 1 TABLET: 8.6; 5 TABLET ORAL at 09:03

## 2020-03-12 RX ADMIN — FAMOTIDINE 40 MG: 20 TABLET, FILM COATED ORAL at 10:03

## 2020-03-12 RX ADMIN — OXYCODONE HYDROCHLORIDE 15 MG: 10 TABLET ORAL at 05:03

## 2020-03-12 RX ADMIN — FERROUS SULFATE TAB EC 325 MG (65 MG FE EQUIVALENT) 325 MG: 325 (65 FE) TABLET DELAYED RESPONSE at 09:03

## 2020-03-12 RX ADMIN — SENNOSIDES AND DOCUSATE SODIUM 1 TABLET: 8.6; 5 TABLET ORAL at 10:03

## 2020-03-12 RX ADMIN — METHOCARBAMOL TABLETS 750 MG: 750 TABLET, COATED ORAL at 09:03

## 2020-03-12 NOTE — PLAN OF CARE
Pt is progressing towards goals as expected. Goals remain updated, continue with current POC.     Katy Deutsch, PT, DPT  3/12/2020      Problem: Physical Therapy Goal  Goal: Physical Therapy Goal  Description  Goals to be met by: 3/14/2020, goals extended to 3/22/2020    Patient will increase functional independence with mobility by performin. Supine to sit with Moderate Assistance - goal met 3/11/2020   Updated: CGA  2. Sit to supine with Moderate Assistance - goal met 3/12/2020   Updated: CGA  3. Rolling to Left and Right with Moderate Assistance.  4. Sit to stand transfer with Maximum Assistance - goal met 3/12/2020   Updated: Mitch  5. Bed to chair transfer with Maximum Assistance  6. Gait  x 15 feet with Maximum Assistance using RW.   7. Lower extremity exercise program x15 reps per handout, with assistance as needed       Outcome: Ongoing, Progressing

## 2020-03-12 NOTE — SUBJECTIVE & OBJECTIVE
Interval History: NAEON. Strength and tolerance continues to improve daily. Pain controlled with current regimen. Denies worsening pain, weakness, paresthesias, b/b dysfunction. L YRN drain removed by plastics today. Medically stable for discharge.       Medications:  Continuous Infusions:  Scheduled Meds:   bacitracin   Topical (Top) BID    ceFAZolin (ANCEF) IVPB  2 g Intravenous Q8H    famotidine  40 mg Oral QHS    ferrous sulfate  325 mg Oral BID    heparin (porcine)  5,000 Units Subcutaneous Q8H    lidocaine  2 patch Transdermal Q24H    lisinopriL  5 mg Oral Daily    methocarbamoL  750 mg Oral TID    pregabalin  150 mg Oral TID    senna-docusate 8.6-50 mg  1 tablet Oral BID     PRN Meds:aluminum-magnesium hydroxide-simethicone, bisacodyL, Dextrose 10% Bolus, Dextrose 10% Bolus, fentaNYL, glucagon (human recombinant), glucose, glucose, HYDROmorphone, insulin aspart U-100, labetalol, midazolam, ondansetron, oxyCODONE, oxyCODONE, promethazine (PHENERGAN) IVPB, sodium chloride 0.9%       Objective:     Weight: 98.4 kg (217 lb)  Body mass index is 28.63 kg/m².  Vital Signs (Most Recent):  Temp: 98.3 °F (36.8 °C) (03/12/20 1545)  Pulse: 96 (03/12/20 1545)  Resp: 18 (03/12/20 1545)  BP: (!) 140/65 (03/12/20 1545)  SpO2: (!) 94 % (03/12/20 1545) Vital Signs (24h Range):  Temp:  [97.8 °F (36.6 °C)-99.3 °F (37.4 °C)] 98.3 °F (36.8 °C)  Pulse:  [] 96  Resp:  [16-18] 18  SpO2:  [92 %-98 %] 94 %  BP: (102-140)/(51-70) 140/65     Date 03/12/20 0700 - 03/13/20 0659   Shift 7025-8467 0574-7635 2383-3544 24 Hour Total   INTAKE   P.O. 960 480  1440   Shift Total(mL/kg) 960(9.8) 480(4.9)  1440(14.6)   OUTPUT   Urine(mL/kg/hr) 550(0.7) 500  1050   Drains 30 0  30   Shift Total(mL/kg) 580(5.9) 500(5.1)  1080(11)   Weight (kg) 98.4 98.4 98.4 98.4                        Closed/Suction Drain 03/03/20 1559 Left;Superior Back Accordion 10 Fr. (Active)   Site Description Unable to view 3/12/2020  6:30 PM   Dressing Type  Transparent (Tegaderm) 3/10/2020  8:00 PM   Dressing Status Clean;Dry;Intact 3/10/2020  8:00 PM   Dressing Intervention Integrity maintained 3/10/2020  9:00 AM   Drainage Serosanguineous 3/10/2020  9:00 AM   Status To bulb suction 3/11/2020  7:28 PM   Output (mL) 0 mL 3/12/2020  4:00 PM            Closed/Suction Drain 03/03/20 1600 Right;Superior Back Accordion 10 Fr. (Active)   Site Description Unable to view 3/12/2020  6:30 PM   Dressing Type Transparent (Tegaderm) 3/10/2020  8:00 PM   Dressing Status Clean;Dry;Intact 3/10/2020  8:00 PM   Dressing Intervention Integrity maintained 3/10/2020  8:00 PM   Drainage Serosanguineous 3/10/2020  9:00 AM   Status To bulb suction 3/11/2020  7:28 PM   Output (mL) 0 mL 3/12/2020  4:00 PM            Closed/Suction Drain 03/03/20 1601 Left;Inferior Back Bulb 15 Fr. (Active)   Site Description Unable to view 3/12/2020  6:30 PM   Dressing Type Transparent (Tegaderm) 3/10/2020  8:00 PM   Dressing Status Clean;Dry;Intact 3/10/2020  8:00 PM   Dressing Intervention Integrity maintained 3/10/2020  8:00 PM   Drainage Serosanguineous 3/10/2020  8:00 PM   Status To bulb suction 3/10/2020  8:00 PM   Output (mL) 80 mL 3/7/2020  2:19 AM       Neurosurgery Physical Exam    General: well developed, well nourished, no distress.   Head: normocephalic, atraumatic  Neck: No tracheal deviation. No palpable masses.   Neurologic: Alert and oriented. Thought content appropriate.  GCS: Motor: 6/Verbal: 5/Eyes: 4 GCS Total: 15  Mental Status: Awake, Alert, Oriented x 4  Language: No aphasia  Speech: No dysarthria  Cranial nerves: face symmetric, tongue midline, CN II-XII grossly intact.   Eyes: pupils equal, round, reactive to light with accomodation, EOMI.   Ears: No drainage.   Pulmonary: normal respirations, no signs of respiratory distress  Abdomen: soft, non-distended, not tender to palpation  Sensory: intact to light touch throughout  Motor Strength: Moves all extremities spontaneously with good  tone.  Full strength upper and lower extremities. No abnormal movements seen.     Strength  Deltoids Triceps Biceps Wrist Extension Wrist Flexion Hand    Upper: R 5/5 5/5 5/5 5/5 5/5 5/5    L 5/5 5/5 5/5 5/5 5/5 5/5     Iliopsoas Quadriceps Knee  Flexion Tibialis  anterior Gastro- cnemius EHL   Lower: R 5/5 5/5 5/5 5/5 5/5 5/5    L 5/5 5/5 5/5 5/5 5/5 5/5     Vascular: Pulses 2+ and symmetric radial and dorsalis pedis. No LE edema.   Skin: Skin is warm, dry and intact.    Incision c/d/I with skin edges well approximated with sutures. No surrounding erythema or edema. No drainage from incision. No palpable underlying fluid collection.      Significant Labs:  Recent Labs   Lab 03/12/20  0336         K 4.2      CO2 29   BUN 11   CREATININE 0.9   CALCIUM 8.7     Recent Labs   Lab 03/12/20  0336   WBC 8.19   HGB 8.8*   HCT 29.0*        No results for input(s): LABPT, INR, APTT in the last 48 hours.  Microbiology Results (last 7 days)     ** No results found for the last 168 hours. **        Recent Lab Results       03/12/20  1613   03/12/20  1106   03/12/20  0716   03/12/20  0336   03/11/20  2049        Albumin       2.2       Alkaline Phosphatase       105       ALT       40       Anion Gap       7       AST       53       Baso #       0.05       Basophil%       0.6       BILIRUBIN TOTAL       0.3  Comment:  For infants and newborns, interpretation of results should be based  on gestational age, weight and in agreement with clinical  observations.  Premature Infant recommended reference ranges:  Up to 24 hours.............<8.0 mg/dL  Up to 48 hours............<12.0 mg/dL  3-5 days..................<15.0 mg/dL  6-29 days.................<15.0 mg/dL         BUN, Bld       11       Calcium       8.7       Chloride       104       CO2       29       Creatinine       0.9       Differential Method       Automated       eGFR if        >60.0       eGFR if non         >60.0  Comment:  Calculation used to obtain the estimated glomerular filtration  rate (eGFR) is the CKD-EPI equation.          Eos #       0.2       Eosinophil%       2.0       Glucose       106       Gran # (ANC)       4.3       Gran%       52.8       Hematocrit       29.0       Hemoglobin       8.8       Immature Grans (Abs)       0.26  Comment:  Mild elevation in immature granulocytes is non specific and   can be seen in a variety of conditions including stress response,   acute inflammation, trauma and pregnancy. Correlation with other   laboratory and clinical findings is essential.         Immature Granulocytes       3.2       Lymph #       2.5       Lymph%       30.2       MCH       27.3       MCHC       30.3       MCV       90       Mono #       0.9       Mono%       11.2       MPV       10.7       nRBC       0       Platelets       280       POCT Glucose 118 104 148   106     Potassium       4.2       PROTEIN TOTAL       5.6       RBC       3.22       RDW       13.5       Sodium       140       WBC       8.19                          All pertinent labs from the last 24 hours have been reviewed.    Significant Diagnostics:  I have reviewed all pertinent imaging results/findings within the past 24 hours.

## 2020-03-12 NOTE — PLAN OF CARE
Per Francia with [facility] in [place], the record is lacking documentation needed for acceptance.  Referrals sent to multiple Skilled Nursing Facilities is [facility].

## 2020-03-12 NOTE — PLAN OF CARE
Problem: Diabetes Comorbidity  Goal: Blood Glucose Level Within Desired Range  Outcome: Ongoing, Progressing     Problem: Adult Inpatient Plan of Care  Goal: Plan of Care Review  Outcome: Ongoing, Progressing  Goal: Patient-Specific Goal (Individualization)  Description  Admit Date: 3/3    Admit Dx: spinal fusion    Past Medical History:  No date: Colitis  No date: Diabetes mellitus, type 2  No date: Diverticulosis  No date: GERD (gastroesophageal reflux disease)  No date: Hypertension    Past Surgical History:  No date: ANKLE SURGERY; Bilateral  10/30/2019: ANTERIOR CERVICAL DISCECTOMY W/ FUSION; N/A      Comment:  Procedure: C3-4, C4-5 ACDF;  Surgeon: Francis Alan MD;               Location: [facility] OR 2ND FLR;  Service: Neurosurgery;                 Laterality: N/A;  C3-4, C4-5 ACDFAnesthesia:                 GeneralRad:  C-ArmNM:  EMG, SEP, & MEPBlood:  Type                & ScreenPosition:  SupineBed:  Regular slider                bedHeadrest:  [place] & GW tongs/hanging                weightsMisc:Small vivigenNerve root retractor                (DePuy)Disposable #2 KerrisonInterbody  No date: BACK SURGERY  No date: COLONOSCOPY  3/3/2020: CREATION OF MUSCLE ROTATIONAL FLAP      Comment:  Procedure: CREATION, FLAP, MUSCLE ROTATION;  Surgeon:                Francis Alan MD;  [facility];  Service:                Neurosurgery;;  No date: lower back surgery  3/3/2020: LUMBAR LAMINECTOMY WITH FUSION; N/A      Comment:  Procedure: T10-Pelvis Fusion with L4 PSO **AIRO**                Co-Surgeon: Roberto Parkinson;  Surgeon: Francis Alan MD;                 [facility];  Service: Neurosurgery;                 Laterality: N/A;  **AIRO**CELL SAVER**Co-Surgeon:                Roberto ParkinsonNM: EMG, SEP, MEPPosition: ProneBed:                Shaji 4 post, prone pillowBlood: Type & Hold 2                unitsRad: C-Arm, AIROVendor: DepuyMisc: Vivigen,                Infuse, Cement (Depuy)[place]  No  date: NECK SURGERY  No date: SPINE SURGERY  No date: UPPER GASTROINTESTINAL ENDOSCOPY    Individualization:   1. Pt likes dim lights    Restraints: (date/time/why or N/A) na         Outcome: Ongoing, Progressing  Goal: Absence of Hospital-Acquired Illness or Injury  Outcome: Ongoing, Progressing  Goal: Optimal Comfort and Wellbeing  Outcome: Ongoing, Progressing  Goal: Readiness for Transition of Care  Outcome: Ongoing, Progressing  Goal: Rounds/Family Conference  Outcome: Ongoing, Progressing     Problem: Fall Injury Risk  Goal: Absence of Fall and Fall-Related Injury  Outcome: Ongoing, Progressing     Problem: Infection  Goal: Infection Symptom Resolution  Outcome: Ongoing, Progressing     Problem: Skin Injury Risk Increased  Goal: Skin Health and Integrity  Outcome: Ongoing, Progressing     Problem: Hypertension Comorbidity  Goal: Blood Pressure in Desired Range  Outcome: Ongoing, Progressing     Problem: Pain Chronic (Persistent) (Comorbidity Management)  Goal: Acceptable Pain Control and Functional Ability  Outcome: Ongoing, Progressing     Problem: Oral Intake Inadequate  Goal: Improved Oral Intake  Outcome: Ongoing, Progressing    Neuro - aaox4  Deficits - numbness BLE  V/S - see v/s flowsheet  Tele - NSR  Diet - diabetic  GI - no bm today  - voids  IV - x1  IV Fluids - none  Skin - incision to back with drsg changed by SANDY Calle, this am; x2 YRN drains to back to bulb suction  Others - pain meds give when requested, Alva stated no ointment will be needed for incision d/t the type of drsg that was applied this AM

## 2020-03-12 NOTE — PLAN OF CARE
03/12/20 1042   Post-Acute Status   Post-Acute Authorization Placement   Post-Acute Placement Status Referrals Sent       Referral sent to [facility] in [place].  SW in contact with CM and Medical staff. Will continue to follow and offer support as needed.     Aguilar Barone, LMSW [facility]   Ext. [phone]

## 2020-03-12 NOTE — PT/OT/SLP PROGRESS
Physical Therapy Treatment    Patient Name:  Garcia Renteria   MRN:  [id number]    Recommendations:     Discharge Recommendations:  rehabilitation facility   Discharge Equipment Recommendations: (TBD by next level of care)   Barriers to discharge: Inaccessible home and Decreased caregiver support    Assessment:     Garcia Renteria is a 70 y.o. male admitted with a medical diagnosis of Acquired spondylolisthesis of thoracolumbar vertebra.  He presents with the following impairments/functional limitations:  weakness, impaired endurance, impaired self care skills, impaired functional mobilty, gait instability, impaired balance, impaired sensation, decreased lower extremity function, pain, decreased safety awareness, orthopedic precautions. Pt tolerated session well and progressed took small steps with modA. Pt stood with maximal assistance and a RW.    Pt would benefit from intensive collaborative rehabilitation for: Dynamic/static standing/sitting balance through skilled balance training, strengthening with the use of skilled therapeutic exercises interventions, mobility and safety training to ensure safe discharge home through skilled patient and caregiver education home management training, mobility through community re-integration training and mobility through adaptive equipment training. Pt highly motivated to return to independent PLOF and can tolerate 3+hours of therapy. Pt continues to benefit from a collaborative PT/OT program to improve quality of life and focus on recovery of impairments.      Rehab Prognosis: Good; patient would benefit from acute skilled PT services to address these deficits and reach maximum level of function.    Recent Surgery: Procedure(s) (LRB):  Block, Nerve (Bilateral) 2 Days Post-Op    Plan:     During this hospitalization, patient to be seen 4 x/week to address the identified rehab impairments via gait training, therapeutic activities, therapeutic exercises, neuromuscular  re-education and progress toward the following goals:    · Plan of Care Expires:  04/03/20    Subjective     Chief Complaint: weak and numb thighs  Patient/Family Comments/goals: return to PLOF  Pain/Comfort:  · Pain Rating 1: (not rated)  · Location - Side 1: Bilateral  · Location - Orientation 1: generalized  · Location 1: thigh  · Pain Addressed 1: Pre-medicate for activity, Reposition, Distraction, Nurse notified  · Pain Rating Post-Intervention 1: (not rated)      Objective:     Communicated with nursing prior to session.  Patient found right sidelying with YRN drain, peripheral IV, bed alarm upon PT entry to room.     General Precautions: Standard, fall   Orthopedic Precautions:spinal precautions   Braces: TLSO     Functional Mobility:  · Bed Mobility:  ? Right sidelying to Sit: moderate assistance  ? Sit to Right Sidelying: moderate assistance and of 2 persons  · Transfers:     ? MaxA, RW; x3 trials, standing ~ 2 minutes each  · Gait: 2 steps forward, 2 steps backward, 2 steps R; modA, RW, VCing  · Balance:   · Static and Dynamic Sitting: close SBA to CGA  · Static Standing: modA with RW  · Dynamic Standing: modA for steps with RW      AM-PAC 6 CLICK MOBILITY  Turning over in bed (including adjusting bedclothes, sheets and blankets)?: 3  Sitting down on and standing up from a chair with arms (e.g., wheelchair, bedside commode, etc.): 2  Moving from lying on back to sitting on the side of the bed?: 2  Moving to and from a bed to a chair (including a wheelchair)?: 2  Need to walk in hospital room?: 2  Climbing 3-5 steps with a railing?: 1  Basic Mobility Total Score: 12       Therapeutic Activities and Exercises:  Patient educated on role of therapy, goals of session, and benefits of mobilizing. Discussed PT plan of care during hospitalization. Patient educated on calling for assistance. Patient educated on how their diagnosis impacts their mobility within PT scope of practice.   Communication board  updated.  All questions answered within PT scope of practice.    Patient left right sidelying with all lines intact, call button in reach, bed alarm on, nursing notified and spouse present.    GOALS:   Multidisciplinary Problems     Physical Therapy Goals        Problem: Physical Therapy Goal    Goal Priority Disciplines Outcome Goal Variances Interventions   Physical Therapy Goal     PT, PT/OT Ongoing, Progressing     Description:  Goals to be met by: 3/14/2020, goals extended to 3/22/2020    Patient will increase functional independence with mobility by performin. Supine to sit with Moderate Assistance - goal met 3/11/2020   Updated: CGA  2. Sit to supine with Moderate Assistance - goal met 3/12/2020   Updated: CGA  3. Rolling to Left and Right with Moderate Assistance.  4. Sit to stand transfer with Maximum Assistance - goal met 3/12/2020   Updated: Mitch  5. Bed to chair transfer with Maximum Assistance  6. Gait  x 15 feet with Maximum Assistance using RW.   7. Lower extremity exercise program x15 reps per handout, with assistance as needed                        Time Tracking:     PT Received On: 20  PT Start Time: 1517     PT Stop Time: 1545  PT Total Time (min): 28 min     Billable Minutes: Gait Training 14 and Therapeutic Activity 14    Treatment Type: Treatment  PT/PTA: PT           Katy Deutsch, PT  2020

## 2020-03-13 LAB
POCT GLUCOSE: 104 MG/DL (ref 70–110)
POCT GLUCOSE: 107 MG/DL (ref 70–110)
POCT GLUCOSE: 115 MG/DL (ref 70–110)
POCT GLUCOSE: 116 MG/DL (ref 70–110)

## 2020-03-13 PROCEDURE — 25000003 PHARM REV CODE 250: Performed by: PHYSICIAN ASSISTANT

## 2020-03-13 PROCEDURE — 11000001 HC ACUTE MED/SURG PRIVATE ROOM

## 2020-03-13 PROCEDURE — 97535 SELF CARE MNGMENT TRAINING: CPT

## 2020-03-13 PROCEDURE — 63600175 PHARM REV CODE 636 W HCPCS: Performed by: PHYSICIAN ASSISTANT

## 2020-03-13 PROCEDURE — 25000003 PHARM REV CODE 250: Performed by: STUDENT IN AN ORGANIZED HEALTH CARE EDUCATION/TRAINING PROGRAM

## 2020-03-13 PROCEDURE — 86580 TB INTRADERMAL TEST: CPT | Performed by: NEUROLOGICAL SURGERY

## 2020-03-13 PROCEDURE — 97112 NEUROMUSCULAR REEDUCATION: CPT

## 2020-03-13 PROCEDURE — 97530 THERAPEUTIC ACTIVITIES: CPT

## 2020-03-13 PROCEDURE — 30200315 PPD INTRADERMAL TEST REV CODE 302: Performed by: NEUROLOGICAL SURGERY

## 2020-03-13 PROCEDURE — 99024 PR POST-OP FOLLOW-UP VISIT: ICD-10-PCS | Mod: POP,,, | Performed by: PHYSICIAN ASSISTANT

## 2020-03-13 PROCEDURE — 99024 POSTOP FOLLOW-UP VISIT: CPT | Mod: POP,,, | Performed by: PHYSICIAN ASSISTANT

## 2020-03-13 RX ORDER — LIDOCAINE 50 MG/G
1 PATCH TOPICAL DAILY
Qty: 15 PATCH | Refills: 0
Start: 2020-03-13

## 2020-03-13 RX ORDER — PREGABALIN 150 MG/1
150 CAPSULE ORAL 3 TIMES DAILY
Qty: 90 CAPSULE | Refills: 6 | Status: SHIPPED | OUTPATIENT
Start: 2020-03-13 | End: 2022-12-28

## 2020-03-13 RX ORDER — METHOCARBAMOL 750 MG/1
750 TABLET, FILM COATED ORAL 3 TIMES DAILY
Qty: 40 TABLET | Refills: 0 | Status: SHIPPED | OUTPATIENT
Start: 2020-03-13 | End: 2020-03-24

## 2020-03-13 RX ORDER — CEFAZOLIN SODIUM 1 G/3ML
2 INJECTION, POWDER, FOR SOLUTION INTRAMUSCULAR; INTRAVENOUS EVERY 8 HOURS
Start: 2020-03-14 | End: 2021-03-30

## 2020-03-13 RX ORDER — OXYCODONE HYDROCHLORIDE 15 MG/1
15 TABLET ORAL EVERY 6 HOURS PRN
Qty: 50 TABLET | Refills: 0 | Status: SHIPPED | OUTPATIENT
Start: 2020-03-13 | End: 2020-12-02

## 2020-03-13 RX ORDER — BACITRACIN ZINC 500 UNIT/G
OINTMENT (GRAM) TOPICAL 2 TIMES DAILY
Refills: 0 | COMMUNITY
Start: 2020-03-13 | End: 2023-03-27

## 2020-03-13 RX ORDER — FERROUS SULFATE 325(65) MG
325 TABLET, DELAYED RELEASE (ENTERIC COATED) ORAL 2 TIMES DAILY
Refills: 0
Start: 2020-03-13 | End: 2020-06-08

## 2020-03-13 RX ADMIN — TUBERCULIN PURIFIED PROTEIN DERIVATIVE 5 UNITS: 5 INJECTION, SOLUTION INTRADERMAL at 03:03

## 2020-03-13 RX ADMIN — OXYCODONE HYDROCHLORIDE 15 MG: 10 TABLET ORAL at 12:03

## 2020-03-13 RX ADMIN — OXYCODONE HYDROCHLORIDE 15 MG: 10 TABLET ORAL at 03:03

## 2020-03-13 RX ADMIN — HEPARIN SODIUM 5000 UNITS: 5000 INJECTION, SOLUTION INTRAVENOUS; SUBCUTANEOUS at 05:03

## 2020-03-13 RX ADMIN — METHOCARBAMOL TABLETS 750 MG: 750 TABLET, COATED ORAL at 09:03

## 2020-03-13 RX ADMIN — OXYCODONE HYDROCHLORIDE 15 MG: 10 TABLET ORAL at 04:03

## 2020-03-13 RX ADMIN — HYDROMORPHONE HYDROCHLORIDE 0.5 MG: 1 INJECTION, SOLUTION INTRAMUSCULAR; INTRAVENOUS; SUBCUTANEOUS at 08:03

## 2020-03-13 RX ADMIN — FAMOTIDINE 40 MG: 20 TABLET, FILM COATED ORAL at 09:03

## 2020-03-13 RX ADMIN — SENNOSIDES AND DOCUSATE SODIUM 1 TABLET: 8.6; 5 TABLET ORAL at 08:03

## 2020-03-13 RX ADMIN — LIDOCAINE 2 PATCH: 50 PATCH TOPICAL at 03:03

## 2020-03-13 RX ADMIN — CEFAZOLIN 2 G: 1 INJECTION, POWDER, FOR SOLUTION INTRAMUSCULAR; INTRAVENOUS at 08:03

## 2020-03-13 RX ADMIN — FERROUS SULFATE TAB EC 325 MG (65 MG FE EQUIVALENT) 325 MG: 325 (65 FE) TABLET DELAYED RESPONSE at 09:03

## 2020-03-13 RX ADMIN — HEPARIN SODIUM 5000 UNITS: 5000 INJECTION, SOLUTION INTRAVENOUS; SUBCUTANEOUS at 09:03

## 2020-03-13 RX ADMIN — METHOCARBAMOL TABLETS 750 MG: 750 TABLET, COATED ORAL at 03:03

## 2020-03-13 RX ADMIN — CEFAZOLIN 2 G: 1 INJECTION, POWDER, FOR SOLUTION INTRAMUSCULAR; INTRAVENOUS at 12:03

## 2020-03-13 RX ADMIN — CEFAZOLIN 2 G: 1 INJECTION, POWDER, FOR SOLUTION INTRAMUSCULAR; INTRAVENOUS at 03:03

## 2020-03-13 RX ADMIN — ALUMINUM HYDROXIDE, MAGNESIUM HYDROXIDE, AND SIMETHICONE 30 ML: 200; 200; 20 SUSPENSION ORAL at 12:03

## 2020-03-13 RX ADMIN — FERROUS SULFATE TAB EC 325 MG (65 MG FE EQUIVALENT) 325 MG: 325 (65 FE) TABLET DELAYED RESPONSE at 08:03

## 2020-03-13 RX ADMIN — SENNOSIDES AND DOCUSATE SODIUM 1 TABLET: 8.6; 5 TABLET ORAL at 09:03

## 2020-03-13 RX ADMIN — OXYCODONE HYDROCHLORIDE 15 MG: 10 TABLET ORAL at 11:03

## 2020-03-13 RX ADMIN — PREGABALIN 150 MG: 150 CAPSULE ORAL at 09:03

## 2020-03-13 RX ADMIN — PREGABALIN 150 MG: 150 CAPSULE ORAL at 03:03

## 2020-03-13 RX ADMIN — HEPARIN SODIUM 5000 UNITS: 5000 INJECTION, SOLUTION INTRAVENOUS; SUBCUTANEOUS at 03:03

## 2020-03-13 RX ADMIN — OXYCODONE HYDROCHLORIDE 15 MG: 10 TABLET ORAL at 09:03

## 2020-03-13 NOTE — PLAN OF CARE
Pt is progressing towards goals as expected. Goals remain appropriate continue with current POC.     Katy Deutsch, PT, DPT  3/13/2020      Problem: Physical Therapy Goal  Goal: Physical Therapy Goal  Description  Goals to be met by: 3/14/2020, goals extended to 3/22/2020    Patient will increase functional independence with mobility by performin. Supine to sit with Moderate Assistance - goal met 3/11/2020   Updated: CGA  2. Sit to supine with Moderate Assistance - goal met 3/12/2020   Updated: CGA  3. Rolling to Left and Right with Moderate Assistance.  4. Sit to stand transfer with Maximum Assistance - goal met 3/12/2020   Updated: Mitch  5. Bed to chair transfer with Maximum Assistance  6. Gait  x 15 feet with Maximum Assistance using RW.   7. Lower extremity exercise program x15 reps per handout, with assistance as needed       Outcome: Ongoing, Progressing

## 2020-03-13 NOTE — SUBJECTIVE & OBJECTIVE
Interval History: NAEON. Strength stable. Tolerated PT/OT session well today, pain limited weakness in proximal RLE noted after session today. Motivated to continue progression. XR done. Patient denies worsening weakness, paresthesias, b/b dysfunction.      Medications:  Continuous Infusions:  Scheduled Meds:   bacitracin   Topical (Top) BID    ceFAZolin (ANCEF) IVPB  2 g Intravenous Q8H    famotidine  40 mg Oral QHS    ferrous sulfate  325 mg Oral BID    heparin (porcine)  5,000 Units Subcutaneous Q8H    lidocaine  2 patch Transdermal Q24H    lisinopriL  5 mg Oral Daily    methocarbamoL  750 mg Oral TID    pregabalin  150 mg Oral TID    senna-docusate 8.6-50 mg  1 tablet Oral BID     PRN Meds:aluminum-magnesium hydroxide-simethicone, bisacodyL, Dextrose 10% Bolus, Dextrose 10% Bolus, fentaNYL, glucagon (human recombinant), glucose, glucose, HYDROmorphone, insulin aspart U-100, labetalol, midazolam, ondansetron, oxyCODONE, oxyCODONE, promethazine (PHENERGAN) IVPB, sodium chloride 0.9%       Objective:     Weight: 98.4 kg (217 lb)  Body mass index is 28.63 kg/m².  Vital Signs (Most Recent):  Temp: 99.3 °F (37.4 °C) (03/13/20 1551)  Pulse: 95 (03/13/20 1551)  Resp: 18 (03/13/20 1551)  BP: 127/71 (03/13/20 1551)  SpO2: (!) 94 % (03/13/20 1551) Vital Signs (24h Range):  Temp:  [98.4 °F (36.9 °C)-100.1 °F (37.8 °C)] 99.3 °F (37.4 °C)  Pulse:  [85-96] 95  Resp:  [15-18] 18  SpO2:  [92 %-95 %] 94 %  BP: ()/(53-71) 127/71     Date 03/13/20 0700 - 03/14/20 0659   Shift 8667-5628 5193-6393 4231-6369 24 Hour Total   INTAKE   P.O. 480   480   Shift Total(mL/kg) 480(4.9)   480(4.9)   OUTPUT   Shift Total(mL/kg)       Weight (kg) 98.4 98.4 98.4 98.4                        Closed/Suction Drain 03/03/20 1601 Left;Inferior Back Bulb 15 Fr. (Active)   Site Description Unable to view 3/13/2020  8:00 AM   Dressing Type Transparent (Tegaderm) 3/12/2020  8:10 PM   Dressing Status Clean;Dry;Intact 3/12/2020  8:10 PM    Dressing Intervention Integrity maintained 3/12/2020  8:10 PM   Drainage Serosanguineous 3/12/2020  8:10 PM   Status To bulb suction 3/12/2020  8:10 PM   Output (mL) 80 mL 3/7/2020  2:19 AM       Neurosurgery Physical Exam    General: well developed, well nourished, no distress.   Head: normocephalic, atraumatic  Neck: No tracheal deviation. No palpable masses.   Neurologic: Alert and oriented. Thought content appropriate.  GCS: Motor: 6/Verbal: 5/Eyes: 4 GCS Total: 15  Mental Status: Awake, Alert, Oriented x 4  Language: No aphasia  Speech: No dysarthria  Cranial nerves: face symmetric, tongue midline, CN II-XII grossly intact.   Eyes: pupils equal, round, reactive to light with accomodation, EOMI.   Ears: No drainage.   Pulmonary: normal respirations, no signs of respiratory distress  Abdomen: soft, non-distended, not tender to palpation  Sensory: intact to light touch throughout  Motor Strength: Moves all extremities spontaneously with good tone.  Full strength upper and lower extremities. No abnormal movements seen.      Strength   Deltoids Triceps Biceps Wrist Extension Wrist Flexion Hand    Upper: R 5/5 5/5 5/5 5/5 5/5 5/5     L 5/5 5/5 5/5 5/5 5/5 5/5       Iliopsoas Quadriceps Knee  Flexion Tibialis  anterior Gastro- cnemius EHL   Lower: R 5-/5 5/5 5-/5 5/5 5/5 5/5     L 5/5 5/5 5/5 5/5 5/5 5/5      Vascular: Pulses 2+ and symmetric radial and dorsalis pedis. No LE edema.   Skin: Skin is warm, dry and intact.     Incision c/d/I with skin edges well approximated with sutures. No surrounding erythema or edema. No drainage from incision. No palpable underlying fluid collection.      Significant Labs:  Recent Labs   Lab 03/12/20  0336         K 4.2      CO2 29   BUN 11   CREATININE 0.9   CALCIUM 8.7     Recent Labs   Lab 03/12/20  0336   WBC 8.19   HGB 8.8*   HCT 29.0*        No results for input(s): LABPT, INR, APTT in the last 48 hours.  Microbiology Results (last 7 days)     **  No results found for the last 168 hours. **        Recent Lab Results       03/13/20  1551   03/13/20  1104   03/13/20  0702   03/12/20  2232        POCT Glucose 115 107 116 104         All pertinent labs from the last 24 hours have been reviewed.    Significant Diagnostics:  I have reviewed all pertinent imaging results/findings within the past 24 hours.

## 2020-03-13 NOTE — PLAN OF CARE
Problem: Diabetes Comorbidity  Goal: Blood Glucose Level Within Desired Range  Outcome: Ongoing, Progressing     Problem: Adult Inpatient Plan of Care  Goal: Plan of Care Review  Outcome: Ongoing, Progressing  Flowsheets (Taken 3/13/2020 0717)  Plan of Care Reviewed With: patient; spouse  Goal: Patient-Specific Goal (Individualization)  Description  Admit Date: 3/3    Admit Dx: spinal fusion    Past Medical History:  No date: Colitis  No date: Diabetes mellitus, type 2  No date: Diverticulosis  No date: GERD (gastroesophageal reflux disease)  No date: Hypertension    Past Surgical History:  No date: ANKLE SURGERY; Bilateral  10/30/2019: ANTERIOR CERVICAL DISCECTOMY W/ FUSION; N/A      Comment:  Procedure: C3-4, C4-5 ACDF;  Surgeon: Francis Alan MD;               Location: [facility] OR [address] [place] [address];  Service: Neurosurgery;                 Laterality: N/A;  C3-4, C4-5 ACDFAnesthesia:                 GeneralRad:  C-ArmNM:  EMG, SEP, & MEPBlood:  Type                & ScreenPosition:  SupineBed:  Regular slider                bedHeadrest:  [place] & GW tongs/hanging                weightsMisc:Small vivigenNerve root retractor                (LoyaltyLion)Disposable #2 KerrisonInterbody  No date: BACK SURGERY  No date: COLONOSCOPY  3/3/2020: CREATION OF MUSCLE ROTATIONAL FLAP      Comment:  Procedure: CREATION, FLAP, MUSCLE ROTATION;  Surgeon:                Francis Alan MD;  Location: [address];  Service:                Neurosurgery;;  No date: lower back surgery  3/3/2020: LUMBAR LAMINECTOMY WITH FUSION; N/A      Comment:  Procedure: T10-Pelvis Fusion with L4 PSO **AIRO**                Co-Surgeon: Roberto Parkinson;  Surgeon: Francis Alan MD;                 Location: [address];  Service: Neurosurgery;                 Laterality: N/A;  **AIRO**CELL SAVER**Co-Surgeon:                Roberto ParkinsonNM: EMG, SEP, MEPPosition: ProneBed:                Shaji 4 post, prone pillowBlood: Type & Hold 2                unitsRad: C-Arm,  AIROVendor: DepuyMisc: Vivigen,                Infuse, Cement (Depuy)[place]  No date: NECK SURGERY  No date: SPINE SURGERY  No date: UPPER GASTROINTESTINAL ENDOSCOPY    Individualization:   1. Pt likes dim lights    Restraints: (date/time/why or N/A) na         Outcome: Ongoing, Progressing  Goal: Absence of Hospital-Acquired Illness or Injury  Outcome: Ongoing, Progressing  Goal: Optimal Comfort and Wellbeing  Outcome: Ongoing, Progressing  Goal: Readiness for Transition of Care  Outcome: Ongoing, Progressing  Goal: Rounds/Family Conference  Outcome: Ongoing, Progressing     Problem: Fall Injury Risk  Goal: Absence of Fall and Fall-Related Injury  Outcome: Ongoing, Progressing     Problem: Infection  Goal: Infection Symptom Resolution  Outcome: Ongoing, Progressing     Problem: Skin Injury Risk Increased  Goal: Skin Health and Integrity  Outcome: Ongoing, Progressing     Problem: Hypertension Comorbidity  Goal: Blood Pressure in Desired Range  Outcome: Ongoing, Progressing     Problem: Pain Chronic (Persistent) (Comorbidity Management)  Goal: Acceptable Pain Control and Functional Ability  Outcome: Ongoing, Progressing    Pt a&ox4, describes pain in lower back 4-9/10, controlled with oxycodone and dilaudid prn. POC discussed with pt and spouse. VSS on RA. 1x fever 100.1, MD notified, no orders. Incision with dressing CDI to lower back, YRN drain had 20 mL ss output this shift. Pt went for thoracolumbar XR. Voiding in urinal. Able to turn and move self in bed, 2x assist to stand. Pt seeing.

## 2020-03-13 NOTE — ASSESSMENT & PLAN NOTE
A 70 year old with degenerative lumbar spondy and spine deformity now s/p T10 - pelvis posterior segmental instrumented fusion, T10 - L3 laminectomy for decompression, L3 pedical subtraction osteotomy (3/3).    -Patient neurologically stable on exam.   -Brace on when upright, OOB, or working with therapy. OK to remove when lying in bed.   -Postop XR lumbar spine shows satisfactory posterior alignment and positioning of posterior hardware.   -Continue R YRN management per plastic surgery. Abx while drains in place. Plan for FU 1 week after discharge for drain removal. Can sit for up to 30 minutes at meal time (3 times per day).  Head of bed flat otherwise at all times.  Wound closure tension increases in sitting position.  This restriction should be in place for 6 weeks. Aquacel  surgical island cm dressing to cover entire back incision, change every 2 days or sooner if dressing soiled. Chlorhexidine biopatches should be maintained around the YRN drain tubing at the skin level.  Biopatches should be replaced at least every 7 days or sooner if they become soiled. Can shower but should not tub soak the wounds.  No lotions, creams applied to the incision.    -Pain: Improved with multimodal agents per Anesthesia recommendations. No changes in pain after nerve block. Continue Lyrica TID. Scheduled lido patches, tylenol 1000 mg Q8, and robaxin 500 mg TID. Continue oxy prn.  -Thrombocytopenia: Resolved. Plts 280.  -Post op anemia: Stable. Continue iron for replacement. Improving.   -Transaminitis: Improving, f/u next lab draw.  -HTN: Continue home dose lisinopril and Prn labetalol.   -DM2: continue POCT glucose, SSI, diabetic diet  -GERD: continue home dose pepcid  -DVT prophylaxis: CLIFF's, SCD's, SQH  -Bowel regimen: senna BID and miralax daily. States he uses enema or suppository normally 1-2x per week at home.  -Atelectasis prevention: IS hourly while awake, PT/OT, OOB > 6 hours per day. Continue to encourage OOB mobility  and up to chair with meals.       Dispo: Pending placement. Medically stable for discharge.

## 2020-03-13 NOTE — PLAN OF CARE
Cm attempting to get patient placed in either LTAC or Skilled Nursing. Multiple referrals had been sent.  [facility] unable to accept due to not meeting criteria with regard to revenue codes as well as [facility].  Multiple skilled facilities denied unable to meet needs.  [facility] accepted and patient and spouse agreeable to go there.  Cm called [facility] and left voicemail for admissions to return call to check on getting patient to that facility.

## 2020-03-13 NOTE — PT/OT/SLP PROGRESS
Occupational Therapy   Treatment    Name: Garcia Renteria  MRN: [id number]  Admitting Diagnosis:  Acquired spondylolisthesis of thoracolumbar vertebra  3 Days Post-Op    Recommendations:     Discharge Recommendations: rehabilitation facility  Discharge Equipment Recommendations:  (TBD at next level of care)  Barriers to discharge:  None    Assessment:     Garcia Renteria is a 70 y.o. male with a medical diagnosis of Acquired spondylolisthesis of thoracolumbar vertebra.  He presents s/p T10-pelvis and L3 PSO. Pt motivated and demonstrated increased activity tolerance despite pain. Performance deficits affecting function are weakness, impaired self care skills, impaired balance, impaired endurance, impaired functional mobilty, gait instability, decreased lower extremity function, pain, impaired cardiopulmonary response to activity, decreased safety awareness.     Rehab Prognosis:  Good; patient would benefit from acute skilled OT services to address these deficits and reach maximum level of function.       Plan:     Patient to be seen 4 x/week to address the above listed problems via self-care/home management, therapeutic activities, therapeutic exercises, neuromuscular re-education  · Plan of Care Expires: 04/04/20  · Plan of Care Reviewed with: spouse    Subjective     Pain/Comfort:  · Pain Rating 1: 10/10  · Location - Side 1: Bilateral  · Location - Orientation 1: generalized  · Location 1: back  · Pain Addressed 1: Pre-medicate for activity, Reposition, Distraction  · Pain Rating Post-Intervention 1: 10/10    Objective:     Communicated with: RN prior to session.  Patient found supine with telemetry, bed alarm upon OT entry to room.    General Precautions: Standard, fall   Orthopedic Precautions:spinal precautions   Braces: TLSO     Occupational Performance:     Bed Mobility:    · Patient completed Rolling/Turning to Left with  stand by assistance and with side rail  · Patient completed Rolling/Turning to Right  with stand by assistance and with side rail  · Patient completed Scooting/Bridging with stand by assistance and to EOB  · Patient completed Supine to Sit with moderate assistance with side rail  · Patient completed Sit to Supine with moderate assistance and with side rail     Functional Mobility/Transfers:  · Patient completed Sit <> Stand Transfer with maximal assistance  with  rolling walker from EOB x 2 trials; Mod A x 2 from low b/s chair x 2 trials with RW    Activities of Daily Living:  · Grooming: contact guard assistance for oral hygiene and washing face  · Upper Body Dressing: stand by assistance doffing/donning gown; Mod A donning/doffing TLSO  · Lower Body Dressing: total assistance donning socks  · Toileting: maximal assistance for pericare seated and in standing after incontinent episode      AMPAC 6 Click ADL: 14    Treatment & Education:  Pt ed on OT POC  Pt re-ed on log rolling for bed mobility  Pt stood x 4 minutes with Minimal A for static balance during grooming tasks  Pt stood with  Minimal A for balance during dynamic activities including unilateral reaching to simulate functional tasks    Patient left in left sidelying with HOB ~11* with all lines intact, call button in reach, RN notified and spouse presentEducation:      GOALS:   Multidisciplinary Problems     Occupational Therapy Goals        Problem: Occupational Therapy Goal    Goal Priority Disciplines Outcome Interventions   Occupational Therapy Goal     OT, PT/OT Ongoing, Progressing    Description:  Updated Goals to be met by: 7 days (3/18/20)     Patient will increase functional independence with ADLs by performing:    UE Dressing with Minimal Assistance including TLSO.  LE Dressing with Moderate Assistance using AD as needed.  Grooming while seated with SBA.  Toileting from bedside commode with Moderate Assistance for hygiene and clothing management.   Supine to sit with Minimal Assistance.  Toilet transfer to bedside commode with  Minimal Assistance.  Pt will verbalize understanding of 3/3 spinal precautions without added cues.       Goals to be met by: 7 days (3/11/20)     Patient will increase functional independence with ADLs by performing:    UE Dressing with Minimal Assistance including TLSO.  LE Dressing with Moderate Assistance using AD as needed.  Grooming while standing with Contact Guard Assistance.  Toileting from bedside commode with Minimal Assistance for hygiene and clothing management.   Supine to sit with Minimal Assistance.  Toilet transfer to bedside commode with Minimal Assistance.  Pt will verbalize understanding of 3/3 spinal precautions without added cues.                       Time Tracking:     OT Date of Treatment: 03/13/20  OT Start Time: 1109  OT Stop Time: 1142  OT Total Time (min): 33 min    Billable Minutes:Self Care/Home Management 28    MELIDA Mckay  3/13/2020

## 2020-03-13 NOTE — PT/OT/SLP PROGRESS
Physical Therapy Treatment    Patient Name:  Garcia Renteria   MRN:  [id number]    Recommendations:     Discharge Recommendations:  rehabilitation facility   Discharge Equipment Recommendations: (TBD by next level of care)   Barriers to discharge: Inaccessible home and Decreased caregiver support    Assessment:     Garcia Renteria is a 70 y.o. male admitted with a medical diagnosis of Acquired spondylolisthesis of thoracolumbar vertebra.  He presents with the following impairments/functional limitations:  weakness, impaired endurance, impaired self care skills, impaired functional mobilty, impaired sensation, gait instability, impaired balance, decreased lower extremity function, decreased safety awareness, pain, orthopedic precautions. Pt tolerated session well and progressed to ambulating with therapist with modA.    Pt would benefit from intensive collaborative rehabilitation for: Dynamic/static standing/sitting balance through skilled balance training, strengthening with the use of skilled therapeutic exercises interventions, mobility and safety training to ensure safe discharge home through skilled patient and caregiver education home management training, mobility through community re-integration training and mobility through adaptive equipment training. Pt highly motivated to return to independent PLOF and can tolerate 3+hours of therapy. Pt continues to benefit from a collaborative PT/OT program to improve quality of life and focus on recovery of impairments.      Rehab Prognosis: Good; patient would benefit from acute skilled PT services to address these deficits and reach maximum level of function.    Recent Surgery: Procedure(s) (LRB):  Block, Nerve (Bilateral) 3 Days Post-Op    Plan:     During this hospitalization, patient to be seen 4 x/week to address the identified rehab impairments via gait training, therapeutic activities, therapeutic exercises, neuromuscular re-education and progress toward the  following goals:    · Plan of Care Expires:  04/03/20    Subjective     Chief Complaint: pain   Patient/Family Comments/goals: return to PLOF  Pain/Comfort:  · Pain Rating 1: 7/10  · Location - Side 1: Bilateral  · Location - Orientation 1: generalized  · Location 1: back  · Pain Addressed 1: Pre-medicate for activity, Reposition, Distraction  · Pain Rating Post-Intervention 1: 10/10      Objective:     Communicated with nursing prior to session.  Patient found R sidelying with bed alarm, peripheral IV, YRN drain, telemetry upon PT entry to room.     General Precautions: Standard, fall   Orthopedic Precautions:spinal precautions   Braces: TLSO     Functional Mobility:  · Bed Mobility:     · Rolling Left:  stand by assistance  · Rolling Right: stand by assistance  · Scooting: contact guard assistance  · Supine to Sit: moderate assistance  · Sit to Supine: moderate assistance  · Transfers:     · Sit to Stand:  maximal assistance with rolling walker, x 2 trials from EOB  · Sit to Stand: mod A x 2, RW, from low bedside chair, x 2 trials  · Bed to Chair: moderate assistance and of 2 persons with  rolling walker  using  Step Transfer  · Gait: 7 ft, RW, modA, chair following  · VCing for proper hand placement on RW and proper distance from body  · Balance:   · Static Sitting: SBA  · Dynamic Sitting: SBA and BUE support  · Static Standing: Standing x 4 minutes with RW and CGA for self-care activities with OT  · Dynamic Standing balance: Mitch with RW while performing UE activities with OT      AM-PAC 6 CLICK MOBILITY  Turning over in bed (including adjusting bedclothes, sheets and blankets)?: 3  Sitting down on and standing up from a chair with arms (e.g., wheelchair, bedside commode, etc.): 2  Moving from lying on back to sitting on the side of the bed?: 2  Moving to and from a bed to a chair (including a wheelchair)?: 2  Need to walk in hospital room?: 2  Climbing 3-5 steps with a railing?: 1  Basic Mobility Total Score:  12       Therapeutic Activities and Exercises:  Pt educated on spinal precautions and log rolling technique.  Pt donned/doffed TLSO brace with modA.  Patient educated on role of therapy, goals of session, and benefits of mobilizing. Discussed PT plan of care during hospitalization.   Patient educated on calling for assistance.  Patient educated on how their diagnosis impacts their mobility within PT scope of practice.  Communication board updated.  All questions answered within PT scope of practice.     Pt safe for sitting EOB with stand by assistance from nursing staff member with TLSO brace donned. Pt must performed log rolling technique to sit EOB. Maintain spinal precautions and plastic sx precautions (listed in plastics note).      Patient left left sidelying with all lines intact, call button in reach, bed alarm on, nursing notified and spouse present.    GOALS:   Multidisciplinary Problems     Physical Therapy Goals        Problem: Physical Therapy Goal    Goal Priority Disciplines Outcome Goal Variances Interventions   Physical Therapy Goal     PT, PT/OT Ongoing, Progressing     Description:  Goals to be met by: 3/14/2020, goals extended to 3/22/2020    Patient will increase functional independence with mobility by performin. Supine to sit with Moderate Assistance - goal met 3/11/2020   Updated: CGA  2. Sit to supine with Moderate Assistance - goal met 3/12/2020   Updated: CGA  3. Rolling to Left and Right with Moderate Assistance.  4. Sit to stand transfer with Maximum Assistance - goal met 3/12/2020   Updated: Mitch  5. Bed to chair transfer with Maximum Assistance  6. Gait  x 15 feet with Maximum Assistance using RW.   7. Lower extremity exercise program x15 reps per handout, with assistance as needed                        Time Tracking:     PT Received On: 20  PT Start Time: 1109     PT Stop Time: 1144  PT Total Time (min): 35 min (co treat with OT)    Billable Minutes: Therapeutic Activity  15 and Neuromuscular Re-education 15    Treatment Type: Treatment  PT/PTA: PT           Katy Deutsch, PT  03/13/2020

## 2020-03-13 NOTE — PROGRESS NOTES
[facility]  Neurosurgery  Progress Note    Subjective:     History of Present Illness: This is a 70-year-old man with a history of a non instrumented lumbar fusion many years ago who presents with severe progressive axial low back pain, bilateral radicular leg pain, and thoracic myelopathy.  Additionally he had severe postural deformity that was functionally disabling.  He failed multiple conservative measures.  Imaging showed severe central stenosis with spinal cord compression from T10-L2, solid fusion from his previous surgery from L3 to the sacrum, and flat back deformity with severe fixed sagittal plane deformity. Recommended patient undergo spinal cord decompression and deformity reduction surgery, via a T10 to pelvis instrumented fusion with an L3 PSO and a multilevel laminectomy for spinal cord decompression.  Risks benefits alternatives indications and methods were reviewed in detail and he wished to proceed.  All questions were answered.  No guarantees about the results the procedure. Presents to [facility] for T10-pelvis and L3 PSO.    Post-Op Info:  Procedure(s) (LRB):  Block, Nerve (Bilateral)   2 Days Post-Op     Interval History: NAEON. Strength and tolerance continues to improve daily. Pain controlled with current regimen. Denies worsening pain, weakness, paresthesias, b/b dysfunction. L YRN drain removed by plastics today. Medically stable for discharge.       Medications:  Continuous Infusions:  Scheduled Meds:   bacitracin   Topical (Top) BID    ceFAZolin (ANCEF) IVPB  2 g Intravenous Q8H    famotidine  40 mg Oral QHS    ferrous sulfate  325 mg Oral BID    heparin (porcine)  5,000 Units Subcutaneous Q8H    lidocaine  2 patch Transdermal Q24H    lisinopriL  5 mg Oral Daily    methocarbamoL  750 mg Oral TID    pregabalin  150 mg Oral TID    senna-docusate 8.6-50 mg  1 tablet Oral BID     PRN Meds:aluminum-magnesium hydroxide-simethicone, bisacodyL, Dextrose 10% Bolus, Dextrose  10% Bolus, fentaNYL, glucagon (human recombinant), glucose, glucose, HYDROmorphone, insulin aspart U-100, labetalol, midazolam, ondansetron, oxyCODONE, oxyCODONE, promethazine (PHENERGAN) IVPB, sodium chloride 0.9%       Objective:     Weight: 98.4 kg (217 lb)  Body mass index is 28.63 kg/m².  Vital Signs (Most Recent):  Temp: 98.3 °F (36.8 °C) (03/12/20 1545)  Pulse: 96 (03/12/20 1545)  Resp: 18 (03/12/20 1545)  BP: (!) 140/65 (03/12/20 1545)  SpO2: (!) 94 % (03/12/20 1545) Vital Signs (24h Range):  Temp:  [97.8 °F (36.6 °C)-99.3 °F (37.4 °C)] 98.3 °F (36.8 °C)  Pulse:  [] 96  Resp:  [16-18] 18  SpO2:  [92 %-98 %] 94 %  BP: (102-140)/(51-70) 140/65     Date 03/12/20 0700 - 03/13/20 0659   Shift 4990-1464 7454-8294 4331-7993 24 Hour Total   INTAKE   P.O. 960 480  1440   Shift Total(mL/kg) 960(9.8) 480(4.9)  1440(14.6)   OUTPUT   Urine(mL/kg/hr) 550(0.7) 500  1050   Drains 30 0  30   Shift Total(mL/kg) 580(5.9) 500(5.1)  1080(11)   Weight (kg) 98.4 98.4 98.4 98.4                        Closed/Suction Drain 03/03/20 1559 Left;Superior Back Accordion 10 Fr. (Active)   Site Description Unable to view 3/12/2020  6:30 PM   Dressing Type Transparent (Tegaderm) 3/10/2020  8:00 PM   Dressing Status Clean;Dry;Intact 3/10/2020  8:00 PM   Dressing Intervention Integrity maintained 3/10/2020  9:00 AM   Drainage Serosanguineous 3/10/2020  9:00 AM   Status To bulb suction 3/11/2020  7:28 PM   Output (mL) 0 mL 3/12/2020  4:00 PM            Closed/Suction Drain 03/03/20 1600 Right;Superior Back Accordion 10 Fr. (Active)   Site Description Unable to view 3/12/2020  6:30 PM   Dressing Type Transparent (Tegaderm) 3/10/2020  8:00 PM   Dressing Status Clean;Dry;Intact 3/10/2020  8:00 PM   Dressing Intervention Integrity maintained 3/10/2020  8:00 PM   Drainage Serosanguineous 3/10/2020  9:00 AM   Status To bulb suction 3/11/2020  7:28 PM   Output (mL) 0 mL 3/12/2020  4:00 PM            Closed/Suction Drain 03/03/20 1601  Left;Inferior Back Bulb 15 Fr. (Active)   Site Description Unable to view 3/12/2020  6:30 PM   Dressing Type Transparent (Tegaderm) 3/10/2020  8:00 PM   Dressing Status Clean;Dry;Intact 3/10/2020  8:00 PM   Dressing Intervention Integrity maintained 3/10/2020  8:00 PM   Drainage Serosanguineous 3/10/2020  8:00 PM   Status To bulb suction 3/10/2020  8:00 PM   Output (mL) 80 mL 3/7/2020  2:19 AM       Neurosurgery Physical Exam    General: well developed, well nourished, no distress.   Head: normocephalic, atraumatic  Neck: No tracheal deviation. No palpable masses.   Neurologic: Alert and oriented. Thought content appropriate.  GCS: Motor: 6/Verbal: 5/Eyes: 4 GCS Total: 15  Mental Status: Awake, Alert, Oriented x 4  Language: No aphasia  Speech: No dysarthria  Cranial nerves: face symmetric, tongue midline, CN II-XII grossly intact.   Eyes: pupils equal, round, reactive to light with accomodation, EOMI.   Ears: No drainage.   Pulmonary: normal respirations, no signs of respiratory distress  Abdomen: soft, non-distended, not tender to palpation  Sensory: intact to light touch throughout  Motor Strength: Moves all extremities spontaneously with good tone.  Full strength upper and lower extremities. No abnormal movements seen.     Strength  Deltoids Triceps Biceps Wrist Extension Wrist Flexion Hand    Upper: R 5/5 5/5 5/5 5/5 5/5 5/5    L 5/5 5/5 5/5 5/5 5/5 5/5     Iliopsoas Quadriceps Knee  Flexion Tibialis  anterior Gastro- cnemius EHL   Lower: R 5/5 5/5 5/5 5/5 5/5 5/5    L 5/5 5/5 5/5 5/5 5/5 5/5     Vascular: Pulses 2+ and symmetric radial and dorsalis pedis. No LE edema.   Skin: Skin is warm, dry and intact.    Incision c/d/I with skin edges well approximated with sutures. No surrounding erythema or edema. No drainage from incision. No palpable underlying fluid collection.      Significant Labs:  Recent Labs   Lab 03/12/20  0336         K 4.2      CO2 29   BUN 11   CREATININE 0.9   CALCIUM  8.7     Recent Labs   Lab 03/12/20  0336   WBC 8.19   HGB 8.8*   HCT 29.0*        No results for input(s): LABPT, INR, APTT in the last 48 hours.  Microbiology Results (last 7 days)     ** No results found for the last 168 hours. **        Recent Lab Results       03/12/20  1613   03/12/20  1106   03/12/20  0716   03/12/20  0336   03/11/20  2049        Albumin       2.2       Alkaline Phosphatase       105       ALT       40       Anion Gap       7       AST       53       Baso #       0.05       Basophil%       0.6       BILIRUBIN TOTAL       0.3  Comment:  For infants and newborns, interpretation of results should be based  on gestational age, weight and in agreement with clinical  observations.  Premature Infant recommended reference ranges:  Up to 24 hours.............<8.0 mg/dL  Up to 48 hours............<12.0 mg/dL  3-5 days..................<15.0 mg/dL  6-29 days.................<15.0 mg/dL         BUN, Bld       11       Calcium       8.7       Chloride       104       CO2       29       Creatinine       0.9       Differential Method       Automated       eGFR if        >60.0       eGFR if non        >60.0  Comment:  Calculation used to obtain the estimated glomerular filtration  rate (eGFR) is the CKD-EPI equation.          Eos #       0.2       Eosinophil%       2.0       Glucose       106       Gran # (ANC)       4.3       Gran%       52.8       Hematocrit       29.0       Hemoglobin       8.8       Immature Grans (Abs)       0.26  Comment:  Mild elevation in immature granulocytes is non specific and   can be seen in a variety of conditions including stress response,   acute inflammation, trauma and pregnancy. Correlation with other   laboratory and clinical findings is essential.         Immature Granulocytes       3.2       Lymph #       2.5       Lymph%       30.2       MCH       27.3       MCHC       30.3       MCV       90       Mono #       0.9       Mono%        11.2       MPV       10.7       nRBC       0       Platelets       280       POCT Glucose 118 104 148   106     Potassium       4.2       PROTEIN TOTAL       5.6       RBC       3.22       RDW       13.5       Sodium       140       WBC       8.19                          All pertinent labs from the last 24 hours have been reviewed.    Significant Diagnostics:  I have reviewed all pertinent imaging results/findings within the past 24 hours.    Assessment/Plan:     Degenerative scoliosis  A 70 year old with degenerative lumbar spondy and spine deformity now s/p T10 - pelvis posterior segmental instrumented fusion, T10 - L3 laminectomy for decompression, L3 pedical subtraction osteotomy (3/3).    -Patient neurologically stable on exam. Improvement in strength exam today 2/2 pain control.  -Brace on when upright, OOB, or working with therapy. OK to remove when lying in bed.   -Postop XR when patient able. Will obtain XR Thoracolumbar spine ap/lateral 2/2 patient unable to sit upright unsupported to obtain sitting scoli films.  -Prevena removed yesterday. L YRN drain removed today. Continue R YRN management per plastic surgery. Abx while drains in place. Plan for FU 1 week after discharge for drain removal. Can sit for up to 30 minutes at meal time (3 times per day).  Head of bed flat otherwise at all times.  Wound closure tension increases in sitting position.  This restriction should be in place for 6 weeks. Aquacel Ag surgical island cm dressing to cover entire back incision, change every 2 days or sooner if dressing soiled. Chlorhexidine biopatches should be maintained around the YRN drain tubing at the skin level.  Biopatches should be replaced at least every 7 days or sooner if they become soiled. Can shower but should not tub soak the wounds.  No lotions, creams applied to the incision.    -Pain: Improved with multimodal agents per Anesthesia recommendations. No changes in pain after nerve block. Continue Lyrica  TID. Scheduled lido patches, tylenol 1000 mg Q8, and robaxin 500 mg TID. Continue oxy prn.  -Thrombocytopenia: Resolved. Plts 280.  -Post op anemia: Stable. Continue iron for replacement. Improving.   -Transaminitis: Improving, f/u next lab draw.  -HTN: Continue home dose lisinopril and Prn labetalol.   -DM2: continue POCT glucose, SSI, diabetic diet  -GERD: continue home dose pepcid  -DVT prophylaxis: CLIFF's, SCD's, SQH  -Bowel regimen: senna BID and miralax daily. States he uses enema or suppository normally 1-2x per week at home.  -Atelectasis prevention: IS hourly while awake, PT/OT, OOB > 6 hours per day. Continue to encourage OOB mobility and up to chair with meals.       Dispo: Pending placement. Medically stable for discharge.         Alva Cuevas PA-C  Neurosurgery  [facility]

## 2020-03-13 NOTE — PROGRESS NOTES
[facility]  Neurosurgery  Progress Note    Subjective:     History of Present Illness: This is a 70-year-old man with a history of a non instrumented lumbar fusion many years ago who presents with severe progressive axial low back pain, bilateral radicular leg pain, and thoracic myelopathy.  Additionally he had severe postural deformity that was functionally disabling.  He failed multiple conservative measures.  Imaging showed severe central stenosis with spinal cord compression from T10-L2, solid fusion from his previous surgery from L3 to the sacrum, and flat back deformity with severe fixed sagittal plane deformity. Recommended patient undergo spinal cord decompression and deformity reduction surgery, via a T10 to pelvis instrumented fusion with an L3 PSO and a multilevel laminectomy for spinal cord decompression.  Risks benefits alternatives indications and methods were reviewed in detail and he wished to proceed.  All questions were answered.  No guarantees about the results the procedure. Presents to [facility] for T10-pelvis and L3 PSO.    Post-Op Info:  Procedure(s) (LRB):  Block, Nerve (Bilateral)   3 Days Post-Op     Interval History: NAEON. Strength stable. Tolerated PT/OT session well today, pain limited weakness in proximal RLE noted after session today. Motivated to continue progression. XR done. Patient denies worsening weakness, paresthesias, b/b dysfunction.      Medications:  Continuous Infusions:  Scheduled Meds:   bacitracin   Topical (Top) BID    ceFAZolin (ANCEF) IVPB  2 g Intravenous Q8H    famotidine  40 mg Oral QHS    ferrous sulfate  325 mg Oral BID    heparin (porcine)  5,000 Units Subcutaneous Q8H    lidocaine  2 patch Transdermal Q24H    lisinopriL  5 mg Oral Daily    methocarbamoL  750 mg Oral TID    pregabalin  150 mg Oral TID    senna-docusate 8.6-50 mg  1 tablet Oral BID     PRN Meds:aluminum-magnesium hydroxide-simethicone, bisacodyL, Dextrose 10% Bolus,  Dextrose 10% Bolus, fentaNYL, glucagon (human recombinant), glucose, glucose, HYDROmorphone, insulin aspart U-100, labetalol, midazolam, ondansetron, oxyCODONE, oxyCODONE, promethazine (PHENERGAN) IVPB, sodium chloride 0.9%       Objective:     Weight: 98.4 kg (217 lb)  Body mass index is 28.63 kg/m².  Vital Signs (Most Recent):  Temp: 99.3 °F (37.4 °C) (03/13/20 1551)  Pulse: 95 (03/13/20 1551)  Resp: 18 (03/13/20 1551)  BP: 127/71 (03/13/20 1551)  SpO2: (!) 94 % (03/13/20 1551) Vital Signs (24h Range):  Temp:  [98.4 °F (36.9 °C)-100.1 °F (37.8 °C)] 99.3 °F (37.4 °C)  Pulse:  [85-96] 95  Resp:  [15-18] 18  SpO2:  [92 %-95 %] 94 %  BP: ()/(53-71) 127/71     Date 03/13/20 0700 - 03/14/20 0659   Shift 0851-5886 5426-4472 7728-6204 24 Hour Total   INTAKE   P.O. 480   480   Shift Total(mL/kg) 480(4.9)   480(4.9)   OUTPUT   Shift Total(mL/kg)       Weight (kg) 98.4 98.4 98.4 98.4                        Closed/Suction Drain 03/03/20 1601 Left;Inferior Back Bulb 15 Fr. (Active)   Site Description Unable to view 3/13/2020  8:00 AM   Dressing Type Transparent (Tegaderm) 3/12/2020  8:10 PM   Dressing Status Clean;Dry;Intact 3/12/2020  8:10 PM   Dressing Intervention Integrity maintained 3/12/2020  8:10 PM   Drainage Serosanguineous 3/12/2020  8:10 PM   Status To bulb suction 3/12/2020  8:10 PM   Output (mL) 80 mL 3/7/2020  2:19 AM       Neurosurgery Physical Exam    General: well developed, well nourished, no distress.   Head: normocephalic, atraumatic  Neck: No tracheal deviation. No palpable masses.   Neurologic: Alert and oriented. Thought content appropriate.  GCS: Motor: 6/Verbal: 5/Eyes: 4 GCS Total: 15  Mental Status: Awake, Alert, Oriented x 4  Language: No aphasia  Speech: No dysarthria  Cranial nerves: face symmetric, tongue midline, CN II-XII grossly intact.   Eyes: pupils equal, round, reactive to light with accomodation, EOMI.   Ears: No drainage.   Pulmonary: normal respirations, no signs of respiratory  distress  Abdomen: soft, non-distended, not tender to palpation  Sensory: intact to light touch throughout  Motor Strength: Moves all extremities spontaneously with good tone.  Full strength upper and lower extremities. No abnormal movements seen.      Strength   Deltoids Triceps Biceps Wrist Extension Wrist Flexion Hand    Upper: R 5/5 5/5 5/5 5/5 5/5 5/5     L 5/5 5/5 5/5 5/5 5/5 5/5       Iliopsoas Quadriceps Knee  Flexion Tibialis  anterior Gastro- cnemius EHL   Lower: R 5-/5 5/5 5-/5 5/5 5/5 5/5     L 5/5 5/5 5/5 5/5 5/5 5/5      Vascular: Pulses 2+ and symmetric radial and dorsalis pedis. No LE edema.   Skin: Skin is warm, dry and intact.     Incision c/d/I with skin edges well approximated with sutures. No surrounding erythema or edema. No drainage from incision. No palpable underlying fluid collection.      Significant Labs:  Recent Labs   Lab 03/12/20  0336         K 4.2      CO2 29   BUN 11   CREATININE 0.9   CALCIUM 8.7     Recent Labs   Lab 03/12/20  0336   WBC 8.19   HGB 8.8*   HCT 29.0*        No results for input(s): LABPT, INR, APTT in the last 48 hours.  Microbiology Results (last 7 days)     ** No results found for the last 168 hours. **        Recent Lab Results       03/13/20  1551   03/13/20  1104   03/13/20  0702   03/12/20  2232        POCT Glucose 115 107 116 104         All pertinent labs from the last 24 hours have been reviewed.    Significant Diagnostics:  I have reviewed all pertinent imaging results/findings within the past 24 hours.    Assessment/Plan:     Degenerative scoliosis  A 70 year old with degenerative lumbar spondy and spine deformity now s/p T10 - pelvis posterior segmental instrumented fusion, T10 - L3 laminectomy for decompression, L3 pedical subtraction osteotomy (3/3).    -Patient neurologically stable on exam.   -Brace on when upright, OOB, or working with therapy. OK to remove when lying in bed.   -Postop XR lumbar spine shows satisfactory  posterior alignment and positioning of posterior hardware.   -Continue R YRN management per plastic surgery. Abx while drains in place. Plan for FU 1 week after discharge for drain removal. Can sit for up to 30 minutes at meal time (3 times per day).  Head of bed flat otherwise at all times.  Wound closure tension increases in sitting position.  This restriction should be in place for 6 weeks. Aquacel Ag surgical island cm dressing to cover entire back incision, change every 2 days or sooner if dressing soiled. Chlorhexidine biopatches should be maintained around the YRN drain tubing at the skin level.  Biopatches should be replaced at least every 7 days or sooner if they become soiled. Can shower but should not tub soak the wounds.  No lotions, creams applied to the incision.    -Pain: Improved with multimodal agents per Anesthesia recommendations. No changes in pain after nerve block. Continue Lyrica TID. Scheduled lido patches, tylenol 1000 mg Q8, and robaxin 500 mg TID. Continue oxy prn.  -Thrombocytopenia: Resolved. Plts 280.  -Post op anemia: Stable. Continue iron for replacement. Improving.   -Transaminitis: Improving, f/u next lab draw.  -HTN: Continue home dose lisinopril and Prn labetalol.   -DM2: continue POCT glucose, SSI, diabetic diet  -GERD: continue home dose pepcid  -DVT prophylaxis: CLIFF's, SCD's, SQH  -Bowel regimen: senna BID and miralax daily. States he uses enema or suppository normally 1-2x per week at home.  -Atelectasis prevention: IS hourly while awake, PT/OT, OOB > 6 hours per day. Continue to encourage OOB mobility and up to chair with meals.       Dispo: Pending placement. Medically stable for discharge.         Alva Cuevas PA-C  Neurosurgery  [facility]

## 2020-03-13 NOTE — PLAN OF CARE
Problem: Occupational Therapy Goal  Goal: Occupational Therapy Goal  Description  Updated Goals to be met by: 7 days (3/18/20)     Patient will increase functional independence with ADLs by performing:    UE Dressing with Minimal Assistance including TLSO.  LE Dressing with Moderate Assistance using AD as needed.  Grooming while seated with SBA.  Toileting from bedside commode with Moderate Assistance for hygiene and clothing management.   Supine to sit with Minimal Assistance.  Toilet transfer to bedside commode with Minimal Assistance.  Pt will verbalize understanding of 3/3 spinal precautions without added cues.       Goals to be met by: 7 days (3/11/20)     Patient will increase functional independence with ADLs by performing:    UE Dressing with Minimal Assistance including TLSO.  LE Dressing with Moderate Assistance using AD as needed.  Grooming while standing with Contact Guard Assistance.  Toileting from bedside commode with Minimal Assistance for hygiene and clothing management.   Supine to sit with Minimal Assistance.  Toilet transfer to bedside commode with Minimal Assistance.  Pt will verbalize understanding of 3/3 spinal precautions without added cues.      Outcome: Ongoing, Progressing   The above goals remain appropriate. MELIDA Mckay  3/13/2020

## 2020-03-13 NOTE — ASSESSMENT & PLAN NOTE
A 70 year old with degenerative lumbar spondy and spine deformity now s/p T10 - pelvis posterior segmental instrumented fusion, T10 - L3 laminectomy for decompression, L3 pedical subtraction osteotomy (3/3).    -Patient neurologically stable on exam. Improvement in strength exam today 2/2 pain control.  -Brace on when upright, OOB, or working with therapy. OK to remove when lying in bed.   -Postop XR when patient able. Will obtain XR Thoracolumbar spine ap/lateral 2/2 patient unable to sit upright unsupported to obtain sitting scoli films.  -Prevena removed yesterday. L YRN drain removed today. Continue R YRN management per plastic surgery. Abx while drains in place. Plan for FU 1 week after discharge for drain removal. Can sit for up to 30 minutes at meal time (3 times per day).  Head of bed flat otherwise at all times.  Wound closure tension increases in sitting position.  This restriction should be in place for 6 weeks. Aquacel  surgical island cm dressing to cover entire back incision, change every 2 days or sooner if dressing soiled. Chlorhexidine biopatches should be maintained around the YRN drain tubing at the skin level.  Biopatches should be replaced at least every 7 days or sooner if they become soiled. Can shower but should not tub soak the wounds.  No lotions, creams applied to the incision.    -Pain: Improved with multimodal agents per Anesthesia recommendations. No changes in pain after nerve block. Continue Lyrica TID. Scheduled lido patches, tylenol 1000 mg Q8, and robaxin 500 mg TID. Continue oxy prn.  -Thrombocytopenia: Resolved. Plts 280.  -Post op anemia: Stable. Continue iron for replacement. Improving.   -Transaminitis: Improving, f/u next lab draw.  -HTN: Continue home dose lisinopril and Prn labetalol.   -DM2: continue POCT glucose, SSI, diabetic diet  -GERD: continue home dose pepcid  -DVT prophylaxis: CLIFF's, SCD's, SQH  -Bowel regimen: senna BID and miralax daily. States he uses enema or  suppository normally 1-2x per week at home.  -Atelectasis prevention: IS hourly while awake, PT/OT, OOB > 6 hours per day. Continue to encourage OOB mobility and up to chair with meals.       Dispo: Pending placement. Medically stable for discharge.

## 2020-03-14 LAB
POCT GLUCOSE: 115 MG/DL (ref 70–110)
POCT GLUCOSE: 138 MG/DL (ref 70–110)
POCT GLUCOSE: 72 MG/DL (ref 70–110)

## 2020-03-14 PROCEDURE — 25000003 PHARM REV CODE 250: Performed by: PHYSICIAN ASSISTANT

## 2020-03-14 PROCEDURE — 63600175 PHARM REV CODE 636 W HCPCS: Performed by: PHYSICIAN ASSISTANT

## 2020-03-14 PROCEDURE — 25000003 PHARM REV CODE 250: Performed by: STUDENT IN AN ORGANIZED HEALTH CARE EDUCATION/TRAINING PROGRAM

## 2020-03-14 PROCEDURE — 11000001 HC ACUTE MED/SURG PRIVATE ROOM

## 2020-03-14 PROCEDURE — 94761 N-INVAS EAR/PLS OXIMETRY MLT: CPT

## 2020-03-14 RX ADMIN — PREGABALIN 150 MG: 150 CAPSULE ORAL at 03:03

## 2020-03-14 RX ADMIN — HEPARIN SODIUM 5000 UNITS: 5000 INJECTION, SOLUTION INTRAVENOUS; SUBCUTANEOUS at 05:03

## 2020-03-14 RX ADMIN — OXYCODONE HYDROCHLORIDE 15 MG: 10 TABLET ORAL at 12:03

## 2020-03-14 RX ADMIN — CEFAZOLIN 2 G: 1 INJECTION, POWDER, FOR SOLUTION INTRAMUSCULAR; INTRAVENOUS at 08:03

## 2020-03-14 RX ADMIN — OXYCODONE HYDROCHLORIDE 15 MG: 10 TABLET ORAL at 09:03

## 2020-03-14 RX ADMIN — PREGABALIN 150 MG: 150 CAPSULE ORAL at 09:03

## 2020-03-14 RX ADMIN — FAMOTIDINE 40 MG: 20 TABLET, FILM COATED ORAL at 09:03

## 2020-03-14 RX ADMIN — OXYCODONE HYDROCHLORIDE 15 MG: 10 TABLET ORAL at 07:03

## 2020-03-14 RX ADMIN — CEFAZOLIN 2 G: 1 INJECTION, POWDER, FOR SOLUTION INTRAMUSCULAR; INTRAVENOUS at 02:03

## 2020-03-14 RX ADMIN — ALUMINUM HYDROXIDE, MAGNESIUM HYDROXIDE, AND SIMETHICONE 30 ML: 200; 200; 20 SUSPENSION ORAL at 06:03

## 2020-03-14 RX ADMIN — METHOCARBAMOL TABLETS 750 MG: 750 TABLET, COATED ORAL at 08:03

## 2020-03-14 RX ADMIN — LIDOCAINE 2 PATCH: 50 PATCH TOPICAL at 02:03

## 2020-03-14 RX ADMIN — LISINOPRIL 5 MG: 5 TABLET ORAL at 08:03

## 2020-03-14 RX ADMIN — OXYCODONE HYDROCHLORIDE 15 MG: 10 TABLET ORAL at 02:03

## 2020-03-14 RX ADMIN — METHOCARBAMOL TABLETS 750 MG: 750 TABLET, COATED ORAL at 03:03

## 2020-03-14 RX ADMIN — METHOCARBAMOL TABLETS 750 MG: 750 TABLET, COATED ORAL at 09:03

## 2020-03-14 RX ADMIN — PREGABALIN 150 MG: 150 CAPSULE ORAL at 08:03

## 2020-03-14 RX ADMIN — SENNOSIDES AND DOCUSATE SODIUM 1 TABLET: 8.6; 5 TABLET ORAL at 09:03

## 2020-03-14 RX ADMIN — CEFAZOLIN 2 G: 1 INJECTION, POWDER, FOR SOLUTION INTRAMUSCULAR; INTRAVENOUS at 04:03

## 2020-03-14 RX ADMIN — HEPARIN SODIUM 5000 UNITS: 5000 INJECTION, SOLUTION INTRAVENOUS; SUBCUTANEOUS at 09:03

## 2020-03-14 RX ADMIN — ALUMINUM HYDROXIDE, MAGNESIUM HYDROXIDE, AND SIMETHICONE 30 ML: 200; 200; 20 SUSPENSION ORAL at 12:03

## 2020-03-14 RX ADMIN — BISACODYL 10 MG: 10 SUPPOSITORY RECTAL at 09:03

## 2020-03-14 RX ADMIN — SENNOSIDES AND DOCUSATE SODIUM 1 TABLET: 8.6; 5 TABLET ORAL at 08:03

## 2020-03-14 RX ADMIN — HYDROMORPHONE HYDROCHLORIDE 0.5 MG: 1 INJECTION, SOLUTION INTRAMUSCULAR; INTRAVENOUS; SUBCUTANEOUS at 06:03

## 2020-03-14 RX ADMIN — OXYCODONE HYDROCHLORIDE 15 MG: 10 TABLET ORAL at 03:03

## 2020-03-14 RX ADMIN — FERROUS SULFATE TAB EC 325 MG (65 MG FE EQUIVALENT) 325 MG: 325 (65 FE) TABLET DELAYED RESPONSE at 08:03

## 2020-03-14 RX ADMIN — HEPARIN SODIUM 5000 UNITS: 5000 INJECTION, SOLUTION INTRAVENOUS; SUBCUTANEOUS at 02:03

## 2020-03-14 NOTE — PLAN OF CARE
Problem: Diabetes Comorbidity  Goal: Blood Glucose Level Within Desired Range  Outcome: Ongoing, Progressing     Problem: Adult Inpatient Plan of Care  Goal: Plan of Care Review  Outcome: Ongoing, Progressing  Goal: Patient-Specific Goal (Individualization)  Description  Admit Date: 3/3    Admit Dx: spinal fusion    Past Medical History:  No date: Colitis  No date: Diabetes mellitus, type 2  No date: Diverticulosis  No date: GERD (gastroesophageal reflux disease)  No date: Hypertension    Past Surgical History:  No date: ANKLE SURGERY; Bilateral  10/30/2019: ANTERIOR CERVICAL DISCECTOMY W/ FUSION; N/A      Comment:  Procedure: C3-4, C4-5 ACDF;  Surgeon: Francis Alan MD;               Location: [facility] OR 2ND FLR;  Service: Neurosurgery;                 Laterality: N/A;  C3-4, C4-5 ACDFAnesthesia:                 GeneralRad:  C-ArmNM:  EMG, SEP, & MEPBlood:  Type                & ScreenPosition:  SupineBed:  Regular slider                bedHeadrest:  [place] & GW tongs/hanging                weightsMisc:Small vivigenNerve root retractor                (DePuy)Disposable #2 KerrisonInterbody  No date: BACK SURGERY  No date: COLONOSCOPY  3/3/2020: CREATION OF MUSCLE ROTATIONAL FLAP      Comment:  Procedure: CREATION, FLAP, MUSCLE ROTATION;  Surgeon:                Francis Alan MD;  [facility];  Service:                Neurosurgery;;  No date: lower back surgery  3/3/2020: LUMBAR LAMINECTOMY WITH FUSION; N/A      Comment:  Procedure: T10-Pelvis Fusion with L4 PSO **AIRO**                Co-Surgeon: Roberto Parkinson;  Surgeon: Francis Alan MD;                 [facility];  Service: Neurosurgery;                 Laterality: N/A;  **AIRO**CELL SAVER**Co-Surgeon:                Roberto ParkinsonNM: EMG, SEP, MEPPosition: ProneBed:                Shaji 4 post, prone pillowBlood: Type & Hold 2                unitsRad: C-Arm, AIROVendor: DepuyMisc: Vivigen,                Infuse, Cement (Depuy)[place]  No  date: NECK SURGERY  No date: SPINE SURGERY  No date: UPPER GASTROINTESTINAL ENDOSCOPY    Individualization:   1. Pt likes dim lights    Restraints: (date/time/why or N/A) na         Outcome: Ongoing, Progressing  Goal: Absence of Hospital-Acquired Illness or Injury  Outcome: Ongoing, Progressing  Goal: Optimal Comfort and Wellbeing  Outcome: Ongoing, Progressing  Goal: Readiness for Transition of Care  Outcome: Ongoing, Progressing  Goal: Rounds/Family Conference  Outcome: Ongoing, Progressing     Problem: Fall Injury Risk  Goal: Absence of Fall and Fall-Related Injury  Outcome: Ongoing, Progressing     Problem: Infection  Goal: Infection Symptom Resolution  Outcome: Ongoing, Progressing     Problem: Skin Injury Risk Increased  Goal: Skin Health and Integrity  Outcome: Ongoing, Progressing     Problem: Hypertension Comorbidity  Goal: Blood Pressure in Desired Range  Outcome: Ongoing, Progressing     Problem: Pain Chronic (Persistent) (Comorbidity Management)  Goal: Acceptable Pain Control and Functional Ability  Outcome: Ongoing, Progressing     Problem: Oral Intake Inadequate  Goal: Improved Oral Intake  Outcome: Ongoing, Progressing    Neuro - aaox4  Deficits - weakness to BLE  V/S - see v/s flowsheet  Tele - NSR  Diet - diabetic   GI - no bm today  - void  IV - x1  IV Fluids - none  Skin - incision on back with aquaAG right YRN bulb drained 20ml

## 2020-03-14 NOTE — PLAN OF CARE
[facility]    FACILITY TRANSFER ORDERS      Patient Name: Garcia Renteria  YOB: 1950    PCP: [facility]   PCP Address: [address] [place] [address] / [place] [id number]  PCP Phone Number: [phone]  PCP Fax: [phone]    Encounter Date: 03/24/2020  Admit to: [facility]    Vital Signs:  Routine    Diagnoses:   Active Hospital Problems    Diagnosis  POA    *Acquired spondylolisthesis of thoracolumbar vertebra [M43.15]  Yes    Positive QuantiFERON-TB Gold test [R76.12]  No    Impaired mobility [Z74.09]  Unknown    Degenerative scoliosis [M41.50]  Yes    Constipation [K59.00]  Yes    Essential hypertension [I10]  Yes    Type 2 diabetes mellitus with diabetic neuropathy, without long-term current use of insulin [E11.40]  Yes    Gastroesophageal reflux disease [K21.9]  Yes      Resolved Hospital Problems   No resolved problems to display.       Allergies:Review of patient's allergies indicates:  No Known Allergies    Diet: diabetic diet: 2000 calorie    Activities: Activity as tolerated, Ambulate in sanchez, Bathroom privileges with assistance and Up with assistance    Nursing: fall precautions    Activity Restrictions:  [x]  No lifting greater than 10 pounds.  [x]  Avoid bending and twisting the area of your surgery more than 45 degrees from neutral position in any direction.  [x]  Wear your brace at all times except when flat in bed.  [x]  Walk on paved surfaces only. It is okay to walk up and down stairs while holding onto a side rail.     Discharge Medication/Follow-up:  [x]  Please refer to discharge medication reconciliation form.  [x]  Do not take ANY non-steroidal anti-inflammatory drugs (NSAIDS), including the following: ibuprofen, naprosyn, Aleve, Advil, Indocin, Mobic, or Celebrex for:  [x]  3 months  [x]  Prescriptions for appropriate medication will be given upon discharge. If you continue to have pain control problems after the 6 week  post-operative period, a referral to [facility] will be provided. Please address this at your follow-up appointments.   [x]  Take docusate (Colace 100 mg): take one capsule a day as needed for constipation. You can get this over the counter.  [x]  Follow-up appointment:  [x]  4/1/2020 with Dr. Caro for suture removal  [facility] PLASTIC SURGERY 2ND FLOOR          Dept Address: [facility] [address] [facility] [address]       Dept Phone Number: [phone]         [x]  4-6 weeks with MD:  [x]  with x-rays  [x]  An appointment will be mailed to you.      Wound Care:  [x]  Please see wound care instructions provided below by Dr. Caro.           Plastic Surgery Discharge Instructions as follows     Recommendations for Activity restrictions:   1.  Can sit for up to 30 minutes at meal time (3 times per day).  Head of bed flat otherwise at all times.  Wound closure tension increases in sitting position.  This restriction should be in place for 6 weeks.    2.  If needs PT to avoid decompensation she may mobilize.  The tension across her back increases as she flexes her hips.    3.  Head of bed as flat as possible otherwise  4.  Log roll out of bed rather than sitting up to get out of bed.      Labs: CBC and CMP per facility protocol    CONSULTS:    Physical Therapy to evaluate and treat.  and Occupational Therapy to evaluate and treat.    MISCELLANEOUS CARE:  Diabetes Care:   Fingerstick blood sugar AC and HS and Report CBG < 60 or > 350 to physician.    Facility provider to assess ongoing need for subcutaneous heparin for DVT prophylaxis.      WOUND CARE ORDERS  Yes:     Wound Care:     1.  Can shower but should not tub soak the wounds.  No lotions, creams applied to the incision.     2.  Consider air fluidized bed for pressure offloading to avoid pressure sore.  Q 2 hour turns to prevent bedsores.           Medications: Review discharge medications with patient and family and provide education.      Current  Discharge Medication List      START taking these medications    Details                     ferrous sulfate 325 (65 FE) MG EC tablet Take 1 tablet (325 mg total) by mouth 2 (two) times daily.  Refills: 0      lidocaine (LIDODERM) 5 % Place 1 patch onto the skin once daily. Remove & Discard patch within 12 hours or as directed by MD  Qty: 15 patch, Refills: 0      methocarbamoL (ROBAXIN) 750 MG Tab Take 1 tablet (750 mg total) by mouth 3 (three) times daily.  Qty: 40 tablet, Refills: 0      oxyCODONE (ROXICODONE) 15 MG Tab Take 1 tablet (15 mg total) by mouth every 6 (six) hours as needed (pain 8-10/10).  Qty: 50 tablet, Refills: 0      pregabalin (LYRICA) 150 MG capsule Take 1 capsule (150 mg total) by mouth 3 (three) times daily.  Qty: 90 capsule, Refills: 6         CONTINUE these medications which have NOT CHANGED    Details   famotidine (PEPCID) 40 MG tablet Take 1 tablet (40 mg total) by mouth every evening.  Qty: 90 tablet, Refills: 3    Associated Diagnoses: Gastroesophageal reflux disease, esophagitis presence not specified      lisinopril (PRINIVIL,ZESTRIL) 5 MG tablet Take 5 mg by mouth once daily.       metFORMIN (GLUCOPHAGE) 1000 MG tablet Take 500 mg by mouth daily with breakfast.       omeprazole (PRILOSEC) 20 MG capsule Take 1 capsule (20 mg total) by mouth once daily.  Qty: 90 capsule, Refills: 1    Associated Diagnoses: Gastroesophageal reflux disease, esophagitis presence not specified      STOOL SOFTENER, DOCUSATE MADELAINE, 240 mg capsule       triamterene-hydrochlorothiazide 75-50 mg (MAXZIDE) 75-50 mg per tablet Take 0.5 tablets by mouth once daily.       vitamin D (VITAMIN D3) 1000 units Tab Take 1,000 Units by mouth once daily.      sildenafil (VIAGRA) 100 MG tablet          STOP taking these medications       ciprofloxacin HCl (CIPRO) 500 MG tablet Comments:   Reason for Stopping:         gabapentin (NEURONTIN) 300 MG capsule Comments:   Reason for Stopping:         glycerin adult suppository  Comments:   Reason for Stopping:         HYDROcodone-acetaminophen (NORCO)  mg per tablet Comments:   Reason for Stopping:         ibuprofen (ADVIL,MOTRIN) 800 MG tablet Comments:   Reason for Stopping:         oxyCODONE-acetaminophen (PERCOCET)  mg per tablet Comments:   Reason for Stopping:                    _________________________________  Alva Cuevas PA-C  03/20/2020

## 2020-03-14 NOTE — PLAN OF CARE
CM called [facility] to see if patient could be accepted. Sam left voicemail Dominic Ruggiero in admissions advising we would like to send patient and to call PAKO Sheikh on Monday to proceed with the acceptance.    10:50 Patient and spouse want to wait on [facility] for acceptance on Monday.

## 2020-03-14 NOTE — SUBJECTIVE & OBJECTIVE
Interval History: NAEON. Pain controlled. YRN in place per plastics. Pending DC to [facility].     Medications:  Continuous Infusions:  Scheduled Meds:   bacitracin   Topical (Top) BID    ceFAZolin (ANCEF) IVPB  2 g Intravenous Q8H    famotidine  40 mg Oral QHS    ferrous sulfate  325 mg Oral BID    heparin (porcine)  5,000 Units Subcutaneous Q8H    lidocaine  2 patch Transdermal Q24H    lisinopriL  5 mg Oral Daily    methocarbamoL  750 mg Oral TID    pregabalin  150 mg Oral TID    senna-docusate 8.6-50 mg  1 tablet Oral BID     PRN Meds:aluminum-magnesium hydroxide-simethicone, bisacodyL, Dextrose 10% Bolus, Dextrose 10% Bolus, fentaNYL, glucagon (human recombinant), glucose, glucose, HYDROmorphone, insulin aspart U-100, labetalol, midazolam, ondansetron, oxyCODONE, oxyCODONE, promethazine (PHENERGAN) IVPB, sodium chloride 0.9%     Review of Systems  Objective:     Weight: 98.4 kg (217 lb)  Body mass index is 28.63 kg/m².  Vital Signs (Most Recent):  Temp: 96.7 °F (35.9 °C) (03/14/20 0554)  Pulse: 87 (03/14/20 0554)  Resp: 16 (03/14/20 0554)  BP: 125/77 (03/14/20 0554)  SpO2: (!) 92 % (03/14/20 0554) Vital Signs (24h Range):  Temp:  [96.7 °F (35.9 °C)-99.8 °F (37.7 °C)] 96.7 °F (35.9 °C)  Pulse:  [83-96] 87  Resp:  [16-18] 16  SpO2:  [92 %-96 %] 92 %  BP: ()/(59-77) 125/77                          Closed/Suction Drain 03/03/20 1601 Left;Inferior Back Bulb 15 Fr. (Active)   Site Description Unable to view 3/13/2020  8:00 AM   Dressing Type Transparent (Tegaderm) 3/12/2020  8:10 PM   Dressing Status Clean;Dry;Intact 3/12/2020  8:10 PM   Dressing Intervention Integrity maintained 3/12/2020  8:10 PM   Drainage Serosanguineous 3/12/2020  8:10 PM   Status To bulb suction 3/12/2020  8:10 PM   Output (mL) 80 mL 3/7/2020  2:19 AM       Neurosurgery Physical Exam  General: well developed, well nourished, no distress.   Head: normocephalic, atraumatic  Neck: No tracheal deviation. No palpable masses.    Neurologic: Alert and oriented. Thought content appropriate.  GCS: Motor: 6/Verbal: 5/Eyes: 4 GCS Total: 15  Mental Status: Awake, Alert, Oriented x 4  Language: No aphasia  Speech: No dysarthria  Cranial nerves: face symmetric, tongue midline, CN II-XII grossly intact.   Eyes: pupils equal, round, reactive to light with accomodation, EOMI.   Ears: No drainage.   Pulmonary: normal respirations, no signs of respiratory distress  Abdomen: soft, non-distended, not tender to palpation  Sensory: intact to light touch throughout  Motor Strength: Moves all extremities spontaneously with good tone.  Full strength upper and lower extremities. No abnormal movements seen.      Strength   Deltoids Triceps Biceps Wrist Extension Wrist Flexion Hand    Upper: R 5/5 5/5 5/5 5/5 5/5 5/5     L 5/5 5/5 5/5 5/5 5/5 5/5       Iliopsoas Quadriceps Knee  Flexion Tibialis  anterior Gastro- cnemius EHL   Lower: R 5-/5 5/5 5-/5 5/5 5/5 5/5     L 5/5 5/5 5/5 5/5 5/5 5/5      Vascular: Pulses 2+ and symmetric radial and dorsalis pedis. No LE edema.   Skin: Skin is warm, dry and intact.     Incision c/d/I with skin edges well approximated with sutures. No surrounding erythema or edema. No drainage from incision. No palpable underlying fluid collection.      Significant Labs:  No results for input(s): GLU, NA, K, CL, CO2, BUN, CREATININE, CALCIUM, MG in the last 48 hours.  No results for input(s): WBC, HGB, HCT, PLT in the last 48 hours.  No results for input(s): LABPT, INR, APTT in the last 48 hours.  Microbiology Results (last 7 days)     ** No results found for the last 168 hours. **          Significant Diagnostics:  X-ray Chest Ap Portable    Result Date: 3/13/2020  Poor depth of inspiration.  No acute chest disease identified. Electronically signed by: Ángel Argueta MD Date:    03/13/2020 Time:    18:50

## 2020-03-14 NOTE — ASSESSMENT & PLAN NOTE
A 70 year old with degenerative lumbar spondy and spine deformity now s/p T10 - pelvis posterior segmental instrumented fusion, T10 - L3 laminectomy for decompression, L3 pedical subtraction osteotomy (3/3).    -Patient neurologically stable on exam.   -Brace on when upright, OOB, or working with therapy. OK to remove when lying in bed.   -Postop XR lumbar spine shows satisfactory posterior alignment and positioning of posterior hardware.   -Continue R YRN management per plastic surgery. Abx while drains in place. Plan for FU 1 week after discharge for drain removal. Can sit for up to 30 minutes at meal time (3 times per day).  Head of bed flat otherwise at all times.  Wound closure tension increases in sitting position.  This restriction should be in place for 6 weeks. Aquacel  surgical island cm dressing to cover entire back incision, change every 2 days or sooner if dressing soiled. Chlorhexidine biopatches should be maintained around the YRN drain tubing at the skin level.  Biopatches should be replaced at least every 7 days or sooner if they become soiled. Can shower but should not tub soak the wounds.  No lotions, creams applied to the incision.    -Pain: Improved with multimodal agents per Anesthesia recommendations. No changes in pain after nerve block. Continue Lyrica TID. Scheduled lido patches, tylenol 1000 mg Q8, and robaxin 500 mg TID. Continue oxy prn.  -Thrombocytopenia: Resolved. Plts 280.  -Post op anemia: Stable. Continue iron for replacement. Improving.   -Transaminitis: Improving, f/u next lab draw. q72h labs  -HTN: Continue home dose lisinopril and Prn labetalol.   -DM2: continue POCT glucose, SSI, diabetic diet  -GERD: continue home dose pepcid  -DVT prophylaxis: CLIFF's, SCD's, SQH  -Bowel regimen: senna BID and miralax daily. States he uses enema or suppository normally 1-2x per week at home.  -Atelectasis prevention: IS hourly while awake, PT/OT, OOB > 6 hours per day. Continue to encourage  OOB mobility and up to chair with meals.       Dispo: Pending placement. Medically stable for discharge.

## 2020-03-14 NOTE — PROGRESS NOTES
[facility]  Neurosurgery  Progress Note    Subjective:     History of Present Illness: This is a 70-year-old man with a history of a non instrumented lumbar fusion many years ago who presents with severe progressive axial low back pain, bilateral radicular leg pain, and thoracic myelopathy.  Additionally he had severe postural deformity that was functionally disabling.  He failed multiple conservative measures.  Imaging showed severe central stenosis with spinal cord compression from T10-L2, solid fusion from his previous surgery from L3 to the sacrum, and flat back deformity with severe fixed sagittal plane deformity. Recommended patient undergo spinal cord decompression and deformity reduction surgery, via a T10 to pelvis instrumented fusion with an L3 PSO and a multilevel laminectomy for spinal cord decompression.  Risks benefits alternatives indications and methods were reviewed in detail and he wished to proceed.  All questions were answered.  No guarantees about the results the procedure. Presents to [facility] for T10-pelvis and L3 PSO.    Post-Op Info:  Procedure(s) (LRB):  Block, Nerve (Bilateral)   4 Days Post-Op     Interval History: NAEON. Pain controlled. YRN in place per plastics. [facility]     Medications:  Continuous Infusions:  Scheduled Meds:   bacitracin   Topical (Top) BID    ceFAZolin (ANCEF) IVPB  2 g Intravenous Q8H    famotidine  40 mg Oral QHS    ferrous sulfate  325 mg Oral BID    heparin (porcine)  5,000 Units Subcutaneous Q8H    lidocaine  2 patch Transdermal Q24H    lisinopriL  5 mg Oral Daily    methocarbamoL  750 mg Oral TID    pregabalin  150 mg Oral TID    senna-docusate 8.6-50 mg  1 tablet Oral BID     PRN Meds:aluminum-magnesium hydroxide-simethicone, bisacodyL, Dextrose 10% Bolus, Dextrose 10% Bolus, fentaNYL, glucagon (human recombinant), glucose, glucose, HYDROmorphone, insulin aspart U-100, labetalol, midazolam, ondansetron, oxyCODONE,  oxyCODONE, promethazine (PHENERGAN) IVPB, sodium chloride 0.9%     Review of Systems  Objective:     Weight: 98.4 kg (217 lb)  Body mass index is 28.63 kg/m².  Vital Signs (Most Recent):  Temp: 96.7 °F (35.9 °C) (03/14/20 0554)  Pulse: 87 (03/14/20 0554)  Resp: 16 (03/14/20 0554)  BP: 125/77 (03/14/20 0554)  SpO2: (!) 92 % (03/14/20 0554) Vital Signs (24h Range):  Temp:  [96.7 °F (35.9 °C)-99.8 °F (37.7 °C)] 96.7 °F (35.9 °C)  Pulse:  [83-96] 87  Resp:  [16-18] 16  SpO2:  [92 %-96 %] 92 %  BP: ()/(59-77) 125/77                          Closed/Suction Drain 03/03/20 1601 Left;Inferior Back Bulb 15 Fr. (Active)   Site Description Unable to view 3/13/2020  8:00 AM   Dressing Type Transparent (Tegaderm) 3/12/2020  8:10 PM   Dressing Status Clean;Dry;Intact 3/12/2020  8:10 PM   Dressing Intervention Integrity maintained 3/12/2020  8:10 PM   Drainage Serosanguineous 3/12/2020  8:10 PM   Status To bulb suction 3/12/2020  8:10 PM   Output (mL) 80 mL 3/7/2020  2:19 AM       Neurosurgery Physical Exam  General: well developed, well nourished, no distress.   Head: normocephalic, atraumatic  Neck: No tracheal deviation. No palpable masses.   Neurologic: Alert and oriented. Thought content appropriate.  GCS: Motor: 6/Verbal: 5/Eyes: 4 GCS Total: 15  Mental Status: Awake, Alert, Oriented x 4  Language: No aphasia  Speech: No dysarthria  Cranial nerves: face symmetric, tongue midline, CN II-XII grossly intact.   Eyes: pupils equal, round, reactive to light with accomodation, EOMI.   Ears: No drainage.   Pulmonary: normal respirations, no signs of respiratory distress  Abdomen: soft, non-distended, not tender to palpation  Sensory: intact to light touch throughout  Motor Strength: Moves all extremities spontaneously with good tone.  Full strength upper and lower extremities. No abnormal movements seen.      Strength   Deltoids Triceps Biceps Wrist Extension Wrist Flexion Hand    Upper: R 5/5 5/5 5/5 5/5 5/5 5/5     L 5/5  5/5 5/5 5/5 5/5 5/5       Iliopsoas Quadriceps Knee  Flexion Tibialis  anterior Gastro- cnemius EHL   Lower: R 5-/5 5/5 5-/5 5/5 5/5 5/5     L 5/5 5/5 5/5 5/5 5/5 5/5      Vascular: Pulses 2+ and symmetric radial and dorsalis pedis. No LE edema.   Skin: Skin is warm, dry and intact.     Incision c/d/I with skin edges well approximated with sutures. No surrounding erythema or edema. No drainage from incision. No palpable underlying fluid collection.      Significant Labs:  No results for input(s): GLU, NA, K, CL, CO2, BUN, CREATININE, CALCIUM, MG in the last 48 hours.  No results for input(s): WBC, HGB, HCT, PLT in the last 48 hours.  No results for input(s): LABPT, INR, APTT in the last 48 hours.  Microbiology Results (last 7 days)     ** No results found for the last 168 hours. **          Significant Diagnostics:  X-ray Chest Ap Portable    Result Date: 3/13/2020  Poor depth of inspiration.  No acute chest disease identified. Electronically signed by: Ángel Argueta MD Date:    03/13/2020 Time:    18:50      Assessment/Plan:     Degenerative scoliosis  A 70 year old with degenerative lumbar spondy and spine deformity now s/p T10 - pelvis posterior segmental instrumented fusion, T10 - L3 laminectomy for decompression, L3 pedical subtraction osteotomy (3/3).    -Patient neurologically stable on exam.   -Brace on when upright, OOB, or working with therapy. OK to remove when lying in bed.   -Postop XR lumbar spine shows satisfactory posterior alignment and positioning of posterior hardware.   -Continue R YRN management per plastic surgery. Abx while drains in place. Plan for FU 1 week after discharge for drain removal. Can sit for up to 30 minutes at meal time (3 times per day).  Head of bed flat otherwise at all times.  Wound closure tension increases in sitting position.  This restriction should be in place for 6 weeks. Aquacel  surgical island cm dressing to cover entire back incision, change every 2 days or sooner  if dressing soiled. Chlorhexidine biopatches should be maintained around the YRN drain tubing at the skin level.  Biopatches should be replaced at least every 7 days or sooner if they become soiled. Can shower but should not tub soak the wounds.  No lotions, creams applied to the incision.    -Pain: Improved with multimodal agents per Anesthesia recommendations. No changes in pain after nerve block. Continue Lyrica TID. Scheduled lido patches, tylenol 1000 mg Q8, and robaxin 500 mg TID. Continue oxy prn.  -Thrombocytopenia: Resolved. Plts 280.  -Post op anemia: Stable. Continue iron for replacement. Improving.   -Transaminitis: Improving, f/u next lab draw. q72h labs  -HTN: Continue home dose lisinopril and Prn labetalol.   -DM2: continue POCT glucose, SSI, diabetic diet  -GERD: continue home dose pepcid  -DVT prophylaxis: CLIFF's, SCD's, SQH  -Bowel regimen: senna BID and miralax daily. States he uses enema or suppository normally 1-2x per week at home.  -Atelectasis prevention: IS hourly while awake, PT/OT, OOB > 6 hours per day. Continue to encourage OOB mobility and up to chair with meals.       Dispo: Pending placement. Medically stable for discharge.         Jovanna Hopkins MD  Neurosurgery  [facility]

## 2020-03-14 NOTE — PLAN OF CARE
Problem: Diabetes Comorbidity  Goal: Blood Glucose Level Within Desired Range  Outcome: Ongoing, Progressing     Problem: Adult Inpatient Plan of Care  Goal: Plan of Care Review  Outcome: Ongoing, Progressing  Goal: Patient-Specific Goal (Individualization)  Description  Admit Date: 3/3    Admit Dx: spinal fusion    Past Medical History:  No date: Colitis  No date: Diabetes mellitus, type 2  No date: Diverticulosis  No date: GERD (gastroesophageal reflux disease)  No date: Hypertension    Past Surgical History:  No date: ANKLE SURGERY; Bilateral  10/30/2019: ANTERIOR CERVICAL DISCECTOMY W/ FUSION; N/A      Comment:  Procedure: C3-4, C4-5 ACDF;  Surgeon: Francis Alan MD;               Location: [facility] OR 2ND FLR;  Service: Neurosurgery;                 Laterality: N/A;  C3-4, C4-5 ACDFAnesthesia:                 GeneralRad:  C-ArmNM:  EMG, SEP, & MEPBlood:  Type                & ScreenPosition:  SupineBed:  Regular slider                bedHeadrest:  Jefferson & GW tongs/hanging                weightsMisc:Small vivigenNerve root retractor                (DePuy)Disposable #2 KerrisonInterbody  No date: BACK SURGERY  No date: COLONOSCOPY  3/3/2020: CREATION OF MUSCLE ROTATIONAL FLAP      Comment:  Procedure: CREATION, FLAP, MUSCLE ROTATION;  Surgeon:                Francis Alan MD;  [facility];  Service:                Neurosurgery;;  No date: lower back surgery  3/3/2020: LUMBAR LAMINECTOMY WITH FUSION; N/A      Comment:  Procedure: T10-Pelvis Fusion with L4 PSO **AIRO**                Co-Surgeon: Roberto Parkinson;  Surgeon: Francis Alan MD;                 [facility];  Service: Neurosurgery;                 Laterality: N/A;  **AIRO**CELL SAVER**Co-Surgeon:                Roberto ParkinsonNM: EMG, SEP, MEPPosition: ProneBed:                Shaji 4 post, prone pillowBlood: Type & Hold 2                unitsRad: C-Arm, AIROVendor: DepuyMisc: Vivigen,                Infuse, Cement (Depuy)[place]  No  date: NECK SURGERY  No date: SPINE SURGERY  No date: UPPER GASTROINTESTINAL ENDOSCOPY    Individualization:   1. Pt likes dim lights    Restraints: (date/time/why or N/A) na         Outcome: Ongoing, Progressing  Goal: Absence of Hospital-Acquired Illness or Injury  Outcome: Ongoing, Progressing  Goal: Optimal Comfort and Wellbeing  Outcome: Ongoing, Progressing  Goal: Readiness for Transition of Care  Outcome: Ongoing, Progressing  Goal: Rounds/Family Conference  Outcome: Ongoing, Progressing     Problem: Skin Injury Risk Increased  Goal: Skin Health and Integrity  Outcome: Ongoing, Progressing     Problem: Pain Chronic (Persistent) (Comorbidity Management)  Goal: Acceptable Pain Control and Functional Ability  Outcome: Ongoing, Progressing     Pt a&ox4, VSS on RA, voids in urinal. YRN drain drained 15 mL. Oxy/dilaudid for pain. LBM 3/10. D/C to rehab Monday. POC discussed with spouse and pt. Will monitor.

## 2020-03-15 LAB
ALBUMIN SERPL BCP-MCNC: 2.5 G/DL (ref 3.5–5.2)
ALP SERPL-CCNC: 110 U/L (ref 55–135)
ALT SERPL W/O P-5'-P-CCNC: 57 U/L (ref 10–44)
ANION GAP SERPL CALC-SCNC: 8 MMOL/L (ref 8–16)
AST SERPL-CCNC: 62 U/L (ref 10–40)
BASOPHILS # BLD AUTO: 0.11 K/UL (ref 0–0.2)
BASOPHILS NFR BLD: 1.1 % (ref 0–1.9)
BILIRUB SERPL-MCNC: 0.4 MG/DL (ref 0.1–1)
BUN SERPL-MCNC: 12 MG/DL (ref 8–23)
CALCIUM SERPL-MCNC: 9.2 MG/DL (ref 8.7–10.5)
CHLORIDE SERPL-SCNC: 103 MMOL/L (ref 95–110)
CO2 SERPL-SCNC: 25 MMOL/L (ref 23–29)
CREAT SERPL-MCNC: 0.9 MG/DL (ref 0.5–1.4)
DIFFERENTIAL METHOD: ABNORMAL
EOSINOPHIL # BLD AUTO: 0.1 K/UL (ref 0–0.5)
EOSINOPHIL NFR BLD: 1.3 % (ref 0–8)
ERYTHROCYTE [DISTWIDTH] IN BLOOD BY AUTOMATED COUNT: 13.9 % (ref 11.5–14.5)
EST. GFR  (AFRICAN AMERICAN): >60 ML/MIN/1.73 M^2
EST. GFR  (NON AFRICAN AMERICAN): >60 ML/MIN/1.73 M^2
GLUCOSE SERPL-MCNC: 98 MG/DL (ref 70–110)
HCT VFR BLD AUTO: 32.2 % (ref 40–54)
HGB BLD-MCNC: 9.7 G/DL (ref 14–18)
IMM GRANULOCYTES # BLD AUTO: 0.46 K/UL (ref 0–0.04)
IMM GRANULOCYTES NFR BLD AUTO: 4.5 % (ref 0–0.5)
LYMPHOCYTES # BLD AUTO: 2.4 K/UL (ref 1–4.8)
LYMPHOCYTES NFR BLD: 23.4 % (ref 18–48)
MCH RBC QN AUTO: 27.6 PG (ref 27–31)
MCHC RBC AUTO-ENTMCNC: 30.1 G/DL (ref 32–36)
MCV RBC AUTO: 92 FL (ref 82–98)
MONOCYTES # BLD AUTO: 1 K/UL (ref 0.3–1)
MONOCYTES NFR BLD: 9.6 % (ref 4–15)
NEUTROPHILS # BLD AUTO: 6.1 K/UL (ref 1.8–7.7)
NEUTROPHILS NFR BLD: 60.1 % (ref 38–73)
NRBC BLD-RTO: 0 /100 WBC
PLATELET # BLD AUTO: 355 K/UL (ref 150–350)
PMV BLD AUTO: 10.5 FL (ref 9.2–12.9)
POCT GLUCOSE: 112 MG/DL (ref 70–110)
POCT GLUCOSE: 131 MG/DL (ref 70–110)
POCT GLUCOSE: 141 MG/DL (ref 70–110)
POCT GLUCOSE: 92 MG/DL (ref 70–110)
POTASSIUM SERPL-SCNC: 4.4 MMOL/L (ref 3.5–5.1)
PROT SERPL-MCNC: 6.3 G/DL (ref 6–8.4)
RBC # BLD AUTO: 3.51 M/UL (ref 4.6–6.2)
SODIUM SERPL-SCNC: 136 MMOL/L (ref 136–145)
WBC # BLD AUTO: 10.11 K/UL (ref 3.9–12.7)

## 2020-03-15 PROCEDURE — 25000003 PHARM REV CODE 250: Performed by: STUDENT IN AN ORGANIZED HEALTH CARE EDUCATION/TRAINING PROGRAM

## 2020-03-15 PROCEDURE — 25000003 PHARM REV CODE 250: Performed by: PHYSICIAN ASSISTANT

## 2020-03-15 PROCEDURE — 11000001 HC ACUTE MED/SURG PRIVATE ROOM

## 2020-03-15 PROCEDURE — 63600175 PHARM REV CODE 636 W HCPCS: Performed by: PHYSICIAN ASSISTANT

## 2020-03-15 PROCEDURE — 80053 COMPREHEN METABOLIC PANEL: CPT

## 2020-03-15 PROCEDURE — 85025 COMPLETE CBC W/AUTO DIFF WBC: CPT

## 2020-03-15 PROCEDURE — 36415 COLL VENOUS BLD VENIPUNCTURE: CPT

## 2020-03-15 RX ORDER — ACETAMINOPHEN 325 MG/1
650 TABLET ORAL EVERY 8 HOURS PRN
Status: DISCONTINUED | OUTPATIENT
Start: 2020-03-15 | End: 2020-03-24 | Stop reason: HOSPADM

## 2020-03-15 RX ADMIN — OXYCODONE HYDROCHLORIDE 15 MG: 10 TABLET ORAL at 12:03

## 2020-03-15 RX ADMIN — CEFAZOLIN 2 G: 1 INJECTION, POWDER, FOR SOLUTION INTRAMUSCULAR; INTRAVENOUS at 12:03

## 2020-03-15 RX ADMIN — OXYCODONE HYDROCHLORIDE 15 MG: 10 TABLET ORAL at 04:03

## 2020-03-15 RX ADMIN — CEFAZOLIN 2 G: 1 INJECTION, POWDER, FOR SOLUTION INTRAMUSCULAR; INTRAVENOUS at 08:03

## 2020-03-15 RX ADMIN — PREGABALIN 150 MG: 150 CAPSULE ORAL at 08:03

## 2020-03-15 RX ADMIN — METHOCARBAMOL TABLETS 750 MG: 750 TABLET, COATED ORAL at 02:03

## 2020-03-15 RX ADMIN — HEPARIN SODIUM 5000 UNITS: 5000 INJECTION, SOLUTION INTRAVENOUS; SUBCUTANEOUS at 09:03

## 2020-03-15 RX ADMIN — METHOCARBAMOL TABLETS 750 MG: 750 TABLET, COATED ORAL at 08:03

## 2020-03-15 RX ADMIN — HEPARIN SODIUM 5000 UNITS: 5000 INJECTION, SOLUTION INTRAVENOUS; SUBCUTANEOUS at 05:03

## 2020-03-15 RX ADMIN — LIDOCAINE 2 PATCH: 50 PATCH TOPICAL at 02:03

## 2020-03-15 RX ADMIN — OXYCODONE HYDROCHLORIDE 15 MG: 10 TABLET ORAL at 08:03

## 2020-03-15 RX ADMIN — SENNOSIDES AND DOCUSATE SODIUM 1 TABLET: 8.6; 5 TABLET ORAL at 08:03

## 2020-03-15 RX ADMIN — OXYCODONE HYDROCHLORIDE 15 MG: 10 TABLET ORAL at 09:03

## 2020-03-15 RX ADMIN — PREGABALIN 150 MG: 150 CAPSULE ORAL at 02:03

## 2020-03-15 RX ADMIN — FAMOTIDINE 40 MG: 20 TABLET, FILM COATED ORAL at 08:03

## 2020-03-15 RX ADMIN — LISINOPRIL 5 MG: 5 TABLET ORAL at 08:03

## 2020-03-15 RX ADMIN — CEFAZOLIN 2 G: 1 INJECTION, POWDER, FOR SOLUTION INTRAMUSCULAR; INTRAVENOUS at 04:03

## 2020-03-15 RX ADMIN — HEPARIN SODIUM 5000 UNITS: 5000 INJECTION, SOLUTION INTRAVENOUS; SUBCUTANEOUS at 02:03

## 2020-03-15 NOTE — ASSESSMENT & PLAN NOTE
A 70 year old with degenerative lumbar spondy and spine deformity now s/p T10 - pelvis posterior segmental instrumented fusion, T10 - L3 laminectomy for decompression, L3 pedical subtraction osteotomy (3/3).    -Patient neurologically stable on exam.   -Brace on when upright, OOB, or working with therapy. OK to remove when lying in bed.   -Postop XR lumbar spine shows satisfactory posterior alignment and positioning of posterior hardware.   -Continue R YRN management per plastic surgery. Abx while drains in place. Plan for FU 1 week after discharge for drain removal. Can sit for up to 30 minutes at meal time (3 times per day).  Head of bed flat otherwise at all times.  Wound closure tension increases in sitting position.  This restriction should be in place for 6 weeks. Aquacel  surgical island cm dressing to cover entire back incision, change every 2 days or sooner if dressing soiled. Chlorhexidine biopatches should be maintained around the YRN drain tubing at the skin level.  Biopatches should be replaced at least every 7 days or sooner if they become soiled. Can shower but should not tub soak the wounds.  No lotions, creams applied to the incision.    -Pain: Improved with multimodal agents per Anesthesia recommendations. No changes in pain after nerve block. Continue Lyrica TID. Scheduled lido patches, tylenol 1000 mg Q8, and robaxin 500 mg TID. Continue oxy prn.  -Thrombocytopenia: Resolved. Plts 280.  -Post op anemia: Stable. Continue iron for replacement. Improving.   -Transaminitis: Improving, f/u next lab draw. q72h labs  -HTN: Continue home dose lisinopril and Prn labetalol.   -DM2: continue POCT glucose, SSI, diabetic diet  -GERD: continue home dose pepcid  -DVT prophylaxis: CLIFF's, SCD's, SQH  -Bowel regimen: senna BID and miralax daily. States he uses enema or suppository normally 1-2x per week at home.  -Atelectasis prevention: IS hourly while awake, PT/OT, OOB > 6 hours per day. Continue to encourage  OOB mobility and up to chair with meals.       Dispo: Anticipate DC to [place] in AM. Medically stable for discharge.

## 2020-03-15 NOTE — NURSING
TB skin test left forearm read the area is raised and hard to touch when palpated.  MD notified.  MD placed new orders for Quantiferon Gold test and chest x ray.

## 2020-03-15 NOTE — PLAN OF CARE
Problem: Adult Inpatient Plan of Care  Goal: Plan of Care Review  Outcome: Ongoing, Progressing     Pt AAOx4. Moves all extremities well. C/o pain. PRN pain medications administered with relief. Pt states numbness and tingling present in bilateral lower extremities is improving. Incision with dressing remains clean, dry, and intact. Drain in place. Urinal within reach. VS stable. All safety measures maintained through the shift. Will continue to monitor.      *TB skin test on left forearm needs to be read 3/15/20 at 1530*

## 2020-03-15 NOTE — PLAN OF CARE
Problem: Fall Injury Risk  Goal: Absence of Fall and Fall-Related Injury  Outcome: Ongoing, Progressing  Intervention: Identify and Manage Contributors to Fall Injury Risk  Flowsheets (Taken 3/15/2020 1056)  Self-Care Promotion: independence encouraged  Medication Review/Management: medications reviewed  Intervention: Promote Injury-Free Environment  Flowsheets (Taken 3/15/2020 1056)  Safety Promotion/Fall Prevention: assistive device/personal item within reach; side rails raised x 3; nonskid shoes/socks when out of bed  Environmental Safety Modification: assistive device/personal items within reach; clutter free environment maintained; lighting adjusted

## 2020-03-15 NOTE — PROGRESS NOTES
[facility]  Neurosurgery  Progress Note    Subjective:     History of Present Illness: This is a 70-year-old man with a history of a non instrumented lumbar fusion many years ago who presents with severe progressive axial low back pain, bilateral radicular leg pain, and thoracic myelopathy.  Additionally he had severe postural deformity that was functionally disabling.  He failed multiple conservative measures.  Imaging showed severe central stenosis with spinal cord compression from T10-L2, solid fusion from his previous surgery from L3 to the sacrum, and flat back deformity with severe fixed sagittal plane deformity. Recommended patient undergo spinal cord decompression and deformity reduction surgery, via a T10 to pelvis instrumented fusion with an L3 PSO and a multilevel laminectomy for spinal cord decompression.  Risks benefits alternatives indications and methods were reviewed in detail and he wished to proceed.  All questions were answered.  No guarantees about the results the procedure. Presents to [facility] for T10-pelvis and L3 PSO.    Post-Op Info:  Procedure(s) (LRB):  Block, Nerve (Bilateral)   5 Days Post-Op     Interval History: NAEON. Neurologically stable. YRN per plastics. Anticipate DC in AM to rehab.     Medications:  Continuous Infusions:  Scheduled Meds:   bacitracin   Topical (Top) BID    ceFAZolin (ANCEF) IVPB  2 g Intravenous Q8H    famotidine  40 mg Oral QHS    ferrous sulfate  325 mg Oral BID    heparin (porcine)  5,000 Units Subcutaneous Q8H    lidocaine  2 patch Transdermal Q24H    lisinopriL  5 mg Oral Daily    methocarbamoL  750 mg Oral TID    pregabalin  150 mg Oral TID    senna-docusate 8.6-50 mg  1 tablet Oral BID     PRN Meds:aluminum-magnesium hydroxide-simethicone, bisacodyL, Dextrose 10% Bolus, Dextrose 10% Bolus, fentaNYL, glucagon (human recombinant), glucose, glucose, HYDROmorphone, insulin aspart U-100, labetalol, midazolam, ondansetron, oxyCODONE,  oxyCODONE, promethazine (PHENERGAN) IVPB, sodium chloride 0.9%     Review of Systems  Objective:     Weight: 98.4 kg (217 lb)  Body mass index is 28.63 kg/m².  Vital Signs (Most Recent):  Temp: 99 °F (37.2 °C) (03/15/20 1202)  Pulse: 99 (03/15/20 1215)  Resp: 20 (03/15/20 1202)  BP: (!) 124/54 (03/15/20 1202)  SpO2: (!) 94 % (03/15/20 1202) Vital Signs (24h Range):  Temp:  [98.6 °F (37 °C)-99.6 °F (37.6 °C)] 99 °F (37.2 °C)  Pulse:  [] 99  Resp:  [17-20] 20  SpO2:  [92 %-98 %] 94 %  BP: ()/(54-71) 124/54                          Closed/Suction Drain 03/03/20 1601 Left;Inferior Back Bulb 15 Fr. (Active)   Site Description Other (Comment) 3/15/2020  4:00 AM   Dressing Type Biopatch in place 3/15/2020  4:00 AM   Dressing Status Clean;Dry;Intact 3/15/2020  4:00 AM   Dressing Intervention Integrity maintained 3/15/2020  4:00 AM   Drainage Serosanguineous 3/13/2020  9:15 PM   Status To bulb suction 3/15/2020  4:00 AM   Output (mL) 80 mL 3/7/2020  2:19 AM       Neurosurgery Physical Exam   General: well developed, well nourished, no distress.   Head: normocephalic, atraumatic  Neck: No tracheal deviation. No palpable masses.   Neurologic: Alert and oriented. Thought content appropriate.  GCS: Motor: 6/Verbal: 5/Eyes: 4 GCS Total: 15  Mental Status: Awake, Alert, Oriented x 4  Language: No aphasia  Speech: No dysarthria  Cranial nerves: face symmetric, tongue midline, CN II-XII grossly intact.   Eyes: pupils equal, round, reactive to light with accomodation, EOMI.   Ears: No drainage.   Pulmonary: normal respirations, no signs of respiratory distress  Abdomen: soft, non-distended, not tender to palpation  Sensory: intact to light touch throughout  Motor Strength: Moves all extremities spontaneously with good tone.  Full strength upper and lower extremities. No abnormal movements seen.      Strength   Deltoids Triceps Biceps Wrist Extension Wrist Flexion Hand    Upper: R 5/5 5/5 5/5 5/5 5/5 5/5     L 5/5 5/5  5/5 5/5 5/5 5/5       Iliopsoas Quadriceps Knee  Flexion Tibialis  anterior Gastro- cnemius EHL   Lower: R 5-/5 5/5 5-/5 5/5 5/5 5/5     L 5/5 5/5 5/5 5/5 5/5 5/5      Vascular: Pulses 2+ and symmetric radial and dorsalis pedis. No LE edema.   Skin: Skin is warm, dry and intact.     Incision c/d/I with skin edges well approximated with sutures. No surrounding erythema or edema. No drainage from incision. No palpable underlying fluid collection.      Significant Labs:  Recent Labs   Lab 03/15/20  0458   GLU 98      K 4.4      CO2 25   BUN 12   CREATININE 0.9   CALCIUM 9.2     Recent Labs   Lab 03/15/20  0458   WBC 10.11   HGB 9.7*   HCT 32.2*   *       Significant Diagnostics:  No results found in the last 24 hours.      Assessment/Plan:     Degenerative scoliosis  A 70 year old with degenerative lumbar spondy and spine deformity now s/p T10 - pelvis posterior segmental instrumented fusion, T10 - L3 laminectomy for decompression, L3 pedical subtraction osteotomy (3/3).    -Patient neurologically stable on exam.   -Brace on when upright, OOB, or working with therapy. OK to remove when lying in bed.   -Postop XR lumbar spine shows satisfactory posterior alignment and positioning of posterior hardware.   -Continue R YRN management per plastic surgery. Abx while drains in place. Plan for FU 1 week after discharge for drain removal. Can sit for up to 30 minutes at meal time (3 times per day).  Head of bed flat otherwise at all times.  Wound closure tension increases in sitting position.  This restriction should be in place for 6 weeks. Aquacel Ag surgical island cm dressing to cover entire back incision, change every 2 days or sooner if dressing soiled. Chlorhexidine biopatches should be maintained around the YRN drain tubing at the skin level.  Biopatches should be replaced at least every 7 days or sooner if they become soiled. Can shower but should not tub soak the wounds.  No lotions, creams applied to  the incision.    -Pain: Improved with multimodal agents per Anesthesia recommendations. No changes in pain after nerve block. Continue Lyrica TID. Scheduled lido patches, tylenol 1000 mg Q8, and robaxin 500 mg TID. Continue oxy prn.  -Thrombocytopenia: Resolved. Plts 280.  -Post op anemia: Stable. Continue iron for replacement. Improving.   -Transaminitis: Improving, f/u next lab draw. q72h labs  -HTN: Continue home dose lisinopril and Prn labetalol.   -DM2: continue POCT glucose, SSI, diabetic diet  -GERD: continue home dose pepcid  -DVT prophylaxis: CLIFF's, SCD's, SQH  -Bowel regimen: senna BID and miralax daily. States he uses enema or suppository normally 1-2x per week at home.  -Atelectasis prevention: IS hourly while awake, PT/OT, OOB > 6 hours per day. Continue to encourage OOB mobility and up to chair with meals.       Dispo: Anticipate DC to [place] in AM. Medically stable for discharge.         Jovanna Hopkins MD  Neurosurgery  [facility]

## 2020-03-15 NOTE — SUBJECTIVE & OBJECTIVE
Interval History: NAEON. Neurologically stable. YRN per plastics. Anticipate DC in AM to rehab.     Medications:  Continuous Infusions:  Scheduled Meds:   bacitracin   Topical (Top) BID    ceFAZolin (ANCEF) IVPB  2 g Intravenous Q8H    famotidine  40 mg Oral QHS    ferrous sulfate  325 mg Oral BID    heparin (porcine)  5,000 Units Subcutaneous Q8H    lidocaine  2 patch Transdermal Q24H    lisinopriL  5 mg Oral Daily    methocarbamoL  750 mg Oral TID    pregabalin  150 mg Oral TID    senna-docusate 8.6-50 mg  1 tablet Oral BID     PRN Meds:aluminum-magnesium hydroxide-simethicone, bisacodyL, Dextrose 10% Bolus, Dextrose 10% Bolus, fentaNYL, glucagon (human recombinant), glucose, glucose, HYDROmorphone, insulin aspart U-100, labetalol, midazolam, ondansetron, oxyCODONE, oxyCODONE, promethazine (PHENERGAN) IVPB, sodium chloride 0.9%     Review of Systems  Objective:     Weight: 98.4 kg (217 lb)  Body mass index is 28.63 kg/m².  Vital Signs (Most Recent):  Temp: 99 °F (37.2 °C) (03/15/20 1202)  Pulse: 99 (03/15/20 1215)  Resp: 20 (03/15/20 1202)  BP: (!) 124/54 (03/15/20 1202)  SpO2: (!) 94 % (03/15/20 1202) Vital Signs (24h Range):  Temp:  [98.6 °F (37 °C)-99.6 °F (37.6 °C)] 99 °F (37.2 °C)  Pulse:  [] 99  Resp:  [17-20] 20  SpO2:  [92 %-98 %] 94 %  BP: ()/(54-71) 124/54                          Closed/Suction Drain 03/03/20 1601 Left;Inferior Back Bulb 15 Fr. (Active)   Site Description Other (Comment) 3/15/2020  4:00 AM   Dressing Type Biopatch in place 3/15/2020  4:00 AM   Dressing Status Clean;Dry;Intact 3/15/2020  4:00 AM   Dressing Intervention Integrity maintained 3/15/2020  4:00 AM   Drainage Serosanguineous 3/13/2020  9:15 PM   Status To bulb suction 3/15/2020  4:00 AM   Output (mL) 80 mL 3/7/2020  2:19 AM       Neurosurgery Physical Exam   General: well developed, well nourished, no distress.   Head: normocephalic, atraumatic  Neck: No tracheal deviation. No palpable masses.   Neurologic:  Alert and oriented. Thought content appropriate.  GCS: Motor: 6/Verbal: 5/Eyes: 4 GCS Total: 15  Mental Status: Awake, Alert, Oriented x 4  Language: No aphasia  Speech: No dysarthria  Cranial nerves: face symmetric, tongue midline, CN II-XII grossly intact.   Eyes: pupils equal, round, reactive to light with accomodation, EOMI.   Ears: No drainage.   Pulmonary: normal respirations, no signs of respiratory distress  Abdomen: soft, non-distended, not tender to palpation  Sensory: intact to light touch throughout  Motor Strength: Moves all extremities spontaneously with good tone.  Full strength upper and lower extremities. No abnormal movements seen.      Strength   Deltoids Triceps Biceps Wrist Extension Wrist Flexion Hand    Upper: R 5/5 5/5 5/5 5/5 5/5 5/5     L 5/5 5/5 5/5 5/5 5/5 5/5       Iliopsoas Quadriceps Knee  Flexion Tibialis  anterior Gastro- cnemius EHL   Lower: R 5-/5 5/5 5-/5 5/5 5/5 5/5     L 5/5 5/5 5/5 5/5 5/5 5/5      Vascular: Pulses 2+ and symmetric radial and dorsalis pedis. No LE edema.   Skin: Skin is warm, dry and intact.     Incision c/d/I with skin edges well approximated with sutures. No surrounding erythema or edema. No drainage from incision. No palpable underlying fluid collection.      Significant Labs:  Recent Labs   Lab 03/15/20  0458   GLU 98      K 4.4      CO2 25   BUN 12   CREATININE 0.9   CALCIUM 9.2     Recent Labs   Lab 03/15/20  0458   WBC 10.11   HGB 9.7*   HCT 32.2*   *       Significant Diagnostics:  No results found in the last 24 hours.

## 2020-03-16 LAB
POCT GLUCOSE: 113 MG/DL (ref 70–110)
POCT GLUCOSE: 113 MG/DL (ref 70–110)
POCT GLUCOSE: 142 MG/DL (ref 70–110)
POCT GLUCOSE: 144 MG/DL (ref 70–110)

## 2020-03-16 PROCEDURE — 25000003 PHARM REV CODE 250: Performed by: PHYSICIAN ASSISTANT

## 2020-03-16 PROCEDURE — 99024 POSTOP FOLLOW-UP VISIT: CPT | Mod: POP,,, | Performed by: PHYSICIAN ASSISTANT

## 2020-03-16 PROCEDURE — 97535 SELF CARE MNGMENT TRAINING: CPT

## 2020-03-16 PROCEDURE — 63600175 PHARM REV CODE 636 W HCPCS: Performed by: PHYSICIAN ASSISTANT

## 2020-03-16 PROCEDURE — 25000003 PHARM REV CODE 250: Performed by: STUDENT IN AN ORGANIZED HEALTH CARE EDUCATION/TRAINING PROGRAM

## 2020-03-16 PROCEDURE — 99024 PR POST-OP FOLLOW-UP VISIT: ICD-10-PCS | Mod: POP,,, | Performed by: PHYSICIAN ASSISTANT

## 2020-03-16 PROCEDURE — 94761 N-INVAS EAR/PLS OXIMETRY MLT: CPT

## 2020-03-16 PROCEDURE — 36415 COLL VENOUS BLD VENIPUNCTURE: CPT

## 2020-03-16 PROCEDURE — 97530 THERAPEUTIC ACTIVITIES: CPT

## 2020-03-16 PROCEDURE — 97116 GAIT TRAINING THERAPY: CPT

## 2020-03-16 PROCEDURE — 86480 TB TEST CELL IMMUN MEASURE: CPT

## 2020-03-16 PROCEDURE — 97112 NEUROMUSCULAR REEDUCATION: CPT

## 2020-03-16 PROCEDURE — 11000001 HC ACUTE MED/SURG PRIVATE ROOM

## 2020-03-16 RX ADMIN — HEPARIN SODIUM 5000 UNITS: 5000 INJECTION, SOLUTION INTRAVENOUS; SUBCUTANEOUS at 01:03

## 2020-03-16 RX ADMIN — CEFAZOLIN 2 G: 1 INJECTION, POWDER, FOR SOLUTION INTRAMUSCULAR; INTRAVENOUS at 12:03

## 2020-03-16 RX ADMIN — OXYCODONE HYDROCHLORIDE 15 MG: 10 TABLET ORAL at 05:03

## 2020-03-16 RX ADMIN — OXYCODONE HYDROCHLORIDE 15 MG: 10 TABLET ORAL at 07:03

## 2020-03-16 RX ADMIN — HEPARIN SODIUM 5000 UNITS: 5000 INJECTION, SOLUTION INTRAVENOUS; SUBCUTANEOUS at 10:03

## 2020-03-16 RX ADMIN — METHOCARBAMOL TABLETS 750 MG: 750 TABLET, COATED ORAL at 09:03

## 2020-03-16 RX ADMIN — LIDOCAINE 2 PATCH: 50 PATCH TOPICAL at 01:03

## 2020-03-16 RX ADMIN — PREGABALIN 150 MG: 150 CAPSULE ORAL at 04:03

## 2020-03-16 RX ADMIN — METHOCARBAMOL TABLETS 750 MG: 750 TABLET, COATED ORAL at 10:03

## 2020-03-16 RX ADMIN — FERROUS SULFATE TAB EC 325 MG (65 MG FE EQUIVALENT) 325 MG: 325 (65 FE) TABLET DELAYED RESPONSE at 10:03

## 2020-03-16 RX ADMIN — OXYCODONE HYDROCHLORIDE 15 MG: 10 TABLET ORAL at 01:03

## 2020-03-16 RX ADMIN — CEFAZOLIN 2 G: 1 INJECTION, POWDER, FOR SOLUTION INTRAMUSCULAR; INTRAVENOUS at 09:03

## 2020-03-16 RX ADMIN — PREGABALIN 150 MG: 150 CAPSULE ORAL at 10:03

## 2020-03-16 RX ADMIN — FAMOTIDINE 40 MG: 20 TABLET, FILM COATED ORAL at 10:03

## 2020-03-16 RX ADMIN — METHOCARBAMOL TABLETS 750 MG: 750 TABLET, COATED ORAL at 04:03

## 2020-03-16 RX ADMIN — SENNOSIDES AND DOCUSATE SODIUM 1 TABLET: 8.6; 5 TABLET ORAL at 09:03

## 2020-03-16 RX ADMIN — PREGABALIN 150 MG: 150 CAPSULE ORAL at 09:03

## 2020-03-16 RX ADMIN — OXYCODONE HYDROCHLORIDE 15 MG: 10 TABLET ORAL at 09:03

## 2020-03-16 RX ADMIN — HEPARIN SODIUM 5000 UNITS: 5000 INJECTION, SOLUTION INTRAVENOUS; SUBCUTANEOUS at 05:03

## 2020-03-16 RX ADMIN — CEFAZOLIN 2 G: 1 INJECTION, POWDER, FOR SOLUTION INTRAMUSCULAR; INTRAVENOUS at 04:03

## 2020-03-16 RX ADMIN — LISINOPRIL 5 MG: 5 TABLET ORAL at 09:03

## 2020-03-16 RX ADMIN — SENNOSIDES AND DOCUSATE SODIUM 1 TABLET: 8.6; 5 TABLET ORAL at 10:03

## 2020-03-16 RX ADMIN — FERROUS SULFATE TAB EC 325 MG (65 MG FE EQUIVALENT) 325 MG: 325 (65 FE) TABLET DELAYED RESPONSE at 09:03

## 2020-03-16 RX ADMIN — ACETAMINOPHEN 650 MG: 325 TABLET ORAL at 04:03

## 2020-03-16 NOTE — PT/OT/SLP PROGRESS
Physical Therapy Treatment    Patient Name:  Garcia Renteria   MRN:  [id number]    Recommendations:     Discharge Recommendations:  rehabilitation facility   Discharge Equipment Recommendations: (TBD by next level of care)   Barriers to discharge: Inaccessible home and Decreased caregiver support    Assessment:     Garcia Renteria is a 70 y.o. male admitted with a medical diagnosis of Acquired spondylolisthesis of thoracolumbar vertebra.  He presents with the following impairments/functional limitations:  weakness, impaired endurance, impaired self care skills, impaired functional mobilty, gait instability, impaired balance, decreased safety awareness, decreased coordination, decreased lower extremity function, pain, orthopedic precautions. Pt tolerated session well. Pt ambulated ~80 ft with with [facility] and RW.     Pt would benefit from intensive collaborative rehabilitation for: Dynamic/static standing/sitting balance through skilled balance training, strengthening with the use of skilled therapeutic exercises interventions, mobility and safety training to ensure safe discharge home through skilled patient and caregiver education home management training, mobility through community re-integration training and mobility through adaptive equipment training. Pt highly motivated to return to independent PLOF and can tolerate 3+hours of therapy. Pt continues to benefit from a collaborative PT/OT program to improve quality of life and focus on recovery of impairments.       Rehab Prognosis: Good; patient would benefit from acute skilled PT services to address these deficits and reach maximum level of function.    Recent Surgery: Procedure(s) (LRB):  Block, Nerve (Bilateral) 6 Days Post-Op    Plan:     During this hospitalization, patient to be seen 4 x/week to address the identified rehab impairments via gait training, therapeutic activities, therapeutic exercises, neuromuscular re-education and progress toward the  following goals:    · Plan of Care Expires:  04/03/20    Subjective     Chief Complaint: none verbalized  Patient/Family Comments/goals: return to PLOF  Pain/Comfort:  · Pain Rating 1: 6/10  · Location - Side 1: Right  · Location - Orientation 1: generalized  · Location 1: thigh  · Pain Addressed 1: Pre-medicate for activity, Reposition      Objective:     Communicated with nursing prior to session.  Patient found right sidelying with telemetry, YRN drain, peripheral IV upon PT entry to room.     General Precautions: Standard, fall   Orthopedic Precautions:spinal precautions   Braces: TLSO     Functional Mobility:  · Bed Mobility:     · Right side lying to Sit EOB: stand by assistance  · Transfers:     · Sit to Stand:  x2 trials. RW, max A on first attempt and stand completed with Mitch of 2nd attempt. VCing for BLE placement, UE positioning, and technique  · Gait: ~80 feet, CGA to Mitch  · BLE knee flexion noted during gait, decreased sonia, decreased step length on L side. Increased step length on R side (stated it was going further that he wanted)  · VCing for proper walker distance and  gait pattern.  · Chair following for safety  · Balance:   · Static Sitting: SBA, BUE support  · Dynamic Sitting:  N/T this date  · Static standing: CGA with RW  · Dynamic standing: CGA to Mitch with RW      AM-PAC 6 CLICK MOBILITY  Turning over in bed (including adjusting bedclothes, sheets and blankets)?: 3  Sitting down on and standing up from a chair with arms (e.g., wheelchair, bedside commode, etc.): 3  Moving from lying on back to sitting on the side of the bed?: 3  Moving to and from a bed to a chair (including a wheelchair)?: 3  Need to walk in hospital room?: 3  Climbing 3-5 steps with a railing?: 1  Basic Mobility Total Score: 16       Therapeutic Activities and Exercises:  Reviewed TLSO brace donning/doffing.  Pt educated on and verbalized understanding on remaining up in chair for ~30 minutes at a time and calling for  assistance to return to bed.  Patient educated on role of therapy, goals of session, and benefits of mobilizing.   Discussed PT plan of care during hospitalization.   Patient educated that they need to call for assistance to mobilize out of bed.   Patient educated on how their diagnosis impacts their mobility within PT scope of practice.   Communication board updated.  All questions answered within PT scope of practice.      Patient left up in chair with all lines intact, call button in reach, nursing notified and spouse present.    GOALS:   Multidisciplinary Problems     Physical Therapy Goals        Problem: Physical Therapy Goal    Goal Priority Disciplines Outcome Goal Variances Interventions   Physical Therapy Goal     PT, PT/OT Ongoing, Progressing     Description:  Goals to be met by: 3/14/2020, goals extended to 3/22/2020    Patient will increase functional independence with mobility by performin. Supine to sit with Moderate Assistance - goal met 3/11/2020   Updated: CGA  2. Sit to supine with Moderate Assistance - goal met 3/12/2020   Updated: CGA  3. Rolling to Left and Right with Moderate Assistance.  4. Sit to stand transfer with Maximum Assistance - goal met 3/12/2020   Updated: Mitch  5. Bed to chair transfer with Maximum Assistance  6. Gait  x 15 feet with Maximum Assistance using RW. - goal met 3/16/2020   Updated: 100 ft; SBA  7. Lower extremity exercise program x15 reps per handout, with assistance as needed                         Time Tracking:     PT Received On: 20  PT Start Time: 1033     PT Stop Time: 1057  PT Total Time (min): 24 min co-treat OT    Billable Minutes: Gait Training 12 and Neuromuscular Re-education 12    Treatment Type: Treatment  PT/PTA: PT           Katy Deutsch, PT  2020

## 2020-03-16 NOTE — PLAN OF CARE
"Problem: Adult Inpatient Plan of Care  Goal: Plan of Care Review  Outcome: Ongoing, Progressing     Pt AAOx4. Moves all extremities well. C/o pain. PRN pain medications administered with relief. Pt states, "numbness and tingling present in bilateral lower extremities is improving and a whole lot better than before." Incision with dressing remains clean, dry, and intact. Drain in place. Urinal within reach. Spouse at bedside. VS stable. All safety measures maintained through the shift. Will continue to monitor.    "

## 2020-03-16 NOTE — PLAN OF CARE
03/16/20 1852   Discharge Reassessment   Assessment Type Discharge Planning Reassessment   Provided patient/caregiver education on the expected discharge date and the discharge plan Yes   Do you have any problems affording any of your prescribed medications? No   Discharge Plan A Rehab   Discharge Plan B Home Health;Home with family

## 2020-03-16 NOTE — PLAN OF CARE
03/16/20 1212   Post-Acute Status   Post-Acute Authorization Placement   Post-Acute Placement Status Awaiting Internal Medical Clearance       Pt with Positive PPD over the weekend.  Chest x ray was done and a quantiferon gold test was done today.  Notified NH.  Awaiting a return call.  SW in contact with CM and Medical staff. Will continue to follow and offer support as needed.     Victor Blanchard, LMSW [facility]   Ext. [phone]      Received a call back from [facility] stating they will take the patgient once the quantiferon gold test is back.  PAKO in contact with  and Medical staff. Will continue to follow and offer support as needed.     Victor Blanchard, LMSW [facility]   Ext. [phone]

## 2020-03-16 NOTE — PLAN OF CARE
Continue OT plan of care.    Problem: Occupational Therapy Goal  Goal: Occupational Therapy Goal  Description  Updated Goals to be met by: 7 days (3/18/20)     Patient will increase functional independence with ADLs by performing:    UE Dressing with Minimal Assistance including TLSO.  LE Dressing with Moderate Assistance using AD as needed.  Grooming while seated with SBA.  Toileting from bedside commode with Moderate Assistance for hygiene and clothing management.   Supine to sit with Minimal Assistance.-MET  Supine to sit with SBA.  Toilet transfer to bedside commode with Minimal Assistance.  Pt will verbalize understanding of 3/3 spinal precautions without added cues.       Goals to be met by: 7 days (3/11/20)     Patient will increase functional independence with ADLs by performing:    UE Dressing with Minimal Assistance including TLSO.  LE Dressing with Moderate Assistance using AD as needed.  Grooming while standing with Contact Guard Assistance.  Toileting from bedside commode with Minimal Assistance for hygiene and clothing management.   Supine to sit with Minimal Assistance.  Toilet transfer to bedside commode with Minimal Assistance.  Pt will verbalize understanding of 3/3 spinal precautions without added cues.       Outcome: Ongoing, Progressing

## 2020-03-16 NOTE — PROGRESS NOTES
[facility]  Neurosurgery   Progress Note    Subjective:     History of Present Illness: This is a 70-year-old man with a history of a non instrumented lumbar fusion many years ago who presents with severe progressive axial low back pain, bilateral radicular leg pain, and thoracic myelopathy.  Additionally he had severe postural deformity that was functionally disabling.  He failed multiple conservative measures.  Imaging showed severe central stenosis with spinal cord compression from T10-L2, solid fusion from his previous surgery from L3 to the sacrum, and flat back deformity with severe fixed sagittal plane deformity. Recommended patient undergo spinal cord decompression and deformity reduction surgery, via a T10 to pelvis instrumented fusion with an L3 PSO and a multilevel laminectomy for spinal cord decompression.  Risks benefits alternatives indications and methods were reviewed in detail and he wished to proceed.  All questions were answered.  No guarantees about the results the procedure. Presents to [facility] [place], [place] for T10-pelvis and L3 PSO.    Post-Op Info:  Procedure(s) (LRB):  Block, Nerve (Bilateral)   6 Days Post-Op     Interval History: NAEON. Neuro exam stable. Ambulated to nurses station over the weekend, sat in chair yesterday for 30 minutes. Progressing daily with aggressive therapy regimen, patient remains self-motivated to progress. Denies weakness, paresthesias, b/b dysfunction. Continue YRN per plastics.       Medications:  Continuous Infusions:  Scheduled Meds:   bacitracin   Topical (Top) BID    ceFAZolin (ANCEF) IVPB  2 g Intravenous Q8H    famotidine  40 mg Oral QHS    ferrous sulfate  325 mg Oral BID    heparin (porcine)  5,000 Units Subcutaneous Q8H    lidocaine  2 patch Transdermal Q24H    lisinopriL  5 mg Oral Daily    methocarbamoL  750 mg Oral TID    pregabalin  150 mg Oral TID    senna-docusate 8.6-50 mg  1 tablet Oral BID     PRN Meds:acetaminophen,  aluminum-magnesium hydroxide-simethicone, bisacodyL, Dextrose 10% Bolus, Dextrose 10% Bolus, fentaNYL, glucagon (human recombinant), glucose, glucose, HYDROmorphone, insulin aspart U-100, labetalol, midazolam, ondansetron, oxyCODONE, oxyCODONE, promethazine (PHENERGAN) IVPB, sodium chloride 0.9%     Objective:     Weight: 98.4 kg (217 lb)  Body mass index is 28.63 kg/m².  Vital Signs (Most Recent):  Temp: 97.8 °F (36.6 °C) (03/16/20 1557)  Pulse: 92 (03/16/20 1706)  Resp: 18 (03/16/20 1557)  BP: (!) 97/56 (03/16/20 1557)  SpO2: (!) 93 % (03/16/20 1706) Vital Signs (24h Range):  Temp:  [97.8 °F (36.6 °C)-99.9 °F (37.7 °C)] 97.8 °F (36.6 °C)  Pulse:  [78-97] 92  Resp:  [16-18] 18  SpO2:  [93 %-97 %] 93 %  BP: ()/(55-70) 97/56                          Closed/Suction Drain 03/03/20 1601 Left;Inferior Back Bulb 15 Fr. (Active)   Site Description Unable to view 3/16/2020  8:00 AM   Dressing Type Transparent (Tegaderm);Gauze 3/16/2020  8:00 AM   Dressing Status Clean;Dry;Intact 3/16/2020  8:00 AM   Dressing Intervention Integrity maintained 3/16/2020  8:00 AM   Drainage Serosanguineous 3/15/2020  8:00 PM   Status To bulb suction 3/15/2020  8:00 PM   Output (mL) 80 mL 3/7/2020  2:19 AM       Neurosurgery Physical Exam    General: well developed, well nourished, no distress.   Head: normocephalic, atraumatic  Neck: No tracheal deviation. No palpable masses.   Neurologic: Alert and oriented. Thought content appropriate.  GCS: Motor: 6/Verbal: 5/Eyes: 4 GCS Total: 15  Mental Status: Awake, Alert, Oriented x 4  Language: No aphasia  Speech: No dysarthria  Cranial nerves: face symmetric, tongue midline, CN II-XII grossly intact.   Eyes: pupils equal, round, reactive to light with accomodation, EOMI.   Ears: No drainage.   Pulmonary: normal respirations, no signs of respiratory distress  Abdomen: soft, non-distended, not tender to palpation  Sensory: intact to light touch throughout  Motor Strength: Moves all extremities  spontaneously with good tone.  Pain limited weakness to right hip flexion/knee flexion 2/2 right groin pain, remains full strength upper and lower extremities. No abnormal movements seen.     Strength  Deltoids Triceps Biceps Wrist Extension Wrist Flexion Hand    Upper: R 5/5 5/5 5/5 5/5 5/5 5/5    L 5/5 5/5 5/5 5/5 5/5 5/5     Iliopsoas Quadriceps Knee  Flexion Tibialis  anterior Gastro- cnemius EHL   Lower: R 5/5 5/5 5/5 5/5 5/5 5/5    L 5/5 5/5 5/5 5/5 5/5 5/5     Harris: absent  Clonus: absent  Vascular: Pulses 2+ and symmetric radial and dorsalis pedis. No LE edema.   Skin: Skin is warm, dry and intact.    Incision c/d/I with skin edges well approximated with sutures. No surrounding erythema or edema. No drainage from incision. No palpable underlying fluid collection.    Significant Labs:  Recent Labs   Lab 03/15/20  0458   GLU 98      K 4.4      CO2 25   BUN 12   CREATININE 0.9   CALCIUM 9.2     Recent Labs   Lab 03/15/20  0458   WBC 10.11   HGB 9.7*   HCT 32.2*   *     No results for input(s): LABPT, INR, APTT in the last 48 hours.  Microbiology Results (last 7 days)     ** No results found for the last 168 hours. **        Recent Lab Results       03/16/20  1640   03/16/20  1150   03/16/20  0810   03/15/20  2114        POCT Glucose 113 113 142 141         All pertinent labs from the last 24 hours have been reviewed.    Significant Diagnostics:  I have reviewed all pertinent imaging results/findings within the past 24 hours.    Assessment/Plan:     Degenerative scoliosis  A 70 year old with degenerative lumbar spondy and spine deformity now s/p T10 - pelvis posterior segmental instrumented fusion, T10 - L3 laminectomy for decompression, L3 pedical subtraction osteotomy (3/3).    -Patient neurologically stable on exam.   -Brace on when upright, OOB, or working with therapy. OK to remove when lying in bed.   -Postop XR lumbar spine shows satisfactory posterior alignment and positioning  of posterior hardware.   -Continue R YRN management per plastic surgery. Abx while drains in place. Plan for FU 1 week after discharge for drain removal. Can sit for up to 30 minutes at meal time (3 times per day).  Head of bed flat otherwise at all times.  Wound closure tension increases in sitting position.  This restriction should be in place for 6 weeks. Leave incision open to air. Chlorhexidine biopatches should be maintained around the YRN drain tubing at the skin level.  Biopatches should be replaced at least every 7 days or sooner if they become soiled. Can shower but should not tub soak the wounds.  No lotions, creams applied to the incision.    -Pain: Improved with multimodal agents per Anesthesia recommendations. No changes in pain after nerve block. Continue Lyrica TID. Scheduled lido patches, tylenol 1000 mg Q8, and robaxin 500 mg TID. Continue oxy prn.  -Thrombocytopenia: Resolved. Ctm with cbc.   -Post op anemia: Stable. Continue iron for replacement. Improving.   -Transaminitis: Improving, f/u next lab draw. q72h labs  -HTN: Continue home dose lisinopril and Prn labetalol.   -DM2: continue POCT glucose, SSI, diabetic diet  -GERD: continue home dose pepcid  -DVT prophylaxis: CLIFF's, SCD's, SQH  -Bowel regimen: senna BID and miralax daily. States he uses enema or suppository normally 1-2x per week at home.  -Atelectasis prevention: IS hourly while awake, PT/OT, OOB > 6 hours per day. Continue to encourage OOB mobility and up to chair with meals.       Dispo: + PPD, CXR negative. Quantiferon gold test pending, collected today. Facility would like to wait for results of test prior to transfer. Medically stable for discharge.         Alva Cuevas PA-C  Neurosurgery  [facility]

## 2020-03-16 NOTE — PT/OT/SLP PROGRESS
Occupational Therapy   Treatment    Name: Garcia Renteria  MRN: [id number]  Admitting Diagnosis:  Acquired spondylolisthesis of thoracolumbar vertebra    T10-Pelvis Fusion with L4 PSO **AIRO**   CREATION, FLAP, MUSCLE ROTATION     Recommendations:     Discharge Recommendations: rehabilitation facility  Discharge Equipment Recommendations:  (TBD next level of care)  Barriers to discharge:  (increased assistance needed)    Assessment:     Garcia Renteria is a 70 y.o. male with a medical diagnosis of Acquired spondylolisthesis of thoracolumbar vertebra.  He presents with performance deficits including weakness, impaired endurance, impaired self care skills, impaired functional mobilty, gait instability, impaired balance, decreased safety awareness, pain, decreased coordination, decreased lower extremity function. Pt progressing toward goals and would continue to benefit from OT to increase functional independence and safety. Recommend rehab upon D/C as pt is motivated to return to PLOF.    Rehab Prognosis:  Good; patient would benefit from acute skilled OT services to address these deficits and reach maximum level of function.       Plan:     Patient to be seen 4 x/week to address the above listed problems via self-care/home management, therapeutic activities, therapeutic exercises, neuromuscular re-education  · Plan of Care Expires: 04/04/20  · Plan of Care Reviewed with: patient, spouse    Subjective     Pain/Comfort:  · Pain Rating 1: 6/10  · Location - Side 1: Right  · Location 1: thigh  · Pain Addressed 1: Pre-medicate for activity, Reposition    Objective:     Communicated with: RN prior to session.  Patient found right sidelying with telemetry, YRN drain, peripheral IV upon OT entry to room, spouse present.    General Precautions: Standard, fall   Orthopedic Precautions:spinal precautions   Braces: TLSO     Occupational Performance:     Bed Mobility:    · Patient completed Scooting toward EOB while seated with  stand by assistance  · Patient completed R sidelying to sit with stand by assistance and side rail    Functional Mobility/Transfers:  · Patient completed Sit <> Stand Transfer from EOB with use of RW-- first attempt with Max A, pt unable to come to full stand; 2nd attempt pt completed with Min A with RW and cues for technique/safety  · Functional Mobility: Within room and hallway household distance with CGA-Min A with RW and chair follow for safety-- cues for proper use of RW and standing rest breaks as needed    Activities of Daily Living:  · Upper Body Dressing: Mod A to don TLSO while seated EOB, Min A to don robe  · Lower Body Dressing: total assistance to don socks      AMPAC 6 Click ADL: 15    Treatment & Education:  Reviewed spinal precautions and TLSO management; able to sit EOB with close SBA and complete UB dressing; completed T/F training and functional mobility as described above; educated on seated UE/LE therex to complete throughout the day; verbalized understanding to stay seated in chair for ~30 min at a time (per plastics MD note) and to call for assistance before getting up    Patient left up in chair with all lines intact, call button in reach, RN notified and spouse presentEducation:      GOALS:   Multidisciplinary Problems     Occupational Therapy Goals        Problem: Occupational Therapy Goal    Goal Priority Disciplines Outcome Interventions   Occupational Therapy Goal     OT, PT/OT Ongoing, Progressing    Description:  Updated Goals to be met by: 7 days (3/18/20)     Patient will increase functional independence with ADLs by performing:    UE Dressing with Minimal Assistance including TLSO.  LE Dressing with Moderate Assistance using AD as needed.  Grooming while seated with SBA.  Toileting from bedside commode with Moderate Assistance for hygiene and clothing management.   Supine to sit with Minimal Assistance.-MET  Supine to sit with SBA.  Toilet transfer to bedside commode with Minimal  Assistance.  Pt will verbalize understanding of 3/3 spinal precautions without added cues.       Goals to be met by: 7 days (3/11/20)     Patient will increase functional independence with ADLs by performing:    UE Dressing with Minimal Assistance including TLSO.  LE Dressing with Moderate Assistance using AD as needed.  Grooming while standing with Contact Guard Assistance.  Toileting from bedside commode with Minimal Assistance for hygiene and clothing management.   Supine to sit with Minimal Assistance.  Toilet transfer to bedside commode with Minimal Assistance.  Pt will verbalize understanding of 3/3 spinal precautions without added cues.                        Time Tracking:     OT Date of Treatment: 03/16/20  OT Start Time: 1033  OT Stop Time: 1056  OT Total Time (min): 23 min (Co-treat with PT)    Billable Minutes:Self Care/Home Management 8  Therapeutic Activity 15    MELIDA Amaro  3/16/2020

## 2020-03-16 NOTE — SUBJECTIVE & OBJECTIVE
Interval History: NAEON. Neuro exam stable. Ambulated to nurses station over the weekend, sat in chair yesterday for 30 minutes. Progressing daily with aggressive therapy regimen, patient remains self-motivated to progress. Denies weakness, paresthesias, b/b dysfunction. Continue YRN per plastics.       Medications:  Continuous Infusions:  Scheduled Meds:   bacitracin   Topical (Top) BID    ceFAZolin (ANCEF) IVPB  2 g Intravenous Q8H    famotidine  40 mg Oral QHS    ferrous sulfate  325 mg Oral BID    heparin (porcine)  5,000 Units Subcutaneous Q8H    lidocaine  2 patch Transdermal Q24H    lisinopriL  5 mg Oral Daily    methocarbamoL  750 mg Oral TID    pregabalin  150 mg Oral TID    senna-docusate 8.6-50 mg  1 tablet Oral BID     PRN Meds:acetaminophen, aluminum-magnesium hydroxide-simethicone, bisacodyL, Dextrose 10% Bolus, Dextrose 10% Bolus, fentaNYL, glucagon (human recombinant), glucose, glucose, HYDROmorphone, insulin aspart U-100, labetalol, midazolam, ondansetron, oxyCODONE, oxyCODONE, promethazine (PHENERGAN) IVPB, sodium chloride 0.9%     Objective:     Weight: 98.4 kg (217 lb)  Body mass index is 28.63 kg/m².  Vital Signs (Most Recent):  Temp: 97.8 °F (36.6 °C) (03/16/20 1557)  Pulse: 92 (03/16/20 1706)  Resp: 18 (03/16/20 1557)  BP: (!) 97/56 (03/16/20 1557)  SpO2: (!) 93 % (03/16/20 1706) Vital Signs (24h Range):  Temp:  [97.8 °F (36.6 °C)-99.9 °F (37.7 °C)] 97.8 °F (36.6 °C)  Pulse:  [78-97] 92  Resp:  [16-18] 18  SpO2:  [93 %-97 %] 93 %  BP: ()/(55-70) 97/56                          Closed/Suction Drain 03/03/20 1601 Left;Inferior Back Bulb 15 Fr. (Active)   Site Description Unable to view 3/16/2020  8:00 AM   Dressing Type Transparent (Tegaderm);Gauze 3/16/2020  8:00 AM   Dressing Status Clean;Dry;Intact 3/16/2020  8:00 AM   Dressing Intervention Integrity maintained 3/16/2020  8:00 AM   Drainage Serosanguineous 3/15/2020  8:00 PM   Status To bulb suction 3/15/2020  8:00 PM   Output  (mL) 80 mL 3/7/2020  2:19 AM       Neurosurgery Physical Exam    General: well developed, well nourished, no distress.   Head: normocephalic, atraumatic  Neck: No tracheal deviation. No palpable masses.   Neurologic: Alert and oriented. Thought content appropriate.  GCS: Motor: 6/Verbal: 5/Eyes: 4 GCS Total: 15  Mental Status: Awake, Alert, Oriented x 4  Language: No aphasia  Speech: No dysarthria  Cranial nerves: face symmetric, tongue midline, CN II-XII grossly intact.   Eyes: pupils equal, round, reactive to light with accomodation, EOMI.   Ears: No drainage.   Pulmonary: normal respirations, no signs of respiratory distress  Abdomen: soft, non-distended, not tender to palpation  Sensory: intact to light touch throughout  Motor Strength: Moves all extremities spontaneously with good tone.  Pain limited weakness to right hip flexion/knee flexion 2/2 right groin pain, remains full strength upper and lower extremities. No abnormal movements seen.     Strength  Deltoids Triceps Biceps Wrist Extension Wrist Flexion Hand    Upper: R 5/5 5/5 5/5 5/5 5/5 5/5    L 5/5 5/5 5/5 5/5 5/5 5/5     Iliopsoas Quadriceps Knee  Flexion Tibialis  anterior Gastro- cnemius EHL   Lower: R 5/5 5/5 5/5 5/5 5/5 5/5    L 5/5 5/5 5/5 5/5 5/5 5/5     Harris: absent  Clonus: absent  Vascular: Pulses 2+ and symmetric radial and dorsalis pedis. No LE edema.   Skin: Skin is warm, dry and intact.    Incision c/d/I with skin edges well approximated with sutures. No surrounding erythema or edema. No drainage from incision. No palpable underlying fluid collection.    Significant Labs:  Recent Labs   Lab 03/15/20  0458   GLU 98      K 4.4      CO2 25   BUN 12   CREATININE 0.9   CALCIUM 9.2     Recent Labs   Lab 03/15/20  0458   WBC 10.11   HGB 9.7*   HCT 32.2*   *     No results for input(s): LABPT, INR, APTT in the last 48 hours.  Microbiology Results (last 7 days)     ** No results found for the last 168 hours. **         Recent Lab Results       03/16/20  1640   03/16/20  1150   03/16/20  0810   03/15/20  2114        POCT Glucose 113 113 142 141         All pertinent labs from the last 24 hours have been reviewed.    Significant Diagnostics:  I have reviewed all pertinent imaging results/findings within the past 24 hours.

## 2020-03-16 NOTE — PLAN OF CARE
Pt is progressing towards goals as expected. Goals remain appropriate continue with current POC.     Katy Deutsch, PT, DPT  3/16/2020      Problem: Physical Therapy Goal  Goal: Physical Therapy Goal  Description  Goals to be met by: 3/14/2020, goals extended to 3/22/2020    Patient will increase functional independence with mobility by performin. Supine to sit with Moderate Assistance - goal met 3/11/2020   Updated: CGA  2. Sit to supine with Moderate Assistance - goal met 3/12/2020   Updated: CGA  3. Rolling to Left and Right with Moderate Assistance.  4. Sit to stand transfer with Maximum Assistance - goal met 3/12/2020   Updated: Mitch  5. Bed to chair transfer with Maximum Assistance  6. Gait  x 15 feet with Maximum Assistance using RW. - goal met 3/16/2020   Updated: 100 ft; SBA  7. Lower extremity exercise program x15 reps per handout, with assistance as needed        Outcome: Ongoing, Progressing

## 2020-03-16 NOTE — NURSING
Lab called to address Quantiferon Gold TB test. Lab stated they do not collect that lab over the weekend, they will need to cancel it, and that the test will need to be ordered in the AM. Will place order for same time as AM labs.

## 2020-03-16 NOTE — ASSESSMENT & PLAN NOTE
A 70 year old with degenerative lumbar spondy and spine deformity now s/p T10 - pelvis posterior segmental instrumented fusion, T10 - L3 laminectomy for decompression, L3 pedical subtraction osteotomy (3/3).    -Patient neurologically stable on exam.   -Brace on when upright, OOB, or working with therapy. OK to remove when lying in bed.   -Postop XR lumbar spine shows satisfactory posterior alignment and positioning of posterior hardware.   -Continue R YRN management per plastic surgery. Abx while drains in place. Plan for FU 1 week after discharge for drain removal. Can sit for up to 30 minutes at meal time (3 times per day).  Head of bed flat otherwise at all times.  Wound closure tension increases in sitting position.  This restriction should be in place for 6 weeks. Leave incision open to air. Chlorhexidine biopatches should be maintained around the YRN drain tubing at the skin level.  Biopatches should be replaced at least every 7 days or sooner if they become soiled. Can shower but should not tub soak the wounds.  No lotions, creams applied to the incision.    -Pain: Improved with multimodal agents per Anesthesia recommendations. No changes in pain after nerve block. Continue Lyrica TID. Scheduled lido patches, tylenol 1000 mg Q8, and robaxin 500 mg TID. Continue oxy prn.  -Thrombocytopenia: Resolved. Ctm with cbc.   -Post op anemia: Stable. Continue iron for replacement. Improving.   -Transaminitis: Improving, f/u next lab draw. q72h labs  -HTN: Continue home dose lisinopril and Prn labetalol.   -DM2: continue POCT glucose, SSI, diabetic diet  -GERD: continue home dose pepcid  -DVT prophylaxis: CLIFF's, SCD's, SQH  -Bowel regimen: senna BID and miralax daily. States he uses enema or suppository normally 1-2x per week at home.  -Atelectasis prevention: IS hourly while awake, PT/OT, OOB > 6 hours per day. Continue to encourage OOB mobility and up to chair with meals.       Dispo: + PPD, CXR negative. Quantiferon  gold test pending, collected today. Facility would like to wait for results of test prior to transfer. Medically stable for discharge.

## 2020-03-17 LAB
GAMMA INTERFERON BACKGROUND BLD IA-ACNC: 0.05 IU/ML
M TB IFN-G CD4+ BCKGRND COR BLD-ACNC: 1.26 IU/ML
MITOGEN IGNF BCKGRD COR BLD-ACNC: 8.4 IU/ML
POCT GLUCOSE: 110 MG/DL (ref 70–110)
POCT GLUCOSE: 115 MG/DL (ref 70–110)
POCT GLUCOSE: 171 MG/DL (ref 70–110)
POCT GLUCOSE: 173 MG/DL (ref 70–110)
TB GOLD PLUS: POSITIVE
TB2 - NIL: 1.64 IU/ML

## 2020-03-17 PROCEDURE — 99024 PR POST-OP FOLLOW-UP VISIT: ICD-10-PCS | Mod: POP,,, | Performed by: PHYSICIAN ASSISTANT

## 2020-03-17 PROCEDURE — 63600175 PHARM REV CODE 636 W HCPCS: Performed by: NURSE PRACTITIONER

## 2020-03-17 PROCEDURE — 25000003 PHARM REV CODE 250: Performed by: PHYSICIAN ASSISTANT

## 2020-03-17 PROCEDURE — 63600175 PHARM REV CODE 636 W HCPCS: Performed by: PHYSICIAN ASSISTANT

## 2020-03-17 PROCEDURE — 63600175 PHARM REV CODE 636 W HCPCS: Performed by: STUDENT IN AN ORGANIZED HEALTH CARE EDUCATION/TRAINING PROGRAM

## 2020-03-17 PROCEDURE — 97530 THERAPEUTIC ACTIVITIES: CPT

## 2020-03-17 PROCEDURE — 97110 THERAPEUTIC EXERCISES: CPT

## 2020-03-17 PROCEDURE — 94761 N-INVAS EAR/PLS OXIMETRY MLT: CPT

## 2020-03-17 PROCEDURE — 25000003 PHARM REV CODE 250: Performed by: STUDENT IN AN ORGANIZED HEALTH CARE EDUCATION/TRAINING PROGRAM

## 2020-03-17 PROCEDURE — 99024 POSTOP FOLLOW-UP VISIT: CPT | Mod: POP,,, | Performed by: PHYSICIAN ASSISTANT

## 2020-03-17 PROCEDURE — 11000001 HC ACUTE MED/SURG PRIVATE ROOM

## 2020-03-17 RX ADMIN — SENNOSIDES AND DOCUSATE SODIUM 1 TABLET: 8.6; 5 TABLET ORAL at 09:03

## 2020-03-17 RX ADMIN — PREGABALIN 150 MG: 150 CAPSULE ORAL at 09:03

## 2020-03-17 RX ADMIN — OXYCODONE HYDROCHLORIDE 15 MG: 10 TABLET ORAL at 11:03

## 2020-03-17 RX ADMIN — OXYCODONE HYDROCHLORIDE 15 MG: 10 TABLET ORAL at 03:03

## 2020-03-17 RX ADMIN — HEPARIN SODIUM 5000 UNITS: 5000 INJECTION, SOLUTION INTRAVENOUS; SUBCUTANEOUS at 10:03

## 2020-03-17 RX ADMIN — SODIUM CHLORIDE 500 ML: 0.9 INJECTION, SOLUTION INTRAVENOUS at 04:03

## 2020-03-17 RX ADMIN — FAMOTIDINE 40 MG: 20 TABLET, FILM COATED ORAL at 08:03

## 2020-03-17 RX ADMIN — ACETAMINOPHEN 650 MG: 325 TABLET ORAL at 03:03

## 2020-03-17 RX ADMIN — METHOCARBAMOL TABLETS 750 MG: 750 TABLET, COATED ORAL at 02:03

## 2020-03-17 RX ADMIN — SENNOSIDES AND DOCUSATE SODIUM 1 TABLET: 8.6; 5 TABLET ORAL at 08:03

## 2020-03-17 RX ADMIN — METHOCARBAMOL TABLETS 750 MG: 750 TABLET, COATED ORAL at 09:03

## 2020-03-17 RX ADMIN — OXYCODONE HYDROCHLORIDE 15 MG: 10 TABLET ORAL at 12:03

## 2020-03-17 RX ADMIN — CEFAZOLIN 2 G: 1 INJECTION, POWDER, FOR SOLUTION INTRAMUSCULAR; INTRAVENOUS at 09:03

## 2020-03-17 RX ADMIN — PREGABALIN 150 MG: 150 CAPSULE ORAL at 02:03

## 2020-03-17 RX ADMIN — OXYCODONE HYDROCHLORIDE 15 MG: 10 TABLET ORAL at 08:03

## 2020-03-17 RX ADMIN — OXYCODONE HYDROCHLORIDE 15 MG: 10 TABLET ORAL at 07:03

## 2020-03-17 RX ADMIN — LIDOCAINE 2 PATCH: 50 PATCH TOPICAL at 02:03

## 2020-03-17 RX ADMIN — HEPARIN SODIUM 5000 UNITS: 5000 INJECTION, SOLUTION INTRAVENOUS; SUBCUTANEOUS at 05:03

## 2020-03-17 RX ADMIN — METHOCARBAMOL TABLETS 750 MG: 750 TABLET, COATED ORAL at 08:03

## 2020-03-17 RX ADMIN — INSULIN ASPART 2 UNITS: 100 INJECTION, SOLUTION INTRAVENOUS; SUBCUTANEOUS at 09:03

## 2020-03-17 RX ADMIN — INSULIN ASPART 2 UNITS: 100 INJECTION, SOLUTION INTRAVENOUS; SUBCUTANEOUS at 04:03

## 2020-03-17 RX ADMIN — HEPARIN SODIUM 5000 UNITS: 5000 INJECTION, SOLUTION INTRAVENOUS; SUBCUTANEOUS at 02:03

## 2020-03-17 RX ADMIN — CEFAZOLIN 2 G: 1 INJECTION, POWDER, FOR SOLUTION INTRAMUSCULAR; INTRAVENOUS at 12:03

## 2020-03-17 NOTE — PROGRESS NOTES
"[facility]  Adult Nutrition  Progress Note    SUMMARY     Recommendations    1. Continue with 2000 kcal Diabetic diet.     Goals: Pt to consume/tolerate % of EEN/EPN by RD follow-up.  Nutrition Goal Status: goal met  Communication of RD Recs: other (comment)(POC)    Reason for Assessment    Reason For Assessment: RD follow-up  Diagnosis: surgery/postoperative complications  Relevant Medical History: HTN, DM2, HLD, GERD, Diverticulosis  Interdisciplinary Rounds: did not attend  General Information Comments:   3.17.20- Appetite has improved. PO intake % of meals. No change in weight.   NFPE 3.11.20- not warranted at this time- pt has no wt loss, pt is eating >75% of meals, and pt appears well nourished.   Nutrition Discharge Planning: Diabetic diet    Nutrition Risk Screen    Nutrition Risk Screen: no indicators present    Nutrition/Diet History    Spiritual, Cultural Beliefs, Oriental orthodox Practices, Values that Affect Care: no    Anthropometrics    Height Method: Measured  Height: 6' 1" (185.4 cm)  Height (inches): 73 in  Weight Method: Standard Scale  Weight: 98.4 kg (216 lb 14.9 oz)  Weight (lb): 216.93 lb  Ideal Body Weight (IBW), Male: 184 lb  % Ideal Body Weight, Male (lb): 117.9 %  BMI (Calculated): 28.6  BMI Grade: 25 - 29.9 - overweight     Lab/Procedures/Meds    Pertinent Labs Reviewed: reviewed  Pertinent Labs Comments: WDL  Pertinent Medications Reviewed: reviewed  Pertinent Medications Comments: Heparin, Senna Docusate    Estimated/Assessed Needs    Weight Used For Calorie Calculations: 98.5 kg (217 lb 2.5 oz)  Energy Calorie Requirements (kcal): 2254  Energy Need Method: [place]  Protein Requirements: 118-138g/day(1.2-1.4g/kg)  Weight Used For Protein Calculations: 98.5 kg (217 lb 2.5 oz)  Fluid Requirements (mL): 1mL/kcal or per MD  Estimated Fluid Requirement Method: RDA Method  RDA Method (mL): 2254  CHO Requirement: 282 g/day    Nutrition Prescription " Ordered    Current Diet Order: Diabetic 2000 kcal  Oral Nutrition Supplement: Boost Glucose    Evaluation of Received Nutrient/Fluid Intake    Energy Calories Required: meeting needs  Protein Required: meeting needs  Fluid Required: meeting needs  Comments: LBM 3.14.20  Tolerance: tolerating  % Intake of Estimated Energy Needs: 75 - 100 %  % Meal Intake: 75 - 100 %    Nutrition Risk    Level of Risk/Frequency of Follow-up: low     Assessment and Plan    Nutrition Problem  Inadequate energy intake     Related to (etiology):   Decreased appetite     Signs and Symptoms (as evidenced by):   Pt consuming <50% of EEN/EPN.     Interventions (treatment strategy):  Collaboration of care with providers   Commercial Beverage - Boost Glucose Control  Modified Diet - Diabetic     Nutrition Diagnosis Status:   Resolved     Monitor and Evaluation    Food and Nutrient Intake: energy intake, food and beverage intake  Food and Nutrient Adminstration: diet order  Knowledge/Beliefs/Attitudes: food and nutrition knowledge/skill  Physical Activity and Function: nutrition-related ADLs and IADLs  Anthropometric Measurements: weight, weight change, body mass index  Biochemical Data, Medical Tests and Procedures: electrolyte and renal panel, gastrointestinal profile, glucose/endocrine profile, inflammatory profile, lipid profile  Nutrition-Focused Physical Findings: overall appearance     Malnutrition Assessment     Polo Score: 19  NFPE 3.11.20- not warranted at this time- pt has no wt loss, pt is eating >75% of meals, and pt appears well nourished.     Nutrition Follow-Up    RD Follow-up?: Yes

## 2020-03-17 NOTE — PLAN OF CARE
Problem: Oral Intake Inadequate  Goal: Improved Oral Intake  Outcome: Met     Recommendations    1. Continue with 2000 kcal Diabetic diet.     Goals: Pt to consume/tolerate % of EEN/EPN by RD follow-up.  Nutrition Goal Status: goal met  Communication of RD Recs: other (comment)(POC)

## 2020-03-17 NOTE — PLAN OF CARE
Problem: Occupational Therapy Goal  Goal: Occupational Therapy Goal  Description  Updated Goals to be met by: 7 days (3/18/20)     Patient will increase functional independence with ADLs by performing:    UE Dressing with Minimal Assistance including TLSO.  LE Dressing with Moderate Assistance using AD as needed.  Grooming while seated with SBA.  Toileting from bedside commode with Moderate Assistance for hygiene and clothing management.   Supine to sit with Minimal Assistance.-MET  Supine to sit with SBA.  Toilet transfer to bedside commode with Minimal Assistance.  Pt will verbalize understanding of 3/3 spinal precautions without added cues.       Goals to be met by: 3 days (3/20/20)   UE Dressing with SBA Assistance including TLSO.  Supine to sit with (S) Assistance.   All ADL tranfers at (S)    Patient will increase functional independence with ADLs by performing:    UE Dressing with Minimal Assistance including TLSO. MEETING  LE Dressing with Moderate Assistance using AD as needed.  Grooming while standing with Contact Guard Assistance.  Toileting from bedside commode with Minimal Assistance for hygiene and clothing management.   Supine to sit with Minimal Assistance. MEETING  Toilet transfer to bedside commode with Minimal Assistance.  Pt will verbalize understanding of 3/3 spinal precautions without added cues.       Outcome: Ongoing, Progressing

## 2020-03-17 NOTE — PT/OT/SLP PROGRESS
Occupational Therapy   Treatment    Name: Garcia Renteria  MRN: [id number]  Admitting Diagnosis:  Acquired spondylolisthesis of thoracolumbar vertebra  7 Days Post-Op    Recommendations:     Discharge Recommendations: rehabilitation facility  Discharge Equipment Recommendations:  (TBD)  Barriers to discharge:       Assessment:     Garcia Renteria is a 70 y.o. male with a medical diagnosis of Acquired spondylolisthesis of thoracolumbar vertebra.  He presents with decreased ADL task tolerance, postural instability, UB/LB weakness, TSLO brace required for all OOB. Performance deficits affecting function are impaired endurance, impaired self care skills, decreased lower extremity function, pain, impaired muscle length, impaired balance, impaired functional mobilty.     Rehab Prognosis:  Good; patient would benefit from acute skilled OT services to address these deficits and reach maximum level of function.       Plan:     Patient to be seen 4 x/week to address the above listed problems via therapeutic activities, therapeutic exercises, neuromuscular re-education  · Plan of Care Expires: 04/04/20  · Plan of Care Reviewed with: patient    Subjective     Pain/Comfort:  · Pain Rating 1: 0/10  · Pain Addressed 1: Pre-medicate for activity    Objective:     Communicated with: Attending nurse  prior to session.  Patient found supine with TLSO upon OT entry to room.    General Precautions: Standard, fall(seated OOB with TSLO brace 30 >/= mins advised)   Orthopedic Precautions:spinal precautions   Braces: TLSO     Occupational Performance:     Bed Mobility:    · Patient completed Rolling/Turning to Left with  contact guard assistance  · Patient completed Supine to Sit with contact guard assistance     Functional Mobility/Transfers:  · Patient completed Sit <> Stand Transfer with minimum assistance  with  rolling walker   · Patient completed Bed <> Chair Transfer using Step Transfer technique with minimum assistance with  rolling walker  · Functional Mobility: Patient demonstrating Min a supine to sit to stand following good dynamic sitting w/ inclusion of reach for bedside TSLO donnnig and adjustments.  Min a ambulation household distance with rolling walker this am. Task tolerance FAIR (+). Patient demonstrating high UB MM guarding in all standing and intermittent breath holding during ambulation. Patient education provided for energy conservation constructs, and fluid postural righting as needed during transitional movements. OT will f/u.     Activities of Daily Living:  · Upper Body Dressing: minimum assistance  2* TSLO brace  · Lower Body Dressing: moderate assistance , marked trunk flexion and seated functional reach 2* brace limitations.       [facility] 6 Click ADL: 19    Treatment & Education:  TSLO adjustment, sit to stand safety, energy conservation constructs.     Patient left up in chair with all lines intact, call button in reach and Attending nurse notifiedEducation:      GOALS:   Multidisciplinary Problems     Occupational Therapy Goals        Problem: Occupational Therapy Goal    Goal Priority Disciplines Outcome Interventions   Occupational Therapy Goal     OT, PT/OT Ongoing, Progressing    Description:  Updated Goals to be met by: 3 days (3/20/20)   Patient will increase functional independence with ADLs by performing:  Supine to sit with Stand By Assistance.  UE Dressing with Stand By Assistance including TLSO.      Patient will increase functional independence with ADLs by performing:    UE Dressing with Minimal Assistance including TLSO. MET  LE Dressing with Moderate Assistance using AD as needed.  Grooming while seated with SBA.  Toileting from bedside commode with Moderate Assistance for hygiene and clothing management.   Supine to sit with Minimal Assistance.-MET  Supine to sit with SBA.  Toilet transfer to bedside commode with Minimal Assistance.  Pt will verbalize understanding of 3/3 spinal precautions without  added cues.       Goals to be met by: 7 days (3/11/20)     Patient will increase functional independence with ADLs by performing:    UE Dressing with Minimal Assistance including TLSO.  LE Dressing with Moderate Assistance using AD as needed.  Grooming while standing with Contact Guard Assistance.  Toileting from bedside commode with Minimal Assistance for hygiene and clothing management.   Supine to sit with Minimal Assistance. MEETING  Toilet transfer to bedside commode with Minimal Assistance.  Pt will verbalize understanding of 3/3 spinal precautions without added cues.                         Time Tracking:     OT Date of Treatment: 03/17/20  OT Start Time: 1005  OT Stop Time: 1035  OT Total Time (min): 30 min    Billable Minutes:Therapeutic Activity 15 mins  Therapeutic Exercise 15 mins    Faby Lau OT  3/17/2020

## 2020-03-17 NOTE — PLAN OF CARE
Problem: Pain Chronic (Persistent) (Comorbidity Management)  Goal: Acceptable Pain Control and Functional Ability  Outcome: Ongoing, Progressing     Problem: Fall Injury Risk  Goal: Absence of Fall and Fall-Related Injury  Outcome: Ongoing, Progressing     Plan of care discussed w/ pt and wife at bedside.Pt verbalizes understanding.Pt AAOX4,VSSAF,Pain controlled w/ PRN pain medication q4h.Pt up in chair w/ PT for 30 min,assist x1 needed to get back in bed.Pt turns and repositions independently,pillows in use.Call bell placed within reach.Bed in lowest position.Bed alarm activated.wctm.

## 2020-03-17 NOTE — PLAN OF CARE
Patient remains AAOx4 throughout shift.  Family at bedside throughout shift.  Pain managed with PRN pain medications. Dressing taken off my NSGY and patient states to keep it open to air. YRN drain intact.  No acute neuro changes this shift.  Urinal at bedside. Call bell within reach, family remains at bedside. Awaiting for TB blood test to come back in order to be transferred to new facility.  Will continue to monitor.

## 2020-03-17 NOTE — PROGRESS NOTES
[facility]  Neurosurgery  Progress Note    Subjective:     History of Present Illness: This is a 70-year-old man with a history of a non instrumented lumbar fusion many years ago who presents with severe progressive axial low back pain, bilateral radicular leg pain, and thoracic myelopathy.  Additionally he had severe postural deformity that was functionally disabling.  He failed multiple conservative measures.  Imaging showed severe central stenosis with spinal cord compression from T10-L2, solid fusion from his previous surgery from L3 to the sacrum, and flat back deformity with severe fixed sagittal plane deformity. Recommended patient undergo spinal cord decompression and deformity reduction surgery, via a T10 to pelvis instrumented fusion with an L3 PSO and a multilevel laminectomy for spinal cord decompression.  Risks benefits alternatives indications and methods were reviewed in detail and he wished to proceed.  All questions were answered.  No guarantees about the results the procedure. Presents to [facility] for T10-pelvis and L3 PSO.    Post-Op Info:  Procedure(s) (LRB):  Block, Nerve (Bilateral)   7 Days Post-Op     Interval History: NADUANE. YRN removed per plastics, Dr. Caro will arrange f/u. Ambulating well with PT, motivated to progress. Pain controlled with current regimen. Sitting edge of bed with therapy, tolerating well. Denies weakness, paresthesias, b/b dysfunction.     Medications:  Continuous Infusions:  Scheduled Meds:   bacitracin   Topical (Top) BID    ceFAZolin (ANCEF) IVPB  2 g Intravenous Q8H    famotidine  40 mg Oral QHS    ferrous sulfate  325 mg Oral BID    heparin (porcine)  5,000 Units Subcutaneous Q8H    lidocaine  2 patch Transdermal Q24H    lisinopriL  5 mg Oral Daily    methocarbamoL  750 mg Oral TID    pregabalin  150 mg Oral TID    senna-docusate 8.6-50 mg  1 tablet Oral BID     PRN Meds:acetaminophen, aluminum-magnesium hydroxide-simethicone, bisacodyL,  Dextrose 10% Bolus, Dextrose 10% Bolus, fentaNYL, glucagon (human recombinant), glucose, glucose, HYDROmorphone, insulin aspart U-100, labetalol, midazolam, ondansetron, oxyCODONE, oxyCODONE, promethazine (PHENERGAN) IVPB, sodium chloride 0.9%       Objective:     Weight: 98.4 kg (216 lb 14.9 oz)  Body mass index is 28.62 kg/m².  Vital Signs (Most Recent):  Temp: 98.5 °F (36.9 °C) (03/17/20 1148)  Pulse: 95 (03/17/20 1137)  Resp: 18 (03/17/20 1148)  BP: 117/70 (03/17/20 1148)  SpO2: 97 % (03/17/20 1148) Vital Signs (24h Range):  Temp:  [97.8 °F (36.6 °C)-99.2 °F (37.3 °C)] 98.5 °F (36.9 °C)  Pulse:  [85-95] 95  Resp:  [16-18] 18  SpO2:  [93 %-98 %] 97 %  BP: ()/(56-72) 117/70                   Neurosurgery Physical Exam    General: well developed, well nourished, no distress.   Head: normocephalic, atraumatic  Neck: No tracheal deviation. No palpable masses.   Neurologic: Alert and oriented. Thought content appropriate.  GCS: Motor: 6/Verbal: 5/Eyes: 4 GCS Total: 15  Mental Status: Awake, Alert, Oriented x 4  Language: No aphasia  Speech: No dysarthria  Cranial nerves: face symmetric, tongue midline, CN II-XII grossly intact.   Eyes: pupils equal, round, reactive to light with accomodation, EOMI.   Ears: No drainage.   Pulmonary: normal respirations, no signs of respiratory distress  Abdomen: soft, non-distended, not tender to palpation  Sensory: intact to light touch throughout  Motor Strength: Moves all extremities spontaneously with good tone.  Full strength upper and lower extremities. No abnormal movements seen.     Strength  Deltoids Triceps Biceps Wrist Extension Wrist Flexion Hand    Upper: R 5/5 5/5 5/5 5/5 5/5 5/5    L 5/5 5/5 5/5 5/5 5/5 5/5     Iliopsoas Quadriceps Knee  Flexion Tibialis  anterior Gastro- cnemius EHL   Lower: R 5/5 5/5 5/5 5/5 5/5 5/5    L 5/5 5/5 5/5 5/5 5/5 5/5      Skin: Skin is warm, dry and intact.    Incision c/d/I with skin edges well approximated with sutures. No  surrounding erythema or edema. No drainage from incision.      Significant Labs:  No results for input(s): GLU, NA, K, CL, CO2, BUN, CREATININE, CALCIUM, MG in the last 48 hours.  No results for input(s): WBC, HGB, HCT, PLT in the last 48 hours.  No results for input(s): LABPT, INR, APTT in the last 48 hours.  Microbiology Results (last 7 days)     ** No results found for the last 168 hours. **        Recent Lab Results       03/17/20  1126   03/17/20  0731   03/16/20  2214   03/16/20  1640        POCT Glucose 115 173 144 113         All pertinent labs from the last 24 hours have been reviewed.    Significant Diagnostics:  I have reviewed all pertinent imaging results/findings within the past 24 hours.    Assessment/Plan:     Degenerative scoliosis  A 70 year old with degenerative lumbar spondy and spine deformity now s/p T10 - pelvis posterior segmental instrumented fusion, T10 - L3 laminectomy for decompression, L3 pedical subtraction osteotomy (3/3).    -Patient neurologically stable on exam.   -Brace on when upright, OOB, or working with therapy. OK to remove when lying in bed.   -Postop XR lumbar spine shows satisfactory posterior alignment and positioning of posterior hardware.   -YNR removed today per plastics. Dr. Caro will contact patient for follow-up. Dc antibiotics for skin/soft tissue ppx. Can sit for up to 30 minutes at meal time (3 times per day).  Head of bed flat otherwise at all times.  Wound closure tension increases in sitting position.  This restriction should be in place for 6 weeks. Leave incision open to air. Can shower but should not tub soak the wounds.  No lotions, creams applied to the incision.    -Pain: Improved with multimodal agents per Anesthesia recommendations. No changes in pain after nerve block. Continue Lyrica TID. Scheduled lido patches, tylenol 1000 mg Q8, and robaxin 500 mg TID. Continue oxy prn.  -Thrombocytopenia: Resolved. Ctm with cbc.   -Post op anemia: Stable.  Continue iron for replacement. Improving.   -Transaminitis: Improving, f/u next lab draw. q72h labs  -HTN: Continue home dose lisinopril and Prn labetalol.   -DM2: continue POCT glucose, SSI, diabetic diet  -GERD: continue home dose pepcid  -DVT prophylaxis: CLIFF's, SCD's, SQH  -Bowel regimen: senna BID and miralax daily. States he uses enema or suppository normally 1-2x per week at home.  -Atelectasis prevention: IS hourly while awake, PT/OT, OOB > 6 hours per day. Continue to encourage OOB mobility and up to chair with meals.       Dispo: + PPD, CXR negative. Quantiferon gold test pending, results will be available tomorrow at the earliest. Facility would like to wait for results of test prior to transfer. Medically stable for discharge.         Alva Cuevas PA-C  Neurosurgery  [facility]

## 2020-03-17 NOTE — PLAN OF CARE
Problem: Diabetes Comorbidity  Goal: Blood Glucose Level Within Desired Range  Outcome: Ongoing, Progressing     Problem: Adult Inpatient Plan of Care  Goal: Plan of Care Review  Outcome: Ongoing, Progressing  Goal: Patient-Specific Goal (Individualization)  Description  Admit Date: 3/3    Admit Dx: spinal fusion    Past Medical History:  No date: Colitis  No date: Diabetes mellitus, type 2  No date: Diverticulosis  No date: GERD (gastroesophageal reflux disease)  No date: Hypertension    Past Surgical History:  No date: ANKLE SURGERY; Bilateral  10/30/2019: ANTERIOR CERVICAL DISCECTOMY W/ FUSION; N/A      Comment:  Procedure: C3-4, C4-5 ACDF;  Surgeon: Francis Alan MD;               Location: [facility] OR 2ND FLR;  Service: Neurosurgery;                 Laterality: N/A;  C3-4, C4-5 ACDFAnesthesia:                 GeneralRad:  C-ArmNM:  EMG, SEP, & MEPBlood:  Type                & ScreenPosition:  SupineBed:  Regular slider                bedHeadrest:  Farina & GW tongs/hanging                weightsMisc:Small vivigenNerve root retractor                (DePuy)Disposable #2 KerrisonInterbody  No date: BACK SURGERY  No date: COLONOSCOPY  3/3/2020: CREATION OF MUSCLE ROTATIONAL FLAP      Comment:  Procedure: CREATION, FLAP, MUSCLE ROTATION;  Surgeon:                Francis Alan MD;  [facility];  Service:                Neurosurgery;;  No date: lower back surgery  3/3/2020: LUMBAR LAMINECTOMY WITH FUSION; N/A      Comment:  Procedure: T10-Pelvis Fusion with L4 PSO **AIRO**                Co-Surgeon: Roberto Parkinson;  Surgeon: Francis Alan MD;                 [facility];  Service: Neurosurgery;                 Laterality: N/A;  **AIRO**CELL SAVER**Co-Surgeon:                Roberto ParkinsonNM: EMG, SEP, MEPPosition: ProneBed:                Shaji 4 post, prone pillowBlood: Type & Hold 2                unitsRad: C-Arm, AIROVendor: DepuyMisc: Vivigen,                Infuse, Cement (Depuy)[place]  No  date: NECK SURGERY  No date: SPINE SURGERY  No date: UPPER GASTROINTESTINAL ENDOSCOPY    Individualization:   1. Pt likes dim lights    Restraints: (date/time/why or N/A) na         Outcome: Ongoing, Progressing  Goal: Absence of Hospital-Acquired Illness or Injury  Outcome: Ongoing, Progressing  Goal: Optimal Comfort and Wellbeing  Outcome: Ongoing, Progressing  Goal: Readiness for Transition of Care  Outcome: Ongoing, Progressing     Problem: Fall Injury Risk  Goal: Absence of Fall and Fall-Related Injury  Outcome: Ongoing, Progressing     Problem: Infection  Goal: Infection Symptom Resolution  Outcome: Ongoing, Progressing     Problem: Hypertension Comorbidity  Goal: Blood Pressure in Desired Range  Outcome: Ongoing, Progressing     Problem: Pain Chronic (Persistent) (Comorbidity Management)  Goal: Acceptable Pain Control and Functional Ability  Outcome: Ongoing, Progressing    Pt a&ox4, back pain 5-10/10 controlled with oxycodone and tylenol. POC discussed with pt and spouse - plan to dc to rehab this week. VS stable on RA - BP low this am with manual cuff reading of 90/60. MD notified, NS bolus of 500 mL given, bp 98/52 which is baseline for pt. Voids in urinal, LBM 3/14, pt c/o gas pains and requested enema, enema given last night. BG achs. YRN drain had 15 mL ss out. Will monitor.

## 2020-03-17 NOTE — SUBJECTIVE & OBJECTIVE
Interval History: NAEON. YRN removed per plastics, Dr. Caro will arrange f/u. Ambulating well with PT, motivated to progress. Pain controlled with current regimen. Sitting edge of bed with therapy, tolerating well. Denies weakness, paresthesias, b/b dysfunction.     Medications:  Continuous Infusions:  Scheduled Meds:   bacitracin   Topical (Top) BID    ceFAZolin (ANCEF) IVPB  2 g Intravenous Q8H    famotidine  40 mg Oral QHS    ferrous sulfate  325 mg Oral BID    heparin (porcine)  5,000 Units Subcutaneous Q8H    lidocaine  2 patch Transdermal Q24H    lisinopriL  5 mg Oral Daily    methocarbamoL  750 mg Oral TID    pregabalin  150 mg Oral TID    senna-docusate 8.6-50 mg  1 tablet Oral BID     PRN Meds:acetaminophen, aluminum-magnesium hydroxide-simethicone, bisacodyL, Dextrose 10% Bolus, Dextrose 10% Bolus, fentaNYL, glucagon (human recombinant), glucose, glucose, HYDROmorphone, insulin aspart U-100, labetalol, midazolam, ondansetron, oxyCODONE, oxyCODONE, promethazine (PHENERGAN) IVPB, sodium chloride 0.9%       Objective:     Weight: 98.4 kg (216 lb 14.9 oz)  Body mass index is 28.62 kg/m².  Vital Signs (Most Recent):  Temp: 98.5 °F (36.9 °C) (03/17/20 1148)  Pulse: 95 (03/17/20 1137)  Resp: 18 (03/17/20 1148)  BP: 117/70 (03/17/20 1148)  SpO2: 97 % (03/17/20 1148) Vital Signs (24h Range):  Temp:  [97.8 °F (36.6 °C)-99.2 °F (37.3 °C)] 98.5 °F (36.9 °C)  Pulse:  [85-95] 95  Resp:  [16-18] 18  SpO2:  [93 %-98 %] 97 %  BP: ()/(56-72) 117/70                   Neurosurgery Physical Exam    General: well developed, well nourished, no distress.   Head: normocephalic, atraumatic  Neck: No tracheal deviation. No palpable masses.   Neurologic: Alert and oriented. Thought content appropriate.  GCS: Motor: 6/Verbal: 5/Eyes: 4 GCS Total: 15  Mental Status: Awake, Alert, Oriented x 4  Language: No aphasia  Speech: No dysarthria  Cranial nerves: face symmetric, tongue midline, CN II-XII grossly intact.   Eyes:  pupils equal, round, reactive to light with accomodation, EOMI.   Ears: No drainage.   Pulmonary: normal respirations, no signs of respiratory distress  Abdomen: soft, non-distended, not tender to palpation  Sensory: intact to light touch throughout  Motor Strength: Moves all extremities spontaneously with good tone.  Full strength upper and lower extremities. No abnormal movements seen.     Strength  Deltoids Triceps Biceps Wrist Extension Wrist Flexion Hand    Upper: R 5/5 5/5 5/5 5/5 5/5 5/5    L 5/5 5/5 5/5 5/5 5/5 5/5     Iliopsoas Quadriceps Knee  Flexion Tibialis  anterior Gastro- cnemius EHL   Lower: R 5/5 5/5 5/5 5/5 5/5 5/5    L 5/5 5/5 5/5 5/5 5/5 5/5      Skin: Skin is warm, dry and intact.    Incision c/d/I with skin edges well approximated with sutures. No surrounding erythema or edema. No drainage from incision.      Significant Labs:  No results for input(s): GLU, NA, K, CL, CO2, BUN, CREATININE, CALCIUM, MG in the last 48 hours.  No results for input(s): WBC, HGB, HCT, PLT in the last 48 hours.  No results for input(s): LABPT, INR, APTT in the last 48 hours.  Microbiology Results (last 7 days)     ** No results found for the last 168 hours. **        Recent Lab Results       03/17/20  1126   03/17/20  0731   03/16/20  2214   03/16/20  1640        POCT Glucose 115 173 144 113         All pertinent labs from the last 24 hours have been reviewed.    Significant Diagnostics:  I have reviewed all pertinent imaging results/findings within the past 24 hours.

## 2020-03-17 NOTE — ASSESSMENT & PLAN NOTE
A 70 year old with degenerative lumbar spondy and spine deformity now s/p T10 - pelvis posterior segmental instrumented fusion, T10 - L3 laminectomy for decompression, L3 pedical subtraction osteotomy (3/3).    -Patient neurologically stable on exam.   -Brace on when upright, OOB, or working with therapy. OK to remove when lying in bed.   -Postop XR lumbar spine shows satisfactory posterior alignment and positioning of posterior hardware.   -YRN removed today per plastics. Dr. Caro will contact patient for follow-up. Dc antibiotics for skin/soft tissue ppx. Can sit for up to 30 minutes at meal time (3 times per day).  Head of bed flat otherwise at all times.  Wound closure tension increases in sitting position.  This restriction should be in place for 6 weeks. Leave incision open to air. Can shower but should not tub soak the wounds.  No lotions, creams applied to the incision.    -Pain: Improved with multimodal agents per Anesthesia recommendations. No changes in pain after nerve block. Continue Lyrica TID. Scheduled lido patches, tylenol 1000 mg Q8, and robaxin 500 mg TID. Continue oxy prn.  -Thrombocytopenia: Resolved. Ctm with cbc.   -Post op anemia: Stable. Continue iron for replacement. Improving.   -Transaminitis: Improving, f/u next lab draw. q72h labs  -HTN: Continue home dose lisinopril and Prn labetalol.   -DM2: continue POCT glucose, SSI, diabetic diet  -GERD: continue home dose pepcid  -DVT prophylaxis: CLIFF's, SCD's, SQH  -Bowel regimen: senna BID and miralax daily. States he uses enema or suppository normally 1-2x per week at home.  -Atelectasis prevention: IS hourly while awake, PT/OT, OOB > 6 hours per day. Continue to encourage OOB mobility and up to chair with meals.       Dispo: + PPD, CXR negative. Quantiferon gold test pending, results will be available tomorrow at the earliest. Facility would like to wait for results of test prior to transfer. Medically stable for discharge.

## 2020-03-17 NOTE — NURSING
Paged neurosurgery as pt manual bp 90/60, automatic 88/58 at this time. Pt asymptomatic, VS otherwise WNL. MD ordered 500 mL NS bolus. Will monitor.

## 2020-03-18 ENCOUNTER — TELEPHONE (OUTPATIENT)
Dept: NEUROSURGERY | Facility: CLINIC | Age: 70
End: 2020-03-18

## 2020-03-18 PROBLEM — R76.12 POSITIVE QUANTIFERON-TB GOLD TEST: Status: ACTIVE | Noted: 2020-03-18

## 2020-03-18 LAB
ALBUMIN SERPL BCP-MCNC: 2.6 G/DL (ref 3.5–5.2)
ALP SERPL-CCNC: 112 U/L (ref 55–135)
ALT SERPL W/O P-5'-P-CCNC: 31 U/L (ref 10–44)
ANION GAP SERPL CALC-SCNC: 10 MMOL/L (ref 8–16)
AST SERPL-CCNC: 39 U/L (ref 10–40)
BASOPHILS # BLD AUTO: 0.07 K/UL (ref 0–0.2)
BASOPHILS NFR BLD: 1 % (ref 0–1.9)
BILIRUB SERPL-MCNC: 0.3 MG/DL (ref 0.1–1)
BUN SERPL-MCNC: 11 MG/DL (ref 8–23)
CALCIUM SERPL-MCNC: 9.3 MG/DL (ref 8.7–10.5)
CHLORIDE SERPL-SCNC: 104 MMOL/L (ref 95–110)
CO2 SERPL-SCNC: 22 MMOL/L (ref 23–29)
CREAT SERPL-MCNC: 1 MG/DL (ref 0.5–1.4)
DIFFERENTIAL METHOD: ABNORMAL
EOSINOPHIL # BLD AUTO: 0.1 K/UL (ref 0–0.5)
EOSINOPHIL NFR BLD: 2.1 % (ref 0–8)
ERYTHROCYTE [DISTWIDTH] IN BLOOD BY AUTOMATED COUNT: 13.6 % (ref 11.5–14.5)
EST. GFR  (AFRICAN AMERICAN): >60 ML/MIN/1.73 M^2
EST. GFR  (NON AFRICAN AMERICAN): >60 ML/MIN/1.73 M^2
GLUCOSE SERPL-MCNC: 114 MG/DL (ref 70–110)
HCT VFR BLD AUTO: 30.1 % (ref 40–54)
HGB BLD-MCNC: 9.2 G/DL (ref 14–18)
IMM GRANULOCYTES # BLD AUTO: 0.22 K/UL (ref 0–0.04)
IMM GRANULOCYTES NFR BLD AUTO: 3.2 % (ref 0–0.5)
LYMPHOCYTES # BLD AUTO: 1.9 K/UL (ref 1–4.8)
LYMPHOCYTES NFR BLD: 28.2 % (ref 18–48)
MCH RBC QN AUTO: 27.6 PG (ref 27–31)
MCHC RBC AUTO-ENTMCNC: 30.6 G/DL (ref 32–36)
MCV RBC AUTO: 90 FL (ref 82–98)
MONOCYTES # BLD AUTO: 0.6 K/UL (ref 0.3–1)
MONOCYTES NFR BLD: 8.6 % (ref 4–15)
NEUTROPHILS # BLD AUTO: 3.9 K/UL (ref 1.8–7.7)
NEUTROPHILS NFR BLD: 56.9 % (ref 38–73)
NRBC BLD-RTO: 0 /100 WBC
PLATELET # BLD AUTO: 344 K/UL (ref 150–350)
PLATELET BLD QL SMEAR: ABNORMAL
PMV BLD AUTO: 10.6 FL (ref 9.2–12.9)
POCT GLUCOSE: 117 MG/DL (ref 70–110)
POCT GLUCOSE: 129 MG/DL (ref 70–110)
POCT GLUCOSE: 134 MG/DL (ref 70–110)
POTASSIUM SERPL-SCNC: 4.2 MMOL/L (ref 3.5–5.1)
PROT SERPL-MCNC: 6.4 G/DL (ref 6–8.4)
RBC # BLD AUTO: 3.33 M/UL (ref 4.6–6.2)
SODIUM SERPL-SCNC: 136 MMOL/L (ref 136–145)
WBC # BLD AUTO: 6.77 K/UL (ref 3.9–12.7)

## 2020-03-18 PROCEDURE — 36415 COLL VENOUS BLD VENIPUNCTURE: CPT

## 2020-03-18 PROCEDURE — 25000003 PHARM REV CODE 250: Performed by: PHYSICIAN ASSISTANT

## 2020-03-18 PROCEDURE — 25000003 PHARM REV CODE 250: Performed by: STUDENT IN AN ORGANIZED HEALTH CARE EDUCATION/TRAINING PROGRAM

## 2020-03-18 PROCEDURE — 85025 COMPLETE CBC W/AUTO DIFF WBC: CPT

## 2020-03-18 PROCEDURE — 80053 COMPREHEN METABOLIC PANEL: CPT

## 2020-03-18 PROCEDURE — 63600175 PHARM REV CODE 636 W HCPCS: Performed by: PHYSICIAN ASSISTANT

## 2020-03-18 PROCEDURE — 87536 HIV-1 QUANT&REVRSE TRNSCRPJ: CPT

## 2020-03-18 PROCEDURE — 20600001 HC STEP DOWN PRIVATE ROOM

## 2020-03-18 PROCEDURE — 99222 1ST HOSP IP/OBS MODERATE 55: CPT | Mod: GC,,, | Performed by: INTERNAL MEDICINE

## 2020-03-18 PROCEDURE — 99222 PR INITIAL HOSPITAL CARE,LEVL II: ICD-10-PCS | Mod: GC,,, | Performed by: INTERNAL MEDICINE

## 2020-03-18 PROCEDURE — 99024 POSTOP FOLLOW-UP VISIT: CPT | Mod: POP,,, | Performed by: PHYSICIAN ASSISTANT

## 2020-03-18 PROCEDURE — 99024 PR POST-OP FOLLOW-UP VISIT: ICD-10-PCS | Mod: POP,,, | Performed by: PHYSICIAN ASSISTANT

## 2020-03-18 RX ADMIN — METHOCARBAMOL TABLETS 750 MG: 750 TABLET, COATED ORAL at 08:03

## 2020-03-18 RX ADMIN — OXYCODONE HYDROCHLORIDE 10 MG: 10 TABLET ORAL at 09:03

## 2020-03-18 RX ADMIN — HEPARIN SODIUM 5000 UNITS: 5000 INJECTION, SOLUTION INTRAVENOUS; SUBCUTANEOUS at 01:03

## 2020-03-18 RX ADMIN — LIDOCAINE 2 PATCH: 50 PATCH TOPICAL at 01:03

## 2020-03-18 RX ADMIN — SENNOSIDES AND DOCUSATE SODIUM 1 TABLET: 8.6; 5 TABLET ORAL at 09:03

## 2020-03-18 RX ADMIN — OXYCODONE HYDROCHLORIDE 15 MG: 10 TABLET ORAL at 10:03

## 2020-03-18 RX ADMIN — HEPARIN SODIUM 5000 UNITS: 5000 INJECTION, SOLUTION INTRAVENOUS; SUBCUTANEOUS at 05:03

## 2020-03-18 RX ADMIN — OXYCODONE HYDROCHLORIDE 15 MG: 10 TABLET ORAL at 05:03

## 2020-03-18 RX ADMIN — PREGABALIN 150 MG: 150 CAPSULE ORAL at 01:03

## 2020-03-18 RX ADMIN — PREGABALIN 150 MG: 150 CAPSULE ORAL at 08:03

## 2020-03-18 RX ADMIN — PREGABALIN 150 MG: 150 CAPSULE ORAL at 09:03

## 2020-03-18 RX ADMIN — METHOCARBAMOL TABLETS 750 MG: 750 TABLET, COATED ORAL at 01:03

## 2020-03-18 RX ADMIN — ACETAMINOPHEN 650 MG: 325 TABLET ORAL at 11:03

## 2020-03-18 RX ADMIN — SENNOSIDES AND DOCUSATE SODIUM 1 TABLET: 8.6; 5 TABLET ORAL at 08:03

## 2020-03-18 RX ADMIN — OXYCODONE HYDROCHLORIDE 15 MG: 10 TABLET ORAL at 01:03

## 2020-03-18 RX ADMIN — FAMOTIDINE 40 MG: 20 TABLET, FILM COATED ORAL at 09:03

## 2020-03-18 RX ADMIN — METHOCARBAMOL TABLETS 750 MG: 750 TABLET, COATED ORAL at 09:03

## 2020-03-18 RX ADMIN — OXYCODONE HYDROCHLORIDE 15 MG: 10 TABLET ORAL at 03:03

## 2020-03-18 RX ADMIN — HEPARIN SODIUM 5000 UNITS: 5000 INJECTION, SOLUTION INTRAVENOUS; SUBCUTANEOUS at 09:03

## 2020-03-18 NOTE — CONSULTS
[facility]  Infectious Disease  Consult Note    Patient Name: Garcia Renteria  MRN: [id number]  Admission Date: 3/3/2020  Hospital Length of Stay: 15 days  Attending Physician: Francis Alan MD  Primary Care Provider: [facility]     Isolation Status: No active isolations    Patient information was obtained from patient.      Inpatient consult to Infectious Diseases  Consult performed by: Justin Amaro MD  Consult ordered by: Alva Cuevas PA-C        Assessment/Plan:     Positive QuantiFERON-TB Gold test  Patient is a 70 year old male with medical history significant for multiple back interventions due to injuries sustained in [place], HTN, GERD, DMII who initially presented for worsening lumbar back pain. Currently s/p lumbar laminectomy with fusion and nerve block. Prior to discharge to rehab, patient was found to have positive PPD skin test and positive quantiferon gold test. Risk factors include previous employment as  (work in a retirement setting), and possibly time [facility]. No known history of immunosuppression, or international travel. He is currently asymptomatic with no cough, SOB, hemoptysis, fevers, chills, unintentional weight loss, or night sweats. Chest x-ray on 3/18 significant for vague irregular opacity right midlung field.    Recommendations:  - Concern for latent TB given positive PPD confirmed with positive quant gold in this asymptomatic patient.  - Acquire AFB smear (sputum) x 3 at least 8 hours apart as well as M. Tuberculosis DNA by PCR  - Given his unclear HIV status, recommend testing for HIV  - Maintain airborne precautions for now, further recommendations pending test results          Thank you for your consult. I will follow-up with patient. Please contact us if you have any additional questions.    Justin Amaro MD  Infectious Disease  [facility]    Subjective:     Principal Problem: Acquired  spondylolisthesis of thoracolumbar vertebra    HPI: Patient is a 70 year old male with medical history significant for DMII, GERD, HTN, and multiple back surgeries for injuries sustained during his time in [place] who initially presented to hospital for severe progressive low back pain.Briefly, patient was admitted on 3/03 and underwent laminectomy of lumbar spine with fusion. He tolerated the procedure well but continued to have pain throughout his hospital stay leading to nerve block being done on 3/10. Patient was being prepped to be discharged to rehab but had a positive PPD skin test and a positive quantiferon gold following this. ID was consulted to help manage patient in this regard.    During my exam, patient reports feeling generally well other than back pain that is aggravated by excessive movement. He denies any SOB, cough, hemoptysis, fevers, chills, night sweats, or unintentional weight loss. He denies any history of HIV infection, but he cannot recall if he has been tested for this. He has never been incarcerated, but has worked as a . 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 Patient currently lives at home with his wife. 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 He reports that he has only traveled between [place], [place], and [place]; denies any international travel within the last few years. No known contacts with TB. His back pain has been present for years, with at least 5 or 6 procedures in the past for continued lower back pain and radiculopathy.       Past Medical History:   Diagnosis Date    Colitis     Diabetes mellitus, type 2     Diverticulosis     GERD (gastroesophageal reflux disease)     Hypertension        Past Surgical History:   Procedure Laterality Date    ANKLE SURGERY Bilateral     ANTERIOR CERVICAL DISCECTOMY  W/ FUSION N/A 10/30/2019    Procedure: C3-4, C4-5 ACDF;  Surgeon: Francis Alan MD;  [facility];  Service: Neurosurgery;  Laterality: N/A;  C3-4, C4-5 ACDF  Anesthesia:  General  Rad:  C-Arm  NM:  EMG, SEP, & MEP  Blood:  Type & Screen  Position:  Supine  Bed:  Regular slider bed  Headrest:  [place] & GW tongs/hanging weights  Misc:  Small vivigen  Nerve root retractor (DePuy)  Disposable #2 Kerrison  Interbody    BACK SURGERY      COLONOSCOPY      CREATION OF MUSCLE ROTATIONAL FLAP  3/3/2020    Procedure: CREATION, FLAP, MUSCLE ROTATION;  Surgeon: Francis Alan MD;  [facility];  Service: Neurosurgery;;    INJECTION OF ANESTHETIC AGENT AROUND NERVE Bilateral 3/10/2020    Procedure: Block, Nerve;  Surgeon: Erinn Surgeon;  [facility];  Service: Anesthesiology;  Laterality: Bilateral;    lower back surgery      LUMBAR LAMINECTOMY WITH FUSION N/A 3/3/2020    Procedure: T10-Pelvis Fusion with L4 PSO **AIRO** Co-Surgeon: Roberto Parkinson;  Surgeon: Francis Alan MD;  Location: [address];  Service: Neurosurgery;  Laterality: N/A;  **AIRO**  **CELL SAVER**  Co-Surgeon: Roberto Parkinson  NM: EMG, SEP, MEP  Position: Prone  Bed: [place] 4 post, prone pillow  Blood: Type & Hold 2 units  Rad: C-Arm, AIRO  Vendor: [email or web address]  Misc: Vivigen, Infuse, Cement (Depuy)  [place]    NECK SURGERY      SPINE SURGERY      UPPER GASTROINTESTINAL ENDOSCOPY         Review of patient's allergies indicates:  No Known Allergies    Medications:  Medications Prior to Admission   Medication Sig    famotidine (PEPCID) 40 MG tablet Take 1 tablet (40 mg total) by mouth every evening.    lisinopril (PRINIVIL,ZESTRIL) 5 MG tablet Take 5 mg by mouth once daily.     metFORMIN (GLUCOPHAGE) 1000 MG tablet Take 500 mg by mouth daily with breakfast.     omeprazole (PRILOSEC) 20 MG capsule Take 1 capsule (20 mg total) by mouth once daily.    STOOL SOFTENER, DOCUSATE MADELAINE, 240 mg capsule      triamterene-hydrochlorothiazide 75-50 mg (MAXZIDE) 75-50 mg per tablet Take 0.5 tablets by mouth once daily.     vitamin D (VITAMIN D3) 1000 units Tab Take 1,000 Units by mouth once daily.    [DISCONTINUED] ciprofloxacin HCl (CIPRO) 500 MG tablet Take 1 tablet (500 mg total) by mouth every 12 (twelve) hours. for 5 days    [DISCONTINUED] gabapentin (NEURONTIN) 300 MG capsule Take 2 capsules (600 mg total) by mouth every evening.    [DISCONTINUED] glycerin adult suppository Place rectally as needed for Constipation.    [DISCONTINUED] HYDROcodone-acetaminophen (NORCO)  mg per tablet Take 1 tablet by mouth every 6 (six) hours as needed for Pain.    [DISCONTINUED] ibuprofen (ADVIL,MOTRIN) 800 MG tablet Take 800 mg by mouth daily as needed for Pain.     sildenafil (VIAGRA) 100 MG tablet     [DISCONTINUED] oxyCODONE-acetaminophen (PERCOCET)  mg per tablet Take 1 tablet by mouth every 6 (six) hours as needed for Pain. (Patient not taking: Reported on 2020)     Antibiotics (From admission, onward)    None        Antifungals (From admission, onward)    None        Antivirals (From admission, onward)    None           Immunization History   Administered Date(s) Administered    PPD Test 2020       Family History     Problem Relation (Age of Onset)    Neurological Disorders Mother        Social History     Socioeconomic History    Marital status:      Spouse name: Not on file    Number of children: Not on file    Years of education: Not on file    Highest education level: Not on file   Occupational History    Not on file   Social Needs    Financial resource strain: Not on file    Food insecurity:     Worry: Not on file     Inability: Not on file    Transportation needs:     Medical: Not on file     Non-medical: Not on file   Tobacco Use    Smoking status: Former Smoker     Types: Cigars     Last attempt to quit: 10/2019     Years since quittin.4    Smokeless tobacco: Never Used    Substance and Sexual Activity    Alcohol use: Yes     Alcohol/week: 2.0 standard drinks     Types: 1 Glasses of wine, 1 Cans of beer per week     Comment: 1 wine , 2 times a week    Drug use: Not Currently    Sexual activity: Yes     Partners: Female   Lifestyle    Physical activity:     Days per week: Not on file     Minutes per session: Not on file    Stress: Not on file   Relationships    Social connections:     Talks on phone: Not on file     Gets together: Not on file     Attends Oriental orthodox service: Not on file     Active member of club or organization: Not on file     Attends meetings of clubs or organizations: Not on file     Relationship status: Not on file   Other Topics Concern    Not on file   Social History Narrative    Not on file     Review of Systems   Constitutional: Positive for fatigue. Negative for activity change, appetite change, chills, diaphoresis, fever and unexpected weight change.   HENT: Negative for congestion and sore throat.    Respiratory: Negative for cough, chest tightness, shortness of breath and wheezing.    Cardiovascular: Negative for chest pain, palpitations and leg swelling.   Gastrointestinal: Negative for abdominal pain, constipation, diarrhea, nausea and vomiting.   Genitourinary: Negative for dysuria and flank pain.   Musculoskeletal: Positive for back pain. Negative for arthralgias and myalgias.   Skin: Negative for color change and pallor.   Neurological: Negative for dizziness, weakness, numbness and headaches.     Objective:     Vital Signs (Most Recent):  Temp: 98.9 °F (37.2 °C) (03/18/20 1100)  Pulse: 90 (03/18/20 1100)  Resp: 16 (03/18/20 1100)  BP: 110/70 (03/18/20 1100)  SpO2: 95 % (03/18/20 1100) Vital Signs (24h Range):  Temp:  [98.9 °F (37.2 °C)-100.2 °F (37.9 °C)] 98.9 °F (37.2 °C)  Pulse:  [77-93] 90  Resp:  [12-18] 16  SpO2:  [92 %-97 %] 95 %  BP: ()/(50-75) 110/70     Weight: 98.4 kg (216 lb 14.9 oz)  Body mass index is 28.62  kg/m².    Estimated Creatinine Clearance: 86.1 mL/min (based on SCr of 1 mg/dL).    Physical Exam   Constitutional: He is oriented to person, place, and time. He appears well-developed and well-nourished. No distress.   HENT:   Head: Normocephalic and atraumatic.   Eyes: Pupils are equal, round, and reactive to light. Conjunctivae and EOM are normal.   Neck: Normal range of motion. Neck supple. No JVD present.   Cardiovascular: Normal rate, regular rhythm and normal heart sounds.   Pulmonary/Chest: Effort normal and breath sounds normal. He has no wheezes. He has no rales.   Abdominal: Soft. Bowel sounds are normal. There is no tenderness.   Musculoskeletal: Normal range of motion. He exhibits no edema or tenderness.   Lymphadenopathy:     He has no cervical adenopathy.   Neurological: He is alert and oriented to person, place, and time.   Skin: Skin is warm and dry.   Psychiatric: He has a normal mood and affect. His behavior is normal.   Nursing note and vitals reviewed.      Significant Labs: All pertinent labs within the past 24 hours have been reviewed.    Significant Imaging: I have reviewed all pertinent imaging results/findings within the past 24 hours.

## 2020-03-18 NOTE — PLAN OF CARE
Problem: Diabetes Comorbidity  Goal: Blood Glucose Level Within Desired Range  Outcome: Ongoing, Progressing     Problem: Adult Inpatient Plan of Care  Goal: Plan of Care Review  Outcome: Ongoing, Progressing  Goal: Patient-Specific Goal (Individualization)  Description  Admit Date: 3/3    Admit Dx: spinal fusion    Past Medical History:  No date: Colitis  No date: Diabetes mellitus, type 2  No date: Diverticulosis  No date: GERD (gastroesophageal reflux disease)  No date: Hypertension    Past Surgical History:  No date: ANKLE SURGERY; Bilateral  10/30/2019: ANTERIOR CERVICAL DISCECTOMY W/ FUSION; N/A      Comment:  Procedure: C3-4, C4-5 ACDF;  Surgeon: Francis Alan MD;               Location: [facility];  Service: Neurosurgery;                 Laterality: N/A;  C3-4, C4-5 ACDFAnesthesia:                 GeneralRad:  C-ArmNM:  EMG, SEP, & MEPBlood:  Type                & ScreenPosition:  SupineBed:  Regular slider                bedHeadrest:  [place] & GW tongs/hanging                weightsMisc:Small vivigenNerve root retractor                (DePuy)Disposable #2 KerrisonInterbody  No date: BACK SURGERY  No date: COLONOSCOPY  3/3/2020: CREATION OF MUSCLE ROTATIONAL FLAP      Comment:  Procedure: CREATION, FLAP, MUSCLE ROTATION;  Surgeon:                Francis Alan MD;  [facility];  Service:                Neurosurgery;;  No date: lower back surgery  3/3/2020: LUMBAR LAMINECTOMY WITH FUSION; N/A      Comment:  Procedure: T10-Pelvis Fusion with L4 PSO **AIRO**                Co-Surgeon: Roberto Parkinson;  Surgeon: Francis Alan MD;                 [facility];  Service: Neurosurgery;                 Laterality: N/A;  **AIRO**CELL SAVER**Co-Surgeon:                Roberto ParkinsonNM: EMG, SEP, MEPPosition: ProneBed:                [place] 4 post, prone pillowBlood: Type & Hold 2                unitsRad: C-Arm, AIROVendor: DepuyMisc: Vivigen,                Infuse, Cement (Depuy)[place]  No  date: NECK SURGERY  No date: SPINE SURGERY  No date: UPPER GASTROINTESTINAL ENDOSCOPY    Individualization:   1. Pt likes dim lights    Restraints: (date/time/why or N/A) na         Outcome: Ongoing, Progressing  Goal: Absence of Hospital-Acquired Illness or Injury  Outcome: Ongoing, Progressing  Goal: Optimal Comfort and Wellbeing  Outcome: Ongoing, Progressing  Goal: Readiness for Transition of Care  Outcome: Ongoing, Progressing  Goal: Rounds/Family Conference  Outcome: Ongoing, Progressing     Problem: Fall Injury Risk  Goal: Absence of Fall and Fall-Related Injury  Outcome: Ongoing, Progressing     Problem: Infection  Goal: Infection Symptom Resolution  Outcome: Ongoing, Progressing     Problem: Pain Chronic (Persistent) (Comorbidity Management)  Goal: Acceptable Pain Control and Functional Ability  Outcome: Ongoing, Progressing    Pt a&ox4, back pain 5-9/10 controlled with oxycodone prn, BP low at times overnight but pt asymptomatic. Poc discussed with pt. Voiding urinal. LBM 3/14. Carb control diet, BG achs. Planned d/c to rehab after tb blood test results. Will monitor.

## 2020-03-18 NOTE — TELEPHONE ENCOUNTER
Spouse states the pt's XRs came back fine from yesterday but recent blood work indicate conflicting results.  States the inpt staff informed the pt he will be moved to another room for further blood work and XRs.  Spouse and pt would like to be discharged and are unclear about the plan of care at this time.   Explained I cannot coordinate care on the inpt side of the hospital but will relay their concerns to GONZÁLEZ Cuevas PA-C to discuss w/inpt staff.  V/U.----- Message from Kirill Rincon sent at 3/18/2020  9:08 AM CDT -----  Contact: wife @ [phone]  Asking to speak w/ May, states pt is quarantined at [facility] right now due to pt having false positive on TB test

## 2020-03-18 NOTE — PT/OT/SLP PROGRESS
Physical Therapy      Patient Name:  Garcia Renteria   MRN:  [id number]    Patient not seen today secondary to Other (Comment)(r/o TB, charge RN advised to hold today). Will follow-up as appropriate.    Marquita Gomez, PT

## 2020-03-18 NOTE — SUBJECTIVE & OBJECTIVE
Interval History: + Quantiferon gold test. ID consulted, will f/u recs. Will be moved to isolation room. Patient denies weakness, paresthesias, b/b dysfunction.     Medications:  Continuous Infusions:  Scheduled Meds:   famotidine  40 mg Oral QHS    ferrous sulfate  325 mg Oral BID    heparin (porcine)  5,000 Units Subcutaneous Q8H    lidocaine  2 patch Transdermal Q24H    lisinopriL  5 mg Oral Daily    methocarbamoL  750 mg Oral TID    pregabalin  150 mg Oral TID    senna-docusate 8.6-50 mg  1 tablet Oral BID     PRN Meds:acetaminophen, aluminum-magnesium hydroxide-simethicone, bisacodyL, Dextrose 10% Bolus, Dextrose 10% Bolus, fentaNYL, glucagon (human recombinant), glucose, glucose, HYDROmorphone, insulin aspart U-100, labetalol, midazolam, ondansetron, oxyCODONE, oxyCODONE, promethazine (PHENERGAN) IVPB, sodium chloride 0.9%       Objective:     Weight: 98.4 kg (216 lb 14.9 oz)  Body mass index is 28.62 kg/m².  Vital Signs (Most Recent):  Temp: 98.5 °F (36.9 °C) (03/18/20 1531)  Pulse: (!) 50 (03/18/20 1533)  Resp: 16 (03/18/20 1100)  BP: 118/76 (03/18/20 1531)  SpO2: 100 % (03/18/20 1531) Vital Signs (24h Range):  Temp:  [98.5 °F (36.9 °C)-99.5 °F (37.5 °C)] 98.5 °F (36.9 °C)  Pulse:  [50-93] 50  Resp:  [12-16] 16  SpO2:  [92 %-100 %] 100 %  BP: ()/(50-76) 118/76     Date 03/18/20 0700 - 03/19/20 0659   Shift 4990-8797 9921-4345 2368-7635 24 Hour Total   INTAKE   Shift Total(mL/kg)       OUTPUT   Urine(mL/kg/hr) 1200(1.5)   1200   Shift Total(mL/kg) 1200(12.2)   1200(12.2)   Weight (kg) 98.4 98.4 98.4 98.4                 Neurosurgery Physical Exam    General: well developed, well nourished, no distress.   Head: normocephalic, atraumatic  Neck: No tracheal deviation. No palpable masses.   Neurologic: Alert and oriented. Thought content appropriate.  GCS: Motor: 6/Verbal: 5/Eyes: 4 GCS Total: 15  Mental Status: Awake, Alert, Oriented x 4  Language: No aphasia  Speech: No dysarthria  Cranial nerves:  face symmetric, tongue midline, CN II-XII grossly intact.   Eyes: pupils equal, round, reactive to light with accomodation, EOMI.   Ears: No drainage.   Pulmonary: normal respirations, no signs of respiratory distress  Abdomen: soft, non-distended, not tender to palpation  Sensory: intact to light touch throughout  Motor Strength: Moves all extremities spontaneously with good tone.  Full strength upper and lower extremities. No abnormal movements seen.      Strength   Deltoids Triceps Biceps Wrist Extension Wrist Flexion Hand    Upper: R 5/5 5/5 5/5 5/5 5/5 5/5     L 5/5 5/5 5/5 5/5 5/5 5/5       Iliopsoas Quadriceps Knee  Flexion Tibialis  anterior Gastro- cnemius EHL   Lower: R 5/5 5/5 5/5 5/5 5/5 5/5     L 5/5 5/5 5/5 5/5 5/5 5/5      Skin: Skin is warm, dry and intact.     Incision c/d/I with skin edges well approximated with sutures. No surrounding erythema or edema. No drainage from incision.      Significant Labs:  Recent Labs   Lab 03/18/20  0302   *      K 4.2      CO2 22*   BUN 11   CREATININE 1.0   CALCIUM 9.3     Recent Labs   Lab 03/18/20  0302   WBC 6.77   HGB 9.2*   HCT 30.1*        No results for input(s): LABPT, INR, APTT in the last 48 hours.  Microbiology Results (last 7 days)     Procedure Component Value Units Date/Time    AFB Culture & Smear [[id number]]     Order Status:  No result Specimen:  Sputum         Recent Lab Results       03/18/20  1529   03/18/20  1156   03/18/20  0302   03/17/20  2025        Albumin     2.6       Alkaline Phosphatase     112       ALT     31       Anion Gap     10       AST     39       Baso #     0.07       Basophil%     1.0       BILIRUBIN TOTAL     0.3  Comment:  For infants and newborns, interpretation of results should be based  on gestational age, weight and in agreement with clinical  observations.  Premature Infant recommended reference ranges:  Up to 24 hours.............<8.0 mg/dL  Up to 48 hours............<12.0 mg/dL  3-5  days..................<15.0 mg/dL  6-29 days.................<15.0 mg/dL         BUN, Bld     11       Calcium     9.3       Chloride     104       CO2     22       Creatinine     1.0       Differential Method     Automated       eGFR if      >60.0       eGFR if non      >60.0  Comment:  Calculation used to obtain the estimated glomerular filtration  rate (eGFR) is the CKD-EPI equation.          Eos #     0.1       Eosinophil%     2.1       Glucose     114       Gran # (ANC)     3.9       Gran%     56.9       Hematocrit     30.1       Hemoglobin     9.2       Immature Grans (Abs)     0.22  Comment:  Mild elevation in immature granulocytes is non specific and   can be seen in a variety of conditions including stress response,   acute inflammation, trauma and pregnancy. Correlation with other   laboratory and clinical findings is essential.         Immature Granulocytes     3.2       Lymph #     1.9       Lymph%     28.2       MCH     27.6       MCHC     30.6       MCV     90       Mono #     0.6       Mono%     8.6       MPV     10.6       nRBC     0       Platelet Estimate     Appears normal       Platelets     344       POCT Glucose 117 134   110     Potassium     4.2       PROTEIN TOTAL     6.4       RBC     3.33       RDW     13.6       Sodium     136       WBC     6.77           All pertinent labs from the last 24 hours have been reviewed.    Significant Diagnostics:  I have reviewed all pertinent imaging results/findings within the past 24 hours.

## 2020-03-18 NOTE — SUBJECTIVE & OBJECTIVE
Past Medical History:   Diagnosis Date    Colitis     Diabetes mellitus, type 2     Diverticulosis     GERD (gastroesophageal reflux disease)     Hypertension        Past Surgical History:   Procedure Laterality Date    ANKLE SURGERY Bilateral     ANTERIOR CERVICAL DISCECTOMY W/ FUSION N/A 10/30/2019    Procedure: C3-4, C4-5 ACDF;  Surgeon: Francis Alan MD;  Location: [address];  Service: Neurosurgery;  Laterality: N/A;  C3-4, C4-5 ACDF  Anesthesia:  General  Rad:  C-Arm  NM:  EMG, SEP, & MEP  Blood:  Type & Screen  Position:  Supine  Bed:  Regular slider bed  Headrest:  Haywood [employer] GW tongs/hanging weights  Misc:  Small vivigen  Nerve root retractor ([email or web address])  Disposable #2 [place]  Interbody    BACK SURGERY      COLONOSCOPY      CREATION OF MUSCLE ROTATIONAL FLAP  3/3/2020    Procedure: CREATION, FLAP, MUSCLE ROTATION;  Surgeon: Francis Alan MD;  Location: [address];  Service: Neurosurgery;;    INJECTION OF ANESTHETIC AGENT AROUND NERVE Bilateral 3/10/2020    Procedure: Block, Nerve;  Surgeon: Erinn Surgeon;  Location: [facility];  Service: Anesthesiology;  Laterality: Bilateral;    lower back surgery      LUMBAR LAMINECTOMY WITH FUSION N/A 3/3/2020    Procedure: T10-Pelvis Fusion with L4 PSO **AIRO** Co-Surgeon: Roberto Parkinson;  Surgeon: Francis Alan MD;  Location: [address];  Service: Neurosurgery;  Laterality: N/A;  **AIRO**  **CELL SAVER**  Co-Surgeon: Roberto Parkinson  NM: EMG, SEP, MEP  Position: Prone  Bed: [place] 4 post, prone pillow  Blood: Type & Hold 2 units  Rad: C-Arm, AIRO  Vendor: [email or web address]  Misc: Vivigen, Infuse, Cement (Depuy)  [place]    NECK SURGERY      SPINE SURGERY      UPPER GASTROINTESTINAL ENDOSCOPY         Review of patient's allergies indicates:  No Known Allergies    Medications:  Medications Prior to Admission   Medication Sig    famotidine (PEPCID) 40 MG tablet Take 1 tablet (40 mg total) by mouth every evening.    lisinopril (PRINIVIL,ZESTRIL) 5 MG tablet Take 5  mg by mouth once daily.     metFORMIN (GLUCOPHAGE) 1000 MG tablet Take 500 mg by mouth daily with breakfast.     omeprazole (PRILOSEC) 20 MG capsule Take 1 capsule (20 mg total) by mouth once daily.    STOOL SOFTENER, DOCUSATE MADELAINE, 240 mg capsule     triamterene-hydrochlorothiazide 75-50 mg (MAXZIDE) 75-50 mg per tablet Take 0.5 tablets by mouth once daily.     vitamin D (VITAMIN D3) 1000 units Tab Take 1,000 Units by mouth once daily.    [DISCONTINUED] ciprofloxacin HCl (CIPRO) 500 MG tablet Take 1 tablet (500 mg total) by mouth every 12 (twelve) hours. for 5 days    [DISCONTINUED] gabapentin (NEURONTIN) 300 MG capsule Take 2 capsules (600 mg total) by mouth every evening.    [DISCONTINUED] glycerin adult suppository Place rectally as needed for Constipation.    [DISCONTINUED] HYDROcodone-acetaminophen (NORCO)  mg per tablet Take 1 tablet by mouth every 6 (six) hours as needed for Pain.    [DISCONTINUED] ibuprofen (ADVIL,MOTRIN) 800 MG tablet Take 800 mg by mouth daily as needed for Pain.     sildenafil (VIAGRA) 100 MG tablet     [DISCONTINUED] oxyCODONE-acetaminophen (PERCOCET)  mg per tablet Take 1 tablet by mouth every 6 (six) hours as needed for Pain. (Patient not taking: Reported on 2/6/2020)     Antibiotics (From admission, onward)    None        Antifungals (From admission, onward)    None        Antivirals (From admission, onward)    None           Immunization History   Administered Date(s) Administered    PPD Test 03/13/2020       Family History     Problem Relation (Age of Onset)    Neurological Disorders Mother        Social History     Socioeconomic History    Marital status:      Spouse name: Not on file    Number of children: Not on file    Years of education: Not on file    Highest education level: Not on file   Occupational History    Not on file   Social Needs    Financial resource strain: Not on file    Food insecurity:     Worry: Not on file     Inability:  Not on file    Transportation needs:     Medical: Not on file     Non-medical: Not on file   Tobacco Use    Smoking status: Former Smoker     Types: Cigars     Last attempt to quit: 10/2019     Years since quittin.4    Smokeless tobacco: Never Used   Substance and Sexual Activity    Alcohol use: Yes     Alcohol/week: 2.0 standard drinks     Types: 1 Glasses of wine, 1 Cans of beer per week     Comment: 1 wine , 2 times a week    Drug use: Not Currently    Sexual activity: Yes     Partners: Female   Lifestyle    Physical activity:     Days per week: Not on file     Minutes per session: Not on file    Stress: Not on file   Relationships    Social connections:     Talks on phone: Not on file     Gets together: Not on file     Attends Anabaptist service: Not on file     Active member of club or organization: Not on file     Attends meetings of clubs or organizations: Not on file     Relationship status: Not on file   Other Topics Concern    Not on file   Social History Narrative    Not on file     Review of Systems   Constitutional: Positive for fatigue. Negative for activity change, appetite change, chills, diaphoresis, fever and unexpected weight change.   HENT: Negative for congestion and sore throat.    Respiratory: Negative for cough, chest tightness, shortness of breath and wheezing.    Cardiovascular: Negative for chest pain, palpitations and leg swelling.   Gastrointestinal: Negative for abdominal pain, constipation, diarrhea, nausea and vomiting.   Genitourinary: Negative for dysuria and flank pain.   Musculoskeletal: Positive for back pain. Negative for arthralgias and myalgias.   Skin: Negative for color change and pallor.   Neurological: Negative for dizziness, weakness, numbness and headaches.     Objective:     Vital Signs (Most Recent):  Temp: 98.9 °F (37.2 °C) (20 1100)  Pulse: 90 (20 1100)  Resp: 16 (20 1100)  BP: 110/70 (20 1100)  SpO2: 95 % (20 1100) Vital  Signs (24h Range):  Temp:  [98.9 °F (37.2 °C)-100.2 °F (37.9 °C)] 98.9 °F (37.2 °C)  Pulse:  [77-93] 90  Resp:  [12-18] 16  SpO2:  [92 %-97 %] 95 %  BP: ()/(50-75) 110/70     Weight: 98.4 kg (216 lb 14.9 oz)  Body mass index is 28.62 kg/m².    Estimated Creatinine Clearance: 86.1 mL/min (based on SCr of 1 mg/dL).    Physical Exam   Constitutional: He is oriented to person, place, and time. He appears well-developed and well-nourished. No distress.   HENT:   Head: Normocephalic and atraumatic.   Eyes: Pupils are equal, round, and reactive to light. Conjunctivae and EOM are normal.   Neck: Normal range of motion. Neck supple. No JVD present.   Cardiovascular: Normal rate, regular rhythm and normal heart sounds.   Pulmonary/Chest: Effort normal and breath sounds normal. He has no wheezes. He has no rales.   Abdominal: Soft. Bowel sounds are normal. There is no tenderness.   Musculoskeletal: Normal range of motion. He exhibits no edema or tenderness.   Lymphadenopathy:     He has no cervical adenopathy.   Neurological: He is alert and oriented to person, place, and time.   Skin: Skin is warm and dry.   Psychiatric: He has a normal mood and affect. His behavior is normal.   Nursing note and vitals reviewed.      Significant Labs: All pertinent labs within the past 24 hours have been reviewed.    Significant Imaging: I have reviewed all pertinent imaging results/findings within the past 24 hours.

## 2020-03-18 NOTE — ASSESSMENT & PLAN NOTE
A 70 year old with degenerative lumbar spondy and spine deformity now s/p T10 - pelvis posterior segmental instrumented fusion, T10 - L3 laminectomy for decompression, L3 pedical subtraction osteotomy (3/3).    -Patient neurologically stable on exam.   -ID: + PPD, CXR x 2 negative, + Quantiferon gold. CXR 3/18 shows vague irregular opacity right midlung field. 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 Asymptomatic. ID recommending AFB smear x3, M. Tuberculosis DNA by PCR, and HIV testing. Continue airborne precautions until further testing.   -Brace on when upright, OOB, or working with therapy. OK to remove when lying in bed.   -Postop XR lumbar spine shows satisfactory posterior alignment and positioning of posterior hardware.   -YRN removed today per plastics. Dr. Caro will contact patient for follow-up. Dc antibiotics for skin/soft tissue ppx. Can sit for up to 30 minutes at meal time (3 times per day).  Head of bed flat otherwise at all times.  Wound closure tension increases in sitting position.  This restriction should be in place for 6 weeks. Leave incision open to air. Can shower but should not tub soak the wounds.  No lotions, creams applied to the incision.    -Pain: Improved with multimodal agents per Anesthesia recommendations. No changes in pain after nerve block. Continue Lyrica TID. Scheduled lido patches, tylenol 1000 mg Q8, and robaxin 500 mg TID. Continue oxy prn.  -Thrombocytopenia: Resolved. Ctm with cbc.   -Post op anemia: Stable. Continue iron for replacement. Improving.   -Transaminitis: Improving, f/u next lab draw. q72h labs  -HTN: Continue home dose lisinopril and Prn labetalol.   -DM2: continue POCT glucose, SSI, diabetic diet  -GERD: continue home dose pepcid  -DVT prophylaxis: CLIFF's, SCD's, SQH  -Bowel regimen: senna BID and miralax daily. States he uses enema or suppository normally 1-2x per week at home.  -Atelectasis prevention: IS hourly while awake, PT/OT, OOB > 6 hours per  day. Continue to encourage OOB mobility and up to chair with meals.       Dispo: Pending ID workup

## 2020-03-18 NOTE — ASSESSMENT & PLAN NOTE
Patient is a 70 year old male with medical history significant for multiple back interventions due to injuries sustained in [place], HTN, GERD, DMII who initially presented for worsening lumbar back pain. Currently s/p lumbar laminectomy with fusion and nerve block. Prior to discharge to rehab, patient was found to have positive PPD skin test and positive quantiferon gold test. Risk factors include previous employment as  (work in a [employer] setting), and possibly time [facility]. No known history of immunosuppression, or international travel. He is currently asymptomatic with no cough, SOB, hemoptysis, fevers, chills, unintentional weight loss, or night sweats. Chest x-ray on 3/18 significant for vague irregular opacity right midlung field.    Recommendations:  - Concern for latent TB given positive PPD confirmed with positive quant gold in this asymptomatic patient.  - Acquire AFB smear (sputum) x 3 at least 8 hours apart as well as M. Tuberculosis DNA by PCR  - Given his unclear HIV status, recommend testing for HIV  - Maintain airborne precautions for now, further recommendations pending test results

## 2020-03-18 NOTE — HPI
Patient is a 70 year old male with medical history significant for DMII, GERD, HTN, and multiple back surgeries for injuries sustained during his time in [place] who initially presented to hospital for severe progressive low back pain.Briefly, patient was admitted on 3/03 and underwent laminectomy of lumbar spine with fusion. He tolerated the procedure well but continued to have pain throughout his hospital stay leading to nerve block being done on 3/10. Patient was being prepped to be discharged to rehab but had a positive PPD skin test and a positive quantiferon gold following this. ID was consulted to help manage patient in this regard.    During my exam, patient reports feeling generally well other than back pain that is aggravated by excessive movement. He denies any SOB, cough, hemoptysis, fevers, chills, night sweats, or unintentional weight loss. He denies any history of HIV infection, but he cannot recall if he has been tested for this. He has never been incarcerated, but has worked as a . He reports that he has had [employer] in the past prior to working in assisted setting. Patient currently lives at home with his wife. 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 He reports that he has only traveled between [place], [place], and [place]; denies any international travel within the last few years. No known contacts with TB. His back pain has been present for years, with at least 5 or 6 procedures in the past for continued lower back pain and radiculopathy.

## 2020-03-18 NOTE — PT/OT/SLP PROGRESS
Occupational Therapy      Patient Name:  Garcia Renteria   MRN:  [id number]    Patient not seen today secondary to hold due to TB rule-out. Will follow-up as scheduled and appropriate.    MELIDA Amaro  3/18/2020

## 2020-03-19 LAB
POCT GLUCOSE: 113 MG/DL (ref 70–110)
POCT GLUCOSE: 128 MG/DL (ref 70–110)
POCT GLUCOSE: 95 MG/DL (ref 70–110)

## 2020-03-19 PROCEDURE — 97530 THERAPEUTIC ACTIVITIES: CPT

## 2020-03-19 PROCEDURE — 97110 THERAPEUTIC EXERCISES: CPT | Mod: CQ

## 2020-03-19 PROCEDURE — 25000003 PHARM REV CODE 250: Performed by: PHYSICIAN ASSISTANT

## 2020-03-19 PROCEDURE — 63600175 PHARM REV CODE 636 W HCPCS: Performed by: PHYSICIAN ASSISTANT

## 2020-03-19 PROCEDURE — 87015 SPECIMEN INFECT AGNT CONCNTJ: CPT

## 2020-03-19 PROCEDURE — 94761 N-INVAS EAR/PLS OXIMETRY MLT: CPT

## 2020-03-19 PROCEDURE — 87116 MYCOBACTERIA CULTURE: CPT | Mod: 59

## 2020-03-19 PROCEDURE — 87116 MYCOBACTERIA CULTURE: CPT

## 2020-03-19 PROCEDURE — 25000003 PHARM REV CODE 250: Performed by: STUDENT IN AN ORGANIZED HEALTH CARE EDUCATION/TRAINING PROGRAM

## 2020-03-19 PROCEDURE — 99900035 HC TECH TIME PER 15 MIN (STAT)

## 2020-03-19 PROCEDURE — 87206 SMEAR FLUORESCENT/ACID STAI: CPT | Mod: 91

## 2020-03-19 PROCEDURE — 20600001 HC STEP DOWN PRIVATE ROOM

## 2020-03-19 PROCEDURE — 99024 POSTOP FOLLOW-UP VISIT: CPT | Mod: POP,,, | Performed by: PHYSICIAN ASSISTANT

## 2020-03-19 PROCEDURE — 99024 PR POST-OP FOLLOW-UP VISIT: ICD-10-PCS | Mod: POP,,, | Performed by: PHYSICIAN ASSISTANT

## 2020-03-19 PROCEDURE — 97116 GAIT TRAINING THERAPY: CPT | Mod: CQ

## 2020-03-19 PROCEDURE — 31720 CLEARANCE OF AIRWAYS: CPT

## 2020-03-19 RX ADMIN — PREGABALIN 150 MG: 150 CAPSULE ORAL at 05:03

## 2020-03-19 RX ADMIN — SENNOSIDES AND DOCUSATE SODIUM 1 TABLET: 8.6; 5 TABLET ORAL at 09:03

## 2020-03-19 RX ADMIN — HEPARIN SODIUM 5000 UNITS: 5000 INJECTION, SOLUTION INTRAVENOUS; SUBCUTANEOUS at 09:03

## 2020-03-19 RX ADMIN — METHOCARBAMOL TABLETS 750 MG: 750 TABLET, COATED ORAL at 05:03

## 2020-03-19 RX ADMIN — OXYCODONE HYDROCHLORIDE 15 MG: 10 TABLET ORAL at 09:03

## 2020-03-19 RX ADMIN — OXYCODONE HYDROCHLORIDE 15 MG: 10 TABLET ORAL at 01:03

## 2020-03-19 RX ADMIN — HEPARIN SODIUM 5000 UNITS: 5000 INJECTION, SOLUTION INTRAVENOUS; SUBCUTANEOUS at 05:03

## 2020-03-19 RX ADMIN — PREGABALIN 150 MG: 150 CAPSULE ORAL at 08:03

## 2020-03-19 RX ADMIN — SENNOSIDES AND DOCUSATE SODIUM 1 TABLET: 8.6; 5 TABLET ORAL at 08:03

## 2020-03-19 RX ADMIN — OXYCODONE HYDROCHLORIDE 15 MG: 10 TABLET ORAL at 05:03

## 2020-03-19 RX ADMIN — LISINOPRIL 5 MG: 5 TABLET ORAL at 09:03

## 2020-03-19 RX ADMIN — FAMOTIDINE 40 MG: 20 TABLET, FILM COATED ORAL at 08:03

## 2020-03-19 RX ADMIN — PREGABALIN 150 MG: 150 CAPSULE ORAL at 09:03

## 2020-03-19 RX ADMIN — HEPARIN SODIUM 5000 UNITS: 5000 INJECTION, SOLUTION INTRAVENOUS; SUBCUTANEOUS at 01:03

## 2020-03-19 RX ADMIN — OXYCODONE HYDROCHLORIDE 10 MG: 10 TABLET ORAL at 01:03

## 2020-03-19 RX ADMIN — METHOCARBAMOL TABLETS 750 MG: 750 TABLET, COATED ORAL at 09:03

## 2020-03-19 RX ADMIN — METHOCARBAMOL TABLETS 750 MG: 750 TABLET, COATED ORAL at 08:03

## 2020-03-19 RX ADMIN — LIDOCAINE 2 PATCH: 50 PATCH TOPICAL at 05:03

## 2020-03-19 NOTE — PLAN OF CARE
Continue OT plan of care.    Problem: Occupational Therapy Goal  Goal: Occupational Therapy Goal  Description  Updated Goals to be met by 7 days (3/26/20)  Patient will increase functional independence with ADLs by performing:    UE Dressing with SBA including TLSO.   LE Dressing with Moderate Assistance using AD as needed.  Grooming while seated with SBA.  Toileting from bedside commode with Minimal Assistance for hygiene and clothing management.   Supine to sit with SBA.  Toilet transfer to bedside commode with Minimal Assistance.  Pt will complete functional mobility household distance with SBA using AD as needed.  Pt will verbalize understanding of 3/3 spinal precautions without added cues.      Patient will increase functional independence with ADLs by performing:    UE Dressing with Minimal Assistance including TLSO. MET  LE Dressing with Moderate Assistance using AD as needed.  Grooming while seated with SBA.  Toileting from bedside commode with Moderate Assistance for hygiene and clothing management.   Supine to sit with Minimal Assistance.-MET  Supine to sit with SBA.  Toilet transfer to bedside commode with Minimal Assistance.  Pt will verbalize understanding of 3/3 spinal precautions without added cues.       Goals to be met by: 7 days (3/11/20)     Patient will increase functional independence with ADLs by performing:    UE Dressing with Minimal Assistance including TLSO.  LE Dressing with Moderate Assistance using AD as needed.  Grooming while standing with Contact Guard Assistance.  Toileting from bedside commode with Minimal Assistance for hygiene and clothing management.   Supine to sit with Minimal Assistance. MEETING  Toilet transfer to bedside commode with Minimal Assistance.  Pt will verbalize understanding of 3/3 spinal precautions without added cues.         Outcome: Ongoing, Progressing

## 2020-03-19 NOTE — PLAN OF CARE
POC reviewed with pt. VSS. A&Ox4. PRN pain medication given. No acute complications during shift. Will continue to monitor.

## 2020-03-19 NOTE — ASSESSMENT & PLAN NOTE
A 70 year old with degenerative lumbar spondy and spine deformity now s/p T10 - pelvis posterior segmental instrumented fusion, T10 - L3 laminectomy for decompression, L3 pedical subtraction osteotomy (3/3).    -Patient neurologically stable on exam.   -ID: + PPD, CXR x 2 negative, + Quantiferon gold. CXR 3/18 shows vague irregular opacity right midlung field. 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 Asymptomatic. ID recommending AFB smear x3, M. Tuberculosis DNA by PCR, and HIV testing. Continue airborne precautions until testing results. Re-consult ID if results positive.  -Brace on when upright, OOB, or working with therapy. OK to remove when lying in bed.   -Postop XR lumbar spine shows satisfactory posterior alignment and positioning of posterior hardware.   -Sutures in place. Dr. Caro will contact patient for follow-up. Dc antibiotics for skin/soft tissue ppx. Can sit for up to 30 minutes at meal time (3 times per day).  Head of bed flat otherwise at all times.  Wound closure tension increases in sitting position.  This restriction should be in place for 6 weeks. Leave incision open to air. Can shower but should not tub soak the wounds.  No lotions, creams applied to the incision.    -Pain: Continue multimodal agents per Anesthesia recommendations. No changes in pain after nerve block. Continue Lyrica TID. Scheduled lido patches, tylenol 1000 mg Q8, and robaxin 500 mg TID. Continue oxy prn.  -Thrombocytopenia: Resolved. Ctm with cbc.   -Post op anemia: Stable. Continue iron for replacement. Improving.   -Transaminitis: Improving, f/u next lab draw. q72h labs  -HTN: Continue home dose lisinopril and Prn labetalol.   -DM2: continue POCT glucose, SSI, diabetic diet  -GERD: continue home dose pepcid  -DVT prophylaxis: CLIFF's, SCD's, SQH  -Bowel regimen: senna BID and miralax daily. States he uses enema or suppository normally 1-2x per week at home.  -Atelectasis prevention: IS hourly while awake, PT/OT, OOB  > 6 hours per day. Continue to encourage OOB mobility and up to chair with meals.       Dispo: Pending AFB results

## 2020-03-19 NOTE — PROGRESS NOTES
[facility]  Neurosurgery  Progress Note    Subjective:     History of Present Illness: This is a 70-year-old man with a history of a non instrumented lumbar fusion many years ago who presents with severe progressive axial low back pain, bilateral radicular leg pain, and thoracic myelopathy.  Additionally he had severe postural deformity that was functionally disabling.  He failed multiple conservative measures.  Imaging showed severe central stenosis with spinal cord compression from T10-L2, solid fusion from his previous surgery from L3 to the sacrum, and flat back deformity with severe fixed sagittal plane deformity. Recommended patient undergo spinal cord decompression and deformity reduction surgery, via a T10 to pelvis instrumented fusion with an L3 PSO and a multilevel laminectomy for spinal cord decompression.  Risks benefits alternatives indications and methods were reviewed in detail and he wished to proceed.  All questions were answered.  No guarantees about the results the procedure. Presents to [facility] for T10-pelvis and L3 PSO.    Post-Op Info:  Procedure(s) (LRB):  Block, Nerve (Bilateral)   9 Days Post-Op     Interval History: NAEON. Sputum obtained this morning for AFB smear. Continue isolation precautions. Up with therapy today. Remains motivated to progress. BM yesterday. Voiding spontaneously.     Medications:  Continuous Infusions:  Scheduled Meds:   famotidine  40 mg Oral QHS    ferrous sulfate  325 mg Oral BID    heparin (porcine)  5,000 Units Subcutaneous Q8H    lidocaine  2 patch Transdermal Q24H    lisinopriL  5 mg Oral Daily    methocarbamoL  750 mg Oral TID    pregabalin  150 mg Oral TID    senna-docusate 8.6-50 mg  1 tablet Oral BID     PRN Meds:acetaminophen, aluminum-magnesium hydroxide-simethicone, bisacodyL, Dextrose 10% Bolus, Dextrose 10% Bolus, fentaNYL, glucagon (human recombinant), glucose, glucose, HYDROmorphone, insulin aspart U-100, labetalol,  midazolam, ondansetron, oxyCODONE, oxyCODONE, promethazine (PHENERGAN) IVPB, sodium chloride 0.9%       Objective:     Weight: 98.4 kg (216 lb 14.9 oz)  Body mass index is 28.62 kg/m².  Vital Signs (Most Recent):  Temp: 98.3 °F (36.8 °C) (03/19/20 1655)  Pulse: 94 (03/19/20 1655)  Resp: 17 (03/19/20 1655)  BP: 99/61 (03/19/20 1655)  SpO2: 97 % (03/19/20 1655) Vital Signs (24h Range):  Temp:  [97.8 °F (36.6 °C)-99.2 °F (37.3 °C)] 98.3 °F (36.8 °C)  Pulse:  [77-97] 94  Resp:  [16-18] 17  SpO2:  [95 %-98 %] 97 %  BP: ()/(55-70) 99/61     Date 03/19/20 0700 - 03/20/20 0659   Shift 1444-8909 4588-1103 9540-1644 24 Hour Total   INTAKE   Shift Total(mL/kg)       OUTPUT   Urine(mL/kg/hr) 200(0.3)   200   Shift Total(mL/kg) 200(2)   200(2)   Weight (kg) 98.4 98.4 98.4 98.4                 Neurosurgery Physical Exam    General: well developed, well nourished, no distress.   Head: normocephalic, atraumatic  Neck: No tracheal deviation. No palpable masses.   Neurologic: Alert and oriented. Thought content appropriate.  GCS: Motor: 6/Verbal: 5/Eyes: 4 GCS Total: 15  Mental Status: Awake, Alert, Oriented x 4  Language: No aphasia  Speech: No dysarthria  Cranial nerves: face symmetric, tongue midline, CN II-XII grossly intact.   Eyes: pupils equal, round, reactive to light with accomodation, EOMI.   Ears: No drainage.   Pulmonary: normal respirations, no signs of respiratory distress  Abdomen: soft, non-distended, not tender to palpation  Sensory: intact to light touch throughout  Motor Strength: Moves all extremities spontaneously with good tone.  Full strength upper and lower extremities. No abnormal movements seen.      Strength   Deltoids Triceps Biceps Wrist Extension Wrist Flexion Hand    Upper: R 5/5 5/5 5/5 5/5 5/5 5/5     L 5/5 5/5 5/5 5/5 5/5 5/5       Iliopsoas Quadriceps Knee  Flexion Tibialis  anterior Gastro- cnemius EHL   Lower: R 5/5 5/5 5/5 5/5 5/5 5/5     L 5/5 5/5 5/5 5/5 5/5 5/5      Skin: Skin is  warm, dry and intact.     Incision c/d/I with skin edges well approximated with sutures. No surrounding erythema or edema. No drainage from incision.         Significant Labs:  Recent Labs   Lab 03/18/20  0302   *      K 4.2      CO2 22*   BUN 11   CREATININE 1.0   CALCIUM 9.3     Recent Labs   Lab 03/18/20  0302   WBC 6.77   HGB 9.2*   HCT 30.1*        No results for input(s): LABPT, INR, APTT in the last 48 hours.  Microbiology Results (last 7 days)     Procedure Component Value Units Date/Time    AFB Culture & Smear [[id number]] Collected:  03/19/20 1805    Order Status:  Sent Specimen:  Sputum, Induced Updated:  03/19/20 1805    AFB Culture & Smear [[id number]] Collected:  03/19/20 0930    Order Status:  Sent Specimen:  Sputum, Induced Updated:  03/19/20 1152    AFB Culture & Smear [[id number]]     Order Status:  No result Specimen:  Sputum         Recent Lab Results       03/19/20  1113   03/19/20  0742   03/18/20  2132        POCT Glucose 95 128 129         All pertinent labs from the last 24 hours have been reviewed.    Significant Diagnostics:  I have reviewed all pertinent imaging results/findings within the past 24 hours.    Assessment/Plan:     Degenerative scoliosis  A 70 year old with degenerative lumbar spondy and spine deformity now s/p T10 - pelvis posterior segmental instrumented fusion, T10 - L3 laminectomy for decompression, L3 pedical subtraction osteotomy (3/3).    -Patient neurologically stable on exam.   -ID: + PPD, CXR x 2 negative, + Quantiferon gold. CXR 3/18 shows vague irregular opacity right midlung field. Hx working in a long term, not immunocompromised. Asymptomatic. ID recommending AFB smear x3, M. Tuberculosis DNA by PCR, and HIV testing. Continue airborne precautions until testing results. Re-consult ID if results positive.  -Brace on when upright, OOB, or working with therapy. OK to remove when lying in bed.   -Postop XR lumbar spine shows satisfactory posterior  alignment and positioning of posterior hardware.   -Sutures in place. Dr. Caro will contact patient for follow-up. Dc antibiotics for skin/soft tissue ppx. Can sit for up to 30 minutes at meal time (3 times per day).  Head of bed flat otherwise at all times.  Wound closure tension increases in sitting position.  This restriction should be in place for 6 weeks. Leave incision open to air. Can shower but should not tub soak the wounds.  No lotions, creams applied to the incision.    -Pain: Continue multimodal agents per Anesthesia recommendations. No changes in pain after nerve block. Continue Lyrica TID. Scheduled lido patches, tylenol 1000 mg Q8, and robaxin 500 mg TID. Continue oxy prn.  -Thrombocytopenia: Resolved. Ctm with cbc.   -Post op anemia: Stable. Continue iron for replacement. Improving.   -Transaminitis: Improving, f/u next lab draw. q72h labs  -HTN: Continue home dose lisinopril and Prn labetalol.   -DM2: continue POCT glucose, SSI, diabetic diet  -GERD: continue home dose pepcid  -DVT prophylaxis: CLIFF's, SCD's, SQH  -Bowel regimen: senna BID and miralax daily. States he uses enema or suppository normally 1-2x per week at home.  -Atelectasis prevention: IS hourly while awake, PT/OT, OOB > 6 hours per day. Continue to encourage OOB mobility and up to chair with meals.       Dispo: Pending AFB results        Alva Cuevas PA-C  Neurosurgery  [facility]

## 2020-03-19 NOTE — PT/OT/SLP PROGRESS
"Physical Therapy Treatment    Patient Name:  Garcia Renteria   MRN:  [id number]    Recommendations:     Discharge Recommendations:  rehabilitation facility   Discharge Equipment Recommendations: (TBD by next level of care)   Barriers to discharge: Inaccessible home and Decreased caregiver support    Assessment:     Garcia Renteria is a 70 y.o. male admitted with a medical diagnosis of Acquired spondylolisthesis of thoracolumbar vertebra.  He presents with the following impairments/functional limitations:  weakness, impaired endurance, impaired self care skills, decreased lower extremity function, pain, gait instability, impaired balance, impaired functional mobilty. Patient will benefit from IPR stay to address deficits. Increased risk for falls. Weakness to hip extensors as evidenced by difficulty standing up from eob/chair    Rehab Prognosis: Good; patient would benefit from acute skilled PT services to address these deficits and reach maximum level of function.    Recent Surgery: Procedure(s) (LRB):  Block, Nerve (Bilateral) 9 Days Post-Op    Plan:     During this hospitalization, patient to be seen 4 x/week to address the identified rehab impairments via gait training, therapeutic activities, therapeutic exercises, neuromuscular re-education and progress toward the following goals:    · Plan of Care Expires:  04/03/20    Subjective     Chief Complaint: pain  Patient/Family Comments/goals: " I just gotta get stronger so I can get home".   Pain/Comfort:  · Pain Rating 1: 7/10  · Location - Side 1: Right  · Location - Orientation 1: lower  · Location 1: leg  · Pain Addressed 1: Pre-medicate for activity  · Location - Side 2: Right  · Pain Addressed 2: Pre-medicate for activity, Reposition, Cessation of Activity, Nurse notified      Objective:     Communicated with nurse prior to session.  Patient found supine with TLSO upon PT entry to room.     General Precautions: Standard, fall   Orthopedic Precautions:spinal " precautions   Braces: TLSO     Functional Mobility:  · Bed Mobility:     · Rolling Right: stand by assistance, sidely>sit EOB with SBA with use of bedrail  · Transfers:     · Sit to Stand:  moderate assistance and maximal assistance with rolling walker  · Gait: 65 ft in room rw with min a/cga.       AM-PAC 6 CLICK MOBILITY  Turning over in bed (including adjusting bedclothes, sheets and blankets)?: 3  Sitting down on and standing up from a chair with arms (e.g., wheelchair, bedside commode, etc.): 3  Moving from lying on back to sitting on the side of the bed?: 3  Moving to and from a bed to a chair (including a wheelchair)?: 3  Need to walk in hospital room?: 3  Climbing 3-5 steps with a railing?: 1  Basic Mobility Total Score: 16       Therapeutic Activities and Exercises:   TLSO donned with min a. instr patient with supine qs,gs,ap 10 reps each and instr to perform as often as possible. Difficulty standing from sofa, was able to squat pivot sofa>chair>EOB with CGA/MIn a    Patient left supine with all lines intact and call button in reach..    GOALS:   Multidisciplinary Problems     Physical Therapy Goals        Problem: Physical Therapy Goal    Goal Priority Disciplines Outcome Goal Variances Interventions   Physical Therapy Goal     PT, PT/OT Ongoing, Progressing     Description:  Goals to be met by: 3/14/2020, goals extended to 3/22/2020    Patient will increase functional independence with mobility by performin. Supine to sit with Moderate Assistance - goal met 3/11/2020   Updated: CGA  2. Sit to supine with Moderate Assistance - goal met 3/12/2020   Updated: CGA  3. Rolling to Left and Right with Moderate Assistance.  4. Sit to stand transfer with Maximum Assistance - goal met 3/12/2020   Updated: Mitch  5. Bed to chair transfer with Maximum Assistance  6. Gait  x 15 feet with Maximum Assistance using RW. - goal met 3/16/2020   Updated: 100 ft; SBA  7. Lower extremity exercise program x15 reps per  handout, with assistance as needed                         Time Tracking:     PT Received On: 03/19/20  PT Start Time: 1245     PT Stop Time: 1319  PT Total Time (min): 34 min         Treatment Type: Treatment  PT/PTA: PTA     PTA Visit Number: 1     Yuliana Contreras, RAMON  03/19/2020

## 2020-03-19 NOTE — SUBJECTIVE & OBJECTIVE
Interval History: NAEON. Sputum obtained this morning for AFB smear. Continue isolation precautions. Up with therapy today. Remains motivated to progress. BM yesterday. Voiding spontaneously.     Medications:  Continuous Infusions:  Scheduled Meds:   famotidine  40 mg Oral QHS    ferrous sulfate  325 mg Oral BID    heparin (porcine)  5,000 Units Subcutaneous Q8H    lidocaine  2 patch Transdermal Q24H    lisinopriL  5 mg Oral Daily    methocarbamoL  750 mg Oral TID    pregabalin  150 mg Oral TID    senna-docusate 8.6-50 mg  1 tablet Oral BID     PRN Meds:acetaminophen, aluminum-magnesium hydroxide-simethicone, bisacodyL, Dextrose 10% Bolus, Dextrose 10% Bolus, fentaNYL, glucagon (human recombinant), glucose, glucose, HYDROmorphone, insulin aspart U-100, labetalol, midazolam, ondansetron, oxyCODONE, oxyCODONE, promethazine (PHENERGAN) IVPB, sodium chloride 0.9%       Objective:     Weight: 98.4 kg (216 lb 14.9 oz)  Body mass index is 28.62 kg/m².  Vital Signs (Most Recent):  Temp: 98.3 °F (36.8 °C) (03/19/20 1655)  Pulse: 94 (03/19/20 1655)  Resp: 17 (03/19/20 1655)  BP: 99/61 (03/19/20 1655)  SpO2: 97 % (03/19/20 1655) Vital Signs (24h Range):  Temp:  [97.8 °F (36.6 °C)-99.2 °F (37.3 °C)] 98.3 °F (36.8 °C)  Pulse:  [77-97] 94  Resp:  [16-18] 17  SpO2:  [95 %-98 %] 97 %  BP: ()/(55-70) 99/61     Date 03/19/20 0700 - 03/20/20 0659   Shift 6868-2853 1025-9473 4312-5442 24 Hour Total   INTAKE   Shift Total(mL/kg)       OUTPUT   Urine(mL/kg/hr) 200(0.3)   200   Shift Total(mL/kg) 200(2)   200(2)   Weight (kg) 98.4 98.4 98.4 98.4                 Neurosurgery Physical Exam    General: well developed, well nourished, no distress.   Head: normocephalic, atraumatic  Neck: No tracheal deviation. No palpable masses.   Neurologic: Alert and oriented. Thought content appropriate.  GCS: Motor: 6/Verbal: 5/Eyes: 4 GCS Total: 15  Mental Status: Awake, Alert, Oriented x 4  Language: No aphasia  Speech: No  dysarthria  Cranial nerves: face symmetric, tongue midline, CN II-XII grossly intact.   Eyes: pupils equal, round, reactive to light with accomodation, EOMI.   Ears: No drainage.   Pulmonary: normal respirations, no signs of respiratory distress  Abdomen: soft, non-distended, not tender to palpation  Sensory: intact to light touch throughout  Motor Strength: Moves all extremities spontaneously with good tone.  Full strength upper and lower extremities. No abnormal movements seen.      Strength   Deltoids Triceps Biceps Wrist Extension Wrist Flexion Hand    Upper: R 5/5 5/5 5/5 5/5 5/5 5/5     L 5/5 5/5 5/5 5/5 5/5 5/5       Iliopsoas Quadriceps Knee  Flexion Tibialis  anterior Gastro- cnemius EHL   Lower: R 5/5 5/5 5/5 5/5 5/5 5/5     L 5/5 5/5 5/5 5/5 5/5 5/5      Skin: Skin is warm, dry and intact.     Incision c/d/I with skin edges well approximated with sutures. No surrounding erythema or edema. No drainage from incision.         Significant Labs:  Recent Labs   Lab 03/18/20  0302   *      K 4.2      CO2 22*   BUN 11   CREATININE 1.0   CALCIUM 9.3     Recent Labs   Lab 03/18/20  0302   WBC 6.77   HGB 9.2*   HCT 30.1*        No results for input(s): LABPT, INR, APTT in the last 48 hours.  Microbiology Results (last 7 days)     Procedure Component Value Units Date/Time    AFB Culture & Smear [[id number]] Collected:  03/19/20 1805    Order Status:  Sent Specimen:  Sputum, Induced Updated:  03/19/20 1805    AFB Culture & Smear [[id number]] Collected:  03/19/20 0930    Order Status:  Sent Specimen:  Sputum, Induced Updated:  03/19/20 1152    AFB Culture & Smear [[id number]]     Order Status:  No result Specimen:  Sputum         Recent Lab Results       03/19/20  1113   03/19/20  0742   03/18/20  2132        POCT Glucose 95 128 129         All pertinent labs from the last 24 hours have been reviewed.    Significant Diagnostics:  I have reviewed all pertinent imaging results/findings within  the past 24 hours.

## 2020-03-19 NOTE — PT/OT/SLP PROGRESS
Occupational Therapy   Treatment    Name: Garcia Renteria  MRN: [id number]  Admitting Diagnosis:  Acquired spondylolisthesis of thoracolumbar vertebra  9 Days Post-Op  T10-Pelvis Fusion with L4 PSO **AIRO**   CREATION, FLAP, MUSCLE ROTATION     Recommendations:     Discharge Recommendations: rehabilitation facility  Discharge Equipment Recommendations:  (TBD next level of care)  Barriers to discharge:  (increased assistance needed)    Assessment:     Garcia Renteria is a 70 y.o. male with a medical diagnosis of Acquired spondylolisthesis of thoracolumbar vertebra.  He presents with performance deficits including weakness, impaired endurance, impaired self care skills, impaired functional mobilty, gait instability, impaired balance, decreased lower extremity function, pain, decreased safety awareness. Pt progressing toward goals and would continue to benefit from OT to increase functional independence and safety. Recommend rehab upon D/C as pt is motivated to return to PLOF.     Rehab Prognosis:  Good; patient would benefit from acute skilled OT services to address these deficits and reach maximum level of function.       Plan:     Patient to be seen 4 x/week to address the above listed problems via self-care/home management, therapeutic activities, therapeutic exercises, neuromuscular re-education  · Plan of Care Expires: 04/04/20  · Plan of Care Reviewed with: patient    Subjective     Pain/Comfort:  · Pain Rating 1: (Did not rate)  · Location - Side 1: Right  · Location - Orientation 1: upper  · Location 1: leg  · Pain Addressed 1: Reposition, Pre-medicate for activity    Objective:     Communicated with: RN prior to session. Patient found right sidelying with telemetry upon OT entry to room.    General Precautions: Standard, fall, contact, airborne   Orthopedic Precautions:spinal precautions   Braces: TLSO     Occupational Performance:     Bed Mobility:    · Patient completed Rolling/Turning to Left with  stand  by assistance and with side rail  · Patient completed Supine to Sit with contact guard assistance     Functional Mobility/Transfers:  · Patient completed Sit <> Stand Transfer with Max A then Mod A from EOB with use of RW and cues for hand placement  · Functional Mobility: Within room household distance with CGA-Min A using RW, standing rest breaks as needed    Activities of Daily Living:  · Feeding:  set-up assistance while seated    · Upper Body Dressing: minimum assistance to don TLSO while seated      AMPAC 6 Click ADL: 18    Treatment & Education:  Reviewed bed mobility technique and spinal precautions as well as TLSO management; completed functional mobility within room with use of RW; discussed POC and D/C recs as well as seated therex to complete throughout the day; verbalized understanding to call for assistance before getting up    Patient left up in chair with all lines intact, call button in reach and RN notifiedEducation:      GOALS:   Multidisciplinary Problems     Occupational Therapy Goals        Problem: Occupational Therapy Goal    Goal Priority Disciplines Outcome Interventions   Occupational Therapy Goal     OT, PT/OT Ongoing, Progressing    Description:  Updated Goals to be met by 7 days (3/26/20)  Patient will increase functional independence with ADLs by performing:    UE Dressing with SBA including TLSO.   LE Dressing with Moderate Assistance using AD as needed.  Grooming while seated with SBA.  Toileting from bedside commode with Minimal Assistance for hygiene and clothing management.   Supine to sit with SBA.  Toilet transfer to bedside commode with Minimal Assistance.  Pt will complete functional mobility household distance with SBA using AD as needed.  Pt will verbalize understanding of 3/3 spinal precautions without added cues.      Patient will increase functional independence with ADLs by performing:    UE Dressing with Minimal Assistance including TLSO. MET  LE Dressing with Moderate  Assistance using AD as needed.  Grooming while seated with SBA.  Toileting from bedside commode with Moderate Assistance for hygiene and clothing management.   Supine to sit with Minimal Assistance.-MET  Supine to sit with SBA.  Toilet transfer to bedside commode with Minimal Assistance.  Pt will verbalize understanding of 3/3 spinal precautions without added cues.       Goals to be met by: 7 days (3/11/20)     Patient will increase functional independence with ADLs by performing:    UE Dressing with Minimal Assistance including TLSO.  LE Dressing with Moderate Assistance using AD as needed.  Grooming while standing with Contact Guard Assistance.  Toileting from bedside commode with Minimal Assistance for hygiene and clothing management.   Supine to sit with Minimal Assistance. MEETING  Toilet transfer to bedside commode with Minimal Assistance.  Pt will verbalize understanding of 3/3 spinal precautions without added cues.                          Time Tracking:     OT Date of Treatment: 03/19/20  OT Start Time: 1447  OT Stop Time: 1511  OT Total Time (min): 24 min    Billable Minutes:Therapeutic Activity 24 minutes    MELIDA Amaro  3/19/2020     Patent

## 2020-03-19 NOTE — PROGRESS NOTES
[facility]  Neurosurgery  Progress Note    Subjective:     History of Present Illness: This is a 70-year-old man with a history of a non instrumented lumbar fusion many years ago who presents with severe progressive axial low back pain, bilateral radicular leg pain, and thoracic myelopathy.  Additionally he had severe postural deformity that was functionally disabling.  He failed multiple conservative measures.  Imaging showed severe central stenosis with spinal cord compression from T10-L2, solid fusion from his previous surgery from L3 to the sacrum, and flat back deformity with severe fixed sagittal plane deformity. Recommended patient undergo spinal cord decompression and deformity reduction surgery, via a T10 to pelvis instrumented fusion with an L3 PSO and a multilevel laminectomy for spinal cord decompression.  Risks benefits alternatives indications and methods were reviewed in detail and he wished to proceed.  All questions were answered.  No guarantees about the results the procedure. Presents to [facility] for T10-pelvis and L3 PSO.    Post-Op Info:  Procedure(s) (LRB):  Block, Nerve (Bilateral)   8 Days Post-Op     Interval History: + Quantiferon gold test. ID consulted, will f/u recs. Will be moved to isolation room. Patient denies weakness, paresthesias, b/b dysfunction.     Medications:  Continuous Infusions:  Scheduled Meds:   famotidine  40 mg Oral QHS    ferrous sulfate  325 mg Oral BID    heparin (porcine)  5,000 Units Subcutaneous Q8H    lidocaine  2 patch Transdermal Q24H    lisinopriL  5 mg Oral Daily    methocarbamoL  750 mg Oral TID    pregabalin  150 mg Oral TID    senna-docusate 8.6-50 mg  1 tablet Oral BID     PRN Meds:acetaminophen, aluminum-magnesium hydroxide-simethicone, bisacodyL, Dextrose 10% Bolus, Dextrose 10% Bolus, fentaNYL, glucagon (human recombinant), glucose, glucose, HYDROmorphone, insulin aspart U-100, labetalol, midazolam, ondansetron, oxyCODONE,  oxyCODONE, promethazine (PHENERGAN) IVPB, sodium chloride 0.9%       Objective:     Weight: 98.4 kg (216 lb 14.9 oz)  Body mass index is 28.62 kg/m².  Vital Signs (Most Recent):  Temp: 98.5 °F (36.9 °C) (03/18/20 1531)  Pulse: (!) 50 (03/18/20 1533)  Resp: 16 (03/18/20 1100)  BP: 118/76 (03/18/20 1531)  SpO2: 100 % (03/18/20 1531) Vital Signs (24h Range):  Temp:  [98.5 °F (36.9 °C)-99.5 °F (37.5 °C)] 98.5 °F (36.9 °C)  Pulse:  [50-93] 50  Resp:  [12-16] 16  SpO2:  [92 %-100 %] 100 %  BP: ()/(50-76) 118/76     Date 03/18/20 0700 - 03/19/20 0659   Shift 2885-5670 9633-9039 2836-4113 24 Hour Total   INTAKE   Shift Total(mL/kg)       OUTPUT   Urine(mL/kg/hr) 1200(1.5)   1200   Shift Total(mL/kg) 1200(12.2)   1200(12.2)   Weight (kg) 98.4 98.4 98.4 98.4                 Neurosurgery Physical Exam    General: well developed, well nourished, no distress.   Head: normocephalic, atraumatic  Neck: No tracheal deviation. No palpable masses.   Neurologic: Alert and oriented. Thought content appropriate.  GCS: Motor: 6/Verbal: 5/Eyes: 4 GCS Total: 15  Mental Status: Awake, Alert, Oriented x 4  Language: No aphasia  Speech: No dysarthria  Cranial nerves: face symmetric, tongue midline, CN II-XII grossly intact.   Eyes: pupils equal, round, reactive to light with accomodation, EOMI.   Ears: No drainage.   Pulmonary: normal respirations, no signs of respiratory distress  Abdomen: soft, non-distended, not tender to palpation  Sensory: intact to light touch throughout  Motor Strength: Moves all extremities spontaneously with good tone.  Full strength upper and lower extremities. No abnormal movements seen.      Strength   Deltoids Triceps Biceps Wrist Extension Wrist Flexion Hand    Upper: R 5/5 5/5 5/5 5/5 5/5 5/5     L 5/5 5/5 5/5 5/5 5/5 5/5       Iliopsoas Quadriceps Knee  Flexion Tibialis  anterior Gastro- cnemius EHL   Lower: R 5/5 5/5 5/5 5/5 5/5 5/5     L 5/5 5/5 5/5 5/5 5/5 5/5      Skin: Skin is warm, dry and  intact.     Incision c/d/I with skin edges well approximated with sutures. No surrounding erythema or edema. No drainage from incision.      Significant Labs:  Recent Labs   Lab 03/18/20  0302   *      K 4.2      CO2 22*   BUN 11   CREATININE 1.0   CALCIUM 9.3     Recent Labs   Lab 03/18/20 0302   WBC 6.77   HGB 9.2*   HCT 30.1*        No results for input(s): LABPT, INR, APTT in the last 48 hours.  Microbiology Results (last 7 days)     Procedure Component Value Units Date/Time    AFB Culture & Smear [[id number]]     Order Status:  No result Specimen:  Sputum         Recent Lab Results       03/18/20  1529   03/18/20  1156   03/18/20  0302 03/17/20 2025        Albumin     2.6       Alkaline Phosphatase     112       ALT     31       Anion Gap     10       AST     39       Baso #     0.07       Basophil%     1.0       BILIRUBIN TOTAL     0.3  Comment:  For infants and newborns, interpretation of results should be based  on gestational age, weight and in agreement with clinical  observations.  Premature Infant recommended reference ranges:  Up to 24 hours.............<8.0 mg/dL  Up to 48 hours............<12.0 mg/dL  3-5 days..................<15.0 mg/dL  6-29 days.................<15.0 mg/dL         BUN, Bld     11       Calcium     9.3       Chloride     104       CO2     22       Creatinine     1.0       Differential Method     Automated       eGFR if      >60.0       eGFR if non      >60.0  Comment:  Calculation used to obtain the estimated glomerular filtration  rate (eGFR) is the CKD-EPI equation.          Eos #     0.1       Eosinophil%     2.1       Glucose     114       Gran # (ANC)     3.9       Gran%     56.9       Hematocrit     30.1       Hemoglobin     9.2       Immature Grans (Abs)     0.22  Comment:  Mild elevation in immature granulocytes is non specific and   can be seen in a variety of conditions including stress response,   acute  inflammation, trauma and pregnancy. Correlation with other   laboratory and clinical findings is essential.         Immature Granulocytes     3.2       Lymph #     1.9       Lymph%     28.2       MCH     27.6       MCHC     30.6       MCV     90       Mono #     0.6       Mono%     8.6       MPV     10.6       nRBC     0       Platelet Estimate     Appears normal       Platelets     344       POCT Glucose 117 134   110     Potassium     4.2       PROTEIN TOTAL     6.4       RBC     3.33       RDW     13.6       Sodium     136       WBC     6.77           All pertinent labs from the last 24 hours have been reviewed.    Significant Diagnostics:  I have reviewed all pertinent imaging results/findings within the past 24 hours.    Assessment/Plan:     Degenerative scoliosis  A 70 year old with degenerative lumbar spondy and spine deformity now s/p T10 - pelvis posterior segmental instrumented fusion, T10 - L3 laminectomy for decompression, L3 pedical subtraction osteotomy (3/3).    -Patient neurologically stable on exam.   -ID: + PPD, CXR x 2 negative, + Quantiferon gold. CXR 3/18 shows vague irregular opacity right midlung field. 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 Asymptomatic. ID recommending AFB smear x3, M. Tuberculosis DNA by PCR, and HIV testing. Continue airborne precautions until further testing.   -Brace on when upright, OOB, or working with therapy. OK to remove when lying in bed.   -Postop XR lumbar spine shows satisfactory posterior alignment and positioning of posterior hardware.   -YRN removed today per plastics. Dr. Caro will contact patient for follow-up. Dc antibiotics for skin/soft tissue ppx. Can sit for up to 30 minutes at meal time (3 times per day).  Head of bed flat otherwise at all times.  Wound closure tension increases in sitting position.  This restriction should be in place for 6 weeks. Leave incision open to air. Can shower but should not tub soak the wounds.  No lotions, creams  applied to the incision.    -Pain: Improved with multimodal agents per Anesthesia recommendations. No changes in pain after nerve block. Continue Lyrica TID. Scheduled lido patches, tylenol 1000 mg Q8, and robaxin 500 mg TID. Continue oxy prn.  -Thrombocytopenia: Resolved. Ctm with cbc.   -Post op anemia: Stable. Continue iron for replacement. Improving.   -Transaminitis: Improving, f/u next lab draw. q72h labs  -HTN: Continue home dose lisinopril and Prn labetalol.   -DM2: continue POCT glucose, SSI, diabetic diet  -GERD: continue home dose pepcid  -DVT prophylaxis: CLIFF's, SCD's, SQH  -Bowel regimen: senna BID and miralax daily. States he uses enema or suppository normally 1-2x per week at home.  -Atelectasis prevention: IS hourly while awake, PT/OT, OOB > 6 hours per day. Continue to encourage OOB mobility and up to chair with meals.       Dispo: Pending ID workup    Called patient's wife to update on room transfer and care update regarding TB workup. All questions answered. Will continue to update daily.     Alva Cuevas PA-C  Neurosurgery  [facility]

## 2020-03-19 NOTE — PLAN OF CARE
Plan of care reviewed. VSS, AFB x 2 collected via induced sputum by RT, last AFB to be collected at 0130. Medicated with prn oxy for back and hip pain. Pt sat up in chair with back brace on for a few hours, tolerated well, max assist back to bed. Call bell in reach, NAD, continue to monitor.

## 2020-03-19 NOTE — PLAN OF CARE
Problem: Physical Therapy Goal  Goal: Physical Therapy Goal  Description  Goals to be met by: 3/14/2020, goals extended to 3/22/2020    Patient will increase functional independence with mobility by performin. Supine to sit with Moderate Assistance - goal met 3/11/2020   Updated: CGA  2. Sit to supine with Moderate Assistance - goal met 3/12/2020   Updated: CGA  3. Rolling to Left and Right with Moderate Assistance.  4. Sit to stand transfer with Maximum Assistance - goal met 3/12/2020   Updated: Mitch  5. Bed to chair transfer with Maximum Assistance  6. Gait  x 15 feet with Maximum Assistance using RW. - goal met 3/16/2020   Updated: 100 ft; SBA  7. Lower extremity exercise program x15 reps per handout, with assistance as needed        Outcome: Ongoing, Progressing

## 2020-03-20 LAB
HIV UQ DATE RECEIVED: NORMAL
HIV UQ DATE REPORTED: NORMAL
HIV1 RNA # SERPL NAA+PROBE: <40 COPIES/ML
HIV1 RNA SERPL NAA+PROBE-LOG#: <1.6 LOG (10) COPIES/ML
HIV1 RNA SERPL QL NAA+PROBE: NOT DETECTED
POCT GLUCOSE: 105 MG/DL (ref 70–110)
POCT GLUCOSE: 106 MG/DL (ref 70–110)
POCT GLUCOSE: 114 MG/DL (ref 70–110)
POCT GLUCOSE: 124 MG/DL (ref 70–110)

## 2020-03-20 PROCEDURE — 20600001 HC STEP DOWN PRIVATE ROOM

## 2020-03-20 PROCEDURE — 25000003 PHARM REV CODE 250: Performed by: PHYSICIAN ASSISTANT

## 2020-03-20 PROCEDURE — 87206 SMEAR FLUORESCENT/ACID STAI: CPT

## 2020-03-20 PROCEDURE — 87015 SPECIMEN INFECT AGNT CONCNTJ: CPT

## 2020-03-20 PROCEDURE — 97110 THERAPEUTIC EXERCISES: CPT

## 2020-03-20 PROCEDURE — 99024 POSTOP FOLLOW-UP VISIT: CPT | Mod: POP,,, | Performed by: PHYSICIAN ASSISTANT

## 2020-03-20 PROCEDURE — 97530 THERAPEUTIC ACTIVITIES: CPT

## 2020-03-20 PROCEDURE — 97116 GAIT TRAINING THERAPY: CPT

## 2020-03-20 PROCEDURE — 25000003 PHARM REV CODE 250: Performed by: STUDENT IN AN ORGANIZED HEALTH CARE EDUCATION/TRAINING PROGRAM

## 2020-03-20 PROCEDURE — 87556 M.TUBERCULO DNA AMP PROBE: CPT

## 2020-03-20 PROCEDURE — 63600175 PHARM REV CODE 636 W HCPCS: Performed by: PHYSICIAN ASSISTANT

## 2020-03-20 PROCEDURE — 99024 PR POST-OP FOLLOW-UP VISIT: ICD-10-PCS | Mod: POP,,, | Performed by: PHYSICIAN ASSISTANT

## 2020-03-20 PROCEDURE — 87116 MYCOBACTERIA CULTURE: CPT

## 2020-03-20 RX ORDER — POLYETHYLENE GLYCOL 3350 17 G/17G
17 POWDER, FOR SOLUTION ORAL DAILY
Status: DISCONTINUED | OUTPATIENT
Start: 2020-03-20 | End: 2020-03-24 | Stop reason: HOSPADM

## 2020-03-20 RX ADMIN — PREGABALIN 150 MG: 150 CAPSULE ORAL at 09:03

## 2020-03-20 RX ADMIN — FAMOTIDINE 40 MG: 20 TABLET, FILM COATED ORAL at 08:03

## 2020-03-20 RX ADMIN — FERROUS SULFATE TAB EC 325 MG (65 MG FE EQUIVALENT) 325 MG: 325 (65 FE) TABLET DELAYED RESPONSE at 08:03

## 2020-03-20 RX ADMIN — HEPARIN SODIUM 5000 UNITS: 5000 INJECTION, SOLUTION INTRAVENOUS; SUBCUTANEOUS at 09:03

## 2020-03-20 RX ADMIN — HEPARIN SODIUM 5000 UNITS: 5000 INJECTION, SOLUTION INTRAVENOUS; SUBCUTANEOUS at 06:03

## 2020-03-20 RX ADMIN — OXYCODONE HYDROCHLORIDE 5 MG: 10 TABLET ORAL at 08:03

## 2020-03-20 RX ADMIN — SENNOSIDES AND DOCUSATE SODIUM 1 TABLET: 8.6; 5 TABLET ORAL at 09:03

## 2020-03-20 RX ADMIN — METHOCARBAMOL TABLETS 750 MG: 750 TABLET, COATED ORAL at 09:03

## 2020-03-20 RX ADMIN — POLYETHYLENE GLYCOL 3350 17 G: 17 POWDER, FOR SOLUTION ORAL at 10:03

## 2020-03-20 RX ADMIN — OXYCODONE HYDROCHLORIDE 15 MG: 10 TABLET ORAL at 10:03

## 2020-03-20 RX ADMIN — METHOCARBAMOL TABLETS 750 MG: 750 TABLET, COATED ORAL at 08:03

## 2020-03-20 RX ADMIN — LIDOCAINE 2 PATCH: 50 PATCH TOPICAL at 02:03

## 2020-03-20 RX ADMIN — HEPARIN SODIUM 5000 UNITS: 5000 INJECTION, SOLUTION INTRAVENOUS; SUBCUTANEOUS at 02:03

## 2020-03-20 RX ADMIN — OXYCODONE HYDROCHLORIDE 15 MG: 10 TABLET ORAL at 03:03

## 2020-03-20 RX ADMIN — OXYCODONE HYDROCHLORIDE 15 MG: 10 TABLET ORAL at 01:03

## 2020-03-20 RX ADMIN — OXYCODONE HYDROCHLORIDE 10 MG: 10 TABLET ORAL at 08:03

## 2020-03-20 RX ADMIN — PREGABALIN 150 MG: 150 CAPSULE ORAL at 02:03

## 2020-03-20 RX ADMIN — PREGABALIN 150 MG: 150 CAPSULE ORAL at 08:03

## 2020-03-20 RX ADMIN — METHOCARBAMOL TABLETS 750 MG: 750 TABLET, COATED ORAL at 02:03

## 2020-03-20 RX ADMIN — OXYCODONE HYDROCHLORIDE 15 MG: 10 TABLET ORAL at 06:03

## 2020-03-20 NOTE — SUBJECTIVE & OBJECTIVE
Interval History: NAEON. AFB smear results pending this afternoon. Endorses right groin pain this morning, controlled with current regimen. Up to chair twice yesterday and ambulated around room, motivated to continue ambulating and progress. Denies weakness, paresthesias, b/b dysfunction.    Medications:  Continuous Infusions:  Scheduled Meds:   famotidine  40 mg Oral QHS    ferrous sulfate  325 mg Oral BID    heparin (porcine)  5,000 Units Subcutaneous Q8H    lidocaine  2 patch Transdermal Q24H    lisinopriL  5 mg Oral Daily    methocarbamoL  750 mg Oral TID    pregabalin  150 mg Oral TID    senna-docusate 8.6-50 mg  1 tablet Oral BID     PRN Meds:acetaminophen, aluminum-magnesium hydroxide-simethicone, bisacodyL, Dextrose 10% Bolus, Dextrose 10% Bolus, fentaNYL, glucagon (human recombinant), glucose, glucose, HYDROmorphone, insulin aspart U-100, labetalol, midazolam, ondansetron, oxyCODONE, oxyCODONE, promethazine (PHENERGAN) IVPB, sodium chloride 0.9%       Objective:     Weight: 98.4 kg (216 lb 14.9 oz)  Body mass index is 28.62 kg/m².  Vital Signs (Most Recent):  Temp: 96.3 °F (35.7 °C) (03/20/20 0736)  Pulse: 84 (03/20/20 0736)  Resp: 17 (03/20/20 0736)  BP: (!) 100/55 (03/20/20 0736)  SpO2: 97 % (03/20/20 0736) Vital Signs (24h Range):  Temp:  [96.3 °F (35.7 °C)-98.9 °F (37.2 °C)] 96.3 °F (35.7 °C)  Pulse:  [80-97] 84  Resp:  [17-18] 17  SpO2:  [96 %-98 %] 97 %  BP: ()/(55-64) 100/55     Date 03/20/20 0700 - 03/21/20 0659   Shift 6572-9869 2388-6017 4572-2777 24 Hour Total   INTAKE   P.O. 360   360   Shift Total(mL/kg) 360(3.7)   360(3.7)   OUTPUT   Shift Total(mL/kg)       Weight (kg) 98.4 98.4 98.4 98.4                 Neurosurgery Physical Exam    General: well developed, well nourished, no distress.   Head: normocephalic, atraumatic  Neck: No tracheal deviation. No palpable masses.   Neurologic: Alert and oriented. Thought content appropriate.  GCS: Motor: 6/Verbal: 5/Eyes: 4 GCS Total:  15  Mental Status: Awake, Alert, Oriented x 4  Language: No aphasia  Speech: No dysarthria  Cranial nerves: face symmetric, tongue midline, CN II-XII grossly intact.   Eyes: pupils equal, round, reactive to light with accomodation, EOMI.   Ears: No drainage.   Pulmonary: normal respirations, no signs of respiratory distress  Abdomen: soft, non-distended, not tender to palpation  Sensory: intact to light touch throughout  Motor Strength: Moves all extremities spontaneously with good tone.  Pain limited weakness of right hip/knee flexion 2/2 groin pain. Otherwise full strength upper and lower extremities. No abnormal movements seen.     Strength  Deltoids Triceps Biceps Wrist Extension Wrist Flexion Hand    Upper: R 5/5 5/5 5/5 5/5 5/5 5/5    L 5/5 5/5 5/5 5/5 5/5 5/5     Iliopsoas Quadriceps Knee  Flexion Tibialis  anterior Gastro- cnemius EHL   Lower: R 5-/5 5/5 5-/5 5/5 5/5 5/5    L 5/5 5/5 5/5 5/5 5/5 5/5     Vascular: Pulses 2+ and symmetric radial and dorsalis pedis. No LE edema.   Skin: Skin is warm, dry and intact.    Incision c/d/I with skin edges well approximated with sutures. No surrounding erythema or edema. No drainage from incision. Abdominal binder in place.       Significant Labs:  No results for input(s): GLU, NA, K, CL, CO2, BUN, CREATININE, CALCIUM, MG in the last 48 hours.  No results for input(s): WBC, HGB, HCT, PLT in the last 48 hours.  No results for input(s): LABPT, INR, APTT in the last 48 hours.  Microbiology Results (last 7 days)     Procedure Component Value Units Date/Time    AFB Culture & Smear [[id number]] Collected:  03/20/20 0118    Order Status:  Sent Specimen:  Sputum from Nares, Right Updated:  03/20/20 0125    AFB Culture & Smear [[id number]] Collected:  03/19/20 1805    Order Status:  Sent Specimen:  Sputum, Induced Updated:  03/19/20 2028    AFB Culture & Smear [[id number]] Collected:  03/19/20 0930    Order Status:  Sent Specimen:  Sputum, Induced Updated:  03/19/20 1152         Recent Lab Results       03/20/20  0732   03/19/20  1713   03/19/20  1113        POCT Glucose 114 113 95         All pertinent labs from the last 24 hours have been reviewed.    Significant Diagnostics:  I have reviewed all pertinent imaging results/findings within the past 24 hours.

## 2020-03-20 NOTE — PT/OT/SLP PROGRESS
Occupational Therapy   Treatment    Name: Garcia Renteria  MRN: [id number]  Admitting Diagnosis:  Acquired spondylolisthesis of thoracolumbar vertebra  10 Days Post-Op    Recommendations:     Discharge Recommendations: rehabilitation facility  Discharge Equipment Recommendations:  (TBD next level of care)    Assessment:     Garcia Renteria is a 70 y.o. male with a medical diagnosis of Acquired spondylolisthesis of thoracolumbar vertebra.  He presents with good participation and motivation.  Pt demonstrated weakness affecting ability to perform ADL's independently as prior to admit.  Pt continues to require (A) with all ADL's & transfers & is at risk for falls.  Pt would benefit from stay on inpt rehab. Performance deficits affecting function are weakness, impaired endurance, impaired self care skills, impaired functional mobilty, impaired balance, decreased upper extremity function, decreased lower extremity function, decreased safety awareness.     Rehab Prognosis:  Good; patient would benefit from acute skilled OT services to address these deficits and reach maximum level of function.       Plan:     Patient to be seen 4 x/week to address the above listed problems via self-care/home management, therapeutic activities, therapeutic exercises, neuromuscular re-education  · Plan of Care Expires: 04/04/20  · Plan of Care Reviewed with: patient    Subjective     Pain/Comfort:  · Pain Rating 1: (pt reported pain however did not rate pain level)  · Location 1: (RLE (thigh))  · Pain Addressed 1: Reposition, Nurse notified  · Pain Rating Post-Intervention 1: (pt did not rate pain or report increased/decreased pain)    Objective:     Communicated with: RN prior to session.  Patient found supine with telemetry upon OT entry to room.    General Precautions: Standard, fall, airborne   Orthopedic Precautions:spinal precautions   Braces: TLSO     Occupational Performance:     Bed Mobility:    · Patient completed Rolling/Turning  to Left with  minimum assistance  · Patient completed Scooting/Bridging with stand by assistance scooting up HOB while supine & scooting back on EOB while seated EOB  · Patient completed Supine to Sit with minimum assistance  · Patient completed Sit to Supine with minimum assistance     Functional Mobility/Transfers:  · Patient completed Sit <> Stand Transfer with minimum assistance  with  rolling walker  (required 2 trials to perform as with 1st trial unable achieve a full stand)  · Functional Mobility: CGA using RW in room for household distances    Activities of Daily Living:  · Upper Body Dressing: minimum assistance donning & doffing TLSO while seated EOB      [facility] 6 Click ADL: 18    Treatment & Education:  Provided discussion & education regarding sock aid to assist with donning socks due to weakness in BLE with pt verbalizing desire to trial sock aid for increased independence with ADL's.  Provided education regarding spinal precautions during ADL's & bed mobility.  Pt performed AROM exercises with BUE in 3 planes x 10 reps each while seated EOB. Pt required SBA with postural control while seated EOB during activities.  Provided education regarding role of OT, POC, & discharge recommendations with pt & family verbalizing understanding.  Pt had no further questions & when asked whether there were any concerns pt reported none.    Patient left supine with all lines intact, call button in reach, RN notified and white board updated.Education:      GOALS:   Multidisciplinary Problems     Occupational Therapy Goals        Problem: Occupational Therapy Goal    Goal Priority Disciplines Outcome Interventions   Occupational Therapy Goal     OT, PT/OT Ongoing, Progressing    Description:  Updated Goals to be met by 7 days (3/26/20)  Patient will increase functional independence with ADLs by performing:    UE Dressing with SBA including TLSO.   LE Dressing with Moderate Assistance using AD as needed.  Grooming while  seated with SBA.  Toileting from bedside commode with Minimal Assistance for hygiene and clothing management.   Supine to sit with SBA.  Toilet transfer to bedside commode with Minimal Assistance.  Pt will complete functional mobility household distance with SBA using AD as needed.  Pt will verbalize understanding of 3/3 spinal precautions without added cues.                        Time Tracking:     OT Date of Treatment: 03/20/20  OT Start Time: 1355  OT Stop Time: 1426  OT Total Time (min): 31 min    Billable Minutes:Therapeutic Activity 16  Therapeutic Exercise 15    MELIDA Camp  3/20/2020

## 2020-03-20 NOTE — PT/OT/SLP PROGRESS
"Physical Therapy Treatment    Patient Name:  Garcia Renteria   MRN:  [id number]    Recommendations:     Discharge Recommendations:  rehabilitation facility   Discharge Equipment Recommendations: none   Barriers to discharge: Inaccessible home and Decreased caregiver support    Assessment:     Garcia Renteria is a 70 y.o. male admitted with a medical diagnosis of Acquired spondylolisthesis of thoracolumbar vertebra.  He presents with the following impairments/functional limitations:  weakness, impaired endurance, impaired functional mobilty, gait instability, impaired balance, impaired self care skills, pain, decreased safety awareness, decreased lower extremity function, decreased coordination.  Pt is s/p T10-pelvis fusion, as of 3/3/20.  S/p nerve block, as of 3/10/20.      Pt currently requires CGA of 1 person to amb in the hallway with use of AD for support and TLSO donned.  CGA required sec to instability of B LEs and poor foot placement following swing phase.  Established exercise program to be performed in sit, 3xs/day.  Difficulty with R LE hip flex in sit, requiring AAROM.  R quad mm activation palpated.      Rehab Prognosis: Good; patient would benefit from acute skilled PT services to address these deficits and reach maximum level of function.    Recent Surgery: Procedure(s) (LRB):  Block, Nerve (Bilateral) 10 Days Post-Op    Plan:     During this hospitalization, patient to be seen 3 x/week to address the identified rehab impairments via gait training, therapeutic activities, therapeutic exercises, neuromuscular re-education and progress toward the following goals:    · Plan of Care Expires:  04/03/20    Subjective     Chief Complaint: Groin and B thigh pain  Patient/Family Comments/goals: "I just took some pain meds not too long ago."  Pain/Comfort:  · Pain Rating 1: 5/10  · Location 1: groin  · Pain Addressed 1: Pre-medicate for activity, Nurse notified, Distraction  · Pain Rating 2: 5/10  · Location " - Side 2: Bilateral  · Location 2: thigh  · Pain Addressed 2: Pre-medicate for activity, Nurse notified, Distraction, Cessation of Activity      Objective:     Communicated with nursin prior to session.  Patient found supine with telemetry, pulse ox (continuous), peripheral IV upon PT entry to room.     General Precautions: Standard, fall, airborne   Orthopedic Precautions:spinal precautions   Braces: TLSO     Functional Mobility:  · Bed Mobility:     · Scooting: supervision  · Supine to Sit: minimum assistance  · Transfers:     · Sit to Stand:  ModA from bed, with electric bed elevated to facilitate standing with ed on hand placement  · Bed to Chair: contact guard assistance with  rolling walker  using  Stand Pivot  · Gait: Pt amb 124', CGA, RW, 1 standing rest break, decreased stability in LEs, decreased gait speed  · Balance: CGA: dynamic standing balance with AD      AM-PAC 6 CLICK MOBILITY  Turning over in bed (including adjusting bedclothes, sheets and blankets)?: 3  Sitting down on and standing up from a chair with arms (e.g., wheelchair, bedside commode, etc.): 2  Moving from lying on back to sitting on the side of the bed?: 3  Moving to and from a bed to a chair (including a wheelchair)?: 3  Need to walk in hospital room?: 3  Climbing 3-5 steps with a railing?: 1  Basic Mobility Total Score: 15       Therapeutic Activities and Exercises:   Whiteboard updated  Ankle pumps: 20 reps, in sit   LAQs: 20 reps each LE perf individually, in sit  Hip abd/add: 20 reps each LE perf individually, in sit  Hip flex: 20 reps each LE perf individually, in sit, R LE with AAROM waiting for visible mm initiation, facilitation for eccentric lowering  Sit-ups: 20 reps, in reclined sitting posture      Patient left up in chair with all lines intact, call button in reach and nursing present.    GOALS:   Multidisciplinary Problems     Physical Therapy Goals        Problem: Physical Therapy Goal    Goal Priority Disciplines Outcome  Goal Variances Interventions   Physical Therapy Goal     PT, PT/OT Ongoing, Progressing     Description:  Goals to be met by: 3/22/2020    Patient will increase functional independence with mobility by performin. Supine to sit with CGA.  2. Sit to supine with CGA.  3. Rolling to Left and Right with Moderate Assistance.  4. Sit to stand transfer with Terrance.  5. Bed to chair transfer with Maximum Assistance. - GOAL MET  REVISED: CGA with RW  6. Gait  x 100 feet with SBA.  7. Lower extremity exercise program x 20 reps per handout, with assistance as needed.                           Time Tracking:     PT Received On: 20  PT Start Time: 1242     PT Stop Time: 1315  PT Total Time (min): 33 min     Billable Minutes: Gait Training 15 and Therapeutic Exercise 14    Treatment Type: Treatment  PT/PTA: PT     PTA Visit Number: 1     Desirae Nino, PT  2020

## 2020-03-20 NOTE — PLAN OF CARE
Problem: Occupational Therapy Goal  Goal: Occupational Therapy Goal  Description  Updated Goals to be met by 7 days (3/26/20)  Patient will increase functional independence with ADLs by performing:    UE Dressing with SBA including TLSO.   LE Dressing with Moderate Assistance using AD as needed.  Grooming while seated with SBA.  Toileting from bedside commode with Minimal Assistance for hygiene and clothing management.   Supine to sit with SBA.  Toilet transfer to bedside commode with Minimal Assistance.  Pt will complete functional mobility household distance with SBA using AD as needed.  Pt will verbalize understanding of 3/3 spinal precautions without added cues.       Outcome: Ongoing, Progressing     Goals remain appropriate.

## 2020-03-20 NOTE — PROGRESS NOTES
[facility]  Neurosurgery  Progress Note    Subjective:     History of Present Illness: This is a 70-year-old man with a history of a non instrumented lumbar fusion many years ago who presents with severe progressive axial low back pain, bilateral radicular leg pain, and thoracic myelopathy.  Additionally he had severe postural deformity that was functionally disabling.  He failed multiple conservative measures.  Imaging showed severe central stenosis with spinal cord compression from T10-L2, solid fusion from his previous surgery from L3 to the sacrum, and flat back deformity with severe fixed sagittal plane deformity. Recommended patient undergo spinal cord decompression and deformity reduction surgery, via a T10 to pelvis instrumented fusion with an L3 PSO and a multilevel laminectomy for spinal cord decompression.  Risks benefits alternatives indications and methods were reviewed in detail and he wished to proceed.  All questions were answered.  No guarantees about the results the procedure. Presents to [facility] for T10-pelvis and L3 PSO.    Post-Op Info:  Procedure(s) (LRB):  Block, Nerve (Bilateral)   10 Days Post-Op     Interval History: NAEON. AFB smear results pending this afternoon. Endorses right groin pain this morning, controlled with current regimen. Up to chair twice yesterday and ambulated around room, motivated to continue ambulating and progress. Denies weakness, paresthesias, b/b dysfunction.    Medications:  Continuous Infusions:  Scheduled Meds:   famotidine  40 mg Oral QHS    ferrous sulfate  325 mg Oral BID    heparin (porcine)  5,000 Units Subcutaneous Q8H    lidocaine  2 patch Transdermal Q24H    lisinopriL  5 mg Oral Daily    methocarbamoL  750 mg Oral TID    pregabalin  150 mg Oral TID    senna-docusate 8.6-50 mg  1 tablet Oral BID     PRN Meds:acetaminophen, aluminum-magnesium hydroxide-simethicone, bisacodyL, Dextrose 10% Bolus, Dextrose 10% Bolus, fentaNYL,  glucagon (human recombinant), glucose, glucose, HYDROmorphone, insulin aspart U-100, labetalol, midazolam, ondansetron, oxyCODONE, oxyCODONE, promethazine (PHENERGAN) IVPB, sodium chloride 0.9%       Objective:     Weight: 98.4 kg (216 lb 14.9 oz)  Body mass index is 28.62 kg/m².  Vital Signs (Most Recent):  Temp: 96.3 °F (35.7 °C) (03/20/20 0736)  Pulse: 84 (03/20/20 0736)  Resp: 17 (03/20/20 0736)  BP: (!) 100/55 (03/20/20 0736)  SpO2: 97 % (03/20/20 0736) Vital Signs (24h Range):  Temp:  [96.3 °F (35.7 °C)-98.9 °F (37.2 °C)] 96.3 °F (35.7 °C)  Pulse:  [80-97] 84  Resp:  [17-18] 17  SpO2:  [96 %-98 %] 97 %  BP: ()/(55-64) 100/55     Date 03/20/20 0700 - 03/21/20 0659   Shift 5158-1813 8023-5764 6069-8520 24 Hour Total   INTAKE   P.O. 360   360   Shift Total(mL/kg) 360(3.7)   360(3.7)   OUTPUT   Shift Total(mL/kg)       Weight (kg) 98.4 98.4 98.4 98.4                 Neurosurgery Physical Exam    General: well developed, well nourished, no distress.   Head: normocephalic, atraumatic  Neck: No tracheal deviation. No palpable masses.   Neurologic: Alert and oriented. Thought content appropriate.  GCS: Motor: 6/Verbal: 5/Eyes: 4 GCS Total: 15  Mental Status: Awake, Alert, Oriented x 4  Language: No aphasia  Speech: No dysarthria  Cranial nerves: face symmetric, tongue midline, CN II-XII grossly intact.   Eyes: pupils equal, round, reactive to light with accomodation, EOMI.   Ears: No drainage.   Pulmonary: normal respirations, no signs of respiratory distress  Abdomen: soft, non-distended, not tender to palpation  Sensory: intact to light touch throughout  Motor Strength: Moves all extremities spontaneously with good tone.  Pain limited weakness of right hip/knee flexion 2/2 groin pain. Otherwise full strength upper and lower extremities. No abnormal movements seen.     Strength  Deltoids Triceps Biceps Wrist Extension Wrist Flexion Hand    Upper: R 5/5 5/5 5/5 5/5 5/5 5/5    L 5/5 5/5 5/5 5/5 5/5 5/5      Iliopsoas Quadriceps Knee  Flexion Tibialis  anterior Gastro- cnemius EHL   Lower: R 5-/5 5/5 5-/5 5/5 5/5 5/5    L 5/5 5/5 5/5 5/5 5/5 5/5     Vascular: Pulses 2+ and symmetric radial and dorsalis pedis. No LE edema.   Skin: Skin is warm, dry and intact.    Incision c/d/I with skin edges well approximated with sutures. No surrounding erythema or edema. No drainage from incision. Abdominal binder in place.       Significant Labs:  No results for input(s): GLU, NA, K, CL, CO2, BUN, CREATININE, CALCIUM, MG in the last 48 hours.  No results for input(s): WBC, HGB, HCT, PLT in the last 48 hours.  No results for input(s): LABPT, INR, APTT in the last 48 hours.  Microbiology Results (last 7 days)     Procedure Component Value Units Date/Time    AFB Culture & Smear [[id number]] Collected:  03/20/20 0118    Order Status:  Sent Specimen:  Sputum from Nares, Right Updated:  03/20/20 0125    AFB Culture & Smear [[id number]] Collected:  03/19/20 1805    Order Status:  Sent Specimen:  Sputum, Induced Updated:  03/19/20 2028    AFB Culture & Smear [[id number]] Collected:  03/19/20 0930    Order Status:  Sent Specimen:  Sputum, Induced Updated:  03/19/20 1152        Recent Lab Results       03/20/20  0732   03/19/20  1713   03/19/20  1113        POCT Glucose 114 113 95         All pertinent labs from the last 24 hours have been reviewed.    Significant Diagnostics:  I have reviewed all pertinent imaging results/findings within the past 24 hours.    Assessment/Plan:     Degenerative scoliosis  A 70 year old with degenerative lumbar spondy and spine deformity now s/p T10 - pelvis posterior segmental instrumented fusion, T10 - L3 laminectomy for decompression, L3 pedical subtraction osteotomy (3/3).    -Patient neurologically stable on exam.   -ID: + PPD, CXR x 2 negative, + Quantiferon gold. CXR 3/18 shows vague irregular opacity right midlung field. 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 Asymptomatic. ID recommending AFB smear  x3, M. Tuberculosis DNA by PCR, and HIV testing. TB DNA by PCR to be sent out today, discussed with lab send off. First AFB smear obtained 3/19 expected to result today, remainder pending. Continue airborne precautions until testing results. Ok to leave room with therapy as long as N95 mask worn by patient. Re-consult ID if results positive.  -Brace on when upright, OOB, or working with therapy. OK to remove when lying in bed.   -Postop XR lumbar spine shows satisfactory posterior alignment and positioning of posterior hardware.   -Sutures in place. Will contact Dr. Caro's team regarding suture removal while inpatient. Can sit for up to 30 minutes at meal time (3 times per day).  Head of bed flat otherwise at all times.  Wound closure tension increases in sitting position.  This restriction should be in place for 6 weeks. Leave incision open to air. Can shower but should not tub soak the wounds.  No lotions, creams applied to the incision.    -Pain: Continue multimodal agents per Anesthesia recommendations. No changes in pain after nerve block. Continue Lyrica TID. Scheduled lido patches, tylenol 1000 mg Q8, and robaxin 500 mg TID. Continue oxy prn.  -Thrombocytopenia: Resolved. Ctm with cbc.   -Post op anemia: Stable. Continue iron for replacement. Improving.   -Transaminitis: Improving, f/u next lab draw. q72h labs  -HTN: Continue home dose lisinopril and Prn labetalol.   -DM2: continue POCT glucose, SSI, diabetic diet  -GERD: continue home dose pepcid  -DVT prophylaxis: CLIFF's, SCD's, SQH  -Bowel regimen: senna BID and miralax daily. States he uses enema or suppository normally 1-2x per week at home.  -Atelectasis prevention: IS hourly while awake, PT/OT, OOB > 6 hours per day. Continue to encourage OOB mobility and up to chair with meals.       Dispo: Pending AFB results        Alva Cuevas PA-C  Neurosurgery  [facility]

## 2020-03-20 NOTE — DISCHARGE INSTRUCTIONS
Neurosurgery Discharge Instructions below:    Activity Restrictions:  [x]  Return to work will be determined on an individual basis.  [x]  No lifting greater than 10 pounds.  [x]  Avoid bending and twisting the area of your surgery more than 45 degrees from neutral position in any direction.  [x]  No driving or operating machinery:  [x]  until cleared by your surgeon.  [x]  while taking narcotic pain medications or muscle relaxants.  [x]  Wear your brace at all times except when flat in bed.  [x]  Increase ambulation over the next 2 weeks so that you are walking 2 miles per day at 2 weeks post-operatively.  [x]  Walk on paved surfaces only. It is okay to walk up and down stairs while holding onto a side rail.    Discharge Medication/Follow-up:  [x]  Please refer to discharge medication reconciliation form.  [x]  Do not take ANY non-steroidal anti-inflammatory drugs (NSAIDS), including the following: ibuprofen, naprosyn, Aleve, Advil, Indocin, Mobic, or Celebrex for:  [x]  3 months  [x]  Prescriptions for appropriate medication will be given upon discharge. If you continue to have pain control problems after the 6 week post-operative period, a referral to [facility] will be provided. Please address this at your follow-up appointments.   [x]  Take docusate (Colace 100 mg): take one capsule a day as needed for constipation. You can get this over the counter.  [x]  Follow-up appointment:  [x]  4-6 weeks with MD:  [x]  with x-rays  [x]  An appointment will be mailed to you.    Wound Care:  [x]  Please see wound care instructions provided below by Dr. Caro.    Call your doctor or go to the Emergency Room for any signs of infection, including: increased redness, drainage, pain, or fever (temperature ?101.5 for 24 hours). Call your doctor or go to the Emergency Room if there are any localized neurological changes; problems with speech, vision, numbness, tingling, weakness, or severe headache; or for other  concerns.    Special Instructions:  [x]  No use of tobacco products.  [x]  Diet: Please eat a regular diet as tolerated.  Physicians need 3 days' notice for pain medicine to be refilled. Pain medicine will only be refilled between 8 AM and 5 PM, Monday through Friday, due to Food and Drug Administration regulation of documentation.    If you have any questions about this form, please call [phone].    Form No. 40043 (Revised 10/31/2013)       Plastic Surgery Discharge Instructions as follows     Recommendations for Activity restrictions:   1.  Can sit for up to 30 minutes at meal time (3 times per day).  Head of bed flat otherwise at all times.  Wound closure tension increases in sitting position.  This restriction should be in place for 6 weeks.    2.  If needs PT to avoid decompensation she may mobilize.  The tension across her back increases as she flexes her hips.    3.  Head of bed as flat as possible otherwise  4.  Log roll out of bed rather than sitting up to get out of bed.       Wound Care:  1.  Leave incision open to air.     2.  Can shower but should not tub soak the wounds.  No lotions, creams applied to the incision.     3.  Consider air fluidized bed for pressure offloading to avoid pressure sore.  Q 2 hour turns to prevent bedsores.       4.  Optimize glycemic control with insulin coverage or appropriate medications, diabetic diet, diabetic nutritional supplements.          Follow Up That Needs to Be Scheduled:  1.  Should follow up with Dr. thomas within 1 week of discharge.  Contact numbers are below to coordinate scheduling.  If discharged to [facility], provide their staff with [email or web address] so that patient care can be coordinated.

## 2020-03-20 NOTE — PLAN OF CARE
AFB smears negative x3. 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 Discussed with ID, Dr. Pro. Patient is safe for discharge to SNF as he is not contagious. Discussed with case management, Corin, who has updated [place]. Planning for discharge to SNF Monday 3/23. Patient and wife (via phone call) updated on dispo plan. All questions answered.     Alva Cuevas PA-C  Pager: [phone]  Neurosurgery  [facility]

## 2020-03-20 NOTE — PLAN OF CARE
03/20/20 1617   Discharge Reassessment   Assessment Type Discharge Planning Reassessment   Provided patient/caregiver education on the expected discharge date and the discharge plan Yes   Do you have any problems affording any of your prescribed medications? No   Discharge Plan A Rehab   DME Needed Upon Discharge  other (see comments)  (TBD)   Anticipated Discharge Disposition Rehab   Post-Acute Status   Post-Acute Authorization Placement  ([facility] in [place])   Post-Acute Placement Status Referrals Sent   Discharge Delays None known at this time

## 2020-03-20 NOTE — ASSESSMENT & PLAN NOTE
A 70 year old with degenerative lumbar spondy and spine deformity now s/p T10 - pelvis posterior segmental instrumented fusion, T10 - L3 laminectomy for decompression, L3 pedical subtraction osteotomy (3/3).    -Patient neurologically stable on exam.   -ID: + PPD, CXR x 2 negative, + Quantiferon gold. CXR 3/18 shows vague irregular opacity right midlung field. 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 Asymptomatic. ID recommending AFB smear x3, M. Tuberculosis DNA by PCR, and HIV testing. TB DNA by PCR to be sent out today, discussed with lab send off. First AFB smear obtained 3/19 expected to result today, remainder pending. Continue airborne precautions until testing results. Ok to leave room with therapy as long as N95 mask worn by patient. Re-consult ID if results positive.  -Brace on when upright, OOB, or working with therapy. OK to remove when lying in bed.   -Postop XR lumbar spine shows satisfactory posterior alignment and positioning of posterior hardware.   -Sutures in place. Will contact Dr. Caro's team regarding suture removal while inpatient. Can sit for up to 30 minutes at meal time (3 times per day).  Head of bed flat otherwise at all times.  Wound closure tension increases in sitting position.  This restriction should be in place for 6 weeks. Leave incision open to air. Can shower but should not tub soak the wounds.  No lotions, creams applied to the incision.    -Pain: Continue multimodal agents per Anesthesia recommendations. No changes in pain after nerve block. Continue Lyrica TID. Scheduled lido patches, tylenol 1000 mg Q8, and robaxin 500 mg TID. Continue oxy prn.  -Thrombocytopenia: Resolved. Ctm with cbc.   -Post op anemia: Stable. Continue iron for replacement. Improving.   -Transaminitis: Improving, f/u next lab draw. q72h labs  -HTN: Continue home dose lisinopril and Prn labetalol.   -DM2: continue POCT glucose, SSI, diabetic diet  -GERD: continue home dose pepcid  -DVT prophylaxis:  CLIFF's, SCD's, SQH  -Bowel regimen: senna BID and miralax daily. States he uses enema or suppository normally 1-2x per week at home.  -Atelectasis prevention: IS hourly while awake, PT/OT, OOB > 6 hours per day. Continue to encourage OOB mobility and up to chair with meals.       Dispo: Pending AFB results

## 2020-03-20 NOTE — PLAN OF CARE
Problem: Physical Therapy Goal  Goal: Physical Therapy Goal  Description  Goals to be met by: 3/22/2020    Patient will increase functional independence with mobility by performin. Supine to sit with CGA.  2. Sit to supine with CGA.  3. Rolling to Left and Right with Moderate Assistance.  4. Sit to stand transfer with Terrance.  5. Bed to chair transfer with Maximum Assistance. - GOAL MET  REVISED: CGA with RW  6. Gait  x 100 feet with SBA.  7. Lower extremity exercise program x 20 reps per handout, with assistance as needed.          Outcome: Ongoing, Progressing     Pt achieved 1 goal.

## 2020-03-21 LAB
ALBUMIN SERPL BCP-MCNC: 2.6 G/DL (ref 3.5–5.2)
ALP SERPL-CCNC: 115 U/L (ref 55–135)
ALT SERPL W/O P-5'-P-CCNC: 31 U/L (ref 10–44)
ANION GAP SERPL CALC-SCNC: 7 MMOL/L (ref 8–16)
AST SERPL-CCNC: 33 U/L (ref 10–40)
BASOPHILS # BLD AUTO: 0.04 K/UL (ref 0–0.2)
BASOPHILS NFR BLD: 0.7 % (ref 0–1.9)
BILIRUB SERPL-MCNC: 0.3 MG/DL (ref 0.1–1)
BUN SERPL-MCNC: 13 MG/DL (ref 8–23)
CALCIUM SERPL-MCNC: 9.3 MG/DL (ref 8.7–10.5)
CHLORIDE SERPL-SCNC: 102 MMOL/L (ref 95–110)
CO2 SERPL-SCNC: 28 MMOL/L (ref 23–29)
CREAT SERPL-MCNC: 1 MG/DL (ref 0.5–1.4)
DIFFERENTIAL METHOD: ABNORMAL
EOSINOPHIL # BLD AUTO: 0.1 K/UL (ref 0–0.5)
EOSINOPHIL NFR BLD: 1.7 % (ref 0–8)
ERYTHROCYTE [DISTWIDTH] IN BLOOD BY AUTOMATED COUNT: 13.7 % (ref 11.5–14.5)
EST. GFR  (AFRICAN AMERICAN): >60 ML/MIN/1.73 M^2
EST. GFR  (NON AFRICAN AMERICAN): >60 ML/MIN/1.73 M^2
GLUCOSE SERPL-MCNC: 121 MG/DL (ref 70–110)
HCT VFR BLD AUTO: 32.4 % (ref 40–54)
HGB BLD-MCNC: 9.4 G/DL (ref 14–18)
IMM GRANULOCYTES # BLD AUTO: 0.08 K/UL (ref 0–0.04)
IMM GRANULOCYTES NFR BLD AUTO: 1.3 % (ref 0–0.5)
LYMPHOCYTES # BLD AUTO: 2 K/UL (ref 1–4.8)
LYMPHOCYTES NFR BLD: 34.4 % (ref 18–48)
MCH RBC QN AUTO: 26.7 PG (ref 27–31)
MCHC RBC AUTO-ENTMCNC: 29 G/DL (ref 32–36)
MCV RBC AUTO: 92 FL (ref 82–98)
MONOCYTES # BLD AUTO: 0.6 K/UL (ref 0.3–1)
MONOCYTES NFR BLD: 9.9 % (ref 4–15)
NEUTROPHILS # BLD AUTO: 3.1 K/UL (ref 1.8–7.7)
NEUTROPHILS NFR BLD: 52 % (ref 38–73)
NRBC BLD-RTO: 0 /100 WBC
PLATELET # BLD AUTO: 394 K/UL (ref 150–350)
PMV BLD AUTO: 10.7 FL (ref 9.2–12.9)
POCT GLUCOSE: 125 MG/DL (ref 70–110)
POCT GLUCOSE: 140 MG/DL (ref 70–110)
POTASSIUM SERPL-SCNC: 4.6 MMOL/L (ref 3.5–5.1)
PROT SERPL-MCNC: 6.3 G/DL (ref 6–8.4)
RBC # BLD AUTO: 3.52 M/UL (ref 4.6–6.2)
SODIUM SERPL-SCNC: 137 MMOL/L (ref 136–145)
WBC # BLD AUTO: 5.93 K/UL (ref 3.9–12.7)

## 2020-03-21 PROCEDURE — 25000003 PHARM REV CODE 250: Performed by: PHYSICIAN ASSISTANT

## 2020-03-21 PROCEDURE — 36415 COLL VENOUS BLD VENIPUNCTURE: CPT

## 2020-03-21 PROCEDURE — 20600001 HC STEP DOWN PRIVATE ROOM

## 2020-03-21 PROCEDURE — 80053 COMPREHEN METABOLIC PANEL: CPT

## 2020-03-21 PROCEDURE — 25000003 PHARM REV CODE 250: Performed by: STUDENT IN AN ORGANIZED HEALTH CARE EDUCATION/TRAINING PROGRAM

## 2020-03-21 PROCEDURE — 85025 COMPLETE CBC W/AUTO DIFF WBC: CPT

## 2020-03-21 PROCEDURE — 63600175 PHARM REV CODE 636 W HCPCS: Performed by: PHYSICIAN ASSISTANT

## 2020-03-21 RX ADMIN — METHOCARBAMOL TABLETS 750 MG: 750 TABLET, COATED ORAL at 06:03

## 2020-03-21 RX ADMIN — PREGABALIN 150 MG: 150 CAPSULE ORAL at 08:03

## 2020-03-21 RX ADMIN — LISINOPRIL 5 MG: 5 TABLET ORAL at 09:03

## 2020-03-21 RX ADMIN — POLYETHYLENE GLYCOL 3350 17 G: 17 POWDER, FOR SOLUTION ORAL at 09:03

## 2020-03-21 RX ADMIN — HYDROMORPHONE HYDROCHLORIDE 0.5 MG: 1 INJECTION, SOLUTION INTRAMUSCULAR; INTRAVENOUS; SUBCUTANEOUS at 08:03

## 2020-03-21 RX ADMIN — PREGABALIN 150 MG: 150 CAPSULE ORAL at 09:03

## 2020-03-21 RX ADMIN — OXYCODONE HYDROCHLORIDE 15 MG: 10 TABLET ORAL at 03:03

## 2020-03-21 RX ADMIN — SENNOSIDES AND DOCUSATE SODIUM 1 TABLET: 8.6; 5 TABLET ORAL at 09:03

## 2020-03-21 RX ADMIN — LIDOCAINE 2 PATCH: 50 PATCH TOPICAL at 01:03

## 2020-03-21 RX ADMIN — HEPARIN SODIUM 5000 UNITS: 5000 INJECTION, SOLUTION INTRAVENOUS; SUBCUTANEOUS at 01:03

## 2020-03-21 RX ADMIN — OXYCODONE HYDROCHLORIDE 15 MG: 10 TABLET ORAL at 06:03

## 2020-03-21 RX ADMIN — PREGABALIN 150 MG: 150 CAPSULE ORAL at 06:03

## 2020-03-21 RX ADMIN — OXYCODONE HYDROCHLORIDE 15 MG: 10 TABLET ORAL at 12:03

## 2020-03-21 RX ADMIN — FAMOTIDINE 40 MG: 20 TABLET, FILM COATED ORAL at 08:03

## 2020-03-21 RX ADMIN — HEPARIN SODIUM 5000 UNITS: 5000 INJECTION, SOLUTION INTRAVENOUS; SUBCUTANEOUS at 10:03

## 2020-03-21 RX ADMIN — HEPARIN SODIUM 5000 UNITS: 5000 INJECTION, SOLUTION INTRAVENOUS; SUBCUTANEOUS at 06:03

## 2020-03-21 RX ADMIN — OXYCODONE HYDROCHLORIDE 15 MG: 10 TABLET ORAL at 10:03

## 2020-03-21 RX ADMIN — METHOCARBAMOL TABLETS 750 MG: 750 TABLET, COATED ORAL at 08:03

## 2020-03-21 RX ADMIN — OXYCODONE HYDROCHLORIDE 15 MG: 10 TABLET ORAL at 07:03

## 2020-03-21 RX ADMIN — METHOCARBAMOL TABLETS 750 MG: 750 TABLET, COATED ORAL at 09:03

## 2020-03-21 RX ADMIN — OXYCODONE HYDROCHLORIDE 15 MG: 10 TABLET ORAL at 11:03

## 2020-03-21 NOTE — PLAN OF CARE
Patient TB results, negative, precautions removed. Patient ambulated in the [facility] with PT today using a walker, brace utilized when OOB. Up in the chair for a few hours. VS stable. Plan to go to rehab . Plan of care reviewed with patient.

## 2020-03-21 NOTE — PLAN OF CARE
POC reviewed at bedside. Questions and concerns addressed. VSS. Neuro checks q4hr. Reports neuropathy to BLE. Reported neuropathy not new. Sutures to back. No redness or drainage. Wearing abd binder. Safety maintained. Bed in low and locked position. Call light within reach. Side rails up x2. Frequent rounds.

## 2020-03-21 NOTE — PLAN OF CARE
Anjelica ambulated a few steps with the nurse today in the room. He was OOB and in the chair for a few hours today and said it felt better than yesterday to be up. PRN pain medication administered with moderate relief. Plan to D/C Monday to rehab in [place]. Dinner CBG was not taken, patient received meal and ate prior to nurse going to check.

## 2020-03-22 LAB
POCT GLUCOSE: 110 MG/DL (ref 70–110)
POCT GLUCOSE: 111 MG/DL (ref 70–110)
POCT GLUCOSE: 120 MG/DL (ref 70–110)
POCT GLUCOSE: 122 MG/DL (ref 70–110)
POCT GLUCOSE: 124 MG/DL (ref 70–110)

## 2020-03-22 PROCEDURE — 25000003 PHARM REV CODE 250: Performed by: PHYSICIAN ASSISTANT

## 2020-03-22 PROCEDURE — 63600175 PHARM REV CODE 636 W HCPCS: Performed by: PHYSICIAN ASSISTANT

## 2020-03-22 PROCEDURE — 25000003 PHARM REV CODE 250: Performed by: STUDENT IN AN ORGANIZED HEALTH CARE EDUCATION/TRAINING PROGRAM

## 2020-03-22 PROCEDURE — 20600001 HC STEP DOWN PRIVATE ROOM

## 2020-03-22 RX ADMIN — PREGABALIN 150 MG: 150 CAPSULE ORAL at 10:03

## 2020-03-22 RX ADMIN — LIDOCAINE 2 PATCH: 50 PATCH TOPICAL at 01:03

## 2020-03-22 RX ADMIN — METHOCARBAMOL TABLETS 750 MG: 750 TABLET, COATED ORAL at 10:03

## 2020-03-22 RX ADMIN — SENNOSIDES AND DOCUSATE SODIUM 1 TABLET: 8.6; 5 TABLET ORAL at 08:03

## 2020-03-22 RX ADMIN — LISINOPRIL 5 MG: 5 TABLET ORAL at 10:03

## 2020-03-22 RX ADMIN — OXYCODONE HYDROCHLORIDE 15 MG: 10 TABLET ORAL at 11:03

## 2020-03-22 RX ADMIN — OXYCODONE HYDROCHLORIDE 15 MG: 10 TABLET ORAL at 04:03

## 2020-03-22 RX ADMIN — HYDROMORPHONE HYDROCHLORIDE 0.5 MG: 1 INJECTION, SOLUTION INTRAMUSCULAR; INTRAVENOUS; SUBCUTANEOUS at 08:03

## 2020-03-22 RX ADMIN — HEPARIN SODIUM 5000 UNITS: 5000 INJECTION, SOLUTION INTRAVENOUS; SUBCUTANEOUS at 05:03

## 2020-03-22 RX ADMIN — SODIUM PHOSPHATE, DIBASIC AND SODIUM PHOSPHATE, MONOBASIC 1 ENEMA: 7; 19 ENEMA RECTAL at 10:03

## 2020-03-22 RX ADMIN — HEPARIN SODIUM 5000 UNITS: 5000 INJECTION, SOLUTION INTRAVENOUS; SUBCUTANEOUS at 01:03

## 2020-03-22 RX ADMIN — FAMOTIDINE 40 MG: 20 TABLET, FILM COATED ORAL at 08:03

## 2020-03-22 RX ADMIN — OXYCODONE HYDROCHLORIDE 15 MG: 10 TABLET ORAL at 09:03

## 2020-03-22 RX ADMIN — PREGABALIN 150 MG: 150 CAPSULE ORAL at 01:03

## 2020-03-22 RX ADMIN — POLYETHYLENE GLYCOL 3350 17 G: 17 POWDER, FOR SOLUTION ORAL at 10:03

## 2020-03-22 RX ADMIN — SENNOSIDES AND DOCUSATE SODIUM 1 TABLET: 8.6; 5 TABLET ORAL at 10:03

## 2020-03-22 RX ADMIN — METHOCARBAMOL TABLETS 750 MG: 750 TABLET, COATED ORAL at 01:03

## 2020-03-22 RX ADMIN — HEPARIN SODIUM 5000 UNITS: 5000 INJECTION, SOLUTION INTRAVENOUS; SUBCUTANEOUS at 09:03

## 2020-03-22 RX ADMIN — METHOCARBAMOL TABLETS 750 MG: 750 TABLET, COATED ORAL at 08:03

## 2020-03-22 RX ADMIN — OXYCODONE HYDROCHLORIDE 15 MG: 10 TABLET ORAL at 06:03

## 2020-03-22 RX ADMIN — HYDROMORPHONE HYDROCHLORIDE 0.5 MG: 1 INJECTION, SOLUTION INTRAMUSCULAR; INTRAVENOUS; SUBCUTANEOUS at 01:03

## 2020-03-22 RX ADMIN — PREGABALIN 150 MG: 150 CAPSULE ORAL at 08:03

## 2020-03-22 NOTE — SUBJECTIVE & OBJECTIVE
Interval History: NAEON. Enema today, otherwise no complaints    Medications:  Continuous Infusions:  Scheduled Meds:   famotidine  40 mg Oral QHS    ferrous sulfate  325 mg Oral BID    heparin (porcine)  5,000 Units Subcutaneous Q8H    lidocaine  2 patch Transdermal Q24H    lisinopriL  5 mg Oral Daily    methocarbamoL  750 mg Oral TID    polyethylene glycol  17 g Oral Daily    pregabalin  150 mg Oral TID    senna-docusate 8.6-50 mg  1 tablet Oral BID    sodium phosphates  1 enema Rectal Once     PRN Meds:acetaminophen, aluminum-magnesium hydroxide-simethicone, bisacodyL, Dextrose 10% Bolus, Dextrose 10% Bolus, fentaNYL, glucagon (human recombinant), glucose, glucose, HYDROmorphone, insulin aspart U-100, labetalol, midazolam, ondansetron, oxyCODONE, oxyCODONE, promethazine (PHENERGAN) IVPB, sodium chloride 0.9%       Objective:     Weight: 98.4 kg (216 lb 14.9 oz)  Body mass index is 28.62 kg/m².  Vital Signs (Most Recent):  Temp: 97.9 °F (36.6 °C) (03/21/20 1958)  Pulse: 89 (03/21/20 1958)  Resp: 16 (03/21/20 1958)  BP: 116/68 (03/21/20 1958)  SpO2: 97 % (03/21/20 1958) Vital Signs (24h Range):  Temp:  [97.2 °F (36.2 °C)-99.3 °F (37.4 °C)] 97.9 °F (36.6 °C)  Pulse:  [77-96] 89  Resp:  [16-18] 16  SpO2:  [95 %-98 %] 97 %  BP: ()/(52-69) 116/68     Date 03/21/20 0700 - 03/22/20 0659   Shift 2934-5721 7182-4924 8486-2909 24 Hour Total   INTAKE   P.O. 480 360  840   Shift Total(mL/kg) 480(4.9) 360(3.7)  840(8.5)   OUTPUT   Urine(mL/kg/hr) 550(0.7) 200(0.3)  750   Shift Total(mL/kg) 550(5.6) 200(2)  750(7.6)   Weight (kg) 98.4 98.4 98.4 98.4                 Neurosurgery Physical Exam      General: well developed, well nourished, no distress.   Head: normocephalic, atraumatic  Neck: No tracheal deviation. No palpable masses.   Neurologic: Alert and oriented. Thought content appropriate.  GCS: Motor: 6/Verbal: 5/Eyes: 4 GCS Total: 15  Mental Status: Awake, Alert, Oriented x 4  Language: No aphasia  Speech:  No dysarthria  Cranial nerves: face symmetric, tongue midline, CN II-XII grossly intact.   Eyes: pupils equal, round, reactive to light with accomodation, EOMI.   Ears: No drainage.   Pulmonary: normal respirations, no signs of respiratory distress  Abdomen: soft, non-distended, not tender to palpation  Sensory: intact to light touch throughout  Motor Strength: Moves all extremities spontaneously with good tone.  Pain limited weakness of right hip/knee flexion 2/2 groin pain. Otherwise full strength upper and lower extremities. No abnormal movements seen.     Strength  Deltoids Triceps Biceps Wrist Extension Wrist Flexion Hand    Upper: R 5/5 5/5 5/5 5/5 5/5 5/5    L 5/5 5/5 5/5 5/5 5/5 5/5     Iliopsoas Quadriceps Knee  Flexion Tibialis  anterior Gastro- cnemius EHL   Lower: R 5-/5 5/5 5-/5 5/5 5/5 5/5    L 5/5 5/5 5/5 5/5 5/5 5/5     Vascular: Pulses 2+ and symmetric radial and dorsalis pedis. No LE edema.   Skin: Skin is warm, dry and intact.    Incision c/d/I with skin edges well approximated with sutures. No surrounding erythema or edema. No drainage from incision. Abdominal binder in place.       Significant Labs:  Recent Labs   Lab 03/21/20  0548   *      K 4.6      CO2 28   BUN 13   CREATININE 1.0   CALCIUM 9.3     Recent Labs   Lab 03/21/20  0548   WBC 5.93   HGB 9.4*   HCT 32.4*   *     No results for input(s): LABPT, INR, APTT in the last 48 hours.  Microbiology Results (last 7 days)     Procedure Component Value Units Date/Time    AFB Culture & Smear [[id number]] Collected:  03/20/20 0118    Order Status:  Completed Specimen:  Sputum from Nares, Right Updated:  03/21/20 0927     AFB Culture & Smear Culture in progress     AFB CULTURE STAIN No acid fast bacilli seen.    AFB Culture & Smear [[id number]] Collected:  03/19/20 0930    Order Status:  Completed Specimen:  Sputum, Induced Updated:  03/20/20 2127     AFB Culture & Smear Culture in progress     AFB CULTURE STAIN No acid  fast bacilli seen.    AFB Culture & Smear [[id number]] Collected:  03/19/20 1808    Order Status:  Completed Specimen:  Sputum, Induced Updated:  03/20/20 2127     AFB Culture & Smear Culture in progress     AFB CULTURE STAIN No acid fast bacilli seen.        Recent Lab Results       03/21/20  1126   03/21/20  0727   03/21/20  0548        Albumin     2.6     Alkaline Phosphatase     115     ALT     31     Anion Gap     7     AST     33     Baso #     0.04     Basophil%     0.7     BILIRUBIN TOTAL     0.3  Comment:  For infants and newborns, interpretation of results should be based  on gestational age, weight and in agreement with clinical  observations.  Premature Infant recommended reference ranges:  Up to 24 hours.............<8.0 mg/dL  Up to 48 hours............<12.0 mg/dL  3-5 days..................<15.0 mg/dL  6-29 days.................<15.0 mg/dL       BUN, Bld     13     Calcium     9.3     Chloride     102     CO2     28     Creatinine     1.0     Differential Method     Automated     eGFR if      >60.0     eGFR if non      >60.0  Comment:  Calculation used to obtain the estimated glomerular filtration  rate (eGFR) is the CKD-EPI equation.        Eos #     0.1     Eosinophil%     1.7     Glucose     121     Gran # (ANC)     3.1     Gran%     52.0     Hematocrit     32.4     Hemoglobin     9.4     Immature Grans (Abs)     0.08  Comment:  Mild elevation in immature granulocytes is non specific and   can be seen in a variety of conditions including stress response,   acute inflammation, trauma and pregnancy. Correlation with other   laboratory and clinical findings is essential.       Immature Granulocytes     1.3     Lymph #     2.0     Lymph%     34.4     MCH     26.7     MCHC     29.0     MCV     92     Mono #     0.6     Mono%     9.9     MPV     10.7     nRBC     0     Platelets     394     POCT Glucose 125 140       Potassium     4.6     PROTEIN TOTAL     6.3     RBC      3.52     RDW     13.7     Sodium     137     WBC     5.93         All pertinent labs from the last 24 hours have been reviewed.    Significant Diagnostics:  I have reviewed all pertinent imaging results/findings within the past 24 hours.    Review of Systems

## 2020-03-22 NOTE — SUBJECTIVE & OBJECTIVE
Interval History: NAEON. Awaiting placement, poossible d/c tommorrow    Medications:  Continuous Infusions:  Scheduled Meds:   famotidine  40 mg Oral QHS    ferrous sulfate  325 mg Oral BID    heparin (porcine)  5,000 Units Subcutaneous Q8H    lidocaine  2 patch Transdermal Q24H    lisinopriL  5 mg Oral Daily    methocarbamoL  750 mg Oral TID    polyethylene glycol  17 g Oral Daily    pregabalin  150 mg Oral TID    senna-docusate 8.6-50 mg  1 tablet Oral BID     PRN Meds:acetaminophen, aluminum-magnesium hydroxide-simethicone, bisacodyL, Dextrose 10% Bolus, Dextrose 10% Bolus, fentaNYL, glucagon (human recombinant), glucose, glucose, HYDROmorphone, insulin aspart U-100, labetalol, midazolam, ondansetron, oxyCODONE, oxyCODONE, promethazine (PHENERGAN) IVPB, sodium chloride 0.9%       Objective:     Weight: 98.4 kg (216 lb 14.9 oz)  Body mass index is 28.62 kg/m².  Vital Signs (Most Recent):  Temp: 98.5 °F (36.9 °C) (03/22/20 1617)  Pulse: 97 (03/22/20 1617)  Resp: 18 (03/22/20 1617)  BP: 121/60 (03/22/20 1617)  SpO2: (!) 93 % (03/22/20 1617) Vital Signs (24h Range):  Temp:  [97.9 °F (36.6 °C)-99 °F (37.2 °C)] 98.5 °F (36.9 °C)  Pulse:  [75-97] 97  Resp:  [16-18] 18  SpO2:  [93 %-100 %] 93 %  BP: ()/(57-70) 121/60     Date 03/22/20 0700 - 03/23/20 0659   Shift 5885-6697 6811-2496 9771-9964 24 Hour Total   INTAKE   Shift Total(mL/kg)       OUTPUT   Urine(mL/kg/hr) 925(1.2)   925   Shift Total(mL/kg) 925(9.4)   925(9.4)   Weight (kg) 98.4 98.4 98.4 98.4                 Neurosurgery Physical Exam      General: well developed, well nourished, no distress.   Head: normocephalic, atraumatic  Neck: No tracheal deviation. No palpable masses.   Neurologic: Alert and oriented. Thought content appropriate.  GCS: Motor: 6/Verbal: 5/Eyes: 4 GCS Total: 15  Mental Status: Awake, Alert, Oriented x 4  Language: No aphasia  Speech: No dysarthria  Cranial nerves: face symmetric, tongue midline, CN II-XII grossly intact.    Eyes: pupils equal, round, reactive to light with accomodation, EOMI.   Ears: No drainage.   Pulmonary: normal respirations, no signs of respiratory distress  Abdomen: soft, non-distended, not tender to palpation  Sensory: intact to light touch throughout  Motor Strength: Moves all extremities spontaneously with good tone.  Pain limited weakness of right hip/knee flexion 2/2 groin pain. Otherwise full strength upper and lower extremities. No abnormal movements seen.     Strength  Deltoids Triceps Biceps Wrist Extension Wrist Flexion Hand    Upper: R 5/5 5/5 5/5 5/5 5/5 5/5    L 5/5 5/5 5/5 5/5 5/5 5/5     Iliopsoas Quadriceps Knee  Flexion Tibialis  anterior Gastro- cnemius EHL   Lower: R 5-/5 5/5 5-/5 5/5 5/5 5/5    L 5/5 5/5 5/5 5/5 5/5 5/5     Vascular: Pulses 2+ and symmetric radial and dorsalis pedis. No LE edema.   Skin: Skin is warm, dry and intact.    Incision c/d/I with skin edges well approximated with sutures. No surrounding erythema or edema. No drainage from incision. Abdominal binder in place.       Significant Labs:  Recent Labs   Lab 03/21/20  0548   *      K 4.6      CO2 28   BUN 13   CREATININE 1.0   CALCIUM 9.3     Recent Labs   Lab 03/21/20  0548   WBC 5.93   HGB 9.4*   HCT 32.4*   *     No results for input(s): LABPT, INR, APTT in the last 48 hours.  Microbiology Results (last 7 days)     Procedure Component Value Units Date/Time    AFB Culture & Smear [[id number]] Collected:  03/20/20 0118    Order Status:  Completed Specimen:  Sputum from Nares, Right Updated:  03/21/20 0927     AFB Culture & Smear Culture in progress     AFB CULTURE STAIN No acid fast bacilli seen.    AFB Culture & Smear [[id number]] Collected:  03/19/20 0930    Order Status:  Completed Specimen:  Sputum, Induced Updated:  03/20/20 2127     AFB Culture & Smear Culture in progress     AFB CULTURE STAIN No acid fast bacilli seen.    AFB Culture & Smear [[id number]] Collected:  03/19/20 1805    Order  Status:  Completed Specimen:  Sputum, Induced Updated:  03/20/20 2127     AFB Culture & Smear Culture in progress     AFB CULTURE STAIN No acid fast bacilli seen.        Recent Lab Results       03/22/20  1608   03/22/20  1128   03/22/20  0748   03/22/20  0007        POCT Glucose 120 110 122 124         All pertinent labs from the last 24 hours have been reviewed.    Significant Diagnostics:  I have reviewed all pertinent imaging results/findings within the past 24 hours.    Review of Systems

## 2020-03-22 NOTE — ASSESSMENT & PLAN NOTE
A 70 year old with degenerative lumbar spondy and spine deformity now s/p T10 - pelvis posterior segmental instrumented fusion, T10 - L3 laminectomy for decompression, L3 pedical subtraction osteotomy (3/3).    -Patient neurologically stable on exam.   -ID: + PPD, CXR x 2 negative, + Quantiferon gold. CXR 3/18 shows vague irregular opacity right midlung field. 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 Asymptomatic. ID recommending AFB smear x3, M. Tuberculosis DNA by PCR, and HIV testing.   -Brace on when upright, OOB, or working with therapy. OK to remove when lying in bed.   -Postop XR lumbar spine shows satisfactory posterior alignment and positioning of posterior hardware.   -Sutures in place. Will contact Dr. Caro's team regarding suture removal while inpatient. Can sit for up to 30 minutes at meal time (3 times per day).  Head of bed flat otherwise at all times.  Wound closure tension increases in sitting position.  This restriction should be in place for 6 weeks. Leave incision open to air. Can shower but should not tub soak the wounds.  No lotions, creams applied to the incision.    -Pain: Continue multimodal agents per Anesthesia recommendations. No changes in pain after nerve block. Continue Lyrica TID. Scheduled lido patches, tylenol 1000 mg Q8, and robaxin 500 mg TID. Continue oxy prn.  -Thrombocytopenia: Resolved. Ctm with cbc.   -Post op anemia: Stable. Continue iron for replacement. Improving.   -Transaminitis: Improving, f/u next lab draw. q72h labs  -HTN: Continue home dose lisinopril and Prn labetalol.   -DM2: continue POCT glucose, SSI, diabetic diet  -GERD: continue home dose pepcid  -DVT prophylaxis: CLIFF's, SCD's, SQH  -Bowel regimen: senna BID and miralax daily. States he uses enema or suppository normally 1-2x per week at home.  -Atelectasis prevention: IS hourly while awake, PT/OT, OOB > 6 hours per day. Continue to encourage OOB mobility and up to chair with meals.       Dispo: not  contagious per ID, medically stable for discharge

## 2020-03-22 NOTE — PROGRESS NOTES
[facility]  Neurosurgery  Progress Note    Subjective:     History of Present Illness: This is a 70-year-old man with a history of a non instrumented lumbar fusion many years ago who presents with severe progressive axial low back pain, bilateral radicular leg pain, and thoracic myelopathy.  Additionally he had severe postural deformity that was functionally disabling.  He failed multiple conservative measures.  Imaging showed severe central stenosis with spinal cord compression from T10-L2, solid fusion from his previous surgery from L3 to the sacrum, and flat back deformity with severe fixed sagittal plane deformity. Recommended patient undergo spinal cord decompression and deformity reduction surgery, via a T10 to pelvis instrumented fusion with an L3 PSO and a multilevel laminectomy for spinal cord decompression.  Risks benefits alternatives indications and methods were reviewed in detail and he wished to proceed.  All questions were answered.  No guarantees about the results the procedure. Presents to [facility] for T10-pelvis and L3 PSO.    Post-Op Info:  Procedure(s) (LRB):  Block, Nerve (Bilateral)   12 Days Post-Op     Interval History: NAEON. Awaiting placement, poossible d/c tommorrow    Medications:  Continuous Infusions:  Scheduled Meds:   famotidine  40 mg Oral QHS    ferrous sulfate  325 mg Oral BID    heparin (porcine)  5,000 Units Subcutaneous Q8H    lidocaine  2 patch Transdermal Q24H    lisinopriL  5 mg Oral Daily    methocarbamoL  750 mg Oral TID    polyethylene glycol  17 g Oral Daily    pregabalin  150 mg Oral TID    senna-docusate 8.6-50 mg  1 tablet Oral BID     PRN Meds:acetaminophen, aluminum-magnesium hydroxide-simethicone, bisacodyL, Dextrose 10% Bolus, Dextrose 10% Bolus, fentaNYL, glucagon (human recombinant), glucose, glucose, HYDROmorphone, insulin aspart U-100, labetalol, midazolam, ondansetron, oxyCODONE, oxyCODONE, promethazine (PHENERGAN) IVPB, sodium  chloride 0.9%       Objective:     Weight: 98.4 kg (216 lb 14.9 oz)  Body mass index is 28.62 kg/m².  Vital Signs (Most Recent):  Temp: 98.5 °F (36.9 °C) (03/22/20 1617)  Pulse: 97 (03/22/20 1617)  Resp: 18 (03/22/20 1617)  BP: 121/60 (03/22/20 1617)  SpO2: (!) 93 % (03/22/20 1617) Vital Signs (24h Range):  Temp:  [97.9 °F (36.6 °C)-99 °F (37.2 °C)] 98.5 °F (36.9 °C)  Pulse:  [75-97] 97  Resp:  [16-18] 18  SpO2:  [93 %-100 %] 93 %  BP: ()/(57-70) 121/60     Date 03/22/20 0700 - 03/23/20 0659   Shift 6657-7595 8299-6341 2437-3861 24 Hour Total   INTAKE   Shift Total(mL/kg)       OUTPUT   Urine(mL/kg/hr) 925(1.2)   925   Shift Total(mL/kg) 925(9.4)   925(9.4)   Weight (kg) 98.4 98.4 98.4 98.4                 Neurosurgery Physical Exam      General: well developed, well nourished, no distress.   Head: normocephalic, atraumatic  Neck: No tracheal deviation. No palpable masses.   Neurologic: Alert and oriented. Thought content appropriate.  GCS: Motor: 6/Verbal: 5/Eyes: 4 GCS Total: 15  Mental Status: Awake, Alert, Oriented x 4  Language: No aphasia  Speech: No dysarthria  Cranial nerves: face symmetric, tongue midline, CN II-XII grossly intact.   Eyes: pupils equal, round, reactive to light with accomodation, EOMI.   Ears: No drainage.   Pulmonary: normal respirations, no signs of respiratory distress  Abdomen: soft, non-distended, not tender to palpation  Sensory: intact to light touch throughout  Motor Strength: Moves all extremities spontaneously with good tone.  Pain limited weakness of right hip/knee flexion 2/2 groin pain. Otherwise full strength upper and lower extremities. No abnormal movements seen.     Strength  Deltoids Triceps Biceps Wrist Extension Wrist Flexion Hand    Upper: R 5/5 5/5 5/5 5/5 5/5 5/5    L 5/5 5/5 5/5 5/5 5/5 5/5     Iliopsoas Quadriceps Knee  Flexion Tibialis  anterior Gastro- cnemius EHL   Lower: R 5-/5 5/5 5-/5 5/5 5/5 5/5    L 5/5 5/5 5/5 5/5 5/5 5/5     Vascular: Pulses 2+  and symmetric radial and dorsalis pedis. No LE edema.   Skin: Skin is warm, dry and intact.    Incision c/d/I with skin edges well approximated with sutures. No surrounding erythema or edema. No drainage from incision. Abdominal binder in place.       Significant Labs:  Recent Labs   Lab 03/21/20  0548   *      K 4.6      CO2 28   BUN 13   CREATININE 1.0   CALCIUM 9.3     Recent Labs   Lab 03/21/20  0548   WBC 5.93   HGB 9.4*   HCT 32.4*   *     No results for input(s): LABPT, INR, APTT in the last 48 hours.  Microbiology Results (last 7 days)     Procedure Component Value Units Date/Time    AFB Culture & Smear [[id number]] Collected:  03/20/20 0118    Order Status:  Completed Specimen:  Sputum from Nares, Right Updated:  03/21/20 0927     AFB Culture & Smear Culture in progress     AFB CULTURE STAIN No acid fast bacilli seen.    AFB Culture & Smear [[id number]] Collected:  03/19/20 0930    Order Status:  Completed Specimen:  Sputum, Induced Updated:  03/20/20 2127     AFB Culture & Smear Culture in progress     AFB CULTURE STAIN No acid fast bacilli seen.    AFB Culture & Smear [[id number]] Collected:  03/19/20 1805    Order Status:  Completed Specimen:  Sputum, Induced Updated:  03/20/20 2127     AFB Culture & Smear Culture in progress     AFB CULTURE STAIN No acid fast bacilli seen.        Recent Lab Results       03/22/20  1608   03/22/20  1128   03/22/20  0748   03/22/20  0007        POCT Glucose 120 110 122 124         All pertinent labs from the last 24 hours have been reviewed.    Significant Diagnostics:  I have reviewed all pertinent imaging results/findings within the past 24 hours.    Review of Systems        Assessment/Plan:     Degenerative scoliosis  A 70 year old with degenerative lumbar spondy and spine deformity now s/p T10 - pelvis posterior segmental instrumented fusion, T10 - L3 laminectomy for decompression, L3 pedical subtraction osteotomy (3/3).    -Patient  neurologically stable on exam.   -ID: + PPD, CXR x 2 negative, + Quantiferon gold. CXR 3/18 shows vague irregular opacity right midlung field. 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 ID recommending AFB smear x3, M. Tuberculosis DNA by PCR, and HIV testing.   -Brace on when upright, OOB, or working with therapy. OK to remove when lying in bed.   -Postop XR lumbar spine shows satisfactory posterior alignment and positioning of posterior hardware.   -Sutures in place. Will contact Dr. Caro's team regarding suture removal while inpatient. Can sit for up to 30 minutes at meal time (3 times per day).  Head of bed flat otherwise at all times.  Wound closure tension increases in sitting position.  This restriction should be in place for 6 weeks. Leave incision open to air. Can shower but should not tub soak the wounds.  No lotions, creams applied to the incision.    -Pain: Continue multimodal agents per Anesthesia recommendations. No changes in pain after nerve block. Continue Lyrica TID. Scheduled lido patches, tylenol 1000 mg Q8, and robaxin 500 mg TID. Continue oxy prn.  -Thrombocytopenia: Resolved. Ctm with cbc.   -Post op anemia: Stable. Continue iron for replacement. Improving.   -Transaminitis: Improving, f/u next lab draw. q72h labs  -HTN: Continue home dose lisinopril and Prn labetalol.   -DM2: continue POCT glucose, SSI, diabetic diet  -GERD: continue home dose pepcid  -DVT prophylaxis: CLIFF's, SCD's, SQH  -Bowel regimen: senna BID and miralax daily. States he uses enema or suppository normally 1-2x per week at home.  -Atelectasis prevention: IS hourly while awake, PT/OT, OOB > 6 hours per day. Continue to encourage OOB mobility and up to chair with meals.       Dispo: not contagious per ID, medically stable for discharge        Onesimo Salguero MD  Neurosurgery  [facility]

## 2020-03-22 NOTE — PLAN OF CARE
Pt aao x4. Lower back kimberley r/t incision from spinal fusion procedure. Pt given PRN pain medications to manage the pain with some success. Pt wearing abdominal binder. Pt complains of Weakness, numbness and tingling in the legs which he said started several years prior to this admit. Pt educated about and verbalized understanding of medications.

## 2020-03-22 NOTE — PROGRESS NOTES
[facility]  Neurosurgery  Progress Note    Subjective:     History of Present Illness: This is a 70-year-old man with a history of a non instrumented lumbar fusion many years ago who presents with severe progressive axial low back pain, bilateral radicular leg pain, and thoracic myelopathy.  Additionally he had severe postural deformity that was functionally disabling.  He failed multiple conservative measures.  Imaging showed severe central stenosis with spinal cord compression from T10-L2, solid fusion from his previous surgery from L3 to the sacrum, and flat back deformity with severe fixed sagittal plane deformity. Recommended patient undergo spinal cord decompression and deformity reduction surgery, via a T10 to pelvis instrumented fusion with an L3 PSO and a multilevel laminectomy for spinal cord decompression.  Risks benefits alternatives indications and methods were reviewed in detail and he wished to proceed.  All questions were answered.  No guarantees about the results the procedure. Presents to [facility] for T10-pelvis and L3 PSO.    Post-Op Info:  Procedure(s) (LRB):  Block, Nerve (Bilateral)   11 Days Post-Op     Interval History: NAEON. Enema today, otherwise no complaints    Medications:  Continuous Infusions:  Scheduled Meds:   famotidine  40 mg Oral QHS    ferrous sulfate  325 mg Oral BID    heparin (porcine)  5,000 Units Subcutaneous Q8H    lidocaine  2 patch Transdermal Q24H    lisinopriL  5 mg Oral Daily    methocarbamoL  750 mg Oral TID    polyethylene glycol  17 g Oral Daily    pregabalin  150 mg Oral TID    senna-docusate 8.6-50 mg  1 tablet Oral BID    sodium phosphates  1 enema Rectal Once     PRN Meds:acetaminophen, aluminum-magnesium hydroxide-simethicone, bisacodyL, Dextrose 10% Bolus, Dextrose 10% Bolus, fentaNYL, glucagon (human recombinant), glucose, glucose, HYDROmorphone, insulin aspart U-100, labetalol, midazolam, ondansetron, oxyCODONE, oxyCODONE,  promethazine (PHENERGAN) IVPB, sodium chloride 0.9%       Objective:     Weight: 98.4 kg (216 lb 14.9 oz)  Body mass index is 28.62 kg/m².  Vital Signs (Most Recent):  Temp: 97.9 °F (36.6 °C) (03/21/20 1958)  Pulse: 89 (03/21/20 1958)  Resp: 16 (03/21/20 1958)  BP: 116/68 (03/21/20 1958)  SpO2: 97 % (03/21/20 1958) Vital Signs (24h Range):  Temp:  [97.2 °F (36.2 °C)-99.3 °F (37.4 °C)] 97.9 °F (36.6 °C)  Pulse:  [77-96] 89  Resp:  [16-18] 16  SpO2:  [95 %-98 %] 97 %  BP: ()/(52-69) 116/68     Date 03/21/20 0700 - 03/22/20 0659   Shift 0682-4518 5998-8174 4644-2497 24 Hour Total   INTAKE   P.O. 480 360  840   Shift Total(mL/kg) 480(4.9) 360(3.7)  840(8.5)   OUTPUT   Urine(mL/kg/hr) 550(0.7) 200(0.3)  750   Shift Total(mL/kg) 550(5.6) 200(2)  750(7.6)   Weight (kg) 98.4 98.4 98.4 98.4                 Neurosurgery Physical Exam      General: well developed, well nourished, no distress.   Head: normocephalic, atraumatic  Neck: No tracheal deviation. No palpable masses.   Neurologic: Alert and oriented. Thought content appropriate.  GCS: Motor: 6/Verbal: 5/Eyes: 4 GCS Total: 15  Mental Status: Awake, Alert, Oriented x 4  Language: No aphasia  Speech: No dysarthria  Cranial nerves: face symmetric, tongue midline, CN II-XII grossly intact.   Eyes: pupils equal, round, reactive to light with accomodation, EOMI.   Ears: No drainage.   Pulmonary: normal respirations, no signs of respiratory distress  Abdomen: soft, non-distended, not tender to palpation  Sensory: intact to light touch throughout  Motor Strength: Moves all extremities spontaneously with good tone.  Pain limited weakness of right hip/knee flexion 2/2 groin pain. Otherwise full strength upper and lower extremities. No abnormal movements seen.     Strength  Deltoids Triceps Biceps Wrist Extension Wrist Flexion Hand    Upper: R 5/5 5/5 5/5 5/5 5/5 5/5    L 5/5 5/5 5/5 5/5 5/5 5/5     Iliopsoas Quadriceps Knee  Flexion Tibialis  anterior Gastro- cnemius  EHL   Lower: R 5-/5 5/5 5-/5 5/5 5/5 5/5    L 5/5 5/5 5/5 5/5 5/5 5/5     Vascular: Pulses 2+ and symmetric radial and dorsalis pedis. No LE edema.   Skin: Skin is warm, dry and intact.    Incision c/d/I with skin edges well approximated with sutures. No surrounding erythema or edema. No drainage from incision. Abdominal binder in place.       Significant Labs:  Recent Labs   Lab 03/21/20  0548   *      K 4.6      CO2 28   BUN 13   CREATININE 1.0   CALCIUM 9.3     Recent Labs   Lab 03/21/20  0548   WBC 5.93   HGB 9.4*   HCT 32.4*   *     No results for input(s): LABPT, INR, APTT in the last 48 hours.  Microbiology Results (last 7 days)     Procedure Component Value Units Date/Time    AFB Culture & Smear [[id number]] Collected:  03/20/20 0118    Order Status:  Completed Specimen:  Sputum from Nares, Right Updated:  03/21/20 0927     AFB Culture & Smear Culture in progress     AFB CULTURE STAIN No acid fast bacilli seen.    AFB Culture & Smear [[id number]] Collected:  03/19/20 0930    Order Status:  Completed Specimen:  Sputum, Induced Updated:  03/20/20 2127     AFB Culture & Smear Culture in progress     AFB CULTURE STAIN No acid fast bacilli seen.    AFB Culture & Smear [[id number]] Collected:  03/19/20 1805    Order Status:  Completed Specimen:  Sputum, Induced Updated:  03/20/20 2127     AFB Culture & Smear Culture in progress     AFB CULTURE STAIN No acid fast bacilli seen.        Recent Lab Results       03/21/20  1126   03/21/20  0727   03/21/20  0548        Albumin     2.6     Alkaline Phosphatase     115     ALT     31     Anion Gap     7     AST     33     Baso #     0.04     Basophil%     0.7     BILIRUBIN TOTAL     0.3  Comment:  For infants and newborns, interpretation of results should be based  on gestational age, weight and in agreement with clinical  observations.  Premature Infant recommended reference ranges:  Up to 24 hours.............<8.0 mg/dL  Up to 48  hours............<12.0 mg/dL  3-5 days..................<15.0 mg/dL  6-29 days.................<15.0 mg/dL       BUN, Bld     13     Calcium     9.3     Chloride     102     CO2     28     Creatinine     1.0     Differential Method     Automated     eGFR if      >60.0     eGFR if non      >60.0  Comment:  Calculation used to obtain the estimated glomerular filtration  rate (eGFR) is the CKD-EPI equation.        Eos #     0.1     Eosinophil%     1.7     Glucose     121     Gran # (ANC)     3.1     Gran%     52.0     Hematocrit     32.4     Hemoglobin     9.4     Immature Grans (Abs)     0.08  Comment:  Mild elevation in immature granulocytes is non specific and   can be seen in a variety of conditions including stress response,   acute inflammation, trauma and pregnancy. Correlation with other   laboratory and clinical findings is essential.       Immature Granulocytes     1.3     Lymph #     2.0     Lymph%     34.4     MCH     26.7     MCHC     29.0     MCV     92     Mono #     0.6     Mono%     9.9     MPV     10.7     nRBC     0     Platelets     394     POCT Glucose 125 140       Potassium     4.6     PROTEIN TOTAL     6.3     RBC     3.52     RDW     13.7     Sodium     137     WBC     5.93         All pertinent labs from the last 24 hours have been reviewed.    Significant Diagnostics:  I have reviewed all pertinent imaging results/findings within the past 24 hours.    Review of Systems        Assessment/Plan:     Degenerative scoliosis  A 70 year old with degenerative lumbar spondy and spine deformity now s/p T10 - pelvis posterior segmental instrumented fusion, T10 - L3 laminectomy for decompression, L3 pedical subtraction osteotomy (3/3).    -Patient neurologically stable on exam.   -ID: + PPD, CXR x 2 negative, + Quantiferon gold. CXR 3/18 shows vague irregular opacity right midlung field. Hx working in a [employer], not immunocompromised. Asymptomatic. ID recommending AFB smear  x3, M. Tuberculosis DNA by PCR, and HIV testing.   -Brace on when upright, OOB, or working with therapy. OK to remove when lying in bed.   -Postop XR lumbar spine shows satisfactory posterior alignment and positioning of posterior hardware.   -Sutures in place. Will contact Dr. Caro's team regarding suture removal while inpatient. Can sit for up to 30 minutes at meal time (3 times per day).  Head of bed flat otherwise at all times.  Wound closure tension increases in sitting position.  This restriction should be in place for 6 weeks. Leave incision open to air. Can shower but should not tub soak the wounds.  No lotions, creams applied to the incision.    -Pain: Continue multimodal agents per Anesthesia recommendations. No changes in pain after nerve block. Continue Lyrica TID. Scheduled lido patches, tylenol 1000 mg Q8, and robaxin 500 mg TID. Continue oxy prn.  -Thrombocytopenia: Resolved. Ctm with cbc.   -Post op anemia: Stable. Continue iron for replacement. Improving.   -Transaminitis: Improving, f/u next lab draw. q72h labs  -HTN: Continue home dose lisinopril and Prn labetalol.   -DM2: continue POCT glucose, SSI, diabetic diet  -GERD: continue home dose pepcid  -DVT prophylaxis: CLIFF's, SCD's, SQH  -Bowel regimen: senna BID and miralax daily. States he uses enema or suppository normally 1-2x per week at home.  -Atelectasis prevention: IS hourly while awake, PT/OT, OOB > 6 hours per day. Continue to encourage OOB mobility and up to chair with meals.       Dispo: not contagious per ID, medically stable for discharge        Onesimo Salguero MD  Neurosurgery  [facility]

## 2020-03-22 NOTE — PLAN OF CARE
Problem: Diabetes Comorbidity  Goal: Blood Glucose Level Within Desired Range  Intervention: Maintain Glycemic Control  Flowsheets (Taken 3/22/2020 1846)  Glycemic Management: blood glucose monitoring; oral hydration promoted     Problem: Fall Injury Risk  Goal: Absence of Fall and Fall-Related Injury  Intervention: Promote Injury-Free Environment  Flowsheets (Taken 3/22/2020 1846)  Safety Promotion/Fall Prevention: assistive device/personal item within reach; bed alarm set; Fall Risk reviewed with patient/family; Fall Risk signage in place; commode/urinal/bedpan at bedside; lighting adjusted; medications reviewed; nonskid shoes/socks when out of bed; side rails raised x 2; instructed to call staff for mobility  Environmental Safety Modification: assistive device/personal items within reach; clutter free environment maintained; lighting adjusted     Problem: Skin Injury Risk Increased  Goal: Skin Health and Integrity  Intervention: Optimize Skin Protection  Flowsheets (Taken 3/22/2020 1846)  Pressure Reduction Techniques: frequent weight shift encouraged  Pressure Reduction Devices: pressure-redistributing mattress utilized; foam padding utilized  Skin Protection: adhesive use limited; incontinence pads utilized; tubing/devices free from skin contact  Head of Bed (HOB): HOB elevated     Problem: Pain Chronic (Persistent) (Comorbidity Management)  Goal: Acceptable Pain Control and Functional Ability  Intervention: Develop Pain Management Plan  Flowsheets (Taken 3/22/2020 1846)  Pain Management Interventions: around-the-clock dosing utilized; care clustered; pain management plan reviewed with patient/caregiver; position adjusted; quiet environment facilitated     Problem: Pain Chronic (Persistent) (Comorbidity Management)  Goal: Acceptable Pain Control and Functional Ability  Intervention: Manage Persistent Pain  Flowsheets (Taken 3/22/2020 1846)  Sleep/Rest Enhancement: awakenings minimized; consistent schedule  promoted; regular sleep/rest pattern promoted; relaxation techniques promoted  Bowel Elimination Promotion: adequate fluid intake promoted; privacy promoted; commode/bedpan at bedside  Medication Review/Management: medications reviewed     Problem: Adult Inpatient Plan of Care  Goal: Plan of Care Review  Outcome: Ongoing, Progressing  Flowsheets (Taken 3/22/2020 1846)  Plan of Care Reviewed With: patient  POC reviewed with patient, ALMA Mccurdy. Pain medication utilized ATC for control. Fleet's Enema given x 1 for constipation, patient had large BM. Accuchecks AC/HS, no insulin required. Abd binder on in bed. No evidence of distress, safety maintained, hourly rounding performed. Will continue to monitor.

## 2020-03-22 NOTE — ASSESSMENT & PLAN NOTE
A 70 year old with degenerative lumbar spondy and spine deformity now s/p T10 - pelvis posterior segmental instrumented fusion, T10 - L3 laminectomy for decompression, L3 pedical subtraction osteotomy (3/3).    -Patient neurologically stable on exam.   -ID: + PPD, CXR x 2 negative, + Quantiferon gold. CXR 3/18 shows vague irregular opacity right midlung field. 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 Asymptomatic. ID recommending AFB smear x3, M. Tuberculosis DNA by PCR, and HIV testing.   -Brace on when upright, OOB, or working with therapy. OK to remove when lying in bed.   -Postop XR lumbar spine shows satisfactory posterior alignment and positioning of posterior hardware.   -Sutures in place. Will contact Dr. Caro's team regarding suture removal while inpatient. Can sit for up to 30 minutes at meal time (3 times per day).  Head of bed flat otherwise at all times.  Wound closure tension increases in sitting position.  This restriction should be in place for 6 weeks. Leave incision open to air. Can shower but should not tub soak the wounds.  No lotions, creams applied to the incision.    -Pain: Continue multimodal agents per Anesthesia recommendations. No changes in pain after nerve block. Continue Lyrica TID. Scheduled lido patches, tylenol 1000 mg Q8, and robaxin 500 mg TID. Continue oxy prn.  -Thrombocytopenia: Resolved. Ctm with cbc.   -Post op anemia: Stable. Continue iron for replacement. Improving.   -Transaminitis: Improving, f/u next lab draw. q72h labs  -HTN: Continue home dose lisinopril and Prn labetalol.   -DM2: continue POCT glucose, SSI, diabetic diet  -GERD: continue home dose pepcid  -DVT prophylaxis: CLIFF's, SCD's, SQH  -Bowel regimen: senna BID and miralax daily. States he uses enema or suppository normally 1-2x per week at home.  -Atelectasis prevention: IS hourly while awake, PT/OT, OOB > 6 hours per day. Continue to encourage OOB mobility and up to chair with meals.       Dispo: not  contagious per ID, medically stable for discharge

## 2020-03-23 LAB
M TB CMPLX DNA SPEC QL NAA+PROBE: NEGATIVE
POCT GLUCOSE: 110 MG/DL (ref 70–110)
POCT GLUCOSE: 116 MG/DL (ref 70–110)
POCT GLUCOSE: 129 MG/DL (ref 70–110)
POCT GLUCOSE: 134 MG/DL (ref 70–110)
SPECIMEN SOURCE: NORMAL

## 2020-03-23 PROCEDURE — 25000003 PHARM REV CODE 250: Performed by: PHYSICIAN ASSISTANT

## 2020-03-23 PROCEDURE — 97535 SELF CARE MNGMENT TRAINING: CPT

## 2020-03-23 PROCEDURE — 97530 THERAPEUTIC ACTIVITIES: CPT

## 2020-03-23 PROCEDURE — 63600175 PHARM REV CODE 636 W HCPCS: Performed by: PHYSICIAN ASSISTANT

## 2020-03-23 PROCEDURE — 99024 POSTOP FOLLOW-UP VISIT: CPT | Mod: POP,,, | Performed by: PHYSICIAN ASSISTANT

## 2020-03-23 PROCEDURE — 20600001 HC STEP DOWN PRIVATE ROOM

## 2020-03-23 PROCEDURE — 97110 THERAPEUTIC EXERCISES: CPT

## 2020-03-23 PROCEDURE — 99024 PR POST-OP FOLLOW-UP VISIT: ICD-10-PCS | Mod: POP,,, | Performed by: PHYSICIAN ASSISTANT

## 2020-03-23 PROCEDURE — 25000003 PHARM REV CODE 250: Performed by: STUDENT IN AN ORGANIZED HEALTH CARE EDUCATION/TRAINING PROGRAM

## 2020-03-23 RX ADMIN — OXYCODONE HYDROCHLORIDE 15 MG: 10 TABLET ORAL at 05:03

## 2020-03-23 RX ADMIN — LIDOCAINE 2 PATCH: 50 PATCH TOPICAL at 01:03

## 2020-03-23 RX ADMIN — METHOCARBAMOL TABLETS 750 MG: 750 TABLET, COATED ORAL at 09:03

## 2020-03-23 RX ADMIN — SENNOSIDES AND DOCUSATE SODIUM 1 TABLET: 8.6; 5 TABLET ORAL at 09:03

## 2020-03-23 RX ADMIN — PREGABALIN 150 MG: 150 CAPSULE ORAL at 09:03

## 2020-03-23 RX ADMIN — HEPARIN SODIUM 5000 UNITS: 5000 INJECTION, SOLUTION INTRAVENOUS; SUBCUTANEOUS at 01:03

## 2020-03-23 RX ADMIN — PREGABALIN 150 MG: 150 CAPSULE ORAL at 03:03

## 2020-03-23 RX ADMIN — POLYETHYLENE GLYCOL 3350 17 G: 17 POWDER, FOR SOLUTION ORAL at 08:03

## 2020-03-23 RX ADMIN — METHOCARBAMOL TABLETS 750 MG: 750 TABLET, COATED ORAL at 03:03

## 2020-03-23 RX ADMIN — HEPARIN SODIUM 5000 UNITS: 5000 INJECTION, SOLUTION INTRAVENOUS; SUBCUTANEOUS at 05:03

## 2020-03-23 RX ADMIN — HEPARIN SODIUM 5000 UNITS: 5000 INJECTION, SOLUTION INTRAVENOUS; SUBCUTANEOUS at 09:03

## 2020-03-23 RX ADMIN — HYDROMORPHONE HYDROCHLORIDE 0.5 MG: 1 INJECTION, SOLUTION INTRAMUSCULAR; INTRAVENOUS; SUBCUTANEOUS at 02:03

## 2020-03-23 RX ADMIN — SENNOSIDES AND DOCUSATE SODIUM 1 TABLET: 8.6; 5 TABLET ORAL at 08:03

## 2020-03-23 RX ADMIN — HYDROMORPHONE HYDROCHLORIDE 0.5 MG: 1 INJECTION, SOLUTION INTRAMUSCULAR; INTRAVENOUS; SUBCUTANEOUS at 01:03

## 2020-03-23 RX ADMIN — OXYCODONE HYDROCHLORIDE 15 MG: 10 TABLET ORAL at 09:03

## 2020-03-23 RX ADMIN — METHOCARBAMOL TABLETS 750 MG: 750 TABLET, COATED ORAL at 08:03

## 2020-03-23 RX ADMIN — FAMOTIDINE 40 MG: 20 TABLET, FILM COATED ORAL at 09:03

## 2020-03-23 RX ADMIN — LISINOPRIL 5 MG: 5 TABLET ORAL at 08:03

## 2020-03-23 RX ADMIN — FERROUS SULFATE TAB EC 325 MG (65 MG FE EQUIVALENT) 325 MG: 325 (65 FE) TABLET DELAYED RESPONSE at 09:03

## 2020-03-23 RX ADMIN — OXYCODONE HYDROCHLORIDE 15 MG: 10 TABLET ORAL at 03:03

## 2020-03-23 NOTE — ASSESSMENT & PLAN NOTE
A 70 year old with degenerative lumbar spondy and spine deformity now s/p T10 - pelvis posterior segmental instrumented fusion, T10 - L3 laminectomy for decompression, L3 pedical subtraction osteotomy (3/3).    -Patient neurologically stable on exam.   -ID: + PPD, CXR x 2 negative, + Quantiferon gold. CXR 3/18 shows vague irregular opacity right midlung field. AFB smear x3 negative. M. Tuberculosis DNA by PCR negative. HIV quantitative negative. 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 Asymptomatic. Discussed with ID, AFB is negative x3 so patient is safe for discharge to rehab.    -Brace on when upright, OOB, or working with therapy. OK to remove when lying in bed.   -Postop XR lumbar spine shows satisfactory posterior alignment and positioning of posterior hardware.   -Sutures in place, will remove at postop appointment on 4/1 with Dr. Caro. Can sit for up to 30 minutes at a time.  Head of bed flat otherwise at all times.  Wound closure tension increases in sitting position.  This restriction should be in place for 6 weeks. Leave incision open to air. Can shower but should not tub soak the wounds.  No lotions, creams applied to the incision.    -Pain: Continue multimodal agents per Anesthesia recommendations. No changes in pain after nerve block. Continue Lyrica TID and robaxin 750 mg TID. Continue oxy prn. Increasing right groin pain with increased activity, ctm.  -Thrombocytopenia: Resolved. Ctm with cbc.   -Post op anemia: Stable. Continue iron for replacement. Improving.   -Transaminitis: Improving, f/u next lab draw. q72h labs  -HTN: Continue home dose lisinopril and Prn labetalol.   -DM2: continue POCT glucose, SSI, diabetic diet  -GERD: continue home dose pepcid  -DVT prophylaxis: CLIFF's, SCD's, SQH  -Bowel regimen: senna BID and miralax daily. States he uses enema or suppository normally 1-2x per week at home. Last BM 3/23.  -Atelectasis prevention: IS hourly while awake, PT/OT, OOB > 6 hours per  day. Continue to encourage OOB mobility and up to chair with meals.     Dispo: Pending placement. Not contagious per ID. Medically stable for discharge. Discussed current medical status with Director of Nursing at [facility] 3/23, she states per [place] guidelines the facility cannot accept anyone with +PPD and +quantiferon gold test without treatment. I provided her with the updated information regarding negative AFB smears and negative M. Tuberculosis DNA by PCR, indicating he does not have an active TB infection and does not require treatment at this time. She states she will call me back once she has reviewed all information. Referrals sent by CM/SW to other facilities.

## 2020-03-23 NOTE — PT/OT/SLP PROGRESS
"Physical Therapy      Patient Name:  Garcia Renteria   MRN:  [id number]    Patient not seen today secondary to (pt w/OT during first attempt and stated "can you come back later I'm exhausted?" on second attempt. Writing therapist unable to make third attempt). Will follow-up as able.    Richard Gauthier, PT    "

## 2020-03-23 NOTE — PROGRESS NOTES
"  [facility]  Adult Nutrition  Consult Note    SUMMARY     Recommendations    1. Continue current Diabetic diet + Boost Glucose Control ONS.   2. RD to monitor & follow-up.    Goals: Pt to consume/tolerate % of EEN/EPn by RD follow-up.  Nutrition Goal Status: goal met  Communication of RD Recs: other (comment)(POC)    Reason for Assessment    Reason For Assessment: RD follow-up  Diagnosis: surgery/postoperative complications  Relevant Medical History: HTN, DM2, HLD, GERD, Diverticulosis  Interdisciplinary Rounds: did not attend    General Information Comments: Pt continues w/ adequate PO intake, consuming % of meals. 13 days post-op bilateral nerve block. NFPE not warranted at this time - pt has no wt loss, pt is eating >75% of meals, and pt appears well nourished.   Nutrition Discharge Planning: Adequate PO intake    Nutrition/Diet History    Patient Reported Diet/Restrictions/Preferences: general  Spiritual, Cultural Beliefs, Bahai Practices, Values that Affect Care: no  Factors Affecting Nutritional Intake: None identified at this time    Anthropometrics    Temp: 98.6 °F (37 °C)  Height Method: Measured  Height: 6' 1" (185.4 cm)  Height (inches): 73 in  Weight Method: Standard Scale  Weight: 98.4 kg (216 lb 14.9 oz)  Weight (lb): 216.93 lb  Ideal Body Weight (IBW), Male: 184 lb  % Ideal Body Weight, Male (lb): 117.9 %  BMI (Calculated): 28.6  BMI Grade: 25 - 29.9 - overweight    Lab/Procedures/Meds    Pertinent Labs Reviewed: reviewed  Pertinent Labs Comments: WDL  Pertinent Medications Reviewed: reviewed  Pertinent Medications Comments: -    Estimated/Assessed Needs    Weight Used For Calorie Calculations: 98.4 kg (216 lb 14.9 oz)     Energy Calorie Requirements (kcal): 2254  Energy Need Method: [place]     Protein Requirements: 99 g/d (1 g/kg)  Weight Used For Protein Calculations: 98.4 kg (216 lb 14.9 oz)     Fluid Requirements (mL): 1mL/kcal or per MD    Nutrition " Prescription Ordered    Current Diet Order: Diabetic 2000 kcal  Oral Nutrition Supplement: Boost Glucose    Evaluation of Received Nutrient/Fluid Intake    Comments: LBM: 3/23    Tolerance: tolerating    Nutrition Risk    Level of Risk/Frequency of Follow-up: (1x/week)     Assessment and Plan    Nutrition Problem  Inadequate energy intake     Related to (etiology):   Decreased appetite     Signs and Symptoms (as evidenced by):   Pt consuming <50% of EEN/EPN.     Interventions (treatment strategy):  Collaboration of care with providers      Nutrition Diagnosis Status:   Improving     Monitor and Evaluation    Food and Nutrient Intake: energy intake, food and beverage intake  Food and Nutrient Adminstration: diet order  Knowledge/Beliefs/Attitudes: food and nutrition knowledge/skill  Physical Activity and Function: nutrition-related ADLs and IADLs  Anthropometric Measurements: weight, weight change, body mass index  Biochemical Data, Medical Tests and Procedures: electrolyte and renal panel, gastrointestinal profile, glucose/endocrine profile, inflammatory profile, lipid profile  Nutrition-Focused Physical Findings: overall appearance     Nutrition Follow-Up    RD Follow-up?: Yes

## 2020-03-23 NOTE — PT/OT/SLP PROGRESS
Occupational Therapy   Treatment    Name: Garcia Renteria  MRN: [id number]  Admitting Diagnosis:  Acquired spondylolisthesis of thoracolumbar vertebra  13 Days Post-Op    Recommendations:     Discharge Recommendations: rehabilitation facility  Discharge Equipment Recommendations:  (TBD next level of care)      Assessment:     Garcia Renteria is a 70 y.o. male with a medical diagnosis of Acquired spondylolisthesis of thoracolumbar vertebra.  He presents with good participation and motivation.  Pt continues to be at risk for falls & is not at prior baseline with ADL's. Performance deficits affecting function are weakness, impaired endurance, impaired self care skills, impaired functional mobilty, impaired balance, decreased upper extremity function, decreased lower extremity function, decreased safety awareness.     Rehab Prognosis:  Fair; patient would benefit from acute skilled OT services to address these deficits and reach maximum level of function.       Plan:     Patient to be seen 4 x/week to address the above listed problems via self-care/home management, therapeutic activities, therapeutic exercises, neuromuscular re-education  · Plan of Care Expires: 04/04/20  · Plan of Care Reviewed with: patient    Subjective     Pain/Comfort:  · Pain Rating 1: 0/10  · Pain Rating Post-Intervention 1: 0/10    Objective:     Communicated with: RN prior to session.  Patient found up in chair with telemetry upon OT entry to room.    General Precautions: Standard, aspiration, fall, seizure   Orthopedic Precautions:spinal precautions   Braces: TLSO     Occupational Performance:     Bed Mobility:    · Patient completed Scooting/Bridging with stand by assistance  · Patient completed Sit to Supine with minimum assistance     Functional Mobility/Transfers:  · Patient completed Sit <> Stand Transfer with moderate assistance and of 2 persons  with  rolling walker   · Functional Mobility: CGA-Min (A) using RW in room for household  distances    Activities of Daily Living:  · Upper Body Dressing: minimum assistance seated in chair  · Lower Body Dressing: minimum assistance doffing & donning socks using sock aid & reacher while seated in chair      [facility] 6 Click ADL: 19    Treatment & Education:  Pt performed AROM exercises with BUE in 3 planes x 10 reps each while seated EOB. Provided education regarding donning & positioning of brace with pt verbalizing understanding.  Provided education via verbal & demonstrated instruction on use of AE during LE dressing.  Provided education regarding role of OT, POC, & discharge recommendations with pt & family verbalizing understanding.  Pt had no further questions & when asked whether there were any concerns pt reported none.    Patient left supine with all lines intact, call button in reach, RN notified and white board updated.Education:      GOALS:   Multidisciplinary Problems     Occupational Therapy Goals        Problem: Occupational Therapy Goal    Goal Priority Disciplines Outcome Interventions   Occupational Therapy Goal     OT, PT/OT Ongoing, Progressing    Description:  Updated Goals to be met by 7 days (3/26/20)  Patient will increase functional independence with ADLs by performing:    UE Dressing with SBA including TLSO.   LE Dressing with Moderate Assistance using AD as needed.  Grooming while seated with SBA.  Toileting from bedside commode with Minimal Assistance for hygiene and clothing management.   Supine to sit with SBA.  Toilet transfer to bedside commode with Minimal Assistance.  Pt will complete functional mobility household distance with SBA using AD as needed.  Pt will verbalize understanding of 3/3 spinal precautions without added cues.                        Time Tracking:     OT Date of Treatment: 03/23/20  OT Start Time: 1018  OT Stop Time: 1103  OT Total Time (min): 45 min    Billable Minutes:Self Care/Home Management 20  Therapeutic Activity 15  Therapeutic Exercise  10    Chela Sanchez, LOTR  3/23/2020

## 2020-03-23 NOTE — PLAN OF CARE
"CM met with Mr. Renteria to update him on his discharge plan. Patient informed that [facility] can not meet his needs at this time. He expressed frustration, but stated "I understand." Mr. Renteria informed this CM that he is amenable to sending out additional referrals. SW notified of above conversation. Will continue to follow.    Corin Christy RN  Ext [phone]  "

## 2020-03-23 NOTE — PLAN OF CARE
SW sent referrals again via [facility] and also spoke with admissions at [facility] rehab.  SW read PT/OT notes with admissions coordinator, Ana, and explained that pt is actively participating and asked if she could take another look at pt for possible admission to their inpt rehab.  Ana stated that she would look at new notes and call SW with an answer.  SW will also send other referrals to area SNF and rehab near/around [place].      Katheryn Ma LMSW

## 2020-03-23 NOTE — PROGRESS NOTES
[facility]  Neurosurgery  Progress Note    Subjective:     History of Present Illness: This is a 70-year-old man with a history of a non instrumented lumbar fusion many years ago who presents with severe progressive axial low back pain, bilateral radicular leg pain, and thoracic myelopathy.  Additionally he had severe postural deformity that was functionally disabling.  He failed multiple conservative measures.  Imaging showed severe central stenosis with spinal cord compression from T10-L2, solid fusion from his previous surgery from L3 to the sacrum, and flat back deformity with severe fixed sagittal plane deformity. Recommended patient undergo spinal cord decompression and deformity reduction surgery, via a T10 to pelvis instrumented fusion with an L3 PSO and a multilevel laminectomy for spinal cord decompression.  Risks benefits alternatives indications and methods were reviewed in detail and he wished to proceed.  All questions were answered.  No guarantees about the results the procedure. Presents to [facility] for T10-pelvis and L3 PSO.    Post-Op Info:  Procedure(s) (LRB):  Block, Nerve (Bilateral)   13 Days Post-Op     Interval History: NAEON. AFVSS. Up in chair today. Complains of worsening right groin pain with radiation into anterolateral right thigh. Increases with movement. Voiding spontaneously. BM today. Denies weakness, paresthesias, b/b dysfunction. Pending placement.    Medications:  Continuous Infusions:  Scheduled Meds:   famotidine  40 mg Oral QHS    ferrous sulfate  325 mg Oral BID    heparin (porcine)  5,000 Units Subcutaneous Q8H    lidocaine  2 patch Transdermal Q24H    lisinopriL  5 mg Oral Daily    methocarbamoL  750 mg Oral TID    polyethylene glycol  17 g Oral Daily    pregabalin  150 mg Oral TID    senna-docusate 8.6-50 mg  1 tablet Oral BID     PRN Meds:acetaminophen, aluminum-magnesium hydroxide-simethicone, bisacodyL, Dextrose 10% Bolus, Dextrose 10% Bolus,  fentaNYL, glucagon (human recombinant), glucose, glucose, HYDROmorphone, insulin aspart U-100, labetalol, midazolam, ondansetron, oxyCODONE, oxyCODONE, promethazine (PHENERGAN) IVPB, sodium chloride 0.9%     Objective:     Weight: 98.4 kg (216 lb 14.9 oz)  Body mass index is 28.62 kg/m².  Vital Signs (Most Recent):  Temp: 98.6 °F (37 °C) (03/23/20 1126)  Pulse: 96 (03/23/20 1149)  Resp: 18 (03/23/20 1126)  BP: (!) 96/54 (03/23/20 1126)  SpO2: 95 % (03/23/20 1126) Vital Signs (24h Range):  Temp:  [98.5 °F (36.9 °C)-98.8 °F (37.1 °C)] 98.6 °F (37 °C)  Pulse:  [60-97] 96  Resp:  [16-20] 18  SpO2:  [93 %-98 %] 95 %  BP: ()/(54-64) 96/54     Date 03/23/20 0700 - 03/24/20 0659   Shift 5872-6271 6347-0360 2820-8009 24 Hour Total   INTAKE   Shift Total(mL/kg)       OUTPUT   Urine(mL/kg/hr) 450   450   Shift Total(mL/kg) 450(4.6)   450(4.6)   Weight (kg) 98.4 98.4 98.4 98.4                 Neurosurgery Physical Exam    General: well developed, well nourished, no distress.   Head: normocephalic, atraumatic  Neck: No tracheal deviation. No palpable masses.   Neurologic: Alert and oriented. Thought content appropriate.  GCS: Motor: 6/Verbal: 5/Eyes: 4 GCS Total: 15  Mental Status: Awake, Alert, Oriented x 4  Language: No aphasia  Speech: No dysarthria  Cranial nerves: face symmetric, tongue midline, CN II-XII grossly intact.   Eyes: pupils equal, round, reactive to light with accomodation, EOMI.   Ears: No drainage.   Pulmonary: normal respirations, no signs of respiratory distress  Abdomen: soft, non-distended, not tender to palpation  Sensory: intact to light touch throughout  Motor Strength: Moves all extremities spontaneously with good tone. Pain limited weakness on hip flexion of RLE, otherwise full strength upper and lower extremities. No abnormal movements seen.     Strength  Deltoids Triceps Biceps Wrist Extension Wrist Flexion Hand    Upper: R 5/5 5/5 5/5 5/5 5/5 5/5    L 5/5 5/5 5/5 5/5 5/5 5/5     Iliopsoas  Quadriceps Knee  Flexion Tibialis  anterior Gastro- cnemius EHL   Lower: R 3-/5 5/5 5/5 5/5 5/5 5/5    L 5/5 5/5 5/5 5/5 5/5 5/5     Clonus: absent  Vascular: Pulses 2+ and symmetric radial and dorsalis pedis. No LE edema.   Skin: Skin is warm, dry and intact.    Incision c/d/I with skin edges well approximated with sutures. No surrounding erythema or edema. No drainage from incision. No palpable underlying fluid collection.    Significant Labs:  No results for input(s): GLU, NA, K, CL, CO2, BUN, CREATININE, CALCIUM, MG in the last 48 hours.  No results for input(s): WBC, HGB, HCT, PLT in the last 48 hours.  No results for input(s): LABPT, INR, APTT in the last 48 hours.  Microbiology Results (last 7 days)     Procedure Component Value Units Date/Time    AFB Culture & Smear [[id number]] Collected:  03/20/20 0118    Order Status:  Completed Specimen:  Sputum from Nares, Right Updated:  03/21/20 0927     AFB Culture & Smear Culture in progress     AFB CULTURE STAIN No acid fast bacilli seen.    AFB Culture & Smear [[id number]] Collected:  03/19/20 0930    Order Status:  Completed Specimen:  Sputum, Induced Updated:  03/20/20 2127     AFB Culture & Smear Culture in progress     AFB CULTURE STAIN No acid fast bacilli seen.    AFB Culture & Smear [[id number]] Collected:  03/19/20 1805    Order Status:  Completed Specimen:  Sputum, Induced Updated:  03/20/20 2127     AFB Culture & Smear Culture in progress     AFB CULTURE STAIN No acid fast bacilli seen.        Recent Lab Results       03/23/20  1134   03/23/20  0839   03/22/20  2102   03/22/20  1608        POCT Glucose 116 110 111 120         All pertinent labs from the last 24 hours have been reviewed.    Significant Diagnostics:  I have reviewed all pertinent imaging results/findings within the past 24 hours.    Assessment/Plan:     Degenerative scoliosis  A 70 year old with degenerative lumbar spondy and spine deformity now s/p T10 - pelvis posterior segmental  instrumented fusion, T10 - L3 laminectomy for decompression, L3 pedical subtraction osteotomy (3/3).    -Patient neurologically stable on exam.   -ID: + PPD, CXR x 2 negative, + Quantiferon gold. CXR 3/18 shows vague irregular opacity right midlung field. AFB smear x3 negative. M. Tuberculosis DNA by PCR negative. HIV quantitative negative. 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 Asymptomatic. Discussed with ID, AFB is negative x3 so patient is safe for discharge to rehab.    -Brace on when upright, OOB, or working with therapy. OK to remove when lying in bed.   -Postop XR lumbar spine shows satisfactory posterior alignment and positioning of posterior hardware.   -Sutures in place, will remove at postop appointment on 4/1 with Dr. Caro. Can sit for up to 30 minutes at a time.  Head of bed flat otherwise at all times.  Wound closure tension increases in sitting position.  This restriction should be in place for 6 weeks. Leave incision open to air. Can shower but should not tub soak the wounds.  No lotions, creams applied to the incision.    -Pain: Continue multimodal agents per Anesthesia recommendations. No changes in pain after nerve block. Continue Lyrica TID and robaxin 750 mg TID. Continue oxy prn. Increasing right groin pain with increased activity, ctm.  -Thrombocytopenia: Resolved. Ctm with cbc.   -Post op anemia: Stable. Continue iron for replacement. Improving.   -Transaminitis: Improving, f/u next lab draw. q72h labs  -HTN: Continue home dose lisinopril and Prn labetalol.   -DM2: continue POCT glucose, SSI, diabetic diet  -GERD: continue home dose pepcid  -DVT prophylaxis: CLIFF's, SCD's, SQH  -Bowel regimen: senna BID and miralax daily. States he uses enema or suppository normally 1-2x per week at home. Last BM 3/23.  -Atelectasis prevention: IS hourly while awake, PT/OT, OOB > 6 hours per day. Continue to encourage OOB mobility and up to chair with meals.     Dispo: Pending placement. Not  contagious per ID. Medically stable for discharge. Discussed current medical status with Director of Nursing at [facility] 3/23, she states per [place] guidelines the facility cannot accept anyone with +PPD and +quantiferon gold test without treatment. I provided her with the updated information regarding negative AFB smears and negative M. Tuberculosis DNA by PCR, indicating he does not have an active TB infection and does not require treatment at this time. She states she will call me back once she has reviewed all information. Referrals sent by CM/PAKO to other facilities.         Alva Cuevas PA-C  Neurosurgery  [facility]

## 2020-03-23 NOTE — PLAN OF CARE
"PAKO spoke with Ms. Ruggiero at [facility] and was told that she had a conversation with PAKO Sheikh, and claims that she told [facility] that they would NOT be able to take pt even if his AFBs and other tests were negative.  Ms. Ruggiero stated that she was not going to be able to admit.  PAKO asked if there was anyone else that we could speak to regarding this matter and she stated "No!  This came from my nursing directors!"  PAKO expressed understanding and will seek directives from Drs assigned to case.        Katheryn Ma LMSW  "

## 2020-03-23 NOTE — PLAN OF CARE
Pt covered with prn pain meds all night with somewhat successful pain management results. Patient turned himself and performed incentive spirometry every hour he was awake. C/o right hip pain radiating down the RLE causing complete immobility of the R hip. Pt advised to notify nurse or pct for assistance with ambulating or bedpan use. No insulin coverage required. Pt is optimistic in regards to his recovery and demonstrates understanding of his medications, limits of mobility, and overall plan of care.

## 2020-03-23 NOTE — SUBJECTIVE & OBJECTIVE
Interval History: NAEON. AFVSS. Up in chair today. Complains of worsening right groin pain with radiation into anterolateral right thigh. Increases with movement. Voiding spontaneously. BM today. Denies weakness, paresthesias, b/b dysfunction. Pending placement.    Medications:  Continuous Infusions:  Scheduled Meds:   famotidine  40 mg Oral QHS    ferrous sulfate  325 mg Oral BID    heparin (porcine)  5,000 Units Subcutaneous Q8H    lidocaine  2 patch Transdermal Q24H    lisinopriL  5 mg Oral Daily    methocarbamoL  750 mg Oral TID    polyethylene glycol  17 g Oral Daily    pregabalin  150 mg Oral TID    senna-docusate 8.6-50 mg  1 tablet Oral BID     PRN Meds:acetaminophen, aluminum-magnesium hydroxide-simethicone, bisacodyL, Dextrose 10% Bolus, Dextrose 10% Bolus, fentaNYL, glucagon (human recombinant), glucose, glucose, HYDROmorphone, insulin aspart U-100, labetalol, midazolam, ondansetron, oxyCODONE, oxyCODONE, promethazine (PHENERGAN) IVPB, sodium chloride 0.9%     Objective:     Weight: 98.4 kg (216 lb 14.9 oz)  Body mass index is 28.62 kg/m².  Vital Signs (Most Recent):  Temp: 98.6 °F (37 °C) (03/23/20 1126)  Pulse: 96 (03/23/20 1149)  Resp: 18 (03/23/20 1126)  BP: (!) 96/54 (03/23/20 1126)  SpO2: 95 % (03/23/20 1126) Vital Signs (24h Range):  Temp:  [98.5 °F (36.9 °C)-98.8 °F (37.1 °C)] 98.6 °F (37 °C)  Pulse:  [60-97] 96  Resp:  [16-20] 18  SpO2:  [93 %-98 %] 95 %  BP: ()/(54-64) 96/54     Date 03/23/20 0700 - 03/24/20 0659   Shift 4724-8872 9020-9534 3917-9630 24 Hour Total   INTAKE   Shift Total(mL/kg)       OUTPUT   Urine(mL/kg/hr) 450   450   Shift Total(mL/kg) 450(4.6)   450(4.6)   Weight (kg) 98.4 98.4 98.4 98.4                 Neurosurgery Physical Exam    General: well developed, well nourished, no distress.   Head: normocephalic, atraumatic  Neck: No tracheal deviation. No palpable masses.   Neurologic: Alert and oriented. Thought content appropriate.  GCS: Motor: 6/Verbal: 5/Eyes:  4 GCS Total: 15  Mental Status: Awake, Alert, Oriented x 4  Language: No aphasia  Speech: No dysarthria  Cranial nerves: face symmetric, tongue midline, CN II-XII grossly intact.   Eyes: pupils equal, round, reactive to light with accomodation, EOMI.   Ears: No drainage.   Pulmonary: normal respirations, no signs of respiratory distress  Abdomen: soft, non-distended, not tender to palpation  Sensory: intact to light touch throughout  Motor Strength: Moves all extremities spontaneously with good tone. Pain limited weakness on hip flexion of RLE, otherwise full strength upper and lower extremities. No abnormal movements seen.     Strength  Deltoids Triceps Biceps Wrist Extension Wrist Flexion Hand    Upper: R 5/5 5/5 5/5 5/5 5/5 5/5    L 5/5 5/5 5/5 5/5 5/5 5/5     Iliopsoas Quadriceps Knee  Flexion Tibialis  anterior Gastro- cnemius EHL   Lower: R 3-/5 5/5 5/5 5/5 5/5 5/5    L 5/5 5/5 5/5 5/5 5/5 5/5     Clonus: absent  Vascular: Pulses 2+ and symmetric radial and dorsalis pedis. No LE edema.   Skin: Skin is warm, dry and intact.    Incision c/d/I with skin edges well approximated with sutures. No surrounding erythema or edema. No drainage from incision. No palpable underlying fluid collection.    Significant Labs:  No results for input(s): GLU, NA, K, CL, CO2, BUN, CREATININE, CALCIUM, MG in the last 48 hours.  No results for input(s): WBC, HGB, HCT, PLT in the last 48 hours.  No results for input(s): LABPT, INR, APTT in the last 48 hours.  Microbiology Results (last 7 days)     Procedure Component Value Units Date/Time    AFB Culture & Smear [[id number]] Collected:  03/20/20 0118    Order Status:  Completed Specimen:  Sputum from Nares, Right Updated:  03/21/20 0927     AFB Culture & Smear Culture in progress     AFB CULTURE STAIN No acid fast bacilli seen.    AFB Culture & Smear [[id number]] Collected:  03/19/20 0930    Order Status:  Completed Specimen:  Sputum, Induced Updated:  03/20/20 2127     AFB Culture &  Smear Culture in progress     AFB CULTURE STAIN No acid fast bacilli seen.    AFB Culture & Smear [[id number]] Collected:  03/19/20 1805    Order Status:  Completed Specimen:  Sputum, Induced Updated:  03/20/20 2127     AFB Culture & Smear Culture in progress     AFB CULTURE STAIN No acid fast bacilli seen.        Recent Lab Results       03/23/20  1134   03/23/20  0839   03/22/20  2102   03/22/20  1608        POCT Glucose 116 110 111 120         All pertinent labs from the last 24 hours have been reviewed.    Significant Diagnostics:  I have reviewed all pertinent imaging results/findings within the past 24 hours.

## 2020-03-23 NOTE — PLAN OF CARE
Problem: Occupational Therapy Goal  Goal: Occupational Therapy Goal  Description  Updated Goals to be met by 7 days (3/26/20)  Patient will increase functional independence with ADLs by performing:    UE Dressing with SBA including TLSO.   LE Dressing with Moderate Assistance using AD as needed.  Grooming while seated with SBA.  Toileting from bedside commode with Minimal Assistance for hygiene and clothing management.   Supine to sit with SBA.  Toilet transfer to bedside commode with Minimal Assistance.  Pt will complete functional mobility household distance with SBA using AD as needed.  Pt will verbalize understanding of 3/3 spinal precautions without added cues.       Outcome: Ongoing, Progressing     Goals remain appropriate

## 2020-03-23 NOTE — PLAN OF CARE
Pt AAOx4, VSS, pain controlled with PRN Oxy and Dilaudid. Pt awaiting placement to rehab facility. Pt up with PT today and ambulated around room. Pt up in chair for part of day. Pt updated on plan of care. Will continue to monitor.

## 2020-03-24 VITALS
HEIGHT: 73 IN | DIASTOLIC BLOOD PRESSURE: 54 MMHG | SYSTOLIC BLOOD PRESSURE: 91 MMHG | WEIGHT: 227.06 LBS | RESPIRATION RATE: 17 BRPM | BODY MASS INDEX: 30.09 KG/M2 | TEMPERATURE: 99 F | OXYGEN SATURATION: 95 % | HEART RATE: 95 BPM

## 2020-03-24 LAB
ALBUMIN SERPL BCP-MCNC: 2.8 G/DL (ref 3.5–5.2)
ALP SERPL-CCNC: 117 U/L (ref 55–135)
ALT SERPL W/O P-5'-P-CCNC: 28 U/L (ref 10–44)
ANION GAP SERPL CALC-SCNC: 9 MMOL/L (ref 8–16)
AST SERPL-CCNC: 27 U/L (ref 10–40)
BASOPHILS # BLD AUTO: 0.03 K/UL (ref 0–0.2)
BASOPHILS NFR BLD: 0.5 % (ref 0–1.9)
BILIRUB SERPL-MCNC: 0.3 MG/DL (ref 0.1–1)
BUN SERPL-MCNC: 13 MG/DL (ref 8–23)
CALCIUM SERPL-MCNC: 9.4 MG/DL (ref 8.7–10.5)
CHLORIDE SERPL-SCNC: 100 MMOL/L (ref 95–110)
CO2 SERPL-SCNC: 28 MMOL/L (ref 23–29)
CREAT SERPL-MCNC: 1 MG/DL (ref 0.5–1.4)
DIFFERENTIAL METHOD: ABNORMAL
EOSINOPHIL # BLD AUTO: 0.1 K/UL (ref 0–0.5)
EOSINOPHIL NFR BLD: 1.6 % (ref 0–8)
ERYTHROCYTE [DISTWIDTH] IN BLOOD BY AUTOMATED COUNT: 13.5 % (ref 11.5–14.5)
EST. GFR  (AFRICAN AMERICAN): >60 ML/MIN/1.73 M^2
EST. GFR  (NON AFRICAN AMERICAN): >60 ML/MIN/1.73 M^2
GLUCOSE SERPL-MCNC: 112 MG/DL (ref 70–110)
HCT VFR BLD AUTO: 33.2 % (ref 40–54)
HGB BLD-MCNC: 9.7 G/DL (ref 14–18)
IMM GRANULOCYTES # BLD AUTO: 0.08 K/UL (ref 0–0.04)
IMM GRANULOCYTES NFR BLD AUTO: 1.4 % (ref 0–0.5)
LYMPHOCYTES # BLD AUTO: 2.4 K/UL (ref 1–4.8)
LYMPHOCYTES NFR BLD: 42.1 % (ref 18–48)
MCH RBC QN AUTO: 26.9 PG (ref 27–31)
MCHC RBC AUTO-ENTMCNC: 29.2 G/DL (ref 32–36)
MCV RBC AUTO: 92 FL (ref 82–98)
MONOCYTES # BLD AUTO: 0.7 K/UL (ref 0.3–1)
MONOCYTES NFR BLD: 12.4 % (ref 4–15)
NEUTROPHILS # BLD AUTO: 2.3 K/UL (ref 1.8–7.7)
NEUTROPHILS NFR BLD: 42 % (ref 38–73)
NRBC BLD-RTO: 0 /100 WBC
PLATELET # BLD AUTO: 334 K/UL (ref 150–350)
PMV BLD AUTO: 10.8 FL (ref 9.2–12.9)
POCT GLUCOSE: 126 MG/DL (ref 70–110)
POCT GLUCOSE: 146 MG/DL (ref 70–110)
POTASSIUM SERPL-SCNC: 4.5 MMOL/L (ref 3.5–5.1)
PROT SERPL-MCNC: 6.7 G/DL (ref 6–8.4)
RBC # BLD AUTO: 3.61 M/UL (ref 4.6–6.2)
SODIUM SERPL-SCNC: 137 MMOL/L (ref 136–145)
WBC # BLD AUTO: 5.58 K/UL (ref 3.9–12.7)

## 2020-03-24 PROCEDURE — 25000003 PHARM REV CODE 250: Performed by: PHYSICIAN ASSISTANT

## 2020-03-24 PROCEDURE — 99024 POSTOP FOLLOW-UP VISIT: CPT | Mod: POP,,, | Performed by: PHYSICIAN ASSISTANT

## 2020-03-24 PROCEDURE — 63600175 PHARM REV CODE 636 W HCPCS: Performed by: PHYSICIAN ASSISTANT

## 2020-03-24 PROCEDURE — 80053 COMPREHEN METABOLIC PANEL: CPT

## 2020-03-24 PROCEDURE — 85025 COMPLETE CBC W/AUTO DIFF WBC: CPT

## 2020-03-24 PROCEDURE — 97535 SELF CARE MNGMENT TRAINING: CPT

## 2020-03-24 PROCEDURE — 99232 SBSQ HOSP IP/OBS MODERATE 35: CPT | Mod: ,,, | Performed by: NURSE PRACTITIONER

## 2020-03-24 PROCEDURE — 25000003 PHARM REV CODE 250: Performed by: STUDENT IN AN ORGANIZED HEALTH CARE EDUCATION/TRAINING PROGRAM

## 2020-03-24 PROCEDURE — 97530 THERAPEUTIC ACTIVITIES: CPT

## 2020-03-24 PROCEDURE — 36415 COLL VENOUS BLD VENIPUNCTURE: CPT

## 2020-03-24 PROCEDURE — 99024 PR POST-OP FOLLOW-UP VISIT: ICD-10-PCS | Mod: POP,,, | Performed by: PHYSICIAN ASSISTANT

## 2020-03-24 PROCEDURE — 99232 PR SUBSEQUENT HOSPITAL CARE,LEVL II: ICD-10-PCS | Mod: ,,, | Performed by: NURSE PRACTITIONER

## 2020-03-24 RX ORDER — METHOCARBAMOL 750 MG/1
750 TABLET, FILM COATED ORAL 3 TIMES DAILY
Qty: 40 TABLET | Refills: 0 | Status: SHIPPED | OUTPATIENT
Start: 2020-03-24 | End: 2022-12-28

## 2020-03-24 RX ADMIN — SENNOSIDES AND DOCUSATE SODIUM 1 TABLET: 8.6; 5 TABLET ORAL at 08:03

## 2020-03-24 RX ADMIN — HEPARIN SODIUM 5000 UNITS: 5000 INJECTION, SOLUTION INTRAVENOUS; SUBCUTANEOUS at 06:03

## 2020-03-24 RX ADMIN — OXYCODONE HYDROCHLORIDE 15 MG: 10 TABLET ORAL at 10:03

## 2020-03-24 RX ADMIN — OXYCODONE HYDROCHLORIDE 15 MG: 10 TABLET ORAL at 04:03

## 2020-03-24 RX ADMIN — HEPARIN SODIUM 5000 UNITS: 5000 INJECTION, SOLUTION INTRAVENOUS; SUBCUTANEOUS at 02:03

## 2020-03-24 RX ADMIN — METHOCARBAMOL TABLETS 750 MG: 750 TABLET, COATED ORAL at 08:03

## 2020-03-24 RX ADMIN — HYDROMORPHONE HYDROCHLORIDE 0.5 MG: 1 INJECTION, SOLUTION INTRAMUSCULAR; INTRAVENOUS; SUBCUTANEOUS at 07:03

## 2020-03-24 RX ADMIN — METHOCARBAMOL TABLETS 750 MG: 750 TABLET, COATED ORAL at 02:03

## 2020-03-24 RX ADMIN — PREGABALIN 150 MG: 150 CAPSULE ORAL at 02:03

## 2020-03-24 RX ADMIN — HYDROMORPHONE HYDROCHLORIDE 0.5 MG: 1 INJECTION, SOLUTION INTRAMUSCULAR; INTRAVENOUS; SUBCUTANEOUS at 12:03

## 2020-03-24 RX ADMIN — LIDOCAINE 2 PATCH: 50 PATCH TOPICAL at 02:03

## 2020-03-24 RX ADMIN — LISINOPRIL 5 MG: 5 TABLET ORAL at 08:03

## 2020-03-24 RX ADMIN — POLYETHYLENE GLYCOL 3350 17 G: 17 POWDER, FOR SOLUTION ORAL at 08:03

## 2020-03-24 RX ADMIN — PREGABALIN 150 MG: 150 CAPSULE ORAL at 08:03

## 2020-03-24 NOTE — PROGRESS NOTES
[facility]  Physical Medicine & Rehab  Progress Note    Patient Name: Garcia Renteria  MRN: [id number]  Admission Date: 3/3/2020  Length of Stay: 21 days  Attending Physician: Francis Alan MD    Subjective:     Principal Problem:Acquired spondylolisthesis of thoracolumbar vertebra    Hospital Course:   3/4/20: Evaluated by PT & OT. Bed mobility maxA x 2 ppl. LBD totalA.   3/5/20: Participated w/ OT. Bed mobility maxA x 2 ppl. LBD & UBD totalA.   3/6/20: Participated w/ OT & PT. Bed mobility maxA x 2 ppl. UBD & LBD totalA. Seated EOB x 5 mins max A.   3/9/20: Participated w/ OT. Bed mobility maxA x 2 ppl. UBD & LBD totalA.   3/10/20: Participated w/ PT. Bed mobility Mitch- maxA x 2 ppl. Sit to stand maxA x 2ppl.   3/23/20: Participated w/ OT. Bed mobility SBA- Mitch. Sit to stand modA x 2 ppl w/ RW. UBD & LBD Mitch.     Interval History 3/24/2020:  Patient is seen for follow-up rehab evaluation and recommendations: Participating with therapy, no drains in place.    HPI, Past Medical, Family, and Social History remains the same as documented in the initial encounter.    Scheduled Medications:    famotidine  40 mg Oral QHS    ferrous sulfate  325 mg Oral BID    heparin (porcine)  5,000 Units Subcutaneous Q8H    lidocaine  2 patch Transdermal Q24H    lisinopriL  5 mg Oral Daily    methocarbamoL  750 mg Oral TID    polyethylene glycol  17 g Oral Daily    pregabalin  150 mg Oral TID    senna-docusate 8.6-50 mg  1 tablet Oral BID       Diagnostic Results:   Labs: Reviewed    PRN Medications: acetaminophen, aluminum-magnesium hydroxide-simethicone, bisacodyL, Dextrose 10% Bolus, Dextrose 10% Bolus, glucagon (human recombinant), glucose, glucose, HYDROmorphone, insulin aspart U-100, labetalol, ondansetron, oxyCODONE, oxyCODONE, promethazine (PHENERGAN) IVPB, sodium chloride 0.9%    Review of Systems   Constitutional: Positive for activity change. Negative for fatigue and fever.   HENT: Negative  for trouble swallowing and voice change.    Respiratory: Negative for cough and shortness of breath.    Cardiovascular: Negative for chest pain and leg swelling.   Gastrointestinal: Negative for abdominal distention and abdominal pain.   Genitourinary: Negative for difficulty urinating and flank pain.   Musculoskeletal: Positive for gait problem. Negative for back pain.   Skin: Positive for wound. Negative for color change and rash.   Neurological: Positive for weakness. Negative for speech difficulty and numbness.   Psychiatric/Behavioral: Negative for agitation and confusion.     Objective:     Vital Signs (Most Recent):  Temp: 98.4 °F (36.9 °C) (03/24/20 0845)  Pulse: 82 (03/24/20 0845)  Resp: 18 (03/24/20 0845)  BP: 104/63 (03/24/20 0845)  SpO2: 97 % (03/24/20 0845)    Vital Signs (24h Range):  Temp:  [98.4 °F (36.9 °C)-99.6 °F (37.6 °C)] 98.4 °F (36.9 °C)  Pulse:  [] 82  Resp:  [17-18] 18  SpO2:  [95 %-97 %] 97 %  BP: ()/(54-72) 104/63     Physical Exam   Constitutional: He is oriented to person, place, and time. He appears well-developed and well-nourished.   HENT:   Head: Normocephalic and atraumatic.   Eyes: Pupils are equal, round, and reactive to light. Right eye exhibits no discharge. Left eye exhibits no discharge.   Neck: Neck supple.   Pulmonary/Chest: Effort normal. No respiratory distress.   Abdominal: Soft. He exhibits no distension. There is no tenderness.   Musculoskeletal: He exhibits no edema or deformity.   Bilateral lower ext weakness  Upper ext 5/5   Neurological: He is alert and oriented to person, place, and time. No sensory deficit. He exhibits normal muscle tone.   - dressing in place to back  - following commands   Skin: Skin is warm and dry.   Psychiatric: He has a normal mood and affect. His behavior is normal. Thought content normal.   Nursing note and vitals reviewed.    Assessment/Plan:      * Acquired spondylolisthesis of thoracolumbar vertebra  - 3/3 s/p T10-pelvis  instrumented fusion, T10- L2 laminectomy, L3 PSO, and complex wound closure by plastic surgery.   - Plastic surgery recommending head of bed flat at all times except meals 30 mis 3x/day x 6 weeks.    - Per NSGY TLSO when upright/OOB.  - Nerve block pending     Impaired mobility  - Related to prolonged/acute hospital course.     Recommendations  -  Encourage mobility, OOB in chair at least 3 hours per day, and early ambulation as appropriate  -  PT/OT evaluate and treat  -  Pain management  -  Monitor for and prevent skin breakdown and pressure ulcers  · Early mobility, repositioning/weight shifting every 20-30 minutes when sitting, turn patient every 2 hours, proper mattress/overlay and chair cushioning, pressure relief/heel protector boots  -  DVT prophylaxis    -  Reviewed discharge options (IP rehab, SNF, HH therapy, and OP therapy)    Will follow progress with therapy.     Jaelyn Gallardo NP  Department of Physical Medicine & Rehab   [facility]

## 2020-03-24 NOTE — PLAN OF CARE
Pt accepted to [facility], at the request of Mrs. Prema Renteria (wife), as per Larisa in admissions.  [facility] RN should call report to nurse for room 233 at [phone].  Transportation order placed in pt chart for 215pm and all questions regarding transfer should be directed to extension [phone], option 5.  Transport packet printed and sent to nurses's station desk on 8th floor tower.              Katheryn Ma LMSW  Ext [phone]

## 2020-03-24 NOTE — DISCHARGE SUMMARY
[facility]  Neurosurgery  Discharge Summary      Patient Name: Garcia Renteria  MRN: [id number]  Admission Date: 3/3/2020  Hospital Length of Stay: 21 days  Discharge Date and Time:  03/24/2020   Attending Physician: Francis Alan MD   Discharging Provider: Flores Bowen PA-C  Primary Care Provider: [facility]    HPI:   This is a 70-year-old man with a history of a non instrumented lumbar fusion many years ago who presents with severe progressive axial low back pain, bilateral radicular leg pain, and thoracic myelopathy.  Additionally he had severe postural deformity that was functionally disabling.  He failed multiple conservative measures.  Imaging showed severe central stenosis with spinal cord compression from T10-L2, solid fusion from his previous surgery from L3 to the sacrum, and flat back deformity with severe fixed sagittal plane deformity. Recommended patient undergo spinal cord decompression and deformity reduction surgery, via a T10 to pelvis instrumented fusion with an L3 PSO and a multilevel laminectomy for spinal cord decompression.  Risks benefits alternatives indications and methods were reviewed in detail and he wished to proceed.  All questions were answered.  No guarantees about the results the procedure. Presents to [facility] for T10-pelvis and L3 PSO.    Procedure(s) (LRB):  Block, Nerve (Bilateral)     Hospital Course: 3/3: admit to the neuro ICU s/p T10-pelvis instrumented fusion, T10- L2 laminectomy, and L3 PSO.   3/4: No significant events over night. Requiring pain meds around the clock. Gets some relief, not total. bolused 1l  And increased IVF discussed with NSGY will step down to nsgy today  3/5:  Continues left hip pain, isolated to lateral thigh and groin.  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  Patient states he was able to sit up longer with therapy today.  We will continue to monitor  his pain.  Continue Lyrica which was started yesterday.  H&H dropped, we will transfuse 2 units today.  Improvement in left lower extremity pain/weakness.  Voiding spontaneously.  Denies worsening pain, weakness, paresthesias.  3/6: NAEON. Left hip pain and Right anterior thigh spasms limited participation with therapy today. Patient able to tolerate sitting EOB for 5 minutes, unable to stand. H/H improved some after transfusion, will ctm. Denies worsening weakness, paresthesias, b/b dysfunction. Voiding spontaneously. L HV removed today.   3/7: NAEON. H/H stable, PLTs trended up to 142. Lisinopril held for mild hypotension. Remaining R HV drain removed for low output. Pt reports continued L hip pain and bilateral thigh pain, unimproved and worse when attempting to sit up. Plan for nerve block with Anesthesia on Monday if does not improve. Otherwise no issues. Denies new/worsening weakness, paresthesias, and b/b dysfunction.    3/8: NAEON. Exam stable. Pain unchanged. NPO at midnight for possible nerve block tomorrow.   3/9: NAEON. Patient resting in bed. Wife at bedside. He continues to report severe BLE pain. States RLE pain is now equal to LLE pain. Pain begins in this lower back/buttocks, radiates down the anterolateral aspect of his leg, terminating at the knee. Pain is in the same distribution as pre op pain; however, the severity has greatly increased post operatively. Leg pain greatly increases when trying to bear weight through legs or sit. Pain is tolerable when lying down. Patient also reports muscle spasms that do not improve with PO Flexeril. Tolerating diet. Intermittent urinary retention at baseline. Denies any new bladder or bowel dysfunction.   3/10: NAEON. Nerve block this morning. Patient reports some improvement in pain since medication adjusted by Anesthesiology. No distress noted while lying in bed, although his pain is exacerbated with movement. Able to tolerate sitting edge of bed for a  longer period of time and able to bear some weight to stand with therapy today. Denies worsening weakness, numbness, paresthesias from the day prior. Voiding spontaneously. BM several days ago and asking for fleet enema, he states he uses an enema or suppository 1-2x per week at home.   3/11: NAEON. Patient reports pain is now tolerable. Able to sit edge of bed today unassisted. Denies worsening pain, weakness, paresthesias, b/b dysfunction. Continue current regimen per APS recs. Medically stable for discharge, pending acceptance.   3/12: NAEON. Strength and tolerance continues to improve daily. Denies worsening pain, weakness, paresthesias, b/b dysfunction. L YRN drain removed by plastics today. Medically stable for discharge.   3/13: NAEON. Strength stable. Tolerated PT/OT session well today, pain limited weakness in proximal RLE noted after session today. Motivated to continue progression. XR done. Patient denies worsening weakness, paresthesias, b/b dysfunction.  3/14: NAEON. Pain controlled. YRN in place per plastics. Pending DC to [facility].   3/15: NAEON. Neurologically stable. YRN per plastics. Anticipate DC in AM to rehab.   3/16: NAEON. Neuro exam stable. Ambulated to nurses station over the weekend, sat in chair yesterday for 30 minutes. Progressing daily with aggressive therapy regimen, patient remains self-motivated to progress. Denies weakness, paresthesias, b/b dysfunction. Continue YRN per plastics.   3/17: NAEON. YRN removed per plastics, Dr. Caro will arrange f/u. Ambulating well with PT, motivated to progress. Pain controlled with current regimen. Sitting edge of bed with therapy, tolerating well. Denies weakness, paresthesias, b/b dysfunction.   3/18: + Quantiferon gold test. ID consulted, will f/u recs. Will be moved to isolation room. Patient denies weakness, paresthesias, b/b dysfunction.   3/19: NAEON. Sputum obtained this morning for AFB smear. Continue isolation precautions. Up with  therapy today. Remains motivated to progress. BM yesterday. Voiding spontaneously.   3/20: NAEON. AFB smear results pending this afternoon. Endorses right groin pain this morning, controlled with current regimen. Up to chair twice yesterday and ambulated around room, motivated to continue ambulating and progress. Denies weakness, paresthesias, b/b dysfunction.  3/21: NAEON, enema today for BM, otherwise no complaints  3/22: NAEON, awaitng LTAC placement  3/23: NAEON. AFVSS. Up in chair today. Complains of worsening right groin pain with radiation into anterolateral right thigh. Increases with movement. Voiding spontaneously. BM today. Denies weakness, paresthesias, b/b dysfunction. Pending placement.   3/24: NAEON. Continues to complain of right groin/anterolateral thigh pain that increases with movement. Pain is alleviated with PO medications, but increases once medication begins to wear off. Denies new onset weakness, paresthesias, or bowel/bladder issues. [facility] has been accepted to [facility]. Medically stable for discharge. Discharge instructions reviewed with patient. He voiced understanding. All of his questions were answered.     Consults: PT/OT  Consults (From admission, onward)        Status Ordering Provider     Inpatient consult to Infectious Diseases  Once     Provider:  (Not yet assigned)    Completed KELSEY NICHOLSON     Inpatient consult to Pain Management  Once     Provider:  (Not yet assigned)    Acknowledged KELSEY NICHOLSON     Inpatient consult to Pain Management  Once     Provider:  (Not yet assigned)    Acknowledged WILLIAM SAMANIEGO     Inpatient consult to Physical Medicine Rehab  Once     Provider:  (Not yet assigned)    Completed GABRIEL BRIDGES     Inpatient consult to Registered Dietitian/Nutritionist  Once     Provider:  (Not yet assigned)    Completed GABRIEL BRIDGES     IP consult to case management/social work  Once     Provider:  (Not yet assigned)    Completed GABRIEL BRIDGES           Significant Diagnostic Studies: AFB cultures, CMP, CBC, Tuberculosis DNA by PCR, HIV RNA, quantiferon gold TB    CXR, XR thoracolumbar    Pending Diagnostic Studies:     Procedure Component Value Units Date/Time    CT Interoperative Limited [[id number]] Resulted:  03/03/20 0724    Order Status:  Sent Lab Status:  In process Updated:  03/03/20 1709        Final Active Diagnoses:    Diagnosis Date Noted POA    PRINCIPAL PROBLEM:  Acquired spondylolisthesis of thoracolumbar vertebra [M43.15] 03/03/2020 Yes    Positive QuantiFERON-TB Gold test [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] 03/18/2020 No    Impaired mobility [Z74.09] 03/05/2020 Unknown    Degenerative scoliosis [M41.50] 03/04/2020 Yes    Constipation [K59.00] 10/25/2019 Yes    Essential hypertension [I10] 10/25/2019 Yes    Type 2 diabetes mellitus with diabetic neuropathy, without long-term current use of insulin [E11.40] 10/25/2019 Yes    Gastroesophageal reflux disease [K21.9] 06/07/2019 Yes      Problems Resolved During this Admission:      Discharged Condition: good    Disposition: Home or Self Care    Follow Up: 1 week with Plastic Surgery  6 weeks post op with Neurosurgery     Follow-up Information     [facility].    Why:  Outpatient Services  Contact information:  [address] [address] [phone]             Rudy Caro MD In 2 weeks.    Specialty:  Plastic Surgery  Contact information:  [address] [address]  [phone]                 Patient Instructions:      Notify your health care provider if you experience any of the following:  temperature >100.4     Notify your health care provider if you experience any of the following:  persistent nausea and vomiting or diarrhea     Notify your health care provider if you experience any of the following:  severe uncontrolled pain     Notify your health care provider if you experience any of the following:  redness, tenderness, or signs of infection (pain,  swelling, redness, odor or green/yellow discharge around incision site)     Notify your health care provider if you experience any of the following:  difficulty breathing or increased cough     Notify your health care provider if you experience any of the following:  severe persistent headache     Notify your health care provider if you experience any of the following:  worsening rash     Notify your health care provider if you experience any of the following:  persistent dizziness, light-headedness, or visual disturbances     Notify your health care provider if you experience any of the following:  increased confusion or weakness     Activity as tolerated     Medications:  Reconciled Home Medications:      Medication List      START taking these medications    bacitracin 500 unit/gram Oint  Apply topically 2 (two) times daily.     ceFAZolin 1 gram injection  Commonly known as:  ANCEF  Inject 2,000 mg (2 g total) into the vein every 8 (eight) hours.     ferrous sulfate 325 (65 FE) MG EC tablet  Take 1 tablet (325 mg total) by mouth 2 (two) times daily.     lidocaine 5 %  Commonly known as:  LIDODERM  Place 1 patch onto the skin once daily. Remove & Discard patch within 12 hours or as directed by MD     methocarbamoL 750 MG Tab  Commonly known as:  ROBAXIN  Take 1 tablet (750 mg total) by mouth 3 (three) times daily.     oxyCODONE 15 MG Tab  Commonly known as:  ROXICODONE  Take 1 tablet (15 mg total) by mouth every 6 (six) hours as needed (pain 8-10/10).     pregabalin 150 MG capsule  Commonly known as:  LYRICA  Take 1 capsule (150 mg total) by mouth 3 (three) times daily.        CONTINUE taking these medications    famotidine 40 MG tablet  Commonly known as:  PEPCID  Take 1 tablet (40 mg total) by mouth every evening.     lisinopriL 5 MG tablet  Commonly known as:  PRINIVIL,ZESTRIL  Take 5 mg by mouth once daily.     metFORMIN 1000 MG tablet  Commonly known as:  GLUCOPHAGE  Take 500 mg by mouth daily with  breakfast.     omeprazole 20 MG capsule  Commonly known as:  PRILOSEC  Take 1 capsule (20 mg total) by mouth once daily.     sildenafiL 100 MG tablet  Commonly known as:  VIAGRA     STOOL SOFTENER (DOCUSATE MADELAINE) 240 mg capsule  Generic drug:  docusate calcium     triamterene-hydrochlorothiazide 75-50 mg 75-50 mg per tablet  Commonly known as:  MAXZIDE  Take 0.5 tablets by mouth once daily.     vitamin D 1000 units Tab  Commonly known as:  VITAMIN D3  Take 1,000 Units by mouth once daily.        STOP taking these medications    ciprofloxacin HCl 500 MG tablet  Commonly known as:  CIPRO     gabapentin 300 MG capsule  Commonly known as:  NEURONTIN     glycerin adult suppository     HYDROcodone-acetaminophen  mg per tablet  Commonly known as:  NORCO     ibuprofen 800 MG tablet  Commonly known as:  ADVIL,MOTRIN     oxyCODONE-acetaminophen  mg per tablet  Commonly known as:  PERCOCET            Flores Bowen PA-C  Neurosurgery  [facility]

## 2020-03-24 NOTE — ASSESSMENT & PLAN NOTE
A 70 year old with degenerative lumbar spondy and spine deformity now s/p T10 - pelvis posterior segmental instrumented fusion, T10 - L3 laminectomy for decompression, L3 pedical subtraction osteotomy (3/3).    - Neurologically stable  -ID: + PPD, CXR x 2 negative, + Quantiferon gold. CXR 3/18 shows vague irregular opacity right midlung field. AFB smear x3 negative. M. Tuberculosis DNA by PCR negative. HIV quantitative negative. 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 Asymptomatic. Discussed with ID, AFB is negative x3 so patient is safe for discharge to rehab.    -Brace on when upright, OOB, or working with therapy. OK to remove when lying in bed.   -Postop XR lumbar spine shows satisfactory posterior alignment and positioning of posterior hardware.   -Sutures in place, will remove at postop appointment on 4/1 with Dr. Caro. Can sit for up to 30 minutes at a time.  Head of bed flat otherwise at all times.  Wound closure tension increases in sitting position.  This restriction should be in place for 6 weeks. Leave incision open to air. Can shower but should not tub soak the wounds.  No lotions, creams applied to the incision.    -Pain: Continue multimodal agents per Anesthesia recommendations. No changes in pain after nerve block. Continue Lyrica TID and robaxin 750 mg TID. Continue oxy prn. Increasing right groin pain with increased activity, relieved with po medications.   -Thrombocytopenia: Resolved.  -Post op anemia: Stable. Continue iron for replacement. Improving.   -HTN: Continue home dose lisinopril and Prn labetalol.   -DM2: continue POCT glucose, SSI, diabetic diet  -GERD: continue home dose pepcid  -DVT prophylaxis: CLIFF's, SCD's, SQH  -Bowel regimen: senna BID and miralax daily. States he uses enema or suppository normally 1-2x per week at home. Last BM 3/23.  -Atelectasis prevention: IS hourly while awake, PT/OT, OOB > 6 hours per day. Continue to encourage OOB mobility and up to chair with  meals.     Dispo: Not contagious per ID. Medically stable for discharge. Accepted to [facility]    Discharge instructions reviewed with patient. He voiced understanding. All of his questions were answered.   He will follow up 4/1 with Dr. Caro and 6 weeks post-op with Neurosurgery     Discussed with Dr. Alan

## 2020-03-24 NOTE — PLAN OF CARE
Future Appointments   Date Time Provider Department Center   4/1/2020 11:30 AM Rudy Caro MD [facility] PLASTE [facility]   5/7/2020  9:30 AM [facility] OIC-XRAY [facility] XRAY [facility]   5/7/2020 11:00 AM Francis Alan MD [facility] NEUROS7 [facility]   5/28/2020  9:15 AM [facility] OIC-CT2 500 LB LIMIT [facility] CTSC [facility]   5/28/2020 10:30 AM Francis Alan MD [facility] NEUROS7 [facility]        03/24/20 1500   Final Note   Assessment Type Final Discharge Note   Anticipated Discharge Disposition Rehab   What phone number can be called within the next 1-3 days to see how you are doing after discharge? [phone]   Right Care Referral Info   Post Acute Recommendation IRF   Facility Name [facility]

## 2020-03-24 NOTE — NURSING
Care handoff to Sarmad at [facility] facility. Pt will be transported to rm 233, via wheelchair. Pt has no further questions at this time.

## 2020-03-24 NOTE — PLAN OF CARE
Problem: Adult Inpatient Plan of Care  Goal: Plan of Care Review  Outcome: Ongoing, Progressing     Problem: Adult Inpatient Plan of Care  Goal: Absence of Hospital-Acquired Illness or Injury  Outcome: Ongoing, Progressing     Problem: Infection  Goal: Infection Symptom Resolution  Outcome: Ongoing, Progressing     Problem: Skin Injury Risk Increased  Goal: Skin Health and Integrity  Outcome: Ongoing, Progressing   POC reviewed with patient. AAOx4. Pain managed with PO Oxycodone 15mg and IV Dilaudid 0.5 mg. SCD's remained on most of night shift. Telemetry remains in place. Rehab placement pending for patient transfer. Pt refused inflation of overlay mattress and use of wedge for q2hr turns. Education provided on prevention of skin breakdown. Bed alarm set. Non skid socks remain in place. Safety maintained. Will continue to monitor.

## 2020-03-24 NOTE — SUBJECTIVE & OBJECTIVE
Interval History 3/24/2020:  Patient is seen for follow-up rehab evaluation and recommendations: Participating with therapy, no drains in place.    HPI, Past Medical, Family, and Social History remains the same as documented in the initial encounter.    Scheduled Medications:    famotidine  40 mg Oral QHS    ferrous sulfate  325 mg Oral BID    heparin (porcine)  5,000 Units Subcutaneous Q8H    lidocaine  2 patch Transdermal Q24H    lisinopriL  5 mg Oral Daily    methocarbamoL  750 mg Oral TID    polyethylene glycol  17 g Oral Daily    pregabalin  150 mg Oral TID    senna-docusate 8.6-50 mg  1 tablet Oral BID       Diagnostic Results:   Labs: Reviewed    PRN Medications: acetaminophen, aluminum-magnesium hydroxide-simethicone, bisacodyL, Dextrose 10% Bolus, Dextrose 10% Bolus, glucagon (human recombinant), glucose, glucose, HYDROmorphone, insulin aspart U-100, labetalol, ondansetron, oxyCODONE, oxyCODONE, promethazine (PHENERGAN) IVPB, sodium chloride 0.9%    Review of Systems   Constitutional: Positive for activity change. Negative for fatigue and fever.   HENT: Negative for trouble swallowing and voice change.    Respiratory: Negative for cough and shortness of breath.    Cardiovascular: Negative for chest pain and leg swelling.   Gastrointestinal: Negative for abdominal distention and abdominal pain.   Genitourinary: Negative for difficulty urinating and flank pain.   Musculoskeletal: Positive for gait problem. Negative for back pain.   Skin: Positive for wound. Negative for color change and rash.   Neurological: Positive for weakness. Negative for speech difficulty and numbness.   Psychiatric/Behavioral: Negative for agitation and confusion.     Objective:     Vital Signs (Most Recent):  Temp: 98.4 °F (36.9 °C) (03/24/20 0845)  Pulse: 82 (03/24/20 0845)  Resp: 18 (03/24/20 0845)  BP: 104/63 (03/24/20 0845)  SpO2: 97 % (03/24/20 0845)    Vital Signs (24h Range):  Temp:  [98.4 °F (36.9 °C)-99.6 °F (37.6  °C)] 98.4 °F (36.9 °C)  Pulse:  [] 82  Resp:  [17-18] 18  SpO2:  [95 %-97 %] 97 %  BP: ()/(54-72) 104/63     Physical Exam   Constitutional: He is oriented to person, place, and time. He appears well-developed and well-nourished.   HENT:   Head: Normocephalic and atraumatic.   Eyes: Pupils are equal, round, and reactive to light. Right eye exhibits no discharge. Left eye exhibits no discharge.   Neck: Neck supple.   Pulmonary/Chest: Effort normal. No respiratory distress.   Abdominal: Soft. He exhibits no distension. There is no tenderness.   Musculoskeletal: He exhibits no edema or deformity.   Bilateral lower ext weakness  Upper ext 5/5   Neurological: He is alert and oriented to person, place, and time. No sensory deficit. He exhibits normal muscle tone.   - dressing in place to back  - following commands   Skin: Skin is warm and dry.   Psychiatric: He has a normal mood and affect. His behavior is normal. Thought content normal.   Nursing note and vitals reviewed.    NEUROLOGICAL EXAMINATION:     MENTAL STATUS   Oriented to person, place, and time.     CRANIAL NERVES     CN III, IV, VI   Pupils are equal, round, and reactive to light.

## 2020-03-24 NOTE — PT/OT/SLP PROGRESS
Occupational Therapy   Treatment    Name: Garcia Renteria  MRN: [id number]  Admitting Diagnosis:  Acquired spondylolisthesis of thoracolumbar vertebra  14 Days Post-Op    Recommendations:     Discharge Recommendations: rehabilitation facility  Discharge Equipment Recommendations:  (TBD next level of care)    Assessment:     Garcia Renteria is a 70 y.o. male with a medical diagnosis of Acquired spondylolisthesis of thoracolumbar vertebra.  He presents with good participation and motivation.  Pt continues to be at risk for falls & requires (A) with all ADL's & transfers. Performance deficits affecting function are weakness, impaired endurance, impaired self care skills, impaired functional mobilty, impaired balance, decreased upper extremity function, decreased lower extremity function.     Rehab Prognosis:  Good; patient would benefit from acute skilled OT services to address these deficits and reach maximum level of function.       Plan:     Patient to be seen 4 x/week to address the above listed problems via self-care/home management, therapeutic activities, therapeutic exercises, neuromuscular re-education  · Plan of Care Expires: 04/04/20  · Plan of Care Reviewed with: patient    Subjective     Pain/Comfort:  · Pain Addressed 1: Distraction, Nurse notified, Cessation of Activity  · Pain Rating Post-Intervention 1: (pt reported pain to RN therefore RN provided pain medication)    Objective:     Communicated with: RN prior to session.  Patient found supine with telemetry upon OT entry to room. No family present.    General Precautions: Standard, aspiration, fall, seizure   Orthopedic Precautions:spinal precautions   Braces: TLSO     Occupational Performance:     Bed Mobility:    · Patient completed Scooting/Bridging with contact guard assistance  · Patient completed Supine to Sit with minimum assistance     Functional Mobility/Transfers:  · Patient completed Sit <> Stand Transfer with minimum assistance  with   rolling walker   · Patient completed Toilet Transfer Stand Pivot technique with minimum assistance with  rolling walker  · Functional Mobility: CGA using RW in room during ADL's    Activities of Daily Living:  · Upper Body Dressing: stand by assistance donning bathrobe seated in chair  · Lower Body Dressing: maximal assistance donning underpants  · Toileting: maximal assistance from bedside commode using RW with (A) for clothing management in standing      [facility] 6 Click ADL: 17    Treatment & Education:  Provided education regarding safety during ADL's due to spinal precautions.  Provided recommendations for shoes with back such as tennis shoes for pt rather than slippers that were present in room with pt verbalizing understanding.  Pt had no further questions & when asked whether there were any concerns pt reported none.      Patient left up in chair with all lines intact, call button in reach, RN notified and white board updated.Education:      GOALS:   Multidisciplinary Problems     Occupational Therapy Goals        Problem: Occupational Therapy Goal    Goal Priority Disciplines Outcome Interventions   Occupational Therapy Goal     OT, PT/OT Ongoing, Progressing    Description:  Updated Goals to be met by 7 days (3/26/20)  Patient will increase functional independence with ADLs by performing:    UE Dressing with SBA including TLSO. - not met  LE Dressing with Moderate Assistance using AD as needed. - not met  Grooming while seated with SBA. - met  Toileting from bedside commode with Minimal Assistance for hygiene and clothing management. - not met  Supine to sit with SBA. - not met  Toilet transfer to bedside commode with Minimal Assistance. - met  Pt will complete functional mobility household distance with SBA using AD as needed. Not met  Pt will verbalize understanding of 3/3 spinal precautions without added cues. - not met                         Time Tracking:     OT Date of Treatment: 03/24/20  OT Start  Time: 0953  OT Stop Time: 1035  OT Total Time (min): 42 min    Billable Minutes:Self Care/Home Management 30  Therapeutic Activity 12    MELIDA Camp  3/24/2020

## 2020-03-24 NOTE — PT/OT/SLP PROGRESS
Physical Therapy      Patient Name:  Garcia Renteria   MRN:  [id number]    Patient not seen today secondary to pt preparing for d/c to inpatient rehab.     Radha Dunn, PT   3/24/2020

## 2020-03-24 NOTE — PLAN OF CARE
03/24/20 1426   Post-Acute Status   Post-Acute Authorization Placement  ([facility])   Post-Acute Placement Status Set-up Complete   Discharge Delays None known at this time   Discharge Plan   Discharge Plan A Rehab

## 2020-03-24 NOTE — SUBJECTIVE & OBJECTIVE
Interval History: PATRICK. Continues to complain of right groin/anterolateral thigh pain that increases with movement. Pain is alleviated with PO medications, but increases once medication begins to wear off. Denies new onset weakness, paresthesias, or bowel/bladder issues. He has been accepted to [facility]. Medically stable for discharge.    Medications:  Continuous Infusions:  Scheduled Meds:   famotidine  40 mg Oral QHS    ferrous sulfate  325 mg Oral BID    heparin (porcine)  5,000 Units Subcutaneous Q8H    lidocaine  2 patch Transdermal Q24H    lisinopriL  5 mg Oral Daily    methocarbamoL  750 mg Oral TID    polyethylene glycol  17 g Oral Daily    pregabalin  150 mg Oral TID    senna-docusate 8.6-50 mg  1 tablet Oral BID     PRN Meds:acetaminophen, aluminum-magnesium hydroxide-simethicone, bisacodyL, Dextrose 10% Bolus, Dextrose 10% Bolus, glucagon (human recombinant), glucose, glucose, HYDROmorphone, insulin aspart U-100, labetalol, ondansetron, oxyCODONE, oxyCODONE, promethazine (PHENERGAN) IVPB, sodium chloride 0.9%     Review of Systems  Objective:     Weight: 103 kg (227 lb 1.2 oz)  Body mass index is 29.96 kg/m².  Vital Signs (Most Recent):  Temp: 98.4 °F (36.9 °C) (03/24/20 0845)  Pulse: 82 (03/24/20 0845)  Resp: 18 (03/24/20 0845)  BP: 104/63 (03/24/20 0845)  SpO2: 97 % (03/24/20 0845) Vital Signs (24h Range):  Temp:  [98.4 °F (36.9 °C)-99.6 °F (37.6 °C)] 98.4 °F (36.9 °C)  Pulse:  [] 82  Resp:  [17-18] 18  SpO2:  [95 %-97 %] 97 %  BP: (101-119)/(58-72) 104/63     Date 03/24/20 0700 - 03/25/20 0659   Shift 2724-9490 2704-5181 9821-7881 24 Hour Total   INTAKE   Shift Total(mL/kg)       OUTPUT   Urine(mL/kg/hr) 200   200   Shift Total(mL/kg) 200(1.9)   200(1.9)   Weight (kg) 103 103 103 103                        Neurosurgery Physical Exam  General: well developed, well nourished, no distress.   Head: normocephalic, atraumatic  Neurologic: Alert and oriented. Thought content  appropriate.  GCS: Motor: 6/Verbal: 5/Eyes: 4 GCS Total: 15  Mental Status: Awake, Alert, Oriented x 4  Language: No aphasia  Speech: No dysarthria  Cranial nerves: face symmetric, tongue midline, CN II-XII grossly intact.   Eyes: pupils equal, round, reactive to light with accomodation, EOMI.   Pulmonary: normal respirations, no signs of respiratory distress  Abdomen: soft, non-distended, not tender to palpation  Sensory: intact to light touch throughout  Motor Strength: Moves all extremities spontaneously with good tone. Pain limited weakness on hip flexion of RLE, otherwise full strength upper and lower extremities. No abnormal movements seen.      Strength   Deltoids Triceps Biceps Wrist Extension Wrist Flexion Hand    Upper: R 5/5 5/5 5/5 5/5 5/5 5/5     L 5/5 5/5 5/5 5/5 5/5 5/5       Iliopsoas Quadriceps Knee  Flexion Tibialis  anterior Gastro- cnemius EHL   Lower: R 3/5 5/5 5/5 5/5 5/5 5/5     L 5/5 5/5 5/5 5/5 5/5 5/5      Clonus: absent  Vascular: Pulses 2+ and symmetric radial and dorsalis pedis. No LE edema.   Skin: Skin is warm, dry and intact.    Incision: Clean, dry, sutures intact. Skin edges well approximated. No surrounding erythema or edema. No drainage or TTP.       Significant Labs:  Recent Labs   Lab 03/24/20  0404   *      K 4.5      CO2 28   BUN 13   CREATININE 1.0   CALCIUM 9.4     Recent Labs   Lab 03/24/20  0404   WBC 5.58   HGB 9.7*   HCT 33.2*        No results for input(s): LABPT, INR, APTT in the last 48 hours.  Microbiology Results (last 7 days)     Procedure Component Value Units Date/Time    AFB Culture & Smear [[id number]] Collected:  03/20/20 0118    Order Status:  Completed Specimen:  Sputum from Nares, Right Updated:  03/21/20 0927     AFB Culture & Smear Culture in progress     AFB CULTURE STAIN No acid fast bacilli seen.    AFB Culture & Smear [[id number]] Collected:  03/19/20 0930    Order Status:  Completed Specimen:  Sputum, Induced Updated:   03/20/20 2127     AFB Culture & Smear Culture in progress     AFB CULTURE STAIN No acid fast bacilli seen.    AFB Culture & Smear [[id number]] Collected:  03/19/20 1805    Order Status:  Completed Specimen:  Sputum, Induced Updated:  03/20/20 2127     AFB Culture & Smear Culture in progress     AFB CULTURE STAIN No acid fast bacilli seen.        All pertinent labs from the last 24 hours have been reviewed.    Significant Diagnostics:  No results found in the last 24 hours.

## 2020-03-24 NOTE — PLAN OF CARE
Problem: Occupational Therapy Goal  Goal: Occupational Therapy Goal  Description  Updated Goals to be met by 7 days (3/26/20)  Patient will increase functional independence with ADLs by performing:    UE Dressing with SBA including TLSO. - not met  LE Dressing with Moderate Assistance using AD as needed. - not met  Grooming while seated with SBA. - met  Toileting from bedside commode with Minimal Assistance for hygiene and clothing management. - not met  Supine to sit with SBA. - not met  Toilet transfer to bedside commode with Minimal Assistance. - met  Pt will complete functional mobility household distance with SBA using AD as needed. Not met  Pt will verbalize understanding of 3/3 spinal precautions without added cues. - not met        Outcome: Ongoing, Progressing     Goals partially met on this date.  Will update at next session if pt does not discharge on this date as planned.

## 2020-03-24 NOTE — PROGRESS NOTES
[facility]  Neurosurgery  Progress Note    Subjective:     History of Present Illness: This is a 70-year-old man with a history of a non instrumented lumbar fusion many years ago who presents with severe progressive axial low back pain, bilateral radicular leg pain, and thoracic myelopathy.  Additionally he had severe postural deformity that was functionally disabling.  He failed multiple conservative measures.  Imaging showed severe central stenosis with spinal cord compression from T10-L2, solid fusion from his previous surgery from L3 to the sacrum, and flat back deformity with severe fixed sagittal plane deformity. Recommended patient undergo spinal cord decompression and deformity reduction surgery, via a T10 to pelvis instrumented fusion with an L3 PSO and a multilevel laminectomy for spinal cord decompression.  Risks benefits alternatives indications and methods were reviewed in detail and he wished to proceed.  All questions were answered.  No guarantees about the results the procedure. [place] [facility] [place] and L3 PSO.    Post-Op Info:  Procedure(s) (LRB):  Block, Nerve (Bilateral)   14 Days Post-Op     Interval History: PATRICK. Continues to complain of right groin/anterolateral thigh pain that increases with movement. Pain is alleviated with PO medications, but increases once medication begins to wear off. Denies new onset weakness, paresthesias, or bowel/bladder issues. He has been accepted to [facility]. Medically stable for discharge.    Medications:  Continuous Infusions:  Scheduled Meds:   famotidine  40 mg Oral QHS    ferrous sulfate  325 mg Oral BID    heparin (porcine)  5,000 Units Subcutaneous Q8H    lidocaine  2 patch Transdermal Q24H    lisinopriL  5 mg Oral Daily    methocarbamoL  750 mg Oral TID    polyethylene glycol  17 g Oral Daily    pregabalin  150 mg Oral TID    senna-docusate 8.6-50 mg  1 tablet Oral BID     PRN Meds:acetaminophen, aluminum-magnesium  hydroxide-simethicone, bisacodyL, Dextrose 10% Bolus, Dextrose 10% Bolus, glucagon (human recombinant), glucose, glucose, HYDROmorphone, insulin aspart U-100, labetalol, ondansetron, oxyCODONE, oxyCODONE, promethazine (PHENERGAN) IVPB, sodium chloride 0.9%     Review of Systems  Objective:     Weight: 103 kg (227 lb 1.2 oz)  Body mass index is 29.96 kg/m².  Vital Signs (Most Recent):  Temp: 98.4 °F (36.9 °C) (03/24/20 0845)  Pulse: 82 (03/24/20 0845)  Resp: 18 (03/24/20 0845)  BP: 104/63 (03/24/20 0845)  SpO2: 97 % (03/24/20 0845) Vital Signs (24h Range):  Temp:  [98.4 °F (36.9 °C)-99.6 °F (37.6 °C)] 98.4 °F (36.9 °C)  Pulse:  [] 82  Resp:  [17-18] 18  SpO2:  [95 %-97 %] 97 %  BP: (101-119)/(58-72) 104/63     Date 03/24/20 0700 - 03/25/20 0659   Shift 6665-2768 1039-4299 0487-6790 24 Hour Total   INTAKE   Shift Total(mL/kg)       OUTPUT   Urine(mL/kg/hr) 200   200   Shift Total(mL/kg) 200(1.9)   200(1.9)   Weight (kg) 103 103 103 103                        Neurosurgery Physical Exam  General: well developed, well nourished, no distress.   Head: normocephalic, atraumatic  Neurologic: Alert and oriented. Thought content appropriate.  GCS: Motor: 6/Verbal: 5/Eyes: 4 GCS Total: 15  Mental Status: Awake, Alert, Oriented x 4  Language: No aphasia  Speech: No dysarthria  Cranial nerves: face symmetric, tongue midline, CN II-XII grossly intact.   Eyes: pupils equal, round, reactive to light with accomodation, EOMI.   Pulmonary: normal respirations, no signs of respiratory distress  Abdomen: soft, non-distended, not tender to palpation  Sensory: intact to light touch throughout  Motor Strength: Moves all extremities spontaneously with good tone. Pain limited weakness on hip flexion of RLE, otherwise full strength upper and lower extremities. No abnormal movements seen.      Strength   Deltoids Triceps Biceps Wrist Extension Wrist Flexion Hand    Upper: R 5/5 5/5 5/5 5/5 5/5 5/5     L 5/5 5/5 5/5 5/5 5/5 5/5        Iliopsoas Quadriceps Knee  Flexion Tibialis  anterior Gastro- cnemius EHL   Lower: R 3/5 5/5 5/5 5/5 5/5 5/5     L 5/5 5/5 5/5 5/5 5/5 5/5      Clonus: absent  Vascular: Pulses 2+ and symmetric radial and dorsalis pedis. No LE edema.   Skin: Skin is warm, dry and intact.    Incision: Clean, dry, sutures intact. Skin edges well approximated. No surrounding erythema or edema. No drainage or TTP.       Significant Labs:  Recent Labs   Lab 03/24/20  0404   *      K 4.5      CO2 28   BUN 13   CREATININE 1.0   CALCIUM 9.4     Recent Labs   Lab 03/24/20  0404   WBC 5.58   HGB 9.7*   HCT 33.2*        No results for input(s): LABPT, INR, APTT in the last 48 hours.  Microbiology Results (last 7 days)     Procedure Component Value Units Date/Time    AFB Culture & Smear [[id number]] Collected:  03/20/20 0118    Order Status:  Completed Specimen:  Sputum from Nares, Right Updated:  03/21/20 0927     AFB Culture & Smear Culture in progress     AFB CULTURE STAIN No acid fast bacilli seen.    AFB Culture & Smear [[id number]] Collected:  03/19/20 0930    Order Status:  Completed Specimen:  Sputum, Induced Updated:  03/20/20 2127     AFB Culture & Smear Culture in progress     AFB CULTURE STAIN No acid fast bacilli seen.    AFB Culture & Smear [[id number]] Collected:  03/19/20 1805    Order Status:  Completed Specimen:  Sputum, Induced Updated:  03/20/20 2127     AFB Culture & Smear Culture in progress     AFB CULTURE STAIN No acid fast bacilli seen.        All pertinent labs from the last 24 hours have been reviewed.    Significant Diagnostics:  No results found in the last 24 hours.      Assessment/Plan:     Degenerative scoliosis  A 70 year old with degenerative lumbar spondy and spine deformity now s/p T10 - pelvis posterior segmental instrumented fusion, T10 - L3 laminectomy for decompression, L3 pedical subtraction osteotomy (3/3).    - Neurologically stable  -ID: + PPD, CXR x 2 negative, + Quantiferon  gold. CXR 3/18 shows vague irregular opacity right midlung field. AFB smear x3 negative. M. Tuberculosis DNA by PCR negative. HIV quantitative negative. 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 Asymptomatic. Discussed with ID, AFB is negative x3 so patient is safe for discharge to rehab.    -Brace on when upright, OOB, or working with therapy. OK to remove when lying in bed.   -Postop XR lumbar spine shows satisfactory posterior alignment and positioning of posterior hardware.   -Sutures in place, will remove at postop appointment on 4/1 with Dr. Caro. Can sit for up to 30 minutes at a time.  Head of bed flat otherwise at all times.  Wound closure tension increases in sitting position.  This restriction should be in place for 6 weeks. Leave incision open to air. Can shower but should not tub soak the wounds.  No lotions, creams applied to the incision.    -Pain: Continue multimodal agents per Anesthesia recommendations. No changes in pain after nerve block. Continue Lyrica TID and robaxin 750 mg TID. Continue oxy prn. Increasing right groin pain with increased activity, relieved with po medications.   -Thrombocytopenia: Resolved.  -Post op anemia: Stable. Continue iron for replacement. Improving.   -HTN: Continue home dose lisinopril and Prn labetalol.   -DM2: continue POCT glucose, SSI, diabetic diet  -GERD: continue home dose pepcid  -DVT prophylaxis: CLIFF's, SCD's, SQH  -Bowel regimen: senna BID and miralax daily. States he uses enema or suppository normally 1-2x per week at home. Last BM 3/23.  -Atelectasis prevention: IS hourly while awake, PT/OT, OOB > 6 hours per day. Continue to encourage OOB mobility and up to chair with meals.     Dispo: Not contagious per ID. Medically stable for discharge. Accepted to [facility]    Discharge instructions reviewed with patient. He voiced understanding. All of his questions were answered.   He will follow up 4/1 with Dr. Caro and 6 weeks post-op with Neurosurgery      Discussed with Dr. Waqas Bowen PAQuangC  Neurosurgery  [facility]

## 2020-03-31 ENCOUNTER — TELEPHONE (OUTPATIENT)
Dept: PLASTIC SURGERY | Facility: CLINIC | Age: 70
End: 2020-03-31

## 2020-03-31 NOTE — TELEPHONE ENCOUNTER
spoke with pt & he stated that he was doing ok and that the nurse stated that they would remove the pts sutures and that we didnt have to worry about him coming into clinic . I told him that was fine. I would touch base with his nurse .

## 2020-04-06 PROCEDURE — G0180 PR HOME HEALTH MD CERTIFICATION: ICD-10-PCS | Mod: ,,, | Performed by: INTERNAL MEDICINE

## 2020-04-06 PROCEDURE — G0180 MD CERTIFICATION HHA PATIENT: HCPCS | Mod: ,,, | Performed by: INTERNAL MEDICINE

## 2020-04-07 ENCOUNTER — TELEPHONE (OUTPATIENT)
Dept: NEUROSURGERY | Facility: CLINIC | Age: 70
End: 2020-04-07

## 2020-04-07 NOTE — TELEPHONE ENCOUNTER
Pt states he is recovering well from his Sx.   LM w/pt for his spouse to contact us to discuss setting up MyChart.  Pt aware his 6WK P/O XR has been rescheduled for 05.05.2020 at [facility] in [place].  V/U.

## 2020-04-13 ENCOUNTER — TELEPHONE (OUTPATIENT)
Dept: NEUROSURGERY | Facility: CLINIC | Age: 70
End: 2020-04-13

## 2020-04-13 ENCOUNTER — OFFICE VISIT (OUTPATIENT)
Dept: GASTROENTEROLOGY | Facility: CLINIC | Age: 70
End: 2020-04-13
Payer: MEDICARE

## 2020-04-13 VITALS
HEART RATE: 93 BPM | HEIGHT: 73 IN | SYSTOLIC BLOOD PRESSURE: 104 MMHG | DIASTOLIC BLOOD PRESSURE: 65 MMHG | WEIGHT: 211 LBS | RESPIRATION RATE: 18 BRPM | BODY MASS INDEX: 27.96 KG/M2

## 2020-04-13 DIAGNOSIS — K21.9 GASTROESOPHAGEAL REFLUX DISEASE WITHOUT ESOPHAGITIS: ICD-10-CM

## 2020-04-13 DIAGNOSIS — G89.29 CHRONIC BILATERAL LOW BACK PAIN WITH RIGHT-SIDED SCIATICA: ICD-10-CM

## 2020-04-13 DIAGNOSIS — M54.41 CHRONIC BILATERAL LOW BACK PAIN WITH RIGHT-SIDED SCIATICA: ICD-10-CM

## 2020-04-13 DIAGNOSIS — Z98.890 NECK PAIN WITH HISTORY OF CERVICAL SPINAL SURGERY: ICD-10-CM

## 2020-04-13 DIAGNOSIS — M54.2 NECK PAIN WITH HISTORY OF CERVICAL SPINAL SURGERY: ICD-10-CM

## 2020-04-13 DIAGNOSIS — K57.30 DIVERTICULOSIS OF LARGE INTESTINE WITHOUT HEMORRHAGE: ICD-10-CM

## 2020-04-13 DIAGNOSIS — M51.36 DDD (DEGENERATIVE DISC DISEASE), LUMBAR: Primary | ICD-10-CM

## 2020-04-13 DIAGNOSIS — M54.50 LOW BACK PAIN, NON-SPECIFIC: ICD-10-CM

## 2020-04-13 PROCEDURE — 99999 PR PBB SHADOW E&M-EST. PATIENT-LVL III: CPT | Mod: PBBFAC,,, | Performed by: INTERNAL MEDICINE

## 2020-04-13 PROCEDURE — 99999 PR PBB SHADOW E&M-EST. PATIENT-LVL III: ICD-10-PCS | Mod: PBBFAC,,, | Performed by: INTERNAL MEDICINE

## 2020-04-13 PROCEDURE — 99213 OFFICE O/P EST LOW 20 MIN: CPT | Mod: S$PBB,,, | Performed by: INTERNAL MEDICINE

## 2020-04-13 PROCEDURE — 99213 PR OFFICE/OUTPT VISIT, EST, LEVL III, 20-29 MIN: ICD-10-PCS | Mod: S$PBB,,, | Performed by: INTERNAL MEDICINE

## 2020-04-13 PROCEDURE — 99213 OFFICE O/P EST LOW 20 MIN: CPT | Mod: PBBFAC,PN | Performed by: INTERNAL MEDICINE

## 2020-04-13 RX ORDER — OXYCODONE AND ACETAMINOPHEN 10; 325 MG/1; MG/1
1 TABLET ORAL EVERY 6 HOURS PRN
Qty: 120 TABLET | Refills: 0 | Status: SHIPPED | OUTPATIENT
Start: 2020-04-13 | End: 2020-05-11 | Stop reason: SDUPTHER

## 2020-04-13 NOTE — PROGRESS NOTES
Subjective:       Patient ID: Garcia Renteria is a 70 y.o. male.    Chief Complaint: Follow-up (surgery)    He recently had lumbar spine surgery.  It was his 4th operation.  He went to the rehab center for 2 weeks and was released last week.  He states that the surgery is helped the numbness in the lower extremities and is able to ambulate with a walker without falling.  He recently underwent cervical spine surgery.  He states he is improving.  His pyrosis and dyspepsia have responded to the omeprazole.  He denies hematemesis hematochezia aspiration or dysphagia.  He generally has daily bowel movements.  He completed his treatment for chronic hepatitis C and is assumed cured.  I have requested his recent labs because he states he had multiple labs associated with his surgery.  He uses the the Percocet for pain.  He is intolerant to the anti-inflammatory agents.      Allergies:  Review of patient's allergies indicates:  No Known Allergies    Medications:    Current Outpatient Medications:     bacitracin 500 unit/gram Oint, Apply topically 2 (two) times daily., Disp: , Rfl: 0    ceFAZolin (ANCEF) 1 gram injection, Inject 2,000 mg (2 g total) into the vein every 8 (eight) hours., Disp: , Rfl:     famotidine (PEPCID) 40 MG tablet, Take 1 tablet (40 mg total) by mouth every evening., Disp: 90 tablet, Rfl: 3    ferrous sulfate 325 (65 FE) MG EC tablet, Take 1 tablet (325 mg total) by mouth 2 (two) times daily., Disp: , Rfl: 0    lidocaine (LIDODERM) 5 %, Place 1 patch onto the skin once daily. Remove & Discard patch within 12 hours or as directed by MD, Disp: 15 patch, Rfl: 0    lisinopril (PRINIVIL,ZESTRIL) 5 MG tablet, Take 5 mg by mouth once daily. , Disp: , Rfl:     metFORMIN (GLUCOPHAGE) 1000 MG tablet, Take 500 mg by mouth daily with breakfast. , Disp: , Rfl:     methocarbamoL (ROBAXIN) 750 MG Tab, Take 1 tablet (750 mg total) by mouth 3 (three) times daily., Disp: 40 tablet, Rfl: 0    omeprazole  (PRILOSEC) 20 MG capsule, Take 1 capsule (20 mg total) by mouth once daily., Disp: 90 capsule, Rfl: 1    oxyCODONE (ROXICODONE) 15 MG Tab, Take 1 tablet (15 mg total) by mouth every 6 (six) hours as needed (pain 8-10/10)., Disp: 50 tablet, Rfl: 0    pregabalin (LYRICA) 150 MG capsule, Take 1 capsule (150 mg total) by mouth 3 (three) times daily., Disp: 90 capsule, Rfl: 6    sildenafil (VIAGRA) 100 MG tablet, , Disp: , Rfl:     STOOL SOFTENER, DOCUSATE MADELAINE, 240 mg capsule, , Disp: , Rfl:     triamterene-hydrochlorothiazide 75-50 mg (MAXZIDE) 75-50 mg per tablet, Take 0.5 tablets by mouth once daily. , Disp: , Rfl:     vitamin D (VITAMIN D3) 1000 units Tab, Take 1,000 Units by mouth once daily., Disp: , Rfl:     oxyCODONE-acetaminophen (PERCOCET)  mg per tablet, Take 1 tablet by mouth every 6 (six) hours as needed for Pain., Disp: 120 tablet, Rfl: 0    Past Medical History:   Diagnosis Date    Colitis     Diabetes mellitus, type 2     Diverticulosis     GERD (gastroesophageal reflux disease)     Hypertension        Past Surgical History:   Procedure Laterality Date    ANKLE SURGERY Bilateral     ANTERIOR CERVICAL DISCECTOMY W/ FUSION N/A 10/30/2019    Procedure: C3-4, C4-5 ACDF;  Surgeon: Francis Alan MD;  Location: [address];  Service: Neurosurgery;  Laterality: N/A;  C3-4, C4-5 ACDF  Anesthesia:  General  Rad:  C-Arm  NM:  EMG, SEP, & MEP  Blood:  Type & Screen  Position:  Supine  Bed:  Regular slider bed  Headrest:  [place] & GW tongs/hanging weights  Misc:  Small vivigen  Nerve root retractor ([employer])  Disposable #2 [employer]  Interbody    BACK SURGERY      COLONOSCOPY      CREATION OF MUSCLE ROTATIONAL FLAP  3/3/2020    Procedure: CREATION, FLAP, MUSCLE ROTATION;  Surgeon: Francis Alan MD;  Location: [facility] OR [address] [place] [address];  Service: Neurosurgery;;    INJECTION OF ANESTHETIC AGENT AROUND NERVE Bilateral 3/10/2020    Procedure: Block, Nerve;  Surgeon: Erinn Surgeon;  [facility];   Service: Anesthesiology;  Laterality: Bilateral;    lower back surgery      LUMBAR LAMINECTOMY WITH FUSION N/A 3/3/2020    Procedure: T10-Pelvis Fusion with L4 PSO **AIRO** Co-Surgeon: Roberto Parkinson;  Surgeon: Francis Alan MD;  Location: [facility] OR [address] [place] [address];  Service: Neurosurgery;  Laterality: N/A;  **AIRO**  **CELL SAVER**  Co-Surgeon: Roberto Parkinson  NM: EMG, SEP, MEP  Position: Prone  Bed: [place] 4 post, prone pillow  Blood: Type & Hold 2 units  Rad: C-Arm, AIRO  Vendor: [employer]  Misc: Vivigen, Infuse, Cement (Depuy)  [place]    NECK SURGERY      SPINE SURGERY      UPPER GASTROINTESTINAL ENDOSCOPY           Review of Systems   Constitutional: Negative for appetite change, fever and unexpected weight change.   HENT: Negative for trouble swallowing.         No jaundice.   Respiratory: Negative for cough, shortness of breath and wheezing.         He stop smoking a few months ago.  He is not physically active but he denies chronic cough chronic sputum production dysphagia or hemoptysis.  He denies dyspnea on exertion.   Cardiovascular: Negative for chest pain.        Denies exertional chest pain or rhythm disturbance.   Gastrointestinal: Negative for abdominal distention, abdominal pain, anal bleeding, blood in stool and constipation.        Several years ago colonoscopy revealed diverticulosis and internal hemorrhoids.  He is having daily bowel movements.  He has a history gastroesophageal reflux.  He has responded well to the omeprazole.  He is followed at the [facility].  I have requested his records in the past.   Endocrine:        He is on the diabetic diet.  He states his diabetes is well controlled.  He is on his vitamins and minerals.   Musculoskeletal: Positive for arthralgias, back pain, gait problem, neck pain and neck stiffness.   Skin: Negative for pallor and rash.   Neurological: Negative for dizziness, seizures, syncope, speech difficulty, weakness and numbness.   Hematological: Negative for adenopathy.    Psychiatric/Behavioral: Negative for confusion.       Objective:      Physical Exam   Constitutional: He is oriented to person, place, and time. He appears well-developed and well-nourished.   Thin nonicteric  male.  He is oriented x3.  He can relate his history and answer questions appropriately.   HENT:   Head: Normocephalic.   Eyes: Pupils are equal, round, and reactive to light. EOM are normal.   Neck: No tracheal deviation present. No thyromegaly present.   Surgical scars.   Cardiovascular: Normal rate, regular rhythm and normal heart sounds.   Pulmonary/Chest: Effort normal and breath sounds normal.   Abdominal: Soft. Bowel sounds are normal. He exhibits no distension. There is no tenderness. There is no guarding.   Musculoskeletal:   He ambulates slowly with a walker.  He can go from the sitting to the standing position and uses the walker for assistance.   Lymphadenopathy:     He has no cervical adenopathy.   Neurological: He is alert and oriented to person, place, and time. No cranial nerve deficit.   Skin: Skin is warm and dry.   Psychiatric: He has a normal mood and affect. His behavior is normal.   Vitals reviewed.        Plan:       DDD (degenerative disc disease), lumbar  -     oxyCODONE-acetaminophen (PERCOCET)  mg per tablet; Take 1 tablet by mouth every 6 (six) hours as needed for Pain.  Dispense: 120 tablet; Refill: 0    Low back pain, non-specific  -     oxyCODONE-acetaminophen (PERCOCET)  mg per tablet; Take 1 tablet by mouth every 6 (six) hours as needed for Pain.  Dispense: 120 tablet; Refill: 0    Neck pain with history of cervical spinal surgery  -     oxyCODONE-acetaminophen (PERCOCET)  mg per tablet; Take 1 tablet by mouth every 6 (six) hours as needed for Pain.  Dispense: 120 tablet; Refill: 0    Gastroesophageal reflux disease without esophagitis    Chronic bilateral low back pain with right-sided sciatica    Diverticulosis of large intestine without  hemorrhage     He will continue his reflux regimen.  He follows up with his surgeon.  He continues his the diabetic diet current medications vitamins and minerals.  He will continue his bowel program was stool softeners and fiber supplements and occasional MiraLax.  He is to avoid constipation.  He takes his Percocet as directed.  He will avoid the anti-inflammatory agents.

## 2020-04-13 NOTE — PATIENT INSTRUCTIONS
He will continue his reflux regimen and his diabetic diet.  He continues his vitamins minerals.  He will follow up with  his surgeon.  He uses the Percocet for pain and will be careful and avoid the anti-inflammatory agents.  He continues his bowel program.  He continues his physical therapy.

## 2020-04-13 NOTE — TELEPHONE ENCOUNTER
Reviewed PP set up w/spouse and upcoming appts.  Provided the number for tech support ([phone]) should she run into any issues.  V/U. ----- Message from Milagros Partida MA sent at 4/13/2020  8:23 AM CDT -----  Contact: Betsy (wife) @ [phone]      ----- Message -----  From: Annelise Washington  Sent: 4/13/2020   8:19 AM CDT  To: [facility] Blanco Staff    Pts wife says May wants to talk to her.  Pls call.

## 2020-04-20 ENCOUNTER — EXTERNAL HOME HEALTH (OUTPATIENT)
Dept: HOME HEALTH SERVICES | Facility: HOSPITAL | Age: 70
End: 2020-04-20
Payer: MEDICARE

## 2020-05-01 ENCOUNTER — TELEPHONE (OUTPATIENT)
Dept: NEUROSURGERY | Facility: CLINIC | Age: 70
End: 2020-05-01

## 2020-05-01 NOTE — TELEPHONE ENCOUNTER
Verified w/Daly ([phone]) that erect is preferrable for the scoli film.  V/U.----- Message from Milagros Partida MA sent at 4/29/2020  4:52 PM CDT -----  Hey please contact Daly at [phone] regarding order placed for patient. Order placed is incorrect per Daly a Scoliosis X-ray is done erect, she would like to know is there a reason why this test is ordered this why.         Thanks   Milagros

## 2020-05-05 ENCOUNTER — HOSPITAL ENCOUNTER (OUTPATIENT)
Dept: RADIOLOGY | Facility: HOSPITAL | Age: 70
Discharge: HOME | End: 2020-05-05
Attending: NEUROLOGICAL SURGERY
Payer: MEDICARE

## 2020-05-05 ENCOUNTER — TELEPHONE (OUTPATIENT)
Dept: NEUROSURGERY | Facility: CLINIC | Age: 70
End: 2020-05-05

## 2020-05-05 DIAGNOSIS — M43.16 SPONDYLOLISTHESIS OF LUMBAR REGION: ICD-10-CM

## 2020-05-05 DIAGNOSIS — M41.50 DEGENERATIVE SCOLIOSIS IN ADULT PATIENT: ICD-10-CM

## 2020-05-05 DIAGNOSIS — M47.14 THORACIC MYELOPATHY: ICD-10-CM

## 2020-05-05 PROCEDURE — 72082 X-RAY EXAM ENTIRE SPI 2/3 VW: CPT | Mod: TC,PN

## 2020-05-05 PROCEDURE — 72082 XR SCOLIOSIS COMPLETE: ICD-10-PCS | Mod: 26,,, | Performed by: RADIOLOGY

## 2020-05-05 PROCEDURE — 72082 X-RAY EXAM ENTIRE SPI 2/3 VW: CPT | Mod: 26,,, | Performed by: RADIOLOGY

## 2020-05-05 NOTE — TELEPHONE ENCOUNTER
Pt confirmed his VV w/Dr. Alan scheduled for 05.07.2020.  States he is doing well overall but has recently experienced BLE challenges below the knee.  Recommended to discuss concerns w/Dr. Alan during the visit.  V/U.

## 2020-05-07 ENCOUNTER — OFFICE VISIT (OUTPATIENT)
Dept: NEUROSURGERY | Facility: CLINIC | Age: 70
End: 2020-05-07
Payer: MEDICARE

## 2020-05-07 DIAGNOSIS — Z98.1 HISTORY OF LUMBAR FUSION: Primary | ICD-10-CM

## 2020-05-07 PROCEDURE — 99024 POSTOP FOLLOW-UP VISIT: CPT | Mod: 95,,, | Performed by: NEUROLOGICAL SURGERY

## 2020-05-07 PROCEDURE — G0463 HOSPITAL OUTPT CLINIC VISIT: HCPCS

## 2020-05-07 PROCEDURE — 99024 PR POST-OP FOLLOW-UP VISIT: ICD-10-PCS | Mod: 95,,, | Performed by: NEUROLOGICAL SURGERY

## 2020-05-07 PROCEDURE — 99211 OFF/OP EST MAY X REQ PHY/QHP: CPT

## 2020-05-07 NOTE — PROGRESS NOTES
"CHIEF COMPLAINT:  Post op evaluation 9 weeks after T10-pelvis fusion with L4 PSO    The patient location is: Patient Home  The chief complaint leading to consultation is: Post op   Visit type: audiovisual  Total time spent with patient: 7 minutes  Each patient to whom he or she provides medical services by telemedicine is:  (1) informed of the relationship between the physician and patient and the respective role of any other health care provider with respect to management of the patient; and (2) notified that he or she may decline to receive medical services by telemedicine and may withdraw from such care at any time.      I, Cristian Hartley, attest that this documentation has been prepared under the direction and in the presence of Francis Alan MD.    HPI:  Garcia Renteria is a 70 y.o.  male, who presents today for a 9 week post op evaluation. Pt is s/p T10-pelvis fusion with L4 PSO on 3/3/2020. Pt reports he is doing well. He is participating in home health physical therapy and walking around his neighborhood. Pt states that his bilateral knees are swollen and have "fevers in them". Pt does not have a systemic fever. He states that his knees are constantly warm. Pt also notices ankle swelling. Pt states that his back feels great and he is getting stronger every week. He is walking with his walker and capable of walking with a cane. He states that his left leg remains somewhat weak but his strength is improving. Pt's groin is also improving. Pt notices significantly improved posture.       Review of patient's allergies indicates:  No Known Allergies    Past Medical History:   Diagnosis Date    Colitis     Diabetes mellitus, type 2     Diverticulosis     GERD (gastroesophageal reflux disease)     Hypertension      Past Surgical History:   Procedure Laterality Date    ANKLE SURGERY Bilateral     ANTERIOR CERVICAL DISCECTOMY W/ FUSION N/A 10/30/2019    Procedure: C3-4, C4-5 ACDF;  Surgeon: Francis Alan MD; "  [facility];  Service: Neurosurgery;  Laterality: N/A;  C3-4, C4-5 ACDF  Anesthesia:  General  Rad:  C-Arm  NM:  EMG, SEP, & MEP  Blood:  Type & Screen  Position:  Supine  Bed:  Regular slider bed  Headrest:  [place] & GW tongs/hanging weights  Misc:  Small vivigen  Nerve root retractor ([employer])  Disposable #2 [employer]  Interbody    BACK SURGERY      COLONOSCOPY      CREATION OF MUSCLE ROTATIONAL FLAP  3/3/2020    Procedure: CREATION, FLAP, MUSCLE ROTATION;  Surgeon: Francis Alan MD;  [facility];  Service: Neurosurgery;;    INJECTION OF ANESTHETIC AGENT AROUND NERVE Bilateral 3/10/2020    Procedure: Block, Nerve;  Surgeon: Erinn Surgeon;  Location: [facility];  Service: Anesthesiology;  Laterality: Bilateral;    lower back surgery      LUMBAR LAMINECTOMY WITH FUSION N/A 3/3/2020    Procedure: T10-Pelvis Fusion with L4 PSO **AIRO** Co-Surgeon: Roberto Parkinson;  Surgeon: Francis Alan MD;  [address];  Service: Neurosurgery;  Laterality: N/A;  **AIRO**  **CELL SAVER**  Co-Surgeon: Roberto Parkinson  NM: EMG, SEP, MEP  Position: Prone  Bed: [place] 4 post, prone pillow  Blood: Type & Hold 2 units  Rad: C-Arm, AIRO  Vendor: [employer]  Misc: Vivigen, Infuse, Cement (Depuy)  [place]    NECK SURGERY      SPINE SURGERY      UPPER GASTROINTESTINAL ENDOSCOPY       Family History   Problem Relation Age of Onset    Neurological Disorders Mother      Social History     Tobacco Use    Smoking status: Former Smoker     Types: Cigars     Last attempt to quit: 10/2019     Years since quittin.6    Smokeless tobacco: Never Used   Substance Use Topics    Alcohol use: Yes     Alcohol/week: 2.0 standard drinks     Types: 1 Glasses of wine, 1 Cans of beer per week     Comment: 1 wine , 2 times a week    Drug use: Not Currently        Review of Systems   Constitutional: Negative.    HENT: Negative.    Eyes: Negative.    Respiratory: Negative.    Cardiovascular: Positive for leg swelling (knees;  "ankles).   Gastrointestinal: Negative.    Endocrine: Negative.    Genitourinary: Negative.    Musculoskeletal: Negative for back pain, gait problem and neck pain.   Skin: Negative.    Allergic/Immunologic: Negative.    Neurological: Negative for weakness, light-headedness, numbness and headaches.   Hematological: Negative.    Psychiatric/Behavioral: Negative.        OBJECTIVE:   Vital Signs:       Physical Exam:    Vital signs: All nursing notes and vital signs reviewed -- afebrile, vital signs stable.  Constitutional: Patient sitting comfortably in chair. Appears well developed and well nourished.  Skin: Exposed areas are intact without abnormal markings, rashes or other lesions.  HEENT: Normocephalic. Normal conjunctivae.  Cardiovascular: Normal rate and regular rhythm.  Respiratory: Chest wall rises and falls symmetrically, without signs of respiratory distress.  Abdomen: Soft and non-tender.  Extremities: Warm and without edema. Calves supple, non-tender.  Psych/Behavior: Normal affect.    Neurological:    Mental status: Alert and oriented. Conversational and appropriate.       Cranial Nerves: Grossly intact.     Motor:    Cerebellar: Finger-to-nose and rapid alternating movements normal. Gait stable, fluid.    Spine:    Posture: Head well aligned over pelvis in front and side views.  No focal or global spinal deformity visible on inspection. Shoulders and hips even. No obvious leg length discrepancy. No scapula winging.      Diagnostic Results:  All imaging was independently reviewed by me.    Scoliosis standing AP and Lateral X-ray, dated 5/5/2020:  1. Good spinal alignment and hardware position    ASSESSMENT/PLAN:     Garcia Renteria is doing well 2 months after T10-pelvis fusion. His back pain is "gone". His xrays look great. We will see him in 6 weeks with a CT L spine. In the meantime I urged him to have his swollen hot knees evaluated by his PCP to rule out septic arthritis.    The patient understands " and agrees with the plan of care. All questions were answered.     1. RTC 6 weeks with CT L-spine       I, Dr. Francis Alan personally performed the services described in this documentation. All medical record entries made by the scribe, Cristian Hartley, were at my direction and in my presence.  I have reviewed the chart and agree that the record reflects my personal performance and is accurate and complete.      Francis Alan M.D.  Department of Neurosurgery  [facility]      .

## 2020-05-11 ENCOUNTER — OFFICE VISIT (OUTPATIENT)
Dept: GASTROENTEROLOGY | Facility: CLINIC | Age: 70
End: 2020-05-11
Payer: MEDICARE

## 2020-05-11 VITALS
OXYGEN SATURATION: 99 % | HEART RATE: 87 BPM | WEIGHT: 214 LBS | RESPIRATION RATE: 18 BRPM | BODY MASS INDEX: 28.36 KG/M2 | HEIGHT: 73 IN | SYSTOLIC BLOOD PRESSURE: 125 MMHG | DIASTOLIC BLOOD PRESSURE: 72 MMHG

## 2020-05-11 DIAGNOSIS — M54.2 NECK PAIN WITH HISTORY OF CERVICAL SPINAL SURGERY: ICD-10-CM

## 2020-05-11 DIAGNOSIS — K57.30 DIVERTICULOSIS OF LARGE INTESTINE WITHOUT HEMORRHAGE: Primary | ICD-10-CM

## 2020-05-11 DIAGNOSIS — M51.36 DDD (DEGENERATIVE DISC DISEASE), LUMBAR: ICD-10-CM

## 2020-05-11 DIAGNOSIS — Z98.890 NECK PAIN WITH HISTORY OF CERVICAL SPINAL SURGERY: ICD-10-CM

## 2020-05-11 DIAGNOSIS — K21.9 GASTROESOPHAGEAL REFLUX DISEASE WITHOUT ESOPHAGITIS: ICD-10-CM

## 2020-05-11 DIAGNOSIS — M54.50 LOW BACK PAIN, NON-SPECIFIC: ICD-10-CM

## 2020-05-11 PROCEDURE — 99999 PR PBB SHADOW E&M-EST. PATIENT-LVL IV: CPT | Mod: PBBFAC,,, | Performed by: INTERNAL MEDICINE

## 2020-05-11 PROCEDURE — 99214 OFFICE O/P EST MOD 30 MIN: CPT | Mod: PBBFAC,PN | Performed by: INTERNAL MEDICINE

## 2020-05-11 PROCEDURE — 99214 PR OFFICE/OUTPT VISIT, EST, LEVL IV, 30-39 MIN: ICD-10-PCS | Mod: S$PBB,,, | Performed by: INTERNAL MEDICINE

## 2020-05-11 PROCEDURE — 99999 PR PBB SHADOW E&M-EST. PATIENT-LVL IV: ICD-10-PCS | Mod: PBBFAC,,, | Performed by: INTERNAL MEDICINE

## 2020-05-11 PROCEDURE — 99214 OFFICE O/P EST MOD 30 MIN: CPT | Mod: S$PBB,,, | Performed by: INTERNAL MEDICINE

## 2020-05-11 RX ORDER — OXYCODONE AND ACETAMINOPHEN 10; 325 MG/1; MG/1
1 TABLET ORAL EVERY 6 HOURS PRN
Qty: 120 TABLET | Refills: 0 | Status: SHIPPED | OUTPATIENT
Start: 2020-05-11 | End: 2020-06-08 | Stop reason: ALTCHOICE

## 2020-05-11 NOTE — PATIENT INSTRUCTIONS
He will continue his reflux regimen current medications.  He continues his follow up with the neurosurgeon and physical therapy.  Because of the numbness in his legs he [facility] concern of [facility].  He will let us know about his evaluation so I can obtain a copy.  He continues his bowel program with adequate fiber to avoid constipation.  He continues his vitamins and minerals.  He continues his Norco his directed avoids the anti-inflammatory agents.

## 2020-05-11 NOTE — PROGRESS NOTES
Subjective:       Patient ID: Garcia Renteria is a 70 y.o. male.    Chief Complaint: Follow-up (1m)    -ms was reviewed.  He will make sure use the medications prescribed only by me.  He has used supplemental medications in the postoperative periods.  He denies significant pyrosis or dyspepsia.  He has a history of gastroesophageal reflux but he states his current medications are resolved his symptoms.  He denies hematemesis hematochezia jaundice or bleeding.  He denies dysphagia aspiration.  He has a history of diverticulosis but is having daily bowel movements.  He has had extensive cervical spine and lumbar spine surgery.  Has a history of diverticulosis but is having daily bowel movements.  He was treated for chronic hepatitis-C.  He is considered cured.  He responded well to the medications.  He realizes that he can be reinfected.  He is extremely careful.      Allergies:  Review of patient's allergies indicates:  No Known Allergies    Medications:    Current Outpatient Medications:     bacitracin 500 unit/gram Oint, Apply topically 2 (two) times daily., Disp: , Rfl: 0    ceFAZolin (ANCEF) 1 gram injection, Inject 2,000 mg (2 g total) into the vein every 8 (eight) hours., Disp: , Rfl:     famotidine (PEPCID) 40 MG tablet, Take 1 tablet (40 mg total) by mouth every evening., Disp: 90 tablet, Rfl: 3    ferrous sulfate 325 (65 FE) MG EC tablet, Take 1 tablet (325 mg total) by mouth 2 (two) times daily., Disp: , Rfl: 0    lidocaine (LIDODERM) 5 %, Place 1 patch onto the skin once daily. Remove & Discard patch within 12 hours or as directed by MD, Disp: 15 patch, Rfl: 0    lisinopril (PRINIVIL,ZESTRIL) 5 MG tablet, Take 5 mg by mouth once daily. , Disp: , Rfl:     metFORMIN (GLUCOPHAGE) 1000 MG tablet, Take 500 mg by mouth daily with breakfast. , Disp: , Rfl:     methocarbamoL (ROBAXIN) 750 MG Tab, Take 1 tablet (750 mg total) by mouth 3 (three) times daily., Disp: 40 tablet, Rfl: 0    omeprazole  (PRILOSEC) 20 MG capsule, Take 1 capsule (20 mg total) by mouth once daily., Disp: 90 capsule, Rfl: 1    oxyCODONE (ROXICODONE) 15 MG Tab, Take 1 tablet (15 mg total) by mouth every 6 (six) hours as needed (pain 8-10/10)., Disp: 50 tablet, Rfl: 0    oxyCODONE-acetaminophen (PERCOCET)  mg per tablet, Take 1 tablet by mouth every 6 (six) hours as needed for Pain., Disp: 120 tablet, Rfl: 0    pregabalin (LYRICA) 150 MG capsule, Take 1 capsule (150 mg total) by mouth 3 (three) times daily., Disp: 90 capsule, Rfl: 6    sildenafil (VIAGRA) 100 MG tablet, , Disp: , Rfl:     STOOL SOFTENER, DOCUSATE MADELAINE, 240 mg capsule, , Disp: , Rfl:     triamterene-hydrochlorothiazide 75-50 mg (MAXZIDE) 75-50 mg per tablet, Take 0.5 tablets by mouth once daily. , Disp: , Rfl:     vitamin D (VITAMIN D3) 1000 units Tab, Take 1,000 Units by mouth once daily., Disp: , Rfl:     Past Medical History:   Diagnosis Date    Colitis     Diabetes mellitus, type 2     Diverticulosis     GERD (gastroesophageal reflux disease)     Hypertension        Past Surgical History:   Procedure Laterality Date    ANKLE SURGERY Bilateral     ANTERIOR CERVICAL DISCECTOMY W/ FUSION N/A 10/30/2019    Procedure: C3-4, C4-5 ACDF;  Surgeon: Francis Alan MD;  Location: [address];  Service: Neurosurgery;  Laterality: N/A;  C3-4, C4-5 ACDF  Anesthesia:  General  Rad:  C-Arm  NM:  EMG, SEP, & MEP  Blood:  Type & Screen  Position:  Supine  Bed:  Regular slider bed  Headrest:  [place] & GW tongs/hanging weights  Misc:  Small vivigen  Nerve root retractor ([employer])  Disposable #2 [employer]  Interbody    BACK SURGERY      COLONOSCOPY      CREATION OF MUSCLE ROTATIONAL FLAP  3/3/2020    Procedure: CREATION, FLAP, MUSCLE ROTATION;  Surgeon: Francis Alan MD;  Location: [facility] OR [address] [place] [address];  Service: Neurosurgery;;    INJECTION OF ANESTHETIC AGENT AROUND NERVE Bilateral 3/10/2020    Procedure: Block, Nerve;  Surgeon: Erinn Surgeon;  Location: [facility];   Service: Anesthesiology;  Laterality: Bilateral;    lower back surgery      LUMBAR LAMINECTOMY WITH FUSION N/A 3/3/2020    Procedure: T10-Pelvis Fusion with L4 PSO **AIRO** Co-Surgeon: Roberto Parkinson;  Surgeon: Francis Alan MD;  Location: [facility] OR [address] [place] [address];  Service: Neurosurgery;  Laterality: N/A;  **AIRO**  **CELL SAVER**  Co-Surgeon: Roberto Parkinson  NM: EMG, SEP, MEP  Position: Prone  Bed: [place] 4 post, prone pillow  Blood: Type & Hold 2 units  Rad: C-Arm, AIRO  Vendor: [employer]  Misc: Vivigen, Infuse, Cement (Depuy)  [place]    NECK SURGERY      SPINE SURGERY      UPPER GASTROINTESTINAL ENDOSCOPY           Review of Systems   Constitutional: Negative for appetite change, fever and unexpected weight change.   HENT: Negative for trouble swallowing.         No jaundice.   Respiratory: Negative for cough, shortness of breath and wheezing.         He is a daily cigarette smoker.  Because of his chronic pain and postoperative status he has not been as physically active and he denies dyspnea on exertion.  He denies aspiration hemoptysis.  He denies significant coughing or sputum production.  He has been offered the smoking cessation program in the past but he states that he has having some age postoperative pain is under significant stress knees unable to stop smoking at this point but he will consider the option in the future.   Cardiovascular: Negative for chest pain.        He denies exertional chest pain or irregular heartbeat.    Gastrointestinal: Negative for abdominal distention, abdominal pain, anal bleeding, blood in stool, constipation, diarrhea, nausea, rectal pain and vomiting.        His current medications have resolved his pyrosis and dyspepsia.  He has occasional nausea without vomiting.  He cannot pinpoint a precipitating factor.  He generally has daily bowel movements.  He takes his stool softeners MiraLax and current medications.   Musculoskeletal: Positive for arthralgias, back pain, gait problem, neck  "pain and neck stiffness.        He complains of "stinging "in both of his knees and ankles.  He feels a numbness sensation.  This is been present for months.  It was addressed by his nurse surgeon.  It was thought possibly related to his spinal compression.  It is not improved since his surgery.  He is quite concerned that he may have gout or arthritis and he [facility]  He denies swollen joints.  The symptoms and has extremities are not related to activities.  He still has back pain.  He has had 4 lower back surgeries.   Skin: Negative for pallor and rash.   Neurological: Negative for dizziness, seizures, syncope, speech difficulty, weakness and numbness.   Hematological: Negative for adenopathy.   Psychiatric/Behavioral: Negative for confusion.       Objective:      Physical Exam   Constitutional: He is oriented to person, place, and time. He appears well-developed and well-nourished.   Well-nourished well-hydrated nonicteric afebrile  male.  He is normocephalic.  Pupils are normal.  He is oriented x3.  He can relate his history and answer questions normally.   HENT:   Head: Normocephalic.   Eyes: Pupils are equal, round, and reactive to light. EOM are normal.   Neck: Normal range of motion. Neck supple. No tracheal deviation present. No thyromegaly present.   Cardiovascular: Normal rate, regular rhythm and normal heart sounds.   Pulmonary/Chest: Effort normal and breath sounds normal.   Abdominal: Soft. Bowel sounds are normal. He exhibits no distension and no mass. There is no tenderness. There is no guarding.   The abdomen is soft without tenderness masses organomegaly.  Bowel sounds are normal.   Musculoskeletal:   He ambulates with the rolling walker.  He appears to have full range of motion of his knees and ankles.  Of the sensation of his toes appears to be normal.   Lymphadenopathy:     He has no cervical adenopathy.   Neurological: He is alert and oriented " to person, place, and time. No cranial nerve deficit.   Skin: Skin is warm and dry.   Psychiatric: He has a normal mood and affect. His behavior is normal.   Vitals reviewed.        Plan:       Diverticulosis of large intestine without hemorrhage    DDD (degenerative disc disease), lumbar  -     oxyCODONE-acetaminophen (PERCOCET)  mg per tablet; Take 1 tablet by mouth every 6 (six) hours as needed for Pain.  Dispense: 120 tablet; Refill: 0    Low back pain, non-specific  -     oxyCODONE-acetaminophen (PERCOCET)  mg per tablet; Take 1 tablet by mouth every 6 (six) hours as needed for Pain.  Dispense: 120 tablet; Refill: 0    Neck pain with history of cervical spinal surgery  -     oxyCODONE-acetaminophen (PERCOCET)  mg per tablet; Take 1 tablet by mouth every 6 (six) hours as needed for Pain.  Dispense: 120 tablet; Refill: 0    Gastroesophageal reflux disease without esophagitis     He continues his reflux regimen.  He continues his vitamins and minerals.  He continues his bowel program with additional fiber and/or MiraLax to avoid constipation.  He follows up with the nurse surgeon.  He can go to the emergency room because of his concern of gout.  I have requested the records.  He takes the Norco as directed.  He will avoid the anti-inflammatory agents because a gastrointestinal side effects.

## 2020-05-19 ENCOUNTER — TELEPHONE (OUTPATIENT)
Dept: UROLOGY | Facility: CLINIC | Age: 70
End: 2020-05-19

## 2020-05-19 NOTE — TELEPHONE ENCOUNTER
Patient called and cancelled his appt due to schedule conflict [facility]. Patient stated he will call back when he is ready.

## 2020-05-21 LAB
ACID FAST MOD KINY STN SPEC: NORMAL
ACID FAST MOD KINY STN SPEC: NORMAL
MYCOBACTERIUM SPEC QL CULT: NORMAL
MYCOBACTERIUM SPEC QL CULT: NORMAL

## 2020-05-22 LAB
ACID FAST MOD KINY STN SPEC: NORMAL
MYCOBACTERIUM SPEC QL CULT: NORMAL

## 2020-06-08 ENCOUNTER — OFFICE VISIT (OUTPATIENT)
Dept: GASTROENTEROLOGY | Facility: CLINIC | Age: 70
End: 2020-06-08
Payer: MEDICARE

## 2020-06-08 ENCOUNTER — TELEPHONE (OUTPATIENT)
Dept: NEUROSURGERY | Facility: CLINIC | Age: 70
End: 2020-06-08

## 2020-06-08 VITALS
WEIGHT: 215 LBS | BODY MASS INDEX: 28.49 KG/M2 | HEIGHT: 73 IN | DIASTOLIC BLOOD PRESSURE: 73 MMHG | SYSTOLIC BLOOD PRESSURE: 123 MMHG | HEART RATE: 87 BPM | OXYGEN SATURATION: 97 % | RESPIRATION RATE: 18 BRPM | TEMPERATURE: 98 F

## 2020-06-08 DIAGNOSIS — K57.30 DIVERTICULOSIS OF LARGE INTESTINE WITHOUT HEMORRHAGE: ICD-10-CM

## 2020-06-08 DIAGNOSIS — M54.2 NECK PAIN WITH HISTORY OF CERVICAL SPINAL SURGERY: ICD-10-CM

## 2020-06-08 DIAGNOSIS — M51.36 DDD (DEGENERATIVE DISC DISEASE), LUMBAR: Primary | ICD-10-CM

## 2020-06-08 DIAGNOSIS — K21.9 GASTROESOPHAGEAL REFLUX DISEASE WITHOUT ESOPHAGITIS: ICD-10-CM

## 2020-06-08 DIAGNOSIS — Z98.890 NECK PAIN WITH HISTORY OF CERVICAL SPINAL SURGERY: ICD-10-CM

## 2020-06-08 DIAGNOSIS — M54.50 LOW BACK PAIN, NON-SPECIFIC: ICD-10-CM

## 2020-06-08 DIAGNOSIS — G89.4 CHRONIC PAIN SYNDROME: ICD-10-CM

## 2020-06-08 PROCEDURE — 99999 PR PBB SHADOW E&M-EST. PATIENT-LVL III: CPT | Mod: PBBFAC,,, | Performed by: INTERNAL MEDICINE

## 2020-06-08 PROCEDURE — 99213 PR OFFICE/OUTPT VISIT, EST, LEVL III, 20-29 MIN: ICD-10-PCS | Mod: S$PBB,,, | Performed by: INTERNAL MEDICINE

## 2020-06-08 PROCEDURE — 99999 PR PBB SHADOW E&M-EST. PATIENT-LVL III: ICD-10-PCS | Mod: PBBFAC,,, | Performed by: INTERNAL MEDICINE

## 2020-06-08 PROCEDURE — 99213 OFFICE O/P EST LOW 20 MIN: CPT | Mod: S$PBB,,, | Performed by: INTERNAL MEDICINE

## 2020-06-08 PROCEDURE — 99213 OFFICE O/P EST LOW 20 MIN: CPT | Mod: PBBFAC,PN | Performed by: INTERNAL MEDICINE

## 2020-06-08 RX ORDER — IBUPROFEN 800 MG/1
800 TABLET ORAL EVERY 6 HOURS PRN
COMMUNITY
Start: 2020-05-20

## 2020-06-08 RX ORDER — FERROUS SULFATE TAB 325 MG (65 MG ELEMENTAL FE) 325 (65 FE) MG
TAB ORAL
COMMUNITY
Start: 2020-05-20 | End: 2020-11-02

## 2020-06-08 RX ORDER — INDOMETHACIN 25 MG/1
CAPSULE ORAL
COMMUNITY
Start: 2020-05-11 | End: 2022-12-28

## 2020-06-08 RX ORDER — HYDROCODONE BITARTRATE AND ACETAMINOPHEN 10; 325 MG/1; MG/1
1 TABLET ORAL EVERY 6 HOURS PRN
Qty: 120 TABLET | Refills: 0 | Status: SHIPPED | OUTPATIENT
Start: 2020-06-08 | End: 2020-07-06 | Stop reason: SDUPTHER

## 2020-06-08 RX ORDER — GABAPENTIN 100 MG/1
100 CAPSULE ORAL 3 TIMES DAILY
COMMUNITY
Start: 2020-05-28 | End: 2022-12-28 | Stop reason: DRUGHIGH

## 2020-06-08 NOTE — TELEPHONE ENCOUNTER
Attempted to reach pt to confirm his CT in [place] on 06.17.2020 and [facility] w/Dr. Alan on 06.18.2020.  LVM.

## 2020-06-08 NOTE — PATIENT INSTRUCTIONS
He will continue his reflux regimen current medications vitamins and minerals.  He continues his bowel program to avoid constipation.  He follows up with his surgeon.  He continues his activity and physical therapy.  He uses the Norco as directed and avoids the anti-inflammatory agents.

## 2020-06-08 NOTE — PROGRESS NOTES
Subjective:       Patient ID: Garcia Renteria is a 70 y.o. male.    Chief Complaint: Follow-up    He has a chronic pain syndrome.  He has undergone cervical and lumbar spine surgery recently.  He has a history of gastroesophageal reflux.  He is intolerant to the anti-inflammatory agents.  He uses the Norco correctly.  Is controlled the majority of his pain and is able to perform his usual activities.ms- reviewed and he uses his medications correctly.  He states his current medications have resolved his pyrosis and dyspepsia.  He denies dysphagia aspiration hematemesis hematochezia jaundice or bleeding.  He has a history gastroesophageal reflux and diverticulosis.  He has daily bowel movements.  He continues his fiber supplements stool softeners and also MiraLax as needed.      Allergies:  Review of patient's allergies indicates:  No Known Allergies    Medications:    Current Outpatient Medications:     bacitracin 500 unit/gram Oint, Apply topically 2 (two) times daily., Disp: , Rfl: 0    ceFAZolin (ANCEF) 1 gram injection, Inject 2,000 mg (2 g total) into the vein every 8 (eight) hours., Disp: , Rfl:     famotidine (PEPCID) 40 MG tablet, Take 1 tablet (40 mg total) by mouth every evening., Disp: 90 tablet, Rfl: 3    FEROSUL 325 mg (65 mg iron) Tab tablet, , Disp: , Rfl:     gabapentin (NEURONTIN) 100 MG capsule, , Disp: , Rfl:     ibuprofen (ADVIL,MOTRIN) 800 MG tablet, , Disp: , Rfl:     indomethacin (INDOCIN) 25 MG capsule, , Disp: , Rfl:     lidocaine (LIDODERM) 5 %, Place 1 patch onto the skin once daily. Remove & Discard patch within 12 hours or as directed by MD, Disp: 15 patch, Rfl: 0    lisinopril (PRINIVIL,ZESTRIL) 5 MG tablet, Take 5 mg by mouth once daily. , Disp: , Rfl:     metFORMIN (GLUCOPHAGE) 1000 MG tablet, Take 500 mg by mouth daily with breakfast. , Disp: , Rfl:     methocarbamoL (ROBAXIN) 750 MG Tab, Take 1 tablet (750 mg total) by mouth 3 (three) times daily., Disp: 40 tablet,  Rfl: 0    omeprazole (PRILOSEC) 20 MG capsule, Take 1 capsule (20 mg total) by mouth once daily., Disp: 90 capsule, Rfl: 1    oxyCODONE (ROXICODONE) 15 MG Tab, Take 1 tablet (15 mg total) by mouth every 6 (six) hours as needed (pain 8-10/10)., Disp: 50 tablet, Rfl: 0    pregabalin (LYRICA) 150 MG capsule, Take 1 capsule (150 mg total) by mouth 3 (three) times daily., Disp: 90 capsule, Rfl: 6    sildenafil (VIAGRA) 100 MG tablet, , Disp: , Rfl:     STOOL SOFTENER, DOCUSATE MADELAINE, 240 mg capsule, , Disp: , Rfl:     triamterene-hydrochlorothiazide 75-50 mg (MAXZIDE) 75-50 mg per tablet, Take 0.5 tablets by mouth once daily. , Disp: , Rfl:     vitamin D (VITAMIN D3) 1000 units Tab, Take 1,000 Units by mouth once daily., Disp: , Rfl:     HYDROcodone-acetaminophen (NORCO)  mg per tablet, Take 1 tablet by mouth every 6 (six) hours as needed for Pain., Disp: 120 tablet, Rfl: 0    Past Medical History:   Diagnosis Date    Colitis     Diabetes mellitus, type 2     Diverticulosis     GERD (gastroesophageal reflux disease)     Hypertension        Past Surgical History:   Procedure Laterality Date    ANKLE SURGERY Bilateral     ANTERIOR CERVICAL DISCECTOMY W/ FUSION N/A 10/30/2019    Procedure: C3-4, C4-5 ACDF;  Surgeon: Francis Alan MD;  Location: [facility] OR [address] [place] [address];  Service: Neurosurgery;  Laterality: N/A;  C3-4, C4-5 ACDF  Anesthesia:  General  Rad:  C-Arm  NM:  EMG, SEP, & MEP  Blood:  Type & Screen  Position:  Supine  Bed:  Regular slider bed  Headrest:  [place] & GW tongs/hanging weights  Misc:  Small vivigen  Nerve root retractor ([employer])  Disposable #2 [employer]  Interbody    BACK SURGERY      COLONOSCOPY      CREATION OF MUSCLE ROTATIONAL FLAP  3/3/2020    Procedure: CREATION, FLAP, MUSCLE ROTATION;  Surgeon: Francis Alan MD;  Location: [facility] OR [address] [place] [address];  Service: Neurosurgery;;    INJECTION OF ANESTHETIC AGENT AROUND NERVE Bilateral 3/10/2020    Procedure: Block, Nerve;  Surgeon: Erinn Surgeon;   Location: [facility];  Service: Anesthesiology;  Laterality: Bilateral;    lower back surgery      LUMBAR LAMINECTOMY WITH FUSION N/A 3/3/2020    Procedure: T10-Pelvis Fusion with L4 PSO **AIRO** Co-Surgeon: Roberto Parkinson;  Surgeon: Francis Alan MD;  Location: [facility] OR [address] [place] [address];  Service: Neurosurgery;  Laterality: N/A;  **AIRO**  **CELL SAVER**  Co-Surgeon: Roberto Parkinson  NM: EMG, SEP, MEP  Position: Prone  Bed: [place] 4 post, prone pillow  Blood: Type & Hold 2 units  Rad: C-Arm, AIRO  Vendor: [employer]  Misc: Vivigen, Infuse, Cement (Depuy)  [place]    NECK SURGERY      SPINE SURGERY      UPPER GASTROINTESTINAL ENDOSCOPY           Review of Systems   Constitutional: Negative for appetite change, fever and unexpected weight change.   HENT: Negative for trouble swallowing.         No jaundice.   Respiratory: Negative for cough, shortness of breath and wheezing.         He is a daily cigarette smoker.  He denies aspiration hemoptysis.  He denies significant coughing sputum production or dyspnea on exertion but is not particularly physically active.  He has been offered the smoking cessation program and he will consider this option.  He denies alcohol usage.   Cardiovascular: Negative for chest pain.   Gastrointestinal: Negative for abdominal distention, abdominal pain, anal bleeding, blood in stool, constipation, diarrhea, nausea, rectal pain and vomiting.        He completed his treatment for chronic hepatitis C and his labs have been normal.  He is considered cured.  He has a history of colonoscopy and upper endoscopy.  At this point he does not want further GI evaluation.   Endocrine:        He states his diabetes is well controlled.  He is on the diabetic diet.  He made a food diary and avoid the offending foods such as spicy or fatty foods.   Musculoskeletal: Positive for arthralgias, back pain, gait problem, neck pain and neck stiffness.        He is followed by the surgeon.  He has had cervical lumbar surgery.  He  has a brace over the body which he states has helped him.  He ambulates with a cane.  He believes that the neck and back pain have improved.  The discomfort and difficulty with his extremities has markedly improved.   Skin: Negative for pallor and rash.   Neurological: Negative for dizziness, seizures, syncope, speech difficulty, weakness and numbness.   Hematological: Negative for adenopathy.   Psychiatric/Behavioral: Negative for confusion.       Objective:      Physical Exam   Constitutional: He is oriented to person, place, and time. He appears well-developed and well-nourished.   Well-nourished well-hydrated afebrile nonicteric  male.  He is sitting comfortably in the chair.  He has a large brace over shoulders and neck abdomen and back.  He is normocephalic.  Pupils are normal.  He is oriented x3.  He can answer questions and relate his history without difficulty.   HENT:   Head: Normocephalic.   Eyes: Pupils are equal, round, and reactive to light. EOM are normal.   Neck: Normal range of motion. Neck supple. No tracheal deviation present. No thyromegaly present.   Cardiovascular: Normal rate, regular rhythm and normal heart sounds.   Pulmonary/Chest: Effort normal and breath sounds normal.   Abdominal: Soft. Bowel sounds are normal. He exhibits no distension and no mass. There is no tenderness. There is no rebound and no guarding. No hernia.   Musculoskeletal:   He ambulates slowly with a cane.  He can go from the sitting the standing position and uses the cane for assistance to ambulate.   Lymphadenopathy:     He has no cervical adenopathy.   Neurological: He is alert and oriented to person, place, and time. No cranial nerve deficit.   Skin: Skin is warm and dry.   Psychiatric: He has a normal mood and affect. His behavior is normal.   Vitals reviewed.        Plan:       DDD (degenerative disc disease), lumbar  -     HYDROcodone-acetaminophen (NORCO)  mg per tablet; Take 1 tablet by  mouth every 6 (six) hours as needed for Pain.  Dispense: 120 tablet; Refill: 0    Low back pain, non-specific  -     HYDROcodone-acetaminophen (NORCO)  mg per tablet; Take 1 tablet by mouth every 6 (six) hours as needed for Pain.  Dispense: 120 tablet; Refill: 0    Neck pain with history of cervical spinal surgery  -     HYDROcodone-acetaminophen (NORCO)  mg per tablet; Take 1 tablet by mouth every 6 (six) hours as needed for Pain.  Dispense: 120 tablet; Refill: 0    Chronic pain syndrome    Diverticulosis of large intestine without hemorrhage    Gastroesophageal reflux disease without esophagitis     He will continue his reflux regimen current medications vitamins and minerals.  She follows up with his surgeon and continues his physical therapy.  He continues his bowel program to avoid constipation.  He uses the Norco for pain avoids the anti-inflammatory agents.  He continues his diabetic diet but will make a food diary and avoid the offending foods.

## 2020-06-17 ENCOUNTER — HOSPITAL ENCOUNTER (OUTPATIENT)
Dept: RADIOLOGY | Facility: HOSPITAL | Age: 70
Discharge: HOME | End: 2020-06-17
Attending: NEUROLOGICAL SURGERY
Payer: MEDICARE

## 2020-06-17 ENCOUNTER — TELEPHONE (OUTPATIENT)
Dept: NEUROSURGERY | Facility: CLINIC | Age: 70
End: 2020-06-17

## 2020-06-17 DIAGNOSIS — Z98.1 HISTORY OF LUMBAR FUSION: ICD-10-CM

## 2020-06-17 PROCEDURE — 72131 CT LUMBAR SPINE W/O DYE: CPT | Mod: 26,,, | Performed by: RADIOLOGY

## 2020-06-17 PROCEDURE — 72131 CT LUMBAR SPINE W/O DYE: CPT | Mod: TC,PN

## 2020-06-17 PROCEDURE — 72131 CT LUMBAR SPINE WITHOUT CONTRAST: ICD-10-PCS | Mod: 26,,, | Performed by: RADIOLOGY

## 2020-06-22 ENCOUNTER — OFFICE VISIT (OUTPATIENT)
Dept: NEUROSURGERY | Facility: CLINIC | Age: 70
End: 2020-06-22
Payer: MEDICARE

## 2020-06-22 DIAGNOSIS — Z98.1 HISTORY OF LUMBAR FUSION: Primary | ICD-10-CM

## 2020-06-22 PROCEDURE — 99213 PR OFFICE/OUTPT VISIT, EST, LEVL III, 20-29 MIN: ICD-10-PCS | Mod: 95,,, | Performed by: NEUROLOGICAL SURGERY

## 2020-06-22 PROCEDURE — 99213 OFFICE O/P EST LOW 20 MIN: CPT | Mod: 95,,, | Performed by: NEUROLOGICAL SURGERY

## 2020-06-22 NOTE — PROGRESS NOTES
CHIEF COMPLAINT:  3 month post-op with new imaging    The patient location is: Patient Home  The chief complaint leading to consultation is: Post op    Visit type: audiovisual    Face to Face time with patient: 7 minutes  10 minutes of total time spent on the encounter, which includes face to face time and non-face to face time preparing to see the patient (eg, review of tests), Obtaining and/or reviewing separately obtained history, Documenting clinical information in the electronic or other health record, Independently interpreting results (not separately reported) and communicating results to the patient/family/caregiver, or Care coordination (not separately reported).     Each patient to whom he or she provides medical services by telemedicine is:  (1) informed of the relationship between the physician and patient and the respective role of any other health care provider with respect to management of the patient; and (2) notified that he or she may decline to receive medical services by telemedicine and may withdraw from such care at any time.    I, Cristian Hartley, attest that this documentation has been prepared under the direction and in the presence of Francis Alan MD.    HPI:  Garcia Renteria is a 70 y.o.  male s/p T-10-Pelvis Fusion with L4 PSO on 3//3/2020 who presents for 3M post-op evaluation with new imaging.Pt reports he is doing okay. He notes some persisting soreness in the groin area, but it is improving with time. Pt's flexibility is also improving. Pt continues to experience swelling in his knees and ankles, which leads to pain in the lower legs. He has been wearing his LSO brace and states that he sleeps in it sometimes. He denies any significant back pain when standing up and notices that he is standing up straighter. Pt would like to participate in aquatic physical therapy. He has been using his bone growth stimulator daily.     Review of patient's allergies indicates:  No Known  Allergies    Past Medical History:   Diagnosis Date    Colitis     Diabetes mellitus, type 2     Diverticulosis     GERD (gastroesophageal reflux disease)     Hypertension      Past Surgical History:   Procedure Laterality Date    ANKLE SURGERY Bilateral     ANTERIOR CERVICAL DISCECTOMY W/ FUSION N/A 10/30/2019    Procedure: C3-4, C4-5 ACDF;  Surgeon: Francis Alan MD;  Location: [address];  Service: Neurosurgery;  Laterality: N/A;  C3-4, C4-5 ACDF  Anesthesia:  General  Rad:  C-Arm  NM:  EMG, SEP, & MEP  Blood:  Type & Screen  Position:  Supine  Bed:  Regular slider bed  Headrest:  [place] & GW tongs/hanging weights  Misc:  Small vivigen  Nerve root retractor (DePuy)  Disposable #2 Kerrison  Interbody    BACK SURGERY      COLONOSCOPY      CREATION OF MUSCLE ROTATIONAL FLAP  3/3/2020    Procedure: CREATION, FLAP, MUSCLE ROTATION;  Surgeon: Francis Alan MD;  Location: [address];  Service: Neurosurgery;;    INJECTION OF ANESTHETIC AGENT AROUND NERVE Bilateral 3/10/2020    Procedure: Block, Nerve;  Surgeon: Erinn Surgeon;  Location: [facility];  Service: Anesthesiology;  Laterality: Bilateral;    lower back surgery      LUMBAR LAMINECTOMY WITH FUSION N/A 3/3/2020    Procedure: T10-Pelvis Fusion with L4 PSO **AIRO** Co-Surgeon: Roberto Parkinson;  Surgeon: Francis Alan MD;  Location: [address];  Service: Neurosurgery;  Laterality: N/A;  **AIRO**  **CELL SAVER**  Co-Surgeon: Roberto Parkinson  NM: EMG, SEP, MEP  Position: Prone  Bed: Unity 4 post, prone pillow  Blood: Type & Hold 2 units  Rad: C-Arm, AIRO  Vendor: Depuy  Misc: Vivigen, Infuse, Cement (Depuy)  [place]    NECK SURGERY      SPINE SURGERY      UPPER GASTROINTESTINAL ENDOSCOPY       Family History   Problem Relation Age of Onset    Neurological Disorders Mother      Social History     Tobacco Use    Smoking status: Former Smoker     Types: Cigars     Quit date: 10/2019     Years since quittin.7    Smokeless tobacco: Never Used    Substance Use Topics    Alcohol use: Yes     Alcohol/week: 2.0 standard drinks     Types: 1 Glasses of wine, 1 Cans of beer per week     Comment: 1 wine , 2 times a week    Drug use: Not Currently        Review of Systems   Constitutional: Negative.    HENT: Negative.    Eyes: Negative.    Respiratory: Negative.    Cardiovascular: Negative.    Gastrointestinal: Negative.    Endocrine: Negative.    Genitourinary: Negative.    Musculoskeletal: Positive for myalgias (improving groin pain). Negative for back pain, gait problem and neck pain.   Skin: Negative.    Allergic/Immunologic: Negative.    Neurological: Negative for weakness, light-headedness, numbness and headaches.   Hematological: Negative.    Psychiatric/Behavioral: Negative.        OBJECTIVE:   Vital Signs:       Physical Exam:    Vital signs: All nursing notes and vital signs reviewed -- afebrile, vital signs stable.  Constitutional: Patient sitting comfortably in chair. Appears well developed and well nourished.  Skin: Exposed areas are intact without abnormal markings, rashes or other lesions.  HEENT: Normocephalic. Normal conjunctivae.  Cardiovascular: Normal rate and regular rhythm.  Respiratory: Chest wall rises and falls symmetrically, without signs of respiratory distress.  Abdomen: Soft and non-tender.  Extremities: Warm and without edema. Calves supple, non-tender.  Psych/Behavior: Normal affect.    Neurological:    Mental status: Alert and oriented. Conversational and appropriate.       Cranial Nerves: Grossly intact.     Diagnostic Results:  All imaging was independently reviewed by me.    CT L spine, dated 6/17/2020:  1. Solid bony fusion     ASSESSMENT/PLAN:     Garcia Renteria is doing well 3 months after spinal deformity surgery. His back pain has improved and his groin pain is improving. His CT shows solid fusion. He can wean his brace. I will send him for PT without restriction. He can follow up at 1 year postop,.    The patient  understands and agrees with the plan of care. All questions were answered.     1. Referral to Physical Therapy  2. RTC 1 year post op      I, Dr. Francis Alan personally performed the services described in this documentation. All medical record entries made by the scribe, Cristian Hartley, were at my direction and in my presence.  I have reviewed the chart and agree that the record reflects my personal performance and is accurate and complete.      Francis Alan M.D.  Department of Neurosurgery  [facility]      .    '

## 2020-06-29 ENCOUNTER — TELEPHONE (OUTPATIENT)
Dept: NEUROSURGERY | Facility: CLINIC | Age: 70
End: 2020-06-29

## 2020-06-29 NOTE — TELEPHONE ENCOUNTER
Spoke with patient informing him that we would need the name of the company along with phone number and fax number. Inform patient once received I will fax over referral for PT. Patient voiced understanding. KJ

## 2020-06-29 NOTE — TELEPHONE ENCOUNTER
----- Message from Yvrose Ngo sent at 6/29/2020  9:25 AM CDT -----  Contact: Pts @ [phone]  Pt's wife is calling to speak to May about the physical therapy orders that are in the system for the pt. Mrs. Renteria says pt is wanting to see a doctor in [place] and are wanting to obtain copies of the referral for pt to see that doctor. Pt would like order to be mailed to them if possible.

## 2020-07-06 ENCOUNTER — OFFICE VISIT (OUTPATIENT)
Dept: GASTROENTEROLOGY | Facility: CLINIC | Age: 70
End: 2020-07-06
Payer: MEDICARE

## 2020-07-06 VITALS
TEMPERATURE: 98 F | BODY MASS INDEX: 27.96 KG/M2 | SYSTOLIC BLOOD PRESSURE: 158 MMHG | RESPIRATION RATE: 18 BRPM | HEIGHT: 73 IN | HEART RATE: 87 BPM | WEIGHT: 211 LBS | DIASTOLIC BLOOD PRESSURE: 89 MMHG | OXYGEN SATURATION: 95 %

## 2020-07-06 DIAGNOSIS — M51.36 DDD (DEGENERATIVE DISC DISEASE), LUMBAR: ICD-10-CM

## 2020-07-06 DIAGNOSIS — M54.2 NECK PAIN WITH HISTORY OF CERVICAL SPINAL SURGERY: ICD-10-CM

## 2020-07-06 DIAGNOSIS — K57.30 DIVERTICULOSIS OF LARGE INTESTINE WITHOUT HEMORRHAGE: ICD-10-CM

## 2020-07-06 DIAGNOSIS — Z98.890 NECK PAIN WITH HISTORY OF CERVICAL SPINAL SURGERY: ICD-10-CM

## 2020-07-06 DIAGNOSIS — M54.50 LOW BACK PAIN, NON-SPECIFIC: ICD-10-CM

## 2020-07-06 DIAGNOSIS — Z98.890 PERSONAL HISTORY OF SPINE SURGERY: Primary | ICD-10-CM

## 2020-07-06 DIAGNOSIS — K21.9 GASTROESOPHAGEAL REFLUX DISEASE, ESOPHAGITIS PRESENCE NOT SPECIFIED: ICD-10-CM

## 2020-07-06 PROCEDURE — 99999 PR PBB SHADOW E&M-EST. PATIENT-LVL V: CPT | Mod: PBBFAC,,, | Performed by: INTERNAL MEDICINE

## 2020-07-06 PROCEDURE — 99213 OFFICE O/P EST LOW 20 MIN: CPT | Mod: S$PBB,,, | Performed by: INTERNAL MEDICINE

## 2020-07-06 PROCEDURE — 99999 PR PBB SHADOW E&M-EST. PATIENT-LVL V: ICD-10-PCS | Mod: PBBFAC,,, | Performed by: INTERNAL MEDICINE

## 2020-07-06 PROCEDURE — 99215 OFFICE O/P EST HI 40 MIN: CPT | Mod: PBBFAC,PN | Performed by: INTERNAL MEDICINE

## 2020-07-06 PROCEDURE — 99213 PR OFFICE/OUTPT VISIT, EST, LEVL III, 20-29 MIN: ICD-10-PCS | Mod: S$PBB,,, | Performed by: INTERNAL MEDICINE

## 2020-07-06 RX ORDER — HYDROCODONE BITARTRATE AND ACETAMINOPHEN 10; 325 MG/1; MG/1
1 TABLET ORAL EVERY 6 HOURS PRN
Qty: 120 TABLET | Refills: 0 | Status: SHIPPED | OUTPATIENT
Start: 2020-07-06 | End: 2020-09-03 | Stop reason: SDUPTHER

## 2020-07-06 RX ORDER — OMEPRAZOLE 20 MG/1
20 CAPSULE, DELAYED RELEASE ORAL DAILY
Qty: 90 CAPSULE | Refills: 3 | Status: SHIPPED | OUTPATIENT
Start: 2020-07-06 | End: 2020-10-02 | Stop reason: SDUPTHER

## 2020-07-06 NOTE — PROGRESS NOTES
Subjective:       Patient ID: Garcia Renteria is a 70 y.o. male.    Chief Complaint: Follow-up    MS- was reviewed.  He is uses the Norco correctly.  He has gastroesophageal reflux gastritis diverticulosis and history of chronic hepatitis C.  He has had extensive cervical and lumbar spine surgery.  He is intolerant to the anti-inflammatory agents because they have caused abdominal pain pyrosis dyspepsia nausea and episodes of bleeding.  He states the Norco has controlled the majority of his symptoms.  He is going to physical therapy and followed by his surgeon.  He ambulates with a cane.  He is scheduled for the physical therapy including the swimming physical therapy.  He denies falling.  He stated his pedal edema is resolved.  [facility].  The numbness needs legs is improved.  He has a history of 5 back surgeries.  He states his surgeon told him that he did not need to wear the back brace on a daily basis.  He has had cervical spine surgery.  He denies headaches.  He has a long history gastroesophageal reflux and states the omeprazole has helped.  He does not take the anti-inflammatory agents only takes medications as prescribed.  He completed his treatment for hepatitis C.  He is an SVR.  He denies jaundice bleeding hematemesis or hematochezia.  He denies fever chills.      Allergies:  Review of patient's allergies indicates:  No Known Allergies    Medications:    Current Outpatient Medications:     bacitracin 500 unit/gram Oint, Apply topically 2 (two) times daily., Disp: , Rfl: 0    ceFAZolin (ANCEF) 1 gram injection, Inject 2,000 mg (2 g total) into the vein every 8 (eight) hours., Disp: , Rfl:     famotidine (PEPCID) 40 MG tablet, Take 1 tablet (40 mg total) by mouth every evening., Disp: 90 tablet, Rfl: 3    FEROSUL 325 mg (65 mg iron) Tab tablet, , Disp: , Rfl:     gabapentin (NEURONTIN) 100 MG capsule, , Disp: , Rfl:     HYDROcodone-acetaminophen  (NORCO)  mg per tablet, Take 1 tablet by mouth every 6 (six) hours as needed for Pain., Disp: 120 tablet, Rfl: 0    ibuprofen (ADVIL,MOTRIN) 800 MG tablet, , Disp: , Rfl:     indomethacin (INDOCIN) 25 MG capsule, , Disp: , Rfl:     lidocaine (LIDODERM) 5 %, Place 1 patch onto the skin once daily. Remove & Discard patch within 12 hours or as directed by MD, Disp: 15 patch, Rfl: 0    lisinopril (PRINIVIL,ZESTRIL) 5 MG tablet, Take 5 mg by mouth once daily. , Disp: , Rfl:     metFORMIN (GLUCOPHAGE) 1000 MG tablet, Take 500 mg by mouth daily with breakfast. , Disp: , Rfl:     methocarbamoL (ROBAXIN) 750 MG Tab, Take 1 tablet (750 mg total) by mouth 3 (three) times daily., Disp: 40 tablet, Rfl: 0    oxyCODONE (ROXICODONE) 15 MG Tab, Take 1 tablet (15 mg total) by mouth every 6 (six) hours as needed (pain 8-10/10)., Disp: 50 tablet, Rfl: 0    pregabalin (LYRICA) 150 MG capsule, Take 1 capsule (150 mg total) by mouth 3 (three) times daily., Disp: 90 capsule, Rfl: 6    sildenafil (VIAGRA) 100 MG tablet, , Disp: , Rfl:     STOOL SOFTENER, DOCUSATE MADELAINE, 240 mg capsule, , Disp: , Rfl:     triamterene-hydrochlorothiazide 75-50 mg (MAXZIDE) 75-50 mg per tablet, Take 0.5 tablets by mouth once daily. , Disp: , Rfl:     vitamin D (VITAMIN D3) 1000 units Tab, Take 1,000 Units by mouth once daily., Disp: , Rfl:     omeprazole (PRILOSEC) 20 MG capsule, Take 1 capsule (20 mg total) by mouth once daily., Disp: 90 capsule, Rfl: 3    Past Medical History:   Diagnosis Date    Colitis     Diabetes mellitus, type 2     Diverticulosis     GERD (gastroesophageal reflux disease)     Hypertension        Past Surgical History:   Procedure Laterality Date    ANKLE SURGERY Bilateral     ANTERIOR CERVICAL DISCECTOMY W/ FUSION N/A 10/30/2019    Procedure: C3-4, C4-5 ACDF;  Surgeon: Francis Alan MD;  Location: [facility] OR [address] [place] [address];  Service: Neurosurgery;  Laterality: N/A;  C3-4, C4-5 ACDF  Anesthesia:  General  Rad:  C-Arm  NM:   EMG, SEP, & MEP  Blood:  Type & Screen  Position:  Supine  Bed:  Regular slider bed  Headrest:  [place] & GW tongs/hanging weights  Misc:  Small vivigen  Nerve root retractor (DePuy)  Disposable #2 [place]  Interbody    BACK SURGERY      COLONOSCOPY      CREATION OF MUSCLE ROTATIONAL FLAP  3/3/2020    Procedure: CREATION, FLAP, MUSCLE ROTATION;  Surgeon: Francis Alan MD;  Location: [facility] OR [address] [place] [address];  Service: Neurosurgery;;    INJECTION OF ANESTHETIC AGENT AROUND NERVE Bilateral 3/10/2020    Procedure: Block, Nerve;  Surgeon: Erinn Surgeon;  Location: [facility];  Service: Anesthesiology;  Laterality: Bilateral;    lower back surgery      LUMBAR LAMINECTOMY WITH FUSION N/A 3/3/2020    Procedure: T10-Pelvis Fusion with L4 PSO **AIRO** Co-Surgeon: Roberto Parkinson;  Surgeon: Francis Alan MD;  Location: [facility] OR [address] [place] [address];  Service: Neurosurgery;  Laterality: N/A;  **AIRO**  **CELL SAVER**  Co-Surgeon: Roberto Parkinson  NM: EMG, SEP, MEP  Position: Prone  Bed: [place] 4 post, prone pillow  Blood: Type & Hold 2 units  Rad: C-Arm, AIRO  Vendor: Depuy  Misc: Vivigen, Infuse, Cement (Depuy)  [place]    NECK SURGERY      SPINE SURGERY      UPPER GASTROINTESTINAL ENDOSCOPY           Review of Systems   Constitutional: Negative for appetite change, fever and unexpected weight change.   HENT: Negative for trouble swallowing.         No jaundice.   Respiratory: Negative for cough, shortness of breath and wheezing.         He is a daily cigarette smoker.  He has been offered the smoking cessation program in the past.  He states he will consider this option.  He denies dysphagia hemoptysis or significant coughing or sputum production.  He denies dyspnea on exertion but is not as physically active.   Cardiovascular: Negative for chest pain.        He denies exertional chest pain or rhythm disturbance.   Gastrointestinal: Negative for abdominal distention, abdominal pain, anal bleeding, blood in stool, constipation, diarrhea and nausea.         He takes the omeprazole in the morning in the famotidine at night.   Endocrine:        He states his diabetes is well controlled.  He is trying to stay on the ADA diet.  [facility]   Musculoskeletal: Positive for arthralgias, back pain, neck pain and neck stiffness.        He ambulates with a cane and states he has not had falling.   Skin: Negative for pallor and rash.   Neurological: Negative for dizziness, seizures, syncope, speech difficulty, weakness and numbness.   Hematological: Negative for adenopathy.   Psychiatric/Behavioral: Negative for confusion.       Objective:      Physical Exam  Vitals signs reviewed.   Constitutional:       Appearance: He is well-developed.      Comments: Well-nourished well-hydrated afebrile nonicteric  male.  He is oriented x3.  He can relate his history and answer questions appropriately.  He is normocephalic.  Pupils are normal.   HENT:      Head: Normocephalic.   Eyes:      Pupils: Pupils are equal, round, and reactive to light.   Neck:      Musculoskeletal: Normal range of motion and neck supple.      Thyroid: No thyromegaly.      Trachea: No tracheal deviation.      Comments: Surgical scars  Cardiovascular:      Rate and Rhythm: Normal rate and regular rhythm.      Heart sounds: Normal heart sounds.   Pulmonary:      Effort: Pulmonary effort is normal.      Breath sounds: Normal breath sounds.   Abdominal:      General: Bowel sounds are normal. There is no distension.      Palpations: There is no mass.      Tenderness: There is no abdominal tenderness. There is no right CVA tenderness, left CVA tenderness, guarding or rebound.      Hernia: No hernia is present.      Comments: The abdomen is soft tenderness masses organomegaly.  Bowel sounds are.   Musculoskeletal: Normal range of motion.      Comments: He ambulates slowly with a cane.  He can go from the sitting to the standing position with the assistance of a cane.    Lymphadenopathy:      Cervical: No cervical adenopathy.   Skin:     General: Skin is warm and dry.   Neurological:      Mental Status: He is alert and oriented to person, place, and time.      Cranial Nerves: No cranial nerve deficit.   Psychiatric:         Behavior: Behavior normal.           Plan:       Personal history of spine surgery    Gastroesophageal reflux disease, esophagitis presence not specified  -     omeprazole (PRILOSEC) 20 MG capsule; Take 1 capsule (20 mg total) by mouth once daily.  Dispense: 90 capsule; Refill: 3    DDD (degenerative disc disease), lumbar  -     HYDROcodone-acetaminophen (NORCO)  mg per tablet; Take 1 tablet by mouth every 6 (six) hours as needed for Pain.  Dispense: 120 tablet; Refill: 0    Low back pain, non-specific  -     HYDROcodone-acetaminophen (NORCO)  mg per tablet; Take 1 tablet by mouth every 6 (six) hours as needed for Pain.  Dispense: 120 tablet; Refill: 0    Neck pain with history of cervical spinal surgery  -     HYDROcodone-acetaminophen (NORCO)  mg per tablet; Take 1 tablet by mouth every 6 (six) hours as needed for Pain.  Dispense: 120 tablet; Refill: 0    Diverticulosis of large intestine without hemorrhage     He will continue his reflux regimen current medications.  He will make a food diary and avoid the offending foods.  He continues the ADA diet.  I have encouraged him to start the smoking cessation program.  He follows with the surgeon for the postsurgical care the neck and back.  He will continue his physical therapy.  He continues his bowel program with additional fiber.  At this point he does not want further GI evaluation because he is doing so well.  He uses the Norco as directed avoids anti-inflammatory agents.  [facility] and diabetes.  I have requested the records.  He continues his vitamins and minerals.

## 2020-07-06 NOTE — PATIENT INSTRUCTIONS
He will follow up with his surgeon.  He will continue with his physical therapy particularly with the swimming physical therapy.  [facility] and I have requested the [facility].  He will make a food diary and avoid the offending foods and particularly avoid excessive salt ingestion.  He continues the Norco as directed.  He will continue his reflux regimen and the omeprazole and current medications vitamins and minerals.

## 2020-07-10 ENCOUNTER — TELEPHONE (OUTPATIENT)
Dept: NEUROSURGERY | Facility: CLINIC | Age: 70
End: 2020-07-10

## 2020-07-10 NOTE — TELEPHONE ENCOUNTER
Pt's PT order, demo, and clinic note faxed to [facility] ([phone] P, [phone] F) per pt's request.

## 2020-08-03 ENCOUNTER — OFFICE VISIT (OUTPATIENT)
Dept: GASTROENTEROLOGY | Facility: CLINIC | Age: 70
End: 2020-08-03
Payer: MEDICARE

## 2020-08-03 VITALS
DIASTOLIC BLOOD PRESSURE: 75 MMHG | SYSTOLIC BLOOD PRESSURE: 132 MMHG | WEIGHT: 213 LBS | BODY MASS INDEX: 28.23 KG/M2 | HEIGHT: 73 IN | TEMPERATURE: 98 F | HEART RATE: 79 BPM | OXYGEN SATURATION: 97 % | RESPIRATION RATE: 18 BRPM

## 2020-08-03 DIAGNOSIS — K21.00 GASTROESOPHAGEAL REFLUX DISEASE WITH ESOPHAGITIS: ICD-10-CM

## 2020-08-03 DIAGNOSIS — M51.36 DDD (DEGENERATIVE DISC DISEASE), LUMBAR: Primary | ICD-10-CM

## 2020-08-03 DIAGNOSIS — Z98.890 NECK PAIN WITH HISTORY OF CERVICAL SPINAL SURGERY: ICD-10-CM

## 2020-08-03 DIAGNOSIS — G89.4 CHRONIC PAIN SYNDROME: ICD-10-CM

## 2020-08-03 DIAGNOSIS — K57.30 DIVERTICULOSIS OF LARGE INTESTINE WITHOUT HEMORRHAGE: ICD-10-CM

## 2020-08-03 DIAGNOSIS — M54.2 NECK PAIN WITH HISTORY OF CERVICAL SPINAL SURGERY: ICD-10-CM

## 2020-08-03 DIAGNOSIS — M54.50 LOW BACK PAIN, NON-SPECIFIC: ICD-10-CM

## 2020-08-03 PROCEDURE — 99214 PR OFFICE/OUTPT VISIT, EST, LEVL IV, 30-39 MIN: ICD-10-PCS | Mod: S$PBB,,, | Performed by: INTERNAL MEDICINE

## 2020-08-03 PROCEDURE — 99999 PR PBB SHADOW E&M-EST. PATIENT-LVL V: CPT | Mod: PBBFAC,,, | Performed by: INTERNAL MEDICINE

## 2020-08-03 PROCEDURE — 99999 PR PBB SHADOW E&M-EST. PATIENT-LVL V: ICD-10-PCS | Mod: PBBFAC,,, | Performed by: INTERNAL MEDICINE

## 2020-08-03 PROCEDURE — 99214 OFFICE O/P EST MOD 30 MIN: CPT | Mod: S$PBB,,, | Performed by: INTERNAL MEDICINE

## 2020-08-03 PROCEDURE — 99215 OFFICE O/P EST HI 40 MIN: CPT | Mod: PBBFAC,PN | Performed by: INTERNAL MEDICINE

## 2020-08-03 RX ORDER — OXYCODONE AND ACETAMINOPHEN 10; 325 MG/1; MG/1
1 TABLET ORAL EVERY 6 HOURS PRN
Qty: 100 TABLET | Refills: 0 | Status: SHIPPED | OUTPATIENT
Start: 2020-08-03 | End: 2020-10-02 | Stop reason: SDUPTHER

## 2020-08-03 NOTE — PROGRESS NOTES
Subjective:       Patient ID: Garcia Renteria is a 70 y.o. male.    Chief Complaint: Follow-up    MS- was reviewed and uses the Norco correctly.  He has a chronic pain syndrome.  He has had extensive cervical spine and lumbar spine surgery.  He has a history of chronic hepatitis C and is considered a SVR.  After completion of his therapy.  He denies history of liver disease.  He denies hematemesis hematochezia jaundice or bleeding.  He is tolerating his PPI.  He denies upper GI symptoms such is pyrosis dyspepsia dysphagia.  He denies aspiration.  He is having daily bowel movements.  His main concern is the severe pain.  He is followed by the orthopedist and the neurosurgeon.  He is going to physical therapy including the pool therapy.  She has a lower extremity pain particularly at night.  The Percocet in the passes were better the Norco.  He avoids the anti-inflammatory agents.  He is physically active without the walker the cane.  He denies falling.  He denies fever chills.      Allergies:  Review of patient's allergies indicates:  No Known Allergies    Medications:    Current Outpatient Medications:     bacitracin 500 unit/gram Oint, Apply topically 2 (two) times daily., Disp: , Rfl: 0    ceFAZolin (ANCEF) 1 gram injection, Inject 2,000 mg (2 g total) into the vein every 8 (eight) hours., Disp: , Rfl:     famotidine (PEPCID) 40 MG tablet, Take 1 tablet (40 mg total) by mouth every evening., Disp: 90 tablet, Rfl: 3    FEROSUL 325 mg (65 mg iron) Tab tablet, , Disp: , Rfl:     gabapentin (NEURONTIN) 100 MG capsule, , Disp: , Rfl:     HYDROcodone-acetaminophen (NORCO)  mg per tablet, Take 1 tablet by mouth every 6 (six) hours as needed for Pain., Disp: 120 tablet, Rfl: 0    ibuprofen (ADVIL,MOTRIN) 800 MG tablet, , Disp: , Rfl:     indomethacin (INDOCIN) 25 MG capsule, , Disp: , Rfl:     lidocaine (LIDODERM) 5 %, Place 1 patch onto the skin once daily. Remove & Discard patch within 12 hours or as  directed by MD, Disp: 15 patch, Rfl: 0    lisinopril (PRINIVIL,ZESTRIL) 5 MG tablet, Take 5 mg by mouth once daily. , Disp: , Rfl:     metFORMIN (GLUCOPHAGE) 1000 MG tablet, Take 500 mg by mouth daily with breakfast. , Disp: , Rfl:     methocarbamoL (ROBAXIN) 750 MG Tab, Take 1 tablet (750 mg total) by mouth 3 (three) times daily., Disp: 40 tablet, Rfl: 0    omeprazole (PRILOSEC) 20 MG capsule, Take 1 capsule (20 mg total) by mouth once daily., Disp: 90 capsule, Rfl: 3    oxyCODONE (ROXICODONE) 15 MG Tab, Take 1 tablet (15 mg total) by mouth every 6 (six) hours as needed (pain 8-10/10)., Disp: 50 tablet, Rfl: 0    pregabalin (LYRICA) 150 MG capsule, Take 1 capsule (150 mg total) by mouth 3 (three) times daily., Disp: 90 capsule, Rfl: 6    sildenafil (VIAGRA) 100 MG tablet, , Disp: , Rfl:     STOOL SOFTENER, DOCUSATE MADELAINE, 240 mg capsule, , Disp: , Rfl:     triamterene-hydrochlorothiazide 75-50 mg (MAXZIDE) 75-50 mg per tablet, Take 0.5 tablets by mouth once daily. , Disp: , Rfl:     vitamin D (VITAMIN D3) 1000 units Tab, Take 1,000 Units by mouth once daily., Disp: , Rfl:     oxyCODONE-acetaminophen (PERCOCET)  mg per tablet, Take 1 tablet by mouth every 6 (six) hours as needed for Pain., Disp: 100 tablet, Rfl: 0    Past Medical History:   Diagnosis Date    Colitis     Diabetes mellitus, type 2     Diverticulosis     GERD (gastroesophageal reflux disease)     Hypertension        Past Surgical History:   Procedure Laterality Date    ANKLE SURGERY Bilateral     ANTERIOR CERVICAL DISCECTOMY W/ FUSION N/A 10/30/2019    Procedure: C3-4, C4-5 ACDF;  Surgeon: Francis Alan MD;  Location: [facility] OR [address] [place] [address];  Service: Neurosurgery;  Laterality: N/A;  C3-4, C4-5 ACDF  Anesthesia:  General  Rad:  C-Arm  NM:  EMG, SEP, & MEP  Blood:  Type & Screen  Position:  Supine  Bed:  Regular slider bed  Headrest:  [place] & GW tongs/hanging weights  Misc:  Small vivigen  Nerve root retractor (DePuy)  Disposable #2  [place]  Interbody    BACK SURGERY      COLONOSCOPY      CREATION OF MUSCLE ROTATIONAL FLAP  3/3/2020    Procedure: CREATION, FLAP, MUSCLE ROTATION;  Surgeon: Francis Alan MD;  Location: [facility] OR [address] [place] [address];  Service: Neurosurgery;;    INJECTION OF ANESTHETIC AGENT AROUND NERVE Bilateral 3/10/2020    Procedure: Block, Nerve;  Surgeon: Erinn Surgeon;  Location: [facility];  Service: Anesthesiology;  Laterality: Bilateral;    lower back surgery      LUMBAR LAMINECTOMY WITH FUSION N/A 3/3/2020    Procedure: T10-Pelvis Fusion with L4 PSO **AIRO** Co-Surgeon: Roberto Parkinson;  Surgeon: Francis Alan MD;  [facility];  Service: Neurosurgery;  Laterality: N/A;  **AIRO**  **CELL SAVER**  Co-Surgeon: Roberto Parkinson  NM: EMG, SEP, MEP  Position: Prone  Bed: [place] 4 post, prone pillow  Blood: Type & Hold 2 units  Rad: C-Arm, AIRO  Vendor: [employer]  Misc: Vivigen, Infuse, Cement (Depuy)  [place]    NECK SURGERY      SPINE SURGERY      UPPER GASTROINTESTINAL ENDOSCOPY           Review of Systems   Constitutional: Negative for appetite change, fever and unexpected weight change.   HENT: Positive for facial swelling. Negative for trouble swallowing.         No jaundice.   Eyes: Negative.    Respiratory: Negative.  Negative for cough, shortness of breath and wheezing.         He is a cigarette smoker.  He has been offered the smoking cessation program.  In the past I have encouraged the him to enroll in the smoking cessation program is stop smoking.  I have discussed with him the adverse effects smoking.  He states that this point is under 2 months stress to stop smoking.  He denies alcohol.  He denies dysphagia aspiration hemoptysis or significant coughing or sputum production.  He denies dyspnea on exertion but is not physically active.   Cardiovascular: Negative.  Negative for chest pain.        He denies exertional chest pain or rhythm disturbance.   Gastrointestinal: Positive for rectal pain and vomiting. Negative  for abdominal distention, abdominal pain, anal bleeding, blood in stool, constipation, diarrhea and nausea.        The PPI has resolved his upper GI symptoms.  He denies nausea vomiting pyrosis dyspepsia or aspiration.  He made a food diary and is avoiding the offending foods.   Endocrine: Negative.         He is on the diabetic diet.  He states his diabetes is well controlled.  He takes his medications as prescribed.   Genitourinary: Positive for difficulty urinating, dysuria and enuresis.   Musculoskeletal: Positive for arthralgias, gait problem, neck pain and neck stiffness. Negative for back pain.   Skin: Negative.  Negative for pallor and rash.   Allergic/Immunologic: Negative.    Neurological: Negative for dizziness, seizures, syncope, speech difficulty, weakness and numbness.   Hematological: Negative.  Negative for adenopathy.   Psychiatric/Behavioral: Negative.  Negative for confusion.       Objective:      Physical Exam  Vitals signs reviewed.   Constitutional:       Appearance: He is well-developed.      Comments: Well-nourished well-hydrated afebrile nonicteric  male.  He is oriented x3.  He can answer questions and relate his history normally.  He is normocephalic.  Pupils are normal.  He is sitting comfortably in the chair.   HENT:      Head: Normocephalic.   Eyes:      Pupils: Pupils are equal, round, and reactive to light.   Neck:      Musculoskeletal: Normal range of motion and neck supple.      Thyroid: No thyromegaly.      Trachea: No tracheal deviation.   Cardiovascular:      Rate and Rhythm: Normal rate and regular rhythm.      Heart sounds: Normal heart sounds.   Pulmonary:      Effort: Pulmonary effort is normal.      Breath sounds: Normal breath sounds.   Abdominal:      General: There is no distension.      Palpations: Abdomen is soft. There is no mass.      Tenderness: There is no abdominal tenderness. There is no right CVA tenderness, left CVA tenderness, guarding or  rebound.      Hernia: No hernia is present.   Musculoskeletal: Normal range of motion.   Lymphadenopathy:      Cervical: No cervical adenopathy.   Skin:     General: Skin is warm and dry.   Neurological:      Mental Status: He is alert and oriented to person, place, and time.      Cranial Nerves: No cranial nerve deficit.   Psychiatric:         Behavior: Behavior normal.           Plan:       DDD (degenerative disc disease), lumbar  -     oxyCODONE-acetaminophen (PERCOCET)  mg per tablet; Take 1 tablet by mouth every 6 (six) hours as needed for Pain.  Dispense: 100 tablet; Refill: 0    Low back pain, non-specific  -     oxyCODONE-acetaminophen (PERCOCET)  mg per tablet; Take 1 tablet by mouth every 6 (six) hours as needed for Pain.  Dispense: 100 tablet; Refill: 0    Neck pain with history of cervical spinal surgery  -     oxyCODONE-acetaminophen (PERCOCET)  mg per tablet; Take 1 tablet by mouth every 6 (six) hours as needed for Pain.  Dispense: 100 tablet; Refill: 0    Chronic pain syndrome  -     oxyCODONE-acetaminophen (PERCOCET)  mg per tablet; Take 1 tablet by mouth every 6 (six) hours as needed for Pain.  Dispense: 100 tablet; Refill: 0     He will continue her reflux regimen current medications vitamins minerals.  He will make a food diary and avoid the offending foods.  He has a history of diverticulosis will continue his bowel program with MiraLax as needed and/or fiber supplements.  He follows up with his surgeon and physical therapy.  He uses the Percocet as directed and will avoid the anti inflammatory agents.  He continues his diabetic diet.

## 2020-08-03 NOTE — PATIENT INSTRUCTIONS
He will continue his reflux regimen and physical therapy including the pool therapy.  He follows up with his surgeon.  He continues his other medications vitamins and minerals.  He wants to switch to the Percocet because of the chronic pain syndrome and particularly the painful extremities at night.  He will continue his diabetic diet.

## 2020-09-03 ENCOUNTER — OFFICE VISIT (OUTPATIENT)
Dept: GASTROENTEROLOGY | Facility: CLINIC | Age: 70
End: 2020-09-03
Payer: MEDICARE

## 2020-09-03 VITALS
TEMPERATURE: 98 F | HEART RATE: 95 BPM | DIASTOLIC BLOOD PRESSURE: 77 MMHG | HEIGHT: 73 IN | BODY MASS INDEX: 27.96 KG/M2 | RESPIRATION RATE: 18 BRPM | WEIGHT: 211 LBS | SYSTOLIC BLOOD PRESSURE: 129 MMHG | OXYGEN SATURATION: 97 %

## 2020-09-03 DIAGNOSIS — Z12.11 COLON CANCER SCREENING: Primary | ICD-10-CM

## 2020-09-03 DIAGNOSIS — Z01.818 PREOP TESTING: Primary | ICD-10-CM

## 2020-09-03 DIAGNOSIS — K57.30 DIVERTICULOSIS OF LARGE INTESTINE WITHOUT HEMORRHAGE: ICD-10-CM

## 2020-09-03 DIAGNOSIS — Z86.010 HX OF COLONIC POLYPS: ICD-10-CM

## 2020-09-03 DIAGNOSIS — Z98.890 NECK PAIN WITH HISTORY OF CERVICAL SPINAL SURGERY: ICD-10-CM

## 2020-09-03 DIAGNOSIS — K21.9 GASTROESOPHAGEAL REFLUX DISEASE, ESOPHAGITIS PRESENCE NOT SPECIFIED: ICD-10-CM

## 2020-09-03 DIAGNOSIS — Z01.818 PREOP TESTING: ICD-10-CM

## 2020-09-03 DIAGNOSIS — M51.36 DDD (DEGENERATIVE DISC DISEASE), LUMBAR: ICD-10-CM

## 2020-09-03 DIAGNOSIS — M54.50 LOW BACK PAIN, NON-SPECIFIC: ICD-10-CM

## 2020-09-03 DIAGNOSIS — M54.2 NECK PAIN WITH HISTORY OF CERVICAL SPINAL SURGERY: ICD-10-CM

## 2020-09-03 PROCEDURE — 99999 PR PBB SHADOW E&M-EST. PATIENT-LVL IV: ICD-10-PCS | Mod: PBBFAC,,, | Performed by: INTERNAL MEDICINE

## 2020-09-03 PROCEDURE — 99214 OFFICE O/P EST MOD 30 MIN: CPT | Mod: S$PBB,,, | Performed by: INTERNAL MEDICINE

## 2020-09-03 PROCEDURE — 99214 PR OFFICE/OUTPT VISIT, EST, LEVL IV, 30-39 MIN: ICD-10-PCS | Mod: S$PBB,,, | Performed by: INTERNAL MEDICINE

## 2020-09-03 PROCEDURE — 99214 OFFICE O/P EST MOD 30 MIN: CPT | Mod: PBBFAC,PN | Performed by: INTERNAL MEDICINE

## 2020-09-03 PROCEDURE — 99999 PR PBB SHADOW E&M-EST. PATIENT-LVL IV: CPT | Mod: PBBFAC,,, | Performed by: INTERNAL MEDICINE

## 2020-09-03 RX ORDER — SODIUM, POTASSIUM,MAG SULFATES 17.5-3.13G
1 SOLUTION, RECONSTITUTED, ORAL ORAL DAILY
Qty: 1 KIT | Refills: 0 | Status: SHIPPED | OUTPATIENT
Start: 2020-09-03 | End: 2020-09-05

## 2020-09-03 RX ORDER — HYDROCODONE BITARTRATE AND ACETAMINOPHEN 10; 325 MG/1; MG/1
1 TABLET ORAL EVERY 6 HOURS PRN
Qty: 120 TABLET | Refills: 0 | Status: SHIPPED | OUTPATIENT
Start: 2020-09-03 | End: 2020-11-02 | Stop reason: SDUPTHER

## 2020-09-03 NOTE — PATIENT INSTRUCTIONS
He continues his diabetic diet vitamins minerals and reflux regimen.  He will be scheduled for colonoscopy.  He has history of colon polyps and diverticulosis.  Physically he feels as if he is able to take the prep kit and undergo the colonoscopy.  He continues his follow-up in the [facility] with the pool therapy.

## 2020-09-03 NOTE — PROGRESS NOTES
Subjective:       Patient ID: Garcia Renteria is a 70 y.o. male.    Chief Complaint: Follow-up    Ms- reviewed and uses the Norco correctly for the pain.  He has had cervical and lumbar spine surgery.  He is intolerant to the anti-inflammatory agents.  He states his reflux symptoms have responded to the PPI.  He denies hematemesis hematochezia jaundice or bleeding.  He denies aspiration.  He generally has daily bowel movements.  Has a history of hemorrhoids and diverticulosis.  He will need to avoid all GI evaluation why he contour less is from his multiple surgeries because he is doing so well.  He denies fever chills.  He has been years since his last colonoscopy.  He has history of diverticulosis and colon polyps.  He has had nonspecific left lower quadrant discomfort.  He has daily bowel movements without straining.  He states he is physically able to have a colonoscopy.  [facility] is [facility].  And he states that they treat his diabetes and that all of his labs were normal.  We have requested the records again.  He states the physical therapy with the food therapy have really helped him.  He is followed by the surgeon he has chronic pain but the Norco has controlled the majority of the pain.  He is intolerant to the NSAIDs.      Allergies:  Review of patient's allergies indicates:  No Known Allergies    Medications:    Current Outpatient Medications:     bacitracin 500 unit/gram Oint, Apply topically 2 (two) times daily., Disp: , Rfl: 0    ceFAZolin (ANCEF) 1 gram injection, Inject 2,000 mg (2 g total) into the vein every 8 (eight) hours., Disp: , Rfl:     famotidine (PEPCID) 40 MG tablet, Take 1 tablet (40 mg total) by mouth every evening., Disp: 90 tablet, Rfl: 3    FEROSUL 325 mg (65 mg iron) Tab tablet, , Disp: , Rfl:     gabapentin (NEURONTIN) 100 MG capsule, , Disp: , Rfl:     HYDROcodone-acetaminophen (NORCO)  mg per tablet, Take 1 tablet by mouth every 6 (six) hours as  needed for Pain., Disp: 120 tablet, Rfl: 0    ibuprofen (ADVIL,MOTRIN) 800 MG tablet, , Disp: , Rfl:     indomethacin (INDOCIN) 25 MG capsule, , Disp: , Rfl:     lidocaine (LIDODERM) 5 %, Place 1 patch onto the skin once daily. Remove & Discard patch within 12 hours or as directed by MD, Disp: 15 patch, Rfl: 0    lisinopril (PRINIVIL,ZESTRIL) 5 MG tablet, Take 5 mg by mouth once daily. , Disp: , Rfl:     metFORMIN (GLUCOPHAGE) 1000 MG tablet, Take 500 mg by mouth daily with breakfast. , Disp: , Rfl:     methocarbamoL (ROBAXIN) 750 MG Tab, Take 1 tablet (750 mg total) by mouth 3 (three) times daily., Disp: 40 tablet, Rfl: 0    omeprazole (PRILOSEC) 20 MG capsule, Take 1 capsule (20 mg total) by mouth once daily., Disp: 90 capsule, Rfl: 3    oxyCODONE (ROXICODONE) 15 MG Tab, Take 1 tablet (15 mg total) by mouth every 6 (six) hours as needed (pain 8-10/10)., Disp: 50 tablet, Rfl: 0    oxyCODONE-acetaminophen (PERCOCET)  mg per tablet, Take 1 tablet by mouth every 6 (six) hours as needed for Pain., Disp: 100 tablet, Rfl: 0    pregabalin (LYRICA) 150 MG capsule, Take 1 capsule (150 mg total) by mouth 3 (three) times daily., Disp: 90 capsule, Rfl: 6    sildenafil (VIAGRA) 100 MG tablet, , Disp: , Rfl:     STOOL SOFTENER, DOCUSATE MADELAINE, 240 mg capsule, , Disp: , Rfl:     triamterene-hydrochlorothiazide 75-50 mg (MAXZIDE) 75-50 mg per tablet, Take 0.5 tablets by mouth once daily. , Disp: , Rfl:     vitamin D (VITAMIN D3) 1000 units Tab, Take 1,000 Units by mouth once daily., Disp: , Rfl:     sodium,potassium,mag sulfates (SUPREP BOWEL PREP KIT) 17.5-3.13-1.6 gram SolR, Take 177 mLs by mouth once daily. for 2 days, Disp: 1 kit, Rfl: 0    Past Medical History:   Diagnosis Date    Colitis     Diabetes mellitus, type 2     Diverticulosis     GERD (gastroesophageal reflux disease)     Hypertension        Past Surgical History:   Procedure Laterality Date    ANKLE SURGERY Bilateral     ANTERIOR CERVICAL  DISCECTOMY W/ FUSION N/A 10/30/2019    Procedure: C3-4, C4-5 ACDF;  Surgeon: Francis Alan MD;  [facility];  Service: Neurosurgery;  Laterality: N/A;  C3-4, C4-5 ACDF  Anesthesia:  General  Rad:  C-Arm  NM:  EMG, SEP, & MEP  Blood:  Type & Screen  Position:  Supine  Bed:  Regular slider bed  Headrest:  [place] & GW tongs/hanging weights  Misc:  Small vivigen  Nerve root retractor (DePuy)  Disposable #2 [place]  Interbody    BACK SURGERY      COLONOSCOPY      CREATION OF MUSCLE ROTATIONAL FLAP  3/3/2020    Procedure: CREATION, FLAP, MUSCLE ROTATION;  Surgeon: Francis Alan MD;  Location: [facility] OR [address] [place] [address];  Service: Neurosurgery;;    INJECTION OF ANESTHETIC AGENT AROUND NERVE Bilateral 3/10/2020    Procedure: Block, Nerve;  Surgeon: Erinn Surgeon;  Location: [facility];  Service: Anesthesiology;  Laterality: Bilateral;    lower back surgery      LUMBAR LAMINECTOMY WITH FUSION N/A 3/3/2020    Procedure: T10-Pelvis Fusion with L4 PSO **AIRO** Co-Surgeon: Roberto Parkinson;  Surgeon: Francis Alan MD;  Location: [address];  Service: Neurosurgery;  Laterality: N/A;  **AIRO**  **CELL SAVER**  Co-Surgeon: Roberto Parkinson  NM: EMG, SEP, MEP  Position: Prone  Bed: [place] 4 post, prone pillow  Blood: Type & Hold 2 units  Rad: C-Arm, AIRO  Vendor: [employer]  Misc: Vivigen, Infuse, Cement (Depuy)  [place]    NECK SURGERY      SPINE SURGERY      UPPER GASTROINTESTINAL ENDOSCOPY           Review of Systems   Constitutional: Negative for appetite change, fever and unexpected weight change.   HENT: Negative for trouble swallowing.         No jaundice.   Respiratory: Negative for cough, shortness of breath and wheezing.         He is a former cigarette smoker.  He denies dysphagia aspiration hemoptysis chronic cough chronic sputum production or dyspnea on exertion although he is not as physically active.   Cardiovascular: Negative for chest pain.        He denies exertional chest pain or rhythm disturbance.    Gastrointestinal: Positive for abdominal pain. Negative for abdominal distention, anal bleeding, blood in stool, constipation, diarrhea, nausea and rectal pain.        He has nonspecific left lower quadrant discomfort sometimes associated with a bowel movement.  He cannot be more specific.  His family history is negative for colon cancer.  He completed his therapy for chronic hepatitis-C and is considered an SVR.   Endocrine:        He states his diabetes is well controlled.  He has tries to stay on the ADA diet.  He is ingesting adequate fiber.   Musculoskeletal: Positive for arthralgias, back pain, gait problem and neck pain.        He now ambulates with a cane.  He denies falling.  He no longer uses the wheelchair or the walker.   Skin: Negative for pallor and rash.   Neurological: Negative for dizziness, seizures, syncope, speech difficulty, weakness and numbness.   Hematological: Negative for adenopathy.   Psychiatric/Behavioral: Negative for confusion.       Objective:      Physical Exam  Vitals signs reviewed.   Constitutional:       Appearance: He is well-developed.      Comments: Well-nourished well-hydrated afebrile nonicteric  male.  He is oriented x3.  He is sitting comfortably in the chair.  He can relate his history and answer questions appropriately.  He is normocephalic.  Pupils are normal.   HENT:      Head: Normocephalic.   Eyes:      Pupils: Pupils are equal, round, and reactive to light.   Neck:      Musculoskeletal: Normal range of motion and neck supple.      Thyroid: No thyromegaly.      Trachea: No tracheal deviation.   Cardiovascular:      Rate and Rhythm: Normal rate and regular rhythm.      Heart sounds: Normal heart sounds.   Pulmonary:      Effort: Pulmonary effort is normal.      Breath sounds: Normal breath sounds.   Abdominal:      General: Bowel sounds are normal. There is no distension.      Palpations: Abdomen is soft. There is no mass.      Tenderness: There is  abdominal tenderness. There is no left CVA tenderness, guarding or rebound.      Hernia: No hernia is present.      Comments: The abdomen is soft there is mild tenderness in the lower quadrant.  Organomegaly or masses are not detected.  Bowel sounds normal.   Musculoskeletal: Normal range of motion.      Comments: He ambulates slowly with a cane.  He can go from the sitting the standing position without difficulty.   Lymphadenopathy:      Cervical: No cervical adenopathy.   Skin:     General: Skin is warm and dry.   Neurological:      Mental Status: He is alert and oriented to person, place, and time.      Cranial Nerves: No cranial nerve deficit.   Psychiatric:         Behavior: Behavior normal.           Plan:       Preop testing  -     sodium,potassium,mag sulfates (SUPREP BOWEL PREP KIT) 17.5-3.13-1.6 gram SolR; Take 177 mLs by mouth once daily. for 2 days  Dispense: 1 kit; Refill: 0    DDD (degenerative disc disease), lumbar  -     HYDROcodone-acetaminophen (NORCO)  mg per tablet; Take 1 tablet by mouth every 6 (six) hours as needed for Pain.  Dispense: 120 tablet; Refill: 0    Low back pain, non-specific  -     HYDROcodone-acetaminophen (NORCO)  mg per tablet; Take 1 tablet by mouth every 6 (six) hours as needed for Pain.  Dispense: 120 tablet; Refill: 0    Neck pain with history of cervical spinal surgery  -     HYDROcodone-acetaminophen (NORCO)  mg per tablet; Take 1 tablet by mouth every 6 (six) hours as needed for Pain.  Dispense: 120 tablet; Refill: 0    Diverticulosis of large intestine without hemorrhage    Gastroesophageal reflux disease, esophagitis presence not specified     He will continue her reflux regimen.  He continues his current medications vitamins and minerals.  He continues his diabetic diet.  He continues his bowel program with additional fiber.  He takes his Norco as directed.  He avoids the anti-inflammatory agents.  [facility]  [facility] and labs  have been requested.  He will be scheduled for colonoscopy.  He has history of diverticulosis nonspecific left lower quadrant discomfort and history of polyps.  He has good health knowledge and he understands the procedures of colonoscopy.  Specifically he realizes there is an extremely small incidence of bleeding perforation or aspiration.  He continues his physical therapy including the pool therapy.

## 2020-10-02 ENCOUNTER — OFFICE VISIT (OUTPATIENT)
Dept: GASTROENTEROLOGY | Facility: CLINIC | Age: 70
End: 2020-10-02
Payer: MEDICARE

## 2020-10-02 ENCOUNTER — TELEPHONE (OUTPATIENT)
Dept: NEUROSURGERY | Facility: CLINIC | Age: 70
End: 2020-10-02

## 2020-10-02 VITALS
SYSTOLIC BLOOD PRESSURE: 120 MMHG | WEIGHT: 213 LBS | TEMPERATURE: 98 F | HEIGHT: 73 IN | RESPIRATION RATE: 18 BRPM | DIASTOLIC BLOOD PRESSURE: 70 MMHG | HEART RATE: 85 BPM | BODY MASS INDEX: 28.23 KG/M2 | OXYGEN SATURATION: 98 %

## 2020-10-02 DIAGNOSIS — K57.30 DIVERTICULOSIS OF LARGE INTESTINE WITHOUT HEMORRHAGE: Primary | ICD-10-CM

## 2020-10-02 DIAGNOSIS — M51.36 DDD (DEGENERATIVE DISC DISEASE), LUMBAR: ICD-10-CM

## 2020-10-02 DIAGNOSIS — K21.9 GASTROESOPHAGEAL REFLUX DISEASE: ICD-10-CM

## 2020-10-02 DIAGNOSIS — M54.50 LOW BACK PAIN, NON-SPECIFIC: ICD-10-CM

## 2020-10-02 DIAGNOSIS — G89.4 CHRONIC PAIN SYNDROME: ICD-10-CM

## 2020-10-02 DIAGNOSIS — Z98.890 NECK PAIN WITH HISTORY OF CERVICAL SPINAL SURGERY: ICD-10-CM

## 2020-10-02 DIAGNOSIS — K21.00 GASTROESOPHAGEAL REFLUX DISEASE WITH ESOPHAGITIS, UNSPECIFIED WHETHER HEMORRHAGE: ICD-10-CM

## 2020-10-02 DIAGNOSIS — M54.2 NECK PAIN WITH HISTORY OF CERVICAL SPINAL SURGERY: ICD-10-CM

## 2020-10-02 PROCEDURE — 99214 OFFICE O/P EST MOD 30 MIN: CPT | Mod: S$PBB,,, | Performed by: INTERNAL MEDICINE

## 2020-10-02 PROCEDURE — 99214 PR OFFICE/OUTPT VISIT, EST, LEVL IV, 30-39 MIN: ICD-10-PCS | Mod: S$PBB,,, | Performed by: INTERNAL MEDICINE

## 2020-10-02 PROCEDURE — 99214 OFFICE O/P EST MOD 30 MIN: CPT | Mod: PBBFAC,PN | Performed by: INTERNAL MEDICINE

## 2020-10-02 PROCEDURE — 99999 PR PBB SHADOW E&M-EST. PATIENT-LVL IV: CPT | Mod: PBBFAC,,, | Performed by: INTERNAL MEDICINE

## 2020-10-02 PROCEDURE — 99999 PR PBB SHADOW E&M-EST. PATIENT-LVL IV: ICD-10-PCS | Mod: PBBFAC,,, | Performed by: INTERNAL MEDICINE

## 2020-10-02 RX ORDER — OMEPRAZOLE 20 MG/1
20 CAPSULE, DELAYED RELEASE ORAL DAILY
Qty: 90 CAPSULE | Refills: 3 | Status: SHIPPED | OUTPATIENT
Start: 2020-10-02 | End: 2021-09-08 | Stop reason: SDUPTHER

## 2020-10-02 RX ORDER — OXYCODONE AND ACETAMINOPHEN 10; 325 MG/1; MG/1
1 TABLET ORAL EVERY 6 HOURS PRN
Qty: 100 TABLET | Refills: 0 | Status: SHIPPED | OUTPATIENT
Start: 2020-10-02 | End: 2020-12-02

## 2020-10-02 NOTE — PATIENT INSTRUCTIONS
He will continue the Percocet for pain.  He avoids anti-inflammatory agents.  He continues his current medication and reflux regimen.  He follows up with his surgeon.  He scheduled for colonoscopy.  He continues his nutritious diet vitamins and minerals.  He will make a food diary and avoid the off ending foods.

## 2020-10-02 NOTE — PROGRESS NOTES
Subjective:       Patient ID: Garcia Renteria is a 70 y.o. male.    Chief Complaint: Follow-up    He denies significant neck or back pain.  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  His surgeon states that he should have improved function.  He is able to ambulate with a cane.  He denies falling.  The Percocet is controlled 95% of his pain.  He avoids the anti-inflammatory agents.  The omeprazole has resolved the pyrosis and dyspepsia.  He denies hematemesis hematochezia jaundice or aspiration.  Has a history of diverticulosis and colon polyps.  He has had occasional constipation.  He scheduled for colonoscopy.  He denies cardiopulmonary symptoms.  He denies fever chills.      Allergies:  Review of patient's allergies indicates:  No Known Allergies    Medications:    Current Outpatient Medications:     bacitracin 500 unit/gram Oint, Apply topically 2 (two) times daily., Disp: , Rfl: 0    ceFAZolin (ANCEF) 1 gram injection, Inject 2,000 mg (2 g total) into the vein every 8 (eight) hours., Disp: , Rfl:     famotidine (PEPCID) 40 MG tablet, Take 1 tablet (40 mg total) by mouth every evening., Disp: 90 tablet, Rfl: 3    FEROSUL 325 mg (65 mg iron) Tab tablet, , Disp: , Rfl:     gabapentin (NEURONTIN) 100 MG capsule, , Disp: , Rfl:     HYDROcodone-acetaminophen (NORCO)  mg per tablet, Take 1 tablet by mouth every 6 (six) hours as needed for Pain., Disp: 120 tablet, Rfl: 0    ibuprofen (ADVIL,MOTRIN) 800 MG tablet, , Disp: , Rfl:     indomethacin (INDOCIN) 25 MG capsule, , Disp: , Rfl:     lidocaine (LIDODERM) 5 %, Place 1 patch onto the skin once daily. Remove & Discard patch within 12 hours or as directed by MD, Disp: 15 patch, Rfl: 0    lisinopril (PRINIVIL,ZESTRIL) 5 MG tablet, Take 5 mg by mouth once daily. , Disp: , Rfl:     metFORMIN (GLUCOPHAGE) 1000 MG tablet, Take 500 mg by mouth daily with breakfast. , Disp: , Rfl:     methocarbamoL (ROBAXIN) 750 MG Tab, Take 1 tablet (750 mg  total) by mouth 3 (three) times daily., Disp: 40 tablet, Rfl: 0    omeprazole (PRILOSEC) 20 MG capsule, Take 1 capsule (20 mg total) by mouth once daily., Disp: 90 capsule, Rfl: 3    oxyCODONE (ROXICODONE) 15 MG Tab, Take 1 tablet (15 mg total) by mouth every 6 (six) hours as needed (pain 8-10/10)., Disp: 50 tablet, Rfl: 0    oxyCODONE-acetaminophen (PERCOCET)  mg per tablet, Take 1 tablet by mouth every 6 (six) hours as needed for Pain., Disp: 100 tablet, Rfl: 0    sildenafil (VIAGRA) 100 MG tablet, , Disp: , Rfl:     STOOL SOFTENER, DOCUSATE MADELAINE, 240 mg capsule, , Disp: , Rfl:     triamterene-hydrochlorothiazide 75-50 mg (MAXZIDE) 75-50 mg per tablet, Take 0.5 tablets by mouth once daily. , Disp: , Rfl:     vitamin D (VITAMIN D3) 1000 units Tab, Take 1,000 Units by mouth once daily., Disp: , Rfl:     pregabalin (LYRICA) 150 MG capsule, Take 1 capsule (150 mg total) by mouth 3 (three) times daily., Disp: 90 capsule, Rfl: 6    Past Medical History:   Diagnosis Date    Colitis     Diabetes mellitus, type 2     Diverticulosis     GERD (gastroesophageal reflux disease)     Hypertension        Past Surgical History:   Procedure Laterality Date    ANKLE SURGERY Bilateral     ANTERIOR CERVICAL DISCECTOMY W/ FUSION N/A 10/30/2019    Procedure: C3-4, C4-5 ACDF;  Surgeon: Francis Alan MD;  Location: [facility] OR [address] [place] [address];  Service: Neurosurgery;  Laterality: N/A;  C3-4, C4-5 ACDF  Anesthesia:  General  Rad:  C-Arm  NM:  EMG, SEP, & MEP  Blood:  Type & Screen  Position:  Supine  Bed:  Regular slider bed  Headrest:  [place] & GW tongs/hanging weights  Misc:  Small vivigen  Nerve root retractor ([employer])  Disposable #2 [employer]  Interbody    BACK SURGERY      COLONOSCOPY      CREATION OF MUSCLE ROTATIONAL FLAP  3/3/2020    Procedure: CREATION, FLAP, MUSCLE ROTATION;  Surgeon: Francis Alan MD;  Location: [facility] OR [address] [place] [address];  Service: Neurosurgery;;    INJECTION OF ANESTHETIC AGENT AROUND NERVE Bilateral  3/10/2020    Procedure: Block, Nerve;  Surgeon: Erinn Surgeon;  Location: [facility];  Service: Anesthesiology;  Laterality: Bilateral;    lower back surgery      LUMBAR LAMINECTOMY WITH FUSION N/A 3/3/2020    Procedure: T10-Pelvis Fusion with L4 PSO **AIRO** Co-Surgeon: Roberto Parkinson;  Surgeon: Francis Alan MD;  Location: [facility] OR [address] [place] [address];  Service: Neurosurgery;  Laterality: N/A;  **AIRO**  **CELL SAVER**  Co-Surgeon: Roberto Parkinson  NM: EMG, SEP, MEP  Position: Prone  Bed: [place] 4 post, prone pillow  Blood: Type & Hold 2 units  Rad: C-Arm, AIRO  Vendor: [employer]  Misc: Vivigen, Infuse, Cement (Depuy)  [place]    NECK SURGERY      SPINE SURGERY      UPPER GASTROINTESTINAL ENDOSCOPY           Review of Systems   Constitutional: Negative for appetite change, fever and unexpected weight change.   HENT: Negative for trouble swallowing.         No jaundice.   Respiratory: Negative for cough, shortness of breath and wheezing.         He is a former cigarette smoker.  He denies dysphagia aspiration hemoptysis.  He denies significant coughing or sputum production.  He denies dyspnea on exertion but is not as physically active.   Cardiovascular: Negative for chest pain.        He denies exertional chest pain or rhythm disturbance.   Gastrointestinal: Negative for abdominal distention, abdominal pain, anal bleeding, blood in stool, constipation, diarrhea and nausea.        He has a history of diverticulosis and hemorrhoids.  He generally has daily bowel movements.  He has tried increase the fiber in his diet.  He states he generally has daily bowel movements.  There is a history of polyps.  He scheduled for colonoscopy.  He has good health knowledge.  He understands the procedure.  Specifically he realizes there is an extremely small incidence of bleeding perforation or aspiration.   Endocrine:        He states diabetes is well controlled.  He tries to stay on the diabetic diet.  He is taking vitamins and minerals particularly  the vitamin-E.   Musculoskeletal: Positive for arthralgias, back pain, gait problem, neck pain and neck stiffness.        He has had extensive cervical spine surgery.  He has had 3 lumbar spine surgeries.  He states his pain is markedly improved.  He is able to ambulate with a cane.  He denies falling.  He still complains of the numbness and decreased sensation of his knees and lower extremities.  He states his surgeon told him that the neuropathy should improve.   Skin: Negative for pallor and rash.   Neurological: Negative for dizziness, seizures, syncope, speech difficulty, weakness and numbness.   Hematological: Negative for adenopathy.   Psychiatric/Behavioral: Negative for confusion.       Objective:      Physical Exam  Vitals signs reviewed.   Constitutional:       Appearance: He is well-developed.      Comments: Well-nourished well-hydrated afebrile nonicteric thin  male.  He is sitting comfortably in the chair.  He is oriented x3.  He is normocephalic.  Pupils are normal.  He can relate his history and answer questions normally.   HENT:      Head: Normocephalic.   Eyes:      Pupils: Pupils are equal, round, and reactive to light.   Neck:      Musculoskeletal: Normal range of motion and neck supple.      Thyroid: No thyromegaly.      Trachea: No tracheal deviation.   Cardiovascular:      Rate and Rhythm: Normal rate and regular rhythm.      Heart sounds: Normal heart sounds.   Pulmonary:      Effort: Pulmonary effort is normal.      Breath sounds: Normal breath sounds.   Abdominal:      General: Bowel sounds are normal. There is no distension.      Palpations: Abdomen is soft. There is no mass.      Tenderness: There is no abdominal tenderness. There is no right CVA tenderness, left CVA tenderness, guarding or rebound.      Hernia: No hernia is present.      Comments: The abdomen is soft without tenderness masses organomegaly.  Bowel sounds are normal.   Musculoskeletal:      Comments: He  ambulates slowly with a cane.  He can go from the sitting the standing position without difficulty.   Lymphadenopathy:      Cervical: No cervical adenopathy.   Skin:     General: Skin is warm and dry.   Neurological:      Mental Status: He is alert and oriented to person, place, and time.      Cranial Nerves: No cranial nerve deficit.   Psychiatric:         Behavior: Behavior normal.           Plan:       Diverticulosis of large intestine without hemorrhage    DDD (degenerative disc disease), lumbar  -     oxyCODONE-acetaminophen (PERCOCET)  mg per tablet; Take 1 tablet by mouth every 6 (six) hours as needed for Pain.  Dispense: 100 tablet; Refill: 0    Low back pain, non-specific  -     oxyCODONE-acetaminophen (PERCOCET)  mg per tablet; Take 1 tablet by mouth every 6 (six) hours as needed for Pain.  Dispense: 100 tablet; Refill: 0    Neck pain with history of cervical spinal surgery  -     oxyCODONE-acetaminophen (PERCOCET)  mg per tablet; Take 1 tablet by mouth every 6 (six) hours as needed for Pain.  Dispense: 100 tablet; Refill: 0    Chronic pain syndrome  -     oxyCODONE-acetaminophen (PERCOCET)  mg per tablet; Take 1 tablet by mouth every 6 (six) hours as needed for Pain.  Dispense: 100 tablet; Refill: 0    Gastroesophageal reflux disease  -     omeprazole (PRILOSEC) 20 MG capsule; Take 1 capsule (20 mg total) by mouth once daily.  Dispense: 90 capsule; Refill: 3    Gastroesophageal reflux disease with esophagitis, unspecified whether hemorrhage     He will continue his current medication in the reflux regimen.  He continues the omeprazole.  He continues the diabetic diet and will make a food diary and avoid the offending foods.  Will add more fiber to the diet.  He scheduled for colonoscopy.  He has a history of diverticulosis hemorrhoids and colon polyps.  He has good health knowledge.  He understands the procedure.  Specifically he realizes there is an extremely small incidence of  bleeding perforation or aspiration.  He follows up with his surgeon.  He continues the Percocet as prescribed.ms- reviewed and he is using the Percocet correctly.

## 2020-10-02 NOTE — H&P (VIEW-ONLY)
Subjective:       Patient ID: Garcia Renteria is a 70 y.o. male.    Chief Complaint: Follow-up    He denies significant neck or back pain.  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  His surgeon states that he should have improved function.  He is able to ambulate with a cane.  He denies falling.  The Percocet is controlled 95% of his pain.  He avoids the anti-inflammatory agents.  The omeprazole has resolved the pyrosis and dyspepsia.  He denies hematemesis hematochezia jaundice or aspiration.  Has a history of diverticulosis and colon polyps.  He has had occasional constipation.  He scheduled for colonoscopy.  He denies cardiopulmonary symptoms.  He denies fever chills.      Allergies:  Review of patient's allergies indicates:  No Known Allergies    Medications:    Current Outpatient Medications:     bacitracin 500 unit/gram Oint, Apply topically 2 (two) times daily., Disp: , Rfl: 0    ceFAZolin (ANCEF) 1 gram injection, Inject 2,000 mg (2 g total) into the vein every 8 (eight) hours., Disp: , Rfl:     famotidine (PEPCID) 40 MG tablet, Take 1 tablet (40 mg total) by mouth every evening., Disp: 90 tablet, Rfl: 3    FEROSUL 325 mg (65 mg iron) Tab tablet, , Disp: , Rfl:     gabapentin (NEURONTIN) 100 MG capsule, , Disp: , Rfl:     HYDROcodone-acetaminophen (NORCO)  mg per tablet, Take 1 tablet by mouth every 6 (six) hours as needed for Pain., Disp: 120 tablet, Rfl: 0    ibuprofen (ADVIL,MOTRIN) 800 MG tablet, , Disp: , Rfl:     indomethacin (INDOCIN) 25 MG capsule, , Disp: , Rfl:     lidocaine (LIDODERM) 5 %, Place 1 patch onto the skin once daily. Remove & Discard patch within 12 hours or as directed by MD, Disp: 15 patch, Rfl: 0    lisinopril (PRINIVIL,ZESTRIL) 5 MG tablet, Take 5 mg by mouth once daily. , Disp: , Rfl:     metFORMIN (GLUCOPHAGE) 1000 MG tablet, Take 500 mg by mouth daily with breakfast. , Disp: , Rfl:     methocarbamoL (ROBAXIN) 750 MG Tab, Take 1 tablet (750 mg  total) by mouth 3 (three) times daily., Disp: 40 tablet, Rfl: 0    omeprazole (PRILOSEC) 20 MG capsule, Take 1 capsule (20 mg total) by mouth once daily., Disp: 90 capsule, Rfl: 3    oxyCODONE (ROXICODONE) 15 MG Tab, Take 1 tablet (15 mg total) by mouth every 6 (six) hours as needed (pain 8-10/10)., Disp: 50 tablet, Rfl: 0    oxyCODONE-acetaminophen (PERCOCET)  mg per tablet, Take 1 tablet by mouth every 6 (six) hours as needed for Pain., Disp: 100 tablet, Rfl: 0    sildenafil (VIAGRA) 100 MG tablet, , Disp: , Rfl:     STOOL SOFTENER, DOCUSATE MADELAINE, 240 mg capsule, , Disp: , Rfl:     triamterene-hydrochlorothiazide 75-50 mg (MAXZIDE) 75-50 mg per tablet, Take 0.5 tablets by mouth once daily. , Disp: , Rfl:     vitamin D (VITAMIN D3) 1000 units Tab, Take 1,000 Units by mouth once daily., Disp: , Rfl:     pregabalin (LYRICA) 150 MG capsule, Take 1 capsule (150 mg total) by mouth 3 (three) times daily., Disp: 90 capsule, Rfl: 6    Past Medical History:   Diagnosis Date    Colitis     Diabetes mellitus, type 2     Diverticulosis     GERD (gastroesophageal reflux disease)     Hypertension        Past Surgical History:   Procedure Laterality Date    ANKLE SURGERY Bilateral     ANTERIOR CERVICAL DISCECTOMY W/ FUSION N/A 10/30/2019    Procedure: C3-4, C4-5 ACDF;  Surgeon: Francis Alan MD;  Location: [facility] OR [address] [place] [address];  Service: Neurosurgery;  Laterality: N/A;  C3-4, C4-5 ACDF  Anesthesia:  General  Rad:  C-Arm  NM:  EMG, SEP, & MEP  Blood:  Type & Screen  Position:  Supine  Bed:  Regular slider bed  Headrest:  [place] & GW tongs/hanging weights  Misc:  Small vivigen  Nerve root retractor ([employer])  Disposable #2 [employer]  Interbody    BACK SURGERY      COLONOSCOPY      CREATION OF MUSCLE ROTATIONAL FLAP  3/3/2020    Procedure: CREATION, FLAP, MUSCLE ROTATION;  Surgeon: Francis Alan MD;  Location: [facility] OR [address] [place] [address];  Service: Neurosurgery;;    INJECTION OF ANESTHETIC AGENT AROUND NERVE Bilateral  3/10/2020    Procedure: Block, Nerve;  Surgeon: Erinn Surgeon;  Location: [facility];  Service: Anesthesiology;  Laterality: Bilateral;    lower back surgery      LUMBAR LAMINECTOMY WITH FUSION N/A 3/3/2020    Procedure: T10-Pelvis Fusion with L4 PSO **AIRO** Co-Surgeon: Roberto Parkinson;  Surgeon: Francis Alan MD;  Location: [facility] OR [address] [place] [address];  Service: Neurosurgery;  Laterality: N/A;  **AIRO**  **CELL SAVER**  Co-Surgeon: Roberto Parkinson  NM: EMG, SEP, MEP  Position: Prone  Bed: [place] 4 post, prone pillow  Blood: Type & Hold 2 units  Rad: C-Arm, AIRO  Vendor: [employer]  Misc: Vivigen, Infuse, Cement (Depuy)  [place]    NECK SURGERY      SPINE SURGERY      UPPER GASTROINTESTINAL ENDOSCOPY           Review of Systems   Constitutional: Negative for appetite change, fever and unexpected weight change.   HENT: Negative for trouble swallowing.         No jaundice.   Respiratory: Negative for cough, shortness of breath and wheezing.         He is a former cigarette smoker.  He denies dysphagia aspiration hemoptysis.  He denies significant coughing or sputum production.  He denies dyspnea on exertion but is not as physically active.   Cardiovascular: Negative for chest pain.        He denies exertional chest pain or rhythm disturbance.   Gastrointestinal: Negative for abdominal distention, abdominal pain, anal bleeding, blood in stool, constipation, diarrhea and nausea.        He has a history of diverticulosis and hemorrhoids.  He generally has daily bowel movements.  He has tried increase the fiber in his diet.  He states he generally has daily bowel movements.  There is a history of polyps.  He scheduled for colonoscopy.  He has good health knowledge.  He understands the procedure.  Specifically he realizes there is an extremely small incidence of bleeding perforation or aspiration.   Endocrine:        He states diabetes is well controlled.  He tries to stay on the diabetic diet.  He is taking vitamins and minerals particularly  the vitamin-E.   Musculoskeletal: Positive for arthralgias, back pain, gait problem, neck pain and neck stiffness.        He has had extensive cervical spine surgery.  He has had 3 lumbar spine surgeries.  He states his pain is markedly improved.  He is able to ambulate with a cane.  He denies falling.  He still complains of the numbness and decreased sensation of his knees and lower extremities.  He states his surgeon told him that the neuropathy should improve.   Skin: Negative for pallor and rash.   Neurological: Negative for dizziness, seizures, syncope, speech difficulty, weakness and numbness.   Hematological: Negative for adenopathy.   Psychiatric/Behavioral: Negative for confusion.       Objective:      Physical Exam  Vitals signs reviewed.   Constitutional:       Appearance: He is well-developed.      Comments: Well-nourished well-hydrated afebrile nonicteric thin  male.  He is sitting comfortably in the chair.  He is oriented x3.  He is normocephalic.  Pupils are normal.  He can relate his history and answer questions normally.   HENT:      Head: Normocephalic.   Eyes:      Pupils: Pupils are equal, round, and reactive to light.   Neck:      Musculoskeletal: Normal range of motion and neck supple.      Thyroid: No thyromegaly.      Trachea: No tracheal deviation.   Cardiovascular:      Rate and Rhythm: Normal rate and regular rhythm.      Heart sounds: Normal heart sounds.   Pulmonary:      Effort: Pulmonary effort is normal.      Breath sounds: Normal breath sounds.   Abdominal:      General: Bowel sounds are normal. There is no distension.      Palpations: Abdomen is soft. There is no mass.      Tenderness: There is no abdominal tenderness. There is no right CVA tenderness, left CVA tenderness, guarding or rebound.      Hernia: No hernia is present.      Comments: The abdomen is soft without tenderness masses organomegaly.  Bowel sounds are normal.   Musculoskeletal:      Comments: He  ambulates slowly with a cane.  He can go from the sitting the standing position without difficulty.   Lymphadenopathy:      Cervical: No cervical adenopathy.   Skin:     General: Skin is warm and dry.   Neurological:      Mental Status: He is alert and oriented to person, place, and time.      Cranial Nerves: No cranial nerve deficit.   Psychiatric:         Behavior: Behavior normal.           Plan:       Diverticulosis of large intestine without hemorrhage    DDD (degenerative disc disease), lumbar  -     oxyCODONE-acetaminophen (PERCOCET)  mg per tablet; Take 1 tablet by mouth every 6 (six) hours as needed for Pain.  Dispense: 100 tablet; Refill: 0    Low back pain, non-specific  -     oxyCODONE-acetaminophen (PERCOCET)  mg per tablet; Take 1 tablet by mouth every 6 (six) hours as needed for Pain.  Dispense: 100 tablet; Refill: 0    Neck pain with history of cervical spinal surgery  -     oxyCODONE-acetaminophen (PERCOCET)  mg per tablet; Take 1 tablet by mouth every 6 (six) hours as needed for Pain.  Dispense: 100 tablet; Refill: 0    Chronic pain syndrome  -     oxyCODONE-acetaminophen (PERCOCET)  mg per tablet; Take 1 tablet by mouth every 6 (six) hours as needed for Pain.  Dispense: 100 tablet; Refill: 0    Gastroesophageal reflux disease  -     omeprazole (PRILOSEC) 20 MG capsule; Take 1 capsule (20 mg total) by mouth once daily.  Dispense: 90 capsule; Refill: 3    Gastroesophageal reflux disease with esophagitis, unspecified whether hemorrhage     He will continue his current medication in the reflux regimen.  He continues the omeprazole.  He continues the diabetic diet and will make a food diary and avoid the offending foods.  Will add more fiber to the diet.  He scheduled for colonoscopy.  He has a history of diverticulosis hemorrhoids and colon polyps.  He has good health knowledge.  He understands the procedure.  Specifically he realizes there is an extremely small incidence of  bleeding perforation or aspiration.  He follows up with his surgeon.  He continues the Percocet as prescribed.ms- reviewed and he is using the Percocet correctly.

## 2020-10-07 ENCOUNTER — ANESTHESIA EVENT (OUTPATIENT)
Dept: SURGERY | Facility: HOSPITAL | Age: 70
End: 2020-10-07
Payer: MEDICARE

## 2020-10-07 ENCOUNTER — ANESTHESIA (OUTPATIENT)
Dept: SURGERY | Facility: HOSPITAL | Age: 70
End: 2020-10-07
Payer: MEDICARE

## 2020-10-07 ENCOUNTER — HOSPITAL ENCOUNTER (OUTPATIENT)
Facility: HOSPITAL | Age: 70
Discharge: HOME | End: 2020-10-07
Attending: INTERNAL MEDICINE | Admitting: INTERNAL MEDICINE
Payer: MEDICARE

## 2020-10-07 VITALS
TEMPERATURE: 98 F | BODY MASS INDEX: 28.23 KG/M2 | DIASTOLIC BLOOD PRESSURE: 106 MMHG | OXYGEN SATURATION: 98 % | HEART RATE: 76 BPM | WEIGHT: 213 LBS | RESPIRATION RATE: 15 BRPM | SYSTOLIC BLOOD PRESSURE: 153 MMHG | HEIGHT: 73 IN

## 2020-10-07 DIAGNOSIS — Z86.010 HX OF COLONIC POLYPS: ICD-10-CM

## 2020-10-07 DIAGNOSIS — Z12.11 COLON CANCER SCREENING: ICD-10-CM

## 2020-10-07 LAB
POCT GLUCOSE: 107 MG/DL (ref 70–110)
POCT GLUCOSE: 90 MG/DL (ref 70–110)
SARS-COV-2 RDRP RESP QL NAA+PROBE: NEGATIVE

## 2020-10-07 PROCEDURE — G0121 COLON CA SCRN NOT HI RSK IND: HCPCS | Performed by: INTERNAL MEDICINE

## 2020-10-07 PROCEDURE — G0121 COLON CA SCRN NOT HI RSK IND: ICD-10-PCS | Mod: ,,, | Performed by: INTERNAL MEDICINE

## 2020-10-07 PROCEDURE — G0121 COLON CA SCRN NOT HI RSK IND: HCPCS | Mod: ,,, | Performed by: INTERNAL MEDICINE

## 2020-10-07 PROCEDURE — D9220A PRA ANESTHESIA: Mod: CRNA,,, | Performed by: NURSE ANESTHETIST, CERTIFIED REGISTERED

## 2020-10-07 PROCEDURE — D9220A PRA ANESTHESIA: Mod: ANES,,, | Performed by: ANESTHESIOLOGY

## 2020-10-07 PROCEDURE — 37000008 HC ANESTHESIA 1ST 15 MINUTES: Performed by: INTERNAL MEDICINE

## 2020-10-07 PROCEDURE — 25000003 PHARM REV CODE 250: Performed by: NURSE ANESTHETIST, CERTIFIED REGISTERED

## 2020-10-07 PROCEDURE — D9220A PRA ANESTHESIA: ICD-10-PCS | Mod: ANES,,, | Performed by: ANESTHESIOLOGY

## 2020-10-07 PROCEDURE — D9220A PRA ANESTHESIA: ICD-10-PCS | Mod: CRNA,,, | Performed by: NURSE ANESTHETIST, CERTIFIED REGISTERED

## 2020-10-07 PROCEDURE — 63600175 PHARM REV CODE 636 W HCPCS: Performed by: NURSE ANESTHETIST, CERTIFIED REGISTERED

## 2020-10-07 PROCEDURE — U0002 COVID-19 LAB TEST NON-CDC: HCPCS

## 2020-10-07 PROCEDURE — 37000009 HC ANESTHESIA EA ADD 15 MINS: Performed by: INTERNAL MEDICINE

## 2020-10-07 PROCEDURE — 45378 DIAGNOSTIC COLONOSCOPY: CPT | Performed by: INTERNAL MEDICINE

## 2020-10-07 RX ORDER — LIDOCAINE HYDROCHLORIDE 20 MG/ML
INJECTION, SOLUTION EPIDURAL; INFILTRATION; INTRACAUDAL; PERINEURAL
Status: DISCONTINUED | OUTPATIENT
Start: 2020-10-07 | End: 2020-10-07

## 2020-10-07 RX ORDER — SODIUM CHLORIDE, SODIUM LACTATE, POTASSIUM CHLORIDE, CALCIUM CHLORIDE 600; 310; 30; 20 MG/100ML; MG/100ML; MG/100ML; MG/100ML
INJECTION, SOLUTION INTRAVENOUS CONTINUOUS PRN
Status: DISCONTINUED | OUTPATIENT
Start: 2020-10-07 | End: 2020-10-07

## 2020-10-07 RX ORDER — PROPOFOL 10 MG/ML
VIAL (ML) INTRAVENOUS
Status: DISCONTINUED | OUTPATIENT
Start: 2020-10-07 | End: 2020-10-07

## 2020-10-07 RX ADMIN — SODIUM CHLORIDE, POTASSIUM CHLORIDE, SODIUM LACTATE AND CALCIUM CHLORIDE: 600; 310; 30; 20 INJECTION, SOLUTION INTRAVENOUS at 09:10

## 2020-10-07 RX ADMIN — LIDOCAINE HYDROCHLORIDE 100 MG: 20 INJECTION, SOLUTION EPIDURAL; INFILTRATION; INTRACAUDAL; PERINEURAL at 09:10

## 2020-10-07 RX ADMIN — PROPOFOL 50 MG: 10 INJECTION, EMULSION INTRAVENOUS at 09:10

## 2020-10-07 NOTE — TRANSFER OF CARE
"Anesthesia Transfer of Care Note    Patient: Garcia Renteria    Procedure(s) Performed: Procedure(s) (LRB):  COLONOSCOPY (N/A)    Patient location: PACU    Anesthesia Type: general    Transport from OR: Transported from OR on 2-3 L/min O2 by NC with adequate spontaneous ventilation    Post pain: adequate analgesia    Post assessment: no apparent anesthetic complications    Post vital signs: stable    Level of consciousness: awake, alert and oriented    Nausea/Vomiting: no nausea/vomiting    Complications: none    Transfer of care protocol was followed      Last vitals:   Visit Vitals  BP (!) 148/108 (BP Location: Right arm, Patient Position: Sitting)   Pulse 83   Temp 36.7 °C (98.1 °F) (Oral)   Resp 20   Ht 6' 1" (1.854 m)   Wt 96.6 kg (213 lb)   SpO2 100%   BMI 28.10 kg/m²     "

## 2020-10-07 NOTE — ANESTHESIA POSTPROCEDURE EVALUATION
Anesthesia Post Evaluation    Patient: Garcia Renteria    Procedure(s) Performed: Procedure(s) (LRB):  COLONOSCOPY (N/A)    Final Anesthesia Type: general    Patient location during evaluation: PACU  Patient participation: Yes- Able to Participate  Level of consciousness: awake and awake and alert  Post-procedure vital signs: reviewed and stable  Pain management: adequate  Airway patency: patent    PONV status at discharge: No PONV  Anesthetic complications: no      Cardiovascular status: blood pressure returned to baseline  Respiratory status: unassisted and spontaneous ventilation  Hydration status: euvolemic  Follow-up not needed.          Vitals Value Taken Time   /106 10/07/20 1000   Temp 36.5 °C (97.7 °F) 10/07/20 0939   Pulse 75 10/07/20 1007   Resp 14 10/07/20 1007   SpO2 98 % 10/07/20 1007   Vitals shown include unvalidated device data.      Event Time   Out of Recovery 09:51:00         Pain/Celena Score: Celena Score: 10 (10/7/2020  9:50 AM)  Modified Celena Score: 20 (10/7/2020 10:29 AM)

## 2020-10-07 NOTE — INTERVAL H&P NOTE
"The patient has been examined and the H&P has been reviewed:    I concur with the findings and no changes have occurred since H&P was written.    Surgery risks, benefits and alternative options discussed and understood by patient/family.      Office Visit    9/1/2020  [facility] - Gastro     Elpidio Lau MD  Gastroenterology  Gastroesophageal reflux disease without esophagitis +5 more  Dx  Follow-up ; Referred by Marija Mayorga DO  Reason for Visit   Additional Documentation    Vitals:    /74 (BP Location: Left arm, Patient Position: Sitting, BP Method: Medium (Automatic))   Pulse 80   Temp 98.5 °F (36.9 °C) (Temporal)   Resp 18   Ht 5' 8" (1.727 m)   Wt 60.3 kg (133 lb)   SpO2 98%   BMI 20.22 kg/m²   BSA 1.7 m²   Flowsheets:    Anthropometrics      SmartForms:     [facility] AMB - FALL RISK     [facility] JESS HM TOPICS ADVANCED      Encounter Info:    Billing Info,   History,   Allergies,   Detailed Report      Instructions       Follow up in about 3 months (around 12/1/2020).  He will be scheduled for colonoscopy.  She has a history of diverticulosis.  Last colonoscopy was 6 years ago.  She is having bowel irregularity of constipation and diarrhea.  She will make a food diary and attempt to pinpoint the offending foods.  She continues her current medications vitamins and minerals the cardiologist has evaluated the patient and his records will be requested.          After Visit Summary (Printed 9/1/2020)  Progress Notes  Elpidio Lau MD (Physician)   Gastroenterology   9/1/2020 11:00 AM   Signed     Subjective:       Patient ID: Zoya Zhu is a 72 y.o. female.     Chief Complaint: Follow-up     She is ready for colonoscopy she states.  Last colonoscopy was 6 years ago.  She has a history of diverticulosis.  She has had constipation and diarrhea.  The Amitiza his cause cramping.  The Amitiza is cause the diarrhea but if she does not take his she is severely constipated.  She has had " occasional left lower quadrant discomfort.  She denies hematemesis hematochezia jaundice or bleeding.  Emotionally she is doing better.  She is remarried and the situation at home is improved.  She is happy that her blood pressure is well controlled and her cholesterol is normal.        Allergies:  Review of patient's allergies indicates:  No Known Allergies     Medications:     Current Outpatient Medications:     acetaminophen (TYLENOL ORAL), Take by mouth., Disp: , Rfl:     ascorbate calcium-bioflavonoid 500-200 mg Tab, APPLY 0.1ML TO WRIST AND RUB IN WELL TWICE A DAY DAYS 1 THRU 25 SKIP 3 DAYS THEN REPEAT EXPIRES AFTER 180 DAYS, Disp: , Rfl: 5    buPROPion (WELLBUTRIN XL) 300 MG 24 hr tablet, Take 1 tablet (300 mg total) by mouth once daily., Disp: 90 tablet, Rfl: 1    calcium carbonate (OS-MADELAINE) 600 mg calcium (1,500 mg) Tab, Take 600 mg by mouth once daily., Disp: , Rfl:     denosumab (PROLIA) 60 mg/mL Syrg, Inject 1 mL (60 mg total) into the skin every 6 (six) months., Disp: 1 mL, Rfl: 3    DHEA 10 mg-47 mg calcium Tab, TAKE 1 TABLET BY MOUTH EVERY DAY, Disp: , Rfl: 5    donepeziL (ARICEPT) 5 MG tablet, Take 1 tablet (5 mg total) by mouth every evening. For memory, Disp: 90 tablet, Rfl: 1    ergocalciferol (VITAMIN D2) 50,000 unit Cap, Take 1 capsule (50,000 Units total) by mouth every 7 days., Disp: 12 capsule, Rfl: 3    fluticasone propionate (FLONASE) 50 mcg/actuation nasal spray, 1 spray (50 mcg total) by Each Nostril route once daily., Disp: 16 mL, Rfl: 0    guaiFENesin (MUCINEX) 600 mg 12 hr tablet, Take 2 tablets (1,200 mg total) by mouth 2 (two) times daily., Disp: 20 tablet, Rfl: 0    lisinopriL (PRINIVIL,ZESTRIL) 2.5 MG tablet, , Disp: , Rfl:     lubiprostone (AMITIZA) 8 MCG Cap, Take 1 capsule (8 mcg total) by mouth 2 (two) times daily with meals., Disp: 180 capsule, Rfl: 3    montelukast (SINGULAIR) 10 mg tablet, , Disp: , Rfl:     progesterone (PROMETRIUM) 200 MG capsule, TAKE 1  CAPSULE BY MOUTH EVERY DAY AT BEDTIME DAYS 1 THRU 25 SKIP 3 DAYS THEN REPEAT EXPIRES AFTER 180 DAYS, Disp: 25 capsule, Rfl: 5    traZODone (DESYREL) 50 MG tablet, Take 1 tablet (50 mg total) by mouth nightly as needed for Insomnia., Disp: 90 tablet, Rfl: 1    erythromycin (ROMYCIN) ophthalmic ointment, Place into the right eye every 6 (six) hours. (Patient not taking: Reported on 9/1/2020), Disp: 3.5 g, Rfl: 0     Current Facility-Administered Medications:     cyanocobalamin injection 1,000 mcg, 1,000 mcg, Intramuscular, Q30 Days, Marija Mayorga DO, 1,000 mcg at 03/17/20 0839          Past Medical History:   Diagnosis Date    ADHD (attention deficit hyperactivity disorder)      Anxiety      Arthritis      GERD (gastroesophageal reflux disease)      Osteoporosis           Past Surgical History:   Procedure Laterality Date    AUGMENTATION OF BREAST Left      TOTAL REDUCTION MAMMOPLASTY Right              Review of Systems   Constitutional: Negative for appetite change, fever and unexpected weight change.   HENT: Negative for trouble swallowing.         Two weeks ago she had a surgery for sinusitis and her symptoms have markedly improved.   Respiratory: Negative for cough, shortness of breath and wheezing.         She has never used tobacco.  She denies tobacco alcohol usage.  She denies dysphagia aspiration hemoptysis chronic cough chronic sputum production dyspnea on exertion.   Cardiovascular: Negative for chest pain.        She denies exertional chest pain or rhythm disturbance.  She was recently evaluated by the cardiologist.  She states the evaluation was normal.   Gastrointestinal: Positive for constipation and diarrhea. Negative for abdominal distention, abdominal pain, anal bleeding, blood in stool, nausea and rectal pain.        She was adopted so she denied or family history.  She states that the PPI has resolved her pyrosis and dyspepsia.  She has a history gastroesophageal reflux but she denies  hematemesis hematochezia he has had jaundice or aspiration.   Musculoskeletal: Positive for arthralgias and back pain. Negative for neck pain.        She is being evaluated by the neuro surgeon for the chronic back pain.  She was told she has osteoporosis she has been treated by her PCP.  She states she is on her calcium and vitamins.   Skin: Negative for pallor and rash.   Neurological: Negative for dizziness, seizures, syncope, speech difficulty, weakness and numbness.   Hematological: Negative for adenopathy.   Psychiatric/Behavioral: Negative for confusion.       Objective:   Physical Exam  Vitals signs reviewed.   Constitutional:       Appearance: She is well-developed.      Comments: Well-nourished well-hydrated Maximino afebrile elderly white female.  She is normocephalic.  Pupils are normal.  She is oriented x3.  She can relate her history and answer questions without difficulty.  She is sitting comfortably in her chair.   HENT:      Head: Normocephalic.   Eyes:      Pupils: Pupils are equal, round, and reactive to light.   Neck:      Musculoskeletal: Normal range of motion and neck supple.      Thyroid: No thyromegaly.      Trachea: No tracheal deviation.   Cardiovascular:      Rate and Rhythm: Normal rate and regular rhythm.      Heart sounds: Normal heart sounds.   Pulmonary:      Effort: Pulmonary effort is normal.      Breath sounds: Normal breath sounds.   Abdominal:      General: Bowel sounds are normal. There is no distension.      Palpations: Abdomen is soft. There is no mass.      Tenderness: There is no abdominal tenderness. There is no left CVA tenderness, guarding or rebound.      Hernia: No hernia is present.   Musculoskeletal: Normal range of motion.      Comments: Ambulate normally.  She can go from the sitting the standing position without difficulty.   Lymphadenopathy:      Cervical: No cervical adenopathy.   Skin:     General: Skin is warm and dry.   Neurological:      Mental Status: She is  alert and oriented to person, place, and time.      Cranial Nerves: No cranial nerve deficit.   Psychiatric:         Behavior: Behavior normal.             Plan:       Gastroesophageal reflux disease without esophagitis     Hiatal hernia     Diverticulosis of large intestine without hemorrhage     Constipation, unspecified constipation type      She will continue her reflux regimen current medications vitamins and minerals.  She takes her calcium vitamin-D.  She will make a food diary and avoid the offending foods.  She will be scheduled for colonoscopy.  She has good health knowledge.  She understands the procedure.  Specifically she realizes there is an extremely small incidence of bleeding perforation or aspiration.  The cardiologist report will be requested.  She follows up with her ENT in the neurosurgeon.             Other Notes  All notes     Addendum Note from Elpidio Lau MD (Gastroenterology)     Addendum Note from Carol Her LPN     Addendum Note from Carol Her LPN  Communications       Letter sent to Marija Mayorga DO    AMB Visit Summary: Provider Version  Not recorded   All Charges for This Encounter    Code Description Service Date Service Provider Modifiers Qty   58969701 HC E&M-EST. PATIENT-LVL IV 9/1/2020 Elpidio Lau MD PBBFAC, PN, 25 1   [id number] HI PBB SHADOW E&M-EST. PATIENT-LVL IV 9/1/2020 Elpidio Lau MD PBBFAC 1   44405 HI OFFICE/OUTPT VISIT,EST,LEVL IV 9/1/2020 Elpidio Lau MD S$PBB 1    HI VITAMIN B12 INJ UP TO 1,000 MCG 9/1/2020 Carol Her LPN PBBFAC, PN 1   34829 HI INJECTION,THERAP/PROPH/VBKN8RC, IM OR SUBCUT 9/1/2020 Carol Her LPN PBBFAC, PN 1   Level of Service    Level of Service   HI OFFICE/OUTPT VISIT,EST,LEVL IV [90468]   BestPractice Advisories    Click to view BestPractice Advisory history   AVS Reports    Date/Time Report Action User   9/1/2020 12:01 PM After Visit Summary Printed Carol Her LPN   9/1/2020 12:01 PM After Visit Summary Automatically  "Generated Carol Her LPN   9/1/2020 11:57 AM After Visit Summary Automatically Generated Carol Her LPN   9/1/2020 11:52 AM After Visit Summary Automatically Generated Elpidio Lau MD   Encounter-Level Documents - 09/01/2020:    After Visit Summary - Document on 9/1/2020 12:01 PM by Carol Her LPN: After Visit Summary  After Visit Summary - Document on 9/1/2020 12:01 PM by Carol Her LPN: After Visit Summary  After Visit Summary - Document on 9/1/2020 11:57 AM by Carol Her LPN: After Visit Summary  After Visit Summary - Document on 9/1/2020 11:52 AM by Elpidio Lau MD: After Visit Summary  Orders Placed     None  Medication Changes       polyethylene glycol 3350 17 gram/dose Mix 8.3oz of Miralax in 2 quarts of liquid the day before scheduled colonoscopy.     Medication List   Fall Risk    Patient Mobility Status: Ambulatory   Number of falls in the past 12 months?: 0   Fall Risk?: No  Medications Administered     cyanocobalamin (vitamin B-12) 1000 mcg  Visit Diagnoses       Gastroesophageal reflux disease without esophagitis     Hiatal hernia     Diverticulosis of large intestine without hemorrhage     Constipation, unspecified constipation type     Colon cancer screening     B12 deficiencyOffice Visit    9/1/2020  [facility] - Gastro     Elpidio Lau MD  Gastroenterology  Gastroesophageal reflux disease without esophagitis +5 more  Dx  Follow-up ; Referred by Marija Mayorga DO  Reason for Visit   Additional Documentation    Vitals:    /74 (BP Location: Left arm, Patient Position: Sitting, BP Method: Medium (Automatic))   Pulse 80   Temp 98.5 °F (36.9 °C) (Temporal)   Resp 18   Ht 5' 8" (1.727 m)   Wt 60.3 kg (133 lb)   SpO2 98%   BMI 20.22 kg/m²   BSA 1.7 m²   Flowsheets:    Anthropometrics      SmartForms:     [facility] AMB - FALL RISK     [facility] JESS HM TOPICS ADVANCED      Encounter Info:    Billing Info,   History,   Allergies,   Detailed Report    "   Instructions       Follow up in about 3 months (around 12/1/2020).  He will be scheduled for colonoscopy.  She has a history of diverticulosis.  Last colonoscopy was 6 years ago.  She is having bowel irregularity of constipation and diarrhea.  She will make a food diary and attempt to pinpoint the offending foods.  She continues her current medications vitamins and minerals the cardiologist has evaluated the patient and his records will be requested.          After Visit Summary (Printed 9/1/2020)  Progress Notes  Elpidio Lau MD (Physician)   Gastroenterology   9/1/2020 11:00 AM   Signed     Subjective:       Patient ID: Zoya Zhu is a 72 y.o. female.     Chief Complaint: Follow-up     She is ready for colonoscopy she states.  Last colonoscopy was 6 years ago.  She has a history of diverticulosis.  She has had constipation and diarrhea.  The Amitiza his cause cramping.  The Amitiza is cause the diarrhea but if she does not take his she is severely constipated.  She has had occasional left lower quadrant discomfort.  She denies hematemesis hematochezia jaundice or bleeding.  Emotionally she is doing better.  She is remarried and the situation at home is improved.  She is happy that her blood pressure is well controlled and her cholesterol is normal.        Allergies:  Review of patient's allergies indicates:  No Known Allergies     Medications:     Current Outpatient Medications:     acetaminophen (TYLENOL ORAL), Take by mouth., Disp: , Rfl:     ascorbate calcium-bioflavonoid 500-200 mg Tab, APPLY 0.1ML TO WRIST AND RUB IN WELL TWICE A DAY DAYS 1 THRU 25 SKIP 3 DAYS THEN REPEAT EXPIRES AFTER 180 DAYS, Disp: , Rfl: 5    buPROPion (WELLBUTRIN XL) 300 MG 24 hr tablet, Take 1 tablet (300 mg total) by mouth once daily., Disp: 90 tablet, Rfl: 1    calcium carbonate (OS-MADELAINE) 600 mg calcium (1,500 mg) Tab, Take 600 mg by mouth once daily., Disp: , Rfl:     denosumab (PROLIA) 60 mg/mL Syrg, Inject 1 mL (60  mg total) into the skin every 6 (six) months., Disp: 1 mL, Rfl: 3    DHEA 10 mg-47 mg calcium Tab, TAKE 1 TABLET BY MOUTH EVERY DAY, Disp: , Rfl: 5    donepeziL (ARICEPT) 5 MG tablet, Take 1 tablet (5 mg total) by mouth every evening. For memory, Disp: 90 tablet, Rfl: 1    ergocalciferol (VITAMIN D2) 50,000 unit Cap, Take 1 capsule (50,000 Units total) by mouth every 7 days., Disp: 12 capsule, Rfl: 3    fluticasone propionate (FLONASE) 50 mcg/actuation nasal spray, 1 spray (50 mcg total) by Each Nostril route once daily., Disp: 16 mL, Rfl: 0    guaiFENesin (MUCINEX) 600 mg 12 hr tablet, Take 2 tablets (1,200 mg total) by mouth 2 (two) times daily., Disp: 20 tablet, Rfl: 0    lisinopriL (PRINIVIL,ZESTRIL) 2.5 MG tablet, , Disp: , Rfl:     lubiprostone (AMITIZA) 8 MCG Cap, Take 1 capsule (8 mcg total) by mouth 2 (two) times daily with meals., Disp: 180 capsule, Rfl: 3    montelukast (SINGULAIR) 10 mg tablet, , Disp: , Rfl:     progesterone (PROMETRIUM) 200 MG capsule, TAKE 1 CAPSULE BY MOUTH EVERY DAY AT BEDTIME DAYS 1 THRU 25 SKIP 3 DAYS THEN REPEAT EXPIRES AFTER 180 DAYS, Disp: 25 capsule, Rfl: 5    traZODone (DESYREL) 50 MG tablet, Take 1 tablet (50 mg total) by mouth nightly as needed for Insomnia., Disp: 90 tablet, Rfl: 1    erythromycin (ROMYCIN) ophthalmic ointment, Place into the right eye every 6 (six) hours. (Patient not taking: Reported on 9/1/2020), Disp: 3.5 g, Rfl: 0     Current Facility-Administered Medications:     cyanocobalamin injection 1,000 mcg, 1,000 mcg, Intramuscular, Q30 Days, Marija Mayorga DO, 1,000 mcg at 03/17/20 0839          Past Medical History:   Diagnosis Date    ADHD (attention deficit hyperactivity disorder)      Anxiety      Arthritis      GERD (gastroesophageal reflux disease)      Osteoporosis           Past Surgical History:   Procedure Laterality Date    AUGMENTATION OF BREAST Left      TOTAL REDUCTION MAMMOPLASTY Right              Review of Systems    Constitutional: Negative for appetite change, fever and unexpected weight change.   HENT: Negative for trouble swallowing.         Two weeks ago she had a surgery for sinusitis and her symptoms have markedly improved.   Respiratory: Negative for cough, shortness of breath and wheezing.         She has never used tobacco.  She denies tobacco alcohol usage.  She denies dysphagia aspiration hemoptysis chronic cough chronic sputum production dyspnea on exertion.   Cardiovascular: Negative for chest pain.        She denies exertional chest pain or rhythm disturbance.  She was recently evaluated by the cardiologist.  She states the evaluation was normal.   Gastrointestinal: Positive for constipation and diarrhea. Negative for abdominal distention, abdominal pain, anal bleeding, blood in stool, nausea and rectal pain.        She was adopted so she denied or family history.  She states that the PPI has resolved her pyrosis and dyspepsia.  She has a history gastroesophageal reflux but she denies hematemesis hematochezia he has had jaundice or aspiration.   Musculoskeletal: Positive for arthralgias and back pain. Negative for neck pain.        She is being evaluated by the neuro surgeon for the chronic back pain.  She was told she has osteoporosis she has been treated by her PCP.  She states she is on her calcium and vitamins.   Skin: Negative for pallor and rash.   Neurological: Negative for dizziness, seizures, syncope, speech difficulty, weakness and numbness.   Hematological: Negative for adenopathy.   Psychiatric/Behavioral: Negative for confusion.       Objective:   Physical Exam  Vitals signs reviewed.   Constitutional:       Appearance: She is well-developed.      Comments: Well-nourished well-hydrated Maximino afebrile elderly white female.  She is normocephalic.  Pupils are normal.  She is oriented x3.  She can relate her history and answer questions without difficulty.  She is sitting comfortably in her chair.    HENT:      Head: Normocephalic.   Eyes:      Pupils: Pupils are equal, round, and reactive to light.   Neck:      Musculoskeletal: Normal range of motion and neck supple.      Thyroid: No thyromegaly.      Trachea: No tracheal deviation.   Cardiovascular:      Rate and Rhythm: Normal rate and regular rhythm.      Heart sounds: Normal heart sounds.   Pulmonary:      Effort: Pulmonary effort is normal.      Breath sounds: Normal breath sounds.   Abdominal:      General: Bowel sounds are normal. There is no distension.      Palpations: Abdomen is soft. There is no mass.      Tenderness: There is no abdominal tenderness. There is no left CVA tenderness, guarding or rebound.      Hernia: No hernia is present.   Musculoskeletal: Normal range of motion.      Comments: Ambulate normally.  She can go from the sitting the standing position without difficulty.   Lymphadenopathy:      Cervical: No cervical adenopathy.   Skin:     General: Skin is warm and dry.   Neurological:      Mental Status: She is alert and oriented to person, place, and time.      Cranial Nerves: No cranial nerve deficit.   Psychiatric:         Behavior: Behavior normal.             Plan:       Gastroesophageal reflux disease without esophagitis     Hiatal hernia     Diverticulosis of large intestine without hemorrhage     Constipation, unspecified constipation type      She will continue her reflux regimen current medications vitamins and minerals.  She takes her calcium vitamin-D.  She will make a food diary and avoid the offending foods.  She will be scheduled for colonoscopy.  She has good health knowledge.  She understands the procedure.  Specifically she realizes there is an extremely small incidence of bleeding perforation or aspiration.  The cardiologist report will be requested.  She follows up with her ENT in the neurosurgeon.             Other Notes  All notes     Addendum Note from Elpidio Lau MD (Gastroenterology)     Addendum Note from  Carol Her LPN     Addendum Note from Carol Her LPN  Communications       Letter sent to DO DANIELLE Ferrara Visit Summary: Provider Version  Not recorded   All Charges for This Encounter    Code Description Service Date Service Provider Modifiers Qty   [id number] HC E&M-EST. PATIENT-LVL IV 9/1/2020 Elpidio Lau MD PBBFAC, PN, 25 1   [id number] [place] PBB SHADOW E&M-EST. PATIENT-LVL IV 9/1/2020 Elpidio Lau MD PBBFAC 1   28095 [place] OFFICE/OUTPT VISIT,EST,LEVL IV 9/1/2020 Elpidio Lau MD S$PBB 1    [place] VITAMIN B12 INJ UP TO 1,000 MCG 9/1/2020 Carol Her LPN PBBFAC, PN 1   70761 [place] INJECTION,THERAP/PROPH/[id number], IM OR SUBCUT 9/1/2020 Carol Her LPN PBBFAC, PN 1   Level of Service    Level of Service   [place] OFFICE/OUTPT VISIT,EST,LEVL IV [92065]   BestPractice Advisories    Click to view BestPractice Advisory history   AVS Reports    Date/Time Report Action User   9/1/2020 12:01 PM After Visit Summary Printed Carol Her LPN   9/1/2020 12:01 PM After Visit Summary Automatically Generated Carol Her LPN   9/1/2020 11:57 AM After Visit Summary Automatically Generated Carol Her LPN   9/1/2020 11:52 AM After Visit Summary Automatically Generated Elpidio Lau MD   Encounter-Level Documents - 09/01/2020:    After Visit Summary - Document on 9/1/2020 12:01 PM by Carol Her LPN: After Visit Summary  After Visit Summary - Document on 9/1/2020 12:01 PM by Carol Her LPN: After Visit Summary  After Visit Summary - Document on 9/1/2020 11:57 AM by Carol Her LPN: After Visit Summary  After Visit Summary - Document on 9/1/2020 11:52 AM by Elpidio Lau MD: After Visit Summary  Orders Placed     None  Medication Changes       polyethylene glycol 3350 17 gram/dose Mix 8.3oz of Miralax in 2 quarts of liquid the day before scheduled colonoscopy.     Medication List   Fall Risk    Patient Mobility Status: Ambulatory   Number of falls in the past 12 months?: 0   Fall Risk?: No  Medications  Administered     cyanocobalamin (vitamin B-12) 1000 mcg  Visit Diagnoses       Gastroesophageal reflux disease without esophagitis     Hiatal hernia     Diverticulosis of large intestine without hemorrhage     Constipation, unspecified constipation type     Colon cancer screening     B12 deficiency     Problem List          Problem List         Active Hospital Problems    Diagnosis  POA    Colon cancer screening [Z12.11]  Not Applicable      Resolved Hospital Problems   No resolved problems to display.

## 2020-10-07 NOTE — OP NOTE
Procedure.  Colonoscopy.  Indication bowel irregularity of constipation.  He has had extensive cervical spine and lumbar spine surgery and is on the Percocet.  He has history of diverticulosis and possibly years ago could have had colon polyps.  He has good knowledge and he understands the procedures of colonoscopy.  She has specifically he realizes there is an extremely small incidence of bleeding perforation or aspiration.  He had some difficulty with the prep kit but he states that he is prepared.  Anesthesia.  Monitored anesthesia coverage.  Procedure.  With the patient the procedure room left lateral supine position.  There were palpable hemorrhoids.  The Olympus colonoscope was passed to the cecum identified by the ileocecal valve.  Washing was required in areas the colon.  There was very careful circumferential inspection mucosa as the scope was withdrawn.  There were a few diverticuli the sigmoid colon.  The scope was retroflexed in the rectum.  Patient tolerated the procedure well.  Definite ulcers or polyps were not seen.  Impression 1.  Diverticulosis 2.  Hemorrhoids.

## 2020-10-07 NOTE — ANESTHESIA PREPROCEDURE EVALUATION
10/07/2020  Garcia Renteria is a 70 y.o., male.    Anesthesia Evaluation    I have reviewed the Patient Summary Reports.    I have reviewed the Nursing Notes.    I have reviewed the Medications.     Review of Systems  Anesthesia Hx:  No problems with previous Anesthesia  Neg history of prior surgery. Denies Family Hx of Anesthesia complications.   Denies Personal Hx of Anesthesia complications.   Social:  Non-Smoker    Hematology/Oncology:  Hematology Normal   Oncology Normal     EENT/Dental:EENT/Dental Normal   Cardiovascular:   Hypertension, well controlled    Pulmonary:  Pulmonary Normal    Renal/:  Renal/ Normal     Hepatic/GI:   GERD, well controlled    Musculoskeletal:   Arthritis     Neurological:  Neurology Normal    Endocrine:   Diabetes, well controlled, type 2    Dermatological:  Skin Normal    Psych:  Psychiatric Normal           Physical Exam  General:  Well nourished    Airway/Jaw/Neck:  Airway Findings: Mouth Opening: Normal Tongue: Normal  General Airway Assessment: Adult  Mallampati: II  TM Distance: 4 - 6 cm        Eyes/Ears/Nose:  EYES/EARS/NOSE FINDINGS: Normal   Dental:  DENTAL FINDINGS: Normal   Chest/Lungs:  Chest/Lungs Clear    Heart/Vascular:  Heart Findings: Normal Heart murmur: negative Vascular Findings: Normal    Abdomen:  Abdomen Findings: Normal    Musculoskeletal:  Musculoskeletal Findings: Normal   Skin:  Skin Findings: Normal    Mental Status:  Mental Status Findings: Normal        Anesthesia Plan  Type of Anesthesia, risks & benefits discussed:  Anesthesia Type:  general  Patient's Preference:   Intra-op Monitoring Plan: standard ASA monitors  Intra-op Monitoring Plan Comments:   Post Op Pain Control Plan:   Post Op Pain Control Plan Comments:   Induction:   IV  Beta Blocker:  Patient is not currently on a Beta-Blocker (No further documentation required).        Informed Consent: Patient understands risks and agrees with Anesthesia plan.  Questions answered. Anesthesia consent signed with patient.  ASA Score: 3     Day of Surgery Review of History & Physical: I have interviewed and examined the patient. I have reviewed the patient's H&P dated:    H&P update referred to the provider.         Ready For Surgery From Anesthesia Perspective.

## 2020-10-07 NOTE — H&P
"Office Visit    9/1/2020  [facility] - [facility]     Elpidio Lau MD  Gastroenterology  Gastroesophageal reflux disease without esophagitis +5 more  Dx  Follow-up ; Referred by Marija Mayorga DO  Reason for Visit   Additional Documentation    Vitals:    /74 (BP Location: Left arm, Patient Position: Sitting, BP Method: Medium (Automatic))   Pulse 80   Temp 98.5 °F (36.9 °C) (Temporal)   Resp 18   Ht 5' 8" (1.727 m)   Wt 60.3 kg (133 lb)   SpO2 98%   BMI 20.22 kg/m²   BSA 1.7 m²   Flowsheets:    Anthropometrics      SmartForms:     [facility] AMB - FALL RISK     [facility] JESS HM TOPICS ADVANCED      Encounter Info:    Billing Info,   History,   Allergies,   Detailed Report      Instructions       Follow up in about 3 months (around 12/1/2020).  He will be scheduled for colonoscopy.  She has a history of diverticulosis.  Last colonoscopy was 6 years ago.  She is having bowel irregularity of constipation and diarrhea.  She will make a food diary and attempt to pinpoint the offending foods.  She continues her current medications vitamins and minerals the cardiologist has evaluated the patient and his records will be requested.          After Visit Summary (Printed 9/1/2020)  Progress Notes  Elpidio Lau MD (Physician)   Gastroenterology   9/1/2020 11:00 AM   Signed     Subjective:       Patient ID: Zoya Zhu is a 72 y.o. female.     Chief Complaint: Follow-up     She is ready for colonoscopy she states.  Last colonoscopy was 6 years ago.  She has a history of diverticulosis.  She has had constipation and diarrhea.  The Amitiza his cause cramping.  The Amitiza is cause the diarrhea but if she does not take his she is severely constipated.  She has had occasional left lower quadrant discomfort.  She denies hematemesis hematochezia jaundice or bleeding.  Emotionally she is doing better.  She is remarried and the situation at home is improved.  She is happy that her blood pressure is well " controlled and her cholesterol is normal.        Allergies:  Review of patient's allergies indicates:  No Known Allergies     Medications:     Current Outpatient Medications:     acetaminophen (TYLENOL ORAL), Take by mouth., Disp: , Rfl:     ascorbate calcium-bioflavonoid 500-200 mg Tab, APPLY 0.1ML TO WRIST AND RUB IN WELL TWICE A DAY DAYS 1 THRU 25 SKIP 3 DAYS THEN REPEAT EXPIRES AFTER 180 DAYS, Disp: , Rfl: 5    buPROPion (WELLBUTRIN XL) 300 MG 24 hr tablet, Take 1 tablet (300 mg total) by mouth once daily., Disp: 90 tablet, Rfl: 1    calcium carbonate (OS-MADELAINE) 600 mg calcium (1,500 mg) Tab, Take 600 mg by mouth once daily., Disp: , Rfl:     denosumab (PROLIA) 60 mg/mL Syrg, Inject 1 mL (60 mg total) into the skin every 6 (six) months., Disp: 1 mL, Rfl: 3    DHEA 10 mg-47 mg calcium Tab, TAKE 1 TABLET BY MOUTH EVERY DAY, Disp: , Rfl: 5    donepeziL (ARICEPT) 5 MG tablet, Take 1 tablet (5 mg total) by mouth every evening. For memory, Disp: 90 tablet, Rfl: 1    ergocalciferol (VITAMIN D2) 50,000 unit Cap, Take 1 capsule (50,000 Units total) by mouth every 7 days., Disp: 12 capsule, Rfl: 3    fluticasone propionate (FLONASE) 50 mcg/actuation nasal spray, 1 spray (50 mcg total) by Each Nostril route once daily., Disp: 16 mL, Rfl: 0    guaiFENesin (MUCINEX) 600 mg 12 hr tablet, Take 2 tablets (1,200 mg total) by mouth 2 (two) times daily., Disp: 20 tablet, Rfl: 0    lisinopriL (PRINIVIL,ZESTRIL) 2.5 MG tablet, , Disp: , Rfl:     lubiprostone (AMITIZA) 8 MCG Cap, Take 1 capsule (8 mcg total) by mouth 2 (two) times daily with meals., Disp: 180 capsule, Rfl: 3    montelukast (SINGULAIR) 10 mg tablet, , Disp: , Rfl:     progesterone (PROMETRIUM) 200 MG capsule, TAKE 1 CAPSULE BY MOUTH EVERY DAY AT BEDTIME DAYS 1 THRU 25 SKIP 3 DAYS THEN REPEAT EXPIRES AFTER 180 DAYS, Disp: 25 capsule, Rfl: 5    traZODone (DESYREL) 50 MG tablet, Take 1 tablet (50 mg total) by mouth nightly as needed for Insomnia., Disp: 90  tablet, Rfl: 1    erythromycin (ROMYCIN) ophthalmic ointment, Place into the right eye every 6 (six) hours. (Patient not taking: Reported on 9/1/2020), Disp: 3.5 g, Rfl: 0     Current Facility-Administered Medications:     cyanocobalamin injection 1,000 mcg, 1,000 mcg, Intramuscular, Q30 Days, Marija Mayorga DO, 1,000 mcg at 03/17/20 0839          Past Medical History:   Diagnosis Date    ADHD (attention deficit hyperactivity disorder)      Anxiety      Arthritis      GERD (gastroesophageal reflux disease)      Osteoporosis           Past Surgical History:   Procedure Laterality Date    AUGMENTATION OF BREAST Left      TOTAL REDUCTION MAMMOPLASTY Right              Review of Systems   Constitutional: Negative for appetite change, fever and unexpected weight change.   HENT: Negative for trouble swallowing.         Two weeks ago she had a surgery for sinusitis and her symptoms have markedly improved.   Respiratory: Negative for cough, shortness of breath and wheezing.         She has never used tobacco.  She denies tobacco alcohol usage.  She denies dysphagia aspiration hemoptysis chronic cough chronic sputum production dyspnea on exertion.   Cardiovascular: Negative for chest pain.        She denies exertional chest pain or rhythm disturbance.  She was recently evaluated by the cardiologist.  She states the evaluation was normal.   Gastrointestinal: Positive for constipation and diarrhea. Negative for abdominal distention, abdominal pain, anal bleeding, blood in stool, nausea and rectal pain.        She was adopted so she denied or family history.  She states that the PPI has resolved her pyrosis and dyspepsia.  She has a history gastroesophageal reflux but she denies hematemesis hematochezia he has had jaundice or aspiration.   Musculoskeletal: Positive for arthralgias and back pain. Negative for neck pain.        She is being evaluated by the neuro surgeon for the chronic back pain.  She was told she has  osteoporosis she has been treated by her PCP.  She states she is on her calcium and vitamins.   Skin: Negative for pallor and rash.   Neurological: Negative for dizziness, seizures, syncope, speech difficulty, weakness and numbness.   Hematological: Negative for adenopathy.   Psychiatric/Behavioral: Negative for confusion.       Objective:   Physical Exam  Vitals signs reviewed.   Constitutional:       Appearance: She is well-developed.      Comments: Well-nourished well-hydrated Maximino afebrile elderly white female.  She is normocephalic.  Pupils are normal.  She is oriented x3.  She can relate her history and answer questions without difficulty.  She is sitting comfortably in her chair.   HENT:      Head: Normocephalic.   Eyes:      Pupils: Pupils are equal, round, and reactive to light.   Neck:      Musculoskeletal: Normal range of motion and neck supple.      Thyroid: No thyromegaly.      Trachea: No tracheal deviation.   Cardiovascular:      Rate and Rhythm: Normal rate and regular rhythm.      Heart sounds: Normal heart sounds.   Pulmonary:      Effort: Pulmonary effort is normal.      Breath sounds: Normal breath sounds.   Abdominal:      General: Bowel sounds are normal. There is no distension.      Palpations: Abdomen is soft. There is no mass.      Tenderness: There is no abdominal tenderness. There is no left CVA tenderness, guarding or rebound.      Hernia: No hernia is present.   Musculoskeletal: Normal range of motion.      Comments: Ambulate normally.  She can go from the sitting the standing position without difficulty.   Lymphadenopathy:      Cervical: No cervical adenopathy.   Skin:     General: Skin is warm and dry.   Neurological:      Mental Status: She is alert and oriented to person, place, and time.      Cranial Nerves: No cranial nerve deficit.   Psychiatric:         Behavior: Behavior normal.             Plan:       Gastroesophageal reflux disease without esophagitis     Hiatal  hernia     Diverticulosis of large intestine without hemorrhage     Constipation, unspecified constipation type      She will continue her reflux regimen current medications vitamins and minerals.  She takes her calcium vitamin-D.  She will make a food diary and avoid the offending foods.  She will be scheduled for colonoscopy.  She has good health knowledge.  She understands the procedure.  Specifically she realizes there is an extremely small incidence of bleeding perforation or aspiration.  The cardiologist report will be requested.  She follows up with her ENT in the neurosurgeon.             Other Notes  All notes     Addendum Note from Elpidio Lau MD (Gastroenterology)     Addendum Note from Carol Her LPN     Addendum Note from Carol Her LPN  Communications       Letter sent to DO DANIELLE Ferrara Visit Summary: Provider Version  Not recorded   All Charges for This Encounter    Code Description Service Date Service Provider Modifiers Qty   97676258 HC E&M-EST. PATIENT-LVL IV 9/1/2020 Elpidio Lau MD PBBFAC, PN, 25 1   [id number] CA PBB SHADOW E&M-EST. PATIENT-LVL IV 9/1/2020 Elpidio Lau MD PBBFAC 1   08597 CA OFFICE/OUTPT VISIT,EST,LEVL IV 9/1/2020 Elpidio Lau MD S$PBB 1    CA VITAMIN B12 INJ UP TO 1,000 MCG 9/1/2020 Carol Her LPN PBBFAC, PN 1   20102 CA INJECTION,THERAP/PROPH/WXVY6HO, IM OR SUBCUT 9/1/2020 Carol Her LPN PBBFAC, PN 1   Level of Service    Level of Service   CA OFFICE/OUTPT VISIT,EST,LEVL IV [25737]   BestPractice Advisories    Click to view BestPractice Advisory history   AVS Reports    Date/Time Report Action User   9/1/2020 12:01 PM After Visit Summary Printed Carol Her LPN   9/1/2020 12:01 PM After Visit Summary Automatically Generated Carol Her LPN   9/1/2020 11:57 AM After Visit Summary Automatically Generated Carol Her LPN   9/1/2020 11:52 AM After Visit Summary Automatically Generated Elpidio Lau MD   Encounter-Level Documents -  09/01/2020:    After Visit Summary - Document on 9/1/2020 12:01 PM by Carol Her LPN: After Visit Summary  After Visit Summary - Document on 9/1/2020 12:01 PM by Carol Her LPN: After Visit Summary  After Visit Summary - Document on 9/1/2020 11:57 AM by Carol Her LPN: After Visit Summary  After Visit Summary - Document on 9/1/2020 11:52 AM by Elpidio Lau MD: After Visit Summary  Orders Placed     None  Medication Changes       polyethylene glycol 3350 17 gram/dose Mix 8.3oz of Miralax in 2 quarts of liquid the day before scheduled colonoscopy.     Medication List   Fall Risk    Patient Mobility Status: Ambulatory   Number of falls in the past 12 months?: 0   Fall Risk?: No  Medications Administered     cyanocobalamin (vitamin B-12) 1000 mcg  Visit Diagnoses       Gastroesophageal reflux disease without esophagitis     Hiatal hernia     Diverticulosis of large intestine without hemorrhage     Constipation, unspecified constipation type     Colon cancer screening     B12 deficiency     Problem List     He is here for colonoscopy.  He has good health knowledge.  He understands the procedure.  Specifically he realizes there is an extremely small incidence of bleeding perforation or aspiration.  He has a family history of possible colon cancer and a history of colon polyps.  She has had bowel irregularity with occasional constipation.  History and physical are unchanged.  Physical examination.  Well-nourished well-hydrated thin afebrile nonicteric  male.  He is normocephalic.  Pupils are normal.  The neck shows healed scars with decreased range of motion.  Lungs reveal symmetrical respirations.  Heart reveals regular rhythm.  The abdomen is soft without tenderness masses organomegaly.  Bowel sounds are normal.  Neurologic reveals he is oriented x3.

## 2020-10-07 NOTE — DISCHARGE INSTRUCTIONS
[facility]   [phone]  [facility]      [phone]    Managing nausea    Some people have an upset stomach after surgery. This is often because of anesthesia, pain, or pain medicine, or the stress of surgery. These tips will help you handle nausea and eat healthy foods as you get better. If you were on a special food plan before surgery, ask your healthcare provider if you should follow it while you get better. These tips may help:  · Do not push yourself to eat. Your body will tell you when to eat and how much.  · Start off with clear liquids and soup. They are easier to digest.  · Next try semi-solid foods, such as mashed potatoes, applesauce, and gelatin, as you feel ready.  · Slowly move to solid foods. Dont eat fatty, rich, or spicy foods at first.  · Do not force yourself to have 3 large meals a day. Instead eat smaller amounts more often.  · Take pain medicines with a small amount of solid food, such as crackers or toast, to avoid nausea.

## 2020-10-07 NOTE — [employer] PATIENT INSTRUCTIONS
Discharge Summary/Instructions after an Endoscopic Procedure  Patient Name: Garcia Renteria  Patient MRN: [id number]  Patient YOB: 1950 Wednesday, October 7, 2020  Elpidio Lau MD  RESTRICTIONS:  During your procedure today, you received medications for sedation.  These   medications may affect your judgment, balance and coordination.  Therefore,   for 24 hours, you have the following restrictions:   - DO NOT drive a car, operate machinery, make legal/financial decisions,   sign important papers or drink alcohol.    ACTIVITY:  Today: no heavy lifting, straining or running due to procedural   sedation/anesthesia.  The following day: return to full activity including work.  DIET:  Eat and drink normally unless instructed otherwise.     TREATMENT FOR COMMON SIDE EFFECTS:  - Mild abdominal pain, nausea, belching, bloating or excessive gas:  rest,   eat lightly and use a heating pad.  - Sore Throat: treat with throat lozenges and/or gargle with warm salt   water.  - Because air was used during the procedure, expelling large amounts of air   from your rectum or belching is normal.  - If a bowel prep was taken, you may not have a bowel movement for 1-3 days.    This is normal.  SYMPTOMS TO WATCH FOR AND REPORT TO YOUR PHYSICIAN:  1. Abdominal pain or bloating, other than gas cramps.  2. Chest pain.  3. Back pain.  4. Signs of infection such as: chills or fever occurring within 24 hours   after the procedure.  5. Rectal bleeding, which would show as bright red, maroon, or black stools.   (A tablespoon of blood from the rectum is not serious, especially if   hemorrhoids are present.)  6. Vomiting.  7. Weakness or dizziness.  GO DIRECTLY TO THE NEAREST EMERGENCY ROOM IF YOU HAVE ANY OF THE FOLLOWING:      Difficulty breathing              Chills and/or fever over 101 F   Persistent vomiting and/or vomiting blood   Severe abdominal pain   Severe chest pain   Black, tarry stools   Bleeding- more than one  tablespoon   Any other symptom or condition that you feel may need urgent attention  Your doctor recommends these additional instructions:  If any biopsies were taken, your doctors clinic will contact you in 1 to 2   weeks with any results.  - Discharge patient to home (ambulatory).   - Resume previous diet.  For questions, problems or results please call your physician - Elpidio Lau MD at Work:  [phone].  [facility] EMERGENCY ROOM PHONE NUMBER: [phone]  IF A COMPLICATION OR EMERGENCY SITUATION ARISES AND YOU ARE UNABLE TO REACH   YOUR PHYSICIAN - GO DIRECTLY TO THE EMERGENCY ROOM.  MD Elpidio Boudreaux MD  10/7/2020 9:43:58 AM  This report has been verified and signed electronically.  [employer]

## 2020-10-07 NOTE — PLAN OF CARE
Has met unit/department guidelines for discharge from each phase of the post procedure continuum. Leaving floor per w/c with RN and wife. AAO x3. Resp even and unlabored room air. No distress noted. All personal belongings returned to pt.

## 2020-10-07 NOTE — PLAN OF CARE
PACU monitors removed. Personal items and walker returned to pt. Changing clothing at bedside. Denies need for assistance. Wife remains at pts side.

## 2020-10-07 NOTE — DISCHARGE SUMMARY
Colonoscopy revealed hemorrhoids and diverticulosis.  The prep kit was not ideal but he had the best that he could.  Polyps were not identified.  He will continue his reflux regimen current medications vitamins and minerals.  He continues his bowel program with additional fiber and/or MiraLax.  He is on the Percocet for pain.  He continues his follow-up with the surgeon and has a followup upon the office.  Next colonoscopy in 10 years

## 2020-11-02 ENCOUNTER — OFFICE VISIT (OUTPATIENT)
Dept: GASTROENTEROLOGY | Facility: CLINIC | Age: 70
End: 2020-11-02
Payer: MEDICARE

## 2020-11-02 VITALS
BODY MASS INDEX: 28.76 KG/M2 | RESPIRATION RATE: 16 BRPM | TEMPERATURE: 98 F | DIASTOLIC BLOOD PRESSURE: 97 MMHG | WEIGHT: 217 LBS | OXYGEN SATURATION: 99 % | SYSTOLIC BLOOD PRESSURE: 143 MMHG | HEIGHT: 73 IN | HEART RATE: 80 BPM

## 2020-11-02 DIAGNOSIS — K57.30 DIVERTICULOSIS OF LARGE INTESTINE WITHOUT HEMORRHAGE: ICD-10-CM

## 2020-11-02 DIAGNOSIS — M54.2 NECK PAIN WITH HISTORY OF CERVICAL SPINAL SURGERY: ICD-10-CM

## 2020-11-02 DIAGNOSIS — K21.00 GASTROESOPHAGEAL REFLUX DISEASE WITH ESOPHAGITIS, UNSPECIFIED WHETHER HEMORRHAGE: Primary | ICD-10-CM

## 2020-11-02 DIAGNOSIS — M54.50 LOW BACK PAIN, NON-SPECIFIC: ICD-10-CM

## 2020-11-02 DIAGNOSIS — Z98.890 NECK PAIN WITH HISTORY OF CERVICAL SPINAL SURGERY: ICD-10-CM

## 2020-11-02 DIAGNOSIS — M51.36 DDD (DEGENERATIVE DISC DISEASE), LUMBAR: ICD-10-CM

## 2020-11-02 DIAGNOSIS — G89.29 CHRONIC PAIN OF LEFT KNEE: ICD-10-CM

## 2020-11-02 DIAGNOSIS — M25.562 CHRONIC PAIN OF LEFT KNEE: ICD-10-CM

## 2020-11-02 PROCEDURE — 99215 OFFICE O/P EST HI 40 MIN: CPT | Mod: PBBFAC,PN | Performed by: INTERNAL MEDICINE

## 2020-11-02 PROCEDURE — 99999 PR PBB SHADOW E&M-EST. PATIENT-LVL V: ICD-10-PCS | Mod: PBBFAC,,, | Performed by: INTERNAL MEDICINE

## 2020-11-02 PROCEDURE — 99214 PR OFFICE/OUTPT VISIT, EST, LEVL IV, 30-39 MIN: ICD-10-PCS | Mod: S$PBB,,, | Performed by: INTERNAL MEDICINE

## 2020-11-02 PROCEDURE — 99999 PR PBB SHADOW E&M-EST. PATIENT-LVL V: CPT | Mod: PBBFAC,,, | Performed by: INTERNAL MEDICINE

## 2020-11-02 PROCEDURE — 99214 OFFICE O/P EST MOD 30 MIN: CPT | Mod: S$PBB,,, | Performed by: INTERNAL MEDICINE

## 2020-11-02 RX ORDER — HYDROCODONE BITARTRATE AND ACETAMINOPHEN 10; 325 MG/1; MG/1
1 TABLET ORAL EVERY 6 HOURS PRN
Qty: 120 TABLET | Refills: 0 | Status: SHIPPED | OUTPATIENT
Start: 2020-11-02 | End: 2020-12-02 | Stop reason: SDUPTHER

## 2020-11-02 NOTE — PROGRESS NOTES
Subjective:       Patient ID: Garcia Renteria is a 70 y.o. male.    Chief Complaint: Gastroesophageal Reflux    Ms- reviewed and uses the Norco correctly.  He has a chronic pain syndrome and has had extensive cervical and lumbar spine surgery.  He has a history of gastroesophageal reflux.  He has diverticulosis.  He is intolerant to the anti-inflammatory agents which have caused pain and probably GI bleeding.  The Norco controlled 95% of his pain and he is able to perform his activities and ambulate appropriately.  He states the omeprazole has resolved the pyrosis and dyspepsia.  He denies hematemesis hematochezia jaundice or bleeding.  He denies dysphagia.  He does have occasional bowel irregularity.  He has a history of diverticulosis.  He completed his treatment for the chronic hepatitis-C and is considered a SVR.  Recent colonoscopy revealed diverticulosis and hemorrhoids.  He is having daily bowel movements with fiber supplements.  He has occasional episodes of constipation and uses the MiraLax and/or stool softeners.  His PCP is the [facility] and he states he has had labs.  He states that he was told his labs were normal.  They have been requested.      Allergies:  Review of patient's allergies indicates:  No Known Allergies    Medications:    Current Outpatient Medications:     bacitracin 500 unit/gram Oint, Apply topically 2 (two) times daily., Disp: , Rfl: 0    ceFAZolin (ANCEF) 1 gram injection, Inject 2,000 mg (2 g total) into the vein every 8 (eight) hours., Disp: , Rfl:     famotidine (PEPCID) 40 MG tablet, Take 1 tablet (40 mg total) by mouth every evening., Disp: 90 tablet, Rfl: 3    gabapentin (NEURONTIN) 100 MG capsule, , Disp: , Rfl:     HYDROcodone-acetaminophen (NORCO)  mg per tablet, Take 1 tablet by mouth every 6 (six) hours as needed for Pain., Disp: 120 tablet, Rfl: 0    ibuprofen (ADVIL,MOTRIN) 800 MG tablet, , Disp: , Rfl:     indomethacin (INDOCIN) 25 MG capsule, ,  Disp: , Rfl:     lidocaine (LIDODERM) 5 %, Place 1 patch onto the skin once daily. Remove & Discard patch within 12 hours or as directed by MD, Disp: 15 patch, Rfl: 0    lisinopril (PRINIVIL,ZESTRIL) 5 MG tablet, Take 5 mg by mouth once daily. , Disp: , Rfl:     metFORMIN (GLUCOPHAGE) 1000 MG tablet, Take 500 mg by mouth daily with breakfast. , Disp: , Rfl:     methocarbamoL (ROBAXIN) 750 MG Tab, Take 1 tablet (750 mg total) by mouth 3 (three) times daily., Disp: 40 tablet, Rfl: 0    omeprazole (PRILOSEC) 20 MG capsule, Take 1 capsule (20 mg total) by mouth once daily., Disp: 90 capsule, Rfl: 3    oxyCODONE (ROXICODONE) 15 MG Tab, Take 1 tablet (15 mg total) by mouth every 6 (six) hours as needed (pain 8-10/10)., Disp: 50 tablet, Rfl: 0    oxyCODONE-acetaminophen (PERCOCET)  mg per tablet, Take 1 tablet by mouth every 6 (six) hours as needed for Pain., Disp: 100 tablet, Rfl: 0    pregabalin (LYRICA) 150 MG capsule, Take 1 capsule (150 mg total) by mouth 3 (three) times daily., Disp: 90 capsule, Rfl: 6    sildenafil (VIAGRA) 100 MG tablet, , Disp: , Rfl:     STOOL SOFTENER, DOCUSATE MADELAINE, 240 mg capsule, , Disp: , Rfl:     triamterene-hydrochlorothiazide 75-50 mg (MAXZIDE) 75-50 mg per tablet, Take 0.5 tablets by mouth once daily. , Disp: , Rfl:     vitamin D (VITAMIN D3) 1000 units Tab, Take 1,000 Units by mouth once daily., Disp: , Rfl:     Past Medical History:   Diagnosis Date    Colitis     Diabetes mellitus, type 2     Diverticulosis     GERD (gastroesophageal reflux disease)     Hypertension        Past Surgical History:   Procedure Laterality Date    ANKLE SURGERY Bilateral     ANTERIOR CERVICAL DISCECTOMY W/ FUSION N/A 10/30/2019    Procedure: C3-4, C4-5 ACDF;  Surgeon: Francis Alan MD;  Location: [facility] OR [address] [place] [address];  Service: Neurosurgery;  Laterality: N/A;  C3-4, C4-5 ACDF  Anesthesia:  General  Rad:  C-Arm  NM:  EMG, SEP, & MEP  Blood:  Type & Screen  Position:  Supine  Bed:   Regular slider bed  Headrest:  [employer] & GW tongs/hanging weights  Misc:  Small vivigen  Nerve root retractor (DePuy)  Disposable #2 Kerrison  Interbody    BACK SURGERY      COLONOSCOPY      COLONOSCOPY N/A 10/7/2020    Procedure: COLONOSCOPY;  Surgeon: Elpidio Lau MD;  Location: [facility] ENDO;  Service: Endoscopy;  Laterality: N/A;    CREATION OF MUSCLE ROTATIONAL FLAP  3/3/2020    Procedure: CREATION, FLAP, MUSCLE ROTATION;  Surgeon: Francis Alan MD;  [facility];  Service: Neurosurgery;;    INJECTION OF ANESTHETIC AGENT AROUND NERVE Bilateral 3/10/2020    Procedure: Block, Nerve;  Surgeon: Erinn Surgeon;  Location: [facility] ERINN;  Service: Anesthesiology;  Laterality: Bilateral;    lower back surgery      LUMBAR LAMINECTOMY WITH FUSION N/A 3/3/2020    Procedure: T10-Pelvis Fusion with L4 PSO **AIRO** Co-Surgeon: Roberto Parkinson;  Surgeon: Francis Alan MD;  [facility];  Service: Neurosurgery;  Laterality: N/A;  **AIRO**  **CELL SAVER**  Co-Surgeon: Roberto Parkinson  NM: EMG, SEP, MEP  Position: Prone  Bed: [place] 4 post, prone pillow  Blood: Type & Hold 2 units  Rad: C-Arm, AIRO  Vendor: [employer]  Misc: Vivigen, Infuse, Cement (Depuy)  [place]    NECK SURGERY      SPINE SURGERY      UPPER GASTROINTESTINAL ENDOSCOPY           Review of Systems   Constitutional: Negative for appetite change, fever and unexpected weight change.   HENT: Negative for trouble swallowing.         No jaundice.   Respiratory: Negative for cough, shortness of breath and wheezing.         He is a former cigarette smoker.  He denies tobacco alcohol use currently.  He denies dysphagia aspiration hemoptysis chronic cough or chronic sputum production.  He denies dyspnea on exertion but is not as physically active.   Cardiovascular: Negative for chest pain.        He denies exertional chest pain or rhythm disturbance.   Gastrointestinal: Negative for abdominal distention, abdominal pain, anal bleeding, blood in stool,  constipation, diarrhea and nausea.   Endocrine:        He states his diabetes well controlled.  He stays on the ADA diet.  He takes his medications as prescribed.   Musculoskeletal: Positive for arthralgias, back pain and gait problem. Negative for neck pain.        He was told the that he had a neuropathy lower extremities.  He is having a painful left knee.  Sometimes he states it is weak and he is afraid that he may fall.  He ambulates with a cane.  He denies falling.  He has had cervical spine and lumbar spine surgery his pain complex is improved.  The Norco some improves the pain.  He is intolerant to the anti-inflammatory agents.   Skin: Negative for pallor and rash.   Neurological: Negative for dizziness, seizures, syncope, speech difficulty, weakness and numbness.   Hematological: Negative for adenopathy.   Psychiatric/Behavioral: Negative for confusion.       Objective:      Physical Exam  Vitals signs reviewed.   Constitutional:       Appearance: He is well-developed.      Comments: Well-nourished well-hydrated afebrile nonicteric  male.  He is sitting comfortably in the chair.  He is breathing normally.  He appears to be oriented x3.  He can relate his history and answer questions appropriately.  He is normocephalic.  Pupils are normal.   HENT:      Head: Normocephalic.   Eyes:      Pupils: Pupils are equal, round, and reactive to light.   Neck:      Musculoskeletal: Normal range of motion and neck supple.      Thyroid: No thyromegaly.      Trachea: No tracheal deviation.   Cardiovascular:      Rate and Rhythm: Normal rate and regular rhythm.      Heart sounds: Normal heart sounds.   Pulmonary:      Effort: Pulmonary effort is normal.      Breath sounds: Normal breath sounds.   Abdominal:      General: Bowel sounds are normal. There is no distension.      Palpations: Abdomen is soft. There is no mass.      Tenderness: There is no abdominal tenderness. There is no right CVA tenderness, left  CVA tenderness, guarding or rebound.      Hernia: No hernia is present.      Comments: The abdomen is soft without tenderness masses organomegaly.  Bowel sounds are normal.   Musculoskeletal:      Comments: He ambulates with a cane.  He can go from the sitting the standing position.  He has a knee support on the left knee.   Lymphadenopathy:      Cervical: No cervical adenopathy.   Skin:     General: Skin is warm and dry.   Neurological:      Mental Status: He is alert and oriented to person, place, and time.      Cranial Nerves: No cranial nerve deficit.   Psychiatric:         Behavior: Behavior normal.           Plan:       Gastroesophageal reflux disease with esophagitis, unspecified whether hemorrhage    Diverticulosis of large intestine without hemorrhage    Chronic pain of left knee    Low back pain, non-specific    Neck pain with history of cervical spinal surgery     He will continue his diabetic diet.  [facility]  The records and labs have been requested.  He continues his reflux regimen current medications vitamins and minerals.  He continues his bowel program.  He is referred to the orthopedist for the left knee pain.  He continues his follow-up with his back surgeon.

## 2020-11-02 NOTE — PATIENT INSTRUCTIONS
He will continue his reflux regimen current medications vitamins and minerals.  He is referred to the orthopedist for the left knee pain.  He continues his Norco for the pain.  [facility] and labs.

## 2020-11-16 DIAGNOSIS — M25.562 ACUTE PAIN OF LEFT KNEE: Primary | ICD-10-CM

## 2020-11-17 ENCOUNTER — HOSPITAL ENCOUNTER (OUTPATIENT)
Dept: RADIOLOGY | Facility: HOSPITAL | Age: 70
Discharge: HOME | End: 2020-11-17
Attending: ORTHOPAEDIC SURGERY
Payer: MEDICARE

## 2020-11-17 ENCOUNTER — OFFICE VISIT (OUTPATIENT)
Dept: ORTHOPEDICS | Facility: CLINIC | Age: 70
End: 2020-11-17
Payer: MEDICARE

## 2020-11-17 VITALS
TEMPERATURE: 98 F | OXYGEN SATURATION: 99 % | RESPIRATION RATE: 19 BRPM | BODY MASS INDEX: 28.76 KG/M2 | HEART RATE: 82 BPM | WEIGHT: 217 LBS | HEIGHT: 73 IN

## 2020-11-17 DIAGNOSIS — M54.10 RADICULAR PAIN OF LEFT LOWER EXTREMITY: ICD-10-CM

## 2020-11-17 DIAGNOSIS — M25.562 ACUTE PAIN OF LEFT KNEE: ICD-10-CM

## 2020-11-17 DIAGNOSIS — M23.52 CHRONIC INSTABILITY OF KNEE, LEFT KNEE: ICD-10-CM

## 2020-11-17 DIAGNOSIS — M17.12 PRIMARY OSTEOARTHRITIS OF LEFT KNEE: Primary | ICD-10-CM

## 2020-11-17 DIAGNOSIS — M23.204 DEGENERATIVE TEAR OF MEDIAL MENISCUS, LEFT: ICD-10-CM

## 2020-11-17 PROCEDURE — 99204 PR OFFICE/OUTPT VISIT, NEW, LEVL IV, 45-59 MIN: ICD-10-PCS | Mod: 25,S$PBB,, | Performed by: ORTHOPAEDIC SURGERY

## 2020-11-17 PROCEDURE — 99204 OFFICE O/P NEW MOD 45 MIN: CPT | Mod: 25,S$PBB,, | Performed by: ORTHOPAEDIC SURGERY

## 2020-11-17 PROCEDURE — 99999 PR PBB SHADOW E&M-EST. PATIENT-LVL IV: ICD-10-PCS | Mod: PBBFAC,,, | Performed by: ORTHOPAEDIC SURGERY

## 2020-11-17 PROCEDURE — 20610 DRAIN/INJ JOINT/BURSA W/O US: CPT | Mod: PBBFAC,PN | Performed by: ORTHOPAEDIC SURGERY

## 2020-11-17 PROCEDURE — 73562 X-RAY EXAM OF KNEE 3: CPT | Mod: 26,LT,, | Performed by: RADIOLOGY

## 2020-11-17 PROCEDURE — 20610 LARGE JOINT ASPIRATION/INJECTION: L KNEE: ICD-10-PCS | Mod: S$PBB,LT,, | Performed by: ORTHOPAEDIC SURGERY

## 2020-11-17 PROCEDURE — 99999 PR PBB SHADOW E&M-EST. PATIENT-LVL IV: CPT | Mod: PBBFAC,,, | Performed by: ORTHOPAEDIC SURGERY

## 2020-11-17 PROCEDURE — 99214 OFFICE O/P EST MOD 30 MIN: CPT | Mod: PBBFAC,25,PN | Performed by: ORTHOPAEDIC SURGERY

## 2020-11-17 PROCEDURE — 73562 X-RAY EXAM OF KNEE 3: CPT | Mod: TC,PN,LT

## 2020-11-17 PROCEDURE — 73562 XR KNEE 3 VIEW LEFT: ICD-10-PCS | Mod: 26,LT,, | Performed by: RADIOLOGY

## 2020-11-17 RX ORDER — TRIAMCINOLONE ACETONIDE 40 MG/ML
40 INJECTION, SUSPENSION INTRA-ARTICULAR; INTRAMUSCULAR
Status: DISCONTINUED | OUTPATIENT
Start: 2020-11-17 | End: 2020-11-17 | Stop reason: HOSPADM

## 2020-11-17 RX ORDER — DICLOFENAC SODIUM 10 MG/G
2 GEL TOPICAL 2 TIMES DAILY
COMMUNITY

## 2020-11-17 RX ADMIN — TRIAMCINOLONE ACETONIDE 40 MG: 40 INJECTION, SUSPENSION INTRA-ARTICULAR; INTRAMUSCULAR at 10:11

## 2020-11-17 NOTE — PROCEDURES
Large Joint Aspiration/Injection: L knee    Date/Time: 11/17/2020 10:30 AM  Performed by: Maximino Guillen DO  Authorized by: Maximino Guillen, DO     Consent Done?:  Yes (Verbal)  Indications:  Arthritis, diagnostic evaluation, joint swelling and pain  Site marked: the procedure site was marked    Timeout: prior to procedure the correct patient, procedure, and site was verified    Prep: patient was prepped and draped in usual sterile fashion      Details:  Needle Size:  22 G  Ultrasonic Guidance for needle placement?: No    Approach:  Anterolateral  Location:  Knee  Site:  L knee  Medications:  40 mg triamcinolone acetonide 40 mg/mL  Patient tolerance:  Patient tolerated the procedure well with no immediate complications

## 2020-11-17 NOTE — PROGRESS NOTES
"  Subjective:      Patient ID: Garcia Renteria is a 70 y.o. male.    Chief Complaint: Pain of the Left Knee    Referring Provider: Elpidio Lau Md  [address] [facility] [place] [address]    HPI:  Mr. Renteria is a 70-year-old gentleman who presented today for evaluation of 3 years of left knee pain increasing intensity since March 2020 after he had back surgery.  He said the turning and twisting his knee increases his symptoms.  Occasionally he gets fluid in his knee.  Intermittently he has giving way.  He also stated, it feels like stinging and burning which runs from my back to my foot and still feels like sciatic symptoms."  Standing and turning increases symptoms while rubbing his knee decreases them.  He has taken NSAIDs with help.  He has worn a sleeve brace which helps.  He has not done physical therapy nor had injections.  Currently he can ambulate approximately 1 mi and climb 6-8 stairs.  Intermittently he will use a cane or a walker.    Past Medical History:   Diagnosis Date    Colitis     Diabetes mellitus, type 2     Diverticulosis     GERD (gastroesophageal reflux disease)      *  * Hypertension  Constipation  Hepatitis      Past Surgical History:   Procedure Laterality Date    ANKLE SURGERY Bilateral     ANTERIOR CERVICAL DISCECTOMY W/ FUSION N/A 10/30/2019    Procedure: C3-4, C4-5 ACDF;  Surgeon: Francis Alan MD;  Location: [facility] OR [address] [place] [address];  Service: Neurosurgery;  Laterality: N/A;  C3-4, C4-5 ACDF  Anesthesia:  General  Rad:  C-Arm  NM:  EMG, SEP, & MEP  Blood:  Type & Screen  Position:  Supine  Bed:  Regular slider bed  Headrest:  [facility] [employer] tongs/hanging weights  Misc:  Small vivigen  Nerve root retractor (DePuy)  Disposable #2 Kerrison  Interbody    BACK SURGERY      COLONOSCOPY      COLONOSCOPY N/A 10/7/2020    Procedure: COLONOSCOPY;  Surgeon: Elpidio Lau MD;  Location: [facility] ENDO;  Service: Endoscopy;  Laterality: N/A;    CREATION OF MUSCLE ROTATIONAL FLAP  3/3/2020 "    Procedure: CREATION, FLAP, MUSCLE ROTATION;  Surgeon: Francis Alan MD;  [address]  Service: Neurosurgery;;    INJECTION OF ANESTHETIC AGENT AROUND NERVE Bilateral 3/10/2020    Procedure: Block, Nerve;  Surgeon: Erinn Surgeon;  Location: [facility] ERINN;  Service: Anesthesiology;  Laterality: Bilateral;    lower back surgery      LUMBAR LAMINECTOMY WITH FUSION N/A 3/3/2020    Procedure: T10-Pelvis Fusion with L4 PSO **AIRO** Co-Surgeon: Roberto Parkinson;  Surgeon: Francis Alan MD;  [address];  Service: Neurosurgery;  Laterality: N/A;  **AIRO**  **CELL SAVER**  Co-Surgeon: Roberto Parkinson  NM: EMG, SEP, MEP  Position: Prone  Bed: [place] 4 post, prone pillow  Blood: Type & Hold 2 units  Rad: C-Arm, AIRO  Vendor: [employer]  Misc: Vivigen, Infuse, Cement (Depuy)  [place]    NECK SURGERY C-spine fusion      SPINE SURGERY      UPPER GASTROINTESTINAL ENDOSCOPY         Review of patient's allergies indicates:  No Known Allergies    Social History     Occupational History    Retired standards officer   Tobacco Use    Smoking status: Former Smoker     Types: Cigars     Quit date: 10/2019     Years since quittin.1    Smokeless tobacco: Never Used   Substance and Sexual Activity    Alcohol use: Yes     Alcohol/week: 2.0 standard drinks     Types: 1 Glasses of wine, 1 Cans of beer per week     Comment: 1 wine , 2 times a week    Drug use: Not Currently    Sexual activity: Yes     Partners: Female      Family History   Problem Relation Age of Onset    Neurological Disorders Mother        Previous Hospitalizations:  C-spine fusion    ROS:   Review of Systems   Constitution: Negative for chills and fever.   HENT: Negative for congestion.    Eyes: Negative for blurred vision.   Cardiovascular: Negative for chest pain.   Respiratory: Negative for cough.    Endocrine: Negative for polydipsia.   Skin: Negative for flushing, itching and skin cancer.   Musculoskeletal: Positive for back pain, joint pain,  joint swelling and neck pain. Negative for gout.   Gastrointestinal: Positive for constipation and heartburn. Negative for diarrhea.   Genitourinary: Negative for nocturia.   Neurological: Negative for headaches and seizures.   Psychiatric/Behavioral: Negative for depression and substance abuse. The patient is not nervous/anxious.    Allergic/Immunologic: Negative for environmental allergies.           Objective:      Physical Exam:   General: AAOx3.  No acute distress  HEENT: Normocephalic, PEARLA EOMI, fair dentition  Neck: Supple, No JVD  Chest: Symetric, equal excursion on inspiration  Abdomen: Soft NTND  Vascular:  Pulses intact and equal bilaterally.  Capillary refill less than 3 seconds and equal bilaterally  Neurologic:  Pinprick and soft touch intact and equal bilaterally.  Positive straight leg raising left lower extremity  Integment:  No ecchymosis, no errythema  Extremity:  Knee:  Extension/flexion both knees equal 1/122 degrees.  Crepitus with motion both knees.  Mild effusion left knee.  Mildly positive patellar load/compression left knee.  Negative patella apprehension/relocation both knees.  Mild increased excursion with varus stressing, left knee.  Valgus stressing equal bilaterally with endpoint.  Lachman's/drawer equal bilaterally with endpoint.  Shelia mildly positive left knee.  Sukhjinder positive left knee.  Positive joint line tenderness left knee.  Nontender at the anserine insertion bilaterally.  No swelling at the anserine insertion bilaterally.  Radiography:  Personally reviewed x-rays of the left knee completed on 11/17/2020 showed an ensothphyte off the superior and inferior patella pole and mild arthritic changes.      Assessment:       Impression:      1. Primary osteoarthritis of left knee    2. Degenerative tear of medial meniscus, left    3. Chronic instability of knee, left knee    4. Radicular pain of left lower extremity          Plan:       1.  Discussed physical examination  and radiographic findings with the patient. Garcia understands that he has arthritis of his left knees which is causing degenerative meniscus.  He also understands he has a radicular component to his left knee issues.  He understands he needs to discuss this with his spine surgeon.  Treatment alternatives and outcomes were discussed with the patient, he understands he could be treated conservatively with observation, activity modification, physical therapy, bracing, injections, or he could consider surgical intervention such as arthroscopy versus total knee arthroplasty.  He stated he would prefer to continue with conservative management.  2.  Offered a steroid injection to the left knee, he elected to proceed.  3.  [place] global knee brace to the left knee, prescription for the patient to obtain 1 was given to him.  4.  Voltaren 1% gel, apply to affected area twice daily and massage in for 2 min, dispense 100 g, refill 5.  5.  Take NSAIDs as tolerated allowed by PCM.  6.  Offered referred to physical therapy declined for now.  7.  Home exercises to include quadriceps and hamstring strengthening were shown discussed.  6.  Follow up p.r.n..

## 2020-11-17 NOTE — LETTER
November 17, 2020      Elpidio Lau MD  [address]  [place] [address]           [facility]  [address] [place] [address]  [facility] [place] [address]  Phone: [phone]  Fax: [phone]          Patient: Garcia Renteria   MR Number: [id number]   YOB: 1950   Date of Visit: 11/17/2020       Dear Dr. Elpidio Lau:    Thank you for referring Garcia Renteria to me for evaluation. Attached you will find relevant portions of my assessment and plan of care.    If you have questions, please do not hesitate to call me. I look forward to following Garcia Renteria along with you.    Sincerely,    Maximino Guillen, DO    Enclosure  CC:  No Recipients    If you would like to receive this communication electronically, please contact [email or web address] or [phone] to request more information on [employer] Link access.    For providers and/or their staff who would like to refer a patient to [facility], please contact us through our one-stop-shop provider referral line, [facility] , at [phone].    If you feel you have received this communication in error or would no longer like to receive these types of communications, please e-mail [email or web address]

## 2020-12-02 DIAGNOSIS — Z98.890 NECK PAIN WITH HISTORY OF CERVICAL SPINAL SURGERY: ICD-10-CM

## 2020-12-02 DIAGNOSIS — M54.2 NECK PAIN WITH HISTORY OF CERVICAL SPINAL SURGERY: ICD-10-CM

## 2020-12-02 DIAGNOSIS — M54.50 LOW BACK PAIN, NON-SPECIFIC: ICD-10-CM

## 2020-12-02 DIAGNOSIS — M51.36 DDD (DEGENERATIVE DISC DISEASE), LUMBAR: ICD-10-CM

## 2020-12-02 RX ORDER — HYDROCODONE BITARTRATE AND ACETAMINOPHEN 10; 325 MG/1; MG/1
1 TABLET ORAL EVERY 6 HOURS PRN
Qty: 120 TABLET | Refills: 0 | Status: SHIPPED | OUTPATIENT
Start: 2020-12-02 | End: 2021-01-04

## 2020-12-02 NOTE — TELEPHONE ENCOUNTER
Spoke to wife about Mr Zelaya. She thought he had his appointment today. I informed her that today is a surgery day and that we have Mr Zelaya scheduled for a 2 month follow up on Jan 4 and he was to call in for his refill this month. She verbalized understanding and scheduled her appointment with him.

## 2020-12-02 NOTE — TELEPHONE ENCOUNTER
----- Message from Aakash Huerta sent at 12/2/2020  8:55 AM CST -----  Type: Needs Medical Advice    Who Called:  Khloe Renteria (Spouse)  Best Call Back Number: [phone]  Additional Information: Caller would like to discuss refilling medications. Caller does not know medication names. Please call to advise. Thanks!

## 2021-01-04 ENCOUNTER — OFFICE VISIT (OUTPATIENT)
Dept: GASTROENTEROLOGY | Facility: CLINIC | Age: 71
End: 2021-01-04
Payer: MEDICARE

## 2021-01-04 VITALS
DIASTOLIC BLOOD PRESSURE: 72 MMHG | RESPIRATION RATE: 18 BRPM | OXYGEN SATURATION: 98 % | SYSTOLIC BLOOD PRESSURE: 111 MMHG | BODY MASS INDEX: 28.23 KG/M2 | TEMPERATURE: 98 F | WEIGHT: 213 LBS | HEIGHT: 73 IN | HEART RATE: 98 BPM

## 2021-01-04 DIAGNOSIS — K21.9 GASTROESOPHAGEAL REFLUX DISEASE: ICD-10-CM

## 2021-01-04 DIAGNOSIS — Z98.890 NECK PAIN WITH HISTORY OF CERVICAL SPINAL SURGERY: ICD-10-CM

## 2021-01-04 DIAGNOSIS — M54.2 NECK PAIN WITH HISTORY OF CERVICAL SPINAL SURGERY: ICD-10-CM

## 2021-01-04 DIAGNOSIS — E11.40 TYPE 2 DIABETES MELLITUS WITH DIABETIC NEUROPATHY, WITHOUT LONG-TERM CURRENT USE OF INSULIN: Primary | ICD-10-CM

## 2021-01-04 DIAGNOSIS — K57.30 DIVERTICULOSIS OF LARGE INTESTINE WITHOUT HEMORRHAGE: ICD-10-CM

## 2021-01-04 PROCEDURE — 99214 OFFICE O/P EST MOD 30 MIN: CPT | Mod: PBBFAC,PN | Performed by: INTERNAL MEDICINE

## 2021-01-04 PROCEDURE — 99214 PR OFFICE/OUTPT VISIT, EST, LEVL IV, 30-39 MIN: ICD-10-PCS | Mod: S$PBB,,, | Performed by: INTERNAL MEDICINE

## 2021-01-04 PROCEDURE — 99214 OFFICE O/P EST MOD 30 MIN: CPT | Mod: S$PBB,,, | Performed by: INTERNAL MEDICINE

## 2021-01-04 PROCEDURE — 99999 PR PBB SHADOW E&M-EST. PATIENT-LVL IV: CPT | Mod: PBBFAC,,, | Performed by: INTERNAL MEDICINE

## 2021-01-04 PROCEDURE — 99999 PR PBB SHADOW E&M-EST. PATIENT-LVL IV: ICD-10-PCS | Mod: PBBFAC,,, | Performed by: INTERNAL MEDICINE

## 2021-01-04 RX ORDER — OXYCODONE AND ACETAMINOPHEN 10; 325 MG/1; MG/1
1 TABLET ORAL EVERY 6 HOURS PRN
Qty: 120 TABLET | Refills: 0 | Status: SHIPPED | OUTPATIENT
Start: 2021-01-04 | End: 2021-02-01 | Stop reason: SDUPTHER

## 2021-01-04 RX ORDER — FAMOTIDINE 40 MG/1
40 TABLET, FILM COATED ORAL NIGHTLY
Qty: 90 TABLET | Refills: 3 | Status: SHIPPED | OUTPATIENT
Start: 2021-01-04 | End: 2022-05-02 | Stop reason: SDUPTHER

## 2021-01-04 RX ORDER — LANOLIN ALCOHOL/MO/W.PET/CERES
CREAM (GRAM) TOPICAL
COMMUNITY
Start: 2020-11-09 | End: 2023-08-14

## 2021-01-04 RX ORDER — TAMSULOSIN HYDROCHLORIDE 0.4 MG/1
0.4 CAPSULE ORAL DAILY
COMMUNITY
Start: 2020-12-29 | End: 2022-12-28

## 2021-02-01 ENCOUNTER — OFFICE VISIT (OUTPATIENT)
Dept: GASTROENTEROLOGY | Facility: CLINIC | Age: 71
End: 2021-02-01
Payer: MEDICARE

## 2021-02-01 VITALS
SYSTOLIC BLOOD PRESSURE: 115 MMHG | DIASTOLIC BLOOD PRESSURE: 73 MMHG | HEIGHT: 73 IN | WEIGHT: 213 LBS | RESPIRATION RATE: 14 BRPM | OXYGEN SATURATION: 98 % | HEART RATE: 109 BPM | TEMPERATURE: 98 F | BODY MASS INDEX: 28.23 KG/M2

## 2021-02-01 DIAGNOSIS — M54.2 NECK PAIN WITH HISTORY OF CERVICAL SPINAL SURGERY: Primary | ICD-10-CM

## 2021-02-01 DIAGNOSIS — M54.50 LOW BACK PAIN, NON-SPECIFIC: ICD-10-CM

## 2021-02-01 DIAGNOSIS — K57.30 DIVERTICULOSIS OF LARGE INTESTINE WITHOUT HEMORRHAGE: ICD-10-CM

## 2021-02-01 DIAGNOSIS — Z98.890 NECK PAIN WITH HISTORY OF CERVICAL SPINAL SURGERY: Primary | ICD-10-CM

## 2021-02-01 DIAGNOSIS — G89.29 CHRONIC PAIN OF LEFT KNEE: ICD-10-CM

## 2021-02-01 DIAGNOSIS — M25.562 CHRONIC PAIN OF LEFT KNEE: ICD-10-CM

## 2021-02-01 DIAGNOSIS — K44.9 HIATAL HERNIA: ICD-10-CM

## 2021-02-01 DIAGNOSIS — M51.36 DDD (DEGENERATIVE DISC DISEASE), LUMBAR: ICD-10-CM

## 2021-02-01 DIAGNOSIS — K21.9 GASTROESOPHAGEAL REFLUX DISEASE, UNSPECIFIED WHETHER ESOPHAGITIS PRESENT: ICD-10-CM

## 2021-02-01 PROCEDURE — 99214 OFFICE O/P EST MOD 30 MIN: CPT | Mod: S$PBB,,, | Performed by: INTERNAL MEDICINE

## 2021-02-01 PROCEDURE — 99999 PR PBB SHADOW E&M-EST. PATIENT-LVL V: CPT | Mod: PBBFAC,,, | Performed by: INTERNAL MEDICINE

## 2021-02-01 PROCEDURE — 99999 PR PBB SHADOW E&M-EST. PATIENT-LVL V: ICD-10-PCS | Mod: PBBFAC,,, | Performed by: INTERNAL MEDICINE

## 2021-02-01 PROCEDURE — 99214 PR OFFICE/OUTPT VISIT, EST, LEVL IV, 30-39 MIN: ICD-10-PCS | Mod: S$PBB,,, | Performed by: INTERNAL MEDICINE

## 2021-02-01 PROCEDURE — 99215 OFFICE O/P EST HI 40 MIN: CPT | Mod: PBBFAC,PN | Performed by: INTERNAL MEDICINE

## 2021-02-01 RX ORDER — OXYCODONE AND ACETAMINOPHEN 10; 325 MG/1; MG/1
1 TABLET ORAL EVERY 6 HOURS PRN
Qty: 120 TABLET | Refills: 0 | Status: SHIPPED | OUTPATIENT
Start: 2021-02-01 | End: 2021-03-02 | Stop reason: SDUPTHER

## 2021-02-02 PROBLEM — K21.00 GASTROESOPHAGEAL REFLUX DISEASE WITH ESOPHAGITIS: Status: RESOLVED | Noted: 2019-08-07 | Resolved: 2021-02-02

## 2021-02-02 PROBLEM — K44.9 HIATAL HERNIA: Status: ACTIVE | Noted: 2021-02-02

## 2021-02-02 PROBLEM — Z12.11 COLON CANCER SCREENING: Status: RESOLVED | Noted: 2020-10-07 | Resolved: 2021-02-02

## 2021-02-10 NOTE — PLAN OF CARE
Goals remain appropriate continue with current POC.     Katy Deutsch, PT, DPT  3/6/2020       Problem: Physical Therapy Goal  Goal: Physical Therapy Goal  Description  Goals to be met by: 3/14/2020     Patient will increase functional independence with mobility by performin. Supine to sit with Moderate Assistance  2. Sit to supine with Moderate Assistance  3. Rolling to Left and Right with Moderate Assistance.  4. Sit to stand transfer with Maximum Assistance  5. Bed to chair transfer with Maximum Assistance  6. Gait  x 15 feet with Maximum Assistance using RW.   7. Lower extremity exercise program x15 reps per handout, with assistance as needed     Outcome: Ongoing, Progressing      yes

## 2021-03-02 ENCOUNTER — OFFICE VISIT (OUTPATIENT)
Dept: GASTROENTEROLOGY | Facility: CLINIC | Age: 71
End: 2021-03-02
Payer: MEDICARE

## 2021-03-02 VITALS
HEIGHT: 73 IN | HEART RATE: 91 BPM | DIASTOLIC BLOOD PRESSURE: 71 MMHG | WEIGHT: 213 LBS | RESPIRATION RATE: 14 BRPM | BODY MASS INDEX: 28.23 KG/M2 | TEMPERATURE: 98 F | SYSTOLIC BLOOD PRESSURE: 107 MMHG | OXYGEN SATURATION: 99 %

## 2021-03-02 DIAGNOSIS — K44.9 HIATAL HERNIA: ICD-10-CM

## 2021-03-02 DIAGNOSIS — G89.29 CHRONIC PAIN OF LEFT KNEE: ICD-10-CM

## 2021-03-02 DIAGNOSIS — Z86.19 HEPATITIS C VIRUS INFECTION RESOLVED AFTER ANTIVIRAL DRUG THERAPY: ICD-10-CM

## 2021-03-02 DIAGNOSIS — Z98.890 NECK PAIN WITH HISTORY OF CERVICAL SPINAL SURGERY: ICD-10-CM

## 2021-03-02 DIAGNOSIS — K57.30 DIVERTICULOSIS OF LARGE INTESTINE WITHOUT HEMORRHAGE: Primary | ICD-10-CM

## 2021-03-02 DIAGNOSIS — M51.36 DDD (DEGENERATIVE DISC DISEASE), LUMBAR: ICD-10-CM

## 2021-03-02 DIAGNOSIS — M54.2 NECK PAIN WITH HISTORY OF CERVICAL SPINAL SURGERY: ICD-10-CM

## 2021-03-02 DIAGNOSIS — M54.50 LOW BACK PAIN, NON-SPECIFIC: ICD-10-CM

## 2021-03-02 DIAGNOSIS — K21.9 GASTROESOPHAGEAL REFLUX DISEASE, UNSPECIFIED WHETHER ESOPHAGITIS PRESENT: ICD-10-CM

## 2021-03-02 DIAGNOSIS — M25.562 CHRONIC PAIN OF LEFT KNEE: ICD-10-CM

## 2021-03-02 PROBLEM — K59.00 CONSTIPATION: Status: RESOLVED | Noted: 2019-10-25 | Resolved: 2021-03-02

## 2021-03-02 PROCEDURE — 99215 OFFICE O/P EST HI 40 MIN: CPT | Mod: PBBFAC,PN | Performed by: INTERNAL MEDICINE

## 2021-03-02 PROCEDURE — 99214 PR OFFICE/OUTPT VISIT, EST, LEVL IV, 30-39 MIN: ICD-10-PCS | Mod: S$PBB,,, | Performed by: INTERNAL MEDICINE

## 2021-03-02 PROCEDURE — 99999 PR PBB SHADOW E&M-EST. PATIENT-LVL V: ICD-10-PCS | Mod: PBBFAC,,, | Performed by: INTERNAL MEDICINE

## 2021-03-02 PROCEDURE — 99214 OFFICE O/P EST MOD 30 MIN: CPT | Mod: S$PBB,,, | Performed by: INTERNAL MEDICINE

## 2021-03-02 PROCEDURE — 99999 PR PBB SHADOW E&M-EST. PATIENT-LVL V: CPT | Mod: PBBFAC,,, | Performed by: INTERNAL MEDICINE

## 2021-03-02 RX ORDER — OXYCODONE AND ACETAMINOPHEN 10; 325 MG/1; MG/1
1 TABLET ORAL EVERY 6 HOURS PRN
Qty: 120 TABLET | Refills: 0 | Status: SHIPPED | OUTPATIENT
Start: 2021-03-02 | End: 2021-04-01

## 2021-03-30 ENCOUNTER — OFFICE VISIT (OUTPATIENT)
Dept: GASTROENTEROLOGY | Facility: CLINIC | Age: 71
End: 2021-03-30
Payer: MEDICARE

## 2021-03-30 ENCOUNTER — TELEPHONE (OUTPATIENT)
Dept: GASTROENTEROLOGY | Facility: CLINIC | Age: 71
End: 2021-03-30

## 2021-03-30 VITALS
HEART RATE: 80 BPM | HEIGHT: 73 IN | RESPIRATION RATE: 20 BRPM | OXYGEN SATURATION: 96 % | DIASTOLIC BLOOD PRESSURE: 52 MMHG | SYSTOLIC BLOOD PRESSURE: 85 MMHG | WEIGHT: 215.31 LBS | BODY MASS INDEX: 28.53 KG/M2

## 2021-03-30 DIAGNOSIS — M54.2 NECK PAIN WITH HISTORY OF CERVICAL SPINAL SURGERY: Primary | ICD-10-CM

## 2021-03-30 DIAGNOSIS — Z98.890 NECK PAIN WITH HISTORY OF CERVICAL SPINAL SURGERY: Primary | ICD-10-CM

## 2021-03-30 DIAGNOSIS — M54.50 LOW BACK PAIN, NON-SPECIFIC: ICD-10-CM

## 2021-03-30 DIAGNOSIS — M25.562 CHRONIC PAIN OF LEFT KNEE: ICD-10-CM

## 2021-03-30 DIAGNOSIS — K57.30 DIVERTICULOSIS OF LARGE INTESTINE WITHOUT HEMORRHAGE: ICD-10-CM

## 2021-03-30 DIAGNOSIS — K21.9 GASTROESOPHAGEAL REFLUX DISEASE, UNSPECIFIED WHETHER ESOPHAGITIS PRESENT: ICD-10-CM

## 2021-03-30 DIAGNOSIS — K44.9 HIATAL HERNIA: ICD-10-CM

## 2021-03-30 DIAGNOSIS — G89.29 CHRONIC PAIN OF LEFT KNEE: ICD-10-CM

## 2021-03-30 DIAGNOSIS — M51.36 DDD (DEGENERATIVE DISC DISEASE), LUMBAR: ICD-10-CM

## 2021-03-30 PROCEDURE — 99999 PR PBB SHADOW E&M-EST. PATIENT-LVL IV: ICD-10-PCS | Mod: PBBFAC,,, | Performed by: INTERNAL MEDICINE

## 2021-03-30 PROCEDURE — 99214 PR OFFICE/OUTPT VISIT, EST, LEVL IV, 30-39 MIN: ICD-10-PCS | Mod: S$PBB,,, | Performed by: INTERNAL MEDICINE

## 2021-03-30 PROCEDURE — 99999 PR PBB SHADOW E&M-EST. PATIENT-LVL IV: CPT | Mod: PBBFAC,,, | Performed by: INTERNAL MEDICINE

## 2021-03-30 PROCEDURE — 99214 OFFICE O/P EST MOD 30 MIN: CPT | Mod: PBBFAC,PN | Performed by: INTERNAL MEDICINE

## 2021-03-30 PROCEDURE — 99214 OFFICE O/P EST MOD 30 MIN: CPT | Mod: S$PBB,,, | Performed by: INTERNAL MEDICINE

## 2021-04-01 PROBLEM — Z01.818 PREOP TESTING: Status: RESOLVED | Noted: 2020-09-03 | Resolved: 2021-04-01

## 2021-04-01 RX ORDER — HYDROCODONE BITARTRATE AND ACETAMINOPHEN 10; 325 MG/1; MG/1
1 TABLET ORAL EVERY 6 HOURS PRN
Qty: 120 TABLET | Refills: 0 | Status: SHIPPED | OUTPATIENT
Start: 2021-04-01 | End: 2021-04-27 | Stop reason: SDUPTHER

## 2021-04-27 ENCOUNTER — OFFICE VISIT (OUTPATIENT)
Dept: GASTROENTEROLOGY | Facility: CLINIC | Age: 71
End: 2021-04-27
Payer: MEDICARE

## 2021-04-27 VITALS
DIASTOLIC BLOOD PRESSURE: 84 MMHG | BODY MASS INDEX: 30.62 KG/M2 | WEIGHT: 231 LBS | HEIGHT: 73 IN | HEART RATE: 72 BPM | SYSTOLIC BLOOD PRESSURE: 145 MMHG | OXYGEN SATURATION: 99 %

## 2021-04-27 DIAGNOSIS — K44.9 HIATAL HERNIA: ICD-10-CM

## 2021-04-27 DIAGNOSIS — K57.30 DIVERTICULOSIS OF LARGE INTESTINE WITHOUT HEMORRHAGE: ICD-10-CM

## 2021-04-27 DIAGNOSIS — N40.0 PROSTATISM: ICD-10-CM

## 2021-04-27 DIAGNOSIS — M54.50 LOW BACK PAIN, NON-SPECIFIC: ICD-10-CM

## 2021-04-27 DIAGNOSIS — G89.29 CHRONIC PAIN OF LEFT KNEE: ICD-10-CM

## 2021-04-27 DIAGNOSIS — K21.9 GASTROESOPHAGEAL REFLUX DISEASE, UNSPECIFIED WHETHER ESOPHAGITIS PRESENT: ICD-10-CM

## 2021-04-27 DIAGNOSIS — M51.36 DDD (DEGENERATIVE DISC DISEASE), LUMBAR: ICD-10-CM

## 2021-04-27 DIAGNOSIS — E11.40 TYPE 2 DIABETES MELLITUS WITH DIABETIC NEUROPATHY, WITHOUT LONG-TERM CURRENT USE OF INSULIN: ICD-10-CM

## 2021-04-27 DIAGNOSIS — M54.2 NECK PAIN WITH HISTORY OF CERVICAL SPINAL SURGERY: ICD-10-CM

## 2021-04-27 DIAGNOSIS — M25.562 CHRONIC PAIN OF LEFT KNEE: ICD-10-CM

## 2021-04-27 DIAGNOSIS — Z98.890 NECK PAIN WITH HISTORY OF CERVICAL SPINAL SURGERY: ICD-10-CM

## 2021-04-27 DIAGNOSIS — Z86.19 HEPATITIS C VIRUS INFECTION RESOLVED AFTER ANTIVIRAL DRUG THERAPY: Primary | ICD-10-CM

## 2021-04-27 PROCEDURE — 99214 PR OFFICE/OUTPT VISIT, EST, LEVL IV, 30-39 MIN: ICD-10-PCS | Mod: S$PBB,,, | Performed by: INTERNAL MEDICINE

## 2021-04-27 PROCEDURE — 99999 PR PBB SHADOW E&M-EST. PATIENT-LVL V: ICD-10-PCS | Mod: PBBFAC,,, | Performed by: INTERNAL MEDICINE

## 2021-04-27 PROCEDURE — 99215 OFFICE O/P EST HI 40 MIN: CPT | Mod: PBBFAC,PN | Performed by: INTERNAL MEDICINE

## 2021-04-27 PROCEDURE — 99214 OFFICE O/P EST MOD 30 MIN: CPT | Mod: S$PBB,,, | Performed by: INTERNAL MEDICINE

## 2021-04-27 PROCEDURE — 99999 PR PBB SHADOW E&M-EST. PATIENT-LVL V: CPT | Mod: PBBFAC,,, | Performed by: INTERNAL MEDICINE

## 2021-04-27 RX ORDER — AZITHROMYCIN 250 MG/1
250 TABLET, FILM COATED ORAL DAILY
COMMUNITY
Start: 2021-04-26 | End: 2022-12-28

## 2021-04-27 RX ORDER — AMOXICILLIN AND CLAVULANATE POTASSIUM 875; 125 MG/1; MG/1
1 TABLET, FILM COATED ORAL 2 TIMES DAILY
COMMUNITY
Start: 2021-04-26 | End: 2022-12-28

## 2021-04-27 RX ORDER — CEFTRIAXONE 1 G/1
INJECTION, POWDER, FOR SOLUTION INTRAMUSCULAR; INTRAVENOUS
COMMUNITY
Start: 2021-04-26 | End: 2021-04-27

## 2021-05-03 ENCOUNTER — TELEPHONE (OUTPATIENT)
Dept: GASTROENTEROLOGY | Facility: CLINIC | Age: 71
End: 2021-05-03

## 2021-05-03 RX ORDER — HYDROCODONE BITARTRATE AND ACETAMINOPHEN 10; 325 MG/1; MG/1
1 TABLET ORAL EVERY 6 HOURS PRN
Qty: 120 TABLET | Refills: 0 | Status: SHIPPED | OUTPATIENT
Start: 2021-05-03 | End: 2021-06-02

## 2021-06-08 ENCOUNTER — OFFICE VISIT (OUTPATIENT)
Dept: GASTROENTEROLOGY | Facility: CLINIC | Age: 71
End: 2021-06-08
Payer: MEDICARE

## 2021-06-08 ENCOUNTER — OFFICE VISIT (OUTPATIENT)
Dept: ORTHOPEDICS | Facility: CLINIC | Age: 71
End: 2021-06-08
Payer: MEDICARE

## 2021-06-08 VITALS
BODY MASS INDEX: 30.62 KG/M2 | RESPIRATION RATE: 19 BRPM | WEIGHT: 231 LBS | OXYGEN SATURATION: 98 % | HEART RATE: 69 BPM | HEIGHT: 73 IN

## 2021-06-08 VITALS
WEIGHT: 231 LBS | DIASTOLIC BLOOD PRESSURE: 66 MMHG | RESPIRATION RATE: 14 BRPM | SYSTOLIC BLOOD PRESSURE: 124 MMHG | BODY MASS INDEX: 30.62 KG/M2 | HEIGHT: 73 IN | OXYGEN SATURATION: 97 % | HEART RATE: 87 BPM

## 2021-06-08 DIAGNOSIS — M17.12 PRIMARY OSTEOARTHRITIS OF LEFT KNEE: Primary | ICD-10-CM

## 2021-06-08 DIAGNOSIS — E11.40 TYPE 2 DIABETES MELLITUS WITH DIABETIC NEUROPATHY, WITHOUT LONG-TERM CURRENT USE OF INSULIN: ICD-10-CM

## 2021-06-08 DIAGNOSIS — K57.30 DIVERTICULOSIS OF LARGE INTESTINE WITHOUT HEMORRHAGE: ICD-10-CM

## 2021-06-08 DIAGNOSIS — K21.9 GASTROESOPHAGEAL REFLUX DISEASE, UNSPECIFIED WHETHER ESOPHAGITIS PRESENT: ICD-10-CM

## 2021-06-08 DIAGNOSIS — R52 PAIN: ICD-10-CM

## 2021-06-08 DIAGNOSIS — Z86.19 HEPATITIS C VIRUS INFECTION RESOLVED AFTER ANTIVIRAL DRUG THERAPY: Primary | ICD-10-CM

## 2021-06-08 PROCEDURE — 20610 DRAIN/INJ JOINT/BURSA W/O US: CPT | Mod: PBBFAC,PN | Performed by: ORTHOPAEDIC SURGERY

## 2021-06-08 PROCEDURE — 99214 OFFICE O/P EST MOD 30 MIN: CPT | Mod: PBBFAC,PN,25 | Performed by: INTERNAL MEDICINE

## 2021-06-08 PROCEDURE — 99999 PR PBB SHADOW E&M-EST. PATIENT-LVL IV: CPT | Mod: PBBFAC,,, | Performed by: ORTHOPAEDIC SURGERY

## 2021-06-08 PROCEDURE — 99999 PR PBB SHADOW E&M-EST. PATIENT-LVL IV: ICD-10-PCS | Mod: PBBFAC,,, | Performed by: ORTHOPAEDIC SURGERY

## 2021-06-08 PROCEDURE — 99214 OFFICE O/P EST MOD 30 MIN: CPT | Mod: S$PBB,,, | Performed by: INTERNAL MEDICINE

## 2021-06-08 PROCEDURE — 99214 PR OFFICE/OUTPT VISIT, EST, LEVL IV, 30-39 MIN: ICD-10-PCS | Mod: S$PBB,,, | Performed by: INTERNAL MEDICINE

## 2021-06-08 PROCEDURE — 99999 PR PBB SHADOW E&M-EST. PATIENT-LVL IV: CPT | Mod: PBBFAC,,, | Performed by: INTERNAL MEDICINE

## 2021-06-08 PROCEDURE — 99213 PR OFFICE/OUTPT VISIT, EST, LEVL III, 20-29 MIN: ICD-10-PCS | Mod: 25,S$PBB,, | Performed by: ORTHOPAEDIC SURGERY

## 2021-06-08 PROCEDURE — 20610 LARGE JOINT ASPIRATION/INJECTION: L KNEE: ICD-10-PCS | Mod: S$PBB,LT,, | Performed by: ORTHOPAEDIC SURGERY

## 2021-06-08 PROCEDURE — 99999 PR PBB SHADOW E&M-EST. PATIENT-LVL IV: ICD-10-PCS | Mod: PBBFAC,,, | Performed by: INTERNAL MEDICINE

## 2021-06-08 PROCEDURE — 99213 OFFICE O/P EST LOW 20 MIN: CPT | Mod: 25,S$PBB,, | Performed by: ORTHOPAEDIC SURGERY

## 2021-06-08 PROCEDURE — 99214 OFFICE O/P EST MOD 30 MIN: CPT | Mod: PBBFAC,27,PN,25 | Performed by: ORTHOPAEDIC SURGERY

## 2021-06-08 RX ORDER — TRIAMCINOLONE ACETONIDE 40 MG/ML
40 INJECTION, SUSPENSION INTRA-ARTICULAR; INTRAMUSCULAR
Status: DISCONTINUED | OUTPATIENT
Start: 2021-06-08 | End: 2021-06-08 | Stop reason: HOSPADM

## 2021-06-08 RX ORDER — CHOLECALCIFEROL (VITAMIN D3) 50 MCG
CAPSULE ORAL
COMMUNITY
Start: 2021-04-02 | End: 2022-02-04 | Stop reason: SDUPTHER

## 2021-06-08 RX ORDER — OXYCODONE AND ACETAMINOPHEN 10; 325 MG/1; MG/1
1 TABLET ORAL EVERY 6 HOURS PRN
Qty: 120 TABLET | Refills: 0 | Status: SHIPPED | OUTPATIENT
Start: 2021-06-08 | End: 2021-07-08 | Stop reason: SDUPTHER

## 2021-06-08 RX ADMIN — TRIAMCINOLONE ACETONIDE 40 MG: 40 INJECTION, SUSPENSION INTRA-ARTICULAR; INTRAMUSCULAR at 11:06

## 2021-07-08 ENCOUNTER — OFFICE VISIT (OUTPATIENT)
Dept: GASTROENTEROLOGY | Facility: CLINIC | Age: 71
End: 2021-07-08
Payer: MEDICARE

## 2021-07-08 VITALS
BODY MASS INDEX: 28.23 KG/M2 | WEIGHT: 213 LBS | HEART RATE: 82 BPM | RESPIRATION RATE: 18 BRPM | HEIGHT: 73 IN | DIASTOLIC BLOOD PRESSURE: 80 MMHG | OXYGEN SATURATION: 98 % | SYSTOLIC BLOOD PRESSURE: 122 MMHG

## 2021-07-08 DIAGNOSIS — K21.9 GASTROESOPHAGEAL REFLUX DISEASE, UNSPECIFIED WHETHER ESOPHAGITIS PRESENT: ICD-10-CM

## 2021-07-08 DIAGNOSIS — K57.30 DIVERTICULOSIS OF LARGE INTESTINE WITHOUT HEMORRHAGE: ICD-10-CM

## 2021-07-08 DIAGNOSIS — K44.9 HIATAL HERNIA: ICD-10-CM

## 2021-07-08 DIAGNOSIS — R52 PAIN: ICD-10-CM

## 2021-07-08 DIAGNOSIS — Z86.19 HEPATITIS C VIRUS INFECTION RESOLVED AFTER ANTIVIRAL DRUG THERAPY: Primary | ICD-10-CM

## 2021-07-08 PROCEDURE — 99214 PR OFFICE/OUTPT VISIT, EST, LEVL IV, 30-39 MIN: ICD-10-PCS | Mod: S$PBB,,, | Performed by: INTERNAL MEDICINE

## 2021-07-08 PROCEDURE — 99999 PR PBB SHADOW E&M-EST. PATIENT-LVL V: CPT | Mod: PBBFAC,,, | Performed by: INTERNAL MEDICINE

## 2021-07-08 PROCEDURE — 99214 OFFICE O/P EST MOD 30 MIN: CPT | Mod: S$PBB,,, | Performed by: INTERNAL MEDICINE

## 2021-07-08 PROCEDURE — 99999 PR PBB SHADOW E&M-EST. PATIENT-LVL V: ICD-10-PCS | Mod: PBBFAC,,, | Performed by: INTERNAL MEDICINE

## 2021-07-08 PROCEDURE — 99215 OFFICE O/P EST HI 40 MIN: CPT | Mod: PBBFAC,PN | Performed by: INTERNAL MEDICINE

## 2021-07-08 RX ORDER — OXYCODONE AND ACETAMINOPHEN 10; 325 MG/1; MG/1
1 TABLET ORAL EVERY 6 HOURS PRN
Qty: 120 TABLET | Refills: 0 | Status: SHIPPED | OUTPATIENT
Start: 2021-07-08 | End: 2021-08-05 | Stop reason: SDUPTHER

## 2021-07-09 PROBLEM — R52 PAIN: Status: ACTIVE | Noted: 2021-07-09

## 2021-07-12 ENCOUNTER — LAB VISIT (OUTPATIENT)
Dept: LAB | Facility: HOSPITAL | Age: 71
End: 2021-07-12
Attending: UROLOGY
Payer: MEDICARE

## 2021-07-12 ENCOUNTER — OFFICE VISIT (OUTPATIENT)
Dept: UROLOGY | Facility: CLINIC | Age: 71
End: 2021-07-12
Payer: MEDICARE

## 2021-07-12 VITALS
BODY MASS INDEX: 28.22 KG/M2 | WEIGHT: 212.94 LBS | DIASTOLIC BLOOD PRESSURE: 83 MMHG | HEIGHT: 73 IN | SYSTOLIC BLOOD PRESSURE: 130 MMHG | HEART RATE: 99 BPM

## 2021-07-12 DIAGNOSIS — N39.41 URGE INCONTINENCE: ICD-10-CM

## 2021-07-12 DIAGNOSIS — Z12.5 SCREENING FOR PROSTATE CANCER: ICD-10-CM

## 2021-07-12 DIAGNOSIS — R39.9 LOWER URINARY TRACT SYMPTOMS (LUTS): Primary | ICD-10-CM

## 2021-07-12 DIAGNOSIS — R31.9 HEMATURIA OF UNKNOWN CAUSE: ICD-10-CM

## 2021-07-12 LAB
BACTERIA #/AREA URNS HPF: NORMAL /HPF
BILIRUB UR QL STRIP: NEGATIVE
CLARITY UR: CLEAR
COLOR UR: YELLOW
COMPLEXED PSA SERPL-MCNC: 3.4 NG/ML (ref 0–4)
GLUCOSE UR QL STRIP: NEGATIVE
HGB UR QL STRIP: ABNORMAL
KETONES UR QL STRIP: NEGATIVE
LEUKOCYTE ESTERASE UR QL STRIP: NEGATIVE
MICROSCOPIC COMMENT: NORMAL
NITRITE UR QL STRIP: NEGATIVE
PH UR STRIP: 6 [PH] (ref 5–8)
PROT UR QL STRIP: NEGATIVE
RBC #/AREA URNS HPF: 3 /HPF (ref 0–4)
SP GR UR STRIP: 1.02 (ref 1–1.03)
URN SPEC COLLECT METH UR: ABNORMAL
UROBILINOGEN UR STRIP-ACNC: 1 EU/DL
WBC #/AREA URNS HPF: 2 /HPF (ref 0–5)

## 2021-07-12 PROCEDURE — 81000 URINALYSIS NONAUTO W/SCOPE: CPT | Performed by: UROLOGY

## 2021-07-12 PROCEDURE — 99215 OFFICE O/P EST HI 40 MIN: CPT | Mod: PBBFAC | Performed by: UROLOGY

## 2021-07-12 PROCEDURE — 99204 PR OFFICE/OUTPT VISIT, NEW, LEVL IV, 45-59 MIN: ICD-10-PCS | Mod: S$PBB,,, | Performed by: UROLOGY

## 2021-07-12 PROCEDURE — 84153 ASSAY OF PSA TOTAL: CPT | Performed by: UROLOGY

## 2021-07-12 PROCEDURE — 36415 COLL VENOUS BLD VENIPUNCTURE: CPT | Performed by: UROLOGY

## 2021-07-12 PROCEDURE — 99204 OFFICE O/P NEW MOD 45 MIN: CPT | Mod: S$PBB,,, | Performed by: UROLOGY

## 2021-07-12 PROCEDURE — 99999 PR PBB SHADOW E&M-EST. PATIENT-LVL V: CPT | Mod: PBBFAC,,, | Performed by: UROLOGY

## 2021-07-12 PROCEDURE — 99999 PR PBB SHADOW E&M-EST. PATIENT-LVL V: ICD-10-PCS | Mod: PBBFAC,,, | Performed by: UROLOGY

## 2021-07-15 DIAGNOSIS — R39.9 LOWER URINARY TRACT SYMPTOMS (LUTS): Primary | ICD-10-CM

## 2021-07-16 ENCOUNTER — HOSPITAL ENCOUNTER (OUTPATIENT)
Dept: RADIOLOGY | Facility: HOSPITAL | Age: 71
Discharge: HOME | End: 2021-07-16
Attending: UROLOGY
Payer: MEDICARE

## 2021-07-16 DIAGNOSIS — R31.9 HEMATURIA OF UNKNOWN CAUSE: ICD-10-CM

## 2021-07-16 PROCEDURE — 74178 CT UROGRAM ABD PELVIS W WO: ICD-10-PCS | Mod: 26,,, | Performed by: RADIOLOGY

## 2021-07-16 PROCEDURE — 74178 CT ABD&PLV WO CNTR FLWD CNTR: CPT | Mod: TC

## 2021-07-16 PROCEDURE — 25500020 PHARM REV CODE 255

## 2021-07-16 PROCEDURE — 74178 CT ABD&PLV WO CNTR FLWD CNTR: CPT | Mod: 26,,, | Performed by: RADIOLOGY

## 2021-07-16 RX ADMIN — IOHEXOL 150 ML: 350 INJECTION, SOLUTION INTRAVENOUS at 09:07

## 2021-07-21 ENCOUNTER — HOSPITAL ENCOUNTER (OUTPATIENT)
Facility: AMBULARY SURGERY CENTER | Age: 71
Discharge: HOME | End: 2021-07-21
Attending: UROLOGY | Admitting: UROLOGY
Payer: MEDICARE

## 2021-07-21 DIAGNOSIS — N40.0 PROSTATISM: ICD-10-CM

## 2021-07-21 DIAGNOSIS — N39.41 URGE INCONTINENCE: Primary | ICD-10-CM

## 2021-07-21 DIAGNOSIS — R39.9 LOWER URINARY TRACT SYMPTOMS (LUTS): ICD-10-CM

## 2021-07-21 PROCEDURE — 52000 CYSTOURETHROSCOPY: CPT | Mod: ,,, | Performed by: UROLOGY

## 2021-07-21 PROCEDURE — 76872 US TRANSRECTAL: CPT | Performed by: UROLOGY

## 2021-07-21 PROCEDURE — 52000 CYSTOURETHROSCOPY: CPT | Performed by: UROLOGY

## 2021-07-21 PROCEDURE — 76872 US TRANSRECTAL: CPT | Mod: 26,,, | Performed by: UROLOGY

## 2021-07-21 PROCEDURE — 52000 PR CYSTOURETHROSCOPY: ICD-10-PCS | Mod: ,,, | Performed by: UROLOGY

## 2021-07-21 PROCEDURE — 76872 PR US TRANSRECTAL: ICD-10-PCS | Mod: 26,,, | Performed by: UROLOGY

## 2021-07-21 RX ORDER — LIDOCAINE HYDROCHLORIDE 20 MG/ML
JELLY TOPICAL
Status: DISCONTINUED
Start: 2021-07-21 | End: 2021-07-21 | Stop reason: HOSPADM

## 2021-07-21 RX ORDER — MIRABEGRON 50 MG/1
50 TABLET, FILM COATED, EXTENDED RELEASE ORAL DAILY
Qty: 30 TABLET | Refills: 11 | Status: SHIPPED | OUTPATIENT
Start: 2021-07-21 | End: 2021-07-23

## 2021-07-21 RX ORDER — LIDOCAINE HYDROCHLORIDE 20 MG/ML
JELLY TOPICAL
Status: DISCONTINUED | OUTPATIENT
Start: 2021-07-21 | End: 2021-07-21 | Stop reason: HOSPADM

## 2021-07-21 RX ORDER — WATER 1 ML/ML
IRRIGANT IRRIGATION
Status: DISCONTINUED | OUTPATIENT
Start: 2021-07-21 | End: 2021-07-21 | Stop reason: HOSPADM

## 2021-07-21 RX ORDER — CEPHALEXIN 500 MG/1
500 CAPSULE ORAL EVERY 6 HOURS
Qty: 12 CAPSULE | Refills: 0 | Status: SHIPPED | OUTPATIENT
Start: 2021-07-21 | End: 2021-07-24

## 2021-07-22 VITALS
TEMPERATURE: 98 F | HEIGHT: 73 IN | HEART RATE: 72 BPM | WEIGHT: 212 LBS | SYSTOLIC BLOOD PRESSURE: 148 MMHG | OXYGEN SATURATION: 100 % | BODY MASS INDEX: 28.1 KG/M2 | DIASTOLIC BLOOD PRESSURE: 72 MMHG | RESPIRATION RATE: 20 BRPM

## 2021-07-23 ENCOUNTER — TELEPHONE (OUTPATIENT)
Dept: UROLOGY | Facility: CLINIC | Age: 71
End: 2021-07-23

## 2021-07-23 RX ORDER — OXYBUTYNIN CHLORIDE 15 MG/1
15 TABLET, EXTENDED RELEASE ORAL DAILY
Qty: 30 TABLET | Refills: 11 | Status: SHIPPED | OUTPATIENT
Start: 2021-07-23 | End: 2022-03-28

## 2021-08-05 ENCOUNTER — OFFICE VISIT (OUTPATIENT)
Dept: GASTROENTEROLOGY | Facility: CLINIC | Age: 71
End: 2021-08-05
Payer: MEDICARE

## 2021-08-05 VITALS
SYSTOLIC BLOOD PRESSURE: 113 MMHG | RESPIRATION RATE: 18 BRPM | BODY MASS INDEX: 27.83 KG/M2 | DIASTOLIC BLOOD PRESSURE: 73 MMHG | HEIGHT: 73 IN | HEART RATE: 80 BPM | WEIGHT: 210 LBS

## 2021-08-05 DIAGNOSIS — Z98.890 NECK PAIN WITH HISTORY OF CERVICAL SPINAL SURGERY: Primary | ICD-10-CM

## 2021-08-05 DIAGNOSIS — M54.50 LOW BACK PAIN, NON-SPECIFIC: ICD-10-CM

## 2021-08-05 DIAGNOSIS — K21.9 GASTROESOPHAGEAL REFLUX DISEASE, UNSPECIFIED WHETHER ESOPHAGITIS PRESENT: ICD-10-CM

## 2021-08-05 DIAGNOSIS — K76.0 FATTY LIVER: ICD-10-CM

## 2021-08-05 DIAGNOSIS — K44.9 HIATAL HERNIA: ICD-10-CM

## 2021-08-05 DIAGNOSIS — M54.2 NECK PAIN WITH HISTORY OF CERVICAL SPINAL SURGERY: Primary | ICD-10-CM

## 2021-08-05 DIAGNOSIS — K57.30 DIVERTICULOSIS OF LARGE INTESTINE WITHOUT HEMORRHAGE: ICD-10-CM

## 2021-08-05 DIAGNOSIS — R52 PAIN: ICD-10-CM

## 2021-08-05 PROCEDURE — 99214 PR OFFICE/OUTPT VISIT, EST, LEVL IV, 30-39 MIN: ICD-10-PCS | Mod: S$PBB,,, | Performed by: INTERNAL MEDICINE

## 2021-08-05 PROCEDURE — 99999 PR PBB SHADOW E&M-EST. PATIENT-LVL V: ICD-10-PCS | Mod: PBBFAC,,, | Performed by: INTERNAL MEDICINE

## 2021-08-05 PROCEDURE — 99215 OFFICE O/P EST HI 40 MIN: CPT | Mod: PBBFAC,PN | Performed by: INTERNAL MEDICINE

## 2021-08-05 PROCEDURE — 99999 PR PBB SHADOW E&M-EST. PATIENT-LVL V: CPT | Mod: PBBFAC,,, | Performed by: INTERNAL MEDICINE

## 2021-08-05 PROCEDURE — 99214 OFFICE O/P EST MOD 30 MIN: CPT | Mod: S$PBB,,, | Performed by: INTERNAL MEDICINE

## 2021-08-05 RX ORDER — OXYCODONE AND ACETAMINOPHEN 10; 325 MG/1; MG/1
1 TABLET ORAL EVERY 6 HOURS PRN
Qty: 120 TABLET | Refills: 0 | Status: SHIPPED | OUTPATIENT
Start: 2021-08-05 | End: 2021-09-04

## 2021-09-08 ENCOUNTER — OFFICE VISIT (OUTPATIENT)
Dept: GASTROENTEROLOGY | Facility: CLINIC | Age: 71
End: 2021-09-08
Payer: MEDICARE

## 2021-09-08 VITALS
HEIGHT: 73 IN | DIASTOLIC BLOOD PRESSURE: 81 MMHG | OXYGEN SATURATION: 99 % | WEIGHT: 203 LBS | RESPIRATION RATE: 14 BRPM | BODY MASS INDEX: 26.9 KG/M2 | HEART RATE: 93 BPM | SYSTOLIC BLOOD PRESSURE: 102 MMHG

## 2021-09-08 DIAGNOSIS — K21.9 GASTROESOPHAGEAL REFLUX DISEASE: ICD-10-CM

## 2021-09-08 DIAGNOSIS — R52 PAIN: Primary | ICD-10-CM

## 2021-09-08 DIAGNOSIS — K76.0 FATTY LIVER: ICD-10-CM

## 2021-09-08 DIAGNOSIS — K44.9 HIATAL HERNIA: ICD-10-CM

## 2021-09-08 DIAGNOSIS — K57.30 DIVERTICULOSIS OF LARGE INTESTINE WITHOUT HEMORRHAGE: ICD-10-CM

## 2021-09-08 PROCEDURE — 99999 PR PBB SHADOW E&M-EST. PATIENT-LVL V: CPT | Mod: PBBFAC,,, | Performed by: INTERNAL MEDICINE

## 2021-09-08 PROCEDURE — 99999 PR PBB SHADOW E&M-EST. PATIENT-LVL V: ICD-10-PCS | Mod: PBBFAC,,, | Performed by: INTERNAL MEDICINE

## 2021-09-08 PROCEDURE — 99214 PR OFFICE/OUTPT VISIT, EST, LEVL IV, 30-39 MIN: ICD-10-PCS | Mod: S$PBB,,, | Performed by: INTERNAL MEDICINE

## 2021-09-08 PROCEDURE — 99215 OFFICE O/P EST HI 40 MIN: CPT | Mod: PBBFAC,PN | Performed by: INTERNAL MEDICINE

## 2021-09-08 PROCEDURE — 99214 OFFICE O/P EST MOD 30 MIN: CPT | Mod: S$PBB,,, | Performed by: INTERNAL MEDICINE

## 2021-09-08 RX ORDER — OMEPRAZOLE 20 MG/1
20 CAPSULE, DELAYED RELEASE ORAL DAILY
Qty: 90 CAPSULE | Refills: 3 | Status: SHIPPED | OUTPATIENT
Start: 2021-09-08 | End: 2021-11-05 | Stop reason: SDUPTHER

## 2021-09-08 RX ORDER — OXYCODONE AND ACETAMINOPHEN 10; 325 MG/1; MG/1
1 TABLET ORAL EVERY 6 HOURS PRN
Qty: 120 TABLET | Refills: 0 | Status: SHIPPED | OUTPATIENT
Start: 2021-09-08 | End: 2021-10-06 | Stop reason: SDUPTHER

## 2021-10-06 ENCOUNTER — OFFICE VISIT (OUTPATIENT)
Dept: GASTROENTEROLOGY | Facility: CLINIC | Age: 71
End: 2021-10-06
Payer: MEDICARE

## 2021-10-06 VITALS
OXYGEN SATURATION: 98 % | WEIGHT: 203 LBS | HEART RATE: 67 BPM | SYSTOLIC BLOOD PRESSURE: 116 MMHG | BODY MASS INDEX: 26.9 KG/M2 | RESPIRATION RATE: 14 BRPM | HEIGHT: 73 IN | DIASTOLIC BLOOD PRESSURE: 68 MMHG

## 2021-10-06 DIAGNOSIS — K76.0 FATTY LIVER: ICD-10-CM

## 2021-10-06 DIAGNOSIS — M54.50 LOW BACK PAIN, NON-SPECIFIC: ICD-10-CM

## 2021-10-06 DIAGNOSIS — Z98.890 PERSONAL HISTORY OF SPINE SURGERY: ICD-10-CM

## 2021-10-06 DIAGNOSIS — M54.2 NECK PAIN WITH HISTORY OF CERVICAL SPINAL SURGERY: Primary | ICD-10-CM

## 2021-10-06 DIAGNOSIS — K57.30 DIVERTICULOSIS OF LARGE INTESTINE WITHOUT HEMORRHAGE: ICD-10-CM

## 2021-10-06 DIAGNOSIS — Z98.890 NECK PAIN WITH HISTORY OF CERVICAL SPINAL SURGERY: Primary | ICD-10-CM

## 2021-10-06 DIAGNOSIS — K21.9 GASTROESOPHAGEAL REFLUX DISEASE, UNSPECIFIED WHETHER ESOPHAGITIS PRESENT: ICD-10-CM

## 2021-10-06 DIAGNOSIS — R52 PAIN: ICD-10-CM

## 2021-10-06 DIAGNOSIS — K44.9 HIATAL HERNIA: ICD-10-CM

## 2021-10-06 PROCEDURE — 99215 OFFICE O/P EST HI 40 MIN: CPT | Mod: PBBFAC,PN | Performed by: INTERNAL MEDICINE

## 2021-10-06 PROCEDURE — 99999 PR PBB SHADOW E&M-EST. PATIENT-LVL V: CPT | Mod: PBBFAC,,, | Performed by: INTERNAL MEDICINE

## 2021-10-06 PROCEDURE — 99214 OFFICE O/P EST MOD 30 MIN: CPT | Mod: S$PBB,,, | Performed by: INTERNAL MEDICINE

## 2021-10-06 PROCEDURE — 99214 PR OFFICE/OUTPT VISIT, EST, LEVL IV, 30-39 MIN: ICD-10-PCS | Mod: S$PBB,,, | Performed by: INTERNAL MEDICINE

## 2021-10-06 PROCEDURE — 99999 PR PBB SHADOW E&M-EST. PATIENT-LVL V: ICD-10-PCS | Mod: PBBFAC,,, | Performed by: INTERNAL MEDICINE

## 2021-10-06 RX ORDER — OXYCODONE AND ACETAMINOPHEN 10; 325 MG/1; MG/1
1 TABLET ORAL EVERY 6 HOURS PRN
Qty: 120 TABLET | Refills: 0 | Status: SHIPPED | OUTPATIENT
Start: 2021-10-06 | End: 2021-11-05 | Stop reason: SDUPTHER

## 2021-10-06 RX ORDER — CEFTRIAXONE 1 G/1
INJECTION, POWDER, FOR SOLUTION INTRAMUSCULAR; INTRAVENOUS
COMMUNITY
Start: 2021-09-29 | End: 2022-12-28

## 2021-10-06 RX ORDER — NITROFURANTOIN 25; 75 MG/1; MG/1
100 CAPSULE ORAL 2 TIMES DAILY
COMMUNITY
Start: 2021-09-29 | End: 2022-12-28

## 2021-10-06 RX ORDER — PHENAZOPYRIDINE HYDROCHLORIDE 200 MG/1
200 TABLET, FILM COATED ORAL 3 TIMES DAILY
COMMUNITY
Start: 2021-09-29 | End: 2023-08-14

## 2021-10-25 ENCOUNTER — OFFICE VISIT (OUTPATIENT)
Dept: UROLOGY | Facility: CLINIC | Age: 71
End: 2021-10-25
Payer: MEDICARE

## 2021-10-25 VITALS
WEIGHT: 209 LBS | HEIGHT: 73 IN | DIASTOLIC BLOOD PRESSURE: 65 MMHG | SYSTOLIC BLOOD PRESSURE: 116 MMHG | BODY MASS INDEX: 27.7 KG/M2 | HEART RATE: 76 BPM

## 2021-10-25 DIAGNOSIS — R39.9 LOWER URINARY TRACT SYMPTOMS (LUTS): Primary | ICD-10-CM

## 2021-10-25 DIAGNOSIS — N39.41 URGE INCONTINENCE: ICD-10-CM

## 2021-10-25 PROCEDURE — 99999 PR PBB SHADOW E&M-EST. PATIENT-LVL IV: CPT | Mod: PBBFAC,,, | Performed by: UROLOGY

## 2021-10-25 PROCEDURE — 99214 OFFICE O/P EST MOD 30 MIN: CPT | Mod: PBBFAC | Performed by: UROLOGY

## 2021-10-25 PROCEDURE — 99214 OFFICE O/P EST MOD 30 MIN: CPT | Mod: S$PBB,,, | Performed by: UROLOGY

## 2021-10-25 PROCEDURE — 99999 PR PBB SHADOW E&M-EST. PATIENT-LVL IV: ICD-10-PCS | Mod: PBBFAC,,, | Performed by: UROLOGY

## 2021-10-25 PROCEDURE — 99214 PR OFFICE/OUTPT VISIT, EST, LEVL IV, 30-39 MIN: ICD-10-PCS | Mod: S$PBB,,, | Performed by: UROLOGY

## 2021-11-01 ENCOUNTER — TELEPHONE (OUTPATIENT)
Dept: UROLOGY | Facility: CLINIC | Age: 71
End: 2021-11-01
Payer: OTHER GOVERNMENT

## 2021-11-01 DIAGNOSIS — N39.41 URGE INCONTINENCE: ICD-10-CM

## 2021-11-05 ENCOUNTER — OFFICE VISIT (OUTPATIENT)
Dept: GASTROENTEROLOGY | Facility: CLINIC | Age: 71
End: 2021-11-05
Payer: MEDICARE

## 2021-11-05 VITALS
OXYGEN SATURATION: 98 % | HEART RATE: 92 BPM | SYSTOLIC BLOOD PRESSURE: 141 MMHG | WEIGHT: 209 LBS | DIASTOLIC BLOOD PRESSURE: 81 MMHG | HEIGHT: 73 IN | BODY MASS INDEX: 27.7 KG/M2 | RESPIRATION RATE: 14 BRPM

## 2021-11-05 DIAGNOSIS — K44.9 HIATAL HERNIA: ICD-10-CM

## 2021-11-05 DIAGNOSIS — K76.0 FATTY LIVER: ICD-10-CM

## 2021-11-05 DIAGNOSIS — K21.9 GASTROESOPHAGEAL REFLUX DISEASE: ICD-10-CM

## 2021-11-05 DIAGNOSIS — R52 PAIN: ICD-10-CM

## 2021-11-05 DIAGNOSIS — K57.30 DIVERTICULOSIS OF LARGE INTESTINE WITHOUT HEMORRHAGE: Primary | ICD-10-CM

## 2021-11-05 PROCEDURE — 99215 OFFICE O/P EST HI 40 MIN: CPT | Mod: PBBFAC,PN | Performed by: INTERNAL MEDICINE

## 2021-11-05 PROCEDURE — 99999 PR PBB SHADOW E&M-EST. PATIENT-LVL V: ICD-10-PCS | Mod: PBBFAC,,, | Performed by: INTERNAL MEDICINE

## 2021-11-05 PROCEDURE — 99214 PR OFFICE/OUTPT VISIT, EST, LEVL IV, 30-39 MIN: ICD-10-PCS | Mod: S$PBB,,, | Performed by: INTERNAL MEDICINE

## 2021-11-05 PROCEDURE — 99214 OFFICE O/P EST MOD 30 MIN: CPT | Mod: S$PBB,,, | Performed by: INTERNAL MEDICINE

## 2021-11-05 PROCEDURE — 99999 PR PBB SHADOW E&M-EST. PATIENT-LVL V: CPT | Mod: PBBFAC,,, | Performed by: INTERNAL MEDICINE

## 2021-11-05 RX ORDER — OMEPRAZOLE 20 MG/1
20 CAPSULE, DELAYED RELEASE ORAL DAILY
Qty: 90 CAPSULE | Refills: 3 | Status: SHIPPED | OUTPATIENT
Start: 2021-11-05 | End: 2021-12-06 | Stop reason: SDUPTHER

## 2021-11-05 RX ORDER — OXYCODONE AND ACETAMINOPHEN 10; 325 MG/1; MG/1
1 TABLET ORAL EVERY 6 HOURS PRN
Qty: 120 TABLET | Refills: 0 | Status: SHIPPED | OUTPATIENT
Start: 2021-11-05 | End: 2021-12-06 | Stop reason: SDUPTHER

## 2021-11-05 RX ORDER — ASPIRIN 325 MG
TABLET, DELAYED RELEASE (ENTERIC COATED) ORAL
COMMUNITY
Start: 2021-10-29

## 2021-11-09 ENCOUNTER — TELEPHONE (OUTPATIENT)
Dept: UROLOGY | Facility: CLINIC | Age: 71
End: 2021-11-09
Payer: OTHER GOVERNMENT

## 2021-12-06 ENCOUNTER — OFFICE VISIT (OUTPATIENT)
Dept: GASTROENTEROLOGY | Facility: CLINIC | Age: 71
End: 2021-12-06
Payer: MEDICARE

## 2021-12-06 VITALS
BODY MASS INDEX: 27.7 KG/M2 | HEIGHT: 73 IN | WEIGHT: 209 LBS | SYSTOLIC BLOOD PRESSURE: 112 MMHG | DIASTOLIC BLOOD PRESSURE: 67 MMHG | OXYGEN SATURATION: 98 % | RESPIRATION RATE: 14 BRPM | HEART RATE: 81 BPM

## 2021-12-06 DIAGNOSIS — K76.0 FATTY LIVER: ICD-10-CM

## 2021-12-06 DIAGNOSIS — K57.30 DIVERTICULOSIS OF LARGE INTESTINE WITHOUT HEMORRHAGE: Primary | ICD-10-CM

## 2021-12-06 DIAGNOSIS — K21.9 GASTROESOPHAGEAL REFLUX DISEASE: ICD-10-CM

## 2021-12-06 DIAGNOSIS — R52 PAIN: ICD-10-CM

## 2021-12-06 DIAGNOSIS — K21.9 GASTROESOPHAGEAL REFLUX DISEASE, UNSPECIFIED WHETHER ESOPHAGITIS PRESENT: ICD-10-CM

## 2021-12-06 DIAGNOSIS — K44.9 HIATAL HERNIA: ICD-10-CM

## 2021-12-06 PROCEDURE — 99215 OFFICE O/P EST HI 40 MIN: CPT | Mod: PBBFAC,PN | Performed by: INTERNAL MEDICINE

## 2021-12-06 PROCEDURE — 99999 PR PBB SHADOW E&M-EST. PATIENT-LVL V: ICD-10-PCS | Mod: PBBFAC,,, | Performed by: INTERNAL MEDICINE

## 2021-12-06 PROCEDURE — 99999 PR PBB SHADOW E&M-EST. PATIENT-LVL V: CPT | Mod: PBBFAC,,, | Performed by: INTERNAL MEDICINE

## 2021-12-06 PROCEDURE — 99214 OFFICE O/P EST MOD 30 MIN: CPT | Mod: S$PBB,,, | Performed by: INTERNAL MEDICINE

## 2021-12-06 PROCEDURE — 99214 PR OFFICE/OUTPT VISIT, EST, LEVL IV, 30-39 MIN: ICD-10-PCS | Mod: S$PBB,,, | Performed by: INTERNAL MEDICINE

## 2021-12-06 RX ORDER — OMEPRAZOLE 20 MG/1
20 CAPSULE, DELAYED RELEASE ORAL DAILY
Qty: 90 CAPSULE | Refills: 3 | Status: SHIPPED | OUTPATIENT
Start: 2021-12-06 | End: 2022-02-04 | Stop reason: SDUPTHER

## 2021-12-06 RX ORDER — OXYCODONE AND ACETAMINOPHEN 10; 325 MG/1; MG/1
1 TABLET ORAL EVERY 6 HOURS PRN
Qty: 120 TABLET | Refills: 0 | Status: SHIPPED | OUTPATIENT
Start: 2021-12-06 | End: 2022-01-06 | Stop reason: SDUPTHER

## 2022-01-06 ENCOUNTER — OFFICE VISIT (OUTPATIENT)
Dept: GASTROENTEROLOGY | Facility: CLINIC | Age: 72
End: 2022-01-06
Payer: MEDICARE

## 2022-01-06 VITALS
HEIGHT: 73 IN | DIASTOLIC BLOOD PRESSURE: 80 MMHG | OXYGEN SATURATION: 98 % | HEART RATE: 88 BPM | SYSTOLIC BLOOD PRESSURE: 110 MMHG | WEIGHT: 209 LBS | RESPIRATION RATE: 13 BRPM | BODY MASS INDEX: 27.7 KG/M2

## 2022-01-06 DIAGNOSIS — K21.9 GASTROESOPHAGEAL REFLUX DISEASE, UNSPECIFIED WHETHER ESOPHAGITIS PRESENT: ICD-10-CM

## 2022-01-06 DIAGNOSIS — R52 PAIN: ICD-10-CM

## 2022-01-06 DIAGNOSIS — M54.50 LOW BACK PAIN, NON-SPECIFIC: ICD-10-CM

## 2022-01-06 DIAGNOSIS — K57.30 DIVERTICULOSIS OF LARGE INTESTINE WITHOUT HEMORRHAGE: ICD-10-CM

## 2022-01-06 DIAGNOSIS — M54.2 NECK PAIN WITH HISTORY OF CERVICAL SPINAL SURGERY: ICD-10-CM

## 2022-01-06 DIAGNOSIS — Z98.890 NECK PAIN WITH HISTORY OF CERVICAL SPINAL SURGERY: ICD-10-CM

## 2022-01-06 DIAGNOSIS — K76.0 FATTY LIVER: Primary | ICD-10-CM

## 2022-01-06 DIAGNOSIS — K44.9 HIATAL HERNIA: ICD-10-CM

## 2022-01-06 PROCEDURE — 99213 OFFICE O/P EST LOW 20 MIN: CPT | Mod: PBBFAC,PN | Performed by: INTERNAL MEDICINE

## 2022-01-06 PROCEDURE — 99214 OFFICE O/P EST MOD 30 MIN: CPT | Mod: S$GLB,,, | Performed by: INTERNAL MEDICINE

## 2022-01-06 PROCEDURE — 99999 PR PBB SHADOW E&M-EST. PATIENT-LVL III: CPT | Mod: PBBFAC,,, | Performed by: INTERNAL MEDICINE

## 2022-01-06 PROCEDURE — 99214 PR OFFICE/OUTPT VISIT, EST, LEVL IV, 30-39 MIN: ICD-10-PCS | Mod: S$GLB,,, | Performed by: INTERNAL MEDICINE

## 2022-01-06 PROCEDURE — 99999 PR PBB SHADOW E&M-EST. PATIENT-LVL III: ICD-10-PCS | Mod: PBBFAC,,, | Performed by: INTERNAL MEDICINE

## 2022-01-06 RX ORDER — LANOLIN ALCOHOL/MO/W.PET/CERES
CREAM (GRAM) TOPICAL
COMMUNITY
Start: 2021-10-29 | End: 2022-10-30

## 2022-01-06 RX ORDER — OXYCODONE AND ACETAMINOPHEN 10; 325 MG/1; MG/1
1 TABLET ORAL EVERY 6 HOURS PRN
Qty: 120 TABLET | Refills: 0 | Status: SHIPPED | OUTPATIENT
Start: 2022-01-06 | End: 2022-02-04 | Stop reason: SDUPTHER

## 2022-01-06 RX ORDER — TROSPIUM CHLORIDE 20 MG/1
20 TABLET, FILM COATED ORAL 2 TIMES DAILY
COMMUNITY
Start: 2021-11-18 | End: 2022-12-28

## 2022-01-06 NOTE — PATIENT INSTRUCTIONS
He will continue his diabetic diet reflux regimen current medications vitamins and minerals.  He follows up with his surgeon.  [facility]  [facility] have been requested.  The Percocet has controlled the majority of the symptoms.  He avoids anti-inflammatory agents.

## 2022-01-06 NOTE — PROGRESS NOTES
Subjective:       Patient ID: Garcia Renteria is a 71 y.o. male.    Chief Complaint: No chief complaint on file.    He has a history of hiatal hernia and gastroesophageal reflux.  The PPI has resolved his symptoms.  He is on the diabetic diet.  [facility].  He tries to stay on the diabetic diet.  He states his diabetes is well controlled.  He made a food diary and he avoids the offending foods particularly the fried and the fatty foods.  He has a history of diverticulosis.  He is having daily bowel movements with his bowel program.  He denies dysphagia hematemesis hematochezia jaundice or bleeding.  He was treated for chronic hepatitis-C and is in SVR.  [facility].      Allergies:  Review of patient's allergies indicates:  No Known Allergies    Medications:    Current Outpatient Medications:     amoxicillin-clavulanate 875-125mg (AUGMENTIN) 875-125 mg per tablet, Take 1 tablet by mouth 2 (two) times daily., Disp: , Rfl:     azithromycin (Z-NISHANT) 250 MG tablet, Take 250 mg by mouth once daily., Disp: , Rfl:     bacitracin 500 unit/gram Oint, Apply topically 2 (two) times daily., Disp: , Rfl: 0    cefTRIAXone (ROCEPHIN) 1 gram injection, SMARTSI Gram(s) IM Every Night, Disp: , Rfl:     cholecalciferol, vitamin D3, 1,250 mcg (50,000 unit) capsule, Take by mouth., Disp: , Rfl:     cyanocobalamin (VITAMIN B-12) 1000 MCG tablet, , Disp: , Rfl:     cyanocobalamin (VITAMIN B-12) 1000 MCG tablet, TAKE ONE TABLET BY MOUTH DAILY AS A VITAMIN B12 SUPPLEMENT, Disp: , Rfl:     diclofenac sodium (VOLTAREN) 1 % Gel, Apply 2 g topically 2 (two) times daily., Disp: , Rfl:     gabapentin (NEURONTIN) 100 MG capsule, , Disp: , Rfl:     ibuprofen (ADVIL,MOTRIN) 800 MG tablet, , Disp: , Rfl:     indomethacin (INDOCIN) 25 MG capsule, , Disp: , Rfl:     lidocaine (LIDODERM) 5 %, Place 1 patch onto the skin once daily. Remove & Discard patch within 12 hours or as  directed by MD, Disp: 15 patch, Rfl: 0    LIDOcaine-benzalkonium-alcohol 2.5 % Soln, USE 1 PAD EXTERNALLY EVERY THIRD DAY (EVERY 72 HOURS), Disp: , Rfl:     lisinopril (PRINIVIL,ZESTRIL) 5 MG tablet, Take 5 mg by mouth once daily. , Disp: , Rfl:     metFORMIN (GLUCOPHAGE) 1000 MG tablet, Take 500 mg by mouth daily with breakfast. , Disp: , Rfl:     methocarbamoL (ROBAXIN) 750 MG Tab, Take 1 tablet (750 mg total) by mouth 3 (three) times daily., Disp: 40 tablet, Rfl: 0    mirabegron (MYRBETRIQ) 25 mg Tb24 ER tablet, Take 1 tablet (25 mg total) by mouth once daily., Disp: 30 tablet, Rfl: 11    nitrofurantoin, macrocrystal-monohydrate, (MACROBID) 100 MG capsule, Take 100 mg by mouth 2 (two) times daily., Disp: , Rfl:     omega 3-dha-epa-fish oil (FISH OIL) 100-160-1,000 mg Cap, TAKE 1 CAPSULE BY MOUTH DAILY AS A SUPPLEMENT, Disp: , Rfl:     omega-3s/dha/epa/fish oil/D3 (VITAMIN-D + OMEGA-3 ORAL), TAKE ONE CAPSULE BY MOUTH EVERY WEEK FOR VITAMIN D3 REPLACEMENT, Disp: , Rfl:     omeprazole (PRILOSEC) 20 MG capsule, Take 1 capsule (20 mg total) by mouth once daily., Disp: 90 capsule, Rfl: 3    oxybutynin (DITROPAN XL) 15 MG TR24, Take 1 tablet (15 mg total) by mouth once daily., Disp: 30 tablet, Rfl: 11    phenazopyridine (PYRIDIUM) 200 MG tablet, Take 200 mg by mouth 3 (three) times daily., Disp: , Rfl:     sildenafil (VIAGRA) 100 MG tablet, , Disp: , Rfl:     STOOL SOFTENER, DOCUSATE MADELAINE, 240 mg capsule, , Disp: , Rfl:     tamsulosin (FLOMAX) 0.4 mg Cap, , Disp: , Rfl:     triamterene-hydrochlorothiazide 75-50 mg (MAXZIDE) 75-50 mg per tablet, Take 0.5 tablets by mouth once daily. , Disp: , Rfl:     vitamin D (VITAMIN D3) 1000 units Tab, Take 1,000 Units by mouth once daily., Disp: , Rfl:     famotidine (PEPCID) 40 MG tablet, Take 1 tablet (40 mg total) by mouth every evening., Disp: 90 tablet, Rfl: 3    oxyCODONE-acetaminophen (PERCOCET)  mg per tablet, Take 1 tablet by mouth every 6 (six) hours  as needed for Pain., Disp: 120 tablet, Rfl: 0    pregabalin (LYRICA) 150 MG capsule, Take 1 capsule (150 mg total) by mouth 3 (three) times daily., Disp: 90 capsule, Rfl: 6    trospium (SANCTURA) 20 mg Tab tablet, Take 20 mg by mouth 2 (two) times daily., Disp: , Rfl:     Past Medical History:   Diagnosis Date    Colitis     Diabetes mellitus, type 2     Diverticulosis     GERD (gastroesophageal reflux disease)     Hypertension        Past Surgical History:   Procedure Laterality Date    ANKLE SURGERY Bilateral     ANTERIOR CERVICAL DISCECTOMY W/ FUSION N/A 10/30/2019    Procedure: C3-4, C4-5 ACDF;  Surgeon: Francis Alan MD;  Location: [address];  Service: Neurosurgery;  Laterality: N/A;  C3-4, C4-5 ACDF  Anesthesia:  General  Rad:  C-Arm  NM:  EMG, SEP, & MEP  Blood:  Type & Screen  Position:  Supine  Bed:  Regular slider bed  Headrest:  [place] & GW tongs/hanging weights  Misc:  Small CPO [place]  Nerve root retractor (Platypi)  Disposable #2 Kerrison  Interbody    BACK SURGERY      COLONOSCOPY      COLONOSCOPY N/A 10/7/2020    Procedure: COLONOSCOPY;  Surgeon: Elpidio Lau MD;  [facility] ENDO;  Service: Endoscopy;  Laterality: N/A;    CREATION OF MUSCLE ROTATIONAL FLAP  3/3/2020    Procedure: CREATION, FLAP, MUSCLE ROTATION;  Surgeon: Francis Alan MD;  Location: [address];  Service: Neurosurgery;;    CYSTOSCOPY N/A 7/21/2021    Procedure: CYSTOSCOPY;  Surgeon: Colton Kamara Jr., MD;  [facility];  Service: Urology;  Laterality: N/A;    INJECTION OF ANESTHETIC AGENT AROUND NERVE Bilateral 3/10/2020    Procedure: Block, Nerve;  Surgeon: Erinn Surgeon;  Location: [facility] ERINN;  Service: Anesthesiology;  Laterality: Bilateral;    lower back surgery      LUMBAR LAMINECTOMY WITH FUSION N/A 3/3/2020    Procedure: T10-Pelvis Fusion with L4 PSO **AIRO** Co-Surgeon: Roberto Parkinson;  Surgeon: Francis Alan MD;  Location: [address];  Service: Neurosurgery;  Laterality: N/A;   **AIRO**  **CELL SAVER**  Co-Surgeon: Roberto Parkinson  NM: EMG, SEP, MEP  Position: Prone  Bed: [place] 4 post, prone pillow  Blood: Type & Hold 2 units  Rad: C-Arm, AIRO  Vendor: Depuy  Misc: Vivigen, Infuse, Cement (Depuy)  [place]    NECK SURGERY      SPINE SURGERY      TRANSRECTAL ULTRASOUND EXAMINATION N/A 7/21/2021    Procedure: ULTRASOUND, RECTAL APPROACH;  Surgeon: Colton Kamara Jr., MD;  Location: [facility] OR;  Service: Urology;  Laterality: N/A;    UPPER GASTROINTESTINAL ENDOSCOPY           Review of Systems   Constitutional: Negative for appetite change, fever and unexpected weight change.   HENT: Negative for trouble swallowing.         No jaundice.   Respiratory: Negative for cough, shortness of breath and wheezing.         He is a former cigarette smoker.  Currently he does not use tobacco alcohol.  He denies use dysphagia aspiration hemoptysis chronic cough chronic sputum production or dyspnea on exertion.   Cardiovascular: Negative for chest pain.        He denies exertional chest pain.  He denies rhythm disturbance.  He states his hypertension and hyperlipidemia are well controlled.   Gastrointestinal: Negative for abdominal distention, abdominal pain, anal bleeding, blood in stool, constipation, diarrhea and nausea.   Musculoskeletal: Positive for arthralgias, back pain, gait problem and myalgias.        He has had extensive cervical lumbar spine surgeries.  [facility].  He has been followed by his surgeon in addition.  He ambulates cane.  Percocet has controlled the majority of his pain.  The anti-inflammatory agents cause severe gastrointestinal upset.   Skin: Negative for pallor and rash.   Neurological: Negative for dizziness, seizures, syncope, speech difficulty, weakness and numbness.   Hematological: Negative for adenopathy.   Psychiatric/Behavioral: Negative for confusion.       Objective:      Physical Exam  Vitals reviewed.    Constitutional:       Appearance: He is well-developed and well-nourished.      Comments: Well-nourished well-hydrated afebrile nonicteric  male.  He is sitting comfortably in the chair.  He is breathing normally.  He is normocephalic.  Pupils are normal.  He appears to be oriented x3 and can relate his history and answer questions appropriately.   HENT:      Head: Normocephalic.   Eyes:      Extraocular Movements: EOM normal.      Pupils: Pupils are equal, round, and reactive to light.   Neck:      Thyroid: No thyromegaly.      Trachea: No tracheal deviation.   Cardiovascular:      Rate and Rhythm: Normal rate and regular rhythm.      Heart sounds: Normal heart sounds.   Pulmonary:      Effort: Pulmonary effort is normal.      Breath sounds: Normal breath sounds.   Abdominal:      General: Bowel sounds are normal. There is no distension.      Palpations: Abdomen is soft. There is no mass.      Tenderness: There is no abdominal tenderness. There is no right CVA tenderness, left CVA tenderness, guarding or rebound.      Hernia: No hernia is present.      Comments: The abdomen is soft without tenderness masses organomegaly.  Bowel sounds are normal.   Musculoskeletal:         General: Normal range of motion.      Cervical back: Normal range of motion and neck supple.      Comments: He ambulates slowly with his cane.  He can go from the sitting to the standing position.   Lymphadenopathy:      Cervical: No cervical adenopathy.   Skin:     General: Skin is warm and dry.   Neurological:      Mental Status: He is alert and oriented to person, place, and time.      Cranial Nerves: No cranial nerve deficit.   Psychiatric:         Mood and Affect: Mood and affect normal.         Behavior: Behavior normal.           Plan:       Fatty liver    Hiatal hernia    Gastroesophageal reflux disease, unspecified whether esophagitis present    Diverticulosis of large intestine without hemorrhage    Neck pain with  history of cervical spinal surgery    Low back pain, non-specific    Pain  -     oxyCODONE-acetaminophen (PERCOCET)  mg per tablet; Take 1 tablet by mouth every 6 (six) hours as needed for Pain.  Dispense: 120 tablet; Refill: 0    He will continue his reflux regimen diabetic diet current medications vitamins and minerals.  He will continue his bowel program with additional fiber and/or stool softeners to produced daily bowel movements.  [facility].  The records have been requested.  At this point he is doing so well he does not want further GI evaluation.

## 2022-02-04 ENCOUNTER — OFFICE VISIT (OUTPATIENT)
Dept: GASTROENTEROLOGY | Facility: CLINIC | Age: 72
End: 2022-02-04
Payer: MEDICARE

## 2022-02-04 VITALS
OXYGEN SATURATION: 97 % | HEART RATE: 89 BPM | DIASTOLIC BLOOD PRESSURE: 86 MMHG | SYSTOLIC BLOOD PRESSURE: 131 MMHG | WEIGHT: 214 LBS | HEIGHT: 73 IN | BODY MASS INDEX: 28.36 KG/M2

## 2022-02-04 DIAGNOSIS — R52 PAIN: ICD-10-CM

## 2022-02-04 DIAGNOSIS — K21.9 GASTROESOPHAGEAL REFLUX DISEASE: ICD-10-CM

## 2022-02-04 DIAGNOSIS — K44.9 HIATAL HERNIA: ICD-10-CM

## 2022-02-04 DIAGNOSIS — K57.30 DIVERTICULOSIS OF LARGE INTESTINE WITHOUT HEMORRHAGE: Primary | ICD-10-CM

## 2022-02-04 DIAGNOSIS — K76.0 FATTY LIVER: ICD-10-CM

## 2022-02-04 PROCEDURE — 99214 OFFICE O/P EST MOD 30 MIN: CPT | Mod: S$GLB,,, | Performed by: INTERNAL MEDICINE

## 2022-02-04 PROCEDURE — 99999 PR PBB SHADOW E&M-EST. PATIENT-LVL V: ICD-10-PCS | Mod: PBBFAC,,, | Performed by: INTERNAL MEDICINE

## 2022-02-04 PROCEDURE — 99215 OFFICE O/P EST HI 40 MIN: CPT | Mod: PBBFAC,PN | Performed by: INTERNAL MEDICINE

## 2022-02-04 PROCEDURE — 99214 PR OFFICE/OUTPT VISIT, EST, LEVL IV, 30-39 MIN: ICD-10-PCS | Mod: S$GLB,,, | Performed by: INTERNAL MEDICINE

## 2022-02-04 PROCEDURE — 99999 PR PBB SHADOW E&M-EST. PATIENT-LVL V: CPT | Mod: PBBFAC,,, | Performed by: INTERNAL MEDICINE

## 2022-02-04 RX ORDER — SILDENAFIL 100 MG/1
TABLET, FILM COATED ORAL
COMMUNITY
Start: 2022-01-18 | End: 2024-02-27

## 2022-02-04 RX ORDER — OMEPRAZOLE 20 MG/1
20 CAPSULE, DELAYED RELEASE ORAL DAILY
Qty: 90 CAPSULE | Refills: 3 | Status: SHIPPED | OUTPATIENT
Start: 2022-02-04 | End: 2022-03-04 | Stop reason: SDUPTHER

## 2022-02-04 RX ORDER — GLUCOSAM/CHONDRO/HERB 149/HYAL 750-100 MG
TABLET ORAL
COMMUNITY
Start: 2021-10-29

## 2022-02-04 RX ORDER — OXYCODONE AND ACETAMINOPHEN 10; 325 MG/1; MG/1
1 TABLET ORAL EVERY 6 HOURS PRN
Qty: 120 TABLET | Refills: 0 | Status: SHIPPED | OUTPATIENT
Start: 2022-02-04 | End: 2022-03-04 | Stop reason: SDUPTHER

## 2022-02-04 NOTE — PROGRESS NOTES
Subjective:       Patient ID: Garcia Renteria is a 72 y.o. male.    Chief Complaint: Follow-up (Fatty liver)    He has a history gastroesophageal reflux.  He is on the reflux regimen the PPI.  He made a food diary and he is avoiding the offending foods.  [facility].  He is on the diabetic diet.  He has had more fiber to the diet.  He has had a colonoscopy with the findings of hemorrhoids and diverticulosis.  He is having daily bowel movements.  He denies dysphagia aspiration hematemesis hematochezia jaundice or bleeding.  The omeprazole has resolved the pyrosis and dyspepsia.  He denies dysphagia.      Allergies:  Review of patient's allergies indicates:  No Known Allergies    Medications:    Current Outpatient Medications:     cholecalciferol, vitamin D3, 1,250 mcg (50,000 unit) capsule, Take by mouth., Disp: , Rfl:     cyanocobalamin (VITAMIN B-12) 1000 MCG tablet, TAKE ONE TABLET BY MOUTH DAILY AS A VITAMIN B12 SUPPLEMENT, Disp: , Rfl:     diclofenac sodium (VOLTAREN) 1 % Gel, Apply 2 g topically 2 (two) times daily., Disp: , Rfl:     gabapentin (NEURONTIN) 100 MG capsule, , Disp: , Rfl:     ibuprofen (ADVIL,MOTRIN) 800 MG tablet, , Disp: , Rfl:     indomethacin (INDOCIN) 25 MG capsule, , Disp: , Rfl:     lidocaine (LIDODERM) 5 %, Place 1 patch onto the skin once daily. Remove & Discard patch within 12 hours or as directed by MD, Disp: 15 patch, Rfl: 0    LIDOcaine-benzalkonium-alcohol 2.5 % Soln, USE 1 PAD EXTERNALLY EVERY THIRD DAY (EVERY 72 HOURS), Disp: , Rfl:     lisinopril (PRINIVIL,ZESTRIL) 5 MG tablet, Take 5 mg by mouth once daily. , Disp: , Rfl:     metFORMIN (GLUCOPHAGE) 1000 MG tablet, Take 500 mg by mouth daily with breakfast. , Disp: , Rfl:     mirabegron (MYRBETRIQ) 25 mg Tb24 ER tablet, Take 1 tablet (25 mg total) by mouth once daily., Disp: 30 tablet, Rfl: 11    omega 3-dha-epa-fish oil 1,000 mg (120 mg-180 mg) Cap, TAKE ONE CAPSULE BY  MOUTH DAILY AS A SUPPLEMENT, Disp: , Rfl:     omeprazole (PRILOSEC) 20 MG capsule, Take 1 capsule (20 mg total) by mouth once daily., Disp: 90 capsule, Rfl: 3    oxyCODONE-acetaminophen (PERCOCET)  mg per tablet, Take 1 tablet by mouth every 6 (six) hours as needed for Pain., Disp: 120 tablet, Rfl: 0    sildenafiL (VIAGRA) 100 MG tablet, TAKE ONE-HALF TABLET BY MOUTH DAILY FOR ERECTILE DYSFUNCTION *TAKE ONE HOUR BEFORE SEXUAL ACTIVITY* NO MORE THAN ONE DOSE IN 24 HOURS ** MUST NOT TAKE NITROGLYCERIN TYPE DRUGS WITH VIAGRA **+++THIS IS, Disp: , Rfl:     STOOL SOFTENER, DOCUSATE MADELAINE, 240 mg capsule, , Disp: , Rfl:     triamterene-hydrochlorothiazide 75-50 mg (MAXZIDE) 75-50 mg per tablet, Take 0.5 tablets by mouth once daily. , Disp: , Rfl:     vitamin D (VITAMIN D3) 1000 units Tab, Take 1,000 Units by mouth once daily., Disp: , Rfl:     amoxicillin-clavulanate 875-125mg (AUGMENTIN) 875-125 mg per tablet, Take 1 tablet by mouth 2 (two) times daily., Disp: , Rfl:     azithromycin (Z-NISHANT) 250 MG tablet, Take 250 mg by mouth once daily., Disp: , Rfl:     bacitracin 500 unit/gram Oint, Apply topically 2 (two) times daily. (Patient not taking: Reported on 2022), Disp: , Rfl: 0    cefTRIAXone (ROCEPHIN) 1 gram injection, SMARTSI Gram(s) IM Every Night, Disp: , Rfl:     cyanocobalamin (VITAMIN B-12) 1000 MCG tablet, , Disp: , Rfl:     famotidine (PEPCID) 40 MG tablet, Take 1 tablet (40 mg total) by mouth every evening., Disp: 90 tablet, Rfl: 3    methocarbamoL (ROBAXIN) 750 MG Tab, Take 1 tablet (750 mg total) by mouth 3 (three) times daily. (Patient not taking: Reported on 2022), Disp: 40 tablet, Rfl: 0    nitrofurantoin, macrocrystal-monohydrate, (MACROBID) 100 MG capsule, Take 100 mg by mouth 2 (two) times daily., Disp: , Rfl:     omega-3s/dha/epa/fish oil/D3 (VITAMIN-D + OMEGA-3 ORAL), TAKE ONE CAPSULE BY MOUTH EVERY WEEK FOR VITAMIN D3 REPLACEMENT, Disp: , Rfl:     oxybutynin (DITROPAN  XL) 15 MG TR24, Take 1 tablet (15 mg total) by mouth once daily. (Patient not taking: Reported on 2/4/2022), Disp: 30 tablet, Rfl: 11    phenazopyridine (PYRIDIUM) 200 MG tablet, Take 200 mg by mouth 3 (three) times daily., Disp: , Rfl:     pregabalin (LYRICA) 150 MG capsule, Take 1 capsule (150 mg total) by mouth 3 (three) times daily., Disp: 90 capsule, Rfl: 6    tamsulosin (FLOMAX) 0.4 mg Cap, , Disp: , Rfl:     trospium (SANCTURA) 20 mg Tab tablet, Take 20 mg by mouth 2 (two) times daily., Disp: , Rfl:     Past Medical History:   Diagnosis Date    Colitis     Diabetes mellitus, type 2     Diverticulosis     GERD (gastroesophageal reflux disease)     Hypertension        Past Surgical History:   Procedure Laterality Date    ANKLE SURGERY Bilateral     ANTERIOR CERVICAL DISCECTOMY W/ FUSION N/A 10/30/2019    Procedure: C3-4, C4-5 ACDF;  Surgeon: Francis Alan MD;  Location: [address];  Service: Neurosurgery;  Laterality: N/A;  C3-4, C4-5 ACDF  Anesthesia:  General  Rad:  C-Arm  NM:  EMG, SEP, & MEP  Blood:  Type & Screen  Position:  Supine  Bed:  Regular slider bed  Headrest:  [place] & [employer] tongs/hanging weights  Misc:  Small vivigen  Nerve root retractor (DePuy)  Disposable #2 [employer]  Interbody    BACK SURGERY      COLONOSCOPY      COLONOSCOPY N/A 10/7/2020    Procedure: COLONOSCOPY;  Surgeon: Elpidio Lau MD;  [facility];  Service: Endoscopy;  Laterality: N/A;    CREATION OF MUSCLE ROTATIONAL FLAP  3/3/2020    Procedure: CREATION, FLAP, MUSCLE ROTATION;  Surgeon: Francis Alan MD;  [address];  Service: Neurosurgery;;    CYSTOSCOPY N/A 7/21/2021    Procedure: CYSTOSCOPY;  Surgeon: Colton Kamara Jr., MD;  Location: [facility] OR;  Service: Urology;  Laterality: N/A;    INJECTION OF ANESTHETIC AGENT AROUND NERVE Bilateral 3/10/2020    Procedure: Block, Nerve;  Surgeon: Erinn Surgeon;  Location: [facility] ERINN;  Service: Anesthesiology;  Laterality: Bilateral;    lower back  surgery      LUMBAR LAMINECTOMY WITH FUSION N/A 3/3/2020    Procedure: T10-Pelvis Fusion with L4 PSO **AIRO** Co-Surgeon: Roberto Parkinson;  Surgeon: Francis Alan MD;  Location: [address];  Service: Neurosurgery;  Laterality: N/A;  **AIRO**  **CELL SAVER**  Co-Surgeon: Roberto Parkinson  NM: EMG, SEP, MEP  Position: Prone  Bed: [place] 4 post, prone pillow  Blood: Type & Hold 2 units  Rad: C-Arm, AIRO  Vendor: Depuy  Misc: Vivigen, Infuse, Cement (Depuy)  [place]    NECK SURGERY      SPINE SURGERY      TRANSRECTAL ULTRASOUND EXAMINATION N/A 7/21/2021    Procedure: ULTRASOUND, RECTAL APPROACH;  Surgeon: Colton Kamara Jr., MD;  Location: [facility];  Service: Urology;  Laterality: N/A;    UPPER GASTROINTESTINAL ENDOSCOPY           Review of Systems   Constitutional: Negative for appetite change, fever and unexpected weight change.   HENT: Negative for trouble swallowing.         No jaundice.   Respiratory: Negative for cough, shortness of breath and wheezing.         He is a former smoker.  Currently he does not use tobacco alcohol.  He denies dysphagia aspiration hemoptysis chronic cough chronic sputum production or dyspnea on exertion.   Cardiovascular: Negative for chest pain.        He denies exertional chest pain or rhythm disturbance.  He states his hypertension hyperlipidemia well controlled.  His [facility] records have been requested and is difficult to obtain the.  He states his labs have been normal in his testing at the [facility] has been normal.   Gastrointestinal: Negative for abdominal distention, abdominal pain, anal bleeding, blood in stool, constipation, diarrhea, nausea and rectal pain.        He has been treated for chronic hepatitis-C and is in SVR.  The [facility] records were requested.  He states all of his liver tests are normal.   Endocrine:        He states his diabetes is well controlled.  He takes his medications as prescribed.  He tries to stay on the diabetic diet and he made a food diary and he is avoiding the  offending foods particularly the fried in the fatty foods.  He is on his vitamins and minerals.  [facility].   Musculoskeletal: Positive for arthralgias, back pain and gait problem. Negative for neck pain.        He has a chronic pain syndrome.  He has chronic neck and back pain.  He has had multiple surgeries of his cervical spine and lumbar spine.  He has improved.  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  He is followed by he the [facility] and also by his surgeon.  He ambulates with a cane.  He believes the last surgery is helped him.  The Percocet has controlled the majority of the pain.  He is intolerant to the anti-inflammatory agents.MS- reviewed.  He ambulates with the pain.  He states the [facility] has scheduled an [facility] in March.   Skin: Negative for pallor and rash.   Neurological: Negative for dizziness, seizures, syncope, speech difficulty, weakness and numbness.   Hematological: Negative for adenopathy.   Psychiatric/Behavioral: Negative for confusion.       Objective:      Physical Exam  Vitals reviewed.   Constitutional:       Appearance: He is well-developed and well-nourished.      Comments: Well-nourished well-hydrated afebrile nonicteric  male.  He is sitting comfortably in the chair.  He is breathing normally.  Is normocephalic.  Pupils normal.  He is oriented x3 and can relate his history and answer questions appropriately.   HENT:      Head: Normocephalic.   Eyes:      Extraocular Movements: EOM normal.      Pupils: Pupils are equal, round, and reactive to light.   Neck:      Thyroid: No thyromegaly.      Trachea: No tracheal deviation.   Cardiovascular:      Rate and Rhythm: Normal rate and regular rhythm.      Heart sounds: Normal heart sounds.   Pulmonary:      Effort: Pulmonary effort is normal.      Breath sounds: Normal breath sounds.   Abdominal:      General: Bowel sounds are normal. There is no distension.      Palpations: Abdomen  is soft. There is no mass.      Tenderness: There is no abdominal tenderness. There is no right CVA tenderness, left CVA tenderness, guarding or rebound.      Hernia: No hernia is present.   Musculoskeletal:         General: Normal range of motion.      Cervical back: Normal range of motion and neck supple.      Comments: He ambulates slowly with a cane.  He can go from the sitting to the standing position without difficulty.   Lymphadenopathy:      Cervical: No cervical adenopathy.   Skin:     General: Skin is warm and dry.   Neurological:      Mental Status: He is alert and oriented to person, place, and time.      Cranial Nerves: No cranial nerve deficit.   Psychiatric:         Mood and Affect: Mood and affect normal.         Behavior: Behavior normal.           Plan:       Diverticulosis of large intestine without hemorrhage    Gastroesophageal reflux disease    Pain    Hiatal hernia    Fatty liver    He will continue his reflux regimen current medications vitamins and minerals.  [facility] evaluation of his neck and back pain.  He continues his diabetic diet vitamins and minerals.  He continues his bowel program with additional fiber to produced daily bowel movements.  At this point he does not want further GI evaluation.  The [facility] records were requested.

## 2022-02-04 NOTE — PATIENT INSTRUCTIONS
He will continue his reflux regimen current medications vitamins and minerals and the Percocet for pain.  He is on the omeprazole.  He is followed at the [facility] who is providing physical therapy and also his treatment of his diabetes.  The records were requested.

## 2022-03-04 ENCOUNTER — OFFICE VISIT (OUTPATIENT)
Dept: GASTROENTEROLOGY | Facility: CLINIC | Age: 72
End: 2022-03-04
Payer: MEDICARE

## 2022-03-04 VITALS
DIASTOLIC BLOOD PRESSURE: 78 MMHG | HEART RATE: 77 BPM | RESPIRATION RATE: 14 BRPM | SYSTOLIC BLOOD PRESSURE: 132 MMHG | WEIGHT: 214 LBS | OXYGEN SATURATION: 97 % | HEIGHT: 73 IN | BODY MASS INDEX: 28.36 KG/M2

## 2022-03-04 DIAGNOSIS — Z86.19 HEPATITIS C VIRUS INFECTION RESOLVED AFTER ANTIVIRAL DRUG THERAPY: ICD-10-CM

## 2022-03-04 DIAGNOSIS — K76.0 FATTY LIVER: ICD-10-CM

## 2022-03-04 DIAGNOSIS — K57.30 DIVERTICULOSIS OF LARGE INTESTINE WITHOUT HEMORRHAGE: Primary | ICD-10-CM

## 2022-03-04 DIAGNOSIS — K21.9 GASTROESOPHAGEAL REFLUX DISEASE, UNSPECIFIED WHETHER ESOPHAGITIS PRESENT: ICD-10-CM

## 2022-03-04 DIAGNOSIS — K44.9 HIATAL HERNIA: ICD-10-CM

## 2022-03-04 DIAGNOSIS — R52 PAIN: ICD-10-CM

## 2022-03-04 PROCEDURE — 99999 PR PBB SHADOW E&M-EST. PATIENT-LVL V: ICD-10-PCS | Mod: PBBFAC,,, | Performed by: INTERNAL MEDICINE

## 2022-03-04 PROCEDURE — 99999 PR PBB SHADOW E&M-EST. PATIENT-LVL V: CPT | Mod: PBBFAC,,, | Performed by: INTERNAL MEDICINE

## 2022-03-04 PROCEDURE — 99214 OFFICE O/P EST MOD 30 MIN: CPT | Mod: S$GLB,,, | Performed by: INTERNAL MEDICINE

## 2022-03-04 PROCEDURE — 99215 OFFICE O/P EST HI 40 MIN: CPT | Mod: PBBFAC,PN | Performed by: INTERNAL MEDICINE

## 2022-03-04 PROCEDURE — 99214 PR OFFICE/OUTPT VISIT, EST, LEVL IV, 30-39 MIN: ICD-10-PCS | Mod: S$GLB,,, | Performed by: INTERNAL MEDICINE

## 2022-03-04 RX ORDER — OXYCODONE AND ACETAMINOPHEN 10; 325 MG/1; MG/1
1 TABLET ORAL EVERY 6 HOURS PRN
Qty: 120 TABLET | Refills: 0 | Status: SHIPPED | OUTPATIENT
Start: 2022-03-04 | End: 2022-04-01 | Stop reason: SDUPTHER

## 2022-03-04 RX ORDER — OMEPRAZOLE 20 MG/1
20 CAPSULE, DELAYED RELEASE ORAL DAILY
Qty: 90 CAPSULE | Refills: 3 | Status: SHIPPED | OUTPATIENT
Start: 2022-03-04 | End: 2022-07-06 | Stop reason: SDUPTHER

## 2022-03-04 NOTE — PATIENT INSTRUCTIONS
He will continue his omeprazole reflux regimen.  He continues his diabetic diet and current medications.  He is [facility] and then he is going to take his records and with his surgeon.

## 2022-03-04 NOTE — PROGRESS NOTES
Subjective:       Patient ID: Garcia Renteria is a 72 y.o. male.    Chief Complaint: No chief complaint on file.    He has a history of hiatal hernia and gastroesophageal reflux , diverticulosis.  He has a history of chronic hepatitis C. [facility] and he is an SVR.  The omeprazole has resolved the upper GI symptoms.  He denies significant pyrosis dyspepsia dysphagia hematemesis hematochezia jaundice or bleeding.  He generally has daily bowel movements with his bowel program with additional fiber and MiraLax.      Allergies:  Review of patient's allergies indicates:  No Known Allergies    Medications:    Current Outpatient Medications:     amoxicillin-clavulanate 875-125mg (AUGMENTIN) 875-125 mg per tablet, Take 1 tablet by mouth 2 (two) times daily., Disp: , Rfl:     azithromycin (Z-NISHANT) 250 MG tablet, Take 250 mg by mouth once daily., Disp: , Rfl:     bacitracin 500 unit/gram Oint, Apply topically 2 (two) times daily., Disp: , Rfl: 0    cefTRIAXone (ROCEPHIN) 1 gram injection, SMARTSI Gram(s) IM Every Night, Disp: , Rfl:     cholecalciferol, vitamin D3, 1,250 mcg (50,000 unit) capsule, Take by mouth., Disp: , Rfl:     cyanocobalamin (VITAMIN B-12) 1000 MCG tablet, , Disp: , Rfl:     cyanocobalamin (VITAMIN B-12) 1000 MCG tablet, TAKE ONE TABLET BY MOUTH DAILY AS A VITAMIN B12 SUPPLEMENT, Disp: , Rfl:     diclofenac sodium (VOLTAREN) 1 % Gel, Apply 2 g topically 2 (two) times daily., Disp: , Rfl:     gabapentin (NEURONTIN) 100 MG capsule, , Disp: , Rfl:     ibuprofen (ADVIL,MOTRIN) 800 MG tablet, , Disp: , Rfl:     indomethacin (INDOCIN) 25 MG capsule, , Disp: , Rfl:     lidocaine (LIDODERM) 5 %, Place 1 patch onto the skin once daily. Remove & Discard patch within 12 hours or as directed by MD, Disp: 15 patch, Rfl: 0    LIDOcaine-benzalkonium-alcohol 2.5 % Soln, USE 1 PAD EXTERNALLY EVERY THIRD DAY (EVERY 72 HOURS), Disp: , Rfl:     lisinopril (PRINIVIL,ZESTRIL) 5 MG  tablet, Take 5 mg by mouth once daily. , Disp: , Rfl:     metFORMIN (GLUCOPHAGE) 1000 MG tablet, Take 500 mg by mouth daily with breakfast. , Disp: , Rfl:     methocarbamoL (ROBAXIN) 750 MG Tab, Take 1 tablet (750 mg total) by mouth 3 (three) times daily., Disp: 40 tablet, Rfl: 0    mirabegron (MYRBETRIQ) 25 mg Tb24 ER tablet, Take 1 tablet (25 mg total) by mouth once daily., Disp: 30 tablet, Rfl: 11    nitrofurantoin, macrocrystal-monohydrate, (MACROBID) 100 MG capsule, Take 100 mg by mouth 2 (two) times daily., Disp: , Rfl:     omega 3-dha-epa-fish oil 1,000 mg (120 mg-180 mg) Cap, TAKE ONE CAPSULE BY MOUTH DAILY AS A SUPPLEMENT, Disp: , Rfl:     omega-3s/dha/epa/fish oil/D3 (VITAMIN-D + OMEGA-3 ORAL), TAKE ONE CAPSULE BY MOUTH EVERY WEEK FOR VITAMIN D3 REPLACEMENT, Disp: , Rfl:     omeprazole (PRILOSEC) 20 MG capsule, Take 1 capsule (20 mg total) by mouth once daily., Disp: 90 capsule, Rfl: 3    oxybutynin (DITROPAN XL) 15 MG TR24, Take 1 tablet (15 mg total) by mouth once daily., Disp: 30 tablet, Rfl: 11    phenazopyridine (PYRIDIUM) 200 MG tablet, Take 200 mg by mouth 3 (three) times daily., Disp: , Rfl:     sildenafiL (VIAGRA) 100 MG tablet, TAKE ONE-HALF TABLET BY MOUTH DAILY FOR ERECTILE DYSFUNCTION *TAKE ONE HOUR BEFORE SEXUAL ACTIVITY* NO MORE THAN ONE DOSE IN 24 HOURS ** MUST NOT TAKE NITROGLYCERIN TYPE DRUGS WITH VIAGRA **+++THIS IS, Disp: , Rfl:     STOOL SOFTENER, DOCUSATE MADELAINE, 240 mg capsule, , Disp: , Rfl:     tamsulosin (FLOMAX) 0.4 mg Cap, , Disp: , Rfl:     triamterene-hydrochlorothiazide 75-50 mg (MAXZIDE) 75-50 mg per tablet, Take 0.5 tablets by mouth once daily. , Disp: , Rfl:     trospium (SANCTURA) 20 mg Tab tablet, Take 20 mg by mouth 2 (two) times daily., Disp: , Rfl:     vitamin D (VITAMIN D3) 1000 units Tab, Take 1,000 Units by mouth once daily., Disp: , Rfl:     famotidine (PEPCID) 40 MG tablet, Take 1 tablet (40 mg total) by mouth every evening., Disp: 90 tablet, Rfl:  3    oxyCODONE-acetaminophen (PERCOCET)  mg per tablet, Take 1 tablet by mouth every 6 (six) hours as needed for Pain., Disp: 120 tablet, Rfl: 0    pregabalin (LYRICA) 150 MG capsule, Take 1 capsule (150 mg total) by mouth 3 (three) times daily., Disp: 90 capsule, Rfl: 6    Past Medical History:   Diagnosis Date    Colitis     Diabetes mellitus, type 2     Diverticulosis     GERD (gastroesophageal reflux disease)     Hypertension        Past Surgical History:   Procedure Laterality Date    ANKLE SURGERY Bilateral     ANTERIOR CERVICAL DISCECTOMY W/ FUSION N/A 10/30/2019    Procedure: C3-4, C4-5 ACDF;  Surgeon: Francis Alan MD;  Location: [address];  Service: Neurosurgery;  Laterality: N/A;  C3-4, C4-5 ACDF  Anesthesia:  General  Rad:  C-Arm  NM:  EMG, SEP, & MEP  Blood:  Type & Screen  Position:  Supine  Bed:  Regular slider bed  Headrest:  [place] & GW tongs/hanging weights  Misc:  Small vivigen  Nerve root retractor ([employer])  Disposable #2 [employer]  Interbody    BACK SURGERY      COLONOSCOPY      COLONOSCOPY N/A 10/7/2020    Procedure: COLONOSCOPY;  Surgeon: Elpidio Lau MD;  Location: [facility];  Service: Endoscopy;  Laterality: N/A;    CREATION OF MUSCLE ROTATIONAL FLAP  3/3/2020    Procedure: CREATION, FLAP, MUSCLE ROTATION;  Surgeon: Francis Alan MD;  Location: [address];  Service: Neurosurgery;;    CYSTOSCOPY N/A 7/21/2021    Procedure: CYSTOSCOPY;  Surgeon: Colton Kamara Jr., MD;  Location: [facility] OR;  Service: Urology;  Laterality: N/A;    INJECTION OF ANESTHETIC AGENT AROUND NERVE Bilateral 3/10/2020    Procedure: Block, Nerve;  Surgeon: Erinn Surgeon;  [facility];  Service: Anesthesiology;  Laterality: Bilateral;    lower back surgery      LUMBAR LAMINECTOMY WITH FUSION N/A 3/3/2020    Procedure: T10-Pelvis Fusion with L4 PSO **AIRO** Co-Surgeon: Roberto Parkinson;  Surgeon: Francis Alan MD;  Location: [address];  Service: Neurosurgery;  Laterality: N/A;   **AIRO**  **CELL SAVER**  Co-Surgeon: Roberto Parkinson  NM: EMG, SEP, MEP  Position: Prone  Bed: [place] 4 post, prone pillow  Blood: Type & Hold 2 units  Rad: C-Arm, AIRO  Vendor: [employer]  Misc: Vivigen, Infuse, Cement (Depuy)  [place]    NECK SURGERY      SPINE SURGERY      TRANSRECTAL ULTRASOUND EXAMINATION N/A 7/21/2021    Procedure: ULTRASOUND, RECTAL APPROACH;  Surgeon: Colton Kamara Jr., MD;  Location: [facility] OR;  Service: Urology;  Laterality: N/A;    UPPER GASTROINTESTINAL ENDOSCOPY           Review of Systems   Constitutional: Negative for appetite change, fever and unexpected weight change.   HENT: Negative for trouble swallowing.         No jaundice.   Respiratory: Negative for cough, shortness of breath and wheezing.         He is a former cigarette smoker.  Currently he does not use tobacco or alcohol.  He denies dysphagia aspiration hemoptysis chronic cough chronic sputum production or dyspnea on exertion but is not physically active.   Cardiovascular: Negative for chest pain.        He denies exam chest pain or rhythm disturbance.  He states his hyperlipidemia hypertension are well controlled on the current medications.   Gastrointestinal: Positive for nausea. Negative for abdominal distention, abdominal pain, anal bleeding, blood in stool, constipation, diarrhea and rectal pain.   Endocrine:        [facility].  He states his diabetes is well controlled.  He made a food diary and he avoids the offending foods such as the fried and the fatty foods.  He takes his vitamins and minerals.  He has a fatty liver.  He realizes that he is overweight but is been difficult to reduce his weight by decreasing his caloric intake.   Musculoskeletal: Positive for arthralgias, back pain, gait problem and neck pain.        He has a chronic pain syndrome is had multiple cervical spine and lumbar spine surgeries.  He ambulates with the cane.  He has been evaluated for the [facility] by the [facility] with [facility] and  is followed by his surgeon.  He is intolerant to the anti-inflammatory agents.  He uses the oxycodone correctly. [place] reviewed   Skin: Negative for pallor and rash.   Neurological: Negative for dizziness, seizures, syncope, speech difficulty, weakness and numbness.   Hematological: Negative for adenopathy.   Psychiatric/Behavioral: Negative for confusion.       Objective:      Physical Exam  Vitals reviewed.   Constitutional:       Appearance: He is well-developed.      Comments: Well-nourished rated afebrile nonicteric  male.  He is sitting comfortably in the chair.  He is breathing normally.  He appears to be oriented x3 and can relate his history and answer questions appropriately.  He is normocephalic.  Pupils are normal   HENT:      Head: Normocephalic.   Eyes:      Pupils: Pupils are equal, round, and reactive to light.   Neck:      Thyroid: No thyromegaly.      Trachea: No tracheal deviation.   Cardiovascular:      Rate and Rhythm: Normal rate and regular rhythm.      Heart sounds: Normal heart sounds.   Pulmonary:      Effort: Pulmonary effort is normal.      Breath sounds: Normal breath sounds.   Abdominal:      General: Bowel sounds are normal. There is no distension.      Palpations: Abdomen is soft. There is no mass.      Tenderness: There is no abdominal tenderness. There is no right CVA tenderness, left CVA tenderness, guarding or rebound.      Hernia: No hernia is present.      Comments: The abdomen is soft without tenderness masses organomegaly.  Bowel sounds are normal.   Musculoskeletal:         General: Normal range of motion.      Cervical back: Normal range of motion and neck supple.      Comments: He ambulates slowly with the assistance of a cane.  He slowly can go from the sitting to the standing position.   Lymphadenopathy:      Cervical: No cervical adenopathy.   Skin:     General: Skin is warm and dry.   Neurological:      Mental Status: He is alert and oriented to person,  place, and time.      Cranial Nerves: No cranial nerve deficit.   Psychiatric:         Behavior: Behavior normal.           Plan:       Diverticulosis of large intestine without hemorrhage    Pain  -     oxyCODONE-acetaminophen (PERCOCET)  mg per tablet; Take 1 tablet by mouth every 6 (six) hours as needed for Pain.  Dispense: 120 tablet; Refill: 0    Gastroesophageal reflux disease, unspecified whether esophagitis present  -     omeprazole (PRILOSEC) 20 MG capsule; Take 1 capsule (20 mg total) by mouth once daily.  Dispense: 90 capsule; Refill: 3    Hiatal hernia    Fatty liver    Hepatitis C virus infection resolved after antiviral drug therapy    He has reflux regimen.  He continues his diabetic diet current medications vitamins and minerals.  [facility] with the [facility] and also his surgeon for evaluation of the neck pain.  [facility] were [facility].  He continues his bowel program with additional fiber and/or MiraLax.  At this point he does not want further GI evaluation because is so well.  Last colonoscopy was 4 years ago.  He states he never wants another colonoscopy.

## 2022-03-28 ENCOUNTER — OFFICE VISIT (OUTPATIENT)
Dept: UROLOGY | Facility: CLINIC | Age: 72
End: 2022-03-28
Payer: MEDICARE

## 2022-03-28 VITALS
WEIGHT: 212 LBS | BODY MASS INDEX: 28.1 KG/M2 | DIASTOLIC BLOOD PRESSURE: 74 MMHG | HEIGHT: 73 IN | HEART RATE: 80 BPM | OXYGEN SATURATION: 96 % | SYSTOLIC BLOOD PRESSURE: 111 MMHG

## 2022-03-28 DIAGNOSIS — N39.41 URGE INCONTINENCE: ICD-10-CM

## 2022-03-28 DIAGNOSIS — R39.9 LOWER URINARY TRACT SYMPTOMS (LUTS): Primary | ICD-10-CM

## 2022-03-28 DIAGNOSIS — Z12.5 SCREENING FOR PROSTATE CANCER: ICD-10-CM

## 2022-03-28 PROCEDURE — 99999 PR PBB SHADOW E&M-EST. PATIENT-LVL V: CPT | Mod: PBBFAC,,, | Performed by: UROLOGY

## 2022-03-28 PROCEDURE — 99214 PR OFFICE/OUTPT VISIT, EST, LEVL IV, 30-39 MIN: ICD-10-PCS | Mod: S$GLB,,, | Performed by: UROLOGY

## 2022-03-28 PROCEDURE — 3074F PR MOST RECENT SYSTOLIC BLOOD PRESSURE < 130 MM HG: ICD-10-PCS | Mod: CPTII,S$GLB,, | Performed by: UROLOGY

## 2022-03-28 PROCEDURE — 3078F PR MOST RECENT DIASTOLIC BLOOD PRESSURE < 80 MM HG: ICD-10-PCS | Mod: CPTII,S$GLB,, | Performed by: UROLOGY

## 2022-03-28 PROCEDURE — 1125F PR PAIN SEVERITY QUANTIFIED, PAIN PRESENT: ICD-10-PCS | Mod: CPTII,S$GLB,, | Performed by: UROLOGY

## 2022-03-28 PROCEDURE — 1159F PR MEDICATION LIST DOCUMENTED IN MEDICAL RECORD: ICD-10-PCS | Mod: CPTII,S$GLB,, | Performed by: UROLOGY

## 2022-03-28 PROCEDURE — 99214 OFFICE O/P EST MOD 30 MIN: CPT | Mod: S$GLB,,, | Performed by: UROLOGY

## 2022-03-28 PROCEDURE — 3078F DIAST BP <80 MM HG: CPT | Mod: CPTII,S$GLB,, | Performed by: UROLOGY

## 2022-03-28 PROCEDURE — 1101F PT FALLS ASSESS-DOCD LE1/YR: CPT | Mod: CPTII,S$GLB,, | Performed by: UROLOGY

## 2022-03-28 PROCEDURE — 1159F MED LIST DOCD IN RCRD: CPT | Mod: CPTII,S$GLB,, | Performed by: UROLOGY

## 2022-03-28 PROCEDURE — 3008F BODY MASS INDEX DOCD: CPT | Mod: CPTII,S$GLB,, | Performed by: UROLOGY

## 2022-03-28 PROCEDURE — 3008F PR BODY MASS INDEX (BMI) DOCUMENTED: ICD-10-PCS | Mod: CPTII,S$GLB,, | Performed by: UROLOGY

## 2022-03-28 PROCEDURE — 99999 PR PBB SHADOW E&M-EST. PATIENT-LVL V: ICD-10-PCS | Mod: PBBFAC,,, | Performed by: UROLOGY

## 2022-03-28 PROCEDURE — 1125F AMNT PAIN NOTED PAIN PRSNT: CPT | Mod: CPTII,S$GLB,, | Performed by: UROLOGY

## 2022-03-28 PROCEDURE — 1101F PR PT FALLS ASSESS DOC 0-1 FALLS W/OUT INJ PAST YR: ICD-10-PCS | Mod: CPTII,S$GLB,, | Performed by: UROLOGY

## 2022-03-28 PROCEDURE — 3288F FALL RISK ASSESSMENT DOCD: CPT | Mod: CPTII,S$GLB,, | Performed by: UROLOGY

## 2022-03-28 PROCEDURE — 3288F PR FALLS RISK ASSESSMENT DOCUMENTED: ICD-10-PCS | Mod: CPTII,S$GLB,, | Performed by: UROLOGY

## 2022-03-28 PROCEDURE — 3074F SYST BP LT 130 MM HG: CPT | Mod: CPTII,S$GLB,, | Performed by: UROLOGY

## 2022-03-28 NOTE — PROGRESS NOTES
[facility] Established Patient/H&P:    Garcia Renteria is a 72 y.o. male who presents for follow up for lower urinary tract symptoms and urge incontinence.     Patient with a history of DM, chronic neck and back pain s/p several surgeries and HCV who was referred by his PCP for progressively worsening lower urinary tract symptoms for several years.      He reports incomplete emptying, frequency, intermittency, urgency, weak stream and nocturia x 5. He wears a diaper daily due to his urgency with incontinence. States he has taken Flomax on and off for several years with no improvement in his symptoms.      UTI in 2/2020. States [facility]. Of note, patient also states he drinks a lot of coffee throughout the day. Does not believe this exacerbates his symptoms.         Interval History     10/25/21: CT urogram on 7/16/21 with enlarged prostate, no stones, bilateral simple renal cysts and several pulmonary nodules.  Cystoscopy and transrectal ultrasound on 7/21/21 with a 43 cc prostate with coaptation of bilateral lobes. Otherwise unremarkable. He was started on Myrbetriq 50 mg following his cystoscopy and instructed to continue Flomax. Could not afford Myrbetriq so exchanged to Ditropan 15 mg. States it has improved his urgency and nocturia. Still using Depends. [facility] with [facility] after [facility]oscopy which was treated with Abx. No record available.      3/28/22: No longer on Ditropan as he states the [facility] did not have the medication. They switched him to Trospium 20 mg. States his urgency and nocturia have improved. He still does have some obstructive symptoms with weak stream and straining, but is not significantly bothered and not interested in surgery at this time.      Of note, he is on Viagra 100 mg as needed for erectile dysfunction.      Denies any gross hematuria, fever, chills, bone pain, unintentional weight loss,  trauma or history of   malignancy.         PSA  3.4                   7/12/21     Urine culture  Enterobacter   2/24/20     IPSS    QoL  20        4          10/25/21  24        6          7/12/21     PVR  40 mL              7/12/21    Past Medical History:   Diagnosis Date    Colitis     Diabetes mellitus, type 2     Diverticulosis     GERD (gastroesophageal reflux disease)     Hypertension        Past Surgical History:   Procedure Laterality Date    ANKLE SURGERY Bilateral     ANTERIOR CERVICAL DISCECTOMY W/ FUSION N/A 10/30/2019    Procedure: C3-4, C4-5 ACDF;  Surgeon: Francis Alan MD;  Location: [address];  Service: Neurosurgery;  Laterality: N/A;  C3-4, C4-5 ACDF  Anesthesia:  General  Rad:  C-Arm  NM:  EMG, SEP, & MEP  Blood:  Type & Screen  Position:  Supine  Bed:  Regular slider bed  Headrest:  [place] & GW tongs/hanging weights  Misc:  Small vivigen  Nerve root retractor ([employer])  Disposable #2 [employer]  Interbody    BACK SURGERY      COLONOSCOPY      COLONOSCOPY N/A 10/7/2020    Procedure: COLONOSCOPY;  Surgeon: Elpidio Lau MD;  Location: [facility];  Service: Endoscopy;  Laterality: N/A;    CREATION OF MUSCLE ROTATIONAL FLAP  3/3/2020    Procedure: CREATION, FLAP, MUSCLE ROTATION;  Surgeon: Francis Alan MD;  Location: [address];  Service: Neurosurgery;;    CYSTOSCOPY N/A 7/21/2021    Procedure: CYSTOSCOPY;  Surgeon: Colton Kamara Jr., MD;  [facility] OR;  Service: Urology;  Laterality: N/A;    INJECTION OF ANESTHETIC AGENT AROUND NERVE Bilateral 3/10/2020    Procedure: Block, Nerve;  Surgeon: Erinn Surgeon;  Location[facility];  Service: Anesthesiology;  Laterality: Bilateral;    lower back surgery      LUMBAR LAMINECTOMY WITH FUSION N/A 3/3/2020    Procedure: T10-Pelvis Fusion with L4 PSO **AIRO** Co-Surgeon: Roberto Parkinson;  Surgeon: Francis Alan MD;  Location: [address];  Service: Neurosurgery;  Laterality: N/A;  **AIRO**  **CELL SAVER**  Co-Surgeon: Roberto Parkinson  NM: EMG, SEP,  "MEP  Position: Prone  Bed: [place] 4 post, prone pillow  Blood: Type & Hold 2 units  Rad: C-Arm, AIRO  Vendor: [employer]  Misc: Vivigen, Infuse, Cement (Depuy)  [place]    NECK SURGERY      SPINE SURGERY      TRANSRECTAL ULTRASOUND EXAMINATION N/A 7/21/2021    Procedure: ULTRASOUND, RECTAL APPROACH;  Surgeon: Colton Kamara Jr., MD;  Location: [facility] OR;  Service: Urology;  Laterality: N/A;    UPPER GASTROINTESTINAL ENDOSCOPY         Review of patient's allergies indicates:  No Known Allergies    Medications Reviewed: see MAR    FOCUSED PHYSICAL EXAM:    Vitals:    03/28/22 0917   BP: 111/74   Pulse: 80     Body mass index is 27.97 kg/m². Weight: 96.2 kg (212 lb) Height: 6' 1" (185.4 cm)       General: Alert, cooperative, no distress, appears stated age  Abdomen: Soft, non-tender, no CVA tenderness, non-distended  JOY: Declined      LABS:    No results found for this or any previous visit (from the past 336 hour(s)).      Assessment/Diagnosis:    1. Lower urinary tract symptoms (LUTS)     2. Screening for prostate cancer  PSA, Screening   3. Urge incontinence         Plans:    - I spent 30 minutes of the day of this encounter preparing for, treating and managing this patient. Extensive discussion with patient regarding the etiology and management of his lower urinary tract symptoms. Explained that LUTS are multifactorial and can be secondary to an enlarged prostate, PO intake of bladder irritants, overactive bladder, constipation, malignancy, trauma, infection, stones or medications.  - We extensively discussed surgical management of his symptoms. Explained that this would help his obstructive symptoms, but run the risk of worsening his urgency and incontinence. States he is ok with continuing Flomax and Trospium currently and not presently interested in surgical management.   - RTC in 6 months with symptom score and PVR.         "

## 2022-04-01 ENCOUNTER — OFFICE VISIT (OUTPATIENT)
Dept: GASTROENTEROLOGY | Facility: CLINIC | Age: 72
End: 2022-04-01
Payer: MEDICARE

## 2022-04-01 VITALS
RESPIRATION RATE: 18 BRPM | WEIGHT: 212.13 LBS | SYSTOLIC BLOOD PRESSURE: 104 MMHG | HEIGHT: 73 IN | HEART RATE: 77 BPM | BODY MASS INDEX: 28.11 KG/M2 | OXYGEN SATURATION: 99 % | DIASTOLIC BLOOD PRESSURE: 68 MMHG

## 2022-04-01 DIAGNOSIS — M54.50 LOW BACK PAIN, NON-SPECIFIC: ICD-10-CM

## 2022-04-01 DIAGNOSIS — K44.9 HIATAL HERNIA: ICD-10-CM

## 2022-04-01 DIAGNOSIS — K76.0 FATTY LIVER: ICD-10-CM

## 2022-04-01 DIAGNOSIS — K21.9 GASTROESOPHAGEAL REFLUX DISEASE, UNSPECIFIED WHETHER ESOPHAGITIS PRESENT: ICD-10-CM

## 2022-04-01 DIAGNOSIS — R52 PAIN: ICD-10-CM

## 2022-04-01 DIAGNOSIS — K57.30 DIVERTICULOSIS OF LARGE INTESTINE WITHOUT HEMORRHAGE: Primary | ICD-10-CM

## 2022-04-01 PROCEDURE — 99214 OFFICE O/P EST MOD 30 MIN: CPT | Mod: S$GLB,,, | Performed by: INTERNAL MEDICINE

## 2022-04-01 PROCEDURE — 99215 OFFICE O/P EST HI 40 MIN: CPT | Mod: PBBFAC,PN | Performed by: INTERNAL MEDICINE

## 2022-04-01 PROCEDURE — 99999 PR PBB SHADOW E&M-EST. PATIENT-LVL V: CPT | Mod: PBBFAC,,, | Performed by: INTERNAL MEDICINE

## 2022-04-01 PROCEDURE — 99999 PR PBB SHADOW E&M-EST. PATIENT-LVL V: ICD-10-PCS | Mod: PBBFAC,,, | Performed by: INTERNAL MEDICINE

## 2022-04-01 PROCEDURE — 99214 PR OFFICE/OUTPT VISIT, EST, LEVL IV, 30-39 MIN: ICD-10-PCS | Mod: S$GLB,,, | Performed by: INTERNAL MEDICINE

## 2022-04-01 RX ORDER — OXYCODONE AND ACETAMINOPHEN 10; 325 MG/1; MG/1
1 TABLET ORAL EVERY 6 HOURS PRN
Qty: 120 TABLET | Refills: 0 | Status: SHIPPED | OUTPATIENT
Start: 2022-04-01 | End: 2022-05-02 | Stop reason: SDUPTHER

## 2022-04-01 RX ORDER — GLUCOSAM/CHONDRO/HERB 149/HYAL 750-100 MG
TABLET ORAL
COMMUNITY
Start: 2021-10-29 | End: 2022-04-01 | Stop reason: SDUPTHER

## 2022-04-01 NOTE — PROGRESS NOTES
Subjective:       Patient ID: Garcia Renteria is a 72 y.o. male.    Chief Complaint: Follow-up    He has a chronic pain syndrome.  He has had multiple surgeries of the cervical or lumbar spine.  His pain was improved by the surgeries.  He is intolerant to the anti-inflammatory agents.  He uses the Percocet as needed for pain.  He ambulates with a cane.  [facility] for the neck and back pain in addition.  He has diabetes.  He states his diabetes is well controlled.  He made a food diary and he is avoiding the offending foods such as the fried in the fatty foods.  He has been taking his vitamins and minerals.  He has a fatty liver.  He has a history of gastroesophageal reflux and states the PPIs resolved his symptoms.  He denies significant pyrosis dyspepsia denies dysphagia.  He has had a recent colonoscopy revealing diverticulosis.  He is having daily bowel movements with his bowel program and additional fiber.  He denies hematemesis hematochezia jaundice or bleeding.      Allergies:  Review of patient's allergies indicates:  No Known Allergies    Medications:    Current Outpatient Medications:     amoxicillin-clavulanate 875-125mg (AUGMENTIN) 875-125 mg per tablet, Take 1 tablet by mouth 2 (two) times daily., Disp: , Rfl:     azithromycin (Z-NISHANT) 250 MG tablet, Take 250 mg by mouth once daily., Disp: , Rfl:     cefTRIAXone (ROCEPHIN) 1 gram injection, SMARTSI Gram(s) IM Every Night, Disp: , Rfl:     cholecalciferol, vitamin D3, 1,250 mcg (50,000 unit) capsule, Take by mouth., Disp: , Rfl:     cyanocobalamin (VITAMIN B-12) 1000 MCG tablet, , Disp: , Rfl:     cyanocobalamin (VITAMIN B-12) 1000 MCG tablet, TAKE ONE TABLET BY MOUTH DAILY AS A VITAMIN B12 SUPPLEMENT, Disp: , Rfl:     diclofenac sodium (VOLTAREN) 1 % Gel, Apply 2 g topically 2 (two) times daily., Disp: , Rfl:     gabapentin (NEURONTIN) 100 MG capsule, , Disp: , Rfl:     ibuprofen (ADVIL,MOTRIN) 800 MG tablet,  , Disp: , Rfl:     indomethacin (INDOCIN) 25 MG capsule, , Disp: , Rfl:     lidocaine (LIDODERM) 5 %, Place 1 patch onto the skin once daily. Remove & Discard patch within 12 hours or as directed by MD, Disp: 15 patch, Rfl: 0    LIDOcaine-benzalkonium-alcohol 2.5 % Soln, USE 1 PAD EXTERNALLY EVERY THIRD DAY (EVERY 72 HOURS), Disp: , Rfl:     lisinopril (PRINIVIL,ZESTRIL) 5 MG tablet, Take 5 mg by mouth once daily. , Disp: , Rfl:     metFORMIN (GLUCOPHAGE) 1000 MG tablet, Take 500 mg by mouth daily with breakfast. , Disp: , Rfl:     methocarbamoL (ROBAXIN) 750 MG Tab, Take 1 tablet (750 mg total) by mouth 3 (three) times daily., Disp: 40 tablet, Rfl: 0    mirabegron (MYRBETRIQ) 25 mg Tb24 ER tablet, Take 1 tablet (25 mg total) by mouth once daily., Disp: 30 tablet, Rfl: 11    nitrofurantoin, macrocrystal-monohydrate, (MACROBID) 100 MG capsule, Take 100 mg by mouth 2 (two) times daily., Disp: , Rfl:     omega 3-dha-epa-fish oil 1,000 mg (120 mg-180 mg) Cap, TAKE ONE CAPSULE BY MOUTH DAILY AS A SUPPLEMENT, Disp: , Rfl:     omega-3s/dha/epa/fish oil/D3 (VITAMIN-D + OMEGA-3 ORAL), TAKE ONE CAPSULE BY MOUTH EVERY WEEK FOR VITAMIN D3 REPLACEMENT, Disp: , Rfl:     omeprazole (PRILOSEC) 20 MG capsule, Take 1 capsule (20 mg total) by mouth once daily., Disp: 90 capsule, Rfl: 3    phenazopyridine (PYRIDIUM) 200 MG tablet, Take 200 mg by mouth 3 (three) times daily., Disp: , Rfl:     pregabalin (LYRICA) 150 MG capsule, Take 1 capsule (150 mg total) by mouth 3 (three) times daily., Disp: 90 capsule, Rfl: 6    sildenafiL (VIAGRA) 100 MG tablet, TAKE ONE-HALF TABLET BY MOUTH DAILY FOR ERECTILE DYSFUNCTION *TAKE ONE HOUR BEFORE SEXUAL ACTIVITY* NO MORE THAN ONE DOSE IN 24 HOURS ** MUST NOT TAKE NITROGLYCERIN TYPE DRUGS WITH VIAGRA **+++THIS IS, Disp: , Rfl:     STOOL SOFTENER, DOCUSATE MADELAINE, 240 mg capsule, , Disp: , Rfl:     tamsulosin (FLOMAX) 0.4 mg Cap, , Disp: , Rfl:     triamterene-hydrochlorothiazide 75-50 mg  (MAXZIDE) 75-50 mg per tablet, Take 0.5 tablets by mouth once daily. , Disp: , Rfl:     trospium (SANCTURA) 20 mg Tab tablet, Take 20 mg by mouth 2 (two) times daily., Disp: , Rfl:     vitamin D (VITAMIN D3) 1000 units Tab, Take 1,000 Units by mouth once daily., Disp: , Rfl:     bacitracin 500 unit/gram Oint, Apply topically 2 (two) times daily. (Patient not taking: Reported on 4/1/2022), Disp: , Rfl: 0    famotidine (PEPCID) 40 MG tablet, Take 1 tablet (40 mg total) by mouth every evening., Disp: 90 tablet, Rfl: 3    oxyCODONE-acetaminophen (PERCOCET)  mg per tablet, Take 1 tablet by mouth every 6 (six) hours as needed for Pain., Disp: 120 tablet, Rfl: 0    Past Medical History:   Diagnosis Date    Colitis     Diabetes mellitus, type 2     Diverticulosis     GERD (gastroesophageal reflux disease)     Hypertension        Past Surgical History:   Procedure Laterality Date    ANKLE SURGERY Bilateral     ANTERIOR CERVICAL DISCECTOMY W/ FUSION N/A 10/30/2019    Procedure: C3-4, C4-5 ACDF;  Surgeon: Francis Alan MD;  Location: [facility] OR [address] [place] [address];  Service: Neurosurgery;  Laterality: N/A;  C3-4, C4-5 ACDF  Anesthesia:  General  Rad:  C-Arm  NM:  EMG, SEP, & MEP  Blood:  Type & Screen  Position:  Supine  Bed:  Regular slider bed  Headrest:  [place] & [employer] tongs/hanging weights  Misc:  Small vivigen  Nerve root retractor ([employer])  Disposable #2 [place]  Interbody    BACK SURGERY      COLONOSCOPY      COLONOSCOPY N/A 10/7/2020    Procedure: COLONOSCOPY;  Surgeon: Elpidio Lau MD;  Location: [facility] ENDO;  Service: Endoscopy;  Laterality: N/A;    CREATION OF MUSCLE ROTATIONAL FLAP  3/3/2020    Procedure: CREATION, FLAP, MUSCLE ROTATION;  Surgeon: Francis Alan MD;  Location: [address];  Service: Neurosurgery;;    CYSTOSCOPY N/A 7/21/2021    Procedure: CYSTOSCOPY;  Surgeon: Colton Kamara Jr., MD;  Location[facility];  Service: Urology;  Laterality: N/A;    INJECTION OF ANESTHETIC AGENT AROUND NERVE  Bilateral 3/10/2020    Procedure: Block, Nerve;  Surgeon: Erinn Surgeon;  Location: [facility];  Service: Anesthesiology;  Laterality: Bilateral;    lower back surgery      LUMBAR LAMINECTOMY WITH FUSION N/A 3/3/2020    Procedure: T10-Pelvis Fusion with L4 PSO **AIRO** Co-Surgeon: Roberto Parkinson;  Surgeon: Francis Alan MD;  [address];  Service: Neurosurgery;  Laterality: N/A;  **AIRO**  **CELL SAVER**  Co-Surgeon: Roberto Parkinson  NM: EMG, SEP, MEP  Position: Prone  Bed: [place] 4 post, prone pillow  Blood: Type & Hold 2 units  Rad: C-Arm, AIRO  Vendor: [employer]  Misc: Vivigen, Infuse, Cement (Depuy)  [place]    NECK SURGERY      SPINE SURGERY      TRANSRECTAL ULTRASOUND EXAMINATION N/A 7/21/2021    Procedure: ULTRASOUND, RECTAL APPROACH;  Surgeon: Colton Kamara Jr., MD;  Location: [facility];  Service: Urology;  Laterality: N/A;    UPPER GASTROINTESTINAL ENDOSCOPY           Review of Systems   Constitutional: Negative for appetite change, fever and unexpected weight change.   HENT: Negative for trouble swallowing.         No jaundice.   Respiratory: Negative for cough, shortness of breath and wheezing.         He is a former cigarette smoker.  Currently he does not use tobacco or alcohol.  He denies dysphagia aspiration hemoptysis chronic cough chronic sputum production or dyspnea on exertion.   Cardiovascular: Negative for chest pain.        He denies exertional chest pain or rhythm disturbances.  He states his hyperlipidemia and hypertension are well controlled.   Gastrointestinal: Negative for abdominal distention, abdominal pain, anal bleeding, blood in stool, constipation, diarrhea, nausea, rectal pain and vomiting.        He completed treatment for chronic hepatitis C and his an SV R. [facility] in May were requested.  He states they were normal.   Endocrine:        He states his diabetes is well controlled.  The [facility] provides medicines and treats his diabetes.  The records were requested.    Musculoskeletal: Positive for arthralgias, back pain, gait problem, neck pain and neck stiffness.        He ambulates with a cane.   Skin: Negative for pallor and rash.   Neurological: Negative for dizziness, seizures, syncope, speech difficulty, weakness and numbness.   Hematological: Negative for adenopathy.   Psychiatric/Behavioral: Negative for confusion.       Objective:      Physical Exam  Vitals reviewed.   Constitutional:       Appearance: He is well-developed.      Comments: Well-nourished well-hydrated afebrile nonicteric  male.  He is sitting comfortably in the chair in breathe normally.  He appears to be oriented x3 and can related his history and answer questions appropriately.  His normal cephalic.  Pupils are normal   HENT:      Head: Normocephalic.   Eyes:      Pupils: Pupils are equal, round, and reactive to light.   Neck:      Thyroid: No thyromegaly.      Trachea: No tracheal deviation.   Cardiovascular:      Rate and Rhythm: Normal rate and regular rhythm.      Heart sounds: Normal heart sounds.   Pulmonary:      Effort: Pulmonary effort is normal.      Breath sounds: Normal breath sounds.   Abdominal:      General: Bowel sounds are normal. There is no distension.      Palpations: Abdomen is soft. There is no mass.      Tenderness: There is no abdominal tenderness. There is no right CVA tenderness, left CVA tenderness, guarding or rebound.      Hernia: No hernia is present.   Musculoskeletal:      Cervical back: Normal range of motion and neck supple.      Comments: The ambulate slowly with a cane.  He go from the sitting to the standing position without difficulty.   Lymphadenopathy:      Cervical: No cervical adenopathy.   Skin:     General: Skin is warm and dry.   Neurological:      Mental Status: He is alert and oriented to person, place, and time.      Cranial Nerves: No cranial nerve deficit.   Psychiatric:         Behavior: Behavior normal.           Plan:        Diverticulosis of large intestine without hemorrhage    Pain  -     oxyCODONE-acetaminophen (PERCOCET)  mg per tablet; Take 1 tablet by mouth every 6 (six) hours as needed for Pain.  Dispense: 120 tablet; Refill: 0    Gastroesophageal reflux disease, unspecified whether esophagitis present    Hiatal hernia    Fatty liver    Low back pain, non-specific    He will continue his reflux regimen in the omeprazole.  He continues his diabetic diet current medications vitamins and minerals.  He continues his bowel program with additional fibroid produced daily bowel movements.  He is followed by the [facility] in the records were requested.  He will follow up with his surgeon and review the cervical spine MRI with him.  The prior records were requested.  He uses the Percocet as for pain and avoids anti-inflammatory agents.

## 2022-04-01 NOTE — PATIENT INSTRUCTIONS
He continues his reflux regimen current medical vitamins and minerals.  He will continue the diabetic diet.  We will follow up with his surgeon and review the MRI with him.

## 2022-05-02 ENCOUNTER — OFFICE VISIT (OUTPATIENT)
Dept: GASTROENTEROLOGY | Facility: CLINIC | Age: 72
End: 2022-05-02
Payer: MEDICARE

## 2022-05-02 VITALS
BODY MASS INDEX: 28.18 KG/M2 | RESPIRATION RATE: 18 BRPM | HEIGHT: 73 IN | WEIGHT: 212.63 LBS | HEART RATE: 88 BPM | OXYGEN SATURATION: 99 % | DIASTOLIC BLOOD PRESSURE: 73 MMHG | SYSTOLIC BLOOD PRESSURE: 117 MMHG

## 2022-05-02 DIAGNOSIS — R52 PAIN: ICD-10-CM

## 2022-05-02 DIAGNOSIS — K76.0 FATTY LIVER: ICD-10-CM

## 2022-05-02 DIAGNOSIS — K21.9 GASTROESOPHAGEAL REFLUX DISEASE: ICD-10-CM

## 2022-05-02 DIAGNOSIS — K57.30 DIVERTICULOSIS OF LARGE INTESTINE WITHOUT HEMORRHAGE: Primary | ICD-10-CM

## 2022-05-02 DIAGNOSIS — K44.9 HIATAL HERNIA: ICD-10-CM

## 2022-05-02 PROCEDURE — 99214 PR OFFICE/OUTPT VISIT, EST, LEVL IV, 30-39 MIN: ICD-10-PCS | Mod: S$GLB,,, | Performed by: INTERNAL MEDICINE

## 2022-05-02 PROCEDURE — 99999 PR PBB SHADOW E&M-EST. PATIENT-LVL III: ICD-10-PCS | Mod: PBBFAC,,, | Performed by: INTERNAL MEDICINE

## 2022-05-02 PROCEDURE — 99999 PR PBB SHADOW E&M-EST. PATIENT-LVL III: CPT | Mod: PBBFAC,,, | Performed by: INTERNAL MEDICINE

## 2022-05-02 PROCEDURE — 99213 OFFICE O/P EST LOW 20 MIN: CPT | Mod: PBBFAC,PN | Performed by: INTERNAL MEDICINE

## 2022-05-02 PROCEDURE — 99214 OFFICE O/P EST MOD 30 MIN: CPT | Mod: S$GLB,,, | Performed by: INTERNAL MEDICINE

## 2022-05-02 RX ORDER — OXYCODONE AND ACETAMINOPHEN 10; 325 MG/1; MG/1
1 TABLET ORAL EVERY 6 HOURS PRN
Qty: 120 TABLET | Refills: 0 | Status: SHIPPED | OUTPATIENT
Start: 2022-05-02 | End: 2022-06-01 | Stop reason: SDUPTHER

## 2022-05-02 RX ORDER — FAMOTIDINE 40 MG/1
40 TABLET, FILM COATED ORAL NIGHTLY
Qty: 90 TABLET | Refills: 3 | Status: SHIPPED | OUTPATIENT
Start: 2022-05-02 | End: 2024-02-27 | Stop reason: SDUPTHER

## 2022-05-02 NOTE — PROGRESS NOTES
Subjective:       Patient ID: Garcia Renteria is a 72 y.o. male.    Chief Complaint: Follow-up    He has a history of hiatal hernia, gastroesophageal reflux, diverticulosis, internal hemorrhoids, history of hepatitis C with treatments an SVR and a chronic pain syndrome.  He has been on her reflux regimen.  He denies significant pyrosis dyspepsia dysphagia hematemesis hematochezia jaundice or bleeding.  His bowel function has been daily with increased fiber.  He is on a diabetic diet.  He takes his vitamins minerals and current medications.      Allergies:  Review of patient's allergies indicates:  No Known Allergies    Medications:    Current Outpatient Medications:     amoxicillin-clavulanate 875-125mg (AUGMENTIN) 875-125 mg per tablet, Take 1 tablet by mouth 2 (two) times daily., Disp: , Rfl:     azithromycin (Z-NISHANT) 250 MG tablet, Take 250 mg by mouth once daily., Disp: , Rfl:     cefTRIAXone (ROCEPHIN) 1 gram injection, SMARTSI Gram(s) IM Every Night, Disp: , Rfl:     cholecalciferol, vitamin D3, 1,250 mcg (50,000 unit) capsule, Take by mouth., Disp: , Rfl:     cyanocobalamin (VITAMIN B-12) 1000 MCG tablet, , Disp: , Rfl:     cyanocobalamin (VITAMIN B-12) 1000 MCG tablet, TAKE ONE TABLET BY MOUTH DAILY AS A VITAMIN B12 SUPPLEMENT, Disp: , Rfl:     diclofenac sodium (VOLTAREN) 1 % Gel, Apply 2 g topically 2 (two) times daily., Disp: , Rfl:     gabapentin (NEURONTIN) 100 MG capsule, , Disp: , Rfl:     ibuprofen (ADVIL,MOTRIN) 800 MG tablet, , Disp: , Rfl:     indomethacin (INDOCIN) 25 MG capsule, , Disp: , Rfl:     lidocaine (LIDODERM) 5 %, Place 1 patch onto the skin once daily. Remove & Discard patch within 12 hours or as directed by MD, Disp: 15 patch, Rfl: 0    LIDOcaine-benzalkonium-alcohol 2.5 % Soln, USE 1 PAD EXTERNALLY EVERY THIRD DAY (EVERY 72 HOURS), Disp: , Rfl:     lisinopril (PRINIVIL,ZESTRIL) 5 MG tablet, Take 5 mg by mouth once daily. , Disp: , Rfl:     metFORMIN (GLUCOPHAGE)  1000 MG tablet, Take 500 mg by mouth daily with breakfast. , Disp: , Rfl:     methocarbamoL (ROBAXIN) 750 MG Tab, Take 1 tablet (750 mg total) by mouth 3 (three) times daily., Disp: 40 tablet, Rfl: 0    mirabegron (MYRBETRIQ) 25 mg Tb24 ER tablet, Take 1 tablet (25 mg total) by mouth once daily., Disp: 30 tablet, Rfl: 11    nitrofurantoin, macrocrystal-monohydrate, (MACROBID) 100 MG capsule, Take 100 mg by mouth 2 (two) times daily., Disp: , Rfl:     omega 3-dha-epa-fish oil 1,000 mg (120 mg-180 mg) Cap, TAKE ONE CAPSULE BY MOUTH DAILY AS A SUPPLEMENT, Disp: , Rfl:     omega-3s/dha/epa/fish oil/D3 (VITAMIN-D + OMEGA-3 ORAL), TAKE ONE CAPSULE BY MOUTH EVERY WEEK FOR VITAMIN D3 REPLACEMENT, Disp: , Rfl:     omeprazole (PRILOSEC) 20 MG capsule, Take 1 capsule (20 mg total) by mouth once daily., Disp: 90 capsule, Rfl: 3    phenazopyridine (PYRIDIUM) 200 MG tablet, Take 200 mg by mouth 3 (three) times daily., Disp: , Rfl:     pregabalin (LYRICA) 150 MG capsule, Take 1 capsule (150 mg total) by mouth 3 (three) times daily., Disp: 90 capsule, Rfl: 6    sildenafiL (VIAGRA) 100 MG tablet, TAKE ONE-HALF TABLET BY MOUTH DAILY FOR ERECTILE DYSFUNCTION *TAKE ONE HOUR BEFORE SEXUAL ACTIVITY* NO MORE THAN ONE DOSE IN 24 HOURS ** MUST NOT TAKE NITROGLYCERIN TYPE DRUGS WITH VIAGRA **+++THIS IS, Disp: , Rfl:     STOOL SOFTENER, DOCUSATE MADELAINE, 240 mg capsule, , Disp: , Rfl:     tamsulosin (FLOMAX) 0.4 mg Cap, , Disp: , Rfl:     triamterene-hydrochlorothiazide 75-50 mg (MAXZIDE) 75-50 mg per tablet, Take 0.5 tablets by mouth once daily. , Disp: , Rfl:     trospium (SANCTURA) 20 mg Tab tablet, Take 20 mg by mouth 2 (two) times daily., Disp: , Rfl:     vitamin D (VITAMIN D3) 1000 units Tab, Take 1,000 Units by mouth once daily., Disp: , Rfl:     bacitracin 500 unit/gram Oint, Apply topically 2 (two) times daily. (Patient not taking: No sig reported), Disp: , Rfl: 0    famotidine (PEPCID) 40 MG tablet, Take 1 tablet (40 mg  total) by mouth every evening., Disp: 90 tablet, Rfl: 3    oxyCODONE-acetaminophen (PERCOCET)  mg per tablet, Take 1 tablet by mouth every 6 (six) hours as needed for Pain., Disp: 120 tablet, Rfl: 0    Past Medical History:   Diagnosis Date    Colitis     Diabetes mellitus, type 2     Diverticulosis     GERD (gastroesophageal reflux disease)     Hypertension        Past Surgical History:   Procedure Laterality Date    ANKLE SURGERY Bilateral     ANTERIOR CERVICAL DISCECTOMY W/ FUSION N/A 10/30/2019    Procedure: C3-4, C4-5 ACDF;  Surgeon: Francis Alan MD;  Location: [address];  Service: Neurosurgery;  Laterality: N/A;  C3-4, C4-5 ACDF  Anesthesia:  General  Rad:  C-Arm  NM:  EMG, SEP, & MEP  Blood:  Type & Screen  Position:  Supine  Bed:  Regular slider bed  Headrest:  [place] & GW tongs/hanging weights  Misc:  Small vivigen  Nerve root retractor ([employer])  Disposable #2 [employer]  Interbody    BACK SURGERY      COLONOSCOPY      COLONOSCOPY N/A 10/7/2020    Procedure: COLONOSCOPY;  Surgeon: Elpidio Lau MD;  Location: [facility];  Service: Endoscopy;  Laterality: N/A;    CREATION OF MUSCLE ROTATIONAL FLAP  3/3/2020    Procedure: CREATION, FLAP, MUSCLE ROTATION;  Surgeon: Francis Alan MD;  Location: [address];  Service: Neurosurgery;;    CYSTOSCOPY N/A 7/21/2021    Procedure: CYSTOSCOPY;  Surgeon: Colton Kamara Jr., MD;  Location: [facility] OR;  Service: Urology;  Laterality: N/A;    INJECTION OF ANESTHETIC AGENT AROUND NERVE Bilateral 3/10/2020    Procedure: Block, Nerve;  Surgeon: Erinn Surgeon;  [facility];  Service: Anesthesiology;  Laterality: Bilateral;    lower back surgery      LUMBAR LAMINECTOMY WITH FUSION N/A 3/3/2020    Procedure: T10-Pelvis Fusion with L4 PSO **AIRO** Co-Surgeon: Roberto Parkinson;  Surgeon: Francis Alan MD;  Location: [address];  Service: Neurosurgery;  Laterality: N/A;  **AIRO**  **CELL SAVER**  Co-Surgeon: Roberto Parkinson  NM: EMG, SEP,  MEP  Position: Prone  Bed: [place] 4 post, prone pillow  Blood: Type & Hold 2 units  Rad: C-Arm, AIRO  Vendor: [employer]  Misc: Vivigen, Infuse, Cement ([employer])  [place]    NECK SURGERY      SPINE SURGERY      TRANSRECTAL ULTRASOUND EXAMINATION N/A 7/21/2021    Procedure: ULTRASOUND, RECTAL APPROACH;  Surgeon: Colton Kamara Jr., MD;  Location: [facility] OR;  Service: Urology;  Laterality: N/A;    UPPER GASTROINTESTINAL ENDOSCOPY           Review of Systems   Constitutional: Negative for appetite change, fever and unexpected weight change.   HENT: Negative for trouble swallowing.         No jaundice.   Respiratory: Negative for cough, shortness of breath and wheezing.         He is a former smoker.  Currently he does not use tobacco or consume alcohol.  He denies dysphagia aspiration hemoptysis chronic cough chronic sputum production or dyspnea on exertion but is not physically active.   Cardiovascular: Negative for chest pain.        He denies exertional chest pain or rhythm disturbance.  He states his hypertension hyperlipidemia are well controlled on his current medications.   Gastrointestinal: Negative for abdominal distention, abdominal pain, anal bleeding, blood in stool, constipation and nausea.        Endoscopy revealed a hiatal hernia gastroesophageal reflux and colonoscopy revealed hemorrhoids.  He states the PPI has resolved his pyrosis and upper GI symptoms.  He is on the bowel program with additional MiraLax and having daily bowel movements.  He does not want further GI evaluation at this time because he is doing so well.  He has a fatty liver.  He has been on his vitamins including vitamin-E.   Endocrine:        He states his diabetes is well controlled.  [facility] which [facility] diabetes in provides his medications.  He avoids the offending foods particularly the fried and fatty foods.  He takes his vitamins and minerals.   Musculoskeletal: Positive for arthralgias, back  pain and neck pain.        He has had multiple cervical spine and lumbar spine surgeries.  He was doing very well until early injured his neck.  He has chronic neck pain.  [facility].  [facility] but the [facility] is [facility] to the surgeon who performed his cervical and lumbar surgery.  The Norco has controlled 95% of his pain and he is able to perform his usual ADLS   Skin: Negative for pallor and rash.   Neurological: Negative for dizziness, seizures, syncope, speech difficulty, weakness and numbness.   Hematological: Negative for adenopathy.   Psychiatric/Behavioral: Negative for confusion.       Objective:      Physical Exam  Vitals reviewed.   Constitutional:       Appearance: He is well-developed.      Comments: Well-nourished well-hydrated afebrile nonicteric  male.  He is sitting comfortably in the chair and he is breathing normally.  He is normocephalic.  Pupils are normal.  He appears to be oriented x3 and can relate his history and answer questions appropriately.   HENT:      Head: Normocephalic.   Eyes:      Pupils: Pupils are equal, round, and reactive to light.   Neck:      Thyroid: No thyromegaly.      Trachea: No tracheal deviation.   Cardiovascular:      Rate and Rhythm: Normal rate and regular rhythm.      Heart sounds: Normal heart sounds.   Pulmonary:      Effort: Pulmonary effort is normal.      Breath sounds: Normal breath sounds.   Abdominal:      General: Bowel sounds are normal. There is no distension.      Palpations: Abdomen is soft. There is no mass.      Tenderness: There is no abdominal tenderness. There is no right CVA tenderness, left CVA tenderness, guarding or rebound.      Hernia: No hernia is present.   Musculoskeletal:         General: Normal range of motion.      Cervical back: Normal range of motion and neck supple.      Comments: He ambulates slowly.  He can go from the sitting to the standing position  without difficulty.   Lymphadenopathy:      Cervical: No cervical adenopathy.   Skin:     General: Skin is warm and dry.   Neurological:      Mental Status: He is alert and oriented to person, place, and time.      Cranial Nerves: No cranial nerve deficit.   Psychiatric:         Behavior: Behavior normal.           Plan:       Diverticulosis of large intestine without hemorrhage    Pain  -     oxyCODONE-acetaminophen (PERCOCET)  mg per tablet; Take 1 tablet by mouth every 6 (six) hours as needed for Pain.  Dispense: 120 tablet; Refill: 0    Gastroesophageal reflux disease  -     famotidine (PEPCID) 40 MG tablet; Take 1 tablet (40 mg total) by mouth every evening.  Dispense: 90 tablet; Refill: 3    Hiatal hernia    Fatty liver    He will continue flux regimen current medications vitamins and minerals.  He continues his diabetic diet.  [facility] that [facility] diabetes.  He follows up with his surgeon regarding his neck pain.  He uses the Norco for pain and avoids the anti-inflammatory agents which have caused GI upset.  The prior records have been requested.

## 2022-05-02 NOTE — PATIENT INSTRUCTIONS
He will continue his reflux regimen current medications.  [facility] and then be referred to the surgeon who performed his recent cervical and lumbar surgeries.  He will keep us informed.

## 2022-05-11 ENCOUNTER — TELEPHONE (OUTPATIENT)
Dept: NEUROSURGERY | Facility: CLINIC | Age: 72
End: 2022-05-11
Payer: OTHER GOVERNMENT

## 2022-05-11 NOTE — TELEPHONE ENCOUNTER
----- Message from Ary Chou RN sent at 5/10/2022  4:26 PM CDT -----    ----- Message -----  From: Katie Garza  Sent: 5/10/2022   3:22 PM CDT  To: [facility] Staff    Hi,    [facility] with Dr. Alan. Patient dx is cervicalgia and lower back pain. Referral and records are saved into .    Can you please review and contact patient for scheduling?    Thank you,  Katie Estrada

## 2022-05-17 ENCOUNTER — OFFICE VISIT (OUTPATIENT)
Dept: NEUROSURGERY | Facility: CLINIC | Age: 72
End: 2022-05-17
Payer: OTHER GOVERNMENT

## 2022-05-17 VITALS — HEART RATE: 83 BPM | SYSTOLIC BLOOD PRESSURE: 152 MMHG | DIASTOLIC BLOOD PRESSURE: 87 MMHG

## 2022-05-17 DIAGNOSIS — Z98.890 H/O LUMBOSACRAL SPINE SURGERY: ICD-10-CM

## 2022-05-17 DIAGNOSIS — G89.4 CHRONIC PAIN SYNDROME: ICD-10-CM
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
## 2022-05-17 DIAGNOSIS — Z98.890 NECK PAIN WITH HISTORY OF CERVICAL SPINAL SURGERY: Primary | ICD-10-CM

## 2022-05-17 DIAGNOSIS — M54.2 NECK PAIN WITH HISTORY OF CERVICAL SPINAL SURGERY: Primary | ICD-10-CM

## 2022-05-17 PROCEDURE — 99213 OFFICE O/P EST LOW 20 MIN: CPT | Mod: S$PBB,,, | Performed by: NURSE PRACTITIONER

## 2022-05-17 PROCEDURE — 99999 PR PBB SHADOW E&M-EST. PATIENT-LVL V: ICD-10-PCS | Mod: PBBFAC,,, | Performed by: NURSE PRACTITIONER

## 2022-05-17 PROCEDURE — 99999 PR PBB SHADOW E&M-EST. PATIENT-LVL V: CPT | Mod: PBBFAC,,, | Performed by: NURSE PRACTITIONER

## 2022-05-17 PROCEDURE — 99213 PR OFFICE/OUTPT VISIT, EST, LEVL III, 20-29 MIN: ICD-10-PCS | Mod: S$PBB,,, | Performed by: NURSE PRACTITIONER

## 2022-05-17 PROCEDURE — 99215 OFFICE O/P EST HI 40 MIN: CPT | Mod: PBBFAC | Performed by: NURSE PRACTITIONER

## 2022-05-17 NOTE — PROGRESS NOTES
"Neurosurgery  Established Patient    SUBJECTIVE:     History of Present Illness: Garcia Renteria is a 72 y.o. male with a hx of ACD C3-4 and C4-5 10/30/19 and T10-Pelvis Fusion with L4 PSO on 3//3/2020 both with Dr. Alan. He is being seen in clinic today to discuss concerns with an audible "pop/click" in his neck when he rotates it from side to side. States that he has been doing well since his surgeries; however, last April he slipped and fell face first on his driveway which caused him to experience severe right-sided neck pain and concern for broken hardware. With time, he noticed some improvement in her neck pain/stiffness and ROM.    Today, he is primarily concerned that he might have broken surgical hardware. Denies sever pain. Reports increased weakness in his hands bilaterally with frequent dropping of objects, balance difficulties, and hand clumsiness. He is ambulating with a cane. The patient has a hx of urinary leakage, which remains unchanged since surgery. Denies bowel incontinence. Denies significant back pain, but since the fall has had left-sided low back pain that radiates down the posterior aspect of the leg stopping at about the ankle. Reports persistent numbness and tingling in his legs and feet L >R and was told that he has polyneuropathy.     Review of patient's allergies indicates:  No Known Allergies    Current Outpatient Medications   Medication Sig Dispense Refill    amoxicillin-clavulanate 875-125mg (AUGMENTIN) 875-125 mg per tablet Take 1 tablet by mouth 2 (two) times daily.      azithromycin (Z-NISHANT) 250 MG tablet Take 250 mg by mouth once daily.      bacitracin 500 unit/gram Oint Apply topically 2 (two) times daily.  0    cefTRIAXone (ROCEPHIN) 1 gram injection SMARTSI Gram(s) IM Every Night      cholecalciferol, vitamin D3, 1,250 mcg (50,000 unit) capsule Take by mouth.      cyanocobalamin (VITAMIN B-12) 1000 MCG tablet       cyanocobalamin (VITAMIN B-12) 1000 MCG tablet " TAKE ONE TABLET BY MOUTH DAILY AS A VITAMIN B12 SUPPLEMENT      diclofenac sodium (VOLTAREN) 1 % Gel Apply 2 g topically 2 (two) times daily.      famotidine (PEPCID) 40 MG tablet Take 1 tablet (40 mg total) by mouth every evening. 90 tablet 3    gabapentin (NEURONTIN) 100 MG capsule       ibuprofen (ADVIL,MOTRIN) 800 MG tablet       indomethacin (INDOCIN) 25 MG capsule       lidocaine (LIDODERM) 5 % Place 1 patch onto the skin once daily. Remove & Discard patch within 12 hours or as directed by MD 15 patch 0    LIDOcaine-benzalkonium-alcohol 2.5 % Soln USE 1 PAD EXTERNALLY EVERY THIRD DAY (EVERY 72 HOURS)      lisinopril (PRINIVIL,ZESTRIL) 5 MG tablet Take 5 mg by mouth once daily.       metFORMIN (GLUCOPHAGE) 1000 MG tablet Take 500 mg by mouth daily with breakfast.       methocarbamoL (ROBAXIN) 750 MG Tab Take 1 tablet (750 mg total) by mouth 3 (three) times daily. 40 tablet 0    mirabegron (MYRBETRIQ) 25 mg Tb24 ER tablet Take 1 tablet (25 mg total) by mouth once daily. 30 tablet 11    nitrofurantoin, macrocrystal-monohydrate, (MACROBID) 100 MG capsule Take 100 mg by mouth 2 (two) times daily.      omega 3-dha-epa-fish oil 1,000 mg (120 mg-180 mg) Cap TAKE ONE CAPSULE BY MOUTH DAILY AS A SUPPLEMENT      omega-3s/dha/epa/fish oil/D3 (VITAMIN-D + OMEGA-3 ORAL) TAKE ONE CAPSULE BY MOUTH EVERY WEEK FOR VITAMIN D3 REPLACEMENT      omeprazole (PRILOSEC) 20 MG capsule Take 1 capsule (20 mg total) by mouth once daily. 90 capsule 3    oxyCODONE-acetaminophen (PERCOCET)  mg per tablet Take 1 tablet by mouth every 6 (six) hours as needed for Pain. 120 tablet 0    phenazopyridine (PYRIDIUM) 200 MG tablet Take 200 mg by mouth 3 (three) times daily.      sildenafiL (VIAGRA) 100 MG tablet TAKE ONE-HALF TABLET BY MOUTH DAILY FOR ERECTILE DYSFUNCTION *TAKE ONE HOUR BEFORE SEXUAL ACTIVITY* NO MORE THAN ONE DOSE IN 24 HOURS ** MUST NOT TAKE NITROGLYCERIN TYPE DRUGS WITH VIAGRA **+++THIS IS      STOOL  SOFTENER, DOCUSATE MADELAINE, 240 mg capsule       tamsulosin (FLOMAX) 0.4 mg Cap       triamterene-hydrochlorothiazide 75-50 mg (MAXZIDE) 75-50 mg per tablet Take 0.5 tablets by mouth once daily.       trospium (SANCTURA) 20 mg Tab tablet Take 20 mg by mouth 2 (two) times daily.      vitamin D (VITAMIN D3) 1000 units Tab Take 1,000 Units by mouth once daily.      pregabalin (LYRICA) 150 MG capsule Take 1 capsule (150 mg total) by mouth 3 (three) times daily. 90 capsule 6     No current facility-administered medications for this visit.       Past Medical History:   Diagnosis Date    Colitis     Diabetes mellitus, type 2     Diverticulosis     GERD (gastroesophageal reflux disease)     Hypertension      Past Surgical History:   Procedure Laterality Date    ANKLE SURGERY Bilateral     ANTERIOR CERVICAL DISCECTOMY W/ FUSION N/A 10/30/2019    Procedure: C3-4, C4-5 ACDF;  Surgeon: Francis Alan MD;  Location: [address];  Service: Neurosurgery;  Laterality: N/A;  C3-4, C4-5 ACDF  Anesthesia:  General  Rad:  C-Arm  NM:  EMG, SEP, & MEP  Blood:  Type & Screen  Position:  Supine  Bed:  Regular slider bed  Headrest:  [place] & GW tongs/hanging weights  Misc:  Small vivigen  Nerve root retractor (Coupmon)  Disposable #2 [employer]ison  Interbody    BACK SURGERY      COLONOSCOPY      COLONOSCOPY N/A 10/7/2020    Procedure: COLONOSCOPY;  Surgeon: Elpidio Lau MD;  Location[facility];  Service: Endoscopy;  Laterality: N/A;    CREATION OF MUSCLE ROTATIONAL FLAP  3/3/2020    Procedure: CREATION, FLAP, MUSCLE ROTATION;  Surgeon: Francis Alan MD;  Location: [address];  Service: Neurosurgery;;    CYSTOSCOPY N/A 7/21/2021    Procedure: CYSTOSCOPY;  Surgeon: Colton Kamara Jr., MD;  Location: [facility] OR;  Service: Urology;  Laterality: N/A;    INJECTION OF ANESTHETIC AGENT AROUND NERVE Bilateral 3/10/2020    Procedure: Block, Nerve;  Surgeon: Erinn Surgeon;  Location: [facility];  Service: Anesthesiology;  Laterality:  Bilateral;    lower back surgery      LUMBAR LAMINECTOMY WITH FUSION N/A 3/3/2020    Procedure: T10-Pelvis Fusion with L4 PSO **AIRO** Co-Surgeon: Roberto Parkinson;  Surgeon: Francis Alan MD;  Location: [address];  Service: Neurosurgery;  Laterality: N/A;  **AIRO**  **CELL SAVER**  Co-Surgeon: Roberto Parkinson  NM: EMG, SEP, MEP  Position: Prone  Bed: [place] 4 post, prone pillow  Blood: Type & Hold 2 units  Rad: C-Arm, AIRO  Vendor: Dep[employer]  Misc: Vivigen, Infuse, Cement (Depuy)  [place]    NECK SURGERY      SPINE SURGERY      TRANSRECTAL ULTRASOUND EXAMINATION N/A 2021    Procedure: ULTRASOUND, RECTAL APPROACH;  Surgeon: Colton Kamara Jr., MD;  Location: [facility];  Service: Urology;  Laterality: N/A;    UPPER GASTROINTESTINAL ENDOSCOPY       Family History     Problem Relation (Age of Onset)    Neurological Disorders Mother        Social History     Socioeconomic History    Marital status:    Tobacco Use    Smoking status: Former Smoker     Types: Cigars     Quit date: 10/2019     Years since quittin.6    Smokeless tobacco: Never Used   Substance and Sexual Activity    Alcohol use: Yes     Alcohol/week: 2.0 standard drinks     Types: 1 Glasses of wine, 1 Cans of beer per week     Comment: 1 wine , 2 times a week    Drug use: Not Currently    Sexual activity: Yes     Partners: Female       Review of Systems   Constitutional: Negative for activity change, appetite change and fever.   HENT: Negative for hearing loss and postnasal drip.    Eyes: Negative for pain and visual disturbance.   Respiratory: Negative for shortness of breath.    Cardiovascular: Negative for chest pain and palpitations.   Gastrointestinal: Negative for abdominal pain.   Endocrine: Negative for polydipsia, polyphagia and polyuria.   Musculoskeletal: Positive for back pain, myalgias, neck pain and neck stiffness. Negative for gait problem.   Neurological: Positive for weakness and numbness. Negative for dizziness and  headaches.   Psychiatric/Behavioral: Negative for confusion and dysphoric mood.       OBJECTIVE:     Vital Signs  Pulse: 83  BP: (!) 152/87  Pain Score:   5  There is no height or weight on file to calculate BMI.    Neurosurgery Physical Exam  General: well developed, well nourished, no distress.   Head: normocephalic, atraumatic  Neurologic: Alert and oriented. Thought content appropriate.  GCS: Motor: 6/Verbal: 5/Eyes: 4 GCS Total: 15  Mental Status: Awake, Alert, Oriented x 4  Language: No aphasia  Speech: No dysarthria  Cranial nerves: face symmetric, tongue midline, CN II-XII grossly intact.   Eyes: pupils equal, round, reactive to light with accomodation, EOMI.   Pulmonary: normal respirations, no signs of respiratory distress  Abdomen: soft, non-distended  Skin: Skin is warm, dry and intact.  Sensory: intact to light touch throughout  Motor Strength:Moves all extremities spontaneously with good tone.     Strength  Deltoids Triceps Biceps Wrist Extension Wrist Flexion Hand    Upper: R 5/5 5/5 5/5 5/5 5/5 5-/5    L 5/5 5/5 5/5 5/5 5/5 5/5     HF KE KF DF PF EHL   Lower: R 5/5 5/5 5/5 5/5 5/5 5/5    L 5/5 5/5 5/5 4/5 4/5 4/5     Reflexes:   DTR: 1+ symmetrically throughout.  Harris's: Negative.  Clonus: Negative.     Cerebellar:   Gait stable, cautious with use of cane    Cervical:   ROM: Full with flexion, extension, lateral rotation and ear-to-shoulder bend.   Midline TTP: Negative.     Thoracic:  Midline TTP: Negative.     Lumbar:  Midline TTP: Negative.  Straight Leg Test: Negative.    Other:  SI joint TTP: Negative.  Greater trochanter TTP: Negative.  Tenderness with external/internal hip rotation: Negative.    Diagnostic Results:  I have personally reviewed the MRI cervical and lumbar spine dated 3/8/22.    The MRI cervical spine shows multilevel cervical spondylosis, most pronounced from C4-C7, resulting in mild spinal canal stenosis and moderate neural foraminal narrowing at those levels.    The MRI  "lumbar spine shows moderate multilevel degenerative disc disease resulting in mild central spinal canal stenosis with mild-to-moderate bilateral neural foraminal narrowing most predominant from L4 through S1.     ASSESSMENT/PLAN:   Garcia Renteria is a 72 y.o. male with a hx of ACD C3-4 and C4-5 10/30/19 and T10-Pelvis Fusion with L4 PSO on 3//3/2020 both with Dr. Alan. He was seen in clinic today to discuss concerns with an audible "pop/click" in his neck when he rotates it from side to side. States that he has been doing well since his surgeries; however, last April he slipped and fell face first on his driveway which caused him to experience severe right-sided neck pain and concern for broken hardware. I have ordered x-rays and CTs to evaluate his surgical hardware in better detail.     The patient would like to follow-up in clinic with Dr. Alan to review the findings. I have encouraged him to contact the clinic with any questions, concerns, or adverse clinical changes. He verbalized understanding.     DERICK Mcleod-LARISSA  Neurosurgery  [facility].     Note dictated with voice recognition software, please excuse any grammatical errors.      "

## 2022-05-26 ENCOUNTER — TELEPHONE (OUTPATIENT)
Dept: NEUROSURGERY | Facility: CLINIC | Age: 72
End: 2022-05-26
Payer: OTHER GOVERNMENT

## 2022-06-01 ENCOUNTER — OFFICE VISIT (OUTPATIENT)
Dept: GASTROENTEROLOGY | Facility: CLINIC | Age: 72
End: 2022-06-01
Payer: OTHER GOVERNMENT

## 2022-06-01 DIAGNOSIS — R52 PAIN: ICD-10-CM

## 2022-06-01 DIAGNOSIS — Z86.19 HEPATITIS C VIRUS INFECTION RESOLVED AFTER ANTIVIRAL DRUG THERAPY: ICD-10-CM

## 2022-06-01 DIAGNOSIS — K76.0 FATTY LIVER: ICD-10-CM

## 2022-06-01 DIAGNOSIS — K44.9 HIATAL HERNIA: ICD-10-CM

## 2022-06-01 DIAGNOSIS — K21.9 GASTROESOPHAGEAL REFLUX DISEASE, UNSPECIFIED WHETHER ESOPHAGITIS PRESENT: ICD-10-CM

## 2022-06-01 DIAGNOSIS — K57.30 DIVERTICULOSIS OF LARGE INTESTINE WITHOUT HEMORRHAGE: Primary | ICD-10-CM

## 2022-06-01 PROCEDURE — 99214 OFFICE O/P EST MOD 30 MIN: CPT | Mod: S$PBB,,, | Performed by: INTERNAL MEDICINE

## 2022-06-01 PROCEDURE — 99214 PR OFFICE/OUTPT VISIT, EST, LEVL IV, 30-39 MIN: ICD-10-PCS | Mod: S$PBB,,, | Performed by: INTERNAL MEDICINE

## 2022-06-01 PROCEDURE — 99999 PR PBB SHADOW E&M-EST. PATIENT-LVL III: ICD-10-PCS | Mod: PBBFAC,,, | Performed by: INTERNAL MEDICINE

## 2022-06-01 PROCEDURE — 99213 OFFICE O/P EST LOW 20 MIN: CPT | Mod: PBBFAC,PN | Performed by: INTERNAL MEDICINE

## 2022-06-01 PROCEDURE — 99999 PR PBB SHADOW E&M-EST. PATIENT-LVL III: CPT | Mod: PBBFAC,,, | Performed by: INTERNAL MEDICINE

## 2022-06-01 RX ORDER — OXYCODONE AND ACETAMINOPHEN 10; 325 MG/1; MG/1
1 TABLET ORAL EVERY 6 HOURS PRN
Qty: 120 TABLET | Refills: 0 | Status: SHIPPED | OUTPATIENT
Start: 2022-06-01 | End: 2022-07-06 | Stop reason: SDUPTHER

## 2022-06-01 NOTE — PATIENT INSTRUCTIONS
He will continue his reflux regimen.  [facility]  He uses the Percocet for pain and will follow up with his surgeon who is evaluating the neck pain.  He continues his other medications vitamins and diabetic diet.

## 2022-06-01 NOTE — PROGRESS NOTES
Subjective:       Patient ID: Garcia Renteria is a 72 y.o. male.    Chief Complaint: Follow-up and Medication Refill    He has a chronic pain syndrome.  He has had multiple cervical spine and lumbar spine surgeries.  He had a fall and he complains of the neck pain.  [facility]  He also has been evaluated by his surgeon.  He has a history of hiatal hernia gastroesophageal reflux and states the PPI has resolved the upper GI symptoms.  He has had a history of diverticulosis.  He is doing well on the bowel program with additional fiber.  At this point he does not want further GI evaluation.  He states the Norco has controlled the majority the pain.  He is intolerant to the anti-inflammatory agents.  He uses his medications as prescribed.      Allergies:  Review of patient's allergies indicates:  No Known Allergies    Medications:    Current Outpatient Medications:     amoxicillin-clavulanate 875-125mg (AUGMENTIN) 875-125 mg per tablet, Take 1 tablet by mouth 2 (two) times daily., Disp: , Rfl:     azithromycin (Z-NISHANT) 250 MG tablet, Take 250 mg by mouth once daily., Disp: , Rfl:     bacitracin 500 unit/gram Oint, Apply topically 2 (two) times daily., Disp: , Rfl: 0    cefTRIAXone (ROCEPHIN) 1 gram injection, SMARTSI Gram(s) IM Every Night, Disp: , Rfl:     cholecalciferol, vitamin D3, 1,250 mcg (50,000 unit) capsule, Take by mouth., Disp: , Rfl:     cyanocobalamin (VITAMIN B-12) 1000 MCG tablet, , Disp: , Rfl:     cyanocobalamin (VITAMIN B-12) 1000 MCG tablet, TAKE ONE TABLET BY MOUTH DAILY AS A VITAMIN B12 SUPPLEMENT, Disp: , Rfl:     diclofenac sodium (VOLTAREN) 1 % Gel, Apply 2 g topically 2 (two) times daily., Disp: , Rfl:     famotidine (PEPCID) 40 MG tablet, Take 1 tablet (40 mg total) by mouth every evening., Disp: 90 tablet, Rfl: 3    gabapentin (NEURONTIN) 100 MG capsule, , Disp: , Rfl:     ibuprofen (ADVIL,MOTRIN) 800 MG tablet, , Disp: , Rfl:     indomethacin  (INDOCIN) 25 MG capsule, , Disp: , Rfl:     lidocaine (LIDODERM) 5 %, Place 1 patch onto the skin once daily. Remove & Discard patch within 12 hours or as directed by MD, Disp: 15 patch, Rfl: 0    LIDOcaine-benzalkonium-alcohol 2.5 % Soln, USE 1 PAD EXTERNALLY EVERY THIRD DAY (EVERY 72 HOURS), Disp: , Rfl:     lisinopril (PRINIVIL,ZESTRIL) 5 MG tablet, Take 5 mg by mouth once daily. , Disp: , Rfl:     metFORMIN (GLUCOPHAGE) 1000 MG tablet, Take 500 mg by mouth daily with breakfast. , Disp: , Rfl:     methocarbamoL (ROBAXIN) 750 MG Tab, Take 1 tablet (750 mg total) by mouth 3 (three) times daily., Disp: 40 tablet, Rfl: 0    mirabegron (MYRBETRIQ) 25 mg Tb24 ER tablet, Take 1 tablet (25 mg total) by mouth once daily., Disp: 30 tablet, Rfl: 11    nitrofurantoin, macrocrystal-monohydrate, (MACROBID) 100 MG capsule, Take 100 mg by mouth 2 (two) times daily., Disp: , Rfl:     omega 3-dha-epa-fish oil 1,000 mg (120 mg-180 mg) Cap, TAKE ONE CAPSULE BY MOUTH DAILY AS A SUPPLEMENT, Disp: , Rfl:     omega-3s/dha/epa/fish oil/D3 (VITAMIN-D + OMEGA-3 ORAL), TAKE ONE CAPSULE BY MOUTH EVERY WEEK FOR VITAMIN D3 REPLACEMENT, Disp: , Rfl:     omeprazole (PRILOSEC) 20 MG capsule, Take 1 capsule (20 mg total) by mouth once daily., Disp: 90 capsule, Rfl: 3    oxyCODONE-acetaminophen (PERCOCET)  mg per tablet, Take 1 tablet by mouth every 6 (six) hours as needed for Pain., Disp: 120 tablet, Rfl: 0    phenazopyridine (PYRIDIUM) 200 MG tablet, Take 200 mg by mouth 3 (three) times daily., Disp: , Rfl:     pregabalin (LYRICA) 150 MG capsule, Take 1 capsule (150 mg total) by mouth 3 (three) times daily., Disp: 90 capsule, Rfl: 6    sildenafiL (VIAGRA) 100 MG tablet, TAKE ONE-HALF TABLET BY MOUTH DAILY FOR ERECTILE DYSFUNCTION *TAKE ONE HOUR BEFORE SEXUAL ACTIVITY* NO MORE THAN ONE DOSE IN 24 HOURS ** MUST NOT TAKE NITROGLYCERIN TYPE DRUGS WITH VIAGRA **+++THIS IS, Disp: , Rfl:     STOOL SOFTENER, DOCUSATE MADELAINE, 240 mg  capsule, , Disp: , Rfl:     tamsulosin (FLOMAX) 0.4 mg Cap, , Disp: , Rfl:     triamterene-hydrochlorothiazide 75-50 mg (MAXZIDE) 75-50 mg per tablet, Take 0.5 tablets by mouth once daily. , Disp: , Rfl:     trospium (SANCTURA) 20 mg Tab tablet, Take 20 mg by mouth 2 (two) times daily., Disp: , Rfl:     vitamin D (VITAMIN D3) 1000 units Tab, Take 1,000 Units by mouth once daily., Disp: , Rfl:     Past Medical History:   Diagnosis Date    Colitis     Diabetes mellitus, type 2     Diverticulosis     GERD (gastroesophageal reflux disease)     Hypertension        Past Surgical History:   Procedure Laterality Date    ANKLE SURGERY Bilateral     ANTERIOR CERVICAL DISCECTOMY W/ FUSION N/A 10/30/2019    Procedure: C3-4, C4-5 ACDF;  Surgeon: Francis Alan MD;  Location: [address];  Service: Neurosurgery;  Laterality: N/A;  C3-4, C4-5 ACDF  Anesthesia:  General  Rad:  C-Arm  NM:  EMG, SEP, & MEP  Blood:  Type & Screen  Position:  Supine  Bed:  Regular slider bed  Headrest:  [place] & GW tongs/hanging weights  Misc:  Small vivigen  Nerve root retractor ([employer])  Disposable #2 [employer]  Interbody    BACK SURGERY      COLONOSCOPY      COLONOSCOPY N/A 10/7/2020    Procedure: COLONOSCOPY;  Surgeon: Elpidio Lau MD;  Location: [facility];  Service: Endoscopy;  Laterality: N/A;    CREATION OF MUSCLE ROTATIONAL FLAP  3/3/2020    Procedure: CREATION, FLAP, MUSCLE ROTATION;  Surgeon: Francis Alan MD;  Location: [address];  Service: Neurosurgery;;    CYSTOSCOPY N/A 7/21/2021    Procedure: CYSTOSCOPY;  Surgeon: Colton Kamara Jr., MD;  [facility] OR;  Service: Urology;  Laterality: N/A;    INJECTION OF ANESTHETIC AGENT AROUND NERVE Bilateral 3/10/2020    Procedure: Block, Nerve;  Surgeon: Erinn Surgeon;  Location: [facility];  Service: Anesthesiology;  Laterality: Bilateral;    lower back surgery      LUMBAR LAMINECTOMY WITH FUSION N/A 3/3/2020    Procedure: T10-Pelvis Fusion with L4 PSO **AIRO**  Co-Surgeon: Roberto Parkinson;  Surgeon: Francis Alan MD;  Location: [facility] OR [address] [place] [address];  Service: Neurosurgery;  Laterality: N/A;  **AIRO**  **CELL SAVER**  Co-Surgeon: Roberto Parkinson  NM: EMG, SEP, MEP  Position: Prone  Bed: [place] 4 post, prone pillow  Blood: Type & Hold 2 units  Rad: C-Arm, AIRO  Vendor: [employer]  Misc: Vivigen, Infuse, Cement (Depuy)  [place]    NECK SURGERY      SPINE SURGERY      TRANSRECTAL ULTRASOUND EXAMINATION N/A 7/21/2021    Procedure: ULTRASOUND, RECTAL APPROACH;  Surgeon: Colton Kamara Jr., MD;  Location: [facility] OR;  Service: Urology;  Laterality: N/A;    UPPER GASTROINTESTINAL ENDOSCOPY           Review of Systems   Constitutional: Negative for appetite change, fever and unexpected weight change.   HENT: Negative for trouble swallowing.         No jaundice.   Respiratory: Negative for cough, shortness of breath and wheezing.         He is a former smoker.  Currently he does not use tobacco or consume alcohol.  He denies dysphagia aspiration hemoptysis chronic cough chronic sputum production or dyspnea on exertion.   Cardiovascular: Negative for chest pain.        He denies exertional chest pain or rhythm disturbance.  He states his hypertension and hyperlipidemia are well controlled on the current medications.   Gastrointestinal: Positive for diarrhea. Negative for abdominal distention, abdominal pain, anal bleeding, blood in stool, constipation and nausea.   Endocrine:        He states his diabetes is well controlled.  He tries stay on the diabetic diet and take his medications as prescribed.  His diabetes and medications [facility] provided [facility].  The records were requested.  He has tried to avoid the offending foods such as the fried and fatty foods.  He takes his vitamins and minerals.   Musculoskeletal: Positive for arthralgias, back pain, gait problem, neck pain and neck stiffness.        He ambulates with a cane.  He is concerned about the loud popping noise that occurs  when he turns his neck to the left.   Skin: Negative for pallor and rash.   Neurological: Negative for dizziness, seizures, syncope, speech difficulty, weakness and numbness.   Hematological: Negative for adenopathy.   Psychiatric/Behavioral: Negative for confusion.       Objective:      Physical Exam  Vitals reviewed.   Constitutional:       Appearance: He is well-developed.      Comments: Well-nourished well-hydrated afebrile nonicteric  male.  He is sitting comfortably in the chair.  He is breathing normally.  He appears to be normocephalic.  Pupils are normal.  He appears to be oriented x3 and can relate his history and answer questions appropriately.   HENT:      Head: Normocephalic.   Eyes:      Pupils: Pupils are equal, round, and reactive to light.   Neck:      Thyroid: No thyromegaly.      Trachea: No tracheal deviation.      Comments: Surgical scars.  Cardiovascular:      Rate and Rhythm: Normal rate and regular rhythm.      Heart sounds: Normal heart sounds.   Pulmonary:      Effort: Pulmonary effort is normal.      Breath sounds: Normal breath sounds.   Abdominal:      General: Bowel sounds are normal. There is no distension.      Palpations: Abdomen is soft. There is no mass.      Tenderness: There is no abdominal tenderness. There is no right CVA tenderness, guarding or rebound.      Hernia: No hernia is present.   Musculoskeletal:      Cervical back: Normal range of motion.      Comments: He ambulates slowly with a cane.  He can go from the sitting to the standing position without difficulty.   Lymphadenopathy:      Cervical: No cervical adenopathy.   Skin:     General: Skin is warm and dry.   Neurological:      Mental Status: He is alert and oriented to person, place, and time.      Cranial Nerves: No cranial nerve deficit.   Psychiatric:         Behavior: Behavior normal.           Plan:       Diverticulosis of large intestine without hemorrhage    Pain  -     oxyCODONE-acetaminophen  (PERCOCET)  mg per tablet; Take 1 tablet by mouth every 6 (six) hours as needed for Pain.  Dispense: 120 tablet; Refill: 0    Gastroesophageal reflux disease, unspecified whether esophagitis present    Hepatitis C virus infection resolved after antiviral drug therapy    Hiatal hernia    Fatty liver    He will continue his reflux regimen current medications vitamins and minerals.  He continues his diabetic diet and current medications.  [facility]  He continues his bowel program with additional fiber.  He states his [facility] have scheduled evaluation of his liver with ultrasound and the records were requested.  He will follow up with the surgeon.  At this point he is doing so well he does not want further GI evaluation.  He uses the Percocet for pain and will avoid the offending foods.

## 2022-06-09 ENCOUNTER — HOSPITAL ENCOUNTER (OUTPATIENT)
Dept: RADIOLOGY | Facility: HOSPITAL | Age: 72
Discharge: HOME | End: 2022-06-09
Attending: NURSE PRACTITIONER
Payer: OTHER GOVERNMENT

## 2022-06-09 DIAGNOSIS — M54.2 NECK PAIN WITH HISTORY OF CERVICAL SPINAL SURGERY: ICD-10-CM

## 2022-06-09 DIAGNOSIS — Z98.890 H/O LUMBOSACRAL SPINE SURGERY: ICD-10-CM

## 2022-06-09 DIAGNOSIS — Z98.890 NECK PAIN WITH HISTORY OF CERVICAL SPINAL SURGERY: ICD-10-CM

## 2022-06-09 PROCEDURE — 72082 X-RAY EXAM ENTIRE SPI 2/3 VW: CPT | Mod: TC,FY

## 2022-06-09 PROCEDURE — 72082 XR SCOLIOSIS COMPLETE: ICD-10-PCS | Mod: 26,,, | Performed by: RADIOLOGY

## 2022-06-09 PROCEDURE — 72082 X-RAY EXAM ENTIRE SPI 2/3 VW: CPT | Mod: 26,,, | Performed by: RADIOLOGY

## 2022-06-09 PROCEDURE — 72131 CT LUMBAR SPINE W/O DYE: CPT | Mod: TC,PO

## 2022-06-09 PROCEDURE — 72125 CT NECK SPINE W/O DYE: CPT | Mod: TC,PO

## 2022-06-24 ENCOUNTER — TELEPHONE (OUTPATIENT)
Dept: NEUROSURGERY | Facility: CLINIC | Age: 72
End: 2022-06-24
Payer: OTHER GOVERNMENT

## 2022-07-06 ENCOUNTER — OFFICE VISIT (OUTPATIENT)
Dept: GASTROENTEROLOGY | Facility: CLINIC | Age: 72
End: 2022-07-06
Payer: MEDICARE

## 2022-07-06 VITALS
HEIGHT: 73 IN | HEART RATE: 83 BPM | SYSTOLIC BLOOD PRESSURE: 128 MMHG | WEIGHT: 211.44 LBS | BODY MASS INDEX: 28.02 KG/M2 | DIASTOLIC BLOOD PRESSURE: 72 MMHG

## 2022-07-06 DIAGNOSIS — G89.4 CHRONIC PAIN SYNDROME: Primary | ICD-10-CM

## 2022-07-06 DIAGNOSIS — K76.0 FATTY LIVER: ICD-10-CM

## 2022-07-06 DIAGNOSIS — R52 PAIN: ICD-10-CM

## 2022-07-06 DIAGNOSIS — K57.30 DIVERTICULOSIS OF LARGE INTESTINE WITHOUT HEMORRHAGE: ICD-10-CM

## 2022-07-06 DIAGNOSIS — K21.9 GASTROESOPHAGEAL REFLUX DISEASE, UNSPECIFIED WHETHER ESOPHAGITIS PRESENT: ICD-10-CM

## 2022-07-06 DIAGNOSIS — Z86.19 HEPATITIS C VIRUS INFECTION RESOLVED AFTER ANTIVIRAL DRUG THERAPY: ICD-10-CM

## 2022-07-06 DIAGNOSIS — K44.9 HIATAL HERNIA: ICD-10-CM

## 2022-07-06 PROCEDURE — 99999 PR PBB SHADOW E&M-EST. PATIENT-LVL III: CPT | Mod: PBBFAC,,, | Performed by: INTERNAL MEDICINE

## 2022-07-06 PROCEDURE — 99214 OFFICE O/P EST MOD 30 MIN: CPT | Mod: S$GLB,,, | Performed by: INTERNAL MEDICINE

## 2022-07-06 PROCEDURE — 99999 PR PBB SHADOW E&M-EST. PATIENT-LVL III: ICD-10-PCS | Mod: PBBFAC,,, | Performed by: INTERNAL MEDICINE

## 2022-07-06 PROCEDURE — 99213 OFFICE O/P EST LOW 20 MIN: CPT | Mod: PBBFAC,PN | Performed by: INTERNAL MEDICINE

## 2022-07-06 PROCEDURE — 99214 PR OFFICE/OUTPT VISIT, EST, LEVL IV, 30-39 MIN: ICD-10-PCS | Mod: S$GLB,,, | Performed by: INTERNAL MEDICINE

## 2022-07-06 RX ORDER — OMEPRAZOLE 20 MG/1
20 CAPSULE, DELAYED RELEASE ORAL DAILY
Qty: 90 CAPSULE | Refills: 3 | Status: SHIPPED | OUTPATIENT
Start: 2022-07-06 | End: 2022-09-30 | Stop reason: SDUPTHER

## 2022-07-06 RX ORDER — OXYCODONE AND ACETAMINOPHEN 10; 325 MG/1; MG/1
1 TABLET ORAL EVERY 6 HOURS PRN
Qty: 120 TABLET | Refills: 0 | Status: SHIPPED | OUTPATIENT
Start: 2022-07-06 | End: 2022-08-05 | Stop reason: SDUPTHER

## 2022-07-06 NOTE — PROGRESS NOTES
Subjective:       Patient ID: Garcia Renteria is a 72 y.o. male.    Chief Complaint: Follow-up (Med refill)    He has a history of gastroesophageal reflux, hiatal hernia, diverticulosis and hemorrhoids.  He is on the PPI and his upper GI symptoms have resolved.  He is on a diabetic diet.  His PCP is [facility].  He states his diabetes is well controlled.  He made a food diary and he is avoiding the fried and the fatty foods.  He has a history of diverticulosis and hemorrhoids.  He has had upper endoscopy and colonoscopy.  He does not want further GI evaluation because he is doing well.  He has added more fiber to the diet and uses occasional MiraLax.  He denies dysphagia aspiration hematemesis hematochezia jaundice or bleeding.  He has a chronic pain syndrome and uses the Percocet which has controlled the majority of his symptoms and he is able to perform his usual activities.  He is intolerant to the anti-inflammatory agents.  He has been treated for chronic hepatitis-C and is considered an SVR.  [facility] and [facility].      Allergies:  Review of patient's allergies indicates:  No Known Allergies    Medications:    Current Outpatient Medications:     azithromycin (Z-NISHANT) 250 MG tablet, Take 250 mg by mouth once daily., Disp: , Rfl:     bacitracin 500 unit/gram Oint, Apply topically 2 (two) times daily., Disp: , Rfl: 0    cefTRIAXone (ROCEPHIN) 1 gram injection, SMARTSI Gram(s) IM Every Night, Disp: , Rfl:     cholecalciferol, vitamin D3, 1,250 mcg (50,000 unit) capsule, Take by mouth., Disp: , Rfl:     cyanocobalamin (VITAMIN B-12) 1000 MCG tablet, , Disp: , Rfl:     cyanocobalamin (VITAMIN B-12) 1000 MCG tablet, TAKE ONE TABLET BY MOUTH DAILY AS A VITAMIN B12 SUPPLEMENT, Disp: , Rfl:     diclofenac sodium (VOLTAREN) 1 % Gel, Apply 2 g topically 2 (two) times daily., Disp: , Rfl:     famotidine (PEPCID) 40 MG tablet, Take 1 tablet (40 mg total) by  mouth every evening., Disp: 90 tablet, Rfl: 3    gabapentin (NEURONTIN) 100 MG capsule, , Disp: , Rfl:     ibuprofen (ADVIL,MOTRIN) 800 MG tablet, , Disp: , Rfl:     indomethacin (INDOCIN) 25 MG capsule, , Disp: , Rfl:     lidocaine (LIDODERM) 5 %, Place 1 patch onto the skin once daily. Remove & Discard patch within 12 hours or as directed by MD, Disp: 15 patch, Rfl: 0    LIDOcaine-benzalkonium-alcohol 2.5 % Soln, USE 1 PAD EXTERNALLY EVERY THIRD DAY (EVERY 72 HOURS), Disp: , Rfl:     lisinopril (PRINIVIL,ZESTRIL) 5 MG tablet, Take 5 mg by mouth once daily. , Disp: , Rfl:     metFORMIN (GLUCOPHAGE) 1000 MG tablet, Take 500 mg by mouth daily with breakfast. , Disp: , Rfl:     methocarbamoL (ROBAXIN) 750 MG Tab, Take 1 tablet (750 mg total) by mouth 3 (three) times daily., Disp: 40 tablet, Rfl: 0    mirabegron (MYRBETRIQ) 25 mg Tb24 ER tablet, Take 1 tablet (25 mg total) by mouth once daily., Disp: 30 tablet, Rfl: 11    nitrofurantoin, macrocrystal-monohydrate, (MACROBID) 100 MG capsule, Take 100 mg by mouth 2 (two) times daily., Disp: , Rfl:     omega 3-dha-epa-fish oil 1,000 mg (120 mg-180 mg) Cap, TAKE ONE CAPSULE BY MOUTH DAILY AS A SUPPLEMENT, Disp: , Rfl:     omega-3s/dha/epa/fish oil/D3 (VITAMIN-D + OMEGA-3 ORAL), TAKE ONE CAPSULE BY MOUTH EVERY WEEK FOR VITAMIN D3 REPLACEMENT, Disp: , Rfl:     phenazopyridine (PYRIDIUM) 200 MG tablet, Take 200 mg by mouth 3 (three) times daily., Disp: , Rfl:     sildenafiL (VIAGRA) 100 MG tablet, TAKE ONE-HALF TABLET BY MOUTH DAILY FOR ERECTILE DYSFUNCTION *TAKE ONE HOUR BEFORE SEXUAL ACTIVITY* NO MORE THAN ONE DOSE IN 24 HOURS ** MUST NOT TAKE NITROGLYCERIN TYPE DRUGS WITH VIAGRA **+++THIS IS, Disp: , Rfl:     STOOL SOFTENER, DOCUSATE MADELAINE, 240 mg capsule, , Disp: , Rfl:     tamsulosin (FLOMAX) 0.4 mg Cap, , Disp: , Rfl:     triamterene-hydrochlorothiazide 75-50 mg (MAXZIDE) 75-50 mg per tablet, Take 0.5 tablets by mouth once daily. , Disp: , Rfl:      trospium (SANCTURA) 20 mg Tab tablet, Take 20 mg by mouth 2 (two) times daily., Disp: , Rfl:     vitamin D (VITAMIN D3) 1000 units Tab, Take 1,000 Units by mouth once daily., Disp: , Rfl:     amoxicillin-clavulanate 875-125mg (AUGMENTIN) 875-125 mg per tablet, Take 1 tablet by mouth 2 (two) times daily., Disp: , Rfl:     omeprazole (PRILOSEC) 20 MG capsule, Take 1 capsule (20 mg total) by mouth once daily., Disp: 90 capsule, Rfl: 3    oxyCODONE-acetaminophen (PERCOCET)  mg per tablet, Take 1 tablet by mouth every 6 (six) hours as needed for Pain., Disp: 120 tablet, Rfl: 0    pregabalin (LYRICA) 150 MG capsule, Take 1 capsule (150 mg total) by mouth 3 (three) times daily., Disp: 90 capsule, Rfl: 6    Past Medical History:   Diagnosis Date    Colitis     Diabetes mellitus, type 2     Diverticulosis     GERD (gastroesophageal reflux disease)     Hypertension        Past Surgical History:   Procedure Laterality Date    ANKLE SURGERY Bilateral     ANTERIOR CERVICAL DISCECTOMY W/ FUSION N/A 10/30/2019    Procedure: C3-4, C4-5 ACDF;  Surgeon: Francis Alan MD;  Location: [facility] OR [address] [place] [address];  Service: Neurosurgery;  Laterality: N/A;  C3-4, C4-5 ACDF  Anesthesia:  General  Rad:  C-Arm  NM:  EMG, SEP, & MEP  Blood:  Type & Screen  Position:  Supine  Bed:  Regular slider bed  Headrest:  [place] & GW tongs/hanging weights  Misc:  Small vivigen  Nerve root retractor (DePuy)  Disposable #2 [place]  Interbody    BACK SURGERY      COLONOSCOPY      COLONOSCOPY N/A 10/7/2020    Procedure: COLONOSCOPY;  Surgeon: Elpidio Lau MD;  Location: [facility] ENDO;  Service: Endoscopy;  Laterality: N/A;    CREATION OF MUSCLE ROTATIONAL FLAP  3/3/2020    Procedure: CREATION, FLAP, MUSCLE ROTATION;  Surgeon: Francis Alan MD;  [facility];  Service: Neurosurgery;;    CYSTOSCOPY N/A 7/21/2021    Procedure: CYSTOSCOPY;  Surgeon: Colton Kamara Jr., MD;  Location: [facility] OR;  Service: Urology;  Laterality: N/A;     INJECTION OF ANESTHETIC AGENT AROUND NERVE Bilateral 3/10/2020    Procedure: Block, Nerve;  Surgeon: Erinn Surgeon;  Location: [facility];  Service: Anesthesiology;  Laterality: Bilateral;    lower back surgery      LUMBAR LAMINECTOMY WITH FUSION N/A 3/3/2020    Procedure: T10-Pelvis Fusion with L4 PSO **AIRO** Co-Surgeon: Roberto Parkinson;  Surgeon: Francis Alan MD;  Location: [address] FLR;  Service: Neurosurgery;  Laterality: N/A;  **AIRO**  **CELL SAVER**  Co-Surgeon: Roberto Parkinson  NM: EMG, SEP, MEP  Position: Prone  Bed: [place] 4 post, prone pillow  Blood: Type & Hold 2 units  Rad: C-Arm, AIRO  Vendor: [employer]  Misc: Vivigen, Infuse, Cement (Depuy)  [place]    NECK SURGERY      SPINE SURGERY      TRANSRECTAL ULTRASOUND EXAMINATION N/A 7/21/2021    Procedure: ULTRASOUND, RECTAL APPROACH;  Surgeon: Colton Kamara Jr., MD;  Location: [facility];  Service: Urology;  Laterality: N/A;    UPPER GASTROINTESTINAL ENDOSCOPY           Review of Systems   Constitutional: Negative for appetite change, fever and unexpected weight change.   HENT: Negative for trouble swallowing.         No jaundice.   Respiratory: Negative for cough, shortness of breath and wheezing.         He is a former cigarette smoker.  Currently he does not use tobacco or.  She get the he is a former cigarette smoker.  Currently he does not use tobacco or alcohol.  He denies dysphagia aspiration chronic cough chronic sputum production or dyspnea on exertion but is not physically active.   Cardiovascular: Negative for chest pain.        He denies exertional chest pain or rhythm disturbance.  He states his hypertension hyperlipidemia are well controlled.   Gastrointestinal: Negative for abdominal distention, abdominal pain, anal bleeding, blood in stool, constipation, diarrhea and nausea.   Endocrine:        He states his diabetes is well controlled.  He states his hypertension hyperlipidemia well controlled.  [facility]  He has  a fatty liver and is taking his vitamins and minerals.  The records were requested.   Musculoskeletal: Positive for arthralgias, back pain, gait problem, myalgias and neck pain.        He has had multiple cervical spine surgeries and lumbar spine surgeries.  He is followed by his surgeon.  He ambulates with a cane.MS- reviewed he still has neck discomfort which occurred after the a fall.  He does not want further surgery.  He is scheduled to have additional MRI scans.  He takes his pain medications as prescribed.  He is able to ambulate with the cane.   Skin: Negative for pallor and rash.   Neurological: Negative for dizziness, seizures, syncope, speech difficulty, weakness and numbness.   Hematological: Negative for adenopathy.   Psychiatric/Behavioral: Negative for confusion.       Objective:      Physical Exam  Vitals reviewed.   Constitutional:       Appearance: He is well-developed.      Comments: Well-nourished well-hydrated afebrile nonicteric  male.  He is sitting comfortably in the chair.  He is breathing normally.  He is normocephalic.  Pupils are normal.  He appears to be oriented x3 and can relate his history and answer questions appropriately.   HENT:      Head: Normocephalic.   Eyes:      Pupils: Pupils are equal, round, and reactive to light.   Neck:      Thyroid: No thyromegaly.      Trachea: No tracheal deviation.   Cardiovascular:      Rate and Rhythm: Normal rate and regular rhythm.      Heart sounds: Normal heart sounds.   Pulmonary:      Effort: Pulmonary effort is normal.      Breath sounds: Normal breath sounds.   Abdominal:      General: There is no distension.      Palpations: There is no mass.      Tenderness: There is no abdominal tenderness. There is no right CVA tenderness, left CVA tenderness, guarding or rebound.      Hernia: No hernia is present.   Musculoskeletal:      Cervical back: Normal range of motion and neck supple.      Comments: He ambulates slowly with a  cane.  He can go from the sitting to the standing position without difficulty.   Lymphadenopathy:      Cervical: No cervical adenopathy.   Skin:     General: Skin is warm and dry.   Neurological:      Mental Status: He is alert and oriented to person, place, and time.      Cranial Nerves: No cranial nerve deficit.   Psychiatric:         Behavior: Behavior normal.           Plan:       Chronic pain syndrome    Pain  -     oxyCODONE-acetaminophen (PERCOCET)  mg per tablet; Take 1 tablet by mouth every 6 (six) hours as needed for Pain.  Dispense: 120 tablet; Refill: 0    Gastroesophageal reflux disease, unspecified whether esophagitis present  -     omeprazole (PRILOSEC) 20 MG capsule; Take 1 capsule (20 mg total) by mouth once daily.  Dispense: 90 capsule; Refill: 3    Diverticulosis of large intestine without hemorrhage    Hepatitis C virus infection resolved after antiviral drug therapy    Hiatal hernia    Fatty liver    He will continue his reflux regimen current medications vitamins and minerals.  He continues his bowel program with additional fiber and/or MiraLax.  He takes his pain medications as prescribed.  He follows up with his other physicians and the [facility].  The records were requested.  He does not want further GI evaluation at this point.

## 2022-07-06 NOTE — PATIENT INSTRUCTIONS
He will continue his reflux regimen diabetic diet and current medications.  He scheduled to be evaluated by the surgeon on hand July 14th will keep us informed.

## 2022-07-13 ENCOUNTER — TELEPHONE (OUTPATIENT)
Dept: NEUROSURGERY | Facility: CLINIC | Age: 72
End: 2022-07-13
Payer: OTHER GOVERNMENT

## 2022-07-13 NOTE — TELEPHONE ENCOUNTER
Called to confirm appt tomorrow w/ dr. Alan=  [phone]  = [facility]  [phone] = Patient confirmed appt

## 2022-07-14 ENCOUNTER — OFFICE VISIT (OUTPATIENT)
Dept: NEUROSURGERY | Facility: CLINIC | Age: 72
End: 2022-07-14
Payer: OTHER GOVERNMENT

## 2022-07-14 VITALS — SYSTOLIC BLOOD PRESSURE: 128 MMHG | HEART RATE: 87 BPM | DIASTOLIC BLOOD PRESSURE: 72 MMHG

## 2022-07-14 DIAGNOSIS — Z98.1 S/P CERVICAL SPINAL FUSION: Primary | ICD-10-CM

## 2022-07-14 PROCEDURE — 99212 PR OFFICE/OUTPT VISIT, EST, LEVL II, 10-19 MIN: ICD-10-PCS | Mod: S$PBB,,, | Performed by: NEUROLOGICAL SURGERY

## 2022-07-14 PROCEDURE — 99999 PR PBB SHADOW E&M-EST. PATIENT-LVL IV: CPT | Mod: PBBFAC,,, | Performed by: NEUROLOGICAL SURGERY

## 2022-07-14 PROCEDURE — 99212 OFFICE O/P EST SF 10 MIN: CPT | Mod: S$PBB,,, | Performed by: NEUROLOGICAL SURGERY

## 2022-07-14 PROCEDURE — 99999 PR PBB SHADOW E&M-EST. PATIENT-LVL IV: ICD-10-PCS | Mod: PBBFAC,,, | Performed by: NEUROLOGICAL SURGERY

## 2022-07-14 PROCEDURE — 99214 OFFICE O/P EST MOD 30 MIN: CPT | Mod: PBBFAC | Performed by: NEUROLOGICAL SURGERY

## 2022-07-14 NOTE — PROGRESS NOTES
"Neurosurgery  Established Patient    SUBJECTIVE:     History of Present Illness: Garcia Renteria is a 72 y.o. male with a hx of ACD C3-4 and C4-5 10/30/19 and T10-Pelvis Fusion with L4 PSO on 3//3/2020 both with Dr. Alan. He is being seen in clinic today to discuss concerns with an audible "pop/click" in his neck when he rotates it from side to side. States that he has been doing well since his surgeries; however, last April he slipped and fell face first on his driveway which caused him to experience severe right-sided neck pain and concern for broken hardware. With time, he noticed some improvement in her neck pain/stiffness and ROM.    Today, he is primarily concerned that he might have broken surgical hardware. Denies sever pain. Reports increased weakness in his hands bilaterally with frequent dropping of objects, balance difficulties, and hand clumsiness. He is ambulating with a cane. The patient has a hx of urinary leakage, which remains unchanged since surgery. Denies bowel incontinence. Denies significant back pain, but since the fall has had left-sided low back pain that radiates down the posterior aspect of the leg stopping at about the ankle. Reports persistent numbness and tingling in his legs and feet L >R and was told that he has polyneuropathy.       Interval 7/14:    Pt presenting today due to concern of cracking sounds in neck with head movement. Pt denies associated pain or new onset weakness or sensory changes. Pt with longstanding urge incontience which has not changed form baseline. On questioning pt endorses difficulty with fine motor tasks. No recent falls or subjective gait imbalance.     Review of patient's allergies indicates:  No Known Allergies    Current Outpatient Medications   Medication Sig Dispense Refill    cholecalciferol, vitamin D3, 1,250 mcg (50,000 unit) capsule Take by mouth.      cyanocobalamin (VITAMIN B-12) 1000 MCG tablet TAKE ONE TABLET BY MOUTH DAILY AS A VITAMIN " B12 SUPPLEMENT      diclofenac sodium (VOLTAREN) 1 % Gel Apply 2 g topically 2 (two) times daily.      famotidine (PEPCID) 40 MG tablet Take 1 tablet (40 mg total) by mouth every evening. 90 tablet 3    gabapentin (NEURONTIN) 100 MG capsule       ibuprofen (ADVIL,MOTRIN) 800 MG tablet       lidocaine (LIDODERM) 5 % Place 1 patch onto the skin once daily. Remove & Discard patch within 12 hours or as directed by MD 15 patch 0    LIDOcaine-benzalkonium-alcohol 2.5 % Soln USE 1 PAD EXTERNALLY EVERY THIRD DAY (EVERY 72 HOURS)      metFORMIN (GLUCOPHAGE) 1000 MG tablet Take 500 mg by mouth daily with breakfast.       mirabegron (MYRBETRIQ) 25 mg Tb24 ER tablet Take 1 tablet (25 mg total) by mouth once daily. 30 tablet 11    omega 3-dha-epa-fish oil 1,000 mg (120 mg-180 mg) Cap TAKE ONE CAPSULE BY MOUTH DAILY AS A SUPPLEMENT      omega-3s/dha/epa/fish oil/D3 (VITAMIN-D + OMEGA-3 ORAL) TAKE ONE CAPSULE BY MOUTH EVERY WEEK FOR VITAMIN D3 REPLACEMENT      omeprazole (PRILOSEC) 20 MG capsule Take 1 capsule (20 mg total) by mouth once daily. 90 capsule 3    oxyCODONE-acetaminophen (PERCOCET)  mg per tablet Take 1 tablet by mouth every 6 (six) hours as needed for Pain. 120 tablet 0    sildenafiL (VIAGRA) 100 MG tablet TAKE ONE-HALF TABLET BY MOUTH DAILY FOR ERECTILE DYSFUNCTION *TAKE ONE HOUR BEFORE SEXUAL ACTIVITY* NO MORE THAN ONE DOSE IN 24 HOURS ** MUST NOT TAKE NITROGLYCERIN TYPE DRUGS WITH VIAGRA **+++THIS IS      STOOL SOFTENER, DOCUSATE MADELAINE, 240 mg capsule       vitamin D (VITAMIN D3) 1000 units Tab Take 1,000 Units by mouth once daily.      amoxicillin-clavulanate 875-125mg (AUGMENTIN) 875-125 mg per tablet Take 1 tablet by mouth 2 (two) times daily.      azithromycin (Z-NISHANT) 250 MG tablet Take 250 mg by mouth once daily.      bacitracin 500 unit/gram Oint Apply topically 2 (two) times daily. (Patient not taking: Reported on 2022)  0    cefTRIAXone (ROCEPHIN) 1 gram injection SMARTSI  Gram(s) IM Every Night      cyanocobalamin (VITAMIN B-12) 1000 MCG tablet       indomethacin (INDOCIN) 25 MG capsule       lisinopril (PRINIVIL,ZESTRIL) 5 MG tablet Take 5 mg by mouth once daily.       methocarbamoL (ROBAXIN) 750 MG Tab Take 1 tablet (750 mg total) by mouth 3 (three) times daily. (Patient not taking: Reported on 7/14/2022) 40 tablet 0    nitrofurantoin, macrocrystal-monohydrate, (MACROBID) 100 MG capsule Take 100 mg by mouth 2 (two) times daily.      phenazopyridine (PYRIDIUM) 200 MG tablet Take 200 mg by mouth 3 (three) times daily.      pregabalin (LYRICA) 150 MG capsule Take 1 capsule (150 mg total) by mouth 3 (three) times daily. 90 capsule 6    tamsulosin (FLOMAX) 0.4 mg Cap       triamterene-hydrochlorothiazide 75-50 mg (MAXZIDE) 75-50 mg per tablet Take 0.5 tablets by mouth once daily.       trospium (SANCTURA) 20 mg Tab tablet Take 20 mg by mouth 2 (two) times daily.       No current facility-administered medications for this visit.       Past Medical History:   Diagnosis Date    Colitis     Diabetes mellitus, type 2     Diverticulosis     GERD (gastroesophageal reflux disease)     Hypertension      Past Surgical History:   Procedure Laterality Date    ANKLE SURGERY Bilateral     ANTERIOR CERVICAL DISCECTOMY W/ FUSION N/A 10/30/2019    Procedure: C3-4, C4-5 ACDF;  Surgeon: Francis Alan MD;  Location: [address];  Service: Neurosurgery;  Laterality: N/A;  C3-4, C4-5 ACDF  Anesthesia:  General  Rad:  C-Arm  NM:  EMG, SEP, & MEP  Blood:  Type & Screen  Position:  Supine  Bed:  Regular slider bed  Headrest:  [place] & GW tongs/hanging weights  Misc:  Small vivigen  Nerve root retractor (DePuy)  Disposable #2 [place]  Interbody    BACK SURGERY      COLONOSCOPY      COLONOSCOPY N/A 10/7/2020    Procedure: COLONOSCOPY;  Surgeon: Elpidio Lau MD;  [facility];  Service: Endoscopy;  Laterality: N/A;    CREATION OF MUSCLE ROTATIONAL FLAP  3/3/2020    Procedure:  CREATION, FLAP, MUSCLE ROTATION;  Surgeon: Francis Alan MD;  [address]  Service: Neurosurgery;;    CYSTOSCOPY N/A 2021    Procedure: CYSTOSCOPY;  Surgeon: Colton Kamara Jr., MD;  Location: [facility];  Service: Urology;  Laterality: N/A;    INJECTION OF ANESTHETIC AGENT AROUND NERVE Bilateral 3/10/2020    Procedure: Block, Nerve;  Surgeon: Erinn Surgeon;  Location: [facility];  Service: Anesthesiology;  Laterality: Bilateral;    lower back surgery      LUMBAR LAMINECTOMY WITH FUSION N/A 3/3/2020    Procedure: T10-Pelvis Fusion with L4 PSO **AIRO** Co-Surgeon: Roberto Parkinson;  Surgeon: Francis Alan MD;  [address]  Service: Neurosurgery;  Laterality: N/A;  **AIRO**  **CELL SAVER**  Co-Surgeon: Roberto Parkinson  NM: EMG, SEP, MEP  Position: Prone  Bed: [place] 4 post, prone pillow  Blood: Type & Hold 2 units  Rad: C-Arm, AIRO  Vendor: [employer]  Misc: Vivigen, Infuse, Cement (Depuy)  [place]    NECK SURGERY      SPINE SURGERY      TRANSRECTAL ULTRASOUND EXAMINATION N/A 2021    Procedure: ULTRASOUND, RECTAL APPROACH;  Surgeon: oClton Kamara Jr., MD;  Location: [facility];  Service: Urology;  Laterality: N/A;    UPPER GASTROINTESTINAL ENDOSCOPY       Family History     Problem Relation (Age of Onset)    Neurological Disorders Mother        Social History     Socioeconomic History    Marital status:    Tobacco Use    Smoking status: Former Smoker     Types: Cigars     Quit date: 10/2019     Years since quittin.7    Smokeless tobacco: Never Used   Substance and Sexual Activity    Alcohol use: Yes     Alcohol/week: 2.0 standard drinks     Types: 1 Glasses of wine, 1 Cans of beer per week     Comment: 1 wine , 2 times a week    Drug use: Not Currently    Sexual activity: Yes     Partners: Female       Review of Systems   Constitutional: Negative for activity change, appetite change and fever.   HENT: Negative for hearing loss and postnasal drip.    Eyes: Negative for pain  and visual disturbance.   Respiratory: Negative for shortness of breath.    Cardiovascular: Negative for chest pain and palpitations.   Gastrointestinal: Negative for abdominal pain.   Endocrine: Negative for polydipsia, polyphagia and polyuria.   Musculoskeletal: Positive for back pain, myalgias, neck pain and neck stiffness. Negative for gait problem.   Neurological: Positive for weakness and numbness. Negative for dizziness and headaches.   Psychiatric/Behavioral: Negative for confusion and dysphoric mood.       OBJECTIVE:     Vital Signs  Pulse: 87  BP: 128/72  Pain Score: 10-Worst pain ever  There is no height or weight on file to calculate BMI.    Neurosurgery Physical Exam  General: well developed, well nourished, no distress.   Head: normocephalic, atraumatic  Neurologic: Alert and oriented. Thought content appropriate.  GCS: Motor: 6/Verbal: 5/Eyes: 4 GCS Total: 15  Mental Status: Awake, Alert, Oriented x 4  Language: No aphasia  Speech: No dysarthria  Cranial nerves: face symmetric, tongue midline, CN II-XII grossly intact.   Eyes: pupils equal, round, reactive to light with accomodation, EOMI.   Pulmonary: normal respirations, no signs of respiratory distress  Abdomen: soft, non-distended  Skin: Skin is warm, dry and intact.  Sensory: intact to light touch throughout  Motor Strength:Moves all extremities spontaneously with good tone.     Strength  Deltoids Triceps Biceps Wrist Extension Wrist Flexion Hand    Upper: R 5/5 5/5 5/5 5/5 5/5 5-/5    L 5/5 5/5 5/5 5/5 5/5 5/5     HF KE KF DF PF EHL   Lower: R 5/5 5/5 5/5 5/5 5/5 5/5    L 5/5 5/5 5/5 4/5 4/5 4/5     Reflexes:   DTR: 1+ symmetrically throughout.  Harris's: Negative.  Clonus: Negative.     Cerebellar:   Gait stable, cautious with use of cane    Cervical:   ROM: Full with flexion, extension, lateral rotation and ear-to-shoulder bend.   Midline TTP: Negative.     Thoracic:  Midline TTP: Negative.     Lumbar:  Midline TTP: Negative.  Straight  "Leg Test: Negative.    Other:  SI joint TTP: Negative.  Greater trochanter TTP: Negative.  Tenderness with external/internal hip rotation: Negative.    Diagnostic Results:  CT cervical spine 6/9/22:  -Successful fusion over anterior constructs.  X-ray cervical spine 6/9/22:  -Regional fixed sagittal imbalance.         ASSESSMENT/PLAN:   Garcia Renteria is a 72 y.o. male with a hx of ACD C3-4 and C4-5 10/30/19 and T10-Pelvis Fusion with L4 PSO on 3//3/2020. He was seen in clinic today to discuss concerns with an audible "pop/click" in his neck when he rotates it from side to side. I reassured him that these are nonspecific symptoms and that my concerns are low. His xrays look great and he has solidly fused through the construct. No treatment indicated. On further questioning he endorses some parasthesias in BLE that sound consistent with diabetic neuropathy. He will talk to his PCP about upping his Gabapentin. We would consider an EMG if his leg symptoms worsen. He will RTC PRN.              "

## 2022-07-31 DIAGNOSIS — E11.40 TYPE 2 DIABETES MELLITUS WITH DIABETIC NEUROPATHY, WITHOUT LONG-TERM CURRENT USE OF INSULIN: Primary | ICD-10-CM

## 2022-08-01 ENCOUNTER — TELEPHONE (OUTPATIENT)
Dept: NEUROSURGERY | Facility: CLINIC | Age: 72
End: 2022-08-01
Payer: OTHER GOVERNMENT

## 2022-08-01 NOTE — TELEPHONE ENCOUNTER
Called patient regarding message about proceeding with EMG study. Pt stated he would like it to be done at [facility] on [address] in [place]. Informed the patient I will fax the orders for the EMG as well as the Xray to [facility]. Pt agreed.

## 2022-08-05 ENCOUNTER — OFFICE VISIT (OUTPATIENT)
Dept: GASTROENTEROLOGY | Facility: CLINIC | Age: 72
End: 2022-08-05
Payer: OTHER GOVERNMENT

## 2022-08-05 VITALS
HEART RATE: 76 BPM | WEIGHT: 210 LBS | BODY MASS INDEX: 27.7 KG/M2 | SYSTOLIC BLOOD PRESSURE: 132 MMHG | DIASTOLIC BLOOD PRESSURE: 75 MMHG | OXYGEN SATURATION: 98 %

## 2022-08-05 DIAGNOSIS — Z86.19 HEPATITIS C VIRUS INFECTION RESOLVED AFTER ANTIVIRAL DRUG THERAPY: ICD-10-CM

## 2022-08-05 DIAGNOSIS — R52 PAIN: ICD-10-CM

## 2022-08-05 DIAGNOSIS — K76.0 FATTY LIVER: ICD-10-CM

## 2022-08-05 DIAGNOSIS — K57.30 DIVERTICULOSIS OF LARGE INTESTINE WITHOUT HEMORRHAGE: ICD-10-CM

## 2022-08-05 DIAGNOSIS — K21.9 GASTROESOPHAGEAL REFLUX DISEASE, UNSPECIFIED WHETHER ESOPHAGITIS PRESENT: ICD-10-CM

## 2022-08-05 DIAGNOSIS — K44.9 HIATAL HERNIA: ICD-10-CM

## 2022-08-05 DIAGNOSIS — G89.4 CHRONIC PAIN SYNDROME: Primary | ICD-10-CM

## 2022-08-05 PROCEDURE — 99214 PR OFFICE/OUTPT VISIT, EST, LEVL IV, 30-39 MIN: ICD-10-PCS | Mod: S$PBB,,, | Performed by: INTERNAL MEDICINE

## 2022-08-05 PROCEDURE — 99214 OFFICE O/P EST MOD 30 MIN: CPT | Mod: PBBFAC,PN | Performed by: INTERNAL MEDICINE

## 2022-08-05 PROCEDURE — 99999 PR PBB SHADOW E&M-EST. PATIENT-LVL IV: ICD-10-PCS | Mod: PBBFAC,,, | Performed by: INTERNAL MEDICINE

## 2022-08-05 PROCEDURE — 99999 PR PBB SHADOW E&M-EST. PATIENT-LVL IV: CPT | Mod: PBBFAC,,, | Performed by: INTERNAL MEDICINE

## 2022-08-05 PROCEDURE — 99214 OFFICE O/P EST MOD 30 MIN: CPT | Mod: S$PBB,,, | Performed by: INTERNAL MEDICINE

## 2022-08-05 RX ORDER — OXYCODONE AND ACETAMINOPHEN 10; 325 MG/1; MG/1
1 TABLET ORAL EVERY 6 HOURS PRN
Qty: 120 TABLET | Refills: 0 | Status: SHIPPED | OUTPATIENT
Start: 2022-08-05 | End: 2022-09-02 | Stop reason: SDUPTHER

## 2022-08-05 NOTE — PROGRESS NOTES
Subjective:       Patient ID: Garcia Renteria is a 72 y.o. male.    Chief Complaint: No chief complaint on file.    He has a history of chronic hepatitis-C and is completed treatment and he is considered an SVR.  He has a history of gastroesophageal reflux and is well controlled on the PPI.  He has a history of diverticulosis and hemorrhoids.  He is having daily bowel movements with his bowel program.  He continues his current medications vitamins minerals and diabetic diet.  .  He states his diabetes is well controlled.  He has a fatty liver.  He denies GI symptoms.      Allergies:  Review of patient's allergies indicates:  No Known Allergies    Medications:    Current Outpatient Medications:     bacitracin 500 unit/gram Oint, Apply topically 2 (two) times daily., Disp: , Rfl: 0    cholecalciferol, vitamin D3, 1,250 mcg (50,000 unit) capsule, Take by mouth., Disp: , Rfl:     cyanocobalamin (VITAMIN B-12) 1000 MCG tablet, TAKE ONE TABLET BY MOUTH DAILY AS A VITAMIN B12 SUPPLEMENT, Disp: , Rfl:     diclofenac sodium (VOLTAREN) 1 % Gel, Apply 2 g topically 2 (two) times daily., Disp: , Rfl:     famotidine (PEPCID) 40 MG tablet, Take 1 tablet (40 mg total) by mouth every evening., Disp: 90 tablet, Rfl: 3    gabapentin (NEURONTIN) 100 MG capsule, , Disp: , Rfl:     ibuprofen (ADVIL,MOTRIN) 800 MG tablet, , Disp: , Rfl:     lidocaine (LIDODERM) 5 %, Place 1 patch onto the skin once daily. Remove & Discard patch within 12 hours or as directed by MD, Disp: 15 patch, Rfl: 0    LIDOcaine-benzalkonium-alcohol 2.5 % Soln, USE 1 PAD EXTERNALLY EVERY THIRD DAY (EVERY 72 HOURS), Disp: , Rfl:     lisinopril (PRINIVIL,ZESTRIL) 5 MG tablet, Take 5 mg by mouth once daily. , Disp: , Rfl:     metFORMIN (GLUCOPHAGE) 1000 MG tablet, Take 500 mg by mouth daily with breakfast. , Disp: , Rfl:     omega 3-dha-epa-fish oil 1,000 mg (120 mg-180 mg) Cap, TAKE ONE CAPSULE BY MOUTH DAILY AS A SUPPLEMENT, Disp: , Rfl:      oxyCODONE-acetaminophen (PERCOCET)  mg per tablet, Take 1 tablet by mouth every 6 (six) hours as needed for Pain., Disp: 120 tablet, Rfl: 0    sildenafiL (VIAGRA) 100 MG tablet, TAKE ONE-HALF TABLET BY MOUTH DAILY FOR ERECTILE DYSFUNCTION *TAKE ONE HOUR BEFORE SEXUAL ACTIVITY* NO MORE THAN ONE DOSE IN 24 HOURS ** MUST NOT TAKE NITROGLYCERIN TYPE DRUGS WITH VIAGRA **+++THIS IS, Disp: , Rfl:     STOOL SOFTENER, DOCUSATE MADELAINE, 240 mg capsule, , Disp: , Rfl:     tamsulosin (FLOMAX) 0.4 mg Cap, , Disp: , Rfl:     trospium (SANCTURA) 20 mg Tab tablet, Take 20 mg by mouth 2 (two) times daily., Disp: , Rfl:     vitamin D (VITAMIN D3) 1000 units Tab, Take 1,000 Units by mouth once daily., Disp: , Rfl:     amoxicillin-clavulanate 875-125mg (AUGMENTIN) 875-125 mg per tablet, Take 1 tablet by mouth 2 (two) times daily., Disp: , Rfl:     azithromycin (Z-NISHANT) 250 MG tablet, Take 250 mg by mouth once daily., Disp: , Rfl:     cefTRIAXone (ROCEPHIN) 1 gram injection, SMARTSI Gram(s) IM Every Night, Disp: , Rfl:     cyanocobalamin (VITAMIN B-12) 1000 MCG tablet, , Disp: , Rfl:     indomethacin (INDOCIN) 25 MG capsule, , Disp: , Rfl:     methocarbamoL (ROBAXIN) 750 MG Tab, Take 1 tablet (750 mg total) by mouth 3 (three) times daily. (Patient not taking: No sig reported), Disp: 40 tablet, Rfl: 0    mirabegron (MYRBETRIQ) 25 mg Tb24 ER tablet, Take 1 tablet (25 mg total) by mouth once daily. (Patient not taking: Reported on 2022), Disp: 30 tablet, Rfl: 11    nitrofurantoin, macrocrystal-monohydrate, (MACROBID) 100 MG capsule, Take 100 mg by mouth 2 (two) times daily., Disp: , Rfl:     omega-3s/dha/epa/fish oil/D3 (VITAMIN-D + OMEGA-3 ORAL), TAKE ONE CAPSULE BY MOUTH EVERY WEEK FOR VITAMIN D3 REPLACEMENT, Disp: , Rfl:     omeprazole (PRILOSEC) 20 MG capsule, Take 1 capsule (20 mg total) by mouth once daily. (Patient not taking: Reported on 2022), Disp: 90 capsule, Rfl: 3    phenazopyridine (PYRIDIUM) 200  MG tablet, Take 200 mg by mouth 3 (three) times daily., Disp: , Rfl:     pregabalin (LYRICA) 150 MG capsule, Take 1 capsule (150 mg total) by mouth 3 (three) times daily., Disp: 90 capsule, Rfl: 6    triamterene-hydrochlorothiazide 75-50 mg (MAXZIDE) 75-50 mg per tablet, Take 0.5 tablets by mouth once daily. , Disp: , Rfl:     Past Medical History:   Diagnosis Date    Colitis     Diabetes mellitus, type 2     Diverticulosis     GERD (gastroesophageal reflux disease)     Hypertension        Past Surgical History:   Procedure Laterality Date    ANKLE SURGERY Bilateral     ANTERIOR CERVICAL DISCECTOMY W/ FUSION N/A 10/30/2019    Procedure: C3-4, C4-5 ACDF;  Surgeon: Francis Alan MD;  Location: [address];  Service: Neurosurgery;  Laterality: N/A;  C3-4, C4-5 ACDF  Anesthesia:  General  Rad:  C-Arm  NM:  EMG, SEP, & MEP  Blood:  Type & Screen  Position:  Supine  Bed:  Regular slider bed  Headrest:  [place] [employer] tongs/hanging weights  Misc:  Small vivigen  Nerve root retractor ([employer])  Disposable #2 [employer]  Interbody    BACK SURGERY      COLONOSCOPY      COLONOSCOPY N/A 10/7/2020    Procedure: COLONOSCOPY;  Surgeon: Elpidio Lau MD;  Location: [facility];  Service: Endoscopy;  Laterality: N/A;    CREATION OF MUSCLE ROTATIONAL FLAP  3/3/2020    Procedure: CREATION, FLAP, MUSCLE ROTATION;  Surgeon: Francis Alan MD;  Location: [address];  Service: Neurosurgery;;    CYSTOSCOPY N/A 7/21/2021    Procedure: CYSTOSCOPY;  Surgeon: Colton Kamara Jr., MD;  Location: [facility] OR;  Service: Urology;  Laterality: N/A;    INJECTION OF ANESTHETIC AGENT AROUND NERVE Bilateral 3/10/2020    Procedure: Block, Nerve;  Surgeon: Erinn Surgeon;  Location: [facility];  Service: Anesthesiology;  Laterality: Bilateral;    lower back surgery      LUMBAR LAMINECTOMY WITH FUSION N/A 3/3/2020    Procedure: T10-Pelvis Fusion with L4 PSO **AIRO** Co-Surgeon: Roberto Parkinson;  Surgeon: Francis Alan MD;  Location: [address]  [facility];  Service: Neurosurgery;  Laterality: N/A;  **AIRO**  **CELL SAVER**  Co-Surgeon: Roberto Parkinson  NM: EMG, SEP, MEP  Position: Prone  Bed: [place] 4 post, prone pillow  Blood: Type & Hold 2 units  Rad: C-Arm, AIRO  Vendor: [employer]  Misc: Vivigen, Infuse, Cement (Depuy)  [place]    NECK SURGERY      SPINE SURGERY      TRANSRECTAL ULTRASOUND EXAMINATION N/A 7/21/2021    Procedure: ULTRASOUND, RECTAL APPROACH;  Surgeon: Colton Kamara Jr., MD;  Location: [facility] OR;  Service: Urology;  Laterality: N/A;    UPPER GASTROINTESTINAL ENDOSCOPY           Review of Systems   Constitutional: Negative for activity change, appetite change, fever and unexpected weight change.   HENT: Negative for trouble swallowing.         No jaundice.   Respiratory: Negative for cough, shortness of breath and wheezing.         He is a former smoker.  He denies dysphagia aspiration hemoptysis chronic cough chronic sputum production or dyspnea on exertion but is not physically active.   Cardiovascular: Negative for chest pain.        He denies cardiopulmonary symptoms such as exertional chest pain or rhythm disturbance.  He states his hypertension hyperlipidemia are well controlled.   Gastrointestinal: Positive for nausea. Negative for abdominal distention, abdominal pain, anal bleeding, blood in stool, constipation, diarrhea, rectal pain and vomiting.   Endocrine:        He states his diabetes well controlled.  He tries stay on the diabetic diet.  He avoids the fried in the fatty foods.  He takes his vitamins and minerals.  He knows his a fatty liver.  [facility] provides his medications and treats his general medical illness and diabetes.   Musculoskeletal: Positive for arthralgias, back pain, gait problem, neck pain and neck stiffness.        MS- reviewed.  He has chronic neck and back pain.  He has had multiple lumbar and cervical spine surgeries.  He has a chronic pain syndrome.  He is intolerant to the anti-inflammatory  agents.  He uses the Percocet correctly.  [facility].  he was referred by the surgeon to the neurologist.  He has been evaluated for the left leg pain.   Skin: Negative for pallor and rash.   Neurological: Negative for dizziness, seizures, syncope, speech difficulty, weakness and numbness.   Hematological: Negative for adenopathy.   Psychiatric/Behavioral: Negative for confusion.       Objective:      Physical Exam  Vitals reviewed.   Constitutional:       Appearance: He is well-developed.      Comments: Well-nourished well-hydrated afebrile nonicteric  male.  He is normocephalic.  Pupils are normal.  He is sitting comfortably in the chair and he is breathing normally.  He is oriented x3 and can relate his history and answer questions appropriately.   HENT:      Head: Normocephalic.   Eyes:      Pupils: Pupils are equal, round, and reactive to light.   Neck:      Thyroid: No thyromegaly.      Trachea: No tracheal deviation.   Cardiovascular:      Rate and Rhythm: Normal rate and regular rhythm.      Heart sounds: Normal heart sounds.   Pulmonary:      Effort: Pulmonary effort is normal.      Breath sounds: Normal breath sounds.   Abdominal:      General: Bowel sounds are normal. There is no distension.      Palpations: Abdomen is soft. There is no mass.      Tenderness: There is no abdominal tenderness. There is no right CVA tenderness, left CVA tenderness, guarding or rebound.      Hernia: No hernia is present.      Comments: The abdomen is soft without tenderness masses organomegaly.  Bowel sounds normal.   Musculoskeletal:      Cervical back: Normal range of motion and neck supple.      Comments: He ambulates slowly with a cane.  He slowly can go from the sitting to the standing position.   Lymphadenopathy:      Cervical: No cervical adenopathy.   Skin:     General: Skin is warm and dry.   Neurological:      Mental Status: He is alert and oriented to person,  place, and time.      Cranial Nerves: No cranial nerve deficit.   Psychiatric:         Behavior: Behavior normal.           Plan:       Chronic pain syndrome    Pain  -     oxyCODONE-acetaminophen (PERCOCET)  mg per tablet; Take 1 tablet by mouth every 6 (six) hours as needed for Pain.  Dispense: 120 tablet; Refill: 0    Diverticulosis of large intestine without hemorrhage    Gastroesophageal reflux disease, unspecified whether esophagitis present    Hepatitis C virus infection resolved after antiviral drug therapy    Hiatal hernia    Fatty liver    He will continue his reflux regimen.  He continues his diabetic diet.  He will make a food diary and avoid the offending foods such as the fried and fatty foods.  He will add more fruit and vegetables to the diet.  He will add fiber supplements to produced daily bowel movements.  [facility]  He follows up with his other physicians.  At this point he does not want further GI evaluation because he is symptom-free.  He will continue the Percocet and avoid the anti-inflammatory agents.

## 2022-08-05 NOTE — PATIENT INSTRUCTIONS
He will continue his reflux regimen current medications vitamins and minerals and diabetic diet.  He follows up with his other physicians including his surgeons.

## 2022-08-19 NOTE — TELEPHONE ENCOUNTER
Much improved. D/c Zirgan TID OS.  Decrease Valtrex to PO QD. Pt rescheduled for 12.19.2019 in the AM.  Appt letter mailed, V/U. ----- Message from Allyson Jackson PA-C sent at 10/31/2019  1:47 PM CDT -----  Patient request that appointments be in the morning as he has a long drive home. Can you please move his 6 week f/u on 12/20 to earlier if possible?    Thank you,  Ebony

## 2022-09-02 ENCOUNTER — OFFICE VISIT (OUTPATIENT)
Dept: GASTROENTEROLOGY | Facility: CLINIC | Age: 72
End: 2022-09-02
Payer: OTHER GOVERNMENT

## 2022-09-02 VITALS
HEART RATE: 78 BPM | WEIGHT: 210.19 LBS | RESPIRATION RATE: 17 BRPM | DIASTOLIC BLOOD PRESSURE: 70 MMHG | OXYGEN SATURATION: 99 % | SYSTOLIC BLOOD PRESSURE: 103 MMHG | BODY MASS INDEX: 27.86 KG/M2 | HEIGHT: 73 IN

## 2022-09-02 DIAGNOSIS — K44.9 HIATAL HERNIA: ICD-10-CM

## 2022-09-02 DIAGNOSIS — K21.9 GASTROESOPHAGEAL REFLUX DISEASE, UNSPECIFIED WHETHER ESOPHAGITIS PRESENT: ICD-10-CM

## 2022-09-02 DIAGNOSIS — R52 PAIN: ICD-10-CM

## 2022-09-02 DIAGNOSIS — K76.0 FATTY LIVER: ICD-10-CM

## 2022-09-02 DIAGNOSIS — Z86.19 HEPATITIS C VIRUS INFECTION RESOLVED AFTER ANTIVIRAL DRUG THERAPY: ICD-10-CM

## 2022-09-02 DIAGNOSIS — K57.30 DIVERTICULOSIS OF LARGE INTESTINE WITHOUT HEMORRHAGE: Primary | ICD-10-CM

## 2022-09-02 PROCEDURE — 99999 PR PBB SHADOW E&M-EST. PATIENT-LVL V: CPT | Mod: PBBFAC,,, | Performed by: INTERNAL MEDICINE

## 2022-09-02 PROCEDURE — 99214 PR OFFICE/OUTPT VISIT, EST, LEVL IV, 30-39 MIN: ICD-10-PCS | Mod: S$PBB,,, | Performed by: INTERNAL MEDICINE

## 2022-09-02 PROCEDURE — 99215 OFFICE O/P EST HI 40 MIN: CPT | Mod: PBBFAC,PN | Performed by: INTERNAL MEDICINE

## 2022-09-02 PROCEDURE — 99214 OFFICE O/P EST MOD 30 MIN: CPT | Mod: S$PBB,,, | Performed by: INTERNAL MEDICINE

## 2022-09-02 PROCEDURE — 99999 PR PBB SHADOW E&M-EST. PATIENT-LVL V: ICD-10-PCS | Mod: PBBFAC,,, | Performed by: INTERNAL MEDICINE

## 2022-09-02 RX ORDER — OXYCODONE AND ACETAMINOPHEN 10; 325 MG/1; MG/1
1 TABLET ORAL EVERY 6 HOURS PRN
Qty: 120 TABLET | Refills: 0 | Status: SHIPPED | OUTPATIENT
Start: 2022-09-02 | End: 2022-09-30 | Stop reason: SDUPTHER

## 2022-09-02 NOTE — PROGRESS NOTES
Subjective:       Patient ID: Garcia Renteria is a 72 y.o. male.    Chief Complaint: Follow-up    He has a history of hiatal hernia and gastro esophageal reflux.  He has avoided the fried and fatty foods.  The PPI has resolved his symptoms.  He denies dysphagia aspiration hemoptysis hematemesis hematochezia jaundice or bleeding.  He has a history of diverticulosis.  He is having daily bowel movements with his fiber supplements and bowel program.  At this point he does not want further GI evaluation.  He has uses the Percocet for pain because the anti-inflammatory agents have cause gastrointestinal upset in the past and probable GI bleeding.    Allergies:  Review of patient's allergies indicates:  No Known Allergies    Medications:    Current Outpatient Medications:     amoxicillin-clavulanate 875-125mg (AUGMENTIN) 875-125 mg per tablet, Take 1 tablet by mouth 2 (two) times daily., Disp: , Rfl:     azithromycin (Z-NISHANT) 250 MG tablet, Take 250 mg by mouth once daily., Disp: , Rfl:     bacitracin 500 unit/gram Oint, Apply topically 2 (two) times daily., Disp: , Rfl: 0    cefTRIAXone (ROCEPHIN) 1 gram injection, SMARTSI Gram(s) IM Every Night, Disp: , Rfl:     cholecalciferol, vitamin D3, 1,250 mcg (50,000 unit) capsule, Take by mouth., Disp: , Rfl:     cyanocobalamin (VITAMIN B-12) 1000 MCG tablet, , Disp: , Rfl:     cyanocobalamin (VITAMIN B-12) 1000 MCG tablet, TAKE ONE TABLET BY MOUTH DAILY AS A VITAMIN B12 SUPPLEMENT, Disp: , Rfl:     diclofenac sodium (VOLTAREN) 1 % Gel, Apply 2 g topically 2 (two) times daily., Disp: , Rfl:     famotidine (PEPCID) 40 MG tablet, Take 1 tablet (40 mg total) by mouth every evening., Disp: 90 tablet, Rfl: 3    gabapentin (NEURONTIN) 100 MG capsule, , Disp: , Rfl:     ibuprofen (ADVIL,MOTRIN) 800 MG tablet, , Disp: , Rfl:     indomethacin (INDOCIN) 25 MG capsule, , Disp: , Rfl:     lidocaine (LIDODERM) 5 %, Place 1 patch onto the skin once daily. Remove & Discard patch within 12  hours or as directed by MD, Disp: 15 patch, Rfl: 0    LIDOcaine-benzalkonium-alcohol 2.5 % Soln, USE 1 PAD EXTERNALLY EVERY THIRD DAY (EVERY 72 HOURS), Disp: , Rfl:     lisinopril (PRINIVIL,ZESTRIL) 5 MG tablet, Take 5 mg by mouth once daily. , Disp: , Rfl:     metFORMIN (GLUCOPHAGE) 1000 MG tablet, Take 500 mg by mouth daily with breakfast. , Disp: , Rfl:     methocarbamoL (ROBAXIN) 750 MG Tab, Take 1 tablet (750 mg total) by mouth 3 (three) times daily., Disp: 40 tablet, Rfl: 0    mirabegron (MYRBETRIQ) 25 mg Tb24 ER tablet, Take 1 tablet (25 mg total) by mouth once daily., Disp: 30 tablet, Rfl: 11    nitrofurantoin, macrocrystal-monohydrate, (MACROBID) 100 MG capsule, Take 100 mg by mouth 2 (two) times daily., Disp: , Rfl:     omega 3-dha-epa-fish oil 1,000 mg (120 mg-180 mg) Cap, TAKE ONE CAPSULE BY MOUTH DAILY AS A SUPPLEMENT, Disp: , Rfl:     omega-3s/dha/epa/fish oil/D3 (VITAMIN-D + OMEGA-3 ORAL), TAKE ONE CAPSULE BY MOUTH EVERY WEEK FOR VITAMIN D3 REPLACEMENT, Disp: , Rfl:     omeprazole (PRILOSEC) 20 MG capsule, Take 1 capsule (20 mg total) by mouth once daily., Disp: 90 capsule, Rfl: 3    phenazopyridine (PYRIDIUM) 200 MG tablet, Take 200 mg by mouth 3 (three) times daily., Disp: , Rfl:     sildenafiL (VIAGRA) 100 MG tablet, TAKE ONE-HALF TABLET BY MOUTH DAILY FOR ERECTILE DYSFUNCTION *TAKE ONE HOUR BEFORE SEXUAL ACTIVITY* NO MORE THAN ONE DOSE IN 24 HOURS ** MUST NOT TAKE NITROGLYCERIN TYPE DRUGS WITH VIAGRA **+++THIS IS, Disp: , Rfl:     STOOL SOFTENER, DOCUSATE MADELAINE, 240 mg capsule, , Disp: , Rfl:     tamsulosin (FLOMAX) 0.4 mg Cap, , Disp: , Rfl:     triamterene-hydrochlorothiazide 75-50 mg (MAXZIDE) 75-50 mg per tablet, Take 0.5 tablets by mouth once daily. , Disp: , Rfl:     trospium (SANCTURA) 20 mg Tab tablet, Take 20 mg by mouth 2 (two) times daily., Disp: , Rfl:     vitamin D (VITAMIN D3) 1000 units Tab, Take 1,000 Units by mouth once daily., Disp: , Rfl:     oxyCODONE-acetaminophen (PERCOCET)   mg per tablet, Take 1 tablet by mouth every 6 (six) hours as needed for Pain., Disp: 120 tablet, Rfl: 0    pregabalin (LYRICA) 150 MG capsule, Take 1 capsule (150 mg total) by mouth 3 (three) times daily., Disp: 90 capsule, Rfl: 6    Past Medical History:   Diagnosis Date    Colitis     Diabetes mellitus, type 2     Diverticulosis     GERD (gastroesophageal reflux disease)     Hypertension        Past Surgical History:   Procedure Laterality Date    ANKLE SURGERY Bilateral     ANTERIOR CERVICAL DISCECTOMY W/ FUSION N/A 10/30/2019    Procedure: C3-4, C4-5 ACDF;  Surgeon: Francis Alan MD;  Location: [address];  Service: Neurosurgery;  Laterality: N/A;  C3-4, C4-5 ACDF  Anesthesia:  General  Rad:  C-Arm  NM:  EMG, SEP, & MEP  Blood:  Type & Screen  Position:  Supine  Bed:  Regular slider bed  Headrest:  [employer] tongs/hanging weights  Misc:  Small vivigen  Nerve root retractor ([employer])  Disposable #2 [employer]  Interbody    BACK SURGERY      COLONOSCOPY      COLONOSCOPY N/A 10/7/2020    Procedure: COLONOSCOPY;  Surgeon: Elpidio Lau MD;  Location[facility];  Service: Endoscopy;  Laterality: N/A;    CREATION OF MUSCLE ROTATIONAL FLAP  3/3/2020    Procedure: CREATION, FLAP, MUSCLE ROTATION;  Surgeon: Francis Alan MD;  Location: [address];  Service: Neurosurgery;;    CYSTOSCOPY N/A 7/21/2021    Procedure: CYSTOSCOPY;  Surgeon: Colton Kamara Jr., MD;  Location: [facility] OR;  Service: Urology;  Laterality: N/A;    INJECTION OF ANESTHETIC AGENT AROUND NERVE Bilateral 3/10/2020    Procedure: Block, Nerve;  Surgeon: Erinn Surgeon;  Location: [facility];  Service: Anesthesiology;  Laterality: Bilateral;    lower back surgery      LUMBAR LAMINECTOMY WITH FUSION N/A 3/3/2020    Procedure: T10-Pelvis Fusion with L4 PSO **AIRO** Co-Surgeon: Roberto Parkinson;  Surgeon: Francis Alan MD;  Location: [address];  Service: Neurosurgery;  Laterality: N/A;  **AIRO**  **CELL SAVER**  Co-Surgeon: Roberto Parkinson  NM: EMG,  SEP, MEP  Position: Prone  Bed: [place] 4 post, prone pillow  Blood: Type & Hold 2 units  Rad: C-Arm, AIRO  Vendor: [employer]  Misc: Vivigen, Infuse, Cement ([employer])  [place]    NECK SURGERY      SPINE SURGERY      TRANSRECTAL ULTRASOUND EXAMINATION N/A 7/21/2021    Procedure: ULTRASOUND, RECTAL APPROACH;  Surgeon: Colton Kamara Jr., MD;  Location: [facility] OR;  Service: Urology;  Laterality: N/A;    UPPER GASTROINTESTINAL ENDOSCOPY           Review of Systems   Constitutional:  Negative for appetite change, fever and unexpected weight change.   HENT:  Negative for trouble swallowing.         No jaundice.   Respiratory:  Negative for cough, shortness of breath and wheezing.         He is a former cigarette smoker.  Currently he does not use tobacco or consume alcohol.  He denies dysphagia aspiration hemoptysis chronic cough chronic sputum production or dyspnea on exertion but is not physically active.   Cardiovascular:  Negative for chest pain.        He denies exertional chest pain or rhythm disturbance.  He states his hypertension and hyperlipidemia are well controlled on his current medications.   Gastrointestinal:  Negative for abdominal distention, abdominal pain, anal bleeding, blood in stool, constipation, diarrhea, nausea, rectal pain and vomiting.   Endocrine:        He is on the diabetic diet.  He avoids the fried in the fatty foods.  He tries to ingest adequate fiber and vegetables produced daily bowel movements.  He states his diabetes is well controlled.  He is [facility] by the [facility].   Musculoskeletal:  Positive for arthralgias, back pain, gait problem, neck pain and neck stiffness.        He has had multiple cervical spine and lumbar spine surgeries.  He is intolerant to the anti-inflammatory agents uses the Percocet correctly.  He is being followed by his surgeon in the [facility].  He uses a cane for ambulation. MS- reviewed.   Skin:  Negative for pallor and rash.   Neurological:   Negative for dizziness, seizures, syncope, speech difficulty, weakness and numbness.   Hematological:  Negative for adenopathy.   Psychiatric/Behavioral:  Negative for confusion.      Objective:      Physical Exam  Vitals reviewed.   Constitutional:       Appearance: He is well-developed.      Comments: Well-nourished well-hydrated afebrile nonicteric  male.  He is sitting comfortably in the chair.  He is breathing normally.  He appears to be normocephalic.  Pupils normal.  He is oriented x3 and can relate his history and answer questions appropriately.   HENT:      Head: Normocephalic.   Eyes:      Pupils: Pupils are equal, round, and reactive to light.   Neck:      Thyroid: No thyromegaly.      Trachea: No tracheal deviation.   Cardiovascular:      Rate and Rhythm: Normal rate and regular rhythm.      Heart sounds: Normal heart sounds.   Pulmonary:      Effort: Pulmonary effort is normal.      Breath sounds: Normal breath sounds.   Abdominal:      General: There is no distension.      Palpations: There is no mass.      Tenderness: There is no abdominal tenderness. There is no right CVA tenderness, left CVA tenderness, guarding or rebound.      Hernia: No hernia is present.   Musculoskeletal:         General: Normal range of motion.      Cervical back: Normal range of motion and neck supple.      Comments: He ambulates slowly with a cane.  He can go from the sitting the standing position without difficulty.   Lymphadenopathy:      Cervical: No cervical adenopathy.   Skin:     General: Skin is warm and dry.   Neurological:      Mental Status: He is alert and oriented to person, place, and time.      Cranial Nerves: No cranial nerve deficit.   Psychiatric:         Behavior: Behavior normal.         Plan:       Diverticulosis of large intestine without hemorrhage    Pain  -     oxyCODONE-acetaminophen (PERCOCET)  mg per tablet; Take 1 tablet by mouth every 6 (six) hours as needed for Pain.   Dispense: 120 tablet; Refill: 0    Gastroesophageal reflux disease, unspecified whether esophagitis present    Hepatitis C virus infection resolved after antiviral drug therapy    Hiatal hernia    Fatty liver  He will continue his reflux regimen current medications vitamins and minerals.  He is followed by the [facility] and also by his surgeon.  He will make a food diary and avoid the offending foods particularly the fried in the fatty foods but he stays on the diabetic diet.  He continues his fiber supplements and vegetables to produced daily bowel movements.  He does not want further GI evaluation at this time.

## 2022-09-02 NOTE — PATIENT INSTRUCTIONS
He will continue his reflux regimen.  He uses the Percocet for pain.  He continues his other meds.  Also he has been evaluated by his surgeon for the left leg pain.

## 2022-09-21 ENCOUNTER — TELEPHONE (OUTPATIENT)
Dept: UROLOGY | Facility: CLINIC | Age: 72
End: 2022-09-21
Payer: OTHER GOVERNMENT

## 2022-09-22 ENCOUNTER — LAB VISIT (OUTPATIENT)
Dept: LAB | Facility: HOSPITAL | Age: 72
End: 2022-09-22
Attending: UROLOGY
Payer: OTHER GOVERNMENT

## 2022-09-22 ENCOUNTER — OFFICE VISIT (OUTPATIENT)
Dept: UROLOGY | Facility: CLINIC | Age: 72
End: 2022-09-22
Payer: OTHER GOVERNMENT

## 2022-09-22 VITALS
BODY MASS INDEX: 27.83 KG/M2 | DIASTOLIC BLOOD PRESSURE: 74 MMHG | HEIGHT: 73 IN | WEIGHT: 210 LBS | HEART RATE: 85 BPM | SYSTOLIC BLOOD PRESSURE: 124 MMHG

## 2022-09-22 DIAGNOSIS — Z12.5 SCREENING FOR PROSTATE CANCER: ICD-10-CM

## 2022-09-22 DIAGNOSIS — N39.41 URGE INCONTINENCE: Primary | ICD-10-CM

## 2022-09-22 DIAGNOSIS — R39.9 LOWER URINARY TRACT SYMPTOMS (LUTS): ICD-10-CM

## 2022-09-22 LAB — COMPLEXED PSA SERPL-MCNC: 3.1 NG/ML (ref 0–4)

## 2022-09-22 PROCEDURE — 84153 ASSAY OF PSA TOTAL: CPT | Performed by: UROLOGY

## 2022-09-22 PROCEDURE — 99999 PR PBB SHADOW E&M-EST. PATIENT-LVL III: ICD-10-PCS | Mod: PBBFAC,,, | Performed by: UROLOGY

## 2022-09-22 PROCEDURE — 36415 COLL VENOUS BLD VENIPUNCTURE: CPT | Performed by: UROLOGY

## 2022-09-22 PROCEDURE — 99214 PR OFFICE/OUTPT VISIT, EST, LEVL IV, 30-39 MIN: ICD-10-PCS | Mod: S$PBB,,, | Performed by: UROLOGY

## 2022-09-22 PROCEDURE — 99213 OFFICE O/P EST LOW 20 MIN: CPT | Mod: PBBFAC,PN | Performed by: UROLOGY

## 2022-09-22 PROCEDURE — 99214 OFFICE O/P EST MOD 30 MIN: CPT | Mod: S$PBB,,, | Performed by: UROLOGY

## 2022-09-22 PROCEDURE — 99999 PR PBB SHADOW E&M-EST. PATIENT-LVL III: CPT | Mod: PBBFAC,,, | Performed by: UROLOGY

## 2022-09-22 NOTE — PROGRESS NOTES
[facility] Established Patient/H&P:    Garcia Renteria is a 72 y.o. male who presents for follow up for lower urinary tract symptoms and urge incontinence.     Patient with a history of DM, chronic neck and back pain s/p several surgeries and HCV who was referred by his PCP for progressively worsening lower urinary tract symptoms for several years.      He reports incomplete emptying, frequency, intermittency, urgency, weak stream and nocturia x 5. He wears a diaper daily due to his urgency with incontinence. States he has taken Flomax on and off for several years with no improvement in his symptoms.      UTI in 2/2020. [facility]. Of note, patient also states he drinks a lot of coffee throughout the day. Does not believe this exacerbates his symptoms.         Interval History     10/25/21: CT urogram on 7/16/21 with enlarged prostate, no stones, bilateral simple renal cysts and several pulmonary nodules.  Cystoscopy and transrectal ultrasound on 7/21/21 with a 43 cc prostate with coaptation of bilateral lobes. Otherwise unremarkable. He was started on Myrbetriq 50 mg following his cystoscopy and instructed to continue Flomax. Could not afford Myrbetriq so exchanged to Ditropan 15 mg. States it has improved his urgency and nocturia. Still using Depends. [facility] after [facility] which was treated with Abx. No record available.       3/28/22: No longer on Ditropan as he states the [facility] did not [facility] the [facility]. They switched him to Trospium 20 mg. States his urgency and nocturia have improved. He still does have some obstructive symptoms with weak stream and straining, but is not significantly bothered and not interested in surgery at this time.    Of note, he is on Viagra 100 mg as needed for erectile dysfunction.    9/22/22:  Here for follow up stating that he remains with bothersome lower urinary tract symptoms - predominately  urgency with incontinence and nocturia x 4. Continuing to wear several Depends daily. He is on Trospium 20 mg twice daily.      Denies any gross hematuria, fever, chills, bone pain, unintentional weight loss,  trauma or history of  malignancy.         PSA  3.1  9/22/22  3.4                   7/12/21     Urine culture  Enterobacter   2/24/20     IPSS    QoL  29 4 9/22/22  20        4          10/25/21  24        6          7/12/21     PVR  0 mL  9/22/22  40 mL              7/12/21    Past Medical History:   Diagnosis Date    Colitis     Diabetes mellitus, type 2     Diverticulosis     GERD (gastroesophageal reflux disease)     Hypertension        Past Surgical History:   Procedure Laterality Date    ANKLE SURGERY Bilateral     ANTERIOR CERVICAL DISCECTOMY W/ FUSION N/A 10/30/2019    Procedure: C3-4, C4-5 ACDF;  Surgeon: Francis Alan MD;  Location: [address];  Service: Neurosurgery;  Laterality: N/A;  C3-4, C4-5 ACDF  Anesthesia:  General  Rad:  C-Arm  NM:  EMG, SEP, & MEP  Blood:  Type & Screen  Position:  Supine  Bed:  Regular slider bed  Headrest:  [place] & GW tongs/hanging weights  Misc:  Small vivigen  Nerve root retractor ([employer])  Disposable #2 Kerrison  Interbody    BACK SURGERY      COLONOSCOPY      COLONOSCOPY N/A 10/7/2020    Procedure: COLONOSCOPY;  Surgeon: Elpidio Lau MD;  Location: [facility];  Service: Endoscopy;  Laterality: N/A;    CREATION OF MUSCLE ROTATIONAL FLAP  3/3/2020    Procedure: CREATION, FLAP, MUSCLE ROTATION;  Surgeon: Francis Alan MD;  Location: [address];  Service: Neurosurgery;;    CYSTOSCOPY N/A 7/21/2021    Procedure: CYSTOSCOPY;  Surgeon: Colton Kamara Jr., MD;  [facility]: [facility];  Service: Urology;  Laterality: N/A;    INJECTION OF ANESTHETIC AGENT AROUND NERVE Bilateral 3/10/2020    Procedure: Block, Nerve;  Surgeon: Erinn Surgeon;  Location: [facility];  Service: Anesthesiology;  Laterality: Bilateral;    lower back surgery      LUMBAR LAMINECTOMY WITH FUSION  "N/A 3/3/2020    Procedure: T10-Pelvis Fusion with L4 PSO **AIRO** Co-Surgeon: Roberto Parkinson;  Surgeon: Francis Alan MD;  Location: [address];  Service: Neurosurgery;  Laterality: N/A;  **AIRO**  **CELL SAVER**  Co-Surgeon: Roberto Parkinson  NM: EMG, SEP, MEP  Position: Prone  Bed: Shaji 4 post, prone pillow  Blood: Type & Hold 2 units  Rad: C-Arm, AIRO  Vendor: Depuy  Misc: Vivigen, Infuse, Cement (Depuy)  [place]    NECK SURGERY      SPINE SURGERY      TRANSRECTAL ULTRASOUND EXAMINATION N/A 7/21/2021    Procedure: ULTRASOUND, RECTAL APPROACH;  Surgeon: Colton Kamara Jr., MD;  Location: [facility];  Service: Urology;  Laterality: N/A;    UPPER GASTROINTESTINAL ENDOSCOPY         Review of patient's allergies indicates:  No Known Allergies    Medications Reviewed: see MAR    FOCUSED PHYSICAL EXAM:    Vitals:    09/22/22 1035   BP: 124/74   Pulse: 85     Body mass index is 27.71 kg/m². Weight: 95.3 kg (210 lb) Height: 6' 1" (185.4 cm)       General: Alert, cooperative, no distress, appears stated age  Abdomen: Soft, non-tender, no CVA tenderness, non-distended  JOY: Declined      LABS:    Recent Results (from the past 336 hour(s))   PSA, Screening    Collection Time: 09/22/22  9:08 AM   Result Value Ref Range    PSA, Screen 3.1 0.00 - 4.00 ng/mL           Assessment/Diagnosis:    1. Urge incontinence        2. Lower urinary tract symptoms (LUTS)        3. Screening for prostate cancer            Plans:    - I spent 30 minutes of the day of this encounter preparing for, treating and managing this patient. Extensive discussion with patient regarding the etiology and management of his lower urinary tract symptoms. Explained that LUTS are multifactorial and can be secondary to an enlarged prostate, PO intake of bladder irritants, overactive bladder, constipation, malignancy, trauma, infection, stones or medications.  - We extensively discussed surgical management of his symptoms. Explained that this would help his " obstructive symptoms, but run the risk of worsening his urgency and incontinence. We also discussed botox or neuro-stimulation for his urgency with incontinence. States he is ok with continuing Flomax and Trospium currently and not presently interested in surgical management. Would like to restart Myrbetriq once generic.   - RTC in 1 year with symptom score and PVR.

## 2022-09-30 ENCOUNTER — OFFICE VISIT (OUTPATIENT)
Dept: GASTROENTEROLOGY | Facility: CLINIC | Age: 72
End: 2022-09-30
Payer: OTHER GOVERNMENT

## 2022-09-30 VITALS
HEART RATE: 88 BPM | DIASTOLIC BLOOD PRESSURE: 81 MMHG | BODY MASS INDEX: 27.95 KG/M2 | OXYGEN SATURATION: 97 % | WEIGHT: 210.88 LBS | SYSTOLIC BLOOD PRESSURE: 128 MMHG | HEIGHT: 73 IN | RESPIRATION RATE: 18 BRPM

## 2022-09-30 DIAGNOSIS — K44.9 HIATAL HERNIA: ICD-10-CM

## 2022-09-30 DIAGNOSIS — K57.30 DIVERTICULOSIS OF LARGE INTESTINE WITHOUT HEMORRHAGE: ICD-10-CM

## 2022-09-30 DIAGNOSIS — Z86.19 HEPATITIS C VIRUS INFECTION RESOLVED AFTER ANTIVIRAL DRUG THERAPY: ICD-10-CM

## 2022-09-30 DIAGNOSIS — R52 PAIN: ICD-10-CM

## 2022-09-30 DIAGNOSIS — G89.4 CHRONIC PAIN SYNDROME: Primary | ICD-10-CM

## 2022-09-30 DIAGNOSIS — K57.90 DIVERTICULOSIS: ICD-10-CM

## 2022-09-30 DIAGNOSIS — K76.0 FATTY LIVER: ICD-10-CM

## 2022-09-30 DIAGNOSIS — K21.9 GASTROESOPHAGEAL REFLUX DISEASE, UNSPECIFIED WHETHER ESOPHAGITIS PRESENT: ICD-10-CM

## 2022-09-30 PROCEDURE — 99213 OFFICE O/P EST LOW 20 MIN: CPT | Mod: PBBFAC,PN | Performed by: INTERNAL MEDICINE

## 2022-09-30 PROCEDURE — 99999 PR PBB SHADOW E&M-EST. PATIENT-LVL III: CPT | Mod: PBBFAC,,, | Performed by: INTERNAL MEDICINE

## 2022-09-30 PROCEDURE — 99999 PR PBB SHADOW E&M-EST. PATIENT-LVL III: ICD-10-PCS | Mod: PBBFAC,,, | Performed by: INTERNAL MEDICINE

## 2022-09-30 PROCEDURE — 99214 PR OFFICE/OUTPT VISIT, EST, LEVL IV, 30-39 MIN: ICD-10-PCS | Mod: S$PBB,,, | Performed by: INTERNAL MEDICINE

## 2022-09-30 PROCEDURE — 99214 OFFICE O/P EST MOD 30 MIN: CPT | Mod: S$PBB,,, | Performed by: INTERNAL MEDICINE

## 2022-09-30 RX ORDER — OMEPRAZOLE 20 MG/1
20 CAPSULE, DELAYED RELEASE ORAL DAILY
Qty: 90 CAPSULE | Refills: 3 | Status: SHIPPED | OUTPATIENT
Start: 2022-09-30 | End: 2022-12-28

## 2022-09-30 RX ORDER — OXYCODONE AND ACETAMINOPHEN 10; 325 MG/1; MG/1
1 TABLET ORAL EVERY 6 HOURS PRN
Qty: 120 TABLET | Refills: 0 | Status: SHIPPED | OUTPATIENT
Start: 2022-09-30 | End: 2022-12-28 | Stop reason: SDUPTHER

## 2022-09-30 NOTE — PROGRESS NOTES
Subjective:       Patient ID: Garcia Renteria is a 72 y.o. male.    Chief Complaint: Follow-up    He has a history of chronic hepatitis-C and responded to therapy and is in SVR.  He has history of hiatal hernia gastroesophageal reflux and is symptom-free on the PPI.  [facility] is [facility].  He states his diabetes and labs are well controlled.  He tries to stay on the diabetic diet and avoid the offending foods particularly the fried and the fatty foods.  He has a history of hemorrhoids diverticulosis and does not want further GI evaluation at this point.  He is having daily bowel movements.  He denies abdominal pain hematemesis hematochezia dysphagia or aspiration.  He has been intolerant to the anti-inflammatory agents and uses the Norco.    Allergies:  Review of patient's allergies indicates:  No Known Allergies    Medications:    Current Outpatient Medications:     amoxicillin-clavulanate 875-125mg (AUGMENTIN) 875-125 mg per tablet, Take 1 tablet by mouth 2 (two) times daily., Disp: , Rfl:     azithromycin (Z-NISHANT) 250 MG tablet, Take 250 mg by mouth once daily., Disp: , Rfl:     bacitracin 500 unit/gram Oint, Apply topically 2 (two) times daily., Disp: , Rfl: 0    cefTRIAXone (ROCEPHIN) 1 gram injection, SMARTSI Gram(s) IM Every Night, Disp: , Rfl:     cholecalciferol, vitamin D3, 1,250 mcg (50,000 unit) capsule, Take by mouth., Disp: , Rfl:     cyanocobalamin (VITAMIN B-12) 1000 MCG tablet, , Disp: , Rfl:     cyanocobalamin (VITAMIN B-12) 1000 MCG tablet, TAKE ONE TABLET BY MOUTH DAILY AS A VITAMIN B12 SUPPLEMENT, Disp: , Rfl:     diclofenac sodium (VOLTAREN) 1 % Gel, Apply 2 g topically 2 (two) times daily., Disp: , Rfl:     famotidine (PEPCID) 40 MG tablet, Take 1 tablet (40 mg total) by mouth every evening., Disp: 90 tablet, Rfl: 3    gabapentin (NEURONTIN) 100 MG capsule, , Disp: , Rfl:     ibuprofen (ADVIL,MOTRIN) 800 MG tablet, , Disp: , Rfl:     indomethacin (INDOCIN) 25 MG  capsule, , Disp: , Rfl:     lidocaine (LIDODERM) 5 %, Place 1 patch onto the skin once daily. Remove & Discard patch within 12 hours or as directed by MD, Disp: 15 patch, Rfl: 0    LIDOcaine-benzalkonium-alcohol 2.5 % Soln, USE 1 PAD EXTERNALLY EVERY THIRD DAY (EVERY 72 HOURS), Disp: , Rfl:     lisinopril (PRINIVIL,ZESTRIL) 5 MG tablet, Take 5 mg by mouth once daily. , Disp: , Rfl:     metFORMIN (GLUCOPHAGE) 1000 MG tablet, Take 500 mg by mouth daily with breakfast. , Disp: , Rfl:     methocarbamoL (ROBAXIN) 750 MG Tab, Take 1 tablet (750 mg total) by mouth 3 (three) times daily., Disp: 40 tablet, Rfl: 0    mirabegron (MYRBETRIQ) 25 mg Tb24 ER tablet, Take 1 tablet (25 mg total) by mouth once daily., Disp: 30 tablet, Rfl: 11    nitrofurantoin, macrocrystal-monohydrate, (MACROBID) 100 MG capsule, Take 100 mg by mouth 2 (two) times daily., Disp: , Rfl:     omega 3-dha-epa-fish oil 1,000 mg (120 mg-180 mg) Cap, TAKE ONE CAPSULE BY MOUTH DAILY AS A SUPPLEMENT, Disp: , Rfl:     omega-3s/dha/epa/fish oil/D3 (VITAMIN-D + OMEGA-3 ORAL), TAKE ONE CAPSULE BY MOUTH EVERY WEEK FOR VITAMIN D3 REPLACEMENT, Disp: , Rfl:     phenazopyridine (PYRIDIUM) 200 MG tablet, Take 200 mg by mouth 3 (three) times daily., Disp: , Rfl:     pregabalin (LYRICA) 150 MG capsule, Take 1 capsule (150 mg total) by mouth 3 (three) times daily., Disp: 90 capsule, Rfl: 6    sildenafiL (VIAGRA) 100 MG tablet, TAKE ONE-HALF TABLET BY MOUTH DAILY FOR ERECTILE DYSFUNCTION *TAKE ONE HOUR BEFORE SEXUAL ACTIVITY* NO MORE THAN ONE DOSE IN 24 HOURS ** MUST NOT TAKE NITROGLYCERIN TYPE DRUGS WITH VIAGRA **+++THIS IS, Disp: , Rfl:     STOOL SOFTENER, DOCUSATE MADELAINE, 240 mg capsule, , Disp: , Rfl:     tamsulosin (FLOMAX) 0.4 mg Cap, , Disp: , Rfl:     triamterene-hydrochlorothiazide 75-50 mg (MAXZIDE) 75-50 mg per tablet, Take 0.5 tablets by mouth once daily. , Disp: , Rfl:     trospium (SANCTURA) 20 mg Tab tablet, Take 20 mg by mouth 2 (two) times daily., Disp: , Rfl:      vitamin D (VITAMIN D3) 1000 units Tab, Take 1,000 Units by mouth once daily., Disp: , Rfl:     omeprazole (PRILOSEC) 20 MG capsule, Take 1 capsule (20 mg total) by mouth once daily., Disp: 90 capsule, Rfl: 3    oxyCODONE-acetaminophen (PERCOCET)  mg per tablet, Take 1 tablet by mouth every 6 (six) hours as needed for Pain., Disp: 120 tablet, Rfl: 0    Past Medical History:   Diagnosis Date    Colitis     Diabetes mellitus, type 2     Diverticulosis     GERD (gastroesophageal reflux disease)     Hypertension        Past Surgical History:   Procedure Laterality Date    ANKLE SURGERY Bilateral     ANTERIOR CERVICAL DISCECTOMY W/ FUSION N/A 10/30/2019    Procedure: C3-4, C4-5 ACDF;  Surgeon: Francis Alan MD;  Location: [address];  Service: Neurosurgery;  Laterality: N/A;  C3-4, C4-5 ACDF  Anesthesia:  General  Rad:  C-Arm  NM:  EMG, SEP, & MEP  Blood:  Type & Screen  Position:  Supine  Bed:  Regular slider bed  Headrest:  [place] [employer] tongs/hanging weights  Misc:  Small vivigen  Nerve root retractor ([employer])  Disposable #2 [employer]  Interbody    BACK SURGERY      COLONOSCOPY      COLONOSCOPY N/A 10/7/2020    Procedure: COLONOSCOPY;  Surgeon: Elpidio Lau MD;  Location: [facility];  Service: Endoscopy;  Laterality: N/A;    CREATION OF MUSCLE ROTATIONAL FLAP  3/3/2020    Procedure: CREATION, FLAP, MUSCLE ROTATION;  Surgeon: Francis Alan MD;  Location: [address];  Service: Neurosurgery;;    CYSTOSCOPY N/A 7/21/2021    Procedure: CYSTOSCOPY;  Surgeon: Colton Kamara Jr., MD;  Location: [facility] OR;  Service: Urology;  Laterality: N/A;    INJECTION OF ANESTHETIC AGENT AROUND NERVE Bilateral 3/10/2020    Procedure: Block, Nerve;  Surgeon: Erinn Surgeon;  Location: [facility];  Service: Anesthesiology;  Laterality: Bilateral;    lower back surgery      LUMBAR LAMINECTOMY WITH FUSION N/A 3/3/2020    Procedure: T10-Pelvis Fusion with L4 PSO **AIRO** Co-Surgeon: Roberto Parkinson;  Surgeon: Francis Alan MD;   [facility];  Service: Neurosurgery;  Laterality: N/A;  **AIRO**  **CELL SAVER**  Co-Surgeon: Roberto Parkinson  NM: EMG, SEP, MEP  Position: Prone  Bed: Shaji 4 post, prone pillow  Blood: Type & Hold 2 units  Rad: C-Arm, AIRO  Vendor: [employer]  Misc: Vivigen, Infuse, Cement (Depuy)  [place]    NECK SURGERY      SPINE SURGERY      TRANSRECTAL ULTRASOUND EXAMINATION N/A 7/21/2021    Procedure: ULTRASOUND, RECTAL APPROACH;  Surgeon: Colton Kamara Jr., MD;  Location: [facility] OR;  Service: Urology;  Laterality: N/A;    UPPER GASTROINTESTINAL ENDOSCOPY           Review of Systems   Constitutional:  Negative for appetite change, fever and unexpected weight change.   HENT:  Negative for trouble swallowing.         No jaundice.   Respiratory:  Negative for cough, shortness of breath and wheezing.         He is a former cigarette smoker.  Currently does not use tobacco consume alcohol.  He denies dysphagia aspiration hemoptysis chronic cough chronic sputum production or dyspnea on exertion.  He is not physically active.   Cardiovascular:  Negative for chest pain.        He denies exertional chest pain or rhythm disturbance.  He states his hypertension hyperlipidemia well controlled on the current medications.   Gastrointestinal:  Positive for nausea. Negative for abdominal distention, abdominal pain, anal bleeding, blood in stool, constipation, diarrhea, rectal pain and vomiting.   Endocrine:        [facility] regulates his diabetes and medications.  He states his labs are normal.  He tries stay on the diabetic diet and add adequate fiber and vegetables to the diet.  He takes his vitamins and minerals.   Musculoskeletal:  Positive for arthralgias, back pain, gait problem, neck pain and neck stiffness.        He has had multiple back and cervical spine surgeries and followed by the surgeon.  He has responded well to the pain medications.  He ambulates with a cane.   Skin:  Negative for pallor and rash.    Neurological:  Negative for dizziness, seizures, syncope, speech difficulty, weakness and numbness.   Hematological:  Negative for adenopathy.   Psychiatric/Behavioral:  Negative for confusion.      Objective:      Physical Exam  Vitals reviewed.   Constitutional:       Appearance: He is well-developed.      Comments: Well-nourished well-hydrated afebrile nonicteric  male.  He is normocephalic.  Pupils are normal.  He is sitting comfortably in the chair.  He is breathing normally.  He appears to be oriented x3 and can relate his history and answer questions appropriately.   HENT:      Head: Normocephalic.   Eyes:      Pupils: Pupils are equal, round, and reactive to light.   Neck:      Thyroid: No thyromegaly.      Trachea: No tracheal deviation.   Cardiovascular:      Rate and Rhythm: Normal rate and regular rhythm.      Heart sounds: Normal heart sounds.   Pulmonary:      Effort: Pulmonary effort is normal.      Breath sounds: Normal breath sounds.   Abdominal:      General: There is no distension.      Palpations: There is no mass.      Tenderness: There is no abdominal tenderness. There is no guarding or rebound.      Hernia: No hernia is present.   Musculoskeletal:      Cervical back: Normal range of motion and neck supple.      Comments: He ambulates slowly with a cane.  He slowly can go from the sitting to the standing position.   Lymphadenopathy:      Cervical: No cervical adenopathy.   Skin:     General: Skin is warm and dry.   Neurological:      Mental Status: He is alert and oriented to person, place, and time.      Cranial Nerves: No cranial nerve deficit.   Psychiatric:         Behavior: Behavior normal.         Plan:       Chronic pain syndrome    Gastroesophageal reflux disease, unspecified whether esophagitis present  -     omeprazole (PRILOSEC) 20 MG capsule; Take 1 capsule (20 mg total) by mouth once daily.  Dispense: 90 capsule; Refill: 3    Pain  -     oxyCODONE-acetaminophen  (PERCOCET)  mg per tablet; Take 1 tablet by mouth every 6 (six) hours as needed for Pain.  Dispense: 120 tablet; Refill: 0  -     Ambulatory referral/consult to Pain Clinic; Future; Expected date: 10/07/2022    Diverticulosis    Diverticulosis of large intestine without hemorrhage    Hepatitis C virus infection resolved after antiviral drug therapy    Hiatal hernia    Fatty liver        He will continue his reflux regimen current medications vitamins and minerals.  He continues his diabetic diet and follows up with his surgeon for the neck and back pain and [facility]  At this point he does not want further GI evaluation.  He will be referred to the Pain Clinic.

## 2022-09-30 NOTE — PATIENT INSTRUCTIONS
He will continues diabetic diet current medications vitamins minerals and reflux regimen.  He follows up with the surgeon who is evaluating his back and extremity pain.

## 2022-10-03 PROBLEM — K57.90 DIVERTICULOSIS: Status: ACTIVE | Noted: 2022-10-03

## 2022-10-25 ENCOUNTER — TELEPHONE (OUTPATIENT)
Dept: GASTROENTEROLOGY | Facility: CLINIC | Age: 72
End: 2022-10-25
Payer: OTHER GOVERNMENT

## 2022-10-25 ENCOUNTER — TELEPHONE (OUTPATIENT)
Dept: NEUROSURGERY | Facility: CLINIC | Age: 72
End: 2022-10-25
Payer: OTHER GOVERNMENT

## 2022-10-25 NOTE — TELEPHONE ENCOUNTER
----- Message from Tremontana Chevalier sent at 10/25/2022  1:51 PM CDT -----  Regarding: appt   Pt wanting to spk with Dr. Alan office regarding scheduling MRI. Pls call pt @ [phone].

## 2022-10-25 NOTE — TELEPHONE ENCOUNTER
LVM stating that pain management is most likely waiting on the [facility] to [facility] prior to scheduled and for him to call back with any questions or concerns.    ----- Message from Yen Thompson sent at 10/25/2022  1:35 PM CDT -----  Regarding: referral update  Name of Who is Calling: DAREK POSADA [[id number]]      What is the request in detail: Pt called inquiring about his pain management referral. Advised pt that the referral is still pending. Pt would like an update. Please advise.       Can the clinic reply by [facility]: no       [facility] Back [facility] [phone]

## 2022-10-25 NOTE — TELEPHONE ENCOUNTER
Spoke w patient about scheduling imaging studies. Informed pt that we do not schedule EMGs, but we could send a message to [facility] in [place] for the EMG.

## 2022-11-03 LAB
ALBUMIN CREATININE RATIO: 2 MG/G
CHOLEST SERPL-MSCNC: 125 MG/DL (ref 0–200)
CREATININE, URINE: 195.9 MG/DL
HBA1C MFR BLD: 6 %
HDLC SERPL-MCNC: 36 MG/DL
LDLC SERPL CALC-MCNC: 74 MG/DL
MICROALBUMIN URINE: 4.89
TRIGL SERPL-MCNC: 89 MG/DL

## 2022-11-30 LAB
LEFT EYE DM RETINOPATHY: NEGATIVE
RIGHT EYE DM RETINOPATHY: NEGATIVE

## 2022-12-13 ENCOUNTER — OFFICE VISIT (OUTPATIENT)
Dept: PODIATRY | Facility: CLINIC | Age: 72
End: 2022-12-13
Payer: OTHER GOVERNMENT

## 2022-12-13 VITALS
WEIGHT: 210 LBS | SYSTOLIC BLOOD PRESSURE: 115 MMHG | BODY MASS INDEX: 27.83 KG/M2 | DIASTOLIC BLOOD PRESSURE: 74 MMHG | HEIGHT: 73 IN | HEART RATE: 76 BPM

## 2022-12-13 DIAGNOSIS — M76.62 ACHILLES TENDINITIS OF LEFT LOWER EXTREMITY: Primary | ICD-10-CM

## 2022-12-13 DIAGNOSIS — M77.52 BURSITIS OF LEFT FOOT: ICD-10-CM

## 2022-12-13 DIAGNOSIS — L60.9 DISEASE OF NAIL: ICD-10-CM

## 2022-12-13 DIAGNOSIS — M79.672 LEFT FOOT PAIN: ICD-10-CM

## 2022-12-13 DIAGNOSIS — E11.49 TYPE II DIABETES MELLITUS WITH NEUROLOGICAL MANIFESTATIONS: ICD-10-CM

## 2022-12-13 DIAGNOSIS — E11.42 DIABETIC POLYNEUROPATHY ASSOCIATED WITH TYPE 2 DIABETES MELLITUS: ICD-10-CM

## 2022-12-13 DIAGNOSIS — M76.72 TENDINITIS OF LEFT PERONEUS BREVIS TENDON: ICD-10-CM

## 2022-12-13 PROCEDURE — 20600 PR DRAIN/INJECT SMALL JOINT/BURSA: ICD-10-PCS | Mod: LT,S$GLB,, | Performed by: PODIATRIST

## 2022-12-13 PROCEDURE — 11721 DEBRIDE NAIL 6 OR MORE: CPT | Mod: 59,S$GLB,, | Performed by: PODIATRIST

## 2022-12-13 PROCEDURE — 20600 DRAIN/INJ JOINT/BURSA W/O US: CPT | Mod: LT,S$GLB,, | Performed by: PODIATRIST

## 2022-12-13 PROCEDURE — 99203 PR OFFICE/OUTPT VISIT, NEW, LEVL III, 30-44 MIN: ICD-10-PCS | Mod: 25,S$GLB,, | Performed by: PODIATRIST

## 2022-12-13 PROCEDURE — 11721 ROUTINE FOOT CARE: ICD-10-PCS | Mod: 59,S$GLB,, | Performed by: PODIATRIST

## 2022-12-13 PROCEDURE — 99203 OFFICE O/P NEW LOW 30 MIN: CPT | Mod: 25,S$GLB,, | Performed by: PODIATRIST

## 2022-12-13 RX ORDER — IBUPROFEN 800 MG/1
800 TABLET ORAL
COMMUNITY
Start: 2022-11-03 | End: 2022-12-28

## 2022-12-13 RX ORDER — TIZANIDINE 4 MG/1
1 TABLET ORAL 3 TIMES DAILY PRN
COMMUNITY
Start: 2022-11-03

## 2022-12-13 RX ORDER — GABAPENTIN 100 MG/1
200 CAPSULE ORAL
COMMUNITY
Start: 2022-11-03 | End: 2022-12-28

## 2022-12-13 RX ORDER — CARBOXYMETHYLCELLULOSE SODIUM 5 MG/ML
SOLUTION/ DROPS OPHTHALMIC
COMMUNITY
Start: 2022-10-11 | End: 2023-03-27

## 2022-12-13 RX ADMIN — LIDOCAINE HYDROCHLORIDE 1 ML: 10 INJECTION INFILTRATION; PERINEURAL at 10:12

## 2022-12-13 RX ADMIN — DEXAMETHASONE SODIUM PHOSPHATE 4 MG: 4 INJECTION, SOLUTION INTRA-ARTICULAR; INTRALESIONAL; INTRAMUSCULAR; INTRAVENOUS; SOFT TISSUE at 10:12

## 2022-12-14 RX ORDER — DEXAMETHASONE SODIUM PHOSPHATE 4 MG/ML
4 INJECTION, SOLUTION INTRA-ARTICULAR; INTRALESIONAL; INTRAMUSCULAR; INTRAVENOUS; SOFT TISSUE
Status: DISCONTINUED | OUTPATIENT
Start: 2022-12-13 | End: 2022-12-14 | Stop reason: HOSPADM

## 2022-12-14 RX ORDER — LIDOCAINE HYDROCHLORIDE 10 MG/ML
1 INJECTION INFILTRATION; PERINEURAL
Status: DISCONTINUED | OUTPATIENT
Start: 2022-12-13 | End: 2022-12-14 | Stop reason: HOSPADM

## 2022-12-14 NOTE — PROGRESS NOTES
Subjective:      Patient ID: Garcia Renteria is a 72 y.o. male.    Chief Complaint: Foot Pain and Diabetes Mellitus    Garcia is a 72 y.o. male who presents to the podiatry clinic  with complaint of  left foot pain. Onset of the symptoms was several months ago. Precipitating event:  previous surgical correction of left peroneal tendons . Current symptoms include: ability to bear weight, but with some pain, pain with inversion of the foot, swelling, and worsening symptoms after a period of activity. Aggravating factors:  prolonged use . Symptoms have waxed and waned but are worse overall. Patient has had prior foot problems. Evaluation to date: none. Treatment to date:  surgical correction of left rearfoot . Patients rates pain 8/10 on pain scale.    Review of Systems   Constitutional: Negative for chills and fever.   Cardiovascular:  Positive for leg swelling. Negative for chest pain.   Respiratory:  Negative for cough and shortness of breath.    Gastrointestinal:  Negative for diarrhea, nausea and vomiting.   Neurological:  Positive for numbness and paresthesias.         Objective:      Physical Exam  Vitals reviewed.   Constitutional:       General: He is not in acute distress.     Appearance: Normal appearance. He is not ill-appearing.   HENT:      Head: Normocephalic.      Nose: Nose normal.   Cardiovascular:      Rate and Rhythm: Normal rate.   Pulmonary:      Effort: Pulmonary effort is normal. No respiratory distress.   Skin:     Capillary Refill: Capillary refill takes 2 to 3 seconds.   Neurological:      Mental Status: He is alert and oriented to person, place, and time.   Psychiatric:         Mood and Affect: Mood normal.         Behavior: Behavior normal.     Neurologic:  Protective sensation mildly diminished to the tip of the digits, light touch sensation intact bilateral lower extremity, positive paresthesias reported bilateral distal foot   Vascular:  DP and PT pulses palpable 2/4 bilateral foot,  capillary fill time less 3 seconds to digits, pedal hair growth is diminished to digits aspect of the digits, mild edema noted to the left foot and ankle, skin temperature gradient warm to cool from proximal to distal to the left foot   Musculoskeletal:  5/5 muscle strength noted bilateral foot, pain and tenderness with inversion of the left foot, pain and tenderness with palpation of the left 5th metatarsal base, no masses noted bilateral foot  Dermatologic:  Nails 1 through 5 bilateral are thickened, elongated, and discolored with presence of subungual debris, no rashes noted bilateral foot, hyperpigmentation noted to the left rearfoot, multiple well-healed surgical scars noted        Assessment:       Encounter Diagnoses   Name Primary?    Achilles tendinitis of left lower extremity Yes    Type II diabetes mellitus with neurological manifestations     Diabetic polyneuropathy associated with type 2 diabetes mellitus     Disease of nail     Left foot pain     Tendinitis of left peroneus brevis tendon     Bursitis of left foot          Plan:       Garcia was seen today for foot pain and diabetes mellitus.    Diagnoses and all orders for this visit:    Achilles tendinitis of left lower extremity    Type II diabetes mellitus with neurological manifestations  -     Routine Foot Care    Diabetic polyneuropathy associated with type 2 diabetes mellitus  -     Routine Foot Care    Disease of nail  -     Routine Foot Care    Left foot pain  -     Small Joint Aspiration/Injection    Tendinitis of left peroneus brevis tendon  -     Small Joint Aspiration/Injection    Bursitis of left foot  -     Small Joint Aspiration/Injection      I counseled the patient on his conditions, their implications and medical management.        1. Patient was examined and evaluated  2. Patient made aware he seems to still be having residual complaints from previous left foot surgery.  Discussed with patient possible treatments include local  steroid injection or Medrol Dosepak.  Patient was advised to consider possible taping of the left foot over time and possible initiation of use of a brace to the left foot.  Patient will adjust shoe gear for better comfort and fit  3. Discussed with patient bursitis and insertional tendinitis of the peroneal tendons.  Discussed local steroid injection for improvement of complaints  4. Discussed with patient fungal toenail development.  Discussed OTC topical treatments for toenail fungus such as Vicks vapor rub and tea tree oil  Routine Foot Care    Date/Time: 12/13/2022 10:00 AM  Performed by: Ludy Minor DPM  Authorized by: Ludy Minor DPM     Consent Done?:  Yes (Verbal)  Hyperkeratotic Skin Lesions?: No      Nail Care Type:  Debride  Location(s): All  (Left 1st Toe, Left 3rd Toe, Left 2nd Toe, Left 4th Toe, Left 5th Toe, Right 1st Toe, Right 2nd Toe, Right 3rd Toe, Right 4th Toe and Right 5th Toe)  Patient tolerance:  Patient tolerated the procedure well with no immediate complications  Small Joint Aspiration/Injection    Date/Time: 12/13/2022 10:00 AM  Performed by: Ludy Minor DPM  Authorized by: Ludy Minor DPM     Consent Done?:  Yes (Verbal)  Indications:  Pain and joint swelling  Prep: patient was prepped and draped in usual sterile fashion      Location:  Foot (left)  Foot joint: left 5th metatarsal base bursa/ insertion of peroneal brevis tendon.  Ultrasonic guidance for needle placement?: No    Needle size:  25 G  Approach:  Lateral  Medications:  1 mL LIDOcaine HCL 10 mg/ml (1%) 10 mg/mL (1 %); 4 mg dexAMETHasone 4 mg/mL  Patient tolerance:  Patient tolerated the procedure well with no immediate complications    5. Patient was advised to continue with daily foot monitoring.  Patient was advised to continue efforts proper glycemic control, lowering hemoglobin A1c, and adherence to diabetic medication regimen  6. Patient will follow-up in 3 weeks p.r.n. for complaints

## 2022-12-28 ENCOUNTER — OFFICE VISIT (OUTPATIENT)
Dept: FAMILY MEDICINE | Facility: CLINIC | Age: 72
End: 2022-12-28
Payer: MEDICARE

## 2022-12-28 VITALS
HEIGHT: 73 IN | BODY MASS INDEX: 28.1 KG/M2 | RESPIRATION RATE: 19 BRPM | SYSTOLIC BLOOD PRESSURE: 119 MMHG | OXYGEN SATURATION: 99 % | HEART RATE: 73 BPM | WEIGHT: 212 LBS | DIASTOLIC BLOOD PRESSURE: 74 MMHG

## 2022-12-28 DIAGNOSIS — G89.4 CHRONIC PAIN SYNDROME: ICD-10-CM

## 2022-12-28 DIAGNOSIS — E11.40 TYPE 2 DIABETES MELLITUS WITH DIABETIC NEUROPATHY, WITHOUT LONG-TERM CURRENT USE OF INSULIN: Primary | ICD-10-CM

## 2022-12-28 DIAGNOSIS — Z98.890 H/O LUMBOSACRAL SPINE SURGERY: ICD-10-CM

## 2022-12-28 DIAGNOSIS — M48.02 CERVICAL STENOSIS OF SPINAL CANAL: ICD-10-CM

## 2022-12-28 DIAGNOSIS — M51.36 DDD (DEGENERATIVE DISC DISEASE), LUMBAR: ICD-10-CM

## 2022-12-28 PROCEDURE — 1126F PR PAIN SEVERITY QUANTIFIED, NO PAIN PRESENT: ICD-10-PCS | Mod: CPTII,S$GLB,, | Performed by: FAMILY MEDICINE

## 2022-12-28 PROCEDURE — 3008F BODY MASS INDEX DOCD: CPT | Mod: CPTII,S$GLB,, | Performed by: FAMILY MEDICINE

## 2022-12-28 PROCEDURE — 3074F PR MOST RECENT SYSTOLIC BLOOD PRESSURE < 130 MM HG: ICD-10-PCS | Mod: CPTII,S$GLB,, | Performed by: FAMILY MEDICINE

## 2022-12-28 PROCEDURE — 3008F PR BODY MASS INDEX (BMI) DOCUMENTED: ICD-10-PCS | Mod: CPTII,S$GLB,, | Performed by: FAMILY MEDICINE

## 2022-12-28 PROCEDURE — 3078F DIAST BP <80 MM HG: CPT | Mod: CPTII,S$GLB,, | Performed by: FAMILY MEDICINE

## 2022-12-28 PROCEDURE — 3288F PR FALLS RISK ASSESSMENT DOCUMENTED: ICD-10-PCS | Mod: CPTII,S$GLB,, | Performed by: FAMILY MEDICINE

## 2022-12-28 PROCEDURE — 99203 PR OFFICE/OUTPT VISIT, NEW, LEVL III, 30-44 MIN: ICD-10-PCS | Mod: S$GLB,,, | Performed by: FAMILY MEDICINE

## 2022-12-28 PROCEDURE — 99203 OFFICE O/P NEW LOW 30 MIN: CPT | Mod: S$GLB,,, | Performed by: FAMILY MEDICINE

## 2022-12-28 PROCEDURE — 99999 PR PBB SHADOW E&M-EST. PATIENT-LVL V: ICD-10-PCS | Mod: PBBFAC,,, | Performed by: FAMILY MEDICINE

## 2022-12-28 PROCEDURE — 3288F FALL RISK ASSESSMENT DOCD: CPT | Mod: CPTII,S$GLB,, | Performed by: FAMILY MEDICINE

## 2022-12-28 PROCEDURE — 99999 PR PBB SHADOW E&M-EST. PATIENT-LVL V: CPT | Mod: PBBFAC,,, | Performed by: FAMILY MEDICINE

## 2022-12-28 PROCEDURE — 1101F PT FALLS ASSESS-DOCD LE1/YR: CPT | Mod: CPTII,S$GLB,, | Performed by: FAMILY MEDICINE

## 2022-12-28 PROCEDURE — 3074F SYST BP LT 130 MM HG: CPT | Mod: CPTII,S$GLB,, | Performed by: FAMILY MEDICINE

## 2022-12-28 PROCEDURE — 1126F AMNT PAIN NOTED NONE PRSNT: CPT | Mod: CPTII,S$GLB,, | Performed by: FAMILY MEDICINE

## 2022-12-28 PROCEDURE — 1101F PR PT FALLS ASSESS DOC 0-1 FALLS W/OUT INJ PAST YR: ICD-10-PCS | Mod: CPTII,S$GLB,, | Performed by: FAMILY MEDICINE

## 2022-12-28 PROCEDURE — 3078F PR MOST RECENT DIASTOLIC BLOOD PRESSURE < 80 MM HG: ICD-10-PCS | Mod: CPTII,S$GLB,, | Performed by: FAMILY MEDICINE

## 2022-12-28 RX ORDER — OXYCODONE AND ACETAMINOPHEN 10; 325 MG/1; MG/1
1 TABLET ORAL EVERY 6 HOURS PRN
Qty: 120 TABLET | Refills: 0 | Status: SHIPPED | OUTPATIENT
Start: 2022-12-28 | End: 2023-01-23 | Stop reason: SDUPTHER

## 2022-12-28 RX ORDER — GABAPENTIN 300 MG/1
300 CAPSULE ORAL 3 TIMES DAILY
Qty: 90 CAPSULE | Refills: 11 | Status: SHIPPED | OUTPATIENT
Start: 2022-12-28 | End: 2024-02-27

## 2022-12-28 NOTE — PROGRESS NOTES
[facility] - Clinic Note    Subjective      Mr. Renteria is a 72 y.o. male who presents to clinic to establish care.     [facility]   He report that he was being treated by Dr. Elpidio Lau and was given norco as he is intolerant to NSAIDs.   He has chronic back and neck pain.    PMGERALDO Zelaya has a past medical history of Colitis, Diabetes mellitus, type 2, Diverticulosis, GERD (gastroesophageal reflux disease), and Hypertension.   PSX Garcia has a past surgical history that includes Colonoscopy; Upper gastrointestinal endoscopy; Back surgery; Ankle surgery (Bilateral); lower back surgery; Neck surgery; Spine surgery; Anterior cervical discectomy w/ fusion (N/A, 10/30/2019); Lumbar laminectomy with fusion (N/A, 3/3/2020); Creation of muscle rotational flap (3/3/2020); Injection of anesthetic agent around nerve (Bilateral, 3/10/2020); Colonoscopy (N/A, 10/7/2020); Transrectal ultrasound examination (N/A, 7/21/2021); and Cystoscopy (N/A, 7/21/2021).    Garcia's family history includes Neurological Disorders in his mother.   FRANSISCO Zelaya reports that he has been smoking cigars. He has been smoking an average of 1 pack per day. He has never used smokeless tobacco. He reports current alcohol use of about 2.0 standard drinks per week. He reports that he does not currently use drugs.   PETER Zelaya has No Known Allergies.   BETTY Zelaya has a current medication list which includes the following prescription(s): bacitracin, carboxymethylcellulose, cholecalciferol (vitamin d3), cyanocobalamin, diclofenac sodium, famotidine, ibuprofen, lidocaine, lisinopril, metformin, omega 3-dha-epa-fish oil, phenazopyridine, sildenafil, stool softener (docusate kiana), tizanidine, triamterene-hydrochlorothiazide 75-50 mg, vitamin d, gabapentin, and oxycodone-acetaminophen.     Review of Systems   Constitutional:  Negative for activity change, appetite change, chills, fatigue and fever.   Eyes:  Negative for  "visual disturbance.   Respiratory:  Negative for cough and shortness of breath.    Cardiovascular:  Negative for chest pain, palpitations and leg swelling.   Gastrointestinal:  Negative for abdominal pain, nausea and vomiting.   Musculoskeletal:  Positive for back pain and neck pain.   Skin:  Negative for wound.   Neurological:  Negative for dizziness and headaches.   Psychiatric/Behavioral:  Negative for confusion.    Objective     /74   Pulse 73   Resp 19   Ht 6' 1" (1.854 m)   Wt 96.2 kg (212 lb)   SpO2 99%   BMI 27.97 kg/m²     Physical Exam   Constitutional: normal appearance.  Non-toxic appearance. No distress. He does not appear ill.   HENT:   Head: Normocephalic and atraumatic.   Eyes: Right eye exhibits no discharge. Left eye exhibits no discharge.   Cardiovascular: Normal rate, regular rhythm, normal heart sounds and normal pulses. Exam reveals no gallop and no friction rub.   No murmur heard.Pulmonary:      Effort: Pulmonary effort is normal. No respiratory distress.      Breath sounds: Normal breath sounds. No wheezing, rhonchi or rales.     Abdominal: Normal appearance.   Musculoskeletal:      Right lower leg: No edema.      Left lower leg: No edema.   Lymphadenopathy:     He has no cervical adenopathy.   Neurological: He is alert.   Skin: Skin is warm and dry. Capillary refill takes less than 2 seconds. He is not diaphoretic.   Psychiatric: His behavior is normal. Mood, judgment and thought content normal.   Vitals reviewed.   Assessment/Plan     Garcia was seen today for establish care.    Diagnoses and all orders for this visit:  -New patient and new problem to me    Type 2 diabetes mellitus with diabetic neuropathy, without long-term current use of insulin  -     gabapentin (NEURONTIN) 300 MG capsule; Take 1 capsule (300 mg total) by mouth 3 (three) times daily.    Chronic pain syndrome  -     Ambulatory referral/consult to Pain Clinic; Future  -     oxyCODONE-acetaminophen (PERCOCET) "  mg per tablet; Take 1 tablet by mouth every 6 (six) hours as needed for Pain.    H/O lumbosacral spine surgery  -     Ambulatory referral/consult to Pain Clinic; Future  -     oxyCODONE-acetaminophen (PERCOCET)  mg per tablet; Take 1 tablet by mouth every 6 (six) hours as needed for Pain.    DDD (degenerative disc disease), lumbar  -     Ambulatory referral/consult to Pain Clinic; Future  -     gabapentin (NEURONTIN) 300 MG capsule; Take 1 capsule (300 mg total) by mouth 3 (three) times daily.  -     oxyCODONE-acetaminophen (PERCOCET)  mg per tablet; Take 1 tablet by mouth every 6 (six) hours as needed for Pain.    Cervical stenosis of spinal canal  -     Ambulatory referral/consult to Pain Clinic; Future  -     gabapentin (NEURONTIN) 300 MG capsule; Take 1 capsule (300 mg total) by mouth 3 (three) times daily.  -     oxyCODONE-acetaminophen (PERCOCET)  mg per tablet; Take 1 tablet by mouth every 6 (six) hours as needed for Pain.      -refer to PM for further treatment and care    Future Appointments   Date Time Provider Department Center   1/17/2023  1:45 PM Ludy Minor DPM [facility]   3/15/2023  2:20 PM Baldemar Garcia MD [facility]   4/10/2023  9:00 AM Brant Will MD [facility]       Baldemar Garcia MD  Family Medicine  [facility]

## 2023-01-03 ENCOUNTER — PATIENT OUTREACH (OUTPATIENT)
Dept: ADMINISTRATIVE | Facility: HOSPITAL | Age: 73
End: 2023-01-03
Payer: OTHER GOVERNMENT

## 2023-01-03 ENCOUNTER — OFFICE VISIT (OUTPATIENT)
Dept: PODIATRY | Facility: CLINIC | Age: 73
End: 2023-01-03
Payer: OTHER GOVERNMENT

## 2023-01-03 VITALS
HEART RATE: 67 BPM | WEIGHT: 212 LBS | SYSTOLIC BLOOD PRESSURE: 125 MMHG | DIASTOLIC BLOOD PRESSURE: 75 MMHG | BODY MASS INDEX: 28.1 KG/M2 | HEIGHT: 73 IN

## 2023-01-03 DIAGNOSIS — M79.672 LEFT FOOT PAIN: ICD-10-CM

## 2023-01-03 DIAGNOSIS — M76.72 TENDINITIS OF LEFT PERONEUS BREVIS TENDON: Primary | ICD-10-CM

## 2023-01-03 DIAGNOSIS — E11.42 DIABETIC POLYNEUROPATHY ASSOCIATED WITH TYPE 2 DIABETES MELLITUS: ICD-10-CM

## 2023-01-03 DIAGNOSIS — E11.49 TYPE II DIABETES MELLITUS WITH NEUROLOGICAL MANIFESTATIONS: ICD-10-CM

## 2023-01-03 DIAGNOSIS — M76.62 ACHILLES TENDINITIS OF LEFT LOWER EXTREMITY: ICD-10-CM

## 2023-01-03 PROCEDURE — 99213 OFFICE O/P EST LOW 20 MIN: CPT | Mod: 25,S$GLB,, | Performed by: PODIATRIST

## 2023-01-03 PROCEDURE — 29540 STRAPPING ANKLE &/FOOT: CPT | Mod: LT,S$GLB,, | Performed by: PODIATRIST

## 2023-01-03 PROCEDURE — 29540 PR STRAPPING; ANKLE &/OR FOOT: ICD-10-PCS | Mod: LT,S$GLB,, | Performed by: PODIATRIST

## 2023-01-03 PROCEDURE — 99213 PR OFFICE/OUTPT VISIT, EST, LEVL III, 20-29 MIN: ICD-10-PCS | Mod: 25,S$GLB,, | Performed by: PODIATRIST

## 2023-01-03 NOTE — LETTER
FAX      AUTHORIZATION FOR RELEASE OF   CONFIDENTIAL INFORMATION        [facility] MEDICAL RECORDS DEPT.      We are seeing Garcia Renteria, date of birth 1950, in the clinic at [facility]. Denise Salguero MD is the patient's PCP. Garcia Renteria has an outstanding lab/procedure at the time we reviewed their chart. In order to help keep their health information updated, Garcia Renteria has authorized us to request the following medical record(s):                  ( X )  MOST RECENT LAB RESULTS-Hep C Screening      ( X )  DIABETIC EYE EXAM WITH RETINOL SCREENING   (MOST RECENT WITHIN 48 MONTHS)                                ( X )  Abdominal Aorta Aneurysm Screening and or Abdominal CT Scan        Please fax records to Denise Salguero MD at [facility]  [phone].     If you have any questions, please contact Desirae at [phone].    Desirae Kate LPN  Performance Improvement Coordinator  [facility] [address] [place] [address] [phone]

## 2023-01-03 NOTE — PROGRESS NOTES
Population Health Outreach.  01/03/2023  EFAX SENT TO [facility] MEDICAL RECORDS DEPT FOR MOST RECENT DM EYE EXAM,  HEP C SCREENING. & AAA  RESULTS

## 2023-01-03 NOTE — PROGRESS NOTES
Records Received, hyper-linked into chart at this time. The following record(s)  below were uploaded for Health Maintenance .    11/03/2022  HEMOGLOBIN A1c  LIPID PANEL  URINE MICROALBUMIN  COMPLETE METABOLIC PANEL

## 2023-01-04 NOTE — PROCEDURES
"Procedures    Low dye ankle and foot strapping/taping    Patient had a low dye taping applied to the left foot-1 in athletic tape applied from lateral to medial x 2 with a plantar strap of 2 inch athletic tape applied from medial to lateral from just distal to the heel to the level of the midfoot along with a Jarquin's rest strap composed of 3 " tensoplast. Keep taping clean, dry, and intact for 1 - 3 days.   "

## 2023-01-04 NOTE — PROGRESS NOTES
Subjective:      Patient ID: Garcia Renteria is a 72 y.o. male.    Chief Complaint: Follow-up and Foot Pain    Garcia is a 72 y.o. male who presents to the podiatry clinic  with complaint of  left foot pain. Onset of the symptoms was several months ago. Precipitating event:  previous surgical correction of left peroneal tendons . Current symptoms include: ability to bear weight, but with some pain, stiffness, swelling, and worsening symptoms after a period of activity. Aggravating factors:  prolonged use . Symptoms have gradually improved. Patient has had prior foot problems. Treatment to date: corticosteroid injection which was effective. Patients rates pain 3/10 on pain scale.    Review of Systems   Constitutional: Negative for chills and fever.   Cardiovascular:  Negative for chest pain and leg swelling.   Respiratory:  Negative for cough and shortness of breath.    Gastrointestinal:  Negative for diarrhea, nausea and vomiting.         Objective:      Physical Exam  Vitals reviewed.   Constitutional:       General: He is not in acute distress.     Appearance: Normal appearance. He is not ill-appearing.   HENT:      Head: Normocephalic.      Nose: Nose normal.   Cardiovascular:      Rate and Rhythm: Normal rate.   Pulmonary:      Effort: Pulmonary effort is normal. No respiratory distress.   Skin:     Capillary Refill: Capillary refill takes 2 to 3 seconds.   Neurological:      Mental Status: He is alert and oriented to person, place, and time.   Psychiatric:         Mood and Affect: Mood normal.         Behavior: Behavior normal.     Neurologic:  Protective sensation mildly diminished to the tip of the digits, light touch sensation intact bilateral lower extremity, positive paresthesias reported bilateral distal foot   Vascular:  DP and PT pulses palpable 2/4 bilateral foot, capillary fill time less 3 seconds to digits, pedal hair growth is diminished to digits aspect of the digits, mild edema noted to the  left foot and ankle, skin temperature gradient warm to cool from proximal to distal to the left foot   Musculoskeletal:  5/5 muscle strength noted bilateral foot, pain and tenderness with inversion of the left foot (improved from prior visit), minimal pain and tenderness with palpation of the left 5th metatarsal base, no masses noted bilateral foot, mild discomfort with palpation and ROM of left achilles tendon at insertion  Dermatologic:  Nails 1 through 5 bilateral are thickened, elongated, and discolored with presence of subungual debris, no rashes noted bilateral foot, hyperpigmentation noted to the left rearfoot, multiple well-healed surgical scars noted        Assessment:       Encounter Diagnoses   Name Primary?    Tendinitis of left peroneus brevis tendon Yes    Achilles tendinitis of left lower extremity     Type II diabetes mellitus with neurological manifestations     Diabetic polyneuropathy associated with type 2 diabetes mellitus     Left foot pain          Plan:       Garcia was seen today for follow-up and foot pain.    Diagnoses and all orders for this visit:    Tendinitis of left peroneus brevis tendon    Achilles tendinitis of left lower extremity    Type II diabetes mellitus with neurological manifestations    Diabetic polyneuropathy associated with type 2 diabetes mellitus    Left foot pain      I counseled the patient on his conditions, their implications and medical management.        1. Patient was examined and evaluated  2. Discussed with patient etiology of tendinitis of left peroneal brevis tendon.  Discussed with patient possible adjunct with oral oral OTC analgesics for pain relief  3. Discussed with patient benefits of a low dye taping to the left foot for better control of the foot and prevention of  re-exacerbation of symptoms.  Discussed with patient possible repeat corticosteroid injection over time if symptoms persist or worsen.    4. Patient was advised to continue with  comfortable shoe gear both inside and outside the home.  Patient was advised to decrease the amount of barefoot walking and limit use of flip-flops   5. Patient will follow up in 2 weeks or p.r.n. for complaints

## 2023-01-17 ENCOUNTER — OFFICE VISIT (OUTPATIENT)
Dept: PODIATRY | Facility: CLINIC | Age: 73
End: 2023-01-17
Payer: OTHER GOVERNMENT

## 2023-01-17 VITALS
HEIGHT: 73 IN | DIASTOLIC BLOOD PRESSURE: 87 MMHG | SYSTOLIC BLOOD PRESSURE: 139 MMHG | HEART RATE: 83 BPM | WEIGHT: 212 LBS | BODY MASS INDEX: 28.1 KG/M2

## 2023-01-17 DIAGNOSIS — M79.672 LEFT FOOT PAIN: ICD-10-CM

## 2023-01-17 DIAGNOSIS — M77.52 BURSITIS OF LEFT FOOT: ICD-10-CM

## 2023-01-17 DIAGNOSIS — E11.49 TYPE II DIABETES MELLITUS WITH NEUROLOGICAL MANIFESTATIONS: ICD-10-CM

## 2023-01-17 DIAGNOSIS — M76.72 TENDINITIS OF LEFT PERONEUS BREVIS TENDON: Primary | ICD-10-CM

## 2023-01-17 PROCEDURE — 99213 PR OFFICE/OUTPT VISIT, EST, LEVL III, 20-29 MIN: ICD-10-PCS | Mod: 25,S$GLB,, | Performed by: PODIATRIST

## 2023-01-17 PROCEDURE — 99213 OFFICE O/P EST LOW 20 MIN: CPT | Mod: 25,S$GLB,, | Performed by: PODIATRIST

## 2023-01-17 PROCEDURE — 96372 THER/PROPH/DIAG INJ SC/IM: CPT | Mod: S$GLB,,, | Performed by: PODIATRIST

## 2023-01-17 PROCEDURE — 96372 PR INJECTION,THERAP/PROPH/DIAG2ST, IM OR SUBCUT: ICD-10-PCS | Mod: S$GLB,,, | Performed by: PODIATRIST

## 2023-01-17 RX ORDER — OMEPRAZOLE 20 MG/1
20 CAPSULE, DELAYED RELEASE ORAL
COMMUNITY
Start: 2023-01-16

## 2023-01-17 RX ORDER — DEXAMETHASONE SODIUM PHOSPHATE 4 MG/ML
4 INJECTION, SOLUTION INTRA-ARTICULAR; INTRALESIONAL; INTRAMUSCULAR; INTRAVENOUS; SOFT TISSUE
Status: DISCONTINUED | OUTPATIENT
Start: 2023-01-17 | End: 2023-01-17 | Stop reason: HOSPADM

## 2023-01-17 RX ORDER — LIDOCAINE HYDROCHLORIDE 10 MG/ML
1 INJECTION INFILTRATION; PERINEURAL
Status: DISCONTINUED | OUTPATIENT
Start: 2023-01-17 | End: 2023-01-17 | Stop reason: HOSPADM

## 2023-01-17 RX ADMIN — LIDOCAINE HYDROCHLORIDE 1 ML: 10 INJECTION INFILTRATION; PERINEURAL at 01:01

## 2023-01-17 RX ADMIN — DEXAMETHASONE SODIUM PHOSPHATE 4 MG: 4 INJECTION, SOLUTION INTRA-ARTICULAR; INTRALESIONAL; INTRAMUSCULAR; INTRAVENOUS; SOFT TISSUE at 01:01

## 2023-01-17 NOTE — PROGRESS NOTES
Subjective:      Patient ID: Garcia Renteria is a 72 y.o. male.    Chief Complaint: Foot Pain    Garcia is a 72 y.o. male who presents to the podiatry clinic  with complaint of  left foot pain. Onset of the symptoms was several months ago. Precipitating event:  previous surgical correction of left foot tendons . Current symptoms include: ability to bear weight, but with some pain, stiffness, and worsening symptoms after a period of activity. Aggravating factors: walking. Symptoms have gradually improved. Patient has had prior foot problems. Treatment to date: corticosteroid injection which was effective, ice, and rest. Patients rates pain 4/10 on pain scale.    Review of Systems   Constitutional: Negative for chills and fever.   Cardiovascular:  Negative for chest pain and leg swelling.   Respiratory:  Negative for cough and shortness of breath.    Gastrointestinal:  Negative for diarrhea, nausea and vomiting.         Objective:      Physical Exam  Vitals reviewed.   Constitutional:       General: He is not in acute distress.     Appearance: Normal appearance. He is not ill-appearing.   HENT:      Head: Normocephalic.      Nose: Nose normal.   Cardiovascular:      Rate and Rhythm: Normal rate.   Pulmonary:      Effort: Pulmonary effort is normal. No respiratory distress.   Skin:     Capillary Refill: Capillary refill takes 2 to 3 seconds.   Neurological:      Mental Status: He is alert and oriented to person, place, and time.   Psychiatric:         Mood and Affect: Mood normal.         Behavior: Behavior normal.     Neurologic:  Protective sensation mildly diminished to the tip of the digits, light touch sensation intact bilateral lower extremity, positive paresthesias reported bilateral distal foot   Vascular:  DP and PT pulses palpable 2/4 bilateral foot, capillary fill time less 3 seconds to digits, pedal hair growth is diminished to digits aspect of the digits, mild edema noted to the left foot and ankle,  skin temperature gradient warm to cool from proximal to distal to the left foot   Musculoskeletal:  5/5 muscle strength noted bilateral foot, pain and tenderness with inversion of the left foot, mild pain and tenderness with palpation of the left 5th metatarsal base, no masses noted bilateral foot, mild discomfort with palpation and ROM of left achilles tendon at insertion  Dermatologic:  Nails 1 through 5 bilateral are thickened, elongated, and discolored with presence of subungual debris, no rashes noted bilateral foot, hyperpigmentation noted to the left rearfoot, multiple well-healed surgical scars noted        Assessment:       Encounter Diagnoses   Name Primary?    Tendinitis of left peroneus brevis tendon Yes    Left foot pain     Type II diabetes mellitus with neurological manifestations     Bursitis of left foot          Plan:       Garcia was seen today for foot pain.    Diagnoses and all orders for this visit:    Tendinitis of left peroneus brevis tendon  -     Small Joint Aspiration/Injection    Left foot pain  -     Small Joint Aspiration/Injection    Type II diabetes mellitus with neurological manifestations    Bursitis of left foot  -     Small Joint Aspiration/Injection      I counseled the patient on his conditions, their implications and medical management.        1. Patient was examined and evaluated  2. Discussed with patient etiology of tendinitis of left peroneal brevis tendon.  Discussed with patient possible adjunct with oral oral OTC analgesics for pain relief.  Small Joint Aspiration/Injection    Date/Time: 1/17/2023 1:45 PM  Performed by: Ludy Minor DPM  Authorized by: Ludy Minor DPM     Consent Done?:  Yes (Verbal)  Indications:  Pain  Location:  Foot (left)  Foot joint: left 5th metatarsal base/ insertion of peroneal brevis tendon.  Ultrasonic guidance for needle placement?: No    Needle size:  25 G  Approach:  Lateral  Medications:  1 mL LIDOcaine HCL 10 mg/ml (1%) 10  mg/mL (1 %); 4 mg dexAMETHasone 4 mg/mL  Patient tolerance:  Patient tolerated the procedure well with no immediate complications   3. Patient was advised to continue with comfortable shoe gear both inside and outside the home.  Patient was advised to decrease the amount of barefoot walking and limit use of flip-flops   4. Discussed etiology of Achilles tendinitis plantar fasciitis with the patient patient was advised continue with a stretching and icing of the left lower extremity when at rest.   5. Patient will follow up in 3 weeks or p.r.n. for complaints

## 2023-01-23 DIAGNOSIS — G89.4 CHRONIC PAIN SYNDROME: ICD-10-CM

## 2023-01-23 DIAGNOSIS — M51.36 DDD (DEGENERATIVE DISC DISEASE), LUMBAR: ICD-10-CM

## 2023-01-23 DIAGNOSIS — M48.02 CERVICAL STENOSIS OF SPINAL CANAL: ICD-10-CM

## 2023-01-23 DIAGNOSIS — Z98.890 H/O LUMBOSACRAL SPINE SURGERY: ICD-10-CM

## 2023-01-23 RX ORDER — OXYCODONE AND ACETAMINOPHEN 10; 325 MG/1; MG/1
1 TABLET ORAL EVERY 6 HOURS PRN
Qty: 120 TABLET | Refills: 0 | Status: SHIPPED | OUTPATIENT
Start: 2023-01-23 | End: 2023-03-27 | Stop reason: SDUPTHER

## 2023-01-23 NOTE — TELEPHONE ENCOUNTER
----- Message from Rufino Rivero sent at 1/23/2023 10:37 AM CST -----  Regarding: pt called  Can the clinic reply in [facility]: DAREK POSADA [[id number]]      Please refill the medication(s) listed below. Please call the patient when the prescription(s) is ready for  at this phone number         Medication #1 oxyCODONE-acetaminophen (PERCOCET)  mg per tablet    Medication #2       Preferred Pharmacy:  [id number] - [place] [address] AT [place] [address] & [address] [address]  Phone: [phone] Fax: [phone]

## 2023-01-27 ENCOUNTER — PATIENT OUTREACH (OUTPATIENT)
Dept: ADMINISTRATIVE | Facility: HOSPITAL | Age: 73
End: 2023-01-27
Payer: OTHER GOVERNMENT

## 2023-01-27 NOTE — PROGRESS NOTES
Records Received, hyper-linked into chart at this time. The following record(s)  below were uploaded for Health Maintenance .    11/30/2022  DM EYE EXAM    05/06/2016  ABDOMINAL AORTA ANEURYSM SCREENING (CT OF ABDOMEN)

## 2023-02-07 ENCOUNTER — OFFICE VISIT (OUTPATIENT)
Dept: PODIATRY | Facility: CLINIC | Age: 73
End: 2023-02-07
Payer: OTHER GOVERNMENT

## 2023-02-07 VITALS
HEIGHT: 73 IN | DIASTOLIC BLOOD PRESSURE: 77 MMHG | SYSTOLIC BLOOD PRESSURE: 122 MMHG | BODY MASS INDEX: 28.1 KG/M2 | HEART RATE: 82 BPM | WEIGHT: 212 LBS

## 2023-02-07 DIAGNOSIS — M79.672 LEFT FOOT PAIN: ICD-10-CM

## 2023-02-07 DIAGNOSIS — M76.72 TENDINITIS OF LEFT PERONEUS BREVIS TENDON: Primary | ICD-10-CM

## 2023-02-07 DIAGNOSIS — M76.72 TENDINITIS OF LEFT PERONEUS LONGUS TENDON: ICD-10-CM

## 2023-02-07 PROCEDURE — 99212 PR OFFICE/OUTPT VISIT, EST, LEVL II, 10-19 MIN: ICD-10-PCS | Mod: S$GLB,,, | Performed by: PODIATRIST

## 2023-02-07 PROCEDURE — 99212 OFFICE O/P EST SF 10 MIN: CPT | Mod: S$GLB,,, | Performed by: PODIATRIST

## 2023-02-07 NOTE — PROGRESS NOTES
Subjective:      Patient ID: Garcia Renteria is a 73 y.o. male.    Chief Complaint: Foot Pain    Garcia is a 72 y.o. male who presents to the podiatry clinic  with complaint of  left foot pain. Onset of the symptoms was several months ago. Precipitating event:  previous surgical correction of left foot tendons . Current symptoms include: ability to bear weight, but with some pain, stiffness, and worsening symptoms after a period of activity. Aggravating factors: walking. Symptoms have gradually improved. Patient has had prior foot problems. Treatment to date: corticosteroid injection which was effective, ice, and rest. Patients rates pain 6/10 on pain scale after exercise or prolonged use.    Review of Systems   Constitutional: Negative for chills and fever.   Cardiovascular:  Negative for chest pain and leg swelling.   Respiratory:  Negative for cough and shortness of breath.    Gastrointestinal:  Negative for diarrhea, nausea and vomiting.         Objective:      Physical Exam  Vitals reviewed.   Constitutional:       General: He is not in acute distress.     Appearance: Normal appearance. He is not ill-appearing.   HENT:      Head: Normocephalic.      Nose: Nose normal.   Cardiovascular:      Rate and Rhythm: Normal rate.   Pulmonary:      Effort: Pulmonary effort is normal. No respiratory distress.   Skin:     Capillary Refill: Capillary refill takes 2 to 3 seconds.   Neurological:      Mental Status: He is alert and oriented to person, place, and time.   Psychiatric:         Mood and Affect: Mood normal.         Behavior: Behavior normal.       Neurologic:  Protective sensation mildly diminished to the tip of the digits, light touch sensation intact bilateral lower extremity, positive paresthesias reported bilateral distal foot   Vascular:  DP and PT pulses palpable 2/4 bilateral foot, capillary fill time less 3 seconds to digits, pedal hair growth is diminished to digits aspect of the digits, mild edema  noted to the left foot and ankle, skin temperature gradient warm to cool from proximal to distal to the left foot   Musculoskeletal:  5/5 muscle strength noted bilateral foot, pain and tenderness with inversion of the left foot, mild pain and tenderness with palpation of the left 5th metatarsal base, no masses noted bilateral foot, mild discomfort with palpation and ROM of left achilles tendon at insertion  Dermatologic:  Nails 1 through 5 bilateral are thickened, elongated, and discolored with presence of subungual debris, no rashes noted bilateral foot, hyperpigmentation noted to the left rearfoot, multiple well-healed surgical scars noted      Assessment:       Encounter Diagnoses   Name Primary?    Tendinitis of left peroneus brevis tendon Yes    Tendinitis of left peroneus longus tendon     Left foot pain          Plan:       Garcia was seen today for foot pain.    Diagnoses and all orders for this visit:    Tendinitis of left peroneus brevis tendon    Tendinitis of left peroneus longus tendon    Left foot pain      I counseled the patient on his conditions, their implications and medical management.        1. Patient was examined and evaluated  2. Discussed with patient etiology of tendinitis of left peroneal brevis tendon.  Discussed with patient possible adjunct with oral oral OTC analgesics for pain relief.  Patient will continue with current exercise regimen.  Patient will continue with decrease use of the treadmill in lieu of less impactful exercises such as cycling.  Patient was advised to consider heel raises without weights at the gym for improvement of dorsiflexion of his left lower extremity.  Discussed with patient possible use of a brace on the left foot over time if symptoms worsen or persist.  3. Patient was advised to continue with comfortable shoe gear both inside and outside the home.  Patient was advised to decrease the amount of barefoot walking and limit use of flip-flops   4. Discussed  etiology of Achilles tendinitis plantar fasciitis with the patient patient was advised continue with a stretching and icing of the left lower extremity when at rest.   5. Patient will follow up in 4 weeks or p.r.n. for complaints

## 2023-03-06 ENCOUNTER — OFFICE VISIT (OUTPATIENT)
Dept: PODIATRY | Facility: CLINIC | Age: 73
End: 2023-03-06
Payer: OTHER GOVERNMENT

## 2023-03-06 VITALS
BODY MASS INDEX: 28.1 KG/M2 | WEIGHT: 212 LBS | HEART RATE: 83 BPM | DIASTOLIC BLOOD PRESSURE: 84 MMHG | SYSTOLIC BLOOD PRESSURE: 113 MMHG | HEIGHT: 73 IN

## 2023-03-06 DIAGNOSIS — M76.72 TENDINITIS OF LEFT PERONEUS BREVIS TENDON: ICD-10-CM

## 2023-03-06 DIAGNOSIS — M76.72 TENDINITIS OF LEFT PERONEUS LONGUS TENDON: Primary | ICD-10-CM

## 2023-03-06 DIAGNOSIS — M79.672 LEFT FOOT PAIN: ICD-10-CM

## 2023-03-06 DIAGNOSIS — E11.49 TYPE II DIABETES MELLITUS WITH NEUROLOGICAL MANIFESTATIONS: ICD-10-CM

## 2023-03-06 PROCEDURE — 99212 PR OFFICE/OUTPT VISIT, EST, LEVL II, 10-19 MIN: ICD-10-PCS | Mod: S$GLB,,, | Performed by: PODIATRIST

## 2023-03-06 PROCEDURE — 99212 OFFICE O/P EST SF 10 MIN: CPT | Mod: S$GLB,,, | Performed by: PODIATRIST

## 2023-03-06 RX ORDER — GABAPENTIN 100 MG/1
CAPSULE ORAL
COMMUNITY
Start: 2022-11-03 | End: 2023-03-27

## 2023-03-09 ENCOUNTER — PATIENT MESSAGE (OUTPATIENT)
Dept: FAMILY MEDICINE | Facility: CLINIC | Age: 73
End: 2023-03-09
Payer: OTHER GOVERNMENT

## 2023-03-13 NOTE — PROGRESS NOTES
Subjective:      Patient ID: Garcia Renteria is a 73 y.o. male.    Chief Complaint: Foot Pain    Garcia is a 73 y.o. male who presents to the podiatry clinic  with complaint of  left foot pain. Onset of the symptoms was several months ago. Precipitating event:  previous surgical correction of left foot tendons . Current symptoms include: ability to bear weight, but with some pain, stiffness, and worsening symptoms after a period of activity. Aggravating factors: walking. Symptoms have gradually improved. Patient has had prior foot problems. Treatment to date: low dye ankle strapping which was effective; corticosteroid injection which was effective, ice, and rest. Patients rates pain 3/10 on pain scale after exercise or prolonged use.       Review of Systems   Constitutional: Negative for chills and fever.   Cardiovascular:  Negative for chest pain and leg swelling.   Respiratory:  Negative for cough and shortness of breath.    Gastrointestinal:  Negative for diarrhea, nausea and vomiting.   Neurological:  Positive for numbness and paresthesias.         Objective:      Physical Exam  Vitals reviewed.   Constitutional:       General: He is not in acute distress.     Appearance: Normal appearance. He is not ill-appearing.   HENT:      Head: Normocephalic.      Nose: Nose normal.   Cardiovascular:      Rate and Rhythm: Normal rate.   Pulmonary:      Effort: Pulmonary effort is normal. No respiratory distress.   Skin:     Capillary Refill: Capillary refill takes 2 to 3 seconds.   Neurological:      Mental Status: He is alert and oriented to person, place, and time.   Psychiatric:         Mood and Affect: Mood normal.         Behavior: Behavior normal.     Neurologic:  Protective sensation mildly diminished to the tip of the digits, light touch sensation intact bilateral lower extremity, positive paresthesias reported bilateral distal foot   Vascular:  DP and PT pulses palpable 2/4 bilateral foot, capillary fill time  less 3 seconds to digits, pedal hair growth is diminished to digits aspect of the digits, mild edema noted to the left foot and ankle, skin temperature gradient warm to cool from proximal to distal to the left foot   Musculoskeletal:  5/5 muscle strength noted bilateral foot, pain and tenderness with inversion of the left foot, mild pain and tenderness with palpation of the left 5th metatarsal base, no masses noted bilateral foot, mild discomfort with palpation and ROM of left achilles tendon at insertion  Dermatologic:  Nails 1 through 5 bilateral are thickened, elongated, and discolored with presence of subungual debris, no rashes noted bilateral foot, hyperpigmentation noted to the left rearfoot, multiple well-healed surgical scars noted        Assessment:       Encounter Diagnoses   Name Primary?    Tendinitis of left peroneus longus tendon Yes    Tendinitis of left peroneus brevis tendon     Left foot pain     Type II diabetes mellitus with neurological manifestations          Plan:       Garcia was seen today for foot pain.    Diagnoses and all orders for this visit:    Tendinitis of left peroneus longus tendon    Tendinitis of left peroneus brevis tendon    Left foot pain    Type II diabetes mellitus with neurological manifestations      I counseled the patient on his conditions, their implications and medical management.        1. Patient was examined and evaluated  2. Discussed with patient etiology of tendinitis of left peroneal longus and brevis tendon as a result of previous surgical repair.  Patient advised to adjunct with oral OTC analgesics for pain relief.  Patient will continue with current exercise regimen.  Patient will continue with decreased use of the treadmill in lieu of less impactful exercises such as cycling.  Patient was advised to consider heel raises without weights at the gym for improvement of dorsiflexion of his left lower extremity.  Discussed with patient possible use of a brace  on the left foot over time if symptoms worsen or persist.  3. Patient was advised to continue with comfortable shoe gear both inside and outside the home.  Patient was advised to decrease the amount of barefoot walking and limit use of flip-flops   4. Discussed etiology of Achilles tendinitis plantar fasciitis with the patient patient was advised continue with a stretching and icing of the left lower extremity when at rest.   5. Patient will follow up in 4 weeks or p.r.n. for complaints

## 2023-03-27 ENCOUNTER — OFFICE VISIT (OUTPATIENT)
Dept: FAMILY MEDICINE | Facility: CLINIC | Age: 73
End: 2023-03-27
Payer: OTHER GOVERNMENT

## 2023-03-27 VITALS
HEIGHT: 73 IN | SYSTOLIC BLOOD PRESSURE: 118 MMHG | OXYGEN SATURATION: 99 % | HEART RATE: 72 BPM | DIASTOLIC BLOOD PRESSURE: 72 MMHG | RESPIRATION RATE: 18 BRPM | BODY MASS INDEX: 28.31 KG/M2 | WEIGHT: 213.63 LBS

## 2023-03-27 DIAGNOSIS — M54.2 NECK PAIN WITH HISTORY OF CERVICAL SPINAL SURGERY: ICD-10-CM

## 2023-03-27 DIAGNOSIS — F11.90 CHRONIC, CONTINUOUS USE OF OPIOIDS: ICD-10-CM

## 2023-03-27 DIAGNOSIS — M51.36 DDD (DEGENERATIVE DISC DISEASE), LUMBAR: ICD-10-CM

## 2023-03-27 DIAGNOSIS — G89.4 CHRONIC PAIN SYNDROME: Primary | ICD-10-CM

## 2023-03-27 DIAGNOSIS — M54.16 LUMBAR BACK PAIN WITH RADICULOPATHY AFFECTING LEFT LOWER EXTREMITY: ICD-10-CM

## 2023-03-27 DIAGNOSIS — Z98.890 H/O LUMBOSACRAL SPINE SURGERY: ICD-10-CM

## 2023-03-27 DIAGNOSIS — M48.02 CERVICAL STENOSIS OF SPINAL CANAL: ICD-10-CM

## 2023-03-27 DIAGNOSIS — Z98.890 NECK PAIN WITH HISTORY OF CERVICAL SPINAL SURGERY: ICD-10-CM

## 2023-03-27 PROCEDURE — 99999 PR PBB SHADOW E&M-EST. PATIENT-LVL V: CPT | Mod: PBBFAC,,, | Performed by: NURSE PRACTITIONER

## 2023-03-27 PROCEDURE — 99215 OFFICE O/P EST HI 40 MIN: CPT | Mod: PBBFAC | Performed by: NURSE PRACTITIONER

## 2023-03-27 PROCEDURE — 99999 PR PBB SHADOW E&M-EST. PATIENT-LVL V: ICD-10-PCS | Mod: PBBFAC,,, | Performed by: NURSE PRACTITIONER

## 2023-03-27 PROCEDURE — 99214 OFFICE O/P EST MOD 30 MIN: CPT | Mod: S$PBB,,, | Performed by: NURSE PRACTITIONER

## 2023-03-27 PROCEDURE — 99214 PR OFFICE/OUTPT VISIT, EST, LEVL IV, 30-39 MIN: ICD-10-PCS | Mod: S$PBB,,, | Performed by: NURSE PRACTITIONER

## 2023-03-27 PROCEDURE — 80326 AMPHETAMINES 5 OR MORE: CPT | Performed by: NURSE PRACTITIONER

## 2023-03-27 RX ORDER — OXYCODONE AND ACETAMINOPHEN 10; 325 MG/1; MG/1
1 TABLET ORAL EVERY 6 HOURS PRN
Qty: 120 TABLET | Refills: 0 | Status: SHIPPED | OUTPATIENT
Start: 2023-03-27 | End: 2023-04-27 | Stop reason: SDUPTHER

## 2023-03-27 NOTE — PROGRESS NOTES
Subjective:       Patient ID: Garcia Renteria is a 73 y.o. male.    Chief Complaint: 3 month follow up:, Diabetes, and Chronic Pain  -  The patient is a 74 y/o male new to this provider presents for pain medication refill that was started by GI d/t intolerance to NSAIDs. H/o 2 cervical and 3 lumbar surgeries. Currently recommended for another lumbar surgery for left sided sciatica yet MD felt he may not recover. Pt is interested in pain management procedural options. Chart viewed and has appt with Dr. Will 4/10/23.     Called for percocet refill 2/28/23 yet not done for unknown reason thus apology given to pt. He does not take 4 percocet daily and takes only prn thus was not completely out.  consistent, no red flags noted.   -    Past Medical History:   Diagnosis Date    Colitis     Diabetes mellitus, type 2     Diverticulosis     GERD (gastroesophageal reflux disease)     Hypertension        Past Surgical History:   Procedure Laterality Date    ANKLE SURGERY Bilateral     ANTERIOR CERVICAL DISCECTOMY W/ FUSION N/A 10/30/2019    Procedure: C3-4, C4-5 ACDF;  Surgeon: Francis Alan MD;  Location: [facility] OR [address] [place] [address];  Service: Neurosurgery;  Laterality: N/A;  C3-4, C4-5 ACDF  Anesthesia:  General  Rad:  C-Arm  NM:  EMG, SEP, & MEP  Blood:  Type & Screen  Position:  Supine  Bed:  Regular slider bed  Headrest:  [place] & GW tongs/hanging weights  Misc:  Small vivigen  Nerve root retractor (DePuy)  Disposable #2 [place]  Interbody    BACK SURGERY      COLONOSCOPY      COLONOSCOPY N/A 10/7/2020    Procedure: COLONOSCOPY;  Surgeon: Elpidio Lau MD;  Location: [facility] ENDO;  Service: Endoscopy;  Laterality: N/A;    CREATION OF MUSCLE ROTATIONAL FLAP  3/3/2020    Procedure: CREATION, FLAP, MUSCLE ROTATION;  Surgeon: Francis Alan MD;  [facility];  Service: Neurosurgery;;    CYSTOSCOPY N/A 7/21/2021    Procedure: CYSTOSCOPY;  Surgeon: Colton Kamara Jr., MD;  Location: [facility] OR;  Service: Urology;  Laterality:  N/A;    INJECTION OF ANESTHETIC AGENT AROUND NERVE Bilateral 3/10/2020    Procedure: Block, Nerve;  Surgeon: Erinn Surgeon;  Location: [facility];  Service: Anesthesiology;  Laterality: Bilateral;    lower back surgery      LUMBAR LAMINECTOMY WITH FUSION N/A 3/3/2020    Procedure: T10-Pelvis Fusion with L4 PSO **AIRO** Co-Surgeon: Roberto Parkinson;  Surgeon: Francis Alan MD;  Location: [address];  Service: Neurosurgery;  Laterality: N/A;  **AIRO**  **CELL SAVER**  Co-Surgeon: Roberto Parkinson  NM: EMG, SEP, MEP  Position: Prone  Bed: Shaji 4 post, prone pillow  Blood: Type & Hold 2 units  Rad: C-Arm, AIRO  Vendor: [employer]  Misc: Vivigen, Infuse, Cement (Depuy)  [place]    NECK SURGERY      SPINE SURGERY      TRANSRECTAL ULTRASOUND EXAMINATION N/A 7/21/2021    Procedure: ULTRASOUND, RECTAL APPROACH;  Surgeon: Cotlon Kamara Jr., MD;  Location: [facility];  Service: Urology;  Laterality: N/A;    UPPER GASTROINTESTINAL ENDOSCOPY          Social History     Socioeconomic History    Marital status:    Tobacco Use    Smoking status: Some Days     Packs/day: 1.00     Types: Cigars, Cigarettes     Last attempt to quit: 10/2019     Years since quitting: 3.4    Smokeless tobacco: Never    Tobacco comments:     3/27/23 not at this time   Substance and Sexual Activity    Alcohol use: Yes     Alcohol/week: 2.0 standard drinks     Types: 1 Glasses of wine, 1 Cans of beer per week     Comment: 1 wine , 2 times a week    Drug use: Not Currently    Sexual activity: Yes     Partners: Female       Family History   Problem Relation Age of Onset    Neurological Disorders Mother        Review of patient's allergies indicates:  No Known Allergies       Current Outpatient Medications:     cholecalciferol, vitamin D3, 1,250 mcg (50,000 unit) capsule, Take by mouth., Disp: , Rfl:     cyanocobalamin (VITAMIN B-12) 1000 MCG tablet, , Disp: , Rfl:     diclofenac sodium (VOLTAREN) 1 % Gel, Apply 2 g topically 2 (two) times daily., Disp: , Rfl:      famotidine (PEPCID) 40 MG tablet, Take 1 tablet (40 mg total) by mouth every evening., Disp: 90 tablet, Rfl: 3    gabapentin (NEURONTIN) 300 MG capsule, Take 1 capsule (300 mg total) by mouth 3 (three) times daily., Disp: 90 capsule, Rfl: 11    ibuprofen (ADVIL,MOTRIN) 800 MG tablet, Take 800 mg by mouth every 6 (six) hours as needed., Disp: , Rfl:     lidocaine (LIDODERM) 5 %, Place 1 patch onto the skin once daily. Remove & Discard patch within 12 hours or as directed by MD, Disp: 15 patch, Rfl: 0    lisinopril (PRINIVIL,ZESTRIL) 5 MG tablet, Take 5 mg by mouth once daily. , Disp: , Rfl:     metFORMIN (GLUCOPHAGE) 1000 MG tablet, Take 500 mg by mouth daily with breakfast. , Disp: , Rfl:     omega 3-dha-epa-fish oil 1,000 mg (120 mg-180 mg) Cap, TAKE ONE CAPSULE BY MOUTH DAILY AS A SUPPLEMENT, Disp: , Rfl:     omeprazole (PRILOSEC) 20 MG capsule, Take 20 mg by mouth., Disp: , Rfl:     phenazopyridine (PYRIDIUM) 200 MG tablet, Take 200 mg by mouth 3 (three) times daily., Disp: , Rfl:     sildenafiL (VIAGRA) 100 MG tablet, TAKE ONE-HALF TABLET BY MOUTH DAILY FOR ERECTILE DYSFUNCTION *TAKE ONE HOUR BEFORE SEXUAL ACTIVITY* NO MORE THAN ONE DOSE IN 24 HOURS ** MUST NOT TAKE NITROGLYCERIN TYPE DRUGS WITH VIAGRA **+++THIS IS, Disp: , Rfl:     STOOL SOFTENER, DOCUSATE MADELAINE, 240 mg capsule, , Disp: , Rfl:     tiZANidine (ZANAFLEX) 4 MG tablet, Take 1 tablet by mouth 3 times daily as needed., Disp: , Rfl:     triamterene-hydrochlorothiazide 75-50 mg (MAXZIDE) 75-50 mg per tablet, Take 0.5 tablets by mouth once daily. , Disp: , Rfl:     vitamin D (VITAMIN D3) 1000 units Tab, Take 1,000 Units by mouth once daily., Disp: , Rfl:     oxyCODONE-acetaminophen (PERCOCET)  mg per tablet, Take 1 tablet by mouth every 6 (six) hours as needed for Pain., Disp: 120 tablet, Rfl: 0    Diabetes    Chronic Pain  Review of Systems   Constitutional: Negative.    HENT: Negative.     Eyes: Negative.    Respiratory: Negative.      Cardiovascular: Negative.    Gastrointestinal: Negative.    Endocrine: Negative.    Genitourinary: Negative.    Musculoskeletal:  Positive for back pain, gait problem, neck pain and neck stiffness.   Skin: Negative.    Allergic/Immunologic: Negative.    Hematological: Negative.    Psychiatric/Behavioral: Negative.       Objective:      Physical Exam  Vitals and nursing note reviewed.   Constitutional:       General: He is not in acute distress.     Appearance: Normal appearance. He is well-developed and normal weight. He is not ill-appearing.   HENT:      Head: Normocephalic.   Eyes:      Conjunctiva/sclera: Conjunctivae normal.   Neck:      Thyroid: No thyromegaly.   Cardiovascular:      Rate and Rhythm: Normal rate and regular rhythm.      Heart sounds: Normal heart sounds. No murmur heard.  Pulmonary:      Effort: Pulmonary effort is normal.      Breath sounds: Normal breath sounds. No wheezing or rales.   Musculoskeletal:         General: Normal range of motion.      Cervical back: Normal range of motion.      Right lower leg: No edema.      Left lower leg: No edema.      Comments: Walks with a rollator   Skin:     General: Skin is warm and dry.   Neurological:      Mental Status: He is alert and oriented to person, place, and time. Mental status is at baseline.      Sensory: Sensory deficit present.      Motor: Weakness present.      Gait: Gait abnormal.   Psychiatric:         Mood and Affect: Mood normal.         Behavior: Behavior normal.         Thought Content: Thought content normal.         Judgment: Judgment normal.       Assessment:       1. Chronic pain syndrome    2. H/O lumbosacral spine surgery    3. Cervical stenosis of spinal canal    4. Chronic, continuous use of opioids    5. Neck pain with history of cervical spinal surgery    6. Lumbar back pain with radiculopathy affecting left lower extremity    7. DDD (degenerative disc disease), lumbar        Plan:     1- percocet filled  2- UDS  today  3- pain contract signed  4-See pain mgt Dr. Will 4/10/23  5- discussed, Yang Vaughn MD- Neurosurgeon specializes in complex spine patients. [facility] [place] Phone: [phone]  -  Chronic pain syndrome  -     Pain Clinic Drug Screen  -     oxyCODONE-acetaminophen (PERCOCET)  mg per tablet; Take 1 tablet by mouth every 6 (six) hours as needed for Pain.  Dispense: 120 tablet; Refill: 0    H/O lumbosacral spine surgery  -     Pain Clinic Drug Screen  -     oxyCODONE-acetaminophen (PERCOCET)  mg per tablet; Take 1 tablet by mouth every 6 (six) hours as needed for Pain.  Dispense: 120 tablet; Refill: 0    Cervical stenosis of spinal canal  -     Pain Clinic Drug Screen  -     oxyCODONE-acetaminophen (PERCOCET)  mg per tablet; Take 1 tablet by mouth every 6 (six) hours as needed for Pain.  Dispense: 120 tablet; Refill: 0    Chronic, continuous use of opioids  -     Pain Clinic Drug Screen  -     oxyCODONE-acetaminophen (PERCOCET)  mg per tablet; Take 1 tablet by mouth every 6 (six) hours as needed for Pain.  Dispense: 120 tablet; Refill: 0    Neck pain with history of cervical spinal surgery  -     Pain Clinic Drug Screen  -     oxyCODONE-acetaminophen (PERCOCET)  mg per tablet; Take 1 tablet by mouth every 6 (six) hours as needed for Pain.  Dispense: 120 tablet; Refill: 0    Lumbar back pain with radiculopathy affecting left lower extremity  -     Pain Clinic Drug Screen  -     oxyCODONE-acetaminophen (PERCOCET)  mg per tablet; Take 1 tablet by mouth every 6 (six) hours as needed for Pain.  Dispense: 120 tablet; Refill: 0    DDD (degenerative disc disease), lumbar  -     Pain Clinic Drug Screen  -     oxyCODONE-acetaminophen (PERCOCET)  mg per tablet; Take 1 tablet by mouth every 6 (six) hours as needed for Pain.  Dispense: 120 tablet; Refill: 0        Risks, benefits, and side effects were discussed with the patient. All questions were answered to  the fullest satisfaction of the patient, and pt verbalized understanding and agreement to treatment plan. Pt was to call with any new or worsening symptoms, or present to the ER.

## 2023-03-27 NOTE — PATIENT INSTRUCTIONS
Yang Vaughn MD- Neurosurgeon specializes in complex spine patients. [facility] [place], [place] Phone: [phone]

## 2023-03-31 LAB
6MAM UR QL: NOT DETECTED
7AMINOCLONAZEPAM UR QL: NOT DETECTED
A-OH ALPRAZ UR QL: NOT DETECTED
ALPHA-OH-MIDAZOLAM: NOT DETECTED
ALPRAZ UR QL: NOT DETECTED
AMPHET UR QL SCN: NOT DETECTED
ANNOTATION COMMENT IMP: NORMAL
ANNOTATION COMMENT IMP: NORMAL
BARBITURATES UR QL: NOT DETECTED
BUPRENORPHINE UR QL: NOT DETECTED
BZE UR QL: NOT DETECTED
CARBOXYTHC UR QL: PRESENT
CARISOPRODOL UR QL: NOT DETECTED
CLONAZEPAM UR QL: NOT DETECTED
CODEINE UR QL: NOT DETECTED
CREAT UR-MCNC: 129.3 MG/DL (ref 20–400)
DIAZEPAM UR QL: NOT DETECTED
ETHYL GLUCURONIDE UR QL: NOT DETECTED
FENTANYL UR QL: NOT DETECTED
GABAPENTIN: PRESENT
HYDROCODONE UR QL: NOT DETECTED
HYDROMORPHONE UR QL: NOT DETECTED
LORAZEPAM UR QL: NOT DETECTED
MDA UR QL: NOT DETECTED
MDEA UR QL: NOT DETECTED
MDMA UR QL: NOT DETECTED
ME-PHENIDATE UR QL: NOT DETECTED
METHADONE UR QL: NOT DETECTED
METHAMPHET UR QL: NOT DETECTED
MIDAZOLAM UR QL SCN: NOT DETECTED
MORPHINE UR QL: NOT DETECTED
NALOXONE: NOT DETECTED
NORBUPRENORPHINE UR QL CFM: NOT DETECTED
NORDIAZEPAM UR QL: NOT DETECTED
NORFENTANYL UR QL: NOT DETECTED
NORHYDROCODONE UR QL CFM: NOT DETECTED
NORMEPERIDINE UR QL CFM: NOT DETECTED
NOROXYCODONE UR QL CFM: PRESENT
NOROXYMORPHONE UR QL SCN: PRESENT
OXAZEPAM UR QL: NOT DETECTED
OXYCODONE UR QL: PRESENT
OXYMORPHONE UR QL: PRESENT
PATHOLOGY STUDY: NORMAL
PCP UR QL: NOT DETECTED
PHENTERMINE UR QL: NOT DETECTED
PREGABALIN: NOT DETECTED
SERVICE CMNT-IMP: NORMAL
TAPENTADOL UR QL SCN: NOT DETECTED
TAPENTADOL UR QL SCN: NOT DETECTED
TEMAZEPAM UR QL: NOT DETECTED
TRAMADOL UR QL: NOT DETECTED
ZOLPIDEM METABOLITE: NOT DETECTED
ZOLPIDEM UR QL: NOT DETECTED

## 2023-04-05 DIAGNOSIS — E11.40 TYPE 2 DIABETES MELLITUS WITH DIABETIC NEUROPATHY, WITHOUT LONG-TERM CURRENT USE OF INSULIN: ICD-10-CM

## 2023-04-06 ENCOUNTER — OFFICE VISIT (OUTPATIENT)
Dept: PODIATRY | Facility: CLINIC | Age: 73
End: 2023-04-06
Payer: MEDICARE

## 2023-04-06 VITALS
HEART RATE: 69 BPM | DIASTOLIC BLOOD PRESSURE: 68 MMHG | SYSTOLIC BLOOD PRESSURE: 108 MMHG | WEIGHT: 215 LBS | BODY MASS INDEX: 28.49 KG/M2 | HEIGHT: 73 IN

## 2023-04-06 DIAGNOSIS — M79.672 LEFT FOOT PAIN: ICD-10-CM

## 2023-04-06 DIAGNOSIS — M76.72 TENDINITIS OF LEFT PERONEUS LONGUS TENDON: ICD-10-CM

## 2023-04-06 DIAGNOSIS — M77.52 BURSITIS OF LEFT FOOT: ICD-10-CM

## 2023-04-06 DIAGNOSIS — M76.72 TENDINITIS OF LEFT PERONEUS BREVIS TENDON: Primary | ICD-10-CM

## 2023-04-06 PROCEDURE — 99499 NO LOS: ICD-10-PCS | Mod: S$GLB,,, | Performed by: PODIATRIST

## 2023-04-06 PROCEDURE — 99499 UNLISTED E&M SERVICE: CPT | Mod: S$GLB,,, | Performed by: PODIATRIST

## 2023-04-06 PROCEDURE — 20600 DRAIN/INJ JOINT/BURSA W/O US: CPT | Mod: LT,S$GLB,, | Performed by: PODIATRIST

## 2023-04-06 PROCEDURE — 20600 PR DRAIN/INJECT SMALL JOINT/BURSA: ICD-10-PCS | Mod: LT,S$GLB,, | Performed by: PODIATRIST

## 2023-04-06 RX ADMIN — DEXAMETHASONE SODIUM PHOSPHATE 4 MG: 4 INJECTION, SOLUTION INTRA-ARTICULAR; INTRALESIONAL; INTRAMUSCULAR; INTRAVENOUS; SOFT TISSUE at 09:04

## 2023-04-06 RX ADMIN — LIDOCAINE HYDROCHLORIDE 1 ML: 10 INJECTION INFILTRATION; PERINEURAL at 09:04

## 2023-04-10 ENCOUNTER — TELEPHONE (OUTPATIENT)
Dept: PAIN MEDICINE | Facility: CLINIC | Age: 73
End: 2023-04-10

## 2023-04-10 ENCOUNTER — OFFICE VISIT (OUTPATIENT)
Dept: PAIN MEDICINE | Facility: CLINIC | Age: 73
End: 2023-04-10
Payer: MEDICARE

## 2023-04-10 VITALS
DIASTOLIC BLOOD PRESSURE: 73 MMHG | HEIGHT: 73 IN | HEART RATE: 78 BPM | BODY MASS INDEX: 28.49 KG/M2 | WEIGHT: 215 LBS | SYSTOLIC BLOOD PRESSURE: 106 MMHG

## 2023-04-10 DIAGNOSIS — M96.1 POST LAMINECTOMY SYNDROME: ICD-10-CM

## 2023-04-10 DIAGNOSIS — M54.16 LUMBAR RADICULOPATHY: Primary | ICD-10-CM

## 2023-04-10 DIAGNOSIS — M54.16 LUMBAR RADICULOPATHY: ICD-10-CM

## 2023-04-10 DIAGNOSIS — M51.36 DDD (DEGENERATIVE DISC DISEASE), LUMBAR: ICD-10-CM

## 2023-04-10 DIAGNOSIS — G89.4 CHRONIC PAIN SYNDROME: ICD-10-CM

## 2023-04-10 PROCEDURE — 99999 PR PBB SHADOW E&M-EST. PATIENT-LVL IV: CPT | Mod: PBBFAC,,, | Performed by: ANESTHESIOLOGY

## 2023-04-10 PROCEDURE — 99214 OFFICE O/P EST MOD 30 MIN: CPT | Mod: PBBFAC,PN | Performed by: ANESTHESIOLOGY

## 2023-04-10 PROCEDURE — 99204 PR OFFICE/OUTPT VISIT, NEW, LEVL IV, 45-59 MIN: ICD-10-PCS | Mod: S$GLB,,, | Performed by: ANESTHESIOLOGY

## 2023-04-10 PROCEDURE — 99204 OFFICE O/P NEW MOD 45 MIN: CPT | Mod: S$GLB,,, | Performed by: ANESTHESIOLOGY

## 2023-04-10 PROCEDURE — 99999 PR PBB SHADOW E&M-EST. PATIENT-LVL IV: ICD-10-PCS | Mod: PBBFAC,,, | Performed by: ANESTHESIOLOGY

## 2023-04-10 NOTE — TELEPHONE ENCOUNTER
Encounter Provider:Brant Will MD [[id number]]   Authorizing Provider: Brant Will MD [[id number]]   Department:[facility] PAIN MANAGEMENT[[id number]]      Common Order Information   Procedure -> Epidural Injection (specify level) Cmt: Caudal      Order Specific Information   Order: Procedure Order to Pain Management [Custom: [id number]]  Order #:           [id number]: 1 FUTURE     Priority: Routine     Class: Clinic Performed

## 2023-04-10 NOTE — H&P (VIEW-ONLY)
This note was completed with dictation software and grammatical errors may exist.    Referring Physician: Baldemar Garcia MD    PCP: Alla Almeida NP      CC: Low back and left leg pain    HPI:   Garcia Renteria is a 73 y.o. male w/ hx of T10-Pelvis fusion (2020), C3-5 ACDF (2019), T2DM, and chronic opiate use presents with worsening back and radicular left leg pain. Patient reports long hx of back pain (L4-5 laminectomy following service in [employer] years ago) that has progressively worsened over the past 5 years. Patient then underwent the above procedures with some relief, however, low back pain persists. Pain today is a 7/10 that is worst on the left side and associated with Left sided radicular symptoms of sharp, stabbing, shooting pain with numbness and tingling in the L5 dermatome. Pain is aggravated by activity, flexion, touch. Pain is improved with rest, medications. Some relief with PT in the past. Distant history of GILMA in past (2000) with some relief. Current medications: ibuprofen 800mg PRN, gabapentin 300mg q8h, percocet  q8h PRN. Patient is curious about potential RFA options today.     ROS:  CONSTITUTIONAL: No fevers, chills, night sweats, wt. loss, appetite changes  SKIN: no rashes or itching  ENT: No headaches, head trauma, vision changes, or eye pain  LYMPH NODES: None noticed   CV: No chest pain, palpitations.   RESP: No shortness of breath, dyspnea on exertion, cough, wheezing, or hemoptysis  GI: No nausea, emesis, diarrhea, constipation, melena, hematochezia, pain.    : No dysuria, hematuria, urgency, or frequency   HEME: No easy bruising, bleeding problems  PSYCHIATRIC: No depression, anxiety, psychosis, hallucinations.  NEURO: No seizures, memory loss, dizziness or difficulty sleeping  MSK: +HPI      Past Medical History:   Diagnosis Date    Colitis     Diabetes mellitus, type 2     Diverticulosis     GERD (gastroesophageal reflux disease)     Hypertension       Past Surgical History:   Procedure Laterality Date    ANKLE SURGERY Bilateral     ANTERIOR CERVICAL DISCECTOMY W/ FUSION N/A 10/30/2019    Procedure: C3-4, C4-5 ACDF;  Surgeon: Francis Alan MD;  [address];  Service: Neurosurgery;  Laterality: N/A;  C3-4, C4-5 ACDF  Anesthesia:  General  Rad:  C-Arm  NM:  EMG, SEP, & MEP  Blood:  Type & Screen  Position:  Supine  Bed:  Regular slider bed  Headrest:  Shohola & GW tongs/hanging weights  Misc:  Small vivigen  Nerve root retractor ([employer]uy)  Disposable #2 [place]  Interbody    BACK SURGERY      COLONOSCOPY      COLONOSCOPY N/A 10/7/2020    Procedure: COLONOSCOPY;  Surgeon: Elpidio Lau MD;  Location: [facility];  Service: Endoscopy;  Laterality: N/A;    CREATION OF MUSCLE ROTATIONAL FLAP  3/3/2020    Procedure: CREATION, FLAP, MUSCLE ROTATION;  Surgeon: Francis Alan MD;  Location: [address];  Service: Neurosurgery;;    CYSTOSCOPY N/A 7/21/2021    Procedure: CYSTOSCOPY;  Surgeon: Colton Kamara Jr., MD;  Location: [facility];  Service: Urology;  Laterality: N/A;    INJECTION OF ANESTHETIC AGENT AROUND NERVE Bilateral 3/10/2020    Procedure: Block, Nerve;  Surgeon: Erinn Surgeon;  Location: [facility];  Service: Anesthesiology;  Laterality: Bilateral;    lower back surgery      LUMBAR LAMINECTOMY WITH FUSION N/A 3/3/2020    Procedure: T10-Pelvis Fusion with L4 PSO **AIRO** Co-Surgeon: Roberto Parkinson;  Surgeon: Francis lAan MD;  Location: [address];  Service: Neurosurgery;  Laterality: N/A;  **AIRO**  **CELL SAVER**  Co-Surgeon: Roberto Parkinson  NM: EMG, SEP, MEP  Position: Prone  Bed: Shaji 4 post, prone pillow  Blood: Type & Hold 2 units  Rad: C-Arm, AIRO  Vendor: Dep[employer]  Misc: Vivigen, Infuse, Cement (Depuy)  [place]    NECK SURGERY      SPINE SURGERY      TRANSRECTAL ULTRASOUND EXAMINATION N/A 7/21/2021    Procedure: ULTRASOUND, RECTAL APPROACH;  Surgeon: Colton Kamara Jr., MD;  Location: [facility];  Service: Urology;  Laterality: N/A;    UPPER  "GASTROINTESTINAL ENDOSCOPY       Family History   Problem Relation Age of Onset    Neurological Disorders Mother      Social History     Socioeconomic History    Marital status:    Tobacco Use    Smoking status: Some Days     Packs/day: 1.00     Types: Cigars, Cigarettes     Last attempt to quit: 10/2019     Years since quitting: 3.5    Smokeless tobacco: Never    Tobacco comments:     3/27/23 not at this time   Substance and Sexual Activity    Alcohol use: Yes     Alcohol/week: 2.0 standard drinks     Types: 1 Glasses of wine, 1 Cans of beer per week     Comment: 1 wine , 2 times a week    Drug use: Not Currently    Sexual activity: Yes     Partners: Female         Medications/Allergies: See med card    Vitals:    04/10/23 0850   BP: 106/73   Pulse: 78   Weight: 97.5 kg (215 lb)   Height: 6' 1" (1.854 m)   PainSc:   7   PainLoc: Neck         Physical exam:    GENERAL: A and O x3, the patient appears well groomed and is in no acute distress.  Skin: No rashes or obvious lesions  HEENT: normocephalic, atraumatic  CARDIOVASCULAR:  Palpable peripheral pulses  LUNGS: easy work of breathing  ABDOMEN: soft, nontender   UPPER EXTREMITIES: Normal alignment, normal range of motion, no atrophy, no skin changes,  hair growth and nail growth normal and equal bilaterally. No swelling, no tenderness.    LOWER EXTREMITIES:  Normal alignment, normal range of motion, no atrophy, no skin changes,  hair growth and nail growth normal and equal bilaterally. No swelling, no tenderness.    LUMBAR SPINE  Lumbar spine: ROM is decreased with flexion, extension and oblique extension with moderate increased pain.    Rafael's test causes increased pain bilaterally (L>R)    Supine straight leg raise is positive bilaterally (L>R)  Internal and external rotation of the hip causes increased pain bilaterally (L>R).  Myofascial exam: No tenderness to palpation across lumbar paraspinous muscles.      MENTAL STATUS: normal orientation, speech, " language, and fund of knowledge for social situation.  Emotional state appropriate.    CRANIAL NERVES:  II:  PERRL bilaterally,   III,IV,VI: EOMI.    V:  Facial sensation equal bilaterally  VII:  Facial motor function normal.  VIII:  Hearing equal to finger rub bilaterally  IX/X: Gag normal, palate symmetric  XI:  Shoulder shrug equal, head turn equal  XII:  Tongue midline without fasciculations      MOTOR: Tone and bulk: normal bilateral upper and lower Strength: normal   Delt Bi Tri WE WF     R 5 5 5 5 5 5   L 5 5 5 5 5 5     IP ADD ABD Quad TA Gas HAM  R 5 5 5 5 5 5 5  L 5 5 5 5 5 5 5    SENSATION: Light touch and pinprick intact decreased on L side.  REFLEXES: normal, symmetric, nonbrisk.  Toes down, no clonus. No hoffmans.  GAIT: antalgic.        Imaging:  Xray Scoliosis 6/2020       FINDINGS:  The patient has had prior posterior decompression and fusion with pedicle screws extending from T11 to the sacrum.  This is unchanged from prior studies.  There is wedge-shaped L3 vertebral body unchanged from prior studies as well.  A significant degree of scoliosis is not presently seen.  Kyphosis is not identified.     Impression:     Prior posterior decompression and fixation of the thoracolumbar spine from T11 to the sacrum with hardware unchanged.  Significant scoliosis is not seen on this image.  Wedge configuration of the L3 vertebral body is unchanged.          CT L-spine 6/2020     COMPARISON: CT lumbar spine June 17, 2020     FINDINGS:     Thoracolumbar spinal fixation hardware is demonstrated. The transpedicular screws of the hardware are intact. There is no definite periarticular lucency observed. There is ankylosis of the L4-5 and L5-S1 vertebra. Laminectomies are noted of the lower thoracic and upper lumbar spine. There is multilevel vertebral body marginal osteophytosis with intervertebral disc height loss at L1-2, L2-3 and L3-4. No acute fracture or destructive osseous lesion identified. Chronic  compression deformity of the L3 vertebra noted. Perihardware heterotopic ossification involving the lower lumbar spine orthopedic hardware noted. There is severe atrophy of the paravertebral musculature.     T10-T11 kyphoplasty changes. Mild bilateral facet joint arthropathy. Bilateral neural foraminal narrowing     T11-12: Moderate bilateral facet joint arthropathy. Bilateral neural foraminal narrowing     T12-L1: Severe bilateral facet joint arthropathy. No spinal canal narrowing. Bilateral neural foraminal narrowing     L1-2: Severe bilateral facet joint arthropathy mild broad-based disc bulge narrows the spinal canal.     L2-3: Left central/paracentral disc protrusion severely narrows the spinal canal, with limited evaluation. The protrusion appears quite large but is difficult to accurately measure due to Limited CT evaluation.     L3-4: Disc osteophyte complex without significant spinal canal narrowing. Postlaminectomy changes noted. Ankylosed facet joints with mild spinal canal narrowing. Bilateral neural foraminal narrowing.     L4-5: Facet joint ankylosis and discogenic degenerative changes narrow the spinal canal. AP diameter of the thecal sac measures 9 mm.     L5-S1: Disc osteophyte complex narrows the spinal canal. Post laminectomy changes noted. There is moderate bilateral neural foraminal narrowing.     IMPRESSION:     1.  Multilevel thoracolumbar spinal fixation hardware is intact. Multilevel laminectomies noted.  2.  There is multilevel degenerative changes of the thoracic and lumbar spine with spinal canal and neural foraminal narrowing     Electronically signed by:  Mulugeta Urias DO  6/9/2022 4:48 PM CDT Workstation: [id number]      CT C-spine 6/2020   COMPARISON:CT 2/6/2020     FINDINGS:  Interbody graft lies at C3-C4 and C4-C5 levels. There has been interval subsidence of C3-C4 interbody graft within the inferior C3 and superior C4 endplates, and progressive loss of disc space height since  2/6/2020. Presumed surgical pin associated with left C4-C5 interbody graft has anterior aspect distracted 6 mm anterior to the anterior portion of the interbody graft, not significantly changed.     Anterior plate and screws transfix C5 and C6 without loosening or other complication. Mature osseous fusion occurs about the C5-C6 disc level.     At C2-C3, moderate right facet joint osteoarthrosis has progressed, without significant narrowing.     At C3-C4, bilateral vertebral joint osteophytes combined with mild left and moderate right facet joint osteoarthrosis to result in severe left and moderate right neural foramen narrowing without central canal narrowing. This has slightly progressed.     At C4-C5, posterior osteophytic ridge results in mild-to-moderate central canal narrowing along with moderate bilateral neural foramen narrowing.     At C5-C6, minor left neural foramen narrowing without central canal or right neural foramen narrowing, unchanged.     At C6-C7, posterior osteophytic ridge results in moderate to severe central canal narrowing and moderate bilateral neural foramen narrowing, unchanged.     At C7-T1, mild bilateral facet joint osteoarthrosis unchanged.     Paraspinal soft tissues show very mild bilateral carotid artery calcifications.     IMPRESSION:     1. Minimal subsidence of C3-C4 interbody graft involving inferior C3 and superior C4 endplates with progressive loss of disc space height and progression of severe left and moderate right neural foramen narrowing. Correlation for left C4 radiculopathy is requested.  2. Unchanged appearance of surgical hardware at C4-C5 disc level and C5-C6 levels, with mature osseous fusion about the C5-C6 disc.  3. Progression of moderate right C2-C3 facet joint osteoarthrosis. Unchanged moderate to severe central canal narrowing at C6-C7 with moderate bilateral neural foramen narrowing.    Assessment: Patient is a 73 y.o. male w/ hx of T10-Pelvis fusion (2020),  C3-5 ACDF (2019), T2DM, and chronic opiate use presents with worsening back and radicular left leg pain (L5 dermatome) over the past 5 years. Distant history of GILMA in past (2000) with some relief. Patient is interested in RFA, however, current symptomatology and hx of lumbar fusion would not make him a candidate for lumbar RFA. Current symptomatolgy more in line with radicular or SIJ pain.  1. Chronic pain syndrome    2. Post laminectomy syndrome    3. DDD (degenerative disc disease), lumbar    4. Lumbar radiculopathy          Plan:  I have stressed the importance of physical activity and exercise to improve overall health  Reviewed pertinent imaging and records with patient  I think that the patient's back pain and radicular leg symptoms are due to degenerative disc disease and have recommended a Caudal GILMA  He can continue medications per his PCP  Follow up after procedure    Thank you for referring this interesting patient, and I look forward to continuing to collaborate in his care.

## 2023-04-10 NOTE — PROGRESS NOTES
This note was completed with dictation software and grammatical errors may exist.    Referring Physician: Baldemar Garcia MD    PCP: Alla Almeida NP      CC: Low back and left leg pain    HPI:   Garcia Renteria is a 73 y.o. male w/ hx of T10-Pelvis fusion (2020), C3-5 ACDF (2019), T2DM, and chronic opiate use presents with worsening back and radicular left leg pain. Patient reports long hx of back pain (L4-5 laminectomy following service in [employer] years ago) that has progressively worsened over the past 5 years. Patient then underwent the above procedures with some relief, however, low back pain persists. Pain today is a 7/10 that is worst on the left side and associated with Left sided radicular symptoms of sharp, stabbing, shooting pain with numbness and tingling in the L5 dermatome. Pain is aggravated by activity, flexion, touch. Pain is improved with rest, medications. Some relief with PT in the past. Distant history of GILMA in past (2000) with some relief. Current medications: ibuprofen 800mg PRN, gabapentin 300mg q8h, percocet  q8h PRN. Patient is curious about potential RFA options today.     ROS:  CONSTITUTIONAL: No fevers, chills, night sweats, wt. loss, appetite changes  SKIN: no rashes or itching  ENT: No headaches, head trauma, vision changes, or eye pain  LYMPH NODES: None noticed   CV: No chest pain, palpitations.   RESP: No shortness of breath, dyspnea on exertion, cough, wheezing, or hemoptysis  GI: No nausea, emesis, diarrhea, constipation, melena, hematochezia, pain.    : No dysuria, hematuria, urgency, or frequency   HEME: No easy bruising, bleeding problems  PSYCHIATRIC: No depression, anxiety, psychosis, hallucinations.  NEURO: No seizures, memory loss, dizziness or difficulty sleeping  MSK: +HPI      Past Medical History:   Diagnosis Date    Colitis     Diabetes mellitus, type 2     Diverticulosis     GERD (gastroesophageal reflux disease)     Hypertension       Past Surgical History:   Procedure Laterality Date    ANKLE SURGERY Bilateral     ANTERIOR CERVICAL DISCECTOMY W/ FUSION N/A 10/30/2019    Procedure: C3-4, C4-5 ACDF;  Surgeon: Francis lAan MD;  [address];  Service: Neurosurgery;  Laterality: N/A;  C3-4, C4-5 ACDF  Anesthesia:  General  Rad:  C-Arm  NM:  EMG, SEP, & MEP  Blood:  Type & Screen  Position:  Supine  Bed:  Regular slider bed  Headrest:  Jamieson [employer] GW tongs/hanging weights  Misc:  Small vivigen  Nerve root retractor (DePuy)  Disposable #2 [place]  Interbody    BACK SURGERY      COLONOSCOPY      COLONOSCOPY N/A 10/7/2020    Procedure: COLONOSCOPY;  Surgeon: Elpidio Lau MD;  Location: [facility];  Service: Endoscopy;  Laterality: N/A;    CREATION OF MUSCLE ROTATIONAL FLAP  3/3/2020    Procedure: CREATION, FLAP, MUSCLE ROTATION;  Surgeon: Francis Alan MD;  Location: [address];  Service: Neurosurgery;;    CYSTOSCOPY N/A 7/21/2021    Procedure: CYSTOSCOPY;  Surgeon: Colton Kamara Jr., MD;  Location: [facility];  Service: Urology;  Laterality: N/A;    INJECTION OF ANESTHETIC AGENT AROUND NERVE Bilateral 3/10/2020    Procedure: Block, Nerve;  Surgeon: Erinn Surgeon;  Location: [facility];  Service: Anesthesiology;  Laterality: Bilateral;    lower back surgery      LUMBAR LAMINECTOMY WITH FUSION N/A 3/3/2020    Procedure: T10-Pelvis Fusion with L4 PSO **AIRO** Co-Surgeon: Roberto Parkinson;  Surgeon: Francis Alan MD;  Location: [address];  Service: Neurosurgery;  Laterality: N/A;  **AIRO**  **CELL SAVER**  Co-Surgeon: Roberto Parkinson  NM: EMG, SEP, MEP  Position: Prone  Bed: Shaji 4 post, prone pillow  Blood: Type & Hold 2 units  Rad: C-Arm, AIRO  Vendor: Depuy  Misc: Vivigen, Infuse, Cement (Depuy)  [place]    NECK SURGERY      SPINE SURGERY      TRANSRECTAL ULTRASOUND EXAMINATION N/A 7/21/2021    Procedure: ULTRASOUND, RECTAL APPROACH;  Surgeon: Colton Kamara Jr., MD;  Location: [facility];  Service: Urology;  Laterality: N/A;    UPPER  "GASTROINTESTINAL ENDOSCOPY       Family History   Problem Relation Age of Onset    Neurological Disorders Mother      Social History     Socioeconomic History    Marital status:    Tobacco Use    Smoking status: Some Days     Packs/day: 1.00     Types: Cigars, Cigarettes     Last attempt to quit: 10/2019     Years since quitting: 3.5    Smokeless tobacco: Never    Tobacco comments:     3/27/23 not at this time   Substance and Sexual Activity    Alcohol use: Yes     Alcohol/week: 2.0 standard drinks     Types: 1 Glasses of wine, 1 Cans of beer per week     Comment: 1 wine , 2 times a week    Drug use: Not Currently    Sexual activity: Yes     Partners: Female         Medications/Allergies: See med card    Vitals:    04/10/23 0850   BP: 106/73   Pulse: 78   Weight: 97.5 kg (215 lb)   Height: 6' 1" (1.854 m)   PainSc:   7   PainLoc: Neck         Physical exam:    GENERAL: A and O x3, the patient appears well groomed and is in no acute distress.  Skin: No rashes or obvious lesions  HEENT: normocephalic, atraumatic  CARDIOVASCULAR:  Palpable peripheral pulses  LUNGS: easy work of breathing  ABDOMEN: soft, nontender   UPPER EXTREMITIES: Normal alignment, normal range of motion, no atrophy, no skin changes,  hair growth and nail growth normal and equal bilaterally. No swelling, no tenderness.    LOWER EXTREMITIES:  Normal alignment, normal range of motion, no atrophy, no skin changes,  hair growth and nail growth normal and equal bilaterally. No swelling, no tenderness.    LUMBAR SPINE  Lumbar spine: ROM is decreased with flexion, extension and oblique extension with moderate increased pain.    Rafael's test causes increased pain bilaterally (L>R)    Supine straight leg raise is positive bilaterally (L>R)  Internal and external rotation of the hip causes increased pain bilaterally (L>R).  Myofascial exam: No tenderness to palpation across lumbar paraspinous muscles.      MENTAL STATUS: normal orientation, speech, " language, and fund of knowledge for social situation.  Emotional state appropriate.    CRANIAL NERVES:  II:  PERRL bilaterally,   III,IV,VI: EOMI.    V:  Facial sensation equal bilaterally  VII:  Facial motor function normal.  VIII:  Hearing equal to finger rub bilaterally  IX/X: Gag normal, palate symmetric  XI:  Shoulder shrug equal, head turn equal  XII:  Tongue midline without fasciculations      MOTOR: Tone and bulk: normal bilateral upper and lower Strength: normal   Delt Bi Tri WE WF     R 5 5 5 5 5 5   L 5 5 5 5 5 5     IP ADD ABD Quad TA Gas HAM  R 5 5 5 5 5 5 5  L 5 5 5 5 5 5 5    SENSATION: Light touch and pinprick intact decreased on L side.  REFLEXES: normal, symmetric, nonbrisk.  Toes down, no clonus. No hoffmans.  GAIT: antalgic.        Imaging:  Xray Scoliosis 6/2020       FINDINGS:  The patient has had prior posterior decompression and fusion with pedicle screws extending from T11 to the sacrum.  This is unchanged from prior studies.  There is wedge-shaped L3 vertebral body unchanged from prior studies as well.  A significant degree of scoliosis is not presently seen.  Kyphosis is not identified.     Impression:     Prior posterior decompression and fixation of the thoracolumbar spine from T11 to the sacrum with hardware unchanged.  Significant scoliosis is not seen on this image.  Wedge configuration of the L3 vertebral body is unchanged.          CT L-spine 6/2020     COMPARISON: CT lumbar spine June 17, 2020     FINDINGS:     Thoracolumbar spinal fixation hardware is demonstrated. The transpedicular screws of the hardware are intact. There is no definite periarticular lucency observed. There is ankylosis of the L4-5 and L5-S1 vertebra. Laminectomies are noted of the lower thoracic and upper lumbar spine. There is multilevel vertebral body marginal osteophytosis with intervertebral disc height loss at L1-2, L2-3 and L3-4. No acute fracture or destructive osseous lesion identified. Chronic  compression deformity of the L3 vertebra noted. Perihardware heterotopic ossification involving the lower lumbar spine orthopedic hardware noted. There is severe atrophy of the paravertebral musculature.     T10-T11 kyphoplasty changes. Mild bilateral facet joint arthropathy. Bilateral neural foraminal narrowing     T11-12: Moderate bilateral facet joint arthropathy. Bilateral neural foraminal narrowing     T12-L1: Severe bilateral facet joint arthropathy. No spinal canal narrowing. Bilateral neural foraminal narrowing     L1-2: Severe bilateral facet joint arthropathy mild broad-based disc bulge narrows the spinal canal.     L2-3: Left central/paracentral disc protrusion severely narrows the spinal canal, with limited evaluation. The protrusion appears quite large but is difficult to accurately measure due to Limited CT evaluation.     L3-4: Disc osteophyte complex without significant spinal canal narrowing. Postlaminectomy changes noted. Ankylosed facet joints with mild spinal canal narrowing. Bilateral neural foraminal narrowing.     L4-5: Facet joint ankylosis and discogenic degenerative changes narrow the spinal canal. AP diameter of the thecal sac measures 9 mm.     L5-S1: Disc osteophyte complex narrows the spinal canal. Post laminectomy changes noted. There is moderate bilateral neural foraminal narrowing.     IMPRESSION:     1.  Multilevel thoracolumbar spinal fixation hardware is intact. Multilevel laminectomies noted.  2.  There is multilevel degenerative changes of the thoracic and lumbar spine with spinal canal and neural foraminal narrowing     Electronically signed by:  Mulugeta Urias DO  6/9/2022 4:48 PM CDT Workstation: [id number]      CT C-spine 6/2020   COMPARISON:CT 2/6/2020     FINDINGS:  Interbody graft lies at C3-C4 and C4-C5 levels. There has been interval subsidence of C3-C4 interbody graft within the inferior C3 and superior C4 endplates, and progressive loss of disc space height since  2/6/2020. Presumed surgical pin associated with left C4-C5 interbody graft has anterior aspect distracted 6 mm anterior to the anterior portion of the interbody graft, not significantly changed.     Anterior plate and screws transfix C5 and C6 without loosening or other complication. Mature osseous fusion occurs about the C5-C6 disc level.     At C2-C3, moderate right facet joint osteoarthrosis has progressed, without significant narrowing.     At C3-C4, bilateral vertebral joint osteophytes combined with mild left and moderate right facet joint osteoarthrosis to result in severe left and moderate right neural foramen narrowing without central canal narrowing. This has slightly progressed.     At C4-C5, posterior osteophytic ridge results in mild-to-moderate central canal narrowing along with moderate bilateral neural foramen narrowing.     At C5-C6, minor left neural foramen narrowing without central canal or right neural foramen narrowing, unchanged.     At C6-C7, posterior osteophytic ridge results in moderate to severe central canal narrowing and moderate bilateral neural foramen narrowing, unchanged.     At C7-T1, mild bilateral facet joint osteoarthrosis unchanged.     Paraspinal soft tissues show very mild bilateral carotid artery calcifications.     IMPRESSION:     1. Minimal subsidence of C3-C4 interbody graft involving inferior C3 and superior C4 endplates with progressive loss of disc space height and progression of severe left and moderate right neural foramen narrowing. Correlation for left C4 radiculopathy is requested.  2. Unchanged appearance of surgical hardware at C4-C5 disc level and C5-C6 levels, with mature osseous fusion about the C5-C6 disc.  3. Progression of moderate right C2-C3 facet joint osteoarthrosis. Unchanged moderate to severe central canal narrowing at C6-C7 with moderate bilateral neural foramen narrowing.    Assessment: Patient is a 73 y.o. male w/ hx of T10-Pelvis fusion (2020),  C3-5 ACDF (2019), T2DM, and chronic opiate use presents with worsening back and radicular left leg pain (L5 dermatome) over the past 5 years. Distant history of GILMA in past (2000) with some relief. Patient is interested in RFA, however, current symptomatology and hx of lumbar fusion would not make him a candidate for lumbar RFA. Current symptomatolgy more in line with radicular or SIJ pain.  1. Chronic pain syndrome    2. Post laminectomy syndrome    3. DDD (degenerative disc disease), lumbar    4. Lumbar radiculopathy          Plan:  I have stressed the importance of physical activity and exercise to improve overall health  Reviewed pertinent imaging and records with patient  I think that the patient's back pain and radicular leg symptoms are due to degenerative disc disease and have recommended a Caudal GILMA  He can continue medications per his PCP  Follow up after procedure    Thank you for referring this interesting patient, and I look forward to continuing to collaborate in his care.

## 2023-04-18 RX ORDER — LIDOCAINE HYDROCHLORIDE 10 MG/ML
1 INJECTION INFILTRATION; PERINEURAL
Status: DISCONTINUED | OUTPATIENT
Start: 2023-04-06 | End: 2023-04-18 | Stop reason: HOSPADM

## 2023-04-18 RX ORDER — DEXAMETHASONE SODIUM PHOSPHATE 4 MG/ML
4 INJECTION, SOLUTION INTRA-ARTICULAR; INTRALESIONAL; INTRAMUSCULAR; INTRAVENOUS; SOFT TISSUE
Status: DISCONTINUED | OUTPATIENT
Start: 2023-04-06 | End: 2023-04-18 | Stop reason: HOSPADM

## 2023-04-18 NOTE — PROGRESS NOTES
Subjective:     Patient ID: Garcia Renteria is a 73 y.o. male.    Chief Complaint: Foot Pain    Garcia is a 73 y.o. male who presents to the podiatry clinic  with complaint of  left foot pain. Onset of the symptoms was several months ago. Precipitating event:  previous surgical correction of left foot tendons . Current symptoms include: ability to bear weight, but with some pain, stiffness, and worsening symptoms after a period of activity. Aggravating factors: walking. Symptoms have gradually improved. Patient has had prior foot problems. Treatment to date: corticosteroid injection which was effective, ice, and rest. Patients rates pain 7/10 on pain scale after exercise or prolonged use.    Review of Systems   Constitutional: Negative for chills and fever.   Cardiovascular:  Negative for chest pain and leg swelling.   Respiratory:  Negative for cough and shortness of breath.    Gastrointestinal:  Negative for diarrhea, nausea and vomiting.      Objective:     Physical Exam  Vitals reviewed.   Constitutional:       General: He is not in acute distress.     Appearance: Normal appearance. He is not ill-appearing.   HENT:      Head: Normocephalic.      Nose: Nose normal.   Cardiovascular:      Rate and Rhythm: Normal rate.   Pulmonary:      Effort: Pulmonary effort is normal. No respiratory distress.   Skin:     Capillary Refill: Capillary refill takes 2 to 3 seconds.   Neurological:      Mental Status: He is alert and oriented to person, place, and time.   Psychiatric:         Mood and Affect: Mood normal.         Behavior: Behavior normal.         Thought Content: Thought content normal.     Neurologic:  Protective sensation mildly diminished to the tip of the digits, light touch sensation intact bilateral lower extremity, positive paresthesias reported bilateral distal foot   Vascular:  DP and PT pulses palpable 2/4 bilateral foot, capillary fill time less 3 seconds to digits, pedal hair growth is diminished to  digits aspect of the digits, mild edema noted to the left foot and ankle, skin temperature gradient warm to cool from proximal to distal to the left foot   Musculoskeletal:  5/5 muscle strength noted bilateral foot, pain and tenderness with inversion of the left foot, mild pain and tenderness with palpation of the left 5th metatarsal base, no masses noted bilateral foot, mild discomfort with palpation and ROM of left achilles tendon at insertion  Dermatologic:  Nails 1 through 5 bilateral are thickened, elongated, and discolored with presence of subungual debris, no rashes noted bilateral foot, hyperpigmentation noted to the left rearfoot, multiple well-healed surgical scars noted    Assessment:      Encounter Diagnoses   Name Primary?    Tendinitis of left peroneus brevis tendon Yes    Tendinitis of left peroneus longus tendon     Left foot pain     Bursitis of left foot      Plan:     Garcia was seen today for foot pain.    Diagnoses and all orders for this visit:    Tendinitis of left peroneus brevis tendon  -     Small Joint Aspiration/Injection    Tendinitis of left peroneus longus tendon    Left foot pain  -     Small Joint Aspiration/Injection    Bursitis of left foot  -     Small Joint Aspiration/Injection      I counseled the patient on his conditions, their implications and medical management.        1. Patient was examined and evaluated  2. Discussed with patient etiology of tendinitis of left peroneal brevis tendon.  Discussed with patient possible adjunct with oral oral OTC analgesics for pain relief.  Patient will continue with current exercise regimen.  Patient will continue with decrease use of the treadmill in lieu of less impactful exercises such as cycling.  Patient was advised to consider heel raises without weights at the gym for improvement of dorsiflexion of his left lower extremity.  Discussed with patient possible use of a brace on the left foot over time if symptoms worsen or  persist.  3. Patient was advised to continue with comfortable shoe gear both inside and outside the home.  Patient was advised to decrease the amount of barefoot walking and limit use of flip-flops   Small Joint Aspiration/Injection    Date/Time: 4/6/2023 9:30 AM  Performed by: Ludy Minor DPM  Authorized by: Ludy Minor DPM     Consent Done?:  Yes (Verbal)  Indications:  Pain and joint swelling  Location:  Foot  Foot joint: left 5th metatarsal base bursa.  Ultrasonic guidance for needle placement?: No    Needle size:  25 G  Approach:  Lateral  Medications:  1 mL LIDOcaine HCL 10 mg/ml (1%) 10 mg/mL (1 %); 4 mg dexAMETHasone 4 mg/mL  Patient tolerance:  Patient tolerated the procedure well with no immediate complications   4. Discussed etiology of Achilles tendinitis plantar fasciitis with the patient patient was advised continue with a stretching and icing of the left lower extremity when at rest.   5. Patient will follow up in 4 weeks or p.r.n. for complaints

## 2023-04-27 DIAGNOSIS — G89.4 CHRONIC PAIN SYNDROME: ICD-10-CM

## 2023-04-27 DIAGNOSIS — Z98.890 NECK PAIN WITH HISTORY OF CERVICAL SPINAL SURGERY: ICD-10-CM

## 2023-04-27 DIAGNOSIS — M51.36 DDD (DEGENERATIVE DISC DISEASE), LUMBAR: ICD-10-CM

## 2023-04-27 DIAGNOSIS — M48.02 CERVICAL STENOSIS OF SPINAL CANAL: ICD-10-CM

## 2023-04-27 DIAGNOSIS — Z98.890 H/O LUMBOSACRAL SPINE SURGERY: ICD-10-CM

## 2023-04-27 DIAGNOSIS — M54.2 NECK PAIN WITH HISTORY OF CERVICAL SPINAL SURGERY: ICD-10-CM

## 2023-04-27 DIAGNOSIS — M54.16 LUMBAR BACK PAIN WITH RADICULOPATHY AFFECTING LEFT LOWER EXTREMITY: ICD-10-CM

## 2023-04-27 DIAGNOSIS — F11.90 CHRONIC, CONTINUOUS USE OF OPIOIDS: ICD-10-CM

## 2023-05-01 RX ORDER — OXYCODONE AND ACETAMINOPHEN 10; 325 MG/1; MG/1
1 TABLET ORAL EVERY 6 HOURS PRN
Qty: 120 TABLET | Refills: 0 | Status: SHIPPED | OUTPATIENT
Start: 2023-05-01 | End: 2023-06-01 | Stop reason: SDUPTHER

## 2023-05-04 ENCOUNTER — TELEPHONE (OUTPATIENT)
Dept: PAIN MEDICINE | Facility: CLINIC | Age: 73
End: 2023-05-04
Payer: MEDICARE

## 2023-05-04 DIAGNOSIS — Z11.59 NEED FOR HEPATITIS C SCREENING TEST: ICD-10-CM

## 2023-05-04 NOTE — TELEPHONE ENCOUNTER
----- Message from Mya Mcghee sent at 5/4/2023  1:02 PM CDT -----  Regarding: advice  Contact: Patient  Type: Needs Medical Advice  Who Called:  Patient   Symptoms (please be specific):    How long has patient had these symptoms:    Pharmacy name and phone #:    Best Call Back Number: [phone] (home)     Additional Information: Patient stated that he would like to know the time of his upcoming appointment. Please contact to advise. Thanks!

## 2023-05-04 NOTE — TELEPHONE ENCOUNTER
Spoke with pt advised that the time will given to him. Also informed him auth not clear yet and I will inform him if we get denial.

## 2023-05-08 NOTE — DISCHARGE INSTRUCTIONS
PLEASE MAKE SURE YOU HAVE ALL OF YOUR BELONGINGS BEFORE LEAVING     Pain injection instructions:     This procedure may take a couple weeks to relieve pain  You may get some pain relief from the local anesthetic initally.   Steroids can have side effects of flushed face or nervous feeling.    No driving for 24 hrs.   Activity as tolerated- gradually increase activities.  Dont lift over 10 lbs for 24 hrs   No heat at injection sites for 2 full days. No heating pads, hot tubs, saunas, or swimming in any body of water or pool for 2 full days.  Use ice pack for mild swelling and for comfort , apply for 20 minutes, remove for 20 minute intervals. No direct contact of ice itself  to skin.  May shower today.  Do not allow shower water to beat on injections site(s) for 2 full days. No tub baths for two full days.      Resume Aspirin, Plavix, or Coumadin the day after the procedure unless otherwise instructed.   If diabetic,monitor your glucose carefully as steroids can increase your glucose level    Seek immediate medical help for:     Severe increase in pain not relieved by medication or ice or any new pain in the region .    Prolonged (more than 24 hrs)or increasing weakness or numbness in the legs or arms. - it is normal to have weakness/numbness for up to 8 hrs.   Fever above 100 degrees F , Drainage,redness,active bleeding, or increased swelling at the injection site.  Headache that increases when sitting up, but decreases when lying down.  New shortness of breath, chest pain, or breathing problems.    After Surgery:  Always be aware that any surgery can cause these symptoms:    Pain- Medication can be prescribed for pain to decrease your pain but may not completely take your pain away. Over the Counter pain medicine my be enough and you can always use Ice and rest to help ease pain.    Bleeding- a little bleeding after a surgery is usually within normal.  If there is a lot of blood you need to notify your MD.   Emergency treatments of bleeding are cold application, elevation of the bleeding site and compression.    Infection- Infection after surgery is NOT a normal occurrence.  Signs of infection are fever, swelling, hot to touch the incision.  If this occurs notify your MD immediately.    Nausea- this can be common after a surgery especially if you have had anesthesia medicine or are taking pain medicine.  Steroids have a side effect of nausea sometimes. Staying on clear liquids, bland foods, gingerale, or over the counter anti nausea medicines can help.  If you vomit more than once, notify your MD.  Anti Nausea medicines can be prescribed.

## 2023-05-09 ENCOUNTER — HOSPITAL ENCOUNTER (OUTPATIENT)
Facility: AMBULARY SURGERY CENTER | Age: 73
Discharge: HOME | End: 2023-05-09
Attending: ANESTHESIOLOGY | Admitting: ANESTHESIOLOGY
Payer: MEDICARE

## 2023-05-09 DIAGNOSIS — M54.16 LUMBAR RADICULITIS: ICD-10-CM

## 2023-05-09 LAB — POCT GLUCOSE: 82 MG/DL (ref 70–110)

## 2023-05-09 PROCEDURE — 62323 NJX INTERLAMINAR LMBR/SAC: CPT | Performed by: ANESTHESIOLOGY

## 2023-05-09 PROCEDURE — 62323 NJX INTERLAMINAR LMBR/SAC: CPT | Mod: ,,, | Performed by: ANESTHESIOLOGY

## 2023-05-09 PROCEDURE — 62323 PR INJ LUMBAR/SACRAL, W/IMAGING GUIDANCE: ICD-10-PCS | Mod: ,,, | Performed by: ANESTHESIOLOGY

## 2023-05-09 RX ORDER — FENTANYL CITRATE 50 UG/ML
INJECTION, SOLUTION INTRAMUSCULAR; INTRAVENOUS
Status: DISCONTINUED | OUTPATIENT
Start: 2023-05-09 | End: 2023-05-09 | Stop reason: HOSPADM

## 2023-05-09 RX ORDER — SODIUM CHLORIDE 0.9 % (FLUSH) 0.9 %
SYRINGE (ML) INJECTION
Status: DISCONTINUED | OUTPATIENT
Start: 2023-05-09 | End: 2023-05-09 | Stop reason: HOSPADM

## 2023-05-09 RX ORDER — LIDOCAINE HYDROCHLORIDE 10 MG/ML
INJECTION, SOLUTION EPIDURAL; INFILTRATION; INTRACAUDAL; PERINEURAL
Status: DISCONTINUED | OUTPATIENT
Start: 2023-05-09 | End: 2023-05-09 | Stop reason: HOSPADM

## 2023-05-09 RX ORDER — MIDAZOLAM HYDROCHLORIDE 1 MG/ML
INJECTION INTRAMUSCULAR; INTRAVENOUS
Status: DISCONTINUED | OUTPATIENT
Start: 2023-05-09 | End: 2023-05-09 | Stop reason: HOSPADM

## 2023-05-09 RX ORDER — DEXAMETHASONE SODIUM PHOSPHATE 10 MG/ML
INJECTION INTRAMUSCULAR; INTRAVENOUS
Status: DISCONTINUED | OUTPATIENT
Start: 2023-05-09 | End: 2023-05-09 | Stop reason: HOSPADM

## 2023-05-09 RX ORDER — SODIUM CHLORIDE, SODIUM LACTATE, POTASSIUM CHLORIDE, CALCIUM CHLORIDE 600; 310; 30; 20 MG/100ML; MG/100ML; MG/100ML; MG/100ML
INJECTION, SOLUTION INTRAVENOUS ONCE AS NEEDED
Status: COMPLETED | OUTPATIENT
Start: 2023-05-09 | End: 2023-05-09

## 2023-05-09 RX ADMIN — SODIUM CHLORIDE, SODIUM LACTATE, POTASSIUM CHLORIDE, CALCIUM CHLORIDE: 600; 310; 30; 20 INJECTION, SOLUTION INTRAVENOUS at 12:05

## 2023-05-09 NOTE — INTERVAL H&P NOTE
The patient has been examined and the H&P has been reviewed:    I concur with the findings and no changes have occurred since H&P was written.    Surgery risks, benefits and alternative options discussed and understood by patient/family.    anticoagulation        There are no hospital problems to display for this patient.

## 2023-05-09 NOTE — OP NOTE
PROCEDURE DATE: 5/9/2023    PROCEDURE:  Caudal epidural steroid injection under fluoroscopy.    Diagnosis: Lumbar radiculitis    Post Op diagnosis: Same    PHYSICIAN: Brant Will M.D.    MEDICATIONS INJECTED:  10 mg of dexamethasone and 4 ml of sterile, preservative-free NaCl.    LOCAL ANESTHETIC GIVEN:  Lidocaine 1%, 2 ml total    SEDATION MEDICATIONS: RN IV sedation    ESTIMATED BLOOD LOSS:  None    COMPLICATIONS:  none    TECHNIQUE:   After the patient was placed in prone position, the patient was prepped and draped in the usual sterile fashion using ChloraPrep and sterile towels.  Appropriate anatomic landmarks were determined by identifying the sacral hiatus in the lateral fluoroscopic view.  Local anesthetic was given via a 25g 1.5 inch needle by raising a wheal and infiltrating down to the periosteum.  A 3.5 inch 20 gauge touhy needle was introduced thru the sacral hiatus.  2ml of contrast was injected to confirm placement in the appropriate area and that there was no vascular uptake.  The medication was then injected slowly.  The patient tolerated the procedure well.    The patient was monitored after the procedure.  Patient was given post procedure and discharge instructions to follow at home.  The patient was discharged in a stable condition

## 2023-05-09 NOTE — DISCHARGE SUMMARY
[facility]  Discharge Note  Short Stay    Procedure(s) (LRB):  Injection-steroid-epidural-caudal (N/A)      OUTCOME: Patient tolerated treatment/procedure well without complication and is now ready for discharge.    DISPOSITION: Home or Self Care    FINAL DIAGNOSIS:  <principal problem not specified>    FOLLOWUP: In clinic    DISCHARGE INSTRUCTIONS:    Discharge Procedure Orders   Notify your health care provider if you experience any of the following:  temperature >100.4     Notify your health care provider if you experience any of the following:  severe uncontrolled pain     Notify your health care provider if you experience any of the following:  redness, tenderness, or signs of infection (pain, swelling, redness, odor or green/yellow discharge around incision site)     Activity as tolerated        TIME SPENT ON DISCHARGE:   30 minutes

## 2023-05-09 NOTE — PLAN OF CARE
Discharge instructions given to pt, verbalized understanding.  Tolerating PO fluids.  IV removed.  No c/o pain.  Voided x1.  Ambulating out per walker with  RN to wife  in no distress.

## 2023-05-10 VITALS
WEIGHT: 215 LBS | OXYGEN SATURATION: 95 % | BODY MASS INDEX: 28.49 KG/M2 | HEART RATE: 69 BPM | TEMPERATURE: 98 F | HEIGHT: 73 IN | RESPIRATION RATE: 17 BRPM | DIASTOLIC BLOOD PRESSURE: 90 MMHG | SYSTOLIC BLOOD PRESSURE: 148 MMHG

## 2023-05-10 DIAGNOSIS — E11.9 TYPE 2 DIABETES MELLITUS WITHOUT COMPLICATION: ICD-10-CM

## 2023-05-15 ENCOUNTER — PATIENT MESSAGE (OUTPATIENT)
Dept: ADMINISTRATIVE | Facility: HOSPITAL | Age: 73
End: 2023-05-15
Payer: MEDICARE

## 2023-06-01 DIAGNOSIS — Z98.890 NECK PAIN WITH HISTORY OF CERVICAL SPINAL SURGERY: ICD-10-CM

## 2023-06-01 DIAGNOSIS — G89.4 CHRONIC PAIN SYNDROME: ICD-10-CM

## 2023-06-01 DIAGNOSIS — M48.02 CERVICAL STENOSIS OF SPINAL CANAL: ICD-10-CM

## 2023-06-01 DIAGNOSIS — M51.36 DDD (DEGENERATIVE DISC DISEASE), LUMBAR: ICD-10-CM

## 2023-06-01 DIAGNOSIS — M54.16 LUMBAR BACK PAIN WITH RADICULOPATHY AFFECTING LEFT LOWER EXTREMITY: ICD-10-CM

## 2023-06-01 DIAGNOSIS — M54.2 NECK PAIN WITH HISTORY OF CERVICAL SPINAL SURGERY: ICD-10-CM

## 2023-06-01 DIAGNOSIS — F11.90 CHRONIC, CONTINUOUS USE OF OPIOIDS: ICD-10-CM

## 2023-06-01 DIAGNOSIS — Z98.890 H/O LUMBOSACRAL SPINE SURGERY: ICD-10-CM

## 2023-06-01 NOTE — TELEPHONE ENCOUNTER
----- Message from Shahnaz Archibald sent at 6/1/2023  1:35 PM CDT -----  Contact: PT  Type:  RX Refill Request    Who Called: PT   Refill or New Rx:REFILL   RX Name and Strength: oxyCODONE-acetaminophen (PERCOCET)  mg per tablet  How is the patient currently taking it? (ex. 1XDay): ONE TABLET EVERY SIX HOURS   Is this a 30 day or 90 day RX:30  Preferred Pharmacy with phone number:  [id number] - [place], [place] - [address] [address] AT [address] [address] & [facility]  [address] [address] [address]  Phone: [phone] Fax: [phone]    Local or Mail Order:LOCAL  Ordering Provider:LUZ MARINA  Would the patient rather a call back or a response via [facility]? CALL   Best Call Back Number: [phone] (home)     Additional Information: THANK YOU

## 2023-06-03 RX ORDER — OXYCODONE AND ACETAMINOPHEN 10; 325 MG/1; MG/1
1 TABLET ORAL EVERY 6 HOURS PRN
Qty: 120 TABLET | Refills: 0 | Status: SHIPPED | OUTPATIENT
Start: 2023-06-03 | End: 2023-07-06 | Stop reason: SDUPTHER

## 2023-06-09 ENCOUNTER — OFFICE VISIT (OUTPATIENT)
Dept: PAIN MEDICINE | Facility: CLINIC | Age: 73
End: 2023-06-09
Payer: MEDICARE

## 2023-06-09 VITALS
BODY MASS INDEX: 28.49 KG/M2 | WEIGHT: 214.94 LBS | DIASTOLIC BLOOD PRESSURE: 69 MMHG | HEIGHT: 73 IN | SYSTOLIC BLOOD PRESSURE: 107 MMHG | HEART RATE: 82 BPM

## 2023-06-09 DIAGNOSIS — G89.4 CHRONIC PAIN SYNDROME: ICD-10-CM

## 2023-06-09 DIAGNOSIS — M51.36 DDD (DEGENERATIVE DISC DISEASE), LUMBAR: ICD-10-CM

## 2023-06-09 DIAGNOSIS — M54.16 LUMBAR RADICULOPATHY: Primary | ICD-10-CM

## 2023-06-09 DIAGNOSIS — M96.1 POST LAMINECTOMY SYNDROME: ICD-10-CM

## 2023-06-09 PROCEDURE — 3288F FALL RISK ASSESSMENT DOCD: CPT | Mod: CPTII,S$GLB,, | Performed by: PHYSICIAN ASSISTANT

## 2023-06-09 PROCEDURE — 1125F AMNT PAIN NOTED PAIN PRSNT: CPT | Mod: CPTII,S$GLB,, | Performed by: PHYSICIAN ASSISTANT

## 2023-06-09 PROCEDURE — 1159F PR MEDICATION LIST DOCUMENTED IN MEDICAL RECORD: ICD-10-PCS | Mod: CPTII,S$GLB,, | Performed by: PHYSICIAN ASSISTANT

## 2023-06-09 PROCEDURE — 1125F PR PAIN SEVERITY QUANTIFIED, PAIN PRESENT: ICD-10-PCS | Mod: CPTII,S$GLB,, | Performed by: PHYSICIAN ASSISTANT

## 2023-06-09 PROCEDURE — 1101F PR PT FALLS ASSESS DOC 0-1 FALLS W/OUT INJ PAST YR: ICD-10-PCS | Mod: CPTII,S$GLB,, | Performed by: PHYSICIAN ASSISTANT

## 2023-06-09 PROCEDURE — 99999 PR PBB SHADOW E&M-EST. PATIENT-LVL III: ICD-10-PCS | Mod: PBBFAC,,, | Performed by: PHYSICIAN ASSISTANT

## 2023-06-09 PROCEDURE — 99999 PR PBB SHADOW E&M-EST. PATIENT-LVL III: CPT | Mod: PBBFAC,,, | Performed by: PHYSICIAN ASSISTANT

## 2023-06-09 PROCEDURE — 99214 OFFICE O/P EST MOD 30 MIN: CPT | Mod: S$GLB,,, | Performed by: PHYSICIAN ASSISTANT

## 2023-06-09 PROCEDURE — 3078F DIAST BP <80 MM HG: CPT | Mod: CPTII,S$GLB,, | Performed by: PHYSICIAN ASSISTANT

## 2023-06-09 PROCEDURE — 3074F PR MOST RECENT SYSTOLIC BLOOD PRESSURE < 130 MM HG: ICD-10-PCS | Mod: CPTII,S$GLB,, | Performed by: PHYSICIAN ASSISTANT

## 2023-06-09 PROCEDURE — 1159F MED LIST DOCD IN RCRD: CPT | Mod: CPTII,S$GLB,, | Performed by: PHYSICIAN ASSISTANT

## 2023-06-09 PROCEDURE — 3008F BODY MASS INDEX DOCD: CPT | Mod: CPTII,S$GLB,, | Performed by: PHYSICIAN ASSISTANT

## 2023-06-09 PROCEDURE — 3074F SYST BP LT 130 MM HG: CPT | Mod: CPTII,S$GLB,, | Performed by: PHYSICIAN ASSISTANT

## 2023-06-09 PROCEDURE — 3288F PR FALLS RISK ASSESSMENT DOCUMENTED: ICD-10-PCS | Mod: CPTII,S$GLB,, | Performed by: PHYSICIAN ASSISTANT

## 2023-06-09 PROCEDURE — 3008F PR BODY MASS INDEX (BMI) DOCUMENTED: ICD-10-PCS | Mod: CPTII,S$GLB,, | Performed by: PHYSICIAN ASSISTANT

## 2023-06-09 PROCEDURE — 1101F PT FALLS ASSESS-DOCD LE1/YR: CPT | Mod: CPTII,S$GLB,, | Performed by: PHYSICIAN ASSISTANT

## 2023-06-09 PROCEDURE — 3078F PR MOST RECENT DIASTOLIC BLOOD PRESSURE < 80 MM HG: ICD-10-PCS | Mod: CPTII,S$GLB,, | Performed by: PHYSICIAN ASSISTANT

## 2023-06-09 PROCEDURE — 99214 PR OFFICE/OUTPT VISIT, EST, LEVL IV, 30-39 MIN: ICD-10-PCS | Mod: S$GLB,,, | Performed by: PHYSICIAN ASSISTANT

## 2023-06-09 NOTE — PROGRESS NOTES
Referring Physician: No ref. provider found    PCP: Alla Almeida NP      CC: Low back and left leg pain    Interval History:  Garcia Renteria is a 73 y.o. male with hx of T10-Pelvis fusion (2020), C3-5 ACDF (2019), T2DM, and chronic opiate use presents with worsening back and radicular left leg pain. He is s/p Caudal GILMA without any improvement. Reports he developed twitches in left LE after procedure while walking. ain today is a 7/10 that is worst on the left side and associated with Left sided radicular symptoms of sharp, stabbing, shooting pain with numbness and tingling in the L5 dermatome. Pain is aggravated by activity, flexion, touch. He currently takes Oxycodone QID and uses medical marijuana with some benefit. He takes Gabapentin 300 mg TID. Pain today is rated 7/10.  Pt has been seen in the clinic before, however pt is new to me.     History below per Dr. Will    HPI:   Garcia Renteria is a 73 y.o. male w/ hx of T10-Pelvis fusion (2020), C3-5 ACDF (2019), T2DM, and chronic opiate use presents with worsening back and radicular left leg pain. Patient reports long hx of back pain (L4-5 laminectomy following service in [employer] years ago) that has progressively worsened over the past 5 years. Patient then underwent the above procedures with some relief, however, low back pain persists. Pain today is a 7/10 that is worst on the left side and associated with Left sided radicular symptoms of sharp, stabbing, shooting pain with numbness and tingling in the L5 dermatome. Pain is aggravated by activity, flexion, touch. Pain is improved with rest, medications. Some relief with PT in the past. Distant history of GILMA in past (2000) with some relief. Current medications: ibuprofen 800mg PRN, gabapentin 300mg q8h, percocet  q8h PRN. Patient is curious about potential RFA options today.     ROS:  CONSTITUTIONAL: No fevers, chills, night sweats, wt. loss, appetite changes  SKIN: no rashes or  itching  ENT: No headaches, head trauma, vision changes, or eye pain  LYMPH NODES: None noticed   CV: No chest pain, palpitations.   RESP: No shortness of breath, dyspnea on exertion, cough, wheezing, or hemoptysis  GI: No nausea, emesis, diarrhea, constipation, melena, hematochezia, pain.    : No dysuria, hematuria, urgency, or frequency   HEME: No easy bruising, bleeding problems  PSYCHIATRIC: No depression, anxiety, psychosis, hallucinations.  NEURO: No seizures, memory loss, dizziness or difficulty sleeping  MSK: +HPI      Past Medical History:   Diagnosis Date    Colitis     Diabetes mellitus, type 2     Diverticulosis     GERD (gastroesophageal reflux disease)     Hypertension      Past Surgical History:   Procedure Laterality Date    ANKLE SURGERY Bilateral     ANTERIOR CERVICAL DISCECTOMY W/ FUSION N/A 10/30/2019    Procedure: C3-4, C4-5 ACDF;  Surgeon: Francis Alan MD;  Location: [address];  Service: Neurosurgery;  Laterality: N/A;  C3-4, C4-5 ACDF  Anesthesia:  General  Rad:  C-Arm  NM:  EMG, SEP, & MEP  Blood:  Type & Screen  Position:  Supine  Bed:  Regular slider bed  Headrest:  [place] & GW tongs/hanging weights  Misc:  Small vivigen  Nerve root retractor ([employer])  Disposable #2 [place]  Interbody    BACK SURGERY      CAUDAL EPIDURAL STEROID INJECTION N/A 5/9/2023    Procedure: Injection-steroid-epidural-caudal;  Surgeon: Brant Will MD;  Location: [facility];  Service: Pain Management;  Laterality: N/A;  caudal    COLONOSCOPY      COLONOSCOPY N/A 10/7/2020    Procedure: COLONOSCOPY;  Surgeon: Elpidio Lau MD;  Location: [facility];  Service: Endoscopy;  Laterality: N/A;    CREATION OF MUSCLE ROTATIONAL FLAP  3/3/2020    Procedure: CREATION, FLAP, MUSCLE ROTATION;  Surgeon: Francis Alan MD;  Location: [facility] OR [address] [place] [address];  Service: Neurosurgery;;    CYSTOSCOPY N/A 7/21/2021    Procedure: CYSTOSCOPY;  Surgeon: Colton Kamara Jr., MD;  Location: [facility] OR;  Service: Urology;  Laterality: N/A;     "INJECTION OF ANESTHETIC AGENT AROUND NERVE Bilateral 3/10/2020    Procedure: Block, Nerve;  Surgeon: Erinn Surgeon;  [facility];  Service: Anesthesiology;  Laterality: Bilateral;    lower back surgery      LUMBAR LAMINECTOMY WITH FUSION N/A 3/3/2020    Procedure: T10-Pelvis Fusion with L4 PSO **AIRO** Co-Surgeon: Roberto Parkinson;  Surgeon: Francis Alan MD;  [address];  Service: Neurosurgery;  Laterality: N/A;  **AIRO**  **CELL SAVER**  Co-Surgeon: Roberto Parkinson  NM: EMG, SEP, MEP  Position: Prone  Bed: Erwin 4 post, prone pillow  Blood: Type & Hold 2 units  Rad: C-Arm, AIRO  Vendor: [employer]  Misc: Vivigen, Infuse, Cement (Depuy)  [place]    NECK SURGERY      SPINE SURGERY      TRANSRECTAL ULTRASOUND EXAMINATION N/A 7/21/2021    Procedure: ULTRASOUND, RECTAL APPROACH;  Surgeon: Colton Kamara Jr., MD;  Location: [facility];  Service: Urology;  Laterality: N/A;    UPPER GASTROINTESTINAL ENDOSCOPY       Family History   Problem Relation Age of Onset    Neurological Disorders Mother      Social History     Socioeconomic History    Marital status:    Tobacco Use    Smoking status: Some Days     Packs/day: 1.00     Types: Cigars, Cigarettes     Last attempt to quit: 10/2019     Years since quitting: 3.6    Smokeless tobacco: Never    Tobacco comments:     3/27/23 not at this time   Substance and Sexual Activity    Alcohol use: Yes     Alcohol/week: 2.0 standard drinks     Types: 1 Glasses of wine, 1 Cans of beer per week     Comment: 1 wine , 2 times a week    Drug use: Not Currently    Sexual activity: Yes     Partners: Female         Medications/Allergies: See med card    Vitals:    06/09/23 1013   BP: 107/69   Pulse: 82   Weight: 97.5 kg (214 lb 15.2 oz)   Height: 6' 1" (1.854 m)   PainSc:   7   PainLoc: Back         Physical exam:    GENERAL: A and O x3, the patient appears well groomed and is in no acute distress.  Skin: No rashes or obvious lesions  HEENT: normocephalic, " atraumatic  CARDIOVASCULAR:  RRR  LUNGS: non labored breathing  ABDOMEN: soft, nontender   UPPER EXTREMITIES: Normal alignment, normal range of motion, no atrophy, no skin changes,  hair growth and nail growth normal and equal bilaterally. No swelling, no tenderness.    LOWER EXTREMITIES:  Normal alignment, normal range of motion, no atrophy, no skin changes,  hair growth and nail growth normal and equal bilaterally. No swelling, no tenderness.    LUMBAR SPINE  Lumbar spine: ROM is decreased with flexion, extension and oblique extension with moderate increased pain.    Rafael's test causes increased pain bilaterally (L>R)    Supine straight leg raise is positive bilaterally (L>R)  Internal and external rotation of the hip causes increased pain bilaterally (L>R).  Myofascial exam: No tenderness to palpation across lumbar paraspinous muscles.      MENTAL STATUS: normal orientation, speech, language, and fund of knowledge for social situation.  Emotional state appropriate.    CRANIAL NERVES:  II:  PERRL bilaterally,   III,IV,VI: EOMI.    V:  Facial sensation equal bilaterally  VII:  Facial motor function normal.  VIII:  Hearing equal to finger rub bilaterally  IX/X: Gag normal, palate symmetric  XI:  Shoulder shrug equal, head turn equal  XII:  Tongue midline without fasciculations      MOTOR: Tone and bulk: normal bilateral upper and lower Strength: normal   Delt Bi Tri WE WF     R 5 5 5 5 5 5   L 5 5 5 5 5 5     IP ADD ABD Quad TA Gas HAM  R 5 5 5 5 5 5 5  L 5 5 5 5 5 5 5    SENSATION: Light touch and pinprick intact decreased on L side.  REFLEXES: normal, symmetric, nonbrisk.  Toes down, no clonus. No hoffmans.  GAIT: antalgic.        Imaging:  Xray Scoliosis 6/2020       FINDINGS:  The patient has had prior posterior decompression and fusion with pedicle screws extending from T11 to the sacrum.  This is unchanged from prior studies.  There is wedge-shaped L3 vertebral body unchanged from prior studies as well.   A significant degree of scoliosis is not presently seen.  Kyphosis is not identified.     Impression:     Prior posterior decompression and fixation of the thoracolumbar spine from T11 to the sacrum with hardware unchanged.  Significant scoliosis is not seen on this image.  Wedge configuration of the L3 vertebral body is unchanged.          CT L-spine 6/2020     COMPARISON: CT lumbar spine June 17, 2020     FINDINGS:     Thoracolumbar spinal fixation hardware is demonstrated. The transpedicular screws of the hardware are intact. There is no definite periarticular lucency observed. There is ankylosis of the L4-5 and L5-S1 vertebra. Laminectomies are noted of the lower thoracic and upper lumbar spine. There is multilevel vertebral body marginal osteophytosis with intervertebral disc height loss at L1-2, L2-3 and L3-4. No acute fracture or destructive osseous lesion identified. Chronic compression deformity of the L3 vertebra noted. Perihardware heterotopic ossification involving the lower lumbar spine orthopedic hardware noted. There is severe atrophy of the paravertebral musculature.     T10-T11 kyphoplasty changes. Mild bilateral facet joint arthropathy. Bilateral neural foraminal narrowing     T11-12: Moderate bilateral facet joint arthropathy. Bilateral neural foraminal narrowing     T12-L1: Severe bilateral facet joint arthropathy. No spinal canal narrowing. Bilateral neural foraminal narrowing     L1-2: Severe bilateral facet joint arthropathy mild broad-based disc bulge narrows the spinal canal.     L2-3: Left central/paracentral disc protrusion severely narrows the spinal canal, with limited evaluation. The protrusion appears quite large but is difficult to accurately measure due to Limited CT evaluation.     L3-4: Disc osteophyte complex without significant spinal canal narrowing. Postlaminectomy changes noted. Ankylosed facet joints with mild spinal canal narrowing. Bilateral neural foraminal narrowing.      L4-5: Facet joint ankylosis and discogenic degenerative changes narrow the spinal canal. AP diameter of the thecal sac measures 9 mm.     L5-S1: Disc osteophyte complex narrows the spinal canal. Post laminectomy changes noted. There is moderate bilateral neural foraminal narrowing.     IMPRESSION:     1.  Multilevel thoracolumbar spinal fixation hardware is intact. Multilevel laminectomies noted.  2.  There is multilevel degenerative changes of the thoracic and lumbar spine with spinal canal and neural foraminal narrowing     Electronically signed by:  Mulugeta Urias DO  6/9/2022 4:48 PM CDT Workstation: [id number]      CT C-spine 6/2020   COMPARISON:CT 2/6/2020     FINDINGS:  Interbody graft lies at C3-C4 and C4-C5 levels. There has been interval subsidence of C3-C4 interbody graft within the inferior C3 and superior C4 endplates, and progressive loss of disc space height since 2/6/2020. Presumed surgical pin associated with left C4-C5 interbody graft has anterior aspect distracted 6 mm anterior to the anterior portion of the interbody graft, not significantly changed.     Anterior plate and screws transfix C5 and C6 without loosening or other complication. Mature osseous fusion occurs about the C5-C6 disc level.     At C2-C3, moderate right facet joint osteoarthrosis has progressed, without significant narrowing.     At C3-C4, bilateral vertebral joint osteophytes combined with mild left and moderate right facet joint osteoarthrosis to result in severe left and moderate right neural foramen narrowing without central canal narrowing. This has slightly progressed.     At C4-C5, posterior osteophytic ridge results in mild-to-moderate central canal narrowing along with moderate bilateral neural foramen narrowing.     At C5-C6, minor left neural foramen narrowing without central canal or right neural foramen narrowing, unchanged.     At C6-C7, posterior osteophytic ridge results in moderate to severe central canal  narrowing and moderate bilateral neural foramen narrowing, unchanged.     At C7-T1, mild bilateral facet joint osteoarthrosis unchanged.     Paraspinal soft tissues show very mild bilateral carotid artery calcifications.     IMPRESSION:     1. Minimal subsidence of C3-C4 interbody graft involving inferior C3 and superior C4 endplates with progressive loss of disc space height and progression of severe left and moderate right neural foramen narrowing. Correlation for left C4 radiculopathy is requested.  2. Unchanged appearance of surgical hardware at C4-C5 disc level and C5-C6 levels, with mature osseous fusion about the C5-C6 disc.  3. Progression of moderate right C2-C3 facet joint osteoarthrosis. Unchanged moderate to severe central canal narrowing at C6-C7 with moderate bilateral neural foramen narrowing.    Assessment: Patient is a 73 y.o. male w/ hx of T10-Pelvis fusion (2020), C3-5 ACDF (2019), T2DM, and chronic opiate use presents with worsening back and radicular left leg pain (L5 dermatome) over the past 5 years. Distant history of GILMA in past (2000) with some relief. Patient is interested in RFA, however, current symptomatology and hx of lumbar fusion would not make him a candidate for lumbar RFA. Current symptomatolgy more in line with radicular or SIJ pain.  1. Lumbar radiculopathy    2. Chronic pain syndrome    3. Post laminectomy syndrome    4. DDD (degenerative disc disease), lumbar            Plan:  I have stressed the importance of physical activity and exercise to improve overall health  Reviewed pertinent imaging and records with patient  He can continue medications per his PCP  Titrate Gabapentin to 600 mg TID  F/u prn

## 2023-07-06 ENCOUNTER — TELEPHONE (OUTPATIENT)
Dept: FAMILY MEDICINE | Facility: CLINIC | Age: 73
End: 2023-07-06
Payer: MEDICARE

## 2023-07-06 DIAGNOSIS — F11.90 CHRONIC, CONTINUOUS USE OF OPIOIDS: ICD-10-CM

## 2023-07-06 DIAGNOSIS — G89.4 CHRONIC PAIN SYNDROME: ICD-10-CM

## 2023-07-06 DIAGNOSIS — Z98.890 H/O LUMBOSACRAL SPINE SURGERY: ICD-10-CM

## 2023-07-06 DIAGNOSIS — M48.02 CERVICAL STENOSIS OF SPINAL CANAL: ICD-10-CM

## 2023-07-06 DIAGNOSIS — Z98.890 NECK PAIN WITH HISTORY OF CERVICAL SPINAL SURGERY: ICD-10-CM

## 2023-07-06 DIAGNOSIS — M54.16 LUMBAR BACK PAIN WITH RADICULOPATHY AFFECTING LEFT LOWER EXTREMITY: ICD-10-CM

## 2023-07-06 DIAGNOSIS — M51.36 DDD (DEGENERATIVE DISC DISEASE), LUMBAR: ICD-10-CM

## 2023-07-06 DIAGNOSIS — M54.2 NECK PAIN WITH HISTORY OF CERVICAL SPINAL SURGERY: ICD-10-CM

## 2023-07-06 RX ORDER — OXYCODONE AND ACETAMINOPHEN 10; 325 MG/1; MG/1
1 TABLET ORAL EVERY 6 HOURS PRN
Qty: 120 TABLET | Refills: 0 | Status: SHIPPED | OUTPATIENT
Start: 2023-07-06 | End: 2023-08-14 | Stop reason: SDUPTHER

## 2023-07-06 NOTE — TELEPHONE ENCOUNTER
----- Message from Radha Sarmiento MA sent at 7/6/2023  5:31 PM CDT -----  Regarding: oxycodone  Contact: patient  Patient is requesting a refill of oxycodone.     Radha Sarmiento [facility]    ----- Message -----  From: Amara Rangel  Sent: 7/6/2023   8:58 AM CDT  To: Elle Gold Staff    Type:  RX Refill Request    Who Called: patient     Refill or New Rx: refill     RX Name and Strength: oxyCODONE-acetaminophen (PERCOCET)  mg per tablet    How is the patient currently taking it? (ex. 1XDay):    Is this a 30 day or 90 day RX:30    Preferred Pharmacy with phone number:    [id number] - [facility] [address] [address] [address] [address] [address] [address]  Phone: [phone] Fax: [phone]     Local or Mail Order:local     Ordering Provider:    Would the patient rather a call back or a response via [facility]? Call     Best Call Back Number:[phone] (home)      Additional Information:

## 2023-08-04 ENCOUNTER — TELEPHONE (OUTPATIENT)
Dept: FAMILY MEDICINE | Facility: CLINIC | Age: 73
End: 2023-08-04
Payer: MEDICARE

## 2023-08-04 DIAGNOSIS — M54.2 NECK PAIN WITH HISTORY OF CERVICAL SPINAL SURGERY: ICD-10-CM

## 2023-08-04 DIAGNOSIS — M51.36 DDD (DEGENERATIVE DISC DISEASE), LUMBAR: ICD-10-CM

## 2023-08-04 DIAGNOSIS — Z98.890 H/O LUMBOSACRAL SPINE SURGERY: ICD-10-CM

## 2023-08-04 DIAGNOSIS — G89.4 CHRONIC PAIN SYNDROME: ICD-10-CM

## 2023-08-04 DIAGNOSIS — Z98.890 NECK PAIN WITH HISTORY OF CERVICAL SPINAL SURGERY: ICD-10-CM

## 2023-08-04 DIAGNOSIS — M54.16 LUMBAR BACK PAIN WITH RADICULOPATHY AFFECTING LEFT LOWER EXTREMITY: ICD-10-CM

## 2023-08-04 DIAGNOSIS — M48.02 CERVICAL STENOSIS OF SPINAL CANAL: ICD-10-CM

## 2023-08-04 DIAGNOSIS — F11.90 CHRONIC, CONTINUOUS USE OF OPIOIDS: ICD-10-CM

## 2023-08-04 RX ORDER — OXYCODONE AND ACETAMINOPHEN 10; 325 MG/1; MG/1
1 TABLET ORAL EVERY 6 HOURS PRN
Qty: 120 TABLET | Refills: 0 | Status: CANCELLED | OUTPATIENT
Start: 2023-08-04 | End: 2023-12-02

## 2023-08-05 NOTE — TELEPHONE ENCOUNTER
"Please telephone pt to let him know his percocet was denied as his last appt was 3/27/23 and he canceled his 3 mo follow up 6/27/23. ANDREAS states "Must be seen every 3 months". Thanks, Laine   "

## 2023-08-14 ENCOUNTER — OFFICE VISIT (OUTPATIENT)
Dept: FAMILY MEDICINE | Facility: CLINIC | Age: 73
End: 2023-08-14
Payer: MEDICARE

## 2023-08-14 VITALS
BODY MASS INDEX: 27.7 KG/M2 | OXYGEN SATURATION: 97 % | RESPIRATION RATE: 18 BRPM | WEIGHT: 209 LBS | DIASTOLIC BLOOD PRESSURE: 70 MMHG | SYSTOLIC BLOOD PRESSURE: 118 MMHG | HEART RATE: 91 BPM | HEIGHT: 73 IN

## 2023-08-14 DIAGNOSIS — Z98.890 H/O LUMBOSACRAL SPINE SURGERY: ICD-10-CM

## 2023-08-14 DIAGNOSIS — Z79.899 HIGH RISK MEDICATION USE: ICD-10-CM

## 2023-08-14 DIAGNOSIS — Z11.59 NEED FOR HEPATITIS C SCREENING TEST: ICD-10-CM

## 2023-08-14 DIAGNOSIS — M54.2 NECK PAIN WITH HISTORY OF CERVICAL SPINAL SURGERY: ICD-10-CM

## 2023-08-14 DIAGNOSIS — M54.16 LUMBAR BACK PAIN WITH RADICULOPATHY AFFECTING LEFT LOWER EXTREMITY: ICD-10-CM

## 2023-08-14 DIAGNOSIS — I10 ESSENTIAL HYPERTENSION: ICD-10-CM

## 2023-08-14 DIAGNOSIS — F11.90 CHRONIC, CONTINUOUS USE OF OPIOIDS: ICD-10-CM

## 2023-08-14 DIAGNOSIS — G89.4 CHRONIC PAIN SYNDROME: Primary | ICD-10-CM

## 2023-08-14 DIAGNOSIS — Z98.890 NECK PAIN WITH HISTORY OF CERVICAL SPINAL SURGERY: ICD-10-CM

## 2023-08-14 DIAGNOSIS — Z79.891 OPIATE ANALGESIC CONTRACT EXISTS: ICD-10-CM

## 2023-08-14 DIAGNOSIS — M48.02 CERVICAL STENOSIS OF SPINAL CANAL: ICD-10-CM

## 2023-08-14 DIAGNOSIS — M51.36 DDD (DEGENERATIVE DISC DISEASE), LUMBAR: ICD-10-CM

## 2023-08-14 DIAGNOSIS — E11.40 TYPE 2 DIABETES MELLITUS WITH DIABETIC NEUROPATHY, WITHOUT LONG-TERM CURRENT USE OF INSULIN: ICD-10-CM

## 2023-08-14 DIAGNOSIS — E55.9 VITAMIN D DEFICIENCY: ICD-10-CM

## 2023-08-14 PROCEDURE — 1160F PR REVIEW ALL MEDS BY PRESCRIBER/CLIN PHARMACIST DOCUMENTED: ICD-10-PCS | Mod: CPTII,S$GLB,, | Performed by: NURSE PRACTITIONER

## 2023-08-14 PROCEDURE — 99999 PR PBB SHADOW E&M-EST. PATIENT-LVL V: ICD-10-PCS | Mod: PBBFAC,,, | Performed by: NURSE PRACTITIONER

## 2023-08-14 PROCEDURE — 3074F SYST BP LT 130 MM HG: CPT | Mod: CPTII,S$GLB,, | Performed by: NURSE PRACTITIONER

## 2023-08-14 PROCEDURE — 3008F BODY MASS INDEX DOCD: CPT | Mod: CPTII,S$GLB,, | Performed by: NURSE PRACTITIONER

## 2023-08-14 PROCEDURE — 1101F PT FALLS ASSESS-DOCD LE1/YR: CPT | Mod: CPTII,S$GLB,, | Performed by: NURSE PRACTITIONER

## 2023-08-14 PROCEDURE — 1125F AMNT PAIN NOTED PAIN PRSNT: CPT | Mod: CPTII,S$GLB,, | Performed by: NURSE PRACTITIONER

## 2023-08-14 PROCEDURE — 3288F FALL RISK ASSESSMENT DOCD: CPT | Mod: CPTII,S$GLB,, | Performed by: NURSE PRACTITIONER

## 2023-08-14 PROCEDURE — 1125F PR PAIN SEVERITY QUANTIFIED, PAIN PRESENT: ICD-10-PCS | Mod: CPTII,S$GLB,, | Performed by: NURSE PRACTITIONER

## 2023-08-14 PROCEDURE — 1160F RVW MEDS BY RX/DR IN RCRD: CPT | Mod: CPTII,S$GLB,, | Performed by: NURSE PRACTITIONER

## 2023-08-14 PROCEDURE — 3078F DIAST BP <80 MM HG: CPT | Mod: CPTII,S$GLB,, | Performed by: NURSE PRACTITIONER

## 2023-08-14 PROCEDURE — 99214 OFFICE O/P EST MOD 30 MIN: CPT | Mod: S$GLB,,, | Performed by: NURSE PRACTITIONER

## 2023-08-14 PROCEDURE — 1101F PR PT FALLS ASSESS DOC 0-1 FALLS W/OUT INJ PAST YR: ICD-10-PCS | Mod: CPTII,S$GLB,, | Performed by: NURSE PRACTITIONER

## 2023-08-14 PROCEDURE — 3008F PR BODY MASS INDEX (BMI) DOCUMENTED: ICD-10-PCS | Mod: CPTII,S$GLB,, | Performed by: NURSE PRACTITIONER

## 2023-08-14 PROCEDURE — 99214 PR OFFICE/OUTPT VISIT, EST, LEVL IV, 30-39 MIN: ICD-10-PCS | Mod: S$GLB,,, | Performed by: NURSE PRACTITIONER

## 2023-08-14 PROCEDURE — 99999 PR PBB SHADOW E&M-EST. PATIENT-LVL V: CPT | Mod: PBBFAC,,, | Performed by: NURSE PRACTITIONER

## 2023-08-14 PROCEDURE — 3288F PR FALLS RISK ASSESSMENT DOCUMENTED: ICD-10-PCS | Mod: CPTII,S$GLB,, | Performed by: NURSE PRACTITIONER

## 2023-08-14 PROCEDURE — 3078F PR MOST RECENT DIASTOLIC BLOOD PRESSURE < 80 MM HG: ICD-10-PCS | Mod: CPTII,S$GLB,, | Performed by: NURSE PRACTITIONER

## 2023-08-14 PROCEDURE — 1159F MED LIST DOCD IN RCRD: CPT | Mod: CPTII,S$GLB,, | Performed by: NURSE PRACTITIONER

## 2023-08-14 PROCEDURE — 1159F PR MEDICATION LIST DOCUMENTED IN MEDICAL RECORD: ICD-10-PCS | Mod: CPTII,S$GLB,, | Performed by: NURSE PRACTITIONER

## 2023-08-14 PROCEDURE — 3074F PR MOST RECENT SYSTOLIC BLOOD PRESSURE < 130 MM HG: ICD-10-PCS | Mod: CPTII,S$GLB,, | Performed by: NURSE PRACTITIONER

## 2023-08-14 RX ORDER — OXYCODONE AND ACETAMINOPHEN 10; 325 MG/1; MG/1
1 TABLET ORAL EVERY 6 HOURS PRN
Qty: 120 TABLET | Refills: 0 | Status: SHIPPED | OUTPATIENT
Start: 2023-08-14 | End: 2023-09-14 | Stop reason: SDUPTHER

## 2023-08-14 NOTE — PROGRESS NOTES
Subjective:       Patient ID: Garcia Renteria is a 73 y.o. male.    Chief Complaint: Medication Refill, Diabetes, and Chronic Pain  -  The patient is a 72 y/o male presents for pain medication refill that was started by GI d/t intolerance to NSAIDs. H/o 2 cervical and 3 lumbar surgeries. Currently recommended for another lumbar surgery for left sided sciatica yet MD felt he may not recover. Pt is interested in pain management procedural options but when he was seen by Dr. Will and obtained inj but did not help and was told left sciatic is so deep MD would not be able to help him. Left sciatica is chronic.     Reports sciatica was presents prior to his last lumbar surgery lumbar lami with fusion 2020. Yoga was somewhat helpful. Is also under the medical marijuana program.     All routine meds filled at the [facility]  -  Past Medical History:   Diagnosis Date    Colitis     Diabetes mellitus, type 2     Diverticulosis     GERD (gastroesophageal reflux disease)     Hypertension        Past Surgical History:   Procedure Laterality Date    ANKLE SURGERY Bilateral     ANTERIOR CERVICAL DISCECTOMY W/ FUSION N/A 10/30/2019    Procedure: C3-4, C4-5 ACDF;  Surgeon: Francis Alan MD;  Location: [address];  Service: Neurosurgery;  Laterality: N/A;  C3-4, C4-5 ACDF  Anesthesia:  General  Rad:  C-Arm  NM:  EMG, SEP, & MEP  Blood:  Type & Screen  Position:  Supine  Bed:  Regular slider bed  Headrest:  Bushnell & GW tongs/hanging weights  Misc:  Small vivigen  Nerve root retractor (DePuy)  Disposable #2 [place]  Interbody    BACK SURGERY      CAUDAL EPIDURAL STEROID INJECTION N/A 5/9/2023    Procedure: Injection-steroid-epidural-caudal;  Surgeon: Brant Will MD;  Location: [facility] OR;  Service: Pain Management;  Laterality: N/A;  caudal    COLONOSCOPY      COLONOSCOPY N/A 10/7/2020    Procedure: COLONOSCOPY;  Surgeon: Elpidio Lau MD;  Location: [facility] ENDO;  Service: Endoscopy;  Laterality: N/A;    CREATION OF MUSCLE ROTATIONAL FLAP   3/3/2020    Procedure: CREATION, FLAP, MUSCLE ROTATION;  Surgeon: Francis Alan MD;  [address];  Service: Neurosurgery;;    CYSTOSCOPY N/A 7/21/2021    Procedure: CYSTOSCOPY;  Surgeon: Colton Kamara Jr., MD;  Location: [facility];  Service: Urology;  Laterality: N/A;    INJECTION OF ANESTHETIC AGENT AROUND NERVE Bilateral 3/10/2020    Procedure: Block, Nerve;  Surgeon: Erinn Surgeon;  [facility];  Service: Anesthesiology;  Laterality: Bilateral;    lower back surgery      LUMBAR LAMINECTOMY WITH FUSION N/A 3/3/2020    Procedure: T10-Pelvis Fusion with L4 PSO **AIRO** Co-Surgeon: Roberto Parkinson;  Surgeon: Francis Alan MD;  [address];  Service: Neurosurgery;  Laterality: N/A;  **AIRO**  **CELL SAVER**  Co-Surgeon: Roberto Parkinson  NM: EMG, SEP, MEP  Position: Prone  Bed: [place] 4 post, prone pillow  Blood: Type & Hold 2 units  Rad: C-Arm, AIRO  Vendor: Depuy  Misc: Vivigen, Infuse, Cement (Depuy)  [place]    NECK SURGERY      SPINE SURGERY      TRANSRECTAL ULTRASOUND EXAMINATION N/A 7/21/2021    Procedure: ULTRASOUND, RECTAL APPROACH;  Surgeon: Colton Kamara Jr., MD;  Location: [facility];  Service: Urology;  Laterality: N/A;    UPPER GASTROINTESTINAL ENDOSCOPY          Social History     Socioeconomic History    Marital status:    Tobacco Use    Smoking status: Some Days     Current packs/day: 0.00     Types: Cigars, Cigarettes     Last attempt to quit: 10/2019     Years since quitting: 3.8    Smokeless tobacco: Never    Tobacco comments:     3/27/23 not at this time   Substance and Sexual Activity    Alcohol use: Yes     Alcohol/week: 2.0 standard drinks of alcohol     Types: 1 Glasses of wine, 1 Cans of beer per week     Comment: 1 wine , 2 times a week    Drug use: Not Currently    Sexual activity: Yes     Partners: Female       Family History   Problem Relation Age of Onset    Neurological Disorders Mother        Review of patient's allergies indicates:  No Known Allergies        Current Outpatient Medications:     cholecalciferol, vitamin D3, 1,250 mcg (50,000 unit) capsule, Take by mouth., Disp: , Rfl:     diclofenac sodium (VOLTAREN) 1 % Gel, Apply 2 g topically 2 (two) times daily., Disp: , Rfl:     famotidine (PEPCID) 40 MG tablet, Take 1 tablet (40 mg total) by mouth every evening., Disp: 90 tablet, Rfl: 3    gabapentin (NEURONTIN) 300 MG capsule, Take 1 capsule (300 mg total) by mouth 3 (three) times daily., Disp: 90 capsule, Rfl: 11    ibuprofen (ADVIL,MOTRIN) 800 MG tablet, Take 800 mg by mouth every 6 (six) hours as needed., Disp: , Rfl:     lidocaine (LIDODERM) 5 %, Place 1 patch onto the skin once daily. Remove & Discard patch within 12 hours or as directed by MD, Disp: 15 patch, Rfl: 0    lisinopril (PRINIVIL,ZESTRIL) 5 MG tablet, Take 5 mg by mouth once daily. , Disp: , Rfl:     metFORMIN (GLUCOPHAGE) 1000 MG tablet, Take 1,000 mg by mouth daily with breakfast., Disp: , Rfl:     omega 3-dha-epa-fish oil 1,000 mg (120 mg-180 mg) Cap, TAKE ONE CAPSULE BY MOUTH DAILY AS A SUPPLEMENT, Disp: , Rfl:     omeprazole (PRILOSEC) 20 MG capsule, Take 20 mg by mouth., Disp: , Rfl:     sildenafiL (VIAGRA) 100 MG tablet, TAKE ONE-HALF TABLET BY MOUTH DAILY FOR ERECTILE DYSFUNCTION *TAKE ONE HOUR BEFORE SEXUAL ACTIVITY* NO MORE THAN ONE DOSE IN 24 HOURS ** MUST NOT TAKE NITROGLYCERIN TYPE DRUGS WITH VIAGRA **+++THIS IS, Disp: , Rfl:     tiZANidine (ZANAFLEX) 4 MG tablet, Take 1 tablet by mouth 3 times daily as needed., Disp: , Rfl:     triamterene-hydrochlorothiazide 75-50 mg (MAXZIDE) 75-50 mg per tablet, Take 0.5 tablets by mouth once daily. , Disp: , Rfl:     oxyCODONE-acetaminophen (PERCOCET)  mg per tablet, Take 1 tablet by mouth every 6 (six) hours as needed for Pain., Disp: 120 tablet, Rfl: 0    HPI  Review of Systems   Constitutional: Negative.    HENT: Negative.     Eyes: Negative.    Respiratory: Negative.     Cardiovascular: Negative.    Gastrointestinal: Negative.     Endocrine: Negative.    Genitourinary: Negative.    Musculoskeletal:  Positive for back pain, gait problem, neck pain and neck stiffness.   Skin: Negative.    Allergic/Immunologic: Negative.    Neurological:  Positive for numbness.        Neuropathy and sciatica    Hematological: Negative.    Psychiatric/Behavioral: Negative.         Objective:      Physical Exam  Vitals and nursing note reviewed.   Constitutional:       General: He is not in acute distress.     Appearance: Normal appearance. He is well-developed and normal weight. He is not ill-appearing.   HENT:      Head: Normocephalic.   Eyes:      Conjunctiva/sclera: Conjunctivae normal.   Neck:      Thyroid: No thyromegaly.   Cardiovascular:      Rate and Rhythm: Normal rate and regular rhythm.      Heart sounds: Normal heart sounds. No murmur heard.  Pulmonary:      Effort: Pulmonary effort is normal.      Breath sounds: Normal breath sounds. No wheezing or rales.   Musculoskeletal:         General: Normal range of motion.      Cervical back: Normal range of motion.      Right lower leg: No edema.      Left lower leg: No edema.      Comments: Wearing left knee brace   Skin:     General: Skin is warm and dry.   Neurological:      Mental Status: He is alert and oriented to person, place, and time. Mental status is at baseline.      Motor: Weakness present.      Gait: Gait abnormal.   Psychiatric:         Mood and Affect: Mood normal.         Behavior: Behavior normal.         Thought Content: Thought content normal.         Judgment: Judgment normal.         Assessment:       1. Chronic pain syndrome    2. H/O lumbosacral spine surgery    3. Cervical stenosis of spinal canal    4. Lumbar back pain with radiculopathy affecting left lower extremity    5. Type 2 diabetes mellitus with diabetic neuropathy, without long-term current use of insulin    6. High risk medication use    7. Essential hypertension    8. Need for hepatitis C screening test    9. Chronic,  continuous use of opioids    10. Neck pain with history of cervical spinal surgery    11. DDD (degenerative disc disease), lumbar    12. Opiate analgesic contract exists    13. Vitamin D deficiency        Plan:     1- NF labs today  2- inc gabapentin to qid  3- discussed Yang OVIEDO (complex spines)  4- RTC 3 mo for refill  -    Chronic pain syndrome  -     oxyCODONE-acetaminophen (PERCOCET)  mg per tablet; Take 1 tablet by mouth every 6 (six) hours as needed for Pain.  Dispense: 120 tablet; Refill: 0    H/O lumbosacral spine surgery  -     oxyCODONE-acetaminophen (PERCOCET)  mg per tablet; Take 1 tablet by mouth every 6 (six) hours as needed for Pain.  Dispense: 120 tablet; Refill: 0    Cervical stenosis of spinal canal  -     oxyCODONE-acetaminophen (PERCOCET)  mg per tablet; Take 1 tablet by mouth every 6 (six) hours as needed for Pain.  Dispense: 120 tablet; Refill: 0    Lumbar back pain with radiculopathy affecting left lower extremity  -     oxyCODONE-acetaminophen (PERCOCET)  mg per tablet; Take 1 tablet by mouth every 6 (six) hours as needed for Pain.  Dispense: 120 tablet; Refill: 0    Type 2 diabetes mellitus with diabetic neuropathy, without long-term current use of insulin  -     Hemoglobin A1C; Future; Expected date: 08/14/2023  -     CBC Auto Differential; Future; Expected date: 08/14/2023  -     Comprehensive Metabolic Panel; Future; Expected date: 08/14/2023  -     TSH; Future; Expected date: 08/14/2023  -     T4, Free; Future; Expected date: 08/14/2023    High risk medication use  -     Hemoglobin A1C; Future; Expected date: 08/14/2023  -     CBC Auto Differential; Future; Expected date: 08/14/2023  -     Comprehensive Metabolic Panel; Future; Expected date: 08/14/2023  -     TSH; Future; Expected date: 08/14/2023  -     T4, Free; Future; Expected date: 08/14/2023    Essential hypertension  -     Hemoglobin A1C; Future; Expected date: 08/14/2023  -     CBC Auto  Differential; Future; Expected date: 08/14/2023  -     Comprehensive Metabolic Panel; Future; Expected date: 08/14/2023  -     TSH; Future; Expected date: 08/14/2023  -     T4, Free; Future; Expected date: 08/14/2023    Need for hepatitis C screening test  -     Hepatitis C Antibody; Future; Expected date: 08/14/2023    Chronic, continuous use of opioids  -     oxyCODONE-acetaminophen (PERCOCET)  mg per tablet; Take 1 tablet by mouth every 6 (six) hours as needed for Pain.  Dispense: 120 tablet; Refill: 0    Neck pain with history of cervical spinal surgery  -     oxyCODONE-acetaminophen (PERCOCET)  mg per tablet; Take 1 tablet by mouth every 6 (six) hours as needed for Pain.  Dispense: 120 tablet; Refill: 0    DDD (degenerative disc disease), lumbar  -     oxyCODONE-acetaminophen (PERCOCET)  mg per tablet; Take 1 tablet by mouth every 6 (six) hours as needed for Pain.  Dispense: 120 tablet; Refill: 0    Opiate analgesic contract exists  -     oxyCODONE-acetaminophen (PERCOCET)  mg per tablet; Take 1 tablet by mouth every 6 (six) hours as needed for Pain.  Dispense: 120 tablet; Refill: 0    Vitamin D deficiency  -     Vitamin D; Future; Expected date: 08/14/2023        Risks, benefits, and side effects were discussed with the patient. All questions were answered to the fullest satisfaction of the patient, and pt verbalized understanding and agreement to treatment plan. Pt was to call with any new or worsening symptoms, or present to the ER.

## 2023-08-28 ENCOUNTER — LAB VISIT (OUTPATIENT)
Dept: LAB | Facility: HOSPITAL | Age: 73
End: 2023-08-28
Attending: NURSE PRACTITIONER
Payer: MEDICARE

## 2023-08-28 ENCOUNTER — PATIENT MESSAGE (OUTPATIENT)
Dept: FAMILY MEDICINE | Facility: CLINIC | Age: 73
End: 2023-08-28
Payer: MEDICARE

## 2023-08-28 DIAGNOSIS — E11.40 TYPE 2 DIABETES MELLITUS WITH DIABETIC NEUROPATHY, WITHOUT LONG-TERM CURRENT USE OF INSULIN: ICD-10-CM

## 2023-08-28 DIAGNOSIS — Z79.899 HIGH RISK MEDICATION USE: ICD-10-CM

## 2023-08-28 DIAGNOSIS — Z11.59 NEED FOR HEPATITIS C SCREENING TEST: ICD-10-CM

## 2023-08-28 DIAGNOSIS — E11.9 TYPE 2 DIABETES MELLITUS WITHOUT COMPLICATION: ICD-10-CM

## 2023-08-28 DIAGNOSIS — I10 ESSENTIAL HYPERTENSION: ICD-10-CM

## 2023-08-28 DIAGNOSIS — E55.9 VITAMIN D DEFICIENCY: ICD-10-CM

## 2023-08-28 LAB
25(OH)D3+25(OH)D2 SERPL-MCNC: 35 NG/ML (ref 30–96)
ALBUMIN SERPL BCP-MCNC: 3.9 G/DL (ref 3.5–5.2)
ALBUMIN SERPL BCP-MCNC: 3.9 G/DL (ref 3.5–5.2)
ALP SERPL-CCNC: 70 U/L (ref 55–135)
ALP SERPL-CCNC: 70 U/L (ref 55–135)
ALT SERPL W/O P-5'-P-CCNC: 16 U/L (ref 10–44)
ALT SERPL W/O P-5'-P-CCNC: 16 U/L (ref 10–44)
ANION GAP SERPL CALC-SCNC: 11 MMOL/L (ref 8–16)
ANION GAP SERPL CALC-SCNC: 11 MMOL/L (ref 8–16)
AST SERPL-CCNC: 17 U/L (ref 10–40)
AST SERPL-CCNC: 17 U/L (ref 10–40)
BASOPHILS # BLD AUTO: 0.04 K/UL (ref 0–0.2)
BASOPHILS NFR BLD: 0.8 % (ref 0–1.9)
BILIRUB SERPL-MCNC: 0.5 MG/DL (ref 0.1–1)
BILIRUB SERPL-MCNC: 0.5 MG/DL (ref 0.1–1)
BUN SERPL-MCNC: 16 MG/DL (ref 8–23)
BUN SERPL-MCNC: 16 MG/DL (ref 8–23)
CALCIUM SERPL-MCNC: 10.2 MG/DL (ref 8.7–10.5)
CALCIUM SERPL-MCNC: 10.2 MG/DL (ref 8.7–10.5)
CHLORIDE SERPL-SCNC: 102 MMOL/L (ref 95–110)
CHLORIDE SERPL-SCNC: 102 MMOL/L (ref 95–110)
CO2 SERPL-SCNC: 28 MMOL/L (ref 23–29)
CO2 SERPL-SCNC: 28 MMOL/L (ref 23–29)
CREAT SERPL-MCNC: 1.1 MG/DL (ref 0.5–1.4)
CREAT SERPL-MCNC: 1.1 MG/DL (ref 0.5–1.4)
DIFFERENTIAL METHOD: ABNORMAL
EOSINOPHIL # BLD AUTO: 0.1 K/UL (ref 0–0.5)
EOSINOPHIL NFR BLD: 2.7 % (ref 0–8)
ERYTHROCYTE [DISTWIDTH] IN BLOOD BY AUTOMATED COUNT: 13.6 % (ref 11.5–14.5)
EST. GFR  (NO RACE VARIABLE): >60 ML/MIN/1.73 M^2
EST. GFR  (NO RACE VARIABLE): >60 ML/MIN/1.73 M^2
ESTIMATED AVG GLUCOSE: 114 MG/DL (ref 68–131)
ESTIMATED AVG GLUCOSE: 114 MG/DL (ref 68–131)
GLUCOSE SERPL-MCNC: 100 MG/DL (ref 70–110)
GLUCOSE SERPL-MCNC: 100 MG/DL (ref 70–110)
HBA1C MFR BLD: 5.6 % (ref 4–5.6)
HBA1C MFR BLD: 5.6 % (ref 4–5.6)
HCT VFR BLD AUTO: 44.2 % (ref 40–54)
HCV AB SERPL QL IA: REACTIVE
HCV AB SERPL QL IA: REACTIVE
HGB BLD-MCNC: 13.9 G/DL (ref 14–18)
IMM GRANULOCYTES # BLD AUTO: 0.04 K/UL (ref 0–0.04)
IMM GRANULOCYTES NFR BLD AUTO: 0.8 % (ref 0–0.5)
LYMPHOCYTES # BLD AUTO: 2.1 K/UL (ref 1–4.8)
LYMPHOCYTES NFR BLD: 39.5 % (ref 18–48)
MCH RBC QN AUTO: 27.5 PG (ref 27–31)
MCHC RBC AUTO-ENTMCNC: 31.4 G/DL (ref 32–36)
MCV RBC AUTO: 87 FL (ref 82–98)
MONOCYTES # BLD AUTO: 0.4 K/UL (ref 0.3–1)
MONOCYTES NFR BLD: 6.8 % (ref 4–15)
NEUTROPHILS # BLD AUTO: 2.6 K/UL (ref 1.8–7.7)
NEUTROPHILS NFR BLD: 49.4 % (ref 38–73)
NRBC BLD-RTO: 0 /100 WBC
PLATELET # BLD AUTO: 207 K/UL (ref 150–450)
PMV BLD AUTO: 9.9 FL (ref 9.2–12.9)
POTASSIUM SERPL-SCNC: 3.8 MMOL/L (ref 3.5–5.1)
POTASSIUM SERPL-SCNC: 3.8 MMOL/L (ref 3.5–5.1)
PROT SERPL-MCNC: 7.5 G/DL (ref 6–8.4)
PROT SERPL-MCNC: 7.5 G/DL (ref 6–8.4)
RBC # BLD AUTO: 5.06 M/UL (ref 4.6–6.2)
SODIUM SERPL-SCNC: 141 MMOL/L (ref 136–145)
SODIUM SERPL-SCNC: 141 MMOL/L (ref 136–145)
T4 FREE SERPL-MCNC: 1.03 NG/DL (ref 0.71–1.51)
TSH SERPL DL<=0.005 MIU/L-ACNC: 1 UIU/ML (ref 0.4–4)
WBC # BLD AUTO: 5.26 K/UL (ref 3.9–12.7)

## 2023-08-28 PROCEDURE — 84443 ASSAY THYROID STIM HORMONE: CPT | Performed by: NURSE PRACTITIONER

## 2023-08-28 PROCEDURE — 83036 HEMOGLOBIN GLYCOSYLATED A1C: CPT | Performed by: NURSE PRACTITIONER

## 2023-08-28 PROCEDURE — 36415 COLL VENOUS BLD VENIPUNCTURE: CPT | Performed by: NURSE PRACTITIONER

## 2023-08-28 PROCEDURE — 80053 COMPREHEN METABOLIC PANEL: CPT | Performed by: NURSE PRACTITIONER

## 2023-08-28 PROCEDURE — 82306 VITAMIN D 25 HYDROXY: CPT | Performed by: NURSE PRACTITIONER

## 2023-08-28 PROCEDURE — 86803 HEPATITIS C AB TEST: CPT | Performed by: NURSE PRACTITIONER

## 2023-08-28 PROCEDURE — 84439 ASSAY OF FREE THYROXINE: CPT | Performed by: NURSE PRACTITIONER

## 2023-08-28 PROCEDURE — 85025 COMPLETE CBC W/AUTO DIFF WBC: CPT | Performed by: NURSE PRACTITIONER

## 2023-09-05 ENCOUNTER — PATIENT MESSAGE (OUTPATIENT)
Dept: FAMILY MEDICINE | Facility: CLINIC | Age: 73
End: 2023-09-05
Payer: MEDICARE

## 2023-09-14 DIAGNOSIS — M54.16 LUMBAR BACK PAIN WITH RADICULOPATHY AFFECTING LEFT LOWER EXTREMITY: ICD-10-CM

## 2023-09-14 DIAGNOSIS — Z79.891 OPIATE ANALGESIC CONTRACT EXISTS: ICD-10-CM

## 2023-09-14 DIAGNOSIS — G89.4 CHRONIC PAIN SYNDROME: ICD-10-CM

## 2023-09-14 DIAGNOSIS — F11.90 CHRONIC, CONTINUOUS USE OF OPIOIDS: ICD-10-CM

## 2023-09-14 DIAGNOSIS — M48.02 CERVICAL STENOSIS OF SPINAL CANAL: ICD-10-CM

## 2023-09-14 DIAGNOSIS — M51.36 DDD (DEGENERATIVE DISC DISEASE), LUMBAR: ICD-10-CM

## 2023-09-14 DIAGNOSIS — Z98.890 NECK PAIN WITH HISTORY OF CERVICAL SPINAL SURGERY: ICD-10-CM

## 2023-09-14 DIAGNOSIS — Z98.890 H/O LUMBOSACRAL SPINE SURGERY: ICD-10-CM

## 2023-09-14 DIAGNOSIS — M54.2 NECK PAIN WITH HISTORY OF CERVICAL SPINAL SURGERY: ICD-10-CM

## 2023-09-14 NOTE — TELEPHONE ENCOUNTER
----- Message from Bonnie Zuñiga sent at 9/14/2023  9:31 AM CDT -----  Contact: Self  Type:  RX Refill Request    Who Called:  Patient  Refill or New Rx:  Refill  RX Name and Strength:  oxyCODONE-acetaminophen (PERCOCET)  mg per tablet   How is the patient currently taking it? (ex. 1XDay):  Take 1 tablet by mouth every 6 (six) hours as needed for Pain. - Oral   Is this a 30 day or 90 day RX:  120 tablet 0  Preferred Pharmacy with phone number:    [id number] - [place] - [address] [address] [address] [address] [address]  [address] [address] [address]  Phone: [phone] Fax: [phone]  Local or Mail Order:  Local  Ordering Provider:  Bridgett De La Torre Call Back Number:  [phone]  Additional Information:  Please call back to advise. Thanks!

## 2023-09-20 RX ORDER — OXYCODONE AND ACETAMINOPHEN 10; 325 MG/1; MG/1
1 TABLET ORAL EVERY 6 HOURS PRN
Qty: 120 TABLET | Refills: 0 | Status: SHIPPED | OUTPATIENT
Start: 2023-09-20 | End: 2023-10-20 | Stop reason: SDUPTHER

## 2023-10-20 DIAGNOSIS — F11.90 CHRONIC, CONTINUOUS USE OF OPIOIDS: ICD-10-CM

## 2023-10-20 DIAGNOSIS — M51.36 DDD (DEGENERATIVE DISC DISEASE), LUMBAR: ICD-10-CM

## 2023-10-20 DIAGNOSIS — M54.2 NECK PAIN WITH HISTORY OF CERVICAL SPINAL SURGERY: ICD-10-CM

## 2023-10-20 DIAGNOSIS — G89.4 CHRONIC PAIN SYNDROME: ICD-10-CM

## 2023-10-20 DIAGNOSIS — M48.02 CERVICAL STENOSIS OF SPINAL CANAL: ICD-10-CM

## 2023-10-20 DIAGNOSIS — Z98.890 NECK PAIN WITH HISTORY OF CERVICAL SPINAL SURGERY: ICD-10-CM

## 2023-10-20 DIAGNOSIS — Z79.891 OPIATE ANALGESIC CONTRACT EXISTS: ICD-10-CM

## 2023-10-20 DIAGNOSIS — Z98.890 H/O LUMBOSACRAL SPINE SURGERY: ICD-10-CM

## 2023-10-20 DIAGNOSIS — M54.16 LUMBAR BACK PAIN WITH RADICULOPATHY AFFECTING LEFT LOWER EXTREMITY: ICD-10-CM

## 2023-10-20 NOTE — TELEPHONE ENCOUNTER
----- Message from Shayan Lincoln sent at 10/20/2023  2:55 PM CDT -----  Contact: self  Type:  RX Refill Request    Who Called:the patient  Refill or New Rx:refil  RX Name and Strength:oxyCODONE-acetaminophen (PERCOCET)  mg per tablet  How is the patient currently taking it? (ex. 1XDay):as rx'd  Is this a 30 day or 90 day RX:30/  Preferred Pharmacy with phone number:  [id number] - [place], [place] - [address] [address] AT [address] [address] [address]  [address] [address] [address]  Phone: [phone] Fax: [phone]  ]  Local or Mail Order:local/  Ordering Provider:Elle  Would the patient rather a call back or a response via [facility]? Call/  Best Call Back Number:[phone]    Additional Information: Thanks

## 2023-10-22 RX ORDER — OXYCODONE AND ACETAMINOPHEN 10; 325 MG/1; MG/1
1 TABLET ORAL EVERY 6 HOURS PRN
Qty: 120 TABLET | Refills: 0 | Status: SHIPPED | OUTPATIENT
Start: 2023-10-22 | End: 2023-11-27 | Stop reason: SDUPTHER

## 2023-11-09 DIAGNOSIS — E11.9 TYPE 2 DIABETES MELLITUS WITHOUT COMPLICATION: ICD-10-CM

## 2023-11-21 ENCOUNTER — TELEPHONE (OUTPATIENT)
Dept: FAMILY MEDICINE | Facility: CLINIC | Age: 73
End: 2023-11-21
Payer: MEDICARE

## 2023-11-21 NOTE — TELEPHONE ENCOUNTER
----- Message from Radha Lopez sent at 11/21/2023  2:16 PM CST -----  Contact: self  Type:  Sooner Appointment Request    Caller is requesting a sooner appointment.  Caller declined first available appointment listed below.  Caller will not accept being placed on the waitlist and is requesting a message be sent to doctor.    Name of Caller:  pt  When is the first available appointment?  march  Symptoms:  meds and check up  Would the patient rather a call back or a response via [facility]? call  Best Call Back Number:  [phone]   Additional Information:  please advise

## 2023-11-27 ENCOUNTER — OFFICE VISIT (OUTPATIENT)
Dept: FAMILY MEDICINE | Facility: CLINIC | Age: 73
End: 2023-11-27
Payer: MEDICARE

## 2023-11-27 VITALS
BODY MASS INDEX: 27.19 KG/M2 | RESPIRATION RATE: 18 BRPM | HEART RATE: 83 BPM | HEIGHT: 73 IN | WEIGHT: 205.19 LBS | OXYGEN SATURATION: 98 % | SYSTOLIC BLOOD PRESSURE: 116 MMHG | DIASTOLIC BLOOD PRESSURE: 78 MMHG

## 2023-11-27 DIAGNOSIS — Z98.890 NECK PAIN WITH HISTORY OF CERVICAL SPINAL SURGERY: ICD-10-CM

## 2023-11-27 DIAGNOSIS — M48.02 CERVICAL STENOSIS OF SPINAL CANAL: ICD-10-CM

## 2023-11-27 DIAGNOSIS — M51.36 DDD (DEGENERATIVE DISC DISEASE), LUMBAR: ICD-10-CM

## 2023-11-27 DIAGNOSIS — M54.2 NECK PAIN WITH HISTORY OF CERVICAL SPINAL SURGERY: ICD-10-CM

## 2023-11-27 DIAGNOSIS — M54.16 LUMBAR BACK PAIN WITH RADICULOPATHY AFFECTING LEFT LOWER EXTREMITY: ICD-10-CM

## 2023-11-27 DIAGNOSIS — Z98.890 H/O LUMBOSACRAL SPINE SURGERY: ICD-10-CM

## 2023-11-27 DIAGNOSIS — M54.16 LUMBAR RADICULOPATHY, CHRONIC: ICD-10-CM

## 2023-11-27 DIAGNOSIS — E11.40 TYPE 2 DIABETES MELLITUS WITH DIABETIC NEUROPATHY, WITHOUT LONG-TERM CURRENT USE OF INSULIN: ICD-10-CM

## 2023-11-27 DIAGNOSIS — F11.90 CHRONIC, CONTINUOUS USE OF OPIOIDS: ICD-10-CM

## 2023-11-27 DIAGNOSIS — Z86.19 HEPATITIS C VIRUS INFECTION RESOLVED AFTER ANTIVIRAL DRUG THERAPY: ICD-10-CM

## 2023-11-27 DIAGNOSIS — G89.4 CHRONIC PAIN SYNDROME: Primary | ICD-10-CM

## 2023-11-27 DIAGNOSIS — Z79.891 OPIATE ANALGESIC CONTRACT EXISTS: ICD-10-CM

## 2023-11-27 DIAGNOSIS — I10 ESSENTIAL HYPERTENSION: ICD-10-CM

## 2023-11-27 PROCEDURE — 1125F PR PAIN SEVERITY QUANTIFIED, PAIN PRESENT: ICD-10-PCS | Mod: CPTII,S$GLB,, | Performed by: NURSE PRACTITIONER

## 2023-11-27 PROCEDURE — 1159F PR MEDICATION LIST DOCUMENTED IN MEDICAL RECORD: ICD-10-PCS | Mod: CPTII,S$GLB,, | Performed by: NURSE PRACTITIONER

## 2023-11-27 PROCEDURE — 3044F PR MOST RECENT HEMOGLOBIN A1C LEVEL <7.0%: ICD-10-PCS | Mod: CPTII,S$GLB,, | Performed by: NURSE PRACTITIONER

## 2023-11-27 PROCEDURE — 1160F RVW MEDS BY RX/DR IN RCRD: CPT | Mod: CPTII,S$GLB,, | Performed by: NURSE PRACTITIONER

## 2023-11-27 PROCEDURE — 3078F PR MOST RECENT DIASTOLIC BLOOD PRESSURE < 80 MM HG: ICD-10-PCS | Mod: CPTII,S$GLB,, | Performed by: NURSE PRACTITIONER

## 2023-11-27 PROCEDURE — 3074F PR MOST RECENT SYSTOLIC BLOOD PRESSURE < 130 MM HG: ICD-10-PCS | Mod: CPTII,S$GLB,, | Performed by: NURSE PRACTITIONER

## 2023-11-27 PROCEDURE — 99999 PR PBB SHADOW E&M-EST. PATIENT-LVL IV: CPT | Mod: PBBFAC,,, | Performed by: NURSE PRACTITIONER

## 2023-11-27 PROCEDURE — 80355 GABAPENTIN NON-BLOOD: CPT | Performed by: NURSE PRACTITIONER

## 2023-11-27 PROCEDURE — 3074F SYST BP LT 130 MM HG: CPT | Mod: CPTII,S$GLB,, | Performed by: NURSE PRACTITIONER

## 2023-11-27 PROCEDURE — 99999 PR PBB SHADOW E&M-EST. PATIENT-LVL IV: ICD-10-PCS | Mod: PBBFAC,,, | Performed by: NURSE PRACTITIONER

## 2023-11-27 PROCEDURE — 99214 PR OFFICE/OUTPT VISIT, EST, LEVL IV, 30-39 MIN: ICD-10-PCS | Mod: S$GLB,,, | Performed by: NURSE PRACTITIONER

## 2023-11-27 PROCEDURE — 1160F PR REVIEW ALL MEDS BY PRESCRIBER/CLIN PHARMACIST DOCUMENTED: ICD-10-PCS | Mod: CPTII,S$GLB,, | Performed by: NURSE PRACTITIONER

## 2023-11-27 PROCEDURE — 3288F FALL RISK ASSESSMENT DOCD: CPT | Mod: CPTII,S$GLB,, | Performed by: NURSE PRACTITIONER

## 2023-11-27 PROCEDURE — 1159F MED LIST DOCD IN RCRD: CPT | Mod: CPTII,S$GLB,, | Performed by: NURSE PRACTITIONER

## 2023-11-27 PROCEDURE — 1101F PR PT FALLS ASSESS DOC 0-1 FALLS W/OUT INJ PAST YR: ICD-10-PCS | Mod: CPTII,S$GLB,, | Performed by: NURSE PRACTITIONER

## 2023-11-27 PROCEDURE — 99214 OFFICE O/P EST MOD 30 MIN: CPT | Mod: S$GLB,,, | Performed by: NURSE PRACTITIONER

## 2023-11-27 PROCEDURE — 1101F PT FALLS ASSESS-DOCD LE1/YR: CPT | Mod: CPTII,S$GLB,, | Performed by: NURSE PRACTITIONER

## 2023-11-27 PROCEDURE — 3008F BODY MASS INDEX DOCD: CPT | Mod: CPTII,S$GLB,, | Performed by: NURSE PRACTITIONER

## 2023-11-27 PROCEDURE — 3288F PR FALLS RISK ASSESSMENT DOCUMENTED: ICD-10-PCS | Mod: CPTII,S$GLB,, | Performed by: NURSE PRACTITIONER

## 2023-11-27 PROCEDURE — 3044F HG A1C LEVEL LT 7.0%: CPT | Mod: CPTII,S$GLB,, | Performed by: NURSE PRACTITIONER

## 2023-11-27 PROCEDURE — 3078F DIAST BP <80 MM HG: CPT | Mod: CPTII,S$GLB,, | Performed by: NURSE PRACTITIONER

## 2023-11-27 PROCEDURE — 1125F AMNT PAIN NOTED PAIN PRSNT: CPT | Mod: CPTII,S$GLB,, | Performed by: NURSE PRACTITIONER

## 2023-11-27 PROCEDURE — 3008F PR BODY MASS INDEX (BMI) DOCUMENTED: ICD-10-PCS | Mod: CPTII,S$GLB,, | Performed by: NURSE PRACTITIONER

## 2023-11-27 RX ORDER — OXYCODONE AND ACETAMINOPHEN 10; 325 MG/1; MG/1
1 TABLET ORAL EVERY 6 HOURS PRN
Qty: 120 TABLET | Refills: 0 | Status: SHIPPED | OUTPATIENT
Start: 2023-11-27 | End: 2024-01-10 | Stop reason: SDUPTHER

## 2023-11-27 RX ORDER — SODIUM FLUORIDE 6 MG/ML
PASTE, DENTIFRICE DENTAL
COMMUNITY
Start: 2023-08-19

## 2023-11-27 RX ORDER — ISOPROPYL ALCOHOL 70 ML/100ML
SWAB TOPICAL
COMMUNITY
Start: 2023-11-07

## 2023-11-27 RX ORDER — VALACYCLOVIR HYDROCHLORIDE 500 MG/1
TABLET, FILM COATED ORAL
COMMUNITY
Start: 2023-11-03 | End: 2024-11-03

## 2023-11-27 RX ORDER — TROSPIUM CHLORIDE 20 MG/1
TABLET, FILM COATED ORAL
COMMUNITY
Start: 2023-11-03 | End: 2024-07-31

## 2023-11-27 NOTE — PROGRESS NOTES
"Subjective:       Patient ID: Garcia Renteria is a 73 y.o. male.    Chief Complaint: Follow-up (Med refills )  -  The patient is a 72 y/o male presents for pain mgt follow up.  w/ hx of T10-Pelvis fusion (2020), C3-5 ACDF (2019). Current biggest c/o is left sciatica which was presents prior to his last lumbar surgery lumbar lami with fusion 2020. We have previously discussed second opinion with Dr. Yang Vaughn but spouse is concerned d/t last surgery took over 1 mo to recover.     Reports significantly worsened bladder incontinence after his 2020 lami with no control at all now. [facility] in the process of referring him to urology d/t enlarged prostate too. Also reports inability to have BM without enema ever, if prob hits a certain spot while in his rectum pain is 10/10 shoots to left foot.    Last L-MRI 2019 prior to sx. Reports medication preserving his quality of life. Does not take qid everyday, does not want to build tolerance.    Reports taking his gabapentin 300 mg tid all in the am thus edu to take TID and even if dose needs inc to QID thus can. When seen by pain mgt 6/9/23 "Titrate Gabapentin to 600 mg TID" for his neuropathy too.     DM and HTN well controlled with current regimen. Aug labs discuss as normal with (+) Hep C yet completed treatment with [facility]  -     Past Medical History:   Diagnosis Date    Colitis     Diabetes mellitus, type 2     Diverticulosis     GERD (gastroesophageal reflux disease)     Hypertension        Past Surgical History:   Procedure Laterality Date    ANKLE SURGERY Bilateral     ANTERIOR CERVICAL DISCECTOMY W/ FUSION N/A 10/30/2019    Procedure: C3-4, C4-5 ACDF;  Surgeon: Francis Alan MD;  Location: [facility] OR [address] [place] [address];  Service: Neurosurgery;  Laterality: N/A;  C3-4, C4-5 ACDF  Anesthesia:  General  Rad:  C-Arm  NM:  EMG, SEP, & MEP  Blood:  Type & Screen  Position:  Supine  Bed:  Regular slider bed  Headrest:  [place] & GW tongs/hanging weights  Misc:  Small vivigen  Nerve root " retractor (DePuy)  Disposable #2 [place]  Interbody    BACK SURGERY      CAUDAL EPIDURAL STEROID INJECTION N/A 2023    Procedure: Injection-steroid-epidural-caudal;  Surgeon: Brant Will MD;  Location: [facility];  Service: Pain Management;  Laterality: N/A;  caudal    COLONOSCOPY      COLONOSCOPY N/A 10/7/2020    Procedure: COLONOSCOPY;  Surgeon: Elpidio Lau MD;  Location: [facility] ENDO;  Service: Endoscopy;  Laterality: N/A;    CREATION OF MUSCLE ROTATIONAL FLAP  3/3/2020    Procedure: CREATION, FLAP, MUSCLE ROTATION;  Surgeon: Francis Alan MD;  [address];  Service: Neurosurgery;;    CYSTOSCOPY N/A 2021    Procedure: CYSTOSCOPY;  Surgeon: Colton Kamara Jr., MD;  Location: [facility];  Service: Urology;  Laterality: N/A;    INJECTION OF ANESTHETIC AGENT AROUND NERVE Bilateral 3/10/2020    Procedure: Block, Nerve;  Surgeon: Erinn Surgeon;  Location: [facility];  Service: Anesthesiology;  Laterality: Bilateral;    lower back surgery      LUMBAR LAMINECTOMY WITH FUSION N/A 3/3/2020    Procedure: T10-Pelvis Fusion with L4 PSO **AIRO** Co-Surgeon: Roberto Parkinson;  Surgeon: Francis Alan MD;  Location: [address];  Service: Neurosurgery;  Laterality: N/A;  **AIRO**  **CELL SAVER**  Co-Surgeon: Roberto Parkinson  NM: EMG, SEP, MEP  Position: Prone  Bed: Roe 4 post, prone pillow  Blood: Type & Hold 2 units  Rad: C-Arm, AIRO  Vendor: [employer]  Misc: Vivigen, Infuse, Cement (Depuy)  [place]    NECK SURGERY      SPINE SURGERY      TRANSRECTAL ULTRASOUND EXAMINATION N/A 2021    Procedure: ULTRASOUND, RECTAL APPROACH;  Surgeon: Colton Kamara Jr., MD;  Location: [facility];  Service: Urology;  Laterality: N/A;    UPPER GASTROINTESTINAL ENDOSCOPY          Social History     Socioeconomic History    Marital status:    Tobacco Use    Smoking status: Some Days     Current packs/day: 0.00     Types: Cigars, Cigarettes     Last attempt to quit: 10/2019     Years since quittin.1    Smokeless tobacco: Never     Tobacco comments:     3/27/23 not at this time   Substance and Sexual Activity    Alcohol use: Yes     Alcohol/week: 2.0 standard drinks of alcohol     Types: 1 Glasses of wine, 1 Cans of beer per week     Comment: 1 wine , 2 times a week    Drug use: Not Currently    Sexual activity: Yes     Partners: Female       Family History   Problem Relation Age of Onset    Neurological Disorders Mother        Review of patient's allergies indicates:  No Known Allergies       Current Outpatient Medications:     cholecalciferol, vitamin D3, 1,250 mcg (50,000 unit) capsule, Take by mouth., Disp: , Rfl:     diclofenac sodium (VOLTAREN) 1 % Gel, Apply 2 g topically 2 (two) times daily., Disp: , Rfl:     EASY TOUCH ALCOHOL PREP PADS PadM, Apply topically., Disp: , Rfl:     fluoride, sodium, (PREVIDENT 5000) 1.1 % Pste, , Disp: , Rfl:     gabapentin (NEURONTIN) 300 MG capsule, Take 1 capsule (300 mg total) by mouth 3 (three) times daily., Disp: 90 capsule, Rfl: 11    ibuprofen (ADVIL,MOTRIN) 800 MG tablet, Take 800 mg by mouth every 6 (six) hours as needed., Disp: , Rfl:     lidocaine (LIDODERM) 5 %, Place 1 patch onto the skin once daily. Remove & Discard patch within 12 hours or as directed by MD, Disp: 15 patch, Rfl: 0    lisinopril (PRINIVIL,ZESTRIL) 5 MG tablet, Take 5 mg by mouth once daily. , Disp: , Rfl:     metFORMIN (GLUCOPHAGE) 1000 MG tablet, Take 1,000 mg by mouth daily with breakfast., Disp: , Rfl:     omega 3-dha-epa-fish oil 1,000 mg (120 mg-180 mg) Cap, TAKE ONE CAPSULE BY MOUTH DAILY AS A SUPPLEMENT, Disp: , Rfl:     omeprazole (PRILOSEC) 20 MG capsule, Take 20 mg by mouth., Disp: , Rfl:     tiZANidine (ZANAFLEX) 4 MG tablet, Take 1 tablet by mouth 3 times daily as needed., Disp: , Rfl:     triamterene-hydrochlorothiazide 75-50 mg (MAXZIDE) 75-50 mg per tablet, Take 0.5 tablets by mouth once daily. , Disp: , Rfl:     trospium (SANCTURA) 20 mg Tab tablet, TAKE ONE TABLET BY MOUTH TWICE A DAY FOR BLADDER, Disp: ,  Rfl:     valACYclovir (VALTREX) 500 MG tablet, TAKE ONE TABLET BY MOUTH TWICE A DAY FOR GENITAL HERPES TAKE ONE PILL TWICE A DAY X 3 DAYS, START WITHIN 24 HOURS OF SX ONSET. ONLY  TAKE FOR 3 DAYS, GIVING EXTRA PILLS FOR REOCCURRENCE., Disp: , Rfl:     famotidine (PEPCID) 40 MG tablet, Take 1 tablet (40 mg total) by mouth every evening., Disp: 90 tablet, Rfl: 3    oxyCODONE-acetaminophen (PERCOCET)  mg per tablet, Take 1 tablet by mouth every 6 (six) hours as needed for Pain., Disp: 120 tablet, Rfl: 0    sildenafiL (VIAGRA) 100 MG tablet, TAKE ONE-HALF TABLET BY MOUTH DAILY FOR ERECTILE DYSFUNCTION *TAKE ONE HOUR BEFORE SEXUAL ACTIVITY* NO MORE THAN ONE DOSE IN 24 HOURS ** MUST NOT TAKE NITROGLYCERIN TYPE DRUGS WITH VIAGRA **+++THIS IS, Disp: , Rfl:     Follow-up  Associated symptoms include weakness.     Review of Systems   Constitutional: Negative.    HENT: Negative.     Eyes: Negative.    Respiratory: Negative.     Cardiovascular: Negative.    Gastrointestinal: Negative.    Endocrine: Negative.    Genitourinary: Negative.    Musculoskeletal:         Left sciatica    Skin: Negative.    Allergic/Immunologic: Negative.    Neurological:  Positive for weakness.        Neuropathy   Hematological: Negative.    Psychiatric/Behavioral: Negative.         Objective:      Physical Exam  Vitals reviewed.   Constitutional:       General: He is not in acute distress.     Appearance: Normal appearance. He is well-developed. He is not ill-appearing.   HENT:      Head: Normocephalic.   Eyes:      Conjunctiva/sclera: Conjunctivae normal.   Neck:      Thyroid: No thyromegaly.   Cardiovascular:      Rate and Rhythm: Normal rate and regular rhythm.      Heart sounds: Normal heart sounds. No murmur heard.  Pulmonary:      Effort: Pulmonary effort is normal.      Breath sounds: Normal breath sounds. No wheezing or rales.   Musculoskeletal:         General: Normal range of motion.      Cervical back: Normal range of motion.       Right lower leg: No edema.      Left lower leg: No edema.      Comments: Use of rollator   Skin:     General: Skin is warm and dry.   Neurological:      Mental Status: He is alert and oriented to person, place, and time. Mental status is at baseline.      Gait: Gait abnormal.   Psychiatric:         Mood and Affect: Mood normal.         Behavior: Behavior normal.         Thought Content: Thought content normal.         Judgment: Judgment normal.         Assessment:       1. Chronic pain syndrome    2. H/O lumbosacral spine surgery    3. Lumbar back pain with radiculopathy affecting left lower extremity    4. Essential hypertension    5. Opiate analgesic contract exists    6. Lumbar radiculopathy, chronic    7. Hepatitis C virus infection resolved after antiviral drug therapy    8. Cervical stenosis of spinal canal    9. Chronic, continuous use of opioids    10. Neck pain with history of cervical spinal surgery    11. DDD (degenerative disc disease), lumbar    12. Type 2 diabetes mellitus with diabetic neuropathy, without long-term current use of insulin        Plan:     1- L- MRI needs incontinence ruled out and inability to have BM s/s may be coming from his spine.  2- UDS today if pt can, d/t incontinence  3- pt enc to see joon palmer MD as MD specializes in complex spine d/o.  4- RTC 3 mo for refill  -  Chronic pain syndrome  -     oxyCODONE-acetaminophen (PERCOCET)  mg per tablet; Take 1 tablet by mouth every 6 (six) hours as needed for Pain.  Dispense: 120 tablet; Refill: 0    H/O lumbosacral spine surgery  -     MRI Lumbar Spine Without Contrast; Future; Expected date: 11/27/2023  -     Pain Clinic Drug Screen  -     oxyCODONE-acetaminophen (PERCOCET)  mg per tablet; Take 1 tablet by mouth every 6 (six) hours as needed for Pain.  Dispense: 120 tablet; Refill: 0    Lumbar back pain with radiculopathy affecting left lower extremity  -     MRI Lumbar Spine Without Contrast; Future; Expected date:  11/27/2023  -     Pain Clinic Drug Screen  -     oxyCODONE-acetaminophen (PERCOCET)  mg per tablet; Take 1 tablet by mouth every 6 (six) hours as needed for Pain.  Dispense: 120 tablet; Refill: 0    Essential hypertension    Opiate analgesic contract exists  -     Pain Clinic Drug Screen  -     oxyCODONE-acetaminophen (PERCOCET)  mg per tablet; Take 1 tablet by mouth every 6 (six) hours as needed for Pain.  Dispense: 120 tablet; Refill: 0    Lumbar radiculopathy, chronic  -     MRI Lumbar Spine Without Contrast; Future; Expected date: 11/27/2023  -     Pain Clinic Drug Screen    Hepatitis C virus infection resolved after antiviral drug therapy    Cervical stenosis of spinal canal  -     oxyCODONE-acetaminophen (PERCOCET)  mg per tablet; Take 1 tablet by mouth every 6 (six) hours as needed for Pain.  Dispense: 120 tablet; Refill: 0    Chronic, continuous use of opioids  -     oxyCODONE-acetaminophen (PERCOCET)  mg per tablet; Take 1 tablet by mouth every 6 (six) hours as needed for Pain.  Dispense: 120 tablet; Refill: 0    Neck pain with history of cervical spinal surgery  -     oxyCODONE-acetaminophen (PERCOCET)  mg per tablet; Take 1 tablet by mouth every 6 (six) hours as needed for Pain.  Dispense: 120 tablet; Refill: 0    DDD (degenerative disc disease), lumbar  -     oxyCODONE-acetaminophen (PERCOCET)  mg per tablet; Take 1 tablet by mouth every 6 (six) hours as needed for Pain.  Dispense: 120 tablet; Refill: 0    Type 2 diabetes mellitus with diabetic neuropathy, without long-term current use of insulin        Risks, benefits, and side effects were discussed with the patient. All questions were answered to the fullest satisfaction of the patient, and pt verbalized understanding and agreement to treatment plan. Pt was to call with any new or worsening symptoms, or present to the ER.

## 2023-12-01 LAB
6MAM UR QL: NOT DETECTED
7AMINOCLONAZEPAM UR QL: NOT DETECTED
A-OH ALPRAZ UR QL: NOT DETECTED
ALPHA-OH-MIDAZOLAM: NOT DETECTED
ALPRAZ UR QL: NOT DETECTED
AMPHET UR QL SCN: NOT DETECTED
ANNOTATION COMMENT IMP: NORMAL
BARBITURATES UR QL: NEGATIVE
BUPRENORPHINE UR QL: NOT DETECTED
BZE UR QL: NEGATIVE
CARBOXYTHC UR QL: NORMAL
CARISOPRODOL UR QL: NEGATIVE
CLONAZEPAM UR QL: NOT DETECTED
CODEINE UR QL: NOT DETECTED
CREAT UR-MCNC: 99.8 MG/DL (ref 20–400)
DIAZEPAM UR QL: NOT DETECTED
ETHYL GLUCURONIDE UR QL: NEGATIVE
FENTANYL UR QL: NOT DETECTED
GABAPENTIN: PRESENT
HYDROCODONE UR QL: NOT DETECTED
HYDROMORPHONE UR QL: NOT DETECTED
LORAZEPAM UR QL: NOT DETECTED
MDA UR QL: NOT DETECTED
MDEA UR QL: NOT DETECTED
MDMA UR QL: NOT DETECTED
ME-PHENIDATE UR QL: NOT DETECTED
METHADONE UR QL: NEGATIVE
METHAMPHET UR QL: NOT DETECTED
MIDAZOLAM UR QL SCN: NOT DETECTED
MORPHINE UR QL: NOT DETECTED
NALOXONE: NOT DETECTED
NORBUPRENORPHINE UR QL CFM: NOT DETECTED
NORDIAZEPAM UR QL: NOT DETECTED
NORFENTANYL UR QL: NOT DETECTED
NORHYDROCODONE UR QL CFM: NOT DETECTED
NORMEPERIDINE UR QL CFM: NOT DETECTED
NOROXYCODONE UR QL CFM: PRESENT
NOROXYMORPHONE UR QL SCN: PRESENT
OXAZEPAM UR QL: NOT DETECTED
OXYCODONE UR QL: PRESENT
OXYMORPHONE UR QL: PRESENT
PATHOLOGY STUDY: NORMAL
PCP UR QL: NEGATIVE
PHENTERMINE UR QL: NOT DETECTED
PREGABALIN: NOT DETECTED
SERVICE CMNT-IMP: NORMAL
TAPENTADOL UR QL SCN: NOT DETECTED
TAPENTADOL UR QL SCN: NOT DETECTED
TEMAZEPAM UR QL: NOT DETECTED
TRAMADOL UR QL: NEGATIVE
ZOLPIDEM METABOLITE: NOT DETECTED
ZOLPIDEM UR QL: NOT DETECTED

## 2023-12-02 ENCOUNTER — PATIENT MESSAGE (OUTPATIENT)
Dept: FAMILY MEDICINE | Facility: CLINIC | Age: 73
End: 2023-12-02
Payer: MEDICARE

## 2023-12-03 NOTE — TELEPHONE ENCOUNTER
Please see UDS and notify pt of marijuana in his UDS. Notify of NP's FMLA and I have no control over other MD's decision in my absence.      UDS consistent with medication prescribed and . THC inconsistent.  Msg sent to staff.  Thanks, Laine

## 2023-12-04 ENCOUNTER — TELEPHONE (OUTPATIENT)
Dept: FAMILY MEDICINE | Facility: CLINIC | Age: 73
End: 2023-12-04
Payer: MEDICARE

## 2023-12-13 ENCOUNTER — HOSPITAL ENCOUNTER (OUTPATIENT)
Dept: RADIOLOGY | Facility: HOSPITAL | Age: 73
Discharge: HOME | End: 2023-12-13
Attending: NURSE PRACTITIONER
Payer: MEDICARE

## 2023-12-13 DIAGNOSIS — M54.16 LUMBAR RADICULOPATHY, CHRONIC: ICD-10-CM

## 2023-12-13 DIAGNOSIS — M54.16 LUMBAR BACK PAIN WITH RADICULOPATHY AFFECTING LEFT LOWER EXTREMITY: ICD-10-CM

## 2023-12-13 DIAGNOSIS — Z98.890 H/O LUMBOSACRAL SPINE SURGERY: ICD-10-CM

## 2024-01-09 ENCOUNTER — PATIENT MESSAGE (OUTPATIENT)
Dept: FAMILY MEDICINE | Facility: CLINIC | Age: 74
End: 2024-01-09
Payer: MEDICARE

## 2024-01-10 DIAGNOSIS — G89.4 CHRONIC PAIN SYNDROME: ICD-10-CM

## 2024-01-10 DIAGNOSIS — M48.02 CERVICAL STENOSIS OF SPINAL CANAL: ICD-10-CM

## 2024-01-10 DIAGNOSIS — M54.2 NECK PAIN WITH HISTORY OF CERVICAL SPINAL SURGERY: ICD-10-CM

## 2024-01-10 DIAGNOSIS — F11.90 CHRONIC, CONTINUOUS USE OF OPIOIDS: ICD-10-CM

## 2024-01-10 DIAGNOSIS — Z98.890 H/O LUMBOSACRAL SPINE SURGERY: ICD-10-CM

## 2024-01-10 DIAGNOSIS — M51.36 DDD (DEGENERATIVE DISC DISEASE), LUMBAR: ICD-10-CM

## 2024-01-10 DIAGNOSIS — M54.16 LUMBAR BACK PAIN WITH RADICULOPATHY AFFECTING LEFT LOWER EXTREMITY: ICD-10-CM

## 2024-01-10 DIAGNOSIS — Z79.891 OPIATE ANALGESIC CONTRACT EXISTS: ICD-10-CM

## 2024-01-10 DIAGNOSIS — Z98.890 NECK PAIN WITH HISTORY OF CERVICAL SPINAL SURGERY: ICD-10-CM

## 2024-01-10 RX ORDER — OXYCODONE AND ACETAMINOPHEN 10; 325 MG/1; MG/1
1 TABLET ORAL EVERY 8 HOURS PRN
Qty: 90 TABLET | Refills: 0 | Status: SHIPPED | OUTPATIENT
Start: 2024-01-10 | End: 2024-05-09

## 2024-01-10 NOTE — TELEPHONE ENCOUNTER
Dear Dr. Chapman,     In the absence of Alla Almeida, CARMEN, can you please address this patients concern or request?  LOV 11.27.23  UDS 11.27.23 WNL    Thank you,  Tahsia Mejía LPN

## 2024-01-10 NOTE — TELEPHONE ENCOUNTER
----- Message from Stevan Day sent at 1/10/2024 10:14 AM CST -----  Contact: pt  Type: Needs Medical Advice  Who Called:  pt  Best Call Back Number: [phone]    Additional Information: Pt is calling the office needs a refill on oxyCODONE-acetaminophen (PERCOCET)  mg per tablet to   [id number] - [place], [place] - [address] [place] [address] AT [address] [address] & [address] [address]  Phone: [phone] Fax: [phone]    Please call back and advise.

## 2024-01-10 NOTE — TELEPHONE ENCOUNTER
----- Message from Sana South, Patient Care Assistant sent at 1/10/2024  3:46 PM CST -----  Contact: Pt  Type: Needs Medical Advice    Who Called: Pt  Best Call Back Number: [phone]  Inquiry/Question: Pt is calling to get the status of getting a month supply of his medication. Please advise Thank you~

## 2024-02-27 ENCOUNTER — OFFICE VISIT (OUTPATIENT)
Dept: FAMILY MEDICINE | Facility: CLINIC | Age: 74
End: 2024-02-27
Payer: MEDICARE

## 2024-02-27 VITALS
DIASTOLIC BLOOD PRESSURE: 80 MMHG | HEART RATE: 92 BPM | SYSTOLIC BLOOD PRESSURE: 120 MMHG | OXYGEN SATURATION: 100 % | WEIGHT: 198.63 LBS | HEIGHT: 73 IN | RESPIRATION RATE: 14 BRPM | BODY MASS INDEX: 26.33 KG/M2

## 2024-02-27 DIAGNOSIS — K21.9 GASTROESOPHAGEAL REFLUX DISEASE: Primary | ICD-10-CM

## 2024-02-27 DIAGNOSIS — G89.4 CHRONIC PAIN SYNDROME: ICD-10-CM

## 2024-02-27 DIAGNOSIS — I10 ESSENTIAL HYPERTENSION: ICD-10-CM

## 2024-02-27 PROCEDURE — 80355 GABAPENTIN NON-BLOOD: CPT | Performed by: STUDENT IN AN ORGANIZED HEALTH CARE EDUCATION/TRAINING PROGRAM

## 2024-02-27 PROCEDURE — 1125F AMNT PAIN NOTED PAIN PRSNT: CPT | Mod: CPTII,S$GLB,, | Performed by: STUDENT IN AN ORGANIZED HEALTH CARE EDUCATION/TRAINING PROGRAM

## 2024-02-27 PROCEDURE — 3008F BODY MASS INDEX DOCD: CPT | Mod: CPTII,S$GLB,, | Performed by: STUDENT IN AN ORGANIZED HEALTH CARE EDUCATION/TRAINING PROGRAM

## 2024-02-27 PROCEDURE — 3288F FALL RISK ASSESSMENT DOCD: CPT | Mod: CPTII,S$GLB,, | Performed by: STUDENT IN AN ORGANIZED HEALTH CARE EDUCATION/TRAINING PROGRAM

## 2024-02-27 PROCEDURE — 3079F DIAST BP 80-89 MM HG: CPT | Mod: CPTII,S$GLB,, | Performed by: STUDENT IN AN ORGANIZED HEALTH CARE EDUCATION/TRAINING PROGRAM

## 2024-02-27 PROCEDURE — 1160F RVW MEDS BY RX/DR IN RCRD: CPT | Mod: CPTII,S$GLB,, | Performed by: STUDENT IN AN ORGANIZED HEALTH CARE EDUCATION/TRAINING PROGRAM

## 2024-02-27 PROCEDURE — 1159F MED LIST DOCD IN RCRD: CPT | Mod: CPTII,S$GLB,, | Performed by: STUDENT IN AN ORGANIZED HEALTH CARE EDUCATION/TRAINING PROGRAM

## 2024-02-27 PROCEDURE — 99999 PR PBB SHADOW E&M-EST. PATIENT-LVL IV: CPT | Mod: PBBFAC,,, | Performed by: STUDENT IN AN ORGANIZED HEALTH CARE EDUCATION/TRAINING PROGRAM

## 2024-02-27 PROCEDURE — 80326 AMPHETAMINES 5 OR MORE: CPT | Performed by: STUDENT IN AN ORGANIZED HEALTH CARE EDUCATION/TRAINING PROGRAM

## 2024-02-27 PROCEDURE — 3074F SYST BP LT 130 MM HG: CPT | Mod: CPTII,S$GLB,, | Performed by: STUDENT IN AN ORGANIZED HEALTH CARE EDUCATION/TRAINING PROGRAM

## 2024-02-27 PROCEDURE — 99214 OFFICE O/P EST MOD 30 MIN: CPT | Mod: S$GLB,,, | Performed by: STUDENT IN AN ORGANIZED HEALTH CARE EDUCATION/TRAINING PROGRAM

## 2024-02-27 PROCEDURE — 1101F PT FALLS ASSESS-DOCD LE1/YR: CPT | Mod: CPTII,S$GLB,, | Performed by: STUDENT IN AN ORGANIZED HEALTH CARE EDUCATION/TRAINING PROGRAM

## 2024-02-27 RX ORDER — FAMOTIDINE 40 MG/1
40 TABLET, FILM COATED ORAL NIGHTLY
Qty: 90 TABLET | Refills: 3 | Status: SHIPPED | OUTPATIENT
Start: 2024-02-27 | End: 2025-02-26

## 2024-02-27 RX ORDER — OXYCODONE AND ACETAMINOPHEN 10; 325 MG/1; MG/1
1 TABLET ORAL EVERY 8 HOURS PRN
Qty: 90 TABLET | Refills: 0 | Status: CANCELLED | OUTPATIENT
Start: 2024-02-27 | End: 2024-06-26

## 2024-02-27 NOTE — PROGRESS NOTES
[facility] Note    Subjective:    The HPI and pertinent ROS is included in the Diagnostic Impression Remarks section at the end of the note. Please see below for further details. Chief complaint is at end of note.     Garcia is a pleasant intelligent patient who is here for evaluation.     Modified Medications    Modified Medication Previous Medication    FAMOTIDINE (PEPCID) 40 MG TABLET famotidine (PEPCID) 40 MG tablet       Take 1 tablet (40 mg total) by mouth every evening.    Take 1 tablet (40 mg total) by mouth every evening.       Data reviewed 274}  Previous medical records reviewed and summarized in plan section at end of note.      If you are due for any health screening(s) below please notify me so we can arrange them to be ordered and scheduled. Most healthy patients at your age complete them, but you are free to accept or refuse. If you can't do it, I'll definitely understand. If you can, I'd certainly appreciate it!     Tests to Keep You Healthy    Eye Exam: DUE  Colon Cancer Screening: Met on 10/7/2020  Last Blood Pressure <= 139/89 (2/27/2024): Yes  Last HbA1c < 8 (11/03/2023): Yes      The following portions of the patient's history were reviewed and updated as appropriate: allergies, current medications, past family history, past medical history, past social history, past surgical history and problem list.    He  has a past medical history of Colitis, Diabetes mellitus, type 2, Diverticulosis, GERD (gastroesophageal reflux disease), and Hypertension.  He  has a past surgical history that includes Colonoscopy; Upper gastrointestinal endoscopy; Back surgery; Ankle surgery (Bilateral); lower back surgery; Neck surgery; Spine surgery; Anterior cervical discectomy w/ fusion (N/A, 10/30/2019); Lumbar laminectomy with fusion (N/A, 3/3/2020); Creation of muscle rotational flap (3/3/2020); Injection of anesthetic agent around nerve (Bilateral, 3/10/2020); Colonoscopy (N/A, 10/7/2020);  "Transrectal ultrasound examination (N/A, 7/21/2021); Cystoscopy (N/A, 7/21/2021); and Caudal epidural steroid injection (N/A, 5/9/2023).    He  reports that he has been smoking cigars and cigarettes. He has never used smokeless tobacco. He reports current alcohol use of about 2.0 standard drinks of alcohol per week. He reports that he does not currently use drugs.  He family history includes Neurological Disorders in his mother.    Review of patient's allergies indicates:  No Known Allergies    Tobacco Use: High Risk (2/27/2024)    Patient History     Smoking Tobacco Use: Some Days     Smokeless Tobacco Use: Never     Passive Exposure: Not on file     Physical Examination  General appearance: alert, cooperative, no distress  Neck: no thyromegaly, no neck stiffness  Lungs: clear to auscultation, no wheezes, rales or rhonchi, symmetric air entry  Heart: normal rate, regular rhythm, normal S1, S2, no murmurs, rubs, clicks or gallops  Abdomen: soft, nontender, nondistended, no rigidity, rebound, or guarding.   Back: no point tenderness over spine  Extremities: Rolator dependent  Neurological:alert, oriented, normal speech, no new focal findings or movement disorder noted from baseline    BP Readings from Last 3 Encounters:   02/27/24 120/80   11/27/23 116/78   08/14/23 118/70     Wt Readings from Last 3 Encounters:   02/27/24 90.1 kg (198 lb 9.6 oz)   11/27/23 93.1 kg (205 lb 3.2 oz)   08/14/23 94.8 kg (209 lb)     /80 (BP Location: Left arm, Patient Position: Sitting, BP Method: Medium (Manual))   Pulse 92   Resp 14   Ht 6' 1" (1.854 m)   Wt 90.1 kg (198 lb 9.6 oz)   SpO2 100%   BMI 26.20 kg/m²    274}  Laboratory: I have reviewed old labs below:    274}    Lab Results   Component Value Date    WBC 5.26 08/28/2023    HGB 13.9 (L) 08/28/2023    HCT 44.2 08/28/2023    MCV 87 08/28/2023     08/28/2023     08/28/2023     08/28/2023    K 3.8 08/28/2023    K 3.8 08/28/2023     08/28/2023 " "    08/28/2023    CALCIUM 10.2 08/28/2023    CALCIUM 10.2 08/28/2023    PHOS 2.8 03/09/2020    CO2 28 08/28/2023    CO2 28 08/28/2023     08/28/2023     08/28/2023    BUN 16 08/28/2023    BUN 16 08/28/2023    CREATININE 1.1 08/28/2023    CREATININE 1.1 08/28/2023    EGFRNORACEVR >60.0 08/28/2023    EGFRNORACEVR >60.0 08/28/2023    ANIONGAP 11 08/28/2023    ANIONGAP 11 08/28/2023    PROT 7.5 08/28/2023    PROT 7.5 08/28/2023    ALBUMIN 3.9 08/28/2023    ALBUMIN 3.9 08/28/2023    BILITOT 0.5 08/28/2023    BILITOT 0.5 08/28/2023    ALKPHOS 70 08/28/2023    ALKPHOS 70 08/28/2023    ALT 16 08/28/2023    ALT 16 08/28/2023    AST 17 08/28/2023    AST 17 08/28/2023    INR 1.2 03/04/2020    CHOL 125 11/03/2022    TRIG 89 11/03/2022    HDL 36 11/03/2022    LDLCALC 74 11/03/2022    TSH 0.998 08/28/2023    PSA 3.1 09/22/2022    HGBA1C 5.6 08/28/2023    HGBA1C 5.6 08/28/2023    MICROALBUR 4.89 11/03/2022      Lab reviewed by me: Particular labs of significance that I will monitor, workup, or treat to improve are mentioned below in diagnostic impression remarks.    Imaging/EKG: I have reviewed the pertinent results and my findings are noted in remarks.  274}    CC:   Chief Complaint   Patient presents with    Medication Refill     3 month        274}    Assessment/Plan  Garcia Renteria is a 74 y.o. male who presents to clinic with:  1. Gastroesophageal reflux disease    2. Chronic pain syndrome    3. Essential hypertension       274}  Diagnostic Impression Remarks + HPI     Documentation entered by me for this encounter may have been done in part using speech-recognition technology. Although I have made an effort to ensure accuracy, "sound like" errors may exist and should be interpreted in context.      GERD: controlled with pepcid  Chronic pain: had multiple surgeries in the past and requiring pain medications. Patient endorses worsening of pain. Patient advised follow back up with back specialist who " performed the procedures and surgeries to see what they recommend. Patient also evaluated by pain management last year. Patient advised to return to pain management for further refills.   HTN: controlled. Continue current regimen    Additional workup planned: see labs ordered below.    See below for labs and meds ordered with associated diagnosis      1. Gastroesophageal reflux disease  - famotidine (PEPCID) 40 MG tablet; Take 1 tablet (40 mg total) by mouth every evening.  Dispense: 90 tablet; Refill: 3    2. Chronic pain syndrome  - POCT Urine Drug Screen Pain Management  - Pain Clinic Drug Screen    3. Essential hypertension      Daria Michel MD   274}    If you are due for any health screening(s) below please notify me so we can arrange them to be ordered and scheduled. Most healthy patients at your age complete them, but you are free to accept or refuse.     If you can't do it, I'll definitely understand. If you can, I'd certainly appreciate it!   Tests to Keep You Healthy    Eye Exam: DUE  Colon Cancer Screening: Met on 10/7/2020  Last Blood Pressure <= 139/89 (2/27/2024): Yes  Last HbA1c < 8 (11/03/2023): Yes

## 2024-02-28 DIAGNOSIS — E11.9 TYPE 2 DIABETES MELLITUS WITHOUT COMPLICATION: ICD-10-CM

## 2024-03-02 LAB
6MAM UR QL: NOT DETECTED
7AMINOCLONAZEPAM UR QL: NOT DETECTED
A-OH ALPRAZ UR QL: NOT DETECTED
ALPHA-OH-MIDAZOLAM: NOT DETECTED
ALPRAZ UR QL: NOT DETECTED
AMPHET UR QL SCN: NOT DETECTED
ANNOTATION COMMENT IMP: NORMAL
BARBITURATES UR QL: NEGATIVE
BUPRENORPHINE UR QL: NOT DETECTED
BZE UR QL: NEGATIVE
CARBOXYTHC UR QL: NORMAL
CARISOPRODOL UR QL: NEGATIVE
CLONAZEPAM UR QL: NOT DETECTED
CODEINE UR QL: NOT DETECTED
CREAT UR-MCNC: 177.6 MG/DL (ref 20–400)
DIAZEPAM UR QL: NOT DETECTED
ETHYL GLUCURONIDE UR QL: NORMAL
FENTANYL UR QL: NOT DETECTED
GABAPENTIN: PRESENT
HYDROCODONE UR QL: NOT DETECTED
HYDROMORPHONE UR QL: NOT DETECTED
LORAZEPAM UR QL: NOT DETECTED
MDA UR QL: NOT DETECTED
MDEA UR QL: NOT DETECTED
MDMA UR QL: NOT DETECTED
ME-PHENIDATE UR QL: NOT DETECTED
METHADONE UR QL: NEGATIVE
METHAMPHET UR QL: NOT DETECTED
MIDAZOLAM UR QL SCN: NOT DETECTED
MORPHINE UR QL: NOT DETECTED
NALOXONE: NOT DETECTED
NORBUPRENORPHINE UR QL CFM: NOT DETECTED
NORDIAZEPAM UR QL: NOT DETECTED
NORFENTANYL UR QL: NOT DETECTED
NORHYDROCODONE UR QL CFM: NOT DETECTED
NORMEPERIDINE UR QL CFM: NOT DETECTED
NOROXYCODONE UR QL CFM: NOT DETECTED
NOROXYMORPHONE UR QL SCN: NOT DETECTED
OXAZEPAM UR QL: NOT DETECTED
OXYCODONE UR QL: NOT DETECTED
OXYMORPHONE UR QL: NOT DETECTED
PATHOLOGY STUDY: NORMAL
PCP UR QL: NEGATIVE
PHENTERMINE UR QL: NOT DETECTED
PREGABALIN: NOT DETECTED
SERVICE CMNT-IMP: NORMAL
TAPENTADOL UR QL SCN: NOT DETECTED
TAPENTADOL UR QL SCN: NOT DETECTED
TEMAZEPAM UR QL: NOT DETECTED
TRAMADOL UR QL: NEGATIVE
ZOLPIDEM METABOLITE: NOT DETECTED
ZOLPIDEM UR QL: NOT DETECTED

## 2024-03-27 DIAGNOSIS — E11.9 TYPE 2 DIABETES MELLITUS WITHOUT COMPLICATION, UNSPECIFIED WHETHER LONG TERM INSULIN USE: ICD-10-CM

## 2024-03-27 DIAGNOSIS — E11.9 TYPE 2 DIABETES MELLITUS WITHOUT COMPLICATION: ICD-10-CM

## 2024-04-01 DIAGNOSIS — M51.36 DDD (DEGENERATIVE DISC DISEASE), LUMBAR: ICD-10-CM

## 2024-04-01 DIAGNOSIS — M54.16 LUMBAR BACK PAIN WITH RADICULOPATHY AFFECTING LEFT LOWER EXTREMITY: ICD-10-CM

## 2024-04-01 DIAGNOSIS — Z98.890 NECK PAIN WITH HISTORY OF CERVICAL SPINAL SURGERY: ICD-10-CM

## 2024-04-01 DIAGNOSIS — Z98.890 H/O LUMBOSACRAL SPINE SURGERY: ICD-10-CM

## 2024-04-01 DIAGNOSIS — F11.90 CHRONIC, CONTINUOUS USE OF OPIOIDS: ICD-10-CM

## 2024-04-01 DIAGNOSIS — M54.2 NECK PAIN WITH HISTORY OF CERVICAL SPINAL SURGERY: ICD-10-CM

## 2024-04-01 DIAGNOSIS — G89.4 CHRONIC PAIN SYNDROME: ICD-10-CM

## 2024-04-01 DIAGNOSIS — Z79.891 OPIATE ANALGESIC CONTRACT EXISTS: ICD-10-CM

## 2024-04-01 DIAGNOSIS — M48.02 CERVICAL STENOSIS OF SPINAL CANAL: ICD-10-CM

## 2024-04-01 RX ORDER — OXYCODONE AND ACETAMINOPHEN 10; 325 MG/1; MG/1
1 TABLET ORAL EVERY 8 HOURS PRN
Qty: 90 TABLET | Refills: 0 | Status: CANCELLED | OUTPATIENT
Start: 2024-04-01 | End: 2024-07-30

## 2024-04-01 NOTE — TELEPHONE ENCOUNTER
----- Message from Radha Lopez sent at 4/1/2024  9:31 AM CDT -----  Contact: self  Type:  RX Refill Request    Who Called:  pt  Refill or New Rx:  refill  RX Name and Strength:  oxyCODONE-acetaminophen (PERCOCET)  mg per tablet  Medication  Date: 1/10/2024 Department: [facility] Medicine Ordering/Authorizing: Allyson Chapman MD    Order Providers      How is the patient currently taking it? (ex. 1XDay):  as directed  Is this a 30 day or 90 day RX:  90  Preferred Pharmacy with phone number:    [id number] - [place], [place] - [address] [address] AT [address] [address] & [facility]  [address] [address] [address]  Phone: [phone] Fax: [phone]     Local or Mail Order:  local  Ordering Provider:  jake De La Torre Call Back Number:  [phone]   Additional Information:  please advise

## 2024-04-02 ENCOUNTER — TELEPHONE (OUTPATIENT)
Dept: FAMILY MEDICINE | Facility: CLINIC | Age: 74
End: 2024-04-02
Payer: MEDICARE

## 2024-04-02 NOTE — TELEPHONE ENCOUNTER
----- Message from Mayte Renteria sent at 4/2/2024 12:16 PM CDT -----  Contact: PT  Type:  RX Refill Request    Who Called: PT  Refill or New Rx: refill  RX Name and Strength:  oxyCODONE-acetaminophen (PERCOCET)  mg per tablet  How is the patient currently taking it?  Take 1 tablet by mouth every 8 (eight) hours as needed for Pain.  Is this a 30 day or 90 day RX: Directed   Preferred Pharmacy with phone number:    [id number] - [place], [place] - [address] [address] AT [address] [address] & [address]  [address] [address] [address]  Phone: [phone] Fax: [phone]    Best Call Back Number:  [phone]  Additional Information: LOV 2/27 seen by Dr. Michel, pt states  he is out of medication

## 2024-04-02 NOTE — TELEPHONE ENCOUNTER
Spoke with pt. Pt informed narcotics can no longer be written for patient's under the Medical Cannabis Program but It is recommend following up with Pain mgt or his surgeon     Pt voiced understanding

## 2024-04-29 ENCOUNTER — TELEPHONE (OUTPATIENT)
Dept: NEUROSURGERY | Facility: CLINIC | Age: 74
End: 2024-04-29
Payer: MEDICARE

## 2024-04-29 NOTE — TELEPHONE ENCOUNTER
----- Message from Josephine Meléndez NP sent at 4/29/2024  1:16 PM CDT -----  We don't provide those letters that I am aware of. Disability usually gets the medical records and makes their own opinion.  ----- Message -----  From: Ana Lepe RN  Sent: 4/29/2024  12:34 PM CDT  To: Josephine Meléndez NP    Pt last seen July of 2022.  How do we resond?  Seems like his PCP could better answer this.  ----- Message -----  From: Jonelle Echavarria  Sent: 4/29/2024  12:20 PM CDT  To: Waqas Blanco Staff    Pt calling to see if a letter can sent explaining how his back surgeries has contributed to his condition now , needs the letter as soon as possible to get his benefits     Confirmed patient's contact info below:  Contact Name: Garcia Renteria  Phone Number: [phone]

## 2024-04-29 NOTE — TELEPHONE ENCOUNTER
P/c to pt. Explained that we dont do letters of disability. Explained that their PCP might be able to assist. And that the disability office can contact medical records to obtain his records and then make that determination regarding his disability status.  Pt.verbalized understanding and thanked me for returning his call

## 2024-05-14 ENCOUNTER — OFFICE VISIT (OUTPATIENT)
Dept: PODIATRY | Facility: CLINIC | Age: 74
End: 2024-05-14
Payer: MEDICARE

## 2024-05-14 VITALS — HEIGHT: 73 IN | WEIGHT: 197.81 LBS | BODY MASS INDEX: 26.22 KG/M2

## 2024-05-14 DIAGNOSIS — M25.572 SINUS TARSITIS OF LEFT FOOT: Primary | ICD-10-CM

## 2024-05-14 PROCEDURE — 20605 DRAIN/INJ JOINT/BURSA W/O US: CPT | Mod: LT,S$GLB,, | Performed by: PODIATRIST

## 2024-05-14 PROCEDURE — 1125F AMNT PAIN NOTED PAIN PRSNT: CPT | Mod: CPTII,S$GLB,, | Performed by: PODIATRIST

## 2024-05-14 PROCEDURE — 3008F BODY MASS INDEX DOCD: CPT | Mod: CPTII,S$GLB,, | Performed by: PODIATRIST

## 2024-05-14 PROCEDURE — 1159F MED LIST DOCD IN RCRD: CPT | Mod: CPTII,S$GLB,, | Performed by: PODIATRIST

## 2024-05-14 PROCEDURE — 99213 OFFICE O/P EST LOW 20 MIN: CPT | Mod: 25,S$GLB,, | Performed by: PODIATRIST

## 2024-05-14 RX ORDER — MIRABEGRON 50 MG/1
1 TABLET, FILM COATED, EXTENDED RELEASE ORAL
COMMUNITY
Start: 2024-03-12

## 2024-05-14 RX ADMIN — LIDOCAINE HYDROCHLORIDE 1 ML: 10 INJECTION INFILTRATION; PERINEURAL at 10:05

## 2024-05-14 RX ADMIN — DEXAMETHASONE SODIUM PHOSPHATE 4 MG: 4 INJECTION, SOLUTION INTRA-ARTICULAR; INTRALESIONAL; INTRAMUSCULAR; INTRAVENOUS; SOFT TISSUE at 10:05

## 2024-06-02 RX ORDER — LIDOCAINE HYDROCHLORIDE 10 MG/ML
1 INJECTION INFILTRATION; PERINEURAL
Status: DISCONTINUED | OUTPATIENT
Start: 2024-05-14 | End: 2024-06-02 | Stop reason: HOSPADM

## 2024-06-02 RX ORDER — DEXAMETHASONE SODIUM PHOSPHATE 4 MG/ML
4 INJECTION, SOLUTION INTRA-ARTICULAR; INTRALESIONAL; INTRAMUSCULAR; INTRAVENOUS; SOFT TISSUE
Status: DISCONTINUED | OUTPATIENT
Start: 2024-05-14 | End: 2024-06-02 | Stop reason: HOSPADM

## 2024-06-03 NOTE — PROGRESS NOTES
Subjective:     Patient ID: Garcia Renteria is a 74 y.o. male.    Chief Complaint: Foot Pain    Garcia is a 74 y.o. male who presents to the podiatry clinic  with complaint of  left foot pain. Onset of the symptoms was several months ago. Precipitating event:  previous surgical correction of left foot tendons . Current symptoms include: ability to bear weight, but with some pain, stiffness, and worsening symptoms after a period of activity. Aggravating factors: walking. Symptoms have gradually improved. Patient has had prior foot problems. Treatment to date: corticosteroid injection which was effective, ice, and rest. Patients rates pain 7/10 on pain scale after exercise or prolonged use.     Review of Systems   Constitutional: Negative for chills and fever.   Cardiovascular:  Negative for chest pain and leg swelling.   Respiratory:  Negative for cough and shortness of breath.    Gastrointestinal:  Negative for diarrhea, nausea and vomiting.        Objective:     Physical Exam  Vitals reviewed.   Constitutional:       General: He is not in acute distress.     Appearance: Normal appearance. He is not ill-appearing.   HENT:      Head: Normocephalic.      Nose: Nose normal.   Cardiovascular:      Rate and Rhythm: Normal rate.   Pulmonary:      Effort: Pulmonary effort is normal. No respiratory distress.   Skin:     Capillary Refill: Capillary refill takes 2 to 3 seconds.   Neurological:      Mental Status: He is alert and oriented to person, place, and time.   Psychiatric:         Mood and Affect: Mood normal.         Behavior: Behavior normal.         Thought Content: Thought content normal.       Neurologic:  Protective sensation mildly diminished to the tip of the digits, light touch sensation intact bilateral lower extremity, positive paresthesias reported bilateral distal foot   Vascular:  DP and PT pulses palpable 2/4 bilateral foot, capillary fill time less 3 seconds to digits, pedal hair growth is  diminished to digits aspect of the digits, mild edema noted to the left foot and ankle, skin temperature gradient warm to cool from proximal to distal to the left foot   Musculoskeletal:  5/5 muscle strength noted bilateral foot, pain and tenderness with inversion of the left foot, pain and tenderness with palpation of the left sinus tarsi region, no masses noted bilateral foot, mild discomfort with palpation and ROM of left achilles tendon at insertion  Dermatologic:  Nails 1 through 5 bilateral are thickened, elongated, and discolored with presence of subungual debris, no rashes noted bilateral foot, hyperpigmentation noted to the left rearfoot, multiple well-healed surgical scars noted     Assessment:      Encounter Diagnosis   Name Primary?    Sinus tarsitis of left foot Yes     Plan:     Garcia was seen today for foot pain.    Diagnoses and all orders for this visit:    Sinus tarsitis of left foot  -     Intermediate Joint Aspiration/Injection: L ankle      I counseled the patient on his conditions, their implications and medical management.        1. Patient was examined and evaluated.    2. Discussed with patient etiology of sinus tarsi syndrome to the left foot.  Patient made aware that prior surgical corrections and previously treated conditions may have led to increase in sinus tarsi symptoms.  Patient was advised to ice and elevate the left lower extremity end of days.  Patient was advised to consider elastic future a brace for better support of left ankle.  Patient was advised to continue to adjunct with OTC analgesics for pain relief.   Intermediate Joint Aspiration/Injection: L ankle    Date/Time: 5/14/2024 10:00 AM    Performed by: Ludy Minor DPM  Authorized by: Ludy Minor DPM    Consent Done?:  Yes (Verbal)  Indications:  Pain, arthritis and diagnostic evaluation    Location:  Ankle  Site:  L ankle  Ultrasonic Guidance for needle placement: No  Needle size:  25 G  Approach:   Anterolateral  Medications:  1 mL LIDOcaine HCL 10 mg/ml (1%) 10 mg/mL (1 %); 4 mg dexAMETHasone 4 mg/mL  Patient tolerance:  Patient tolerated the procedure well with no immediate complications        3. Patient was advised to continue with active passive range of motion of the left foot for improvement of mild Achilles tendon tightness.    4. Patient will follow-up in 1 month or p.r.n. for complaints

## 2024-06-12 NOTE — PLAN OF CARE
Problem: Oral Intake Inadequate  Goal: Improved Oral Intake  Outcome: Ongoing, Progressing  Intervention: Promote and Optimize Oral Intake  Flowsheets (Taken 3/4/2020 1414)  Oral Nutrition Promotion: calorie dense foods provided; calorie dense liquids provided; other (see comments)     Recommendations     1. Recommend Diabetic diet as tolerated, with texture per SLP recommendations. 2. Recommend Boost Glucose Control TID to optimize nutritional intake.   Goals: Pt to consume/tolerate % of EEN/EPn by RD follow-up.  Nutrition Goal Status: new   no

## 2024-06-14 ENCOUNTER — OFFICE VISIT (OUTPATIENT)
Dept: PODIATRY | Facility: CLINIC | Age: 74
End: 2024-06-14
Payer: MEDICARE

## 2024-06-14 VITALS — WEIGHT: 197 LBS | BODY MASS INDEX: 26.11 KG/M2 | HEIGHT: 73 IN

## 2024-06-14 DIAGNOSIS — M79.672 LEFT FOOT PAIN: ICD-10-CM

## 2024-06-14 DIAGNOSIS — M76.72 TENDINITIS OF LEFT PERONEUS LONGUS TENDON: Primary | ICD-10-CM

## 2024-06-14 PROCEDURE — 1159F MED LIST DOCD IN RCRD: CPT | Mod: CPTII,S$GLB,, | Performed by: PODIATRIST

## 2024-06-14 PROCEDURE — 99213 OFFICE O/P EST LOW 20 MIN: CPT | Mod: S$GLB,,, | Performed by: PODIATRIST

## 2024-06-14 PROCEDURE — 3008F BODY MASS INDEX DOCD: CPT | Mod: CPTII,S$GLB,, | Performed by: PODIATRIST

## 2024-06-14 PROCEDURE — 1125F AMNT PAIN NOTED PAIN PRSNT: CPT | Mod: CPTII,S$GLB,, | Performed by: PODIATRIST

## 2024-07-01 NOTE — PROGRESS NOTES
Subjective:     Patient ID: Garcia Renteria is a 74 y.o. male.    Chief Complaint: Foot Pain    Garcia is a 74 y.o. male who presents to the podiatry clinic  with complaint of  left foot pain. Onset of the symptoms was several months ago. Precipitating event:  previous surgical correction of left foot tendons . Current symptoms include: ability to bear weight, but with some pain, stiffness, and worsening symptoms after a period of activity. Aggravating factors: walking. Symptoms have gradually improved. Patient has had prior foot problems. Treatment to date: corticosteroid injection which was effective, ice, and rest. Patients rates pain 7/10 on pain scale after exercise or prolonged use.     Review of Systems   Constitutional: Negative for chills and fever.   Cardiovascular:  Negative for chest pain and leg swelling.   Respiratory:  Negative for cough and shortness of breath.    Gastrointestinal:  Negative for diarrhea, nausea and vomiting.        Objective:     Physical Exam  Vitals reviewed.   Constitutional:       General: He is not in acute distress.     Appearance: Normal appearance. He is not ill-appearing.   HENT:      Head: Normocephalic.      Nose: Nose normal.   Pulmonary:      Effort: Pulmonary effort is normal. No respiratory distress.   Skin:     Capillary Refill: Capillary refill takes 2 to 3 seconds.   Neurological:      Mental Status: He is alert and oriented to person, place, and time.   Psychiatric:         Mood and Affect: Mood normal.         Behavior: Behavior normal.         Thought Content: Thought content normal.       Neurologic:  Protective sensation mildly diminished to the tip of the digits, light touch sensation intact bilateral lower extremity, positive paresthesias reported bilateral distal foot   Vascular:  DP and PT pulses palpable 2/4 bilateral foot, capillary fill time less 3 seconds to digits, pedal hair growth is diminished to digits aspect of the digits, mild edema noted  to the left foot and ankle, skin temperature gradient warm to cool from proximal to distal to the left foot   Musculoskeletal:  5/5 muscle strength noted bilateral foot, pain and tenderness with inversion of the left foot and palpation of the peroneal brevis tendon from lateral malleolus to the base of the left 5th metatarsal, pain and tenderness with palpation of the left sinus tarsi region, no masses noted bilateral foot, mild discomfort with palpation and ROM of left achilles tendon at insertion  Dermatologic:  Nails 1 through 5 bilateral are thickened, elongated, and discolored with presence of subungual debris, no rashes noted bilateral foot, hyperpigmentation noted to the left rearfoot, multiple well-healed surgical scars noted     Assessment:      Encounter Diagnoses   Name Primary?    Tendinitis of left peroneus longus tendon Yes    Left foot pain      Plan:     Garcia was seen today for foot pain.    Diagnoses and all orders for this visit:    Tendinitis of left peroneus longus tendon  -     ORTHOPEDIC BRACING FOR HOME USE - LOWER EXTREMITY    Left foot pain      I counseled the patient on his conditions, their implications and medical management.        1. Patient was examined and evaluated  2. Discussed with patient etiology of tendinitis of left peroneal brevis tendon.  Discussed with patient possible adjunct with oral oral OTC analgesics for pain relief.  Patient will continue with current exercise regimen.  Patient will continue with decrease use of the treadmill in lieu of less impactful exercises such as cycling.  Patient was advised to consider heel raises without weights at the gym for improvement of dorsiflexion of his left lower extremity.  Discussed with patient possible use of a brace on the left foot over time if symptoms worsen or persist.  3. Patient was advised to continue with comfortable shoe gear both inside and outside the home.  Patient was advised to decrease the amount of barefoot  walking and limit use of flip-flops   4. Discussed etiology of Achilles tendinitis plantar fasciitis with the patient patient was advised continue with a stretching and icing of the left lower extremity when at rest.   5. Patient will follow up in 4 weeks or p.r.n. for complaints

## 2024-07-12 ENCOUNTER — OFFICE VISIT (OUTPATIENT)
Dept: PODIATRY | Facility: CLINIC | Age: 74
End: 2024-07-12
Payer: MEDICARE

## 2024-07-12 VITALS — HEIGHT: 73 IN | BODY MASS INDEX: 26.11 KG/M2 | WEIGHT: 197 LBS

## 2024-07-12 DIAGNOSIS — M79.672 LEFT FOOT PAIN: ICD-10-CM

## 2024-07-12 DIAGNOSIS — M76.72 TENDINITIS OF LEFT PERONEUS BREVIS TENDON: Primary | ICD-10-CM

## 2024-07-12 DIAGNOSIS — M76.72 TENDINITIS OF LEFT PERONEUS LONGUS TENDON: ICD-10-CM

## 2024-07-12 RX ORDER — DEXAMETHASONE SODIUM PHOSPHATE 4 MG/ML
4 INJECTION, SOLUTION INTRA-ARTICULAR; INTRALESIONAL; INTRAMUSCULAR; INTRAVENOUS; SOFT TISSUE
Status: DISCONTINUED | OUTPATIENT
Start: 2024-07-12 | End: 2024-07-12 | Stop reason: HOSPADM

## 2024-07-12 RX ORDER — LIDOCAINE HYDROCHLORIDE 10 MG/ML
1 INJECTION INFILTRATION; PERINEURAL
Status: DISCONTINUED | OUTPATIENT
Start: 2024-07-12 | End: 2024-07-12 | Stop reason: HOSPADM

## 2024-07-12 RX ADMIN — DEXAMETHASONE SODIUM PHOSPHATE 4 MG: 4 INJECTION, SOLUTION INTRA-ARTICULAR; INTRALESIONAL; INTRAMUSCULAR; INTRAVENOUS; SOFT TISSUE at 10:07

## 2024-07-12 RX ADMIN — LIDOCAINE HYDROCHLORIDE 1 ML: 10 INJECTION INFILTRATION; PERINEURAL at 10:07

## 2024-07-12 NOTE — PROGRESS NOTES
Subjective:     Patient ID: Garcia Renteria is a 74 y.o. male.    Chief Complaint: Ankle Pain    Garcia is a 74 y.o. male who presents to the podiatry clinic  with complaint of  left foot pain. Onset of the symptoms was several years ago. Precipitating event: inversion injury to foot and inversion injury to ankle. Current symptoms include: ability to bear weight, but with some pain and worsening symptoms after a period of activity. Aggravating factors: any weight bearing. Symptoms have gradually improved. Patient has had prior foot problems. Evaluation to date: none. Treatment to date: brace which is effective and rest. Patients rates pain 8/10 on pain scale.    Review of Systems   Constitutional: Negative for chills and fever.   Cardiovascular:  Negative for chest pain and leg swelling.   Respiratory:  Negative for cough and shortness of breath.    Gastrointestinal:  Negative for diarrhea, nausea and vomiting.        Objective:     Physical Exam  Vitals reviewed.   Constitutional:       General: He is not in acute distress.     Appearance: Normal appearance. He is not ill-appearing.   HENT:      Head: Normocephalic.      Nose: Nose normal.   Pulmonary:      Effort: Pulmonary effort is normal. No respiratory distress.   Skin:     Capillary Refill: Capillary refill takes 2 to 3 seconds.   Neurological:      Mental Status: He is alert and oriented to person, place, and time.   Psychiatric:         Mood and Affect: Mood normal.         Behavior: Behavior normal.         Thought Content: Thought content normal.         Neurologic:  Protective sensation mildly diminished to the tip of the digits, light touch sensation intact bilateral lower extremity, positive paresthesias reported bilateral distal foot   Vascular:  DP and PT pulses palpable 2/4 bilateral foot, capillary fill time less 3 seconds to digits, pedal hair growth is diminished to digits aspect of the digits, mild edema noted to the left foot and ankle,  skin temperature gradient warm to cool from proximal to distal to the left foot   Musculoskeletal:  5/5 muscle strength noted bilateral foot, pain and tenderness with inversion of the left foot and palpation of the peroneal brevis tendon from lateral malleolus to the base of the left 5th metatarsal, pain and tenderness with palpation of the left sinus tarsi region, no masses noted bilateral foot, mild discomfort with palpation and ROM of left achilles tendon at insertion  Dermatologic:  Nails 1 through 5 bilateral are thickened, elongated, and discolored with presence of subungual debris, no rashes noted bilateral foot, hyperpigmentation noted to the left rearfoot, multiple well-healed surgical scars noted     Assessment:      Encounter Diagnoses   Name Primary?    Tendinitis of left peroneus brevis tendon Yes    Tendinitis of left peroneus longus tendon     Left foot pain      Plan:     Garcia was seen today for ankle pain.    Diagnoses and all orders for this visit:    Tendinitis of left peroneus brevis tendon  -     Tendon Sheath    Tendinitis of left peroneus longus tendon  -     Tendon Sheath    Left foot pain      I counseled the patient on his conditions, their implications and medical management.        1. Patient was examined and evaluated  2. Discussed with patient etiology of tendinitis of left peroneal brevis tendon.  Discussed with patient possible adjunct with oral OTC analgesics for pain relief.  Patient will continue with current exercise regimen.  Patient will continue with decrease use of the treadmill in lieu of less impactful exercises such as cycling.  Patient was advised to consider heel raises without weights at the gym for improvement of dorsiflexion of his left lower extremity.  Discussed with patient possible use of a brace on the left foot over time if symptoms worsen or persist.  Tendon Sheath    Date/Time: 7/12/2024 10:30 AM    Performed by: Ludy Minor DPM  Authorized by:  Ludy Minor DPM    Consent Done?:  Yes (Verbal)  Indications:  Pain  Location:  Foot  Foot joint: r peroneal brevis tendon sheath and insertion at right 5th metatarsal base.  Ultrasonic guidance for needle placement?: No    Needle size:  25 G  Approach:  Dorsal  Medications:  1 mL LIDOcaine HCL 10 mg/ml (1%) 10 mg/mL (1 %); 4 mg dexAMETHasone 4 mg/mL  Patient tolerance:  Patient tolerated the procedure well with no immediate complications      3. Patient was advised to continue with comfortable shoe gear both inside and outside the home.  Patient was advised to decrease the amount of barefoot walking and limit use of flip-flops   4. Discussed etiology of Achilles tendinitis plantar fasciitis with the patient patient was advised continue with a stretching and icing of the left lower extremity when at rest.   5. Patient will follow up in 4 weeks or p.r.n. for complaints

## 2024-09-11 DIAGNOSIS — E11.40 TYPE 2 DIABETES MELLITUS WITH DIABETIC NEUROPATHY, WITHOUT LONG-TERM CURRENT USE OF INSULIN: ICD-10-CM

## 2025-01-10 DIAGNOSIS — E11.9 TYPE 2 DIABETES MELLITUS WITHOUT COMPLICATION: ICD-10-CM

## 2025-01-14 ENCOUNTER — PATIENT MESSAGE (OUTPATIENT)
Dept: ADMINISTRATIVE | Facility: HOSPITAL | Age: 75
End: 2025-01-14
Payer: MEDICARE

## 2025-05-01 DIAGNOSIS — E11.9 TYPE 2 DIABETES MELLITUS WITHOUT COMPLICATION: ICD-10-CM

## 2025-05-05 LAB
CHOLEST SERPL-MSCNC: 159 MG/DL (ref 0–200)
HBA1C MFR BLD: 5.9 % (ref 4–6)
HDLC SERPL-MCNC: 42 MG/DL (ref 35–70)
LDLC SERPL CALC-MCNC: 99 MG/DL (ref 0–160)
PSA: 5.98
TRIGL SERPL-MCNC: 93 MG/DL (ref 40–160)

## 2025-06-11 ENCOUNTER — PATIENT MESSAGE (OUTPATIENT)
Dept: ADMINISTRATIVE | Facility: HOSPITAL | Age: 75
End: 2025-06-11
Payer: MEDICARE

## 2025-06-12 ENCOUNTER — PATIENT MESSAGE (OUTPATIENT)
Dept: ADMINISTRATIVE | Facility: HOSPITAL | Age: 75
End: 2025-06-12
Payer: MEDICARE

## 2025-06-25 ENCOUNTER — TELEPHONE (OUTPATIENT)
Dept: FAMILY MEDICINE | Facility: CLINIC | Age: 75
End: 2025-06-25
Payer: MEDICARE

## 2025-06-25 DIAGNOSIS — Z79.899 HIGH RISK MEDICATION USE: ICD-10-CM

## 2025-06-25 DIAGNOSIS — I10 ESSENTIAL HYPERTENSION: Primary | ICD-10-CM

## 2025-06-25 DIAGNOSIS — E11.40 TYPE 2 DIABETES MELLITUS WITH DIABETIC NEUROPATHY, WITHOUT LONG-TERM CURRENT USE OF INSULIN: ICD-10-CM

## 2025-06-25 DIAGNOSIS — E55.9 VITAMIN D DEFICIENCY: ICD-10-CM

## 2025-06-25 DIAGNOSIS — Z12.5 PROSTATE CANCER SCREENING: ICD-10-CM

## 2025-06-26 ENCOUNTER — PATIENT OUTREACH (OUTPATIENT)
Dept: ADMINISTRATIVE | Facility: HOSPITAL | Age: 75
End: 2025-06-26
Payer: MEDICARE

## 2025-06-26 ENCOUNTER — TELEPHONE (OUTPATIENT)
Dept: FAMILY MEDICINE | Facility: CLINIC | Age: 75
End: 2025-06-26
Payer: MEDICARE

## 2025-06-26 NOTE — TELEPHONE ENCOUNTER
VM left with pt and spouse as fasting labs and urine ordered enc to have done this week and call for appt since pt has not been seen since 2/2024 and not by NP since 8/2023. Health maintenance to pull May 2025 labs from [facility]

## 2025-06-26 NOTE — PROGRESS NOTES
Population Health Chart Review & Patient Outreach Details      Additional [facility] Health Notes:               Updates Requested / Reviewed:      Updated Care Coordination Note, Care Everywhere, , Care Team Updated, and Immunizations Reconciliation Completed or Queried: [facility] Topics Overdue:      [facility] Score: 4     Urine Screening  Eye Exam  Foot Exam  Uncontrolled BP    Tetanus Vaccine  Shingles/Zoster Vaccine  RSV Vaccine                  Health Maintenance Topic(s) Outreach Outcomes & Actions Taken:    Lab(s) - Outreach Outcomes & Actions Taken  : External Records Uploaded & Care Team Updated if Applicable

## 2025-06-26 NOTE — TELEPHONE ENCOUNTER
Copied from CRM #[id number]. Topic: Appointments - Amb Referral  >> Jun 26, 2025  9:55 AM Liset wrote:  Type: Needs Medical Advice  Who Called:  Pt    Best Call Back Number: [phone]    Additional Information: Pt is needing to get scheduled an appointment for after he does his labs, please advsie

## 2025-07-03 PROCEDURE — 84443 ASSAY THYROID STIM HORMONE: CPT | Performed by: NURSE PRACTITIONER

## 2025-07-03 PROCEDURE — 82465 ASSAY BLD/SERUM CHOLESTEROL: CPT | Performed by: NURSE PRACTITIONER

## 2025-07-03 PROCEDURE — 83036 HEMOGLOBIN GLYCOSYLATED A1C: CPT | Performed by: NURSE PRACTITIONER

## 2025-07-03 PROCEDURE — 85025 COMPLETE CBC W/AUTO DIFF WBC: CPT | Performed by: NURSE PRACTITIONER

## 2025-07-03 PROCEDURE — 82306 VITAMIN D 25 HYDROXY: CPT | Performed by: NURSE PRACTITIONER

## 2025-07-03 PROCEDURE — 84132 ASSAY OF SERUM POTASSIUM: CPT | Performed by: NURSE PRACTITIONER

## 2025-07-03 PROCEDURE — 82570 ASSAY OF URINE CREATININE: CPT | Performed by: NURSE PRACTITIONER

## 2025-07-03 PROCEDURE — 84153 ASSAY OF PSA TOTAL: CPT | Performed by: NURSE PRACTITIONER

## 2025-07-05 ENCOUNTER — RESULTS FOLLOW-UP (OUTPATIENT)
Dept: FAMILY MEDICINE | Facility: CLINIC | Age: 75
End: 2025-07-05

## 2025-07-11 ENCOUNTER — OFFICE VISIT (OUTPATIENT)
Dept: FAMILY MEDICINE | Facility: CLINIC | Age: 75
End: 2025-07-11
Payer: MEDICARE

## 2025-07-11 ENCOUNTER — HOSPITAL ENCOUNTER (OUTPATIENT)
Dept: RADIOLOGY | Facility: HOSPITAL | Age: 75
Discharge: HOME | End: 2025-07-11
Attending: NURSE PRACTITIONER
Payer: MEDICARE

## 2025-07-11 VITALS
DIASTOLIC BLOOD PRESSURE: 68 MMHG | OXYGEN SATURATION: 96 % | HEART RATE: 62 BPM | BODY MASS INDEX: 25.84 KG/M2 | WEIGHT: 195 LBS | SYSTOLIC BLOOD PRESSURE: 100 MMHG | HEIGHT: 73 IN

## 2025-07-11 DIAGNOSIS — M25.562 CHRONIC PAIN OF LEFT KNEE: ICD-10-CM

## 2025-07-11 DIAGNOSIS — E11.40 TYPE 2 DIABETES MELLITUS WITH DIABETIC NEUROPATHY, WITHOUT LONG-TERM CURRENT USE OF INSULIN: ICD-10-CM

## 2025-07-11 DIAGNOSIS — G89.29 CHRONIC PAIN OF LEFT KNEE: ICD-10-CM

## 2025-07-11 DIAGNOSIS — E55.9 VITAMIN D DEFICIENCY: ICD-10-CM

## 2025-07-11 DIAGNOSIS — I10 ESSENTIAL HYPERTENSION: ICD-10-CM

## 2025-07-11 DIAGNOSIS — M54.32 SCIATIC PAIN, LEFT: Primary | ICD-10-CM

## 2025-07-11 PROCEDURE — 99999 PR PBB SHADOW E&M-EST. PATIENT-LVL V: CPT | Mod: PBBFAC,,, | Performed by: NURSE PRACTITIONER

## 2025-07-11 PROCEDURE — 73562 X-RAY EXAM OF KNEE 3: CPT | Mod: TC,LT

## 2025-07-11 PROCEDURE — 73562 X-RAY EXAM OF KNEE 3: CPT | Mod: 26,LT,, | Performed by: RADIOLOGY

## 2025-07-11 RX ORDER — TIZANIDINE 4 MG/1
4 TABLET ORAL EVERY 8 HOURS PRN
Qty: 90 TABLET | Refills: 3 | Status: SHIPPED | OUTPATIENT
Start: 2025-07-11

## 2025-07-11 RX ORDER — TRIAMTERENE AND HYDROCHLOROTHIAZIDE 75; 50 MG/1; MG/1
0.5 TABLET ORAL DAILY
Start: 2025-07-11

## 2025-07-11 RX ORDER — OMEPRAZOLE 20 MG/1
20 CAPSULE, DELAYED RELEASE ORAL EVERY MORNING
Qty: 90 CAPSULE | Refills: 3 | Status: SHIPPED | OUTPATIENT
Start: 2025-07-11

## 2025-07-11 RX ORDER — ASPIRIN 325 MG
50000 TABLET, DELAYED RELEASE (ENTERIC COATED) ORAL
Qty: 12 CAPSULE | Refills: 3 | Status: SHIPPED | OUTPATIENT
Start: 2025-07-11

## 2025-07-11 RX ORDER — ATORVASTATIN CALCIUM 10 MG/1
10 TABLET, FILM COATED ORAL DAILY
Qty: 90 TABLET | Refills: 3 | Status: SHIPPED | OUTPATIENT
Start: 2025-07-11 | End: 2026-07-11

## 2025-07-11 NOTE — PROGRESS NOTES
Patient ID: Garcia Renteria is a 75 y.o. male.    Chief Complaint:   History of Present Illness    CHIEF COMPLAINT:  Patient presents today for follow up of knee and back pain.    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 He underwent back surgery with a month-long rehabilitation period. MRI from July 11th showed surgical hardware is intact without complications. He experiences sciatic nerve pain originating from his lower back. He performs sciatic stretches and reports persistent pain affecting nighttime mobility and travel. He has previously received injections for pain relief but is hesitant about additional surgical intervention, preferring conservative management.    KNEE PAIN:  He reports chronic left knee pain with bone protrusion on the side of the knee, with visible bone prominence. His last evaluation was approximately 5 years ago. He currently manages pain with two pain patches to cover different areas of discomfort and [facility]. He has not seen a specialist for knee evaluation in the past 5 years, only following up occasionally with PCP Dr. Sarkar.    GENITOURINARY:  He reports complete lack of urinary control requiring continuous diaper use. He denies difficulty initiating urination. PSA has increased from 3 to 6. [facility] for [facility].    PODIATRY:  He reports recurring black callus on bottom of foot requiring periodic removal by podiatrist. The callus is painful, hard, and progressively grows larger with each removal, possibly related to diabetic neuropathy. He uses a donut-shaped protective pad for pressure relief and follows [facility].    CURRENT MEDICATIONS:  - Gabapentin 300mg, 4 pills in morning (occasionally varies up to 6 pills)  - Myrbetriq for urinary symptoms  -  Metformin 1000mg with breakfast  - Maxzide (trimethoprim and hydrochlorothiazide) half pill in morning  - Ibuprofen 800mg for pain  He denies taking cholesterol medication.        Past Medical History:   Diagnosis Date    Colitis     Diabetes mellitus, type 2     Diverticulosis     GERD (gastroesophageal reflux disease)     Hypertension        Past Surgical History:   Procedure Laterality Date    ANKLE SURGERY Bilateral     ANTERIOR CERVICAL DISCECTOMY W/ FUSION N/A 10/30/2019    Procedure: C3-4, C4-5 ACDF;  Surgeon: Francis Alan MD;  Location: [address];  Service: Neurosurgery;  Laterality: N/A;  C3-4, C4-5 ACDF  Anesthesia:  General  Rad:  C-Arm  NM:  EMG, SEP, & MEP  Blood:  Type & Screen  Position:  Supine  Bed:  Regular slider bed  Headrest:  [place] & GW tongs/hanging weights  Misc:  Small OneBreathigen  Nerve root retractor (Gen One Cig)  Disposable #2 Kerrison  Interbody    BACK SURGERY      CAUDAL EPIDURAL STEROID INJECTION N/A 5/9/2023    Procedure: Injection-steroid-epidural-caudal;  Surgeon: Brant Will MD;  Location: [facility];  Service: Pain Management;  Laterality: N/A;  caudal    COLONOSCOPY      COLONOSCOPY N/A 10/7/2020    Procedure: COLONOSCOPY;  Surgeon: Elpidio Lau MD;  Location: [facility] ENDO;  Service: Endoscopy;  Laterality: N/A;    CREATION OF MUSCLE ROTATIONAL FLAP  3/3/2020    Procedure: CREATION, FLAP, MUSCLE ROTATION;  Surgeon: Francis Alan MD;  Location: [address];  Service: Neurosurgery;;    CYSTOSCOPY N/A 7/21/2021    Procedure: CYSTOSCOPY;  Surgeon: Colton Kamara Jr., MD;  Location: [facility] OR;  Service: Urology;  Laterality: N/A;    INJECTION OF ANESTHETIC AGENT AROUND NERVE Bilateral 3/10/2020    Procedure: Block, Nerve;  Surgeon: Erinn Surgeon;  Location: [facility] ERINN;  Service: Anesthesiology;  Laterality: Bilateral;    lower back surgery      LUMBAR LAMINECTOMY WITH FUSION N/A 3/3/2020    Procedure: T10-Pelvis Fusion with L4 PSO **AIRO** Co-Surgeon: Roberto Parkinson;  Surgeon: Francis  MD Waqas;  Location: [facility] OR [facility] FLR;  Service: Neurosurgery;  Laterality: N/A;  **AIRO**  **CELL SAVER**  Co-Surgeon: Roberto Parkinson  NM: EMG, SEP, MEP  Position: Prone  Bed: Shaji 4 post, prone pillow  Blood: Type & Hold 2 units  Rad: C-Arm, AIRO  Vendor: [employer]  Misc: Vivigen, Infuse, Cement (Depuy)  [place]    NECK SURGERY      SPINE SURGERY      TRANSRECTAL ULTRASOUND EXAMINATION N/A 7/21/2021    Procedure: ULTRASOUND, RECTAL APPROACH;  Surgeon: Colton Kamara Jr., MD;  Location: [facility] OR;  Service: Urology;  Laterality: N/A;    UPPER GASTROINTESTINAL ENDOSCOPY          Social History     Substance and Sexual Activity   Alcohol Use Yes    Alcohol/week: 2.0 standard drinks of alcohol    Types: 1 Glasses of wine, 1 Cans of beer per week    Comment: 1 wine , 2 times a week      Social History     Substance and Sexual Activity   Drug Use Not Currently      Tobacco Use: High Risk (7/11/2025)    Patient History     Smoking Tobacco Use: Some Days     Smokeless Tobacco Use: Never     Passive Exposure: Not on file        Family History   Problem Relation Name Age of Onset    Neurological Disorders Mother         Review of patient's allergies indicates:  No Known Allergies     Medication List with Changes/Refills   New Medications    ATORVASTATIN (LIPITOR) 10 MG TABLET    Take 1 tablet (10 mg total) by mouth once daily.   Current Medications    DICLOFENAC SODIUM (VOLTAREN) 1 % GEL    Apply 2 g topically 2 (two) times daily.    FAMOTIDINE (PEPCID) 40 MG TABLET    Take 1 tablet (40 mg total) by mouth every evening.    GABAPENTIN (NEURONTIN) 300 MG CAPSULE    Take 1 capsule (300 mg total) by mouth 3 (three) times daily.    IBUPROFEN (ADVIL,MOTRIN) 800 MG TABLET    Take 800 mg by mouth every 6 (six) hours as needed.    LIDOCAINE (LIDODERM) 5 %    Place 1 patch onto the skin once daily. Remove & Discard patch within 12 hours or as directed by MD    LISINOPRIL (PRINIVIL,ZESTRIL) 5 MG TABLET    Take 5 mg by mouth once daily.      METFORMIN (GLUCOPHAGE) 1000 MG TABLET    Take 1,000 mg by mouth daily with breakfast.    MYRBETRIQ 50 MG TB24    Take 1 tablet by mouth.    OMEGA 3-DHA-EPA-FISH OIL 1,000 MG (120 MG-180 MG) CAP    TAKE ONE CAPSULE BY MOUTH DAILY AS A SUPPLEMENT    SILDENAFIL (VIAGRA) 100 MG TABLET    TAKE ONE-HALF TABLET BY MOUTH DAILY FOR ERECTILE DYSFUNCTION *TAKE ONE HOUR BEFORE SEXUAL ACTIVITY* NO MORE THAN ONE DOSE IN 24 HOURS ** MUST NOT TAKE NITROGLYCERIN TYPE DRUGS WITH VIAGRA **+++THIS IS    VALACYCLOVIR (VALTREX) 500 MG TABLET    TAKE ONE TABLET BY MOUTH TWICE A DAY FOR GENITAL HERPES TAKE ONE PILL TWICE A DAY X 3 DAYS, START WITHIN 24 HOURS OF SX ONSET. ONLY  TAKE FOR 3 DAYS, GIVING EXTRA PILLS FOR REOCCURRENCE.   Changed and/or Refilled Medications    Modified Medication Previous Medication    CHOLECALCIFEROL, VITAMIN D3, 1,250 MCG (50,000 UNIT) CAPSULE cholecalciferol, vitamin D3, 1,250 mcg (50,000 unit) capsule       Take 1 capsule (50,000 Units total) by mouth every 7 days.    Take by mouth.    OMEPRAZOLE (PRILOSEC) 20 MG CAPSULE omeprazole (PRILOSEC) 20 MG capsule       Take 1 capsule (20 mg total) by mouth every morning.    Take 20 mg by mouth.    TIZANIDINE (ZANAFLEX) 4 MG TABLET tiZANidine (ZANAFLEX) 4 MG tablet       Take 1 tablet (4 mg total) by mouth every 8 (eight) hours as needed (pain).    Take 1 tablet by mouth 3 times daily as needed.    TRIAMTERENE-HYDROCHLOROTHIAZIDE 75-50 MG (MAXZIDE) 75-50 MG PER TABLET triamterene-hydrochlorothiazide 75-50 mg (MAXZIDE) 75-50 mg per tablet       Take 0.5 tablets by mouth once daily.    Take 0.5 tablets by mouth once daily.    Discontinued Medications    EASY TOUCH ALCOHOL PREP PADS PADM    Apply topically.    FLUORIDE, SODIUM, (PREVIDENT 5000) 1.1 % PSTE            Follow-up      Review of Systems   Constitutional: Negative.    HENT: Negative.     Eyes: Negative.    Respiratory: Negative.     Cardiovascular: Negative.    Gastrointestinal: Negative.    Endocrine:  Negative.    Genitourinary: Negative.    Musculoskeletal:  Positive for back pain and gait problem.        Left knee pain   Skin: Negative.    Allergic/Immunologic: Negative.    Hematological: Negative.    Psychiatric/Behavioral: Negative.                    Physical Exam  Vitals and nursing note reviewed.   Constitutional:       General: He is not in acute distress.     Appearance: Normal appearance. He is well-developed and normal weight. He is not ill-appearing.   HENT:      Head: Normocephalic.   Eyes:      Conjunctiva/sclera: Conjunctivae normal.   Neck:      Thyroid: No thyromegaly.   Cardiovascular:      Rate and Rhythm: Normal rate and regular rhythm.      Heart sounds: Normal heart sounds. No murmur heard.  Pulmonary:      Effort: Pulmonary effort is normal.      Breath sounds: Normal breath sounds. No wheezing or rales.   Musculoskeletal:         General: Normal range of motion.      Cervical back: Normal range of motion.      Right lower leg: No edema.      Left lower leg: No edema.   Skin:     General: Skin is warm and dry.   Neurological:      Mental Status: He is alert and oriented to person, place, and time. Mental status is at baseline.      Motor: Weakness present.      Gait: Gait abnormal.      Comments: Use of rollator   Psychiatric:         Mood and Affect: Mood normal.         Behavior: Behavior normal.         Thought Content: Thought content normal.         Judgment: Judgment normal.                      ASSESSMENT AND PLAN:   1-will fax labs to [facility]CP, Dr. Hilario for elevated PSA   2- left knee xray with referral to [facility]   3- resume lipitor 10 mg nightly  4- take gabapentin 3 xs daily and not all at once  5- call [facility] for eye exam  6- sciatic pain exercises recommended  7- consider spinal cord stimulator  8- non fasting labs in 6 months then see NP  9- if pt does not hear from [facility] primary or urology, call in 1 week  Sciatic pain, left  -     tiZANidine (ZANAFLEX) 4 MG tablet;  Take 1 tablet (4 mg total) by mouth every 8 (eight) hours as needed (pain).  Dispense: 90 tablet; Refill: 3    Type 2 diabetes mellitus with diabetic neuropathy, without long-term current use of insulin  -     atorvastatin (LIPITOR) 10 MG tablet; Take 1 tablet (10 mg total) by mouth once daily.  Dispense: 90 tablet; Refill: 3  -     CBC Auto Differential; Future; Expected date: 07/11/2025  -     Comprehensive Metabolic Panel; Future; Expected date: 07/11/2025  -     Hemoglobin A1C; Future; Expected date: 07/11/2025    Chronic pain of left knee  -     X-Ray Knee 3 View Left; Future; Expected date: 07/11/2025  -     tiZANidine (ZANAFLEX) 4 MG tablet; Take 1 tablet (4 mg total) by mouth every 8 (eight) hours as needed (pain).  Dispense: 90 tablet; Refill: 3    Essential hypertension  -     triamterene-hydrochlorothiazide 75-50 mg (MAXZIDE) 75-50 mg per tablet; Take 0.5 tablets by mouth once daily.  -     CBC Auto Differential; Future; Expected date: 07/11/2025  -     Comprehensive Metabolic Panel; Future; Expected date: 07/11/2025  -     Hemoglobin A1C; Future; Expected date: 07/11/2025    Vitamin D deficiency  -     cholecalciferol, vitamin D3, 1,250 mcg (50,000 unit) capsule; Take 1 capsule (50,000 Units total) by mouth every 7 days.  Dispense: 12 capsule; Refill: 3    Other orders  -     omeprazole (PRILOSEC) 20 MG capsule; Take 1 capsule (20 mg total) by mouth every morning.  Dispense: 90 capsule; Refill: 3        Risks, benefits, and side effects were discussed with the patient. All questions were answered to the fullest satisfaction of the patient, and pt verbalized understanding and agreement to treatment plan. Pt was to call with any new or worsening symptoms, or present to the ER.        This note was generated with the assistance of ambient listening technology. Verbal consent was obtained by the patient and accompanying visitor(s) for the recording of patient appointment to facilitate this note. I attest to  having reviewed and edited the generated note for accuracy, though some syntax or spelling errors may persist. Please contact the author of this note for any clarification.

## 2025-07-11 NOTE — PATIENT INSTRUCTIONS
1-will fax labs to [facility] PCP, Dr. Hilario for elevated PSA   2- left knee xray with referral to [facility]   3- resume lipitor 10 mg nightly  4- take gabapentin 3 xs daily and not all at once  5- call [facility] for eye exam  6- sciatic pain exercises recommended  7- consider spinal cord stimulator  8- non fasting labs in 6 months then see NP  9- if pt does not hear from [facility] primary or urology, call in 1 week

## 2025-07-20 ENCOUNTER — TELEPHONE (OUTPATIENT)
Dept: FAMILY MEDICINE | Facility: CLINIC | Age: 75
End: 2025-07-20
Payer: MEDICARE

## 2025-07-20 DIAGNOSIS — M25.562 CHRONIC PAIN OF LEFT KNEE: Primary | ICD-10-CM

## 2025-07-20 DIAGNOSIS — G89.29 CHRONIC PAIN OF LEFT KNEE: Primary | ICD-10-CM

## 2025-07-21 NOTE — TELEPHONE ENCOUNTER
Please fax last office note, demographics, left knee xray, with referral to [facility]. Phone pt to let him know xray shows h/o ligament injury, no fluid collection, and spurring on left side is what he is feeling. Ensure to take images on disc to appt. Thanks, Laine

## 2025-07-22 NOTE — TELEPHONE ENCOUNTER
Faxed office note, Lt knee XR report, demographics  Awaiting referral to be signed by NP    Attempted to contact pt   No answer. LVM

## (undated) DEVICE — DRAPE INCISE IOBAN 2 23X17IN

## (undated) DEVICE — SEE MEDLINE ITEM 156905

## (undated) DEVICE — Device

## (undated) DEVICE — SPONGE GAUZE 16PLY 4X4

## (undated) DEVICE — JELLY KY LUBRICATING 5G PACKET

## (undated) DEVICE — DRAPE C-ARMOR EQUIPMENT COVER

## (undated) DEVICE — BLADE 4IN EDGE INSULATED

## (undated) DEVICE — TUBE FRAZIER 5MM 2FT SOFT TIP

## (undated) DEVICE — SEE MEDLINE ITEM 152487

## (undated) DEVICE — STAPLER SKIN PROXIMATE WIDE

## (undated) DEVICE — SHEET DRAPE MEDIUM

## (undated) DEVICE — TUBING MINIBORE EXTENSION

## (undated) DEVICE — FRAZIER 18FR

## (undated) DEVICE — ADHESIVE DERMABOND ADVANCED

## (undated) DEVICE — DRESSING SURGICAL 1/2X1/2

## (undated) DEVICE — CHLORAPREP W TINT 26ML APPL

## (undated) DEVICE — HEMOSTAT SURGICEL 4X8IN

## (undated) DEVICE — SPONGE LAP 4X18 PREWASHED

## (undated) DEVICE — RUBBERBAND STERILE 3X1/8IN

## (undated) DEVICE — MARKER SKIN STND TIP BLUE BARR

## (undated) DEVICE — NDL TUOHY EPIDURAL 20G X 3.5

## (undated) DEVICE — SUT STRATAFIX PDS PLUS VIO

## (undated) DEVICE — DRAPE STERI INSTRUMENT 1018

## (undated) DEVICE — DRAPE THYROID WITH ARMBOARD

## (undated) DEVICE — DRESSING AQUACEL SACRAL 9 X 9

## (undated) DEVICE — KIT EVACUATOR 3-SPRING 1/8 DRN

## (undated) DEVICE — NDL SPINAL 18GX3.5 SPINOCAN

## (undated) DEVICE — GOWN X-LG STERILE BACK

## (undated) DEVICE — KIT SURGIFLO EVITHROM

## (undated) DEVICE — SUT VICRYL+ 1 CT1 18IN

## (undated) DEVICE — KIT SURGIFLO HEMOSTATIC MATRIX

## (undated) DEVICE — PACK SET UP CONVERTORS

## (undated) DEVICE — ELECTRODE REM PLYHSV RETURN 9

## (undated) DEVICE — DRAPE OPMI STERILE

## (undated) DEVICE — PIN DISTRACTION DISP 14MM
Type: IMPLANTABLE DEVICE | Site: SPINE CERVICAL | Status: NON-FUNCTIONAL
Removed: 2019-10-30

## (undated) DEVICE — SEE MEDLINE ITEM 146347

## (undated) DEVICE — DRESSING AQUACEL FOAM 5 X 5

## (undated) DEVICE — DRESSING AQUACEL FOAM 4 X 12

## (undated) DEVICE — ROUTER TAPERED 2.3MM

## (undated) DEVICE — CORD BIPOLAR 12 FOOT

## (undated) DEVICE — DRESSING MEPILEX BORDER 4 X 4

## (undated) DEVICE — GLOVE SURG ULTRA TOUCH 7.5

## (undated) DEVICE — DRAIN CHANNEL ROUND 15FR

## (undated) DEVICE — SKIN MARKER STER DUAL TIP

## (undated) DEVICE — DRAPE ABDOMINAL TIBURON 14X11

## (undated) DEVICE — DRESSING TEGADERM 4.4X5IN

## (undated) DEVICE — DRESSING TRANS 4X4 TEGADERM

## (undated) DEVICE — CANISTER INFOV.A.C WOUND 500ML

## (undated) DEVICE — SEE MEDLINE ITEM 157131

## (undated) DEVICE — TAP 5MM SPINAL POWER

## (undated) DEVICE — GELATIN SURGIPOWDER ABSORBABLE

## (undated) DEVICE — SOL WATER STRL IRR 1000ML

## (undated) DEVICE — TIP KERRISON RONGEUR THIN 3MM

## (undated) DEVICE — SUT MONOCRYL 4-0 PS-1 UND

## (undated) DEVICE — BURR ROUND PRECISION 7.5MM

## (undated) DEVICE — DRAPE STERI-DRAPE 1000 17X11IN

## (undated) DEVICE — COVER BACK TABLE 72X21

## (undated) DEVICE — CANISTER SUCTION 3000CC

## (undated) DEVICE — DRESSING AQUACEL FOAM 3 X 3

## (undated) DEVICE — CATH SUCTION 10FR

## (undated) DEVICE — BURR MIS CURVED 3.0MM

## (undated) DEVICE — SET CYSTO IRRIGATION UNIV SPIK

## (undated) DEVICE — EVACUATOR WOUND BULB 100CC

## (undated) DEVICE — KIT SPINAL PATIENT CARE JACK

## (undated) DEVICE — HEMOSTAT SURGICEL NU-KNIT 6X9

## (undated) DEVICE — SUT CTD VICRYL 3-0 CR/SH

## (undated) DEVICE — KIT PREVENA PLUS

## (undated) DEVICE — GAUZE SPONGE PEANUT STRL

## (undated) DEVICE — SUT CTD VICRYL 2-0 CR/CT-2

## (undated) DEVICE — TRAY FOLEY 16FR INFECTION CONT

## (undated) DEVICE — BUR BONE CUT MICRO TPS 3X3.8MM

## (undated) DEVICE — SUT SILK 3-0 BLK BR SH 30IN

## (undated) DEVICE — SEALER AQUAMANTYS 2.3 BIPOLAR

## (undated) DEVICE — RASP ELITE HELICOIDAL

## (undated) DEVICE — WATER STERILE INJ 500ML BAG

## (undated) DEVICE — SYR DISP LL 5CC

## (undated) DEVICE — DRAPE C ARM 42 X 120 10/BX

## (undated) DEVICE — SCRUB 10% POVIDONE IODINE 4OZ

## (undated) DEVICE — KIT ENDO CARRY-ON PROC 100310

## (undated) DEVICE — SEE MEDLINE ITEM 146292

## (undated) DEVICE — TAP POWER 6MM DOUBLE LEAD

## (undated) DEVICE — TAP 8MM SPINAL POWER

## (undated) DEVICE — GAUZE SPONGE 4X4 12PLY

## (undated) DEVICE — NDL BLUNT W/O FILTER 18GX1.5IN

## (undated) DEVICE — NDL SAFETY 25G X 1.5 ECLIPSE

## (undated) DEVICE — SEE MEDLINE ITEM 157150